# Patient Record
Sex: FEMALE | Race: WHITE | NOT HISPANIC OR LATINO | Employment: OTHER | URBAN - METROPOLITAN AREA
[De-identification: names, ages, dates, MRNs, and addresses within clinical notes are randomized per-mention and may not be internally consistent; named-entity substitution may affect disease eponyms.]

---

## 2018-08-01 DIAGNOSIS — M54.50 LUMBAGO: Primary | ICD-10-CM

## 2018-08-01 DIAGNOSIS — M54.2 CERVICALGIA: Primary | ICD-10-CM

## 2018-08-03 ENCOUNTER — HOSPITAL ENCOUNTER (OUTPATIENT)
Dept: RADIOLOGY | Facility: HOSPITAL | Age: 53
Discharge: HOME | End: 2018-08-03
Attending: NURSE PRACTITIONER

## 2018-08-03 DIAGNOSIS — M54.50 LUMBAGO: ICD-10-CM

## 2018-08-03 DIAGNOSIS — M54.2 CERVICALGIA: ICD-10-CM

## 2018-08-03 PROCEDURE — 72141 MRI NECK SPINE W/O DYE: CPT | Mod: 26,,, | Performed by: RADIOLOGY

## 2018-08-03 PROCEDURE — 72141 MRI NECK SPINE W/O DYE: CPT | Mod: TC

## 2018-08-03 PROCEDURE — 72148 MRI LUMBAR SPINE W/O DYE: CPT | Mod: TC

## 2018-08-03 PROCEDURE — 72148 MRI LUMBAR SPINE W/O DYE: CPT | Mod: 26,,, | Performed by: RADIOLOGY

## 2019-02-20 DIAGNOSIS — Z12.39 SCREENING BREAST EXAMINATION: Primary | ICD-10-CM

## 2019-02-22 ENCOUNTER — HOSPITAL ENCOUNTER (OUTPATIENT)
Dept: RADIOLOGY | Facility: HOSPITAL | Age: 54
Discharge: HOME | End: 2019-02-22
Attending: NURSE PRACTITIONER

## 2019-02-22 VITALS — WEIGHT: 130 LBS | HEIGHT: 65 IN | BODY MASS INDEX: 21.66 KG/M2

## 2019-02-22 DIAGNOSIS — Z12.39 SCREENING BREAST EXAMINATION: ICD-10-CM

## 2019-02-22 PROCEDURE — 77067 MAMMO DIGITAL SCREENING BILAT WITH CAD: ICD-10-PCS | Mod: 26,,, | Performed by: RADIOLOGY

## 2019-02-22 PROCEDURE — 77067 SCR MAMMO BI INCL CAD: CPT | Mod: 26,,, | Performed by: RADIOLOGY

## 2019-02-22 PROCEDURE — 77067 SCR MAMMO BI INCL CAD: CPT | Mod: TC

## 2019-05-16 ENCOUNTER — HOSPITAL ENCOUNTER (EMERGENCY)
Facility: HOSPITAL | Age: 54
Discharge: HOME | End: 2019-05-16
Attending: INTERNAL MEDICINE

## 2019-05-16 VITALS
TEMPERATURE: 98 F | HEIGHT: 65 IN | DIASTOLIC BLOOD PRESSURE: 82 MMHG | OXYGEN SATURATION: 98 % | RESPIRATION RATE: 18 BRPM | HEART RATE: 102 BPM | SYSTOLIC BLOOD PRESSURE: 122 MMHG | BODY MASS INDEX: 21.66 KG/M2 | WEIGHT: 130 LBS

## 2019-05-16 DIAGNOSIS — M54.2 NECK PAIN: ICD-10-CM

## 2019-05-16 DIAGNOSIS — M43.6 TORTICOLLIS, ACUTE: Primary | ICD-10-CM

## 2019-05-16 DIAGNOSIS — M47.892 OTHER OSTEOARTHRITIS OF SPINE, CERVICAL REGION: ICD-10-CM

## 2019-05-16 LAB
AMPHET+METHAMPHET UR QL: NORMAL
ANION GAP SERPL CALC-SCNC: 12 MMOL/L (ref 8–16)
BACTERIA #/AREA URNS HPF: ABNORMAL /HPF
BARBITURATES UR QL SCN>200 NG/ML: NEGATIVE
BASOPHILS # BLD AUTO: 0.07 K/UL (ref 0–0.2)
BASOPHILS NFR BLD: 0.8 % (ref 0–1.9)
BENZODIAZ UR QL SCN>200 NG/ML: NEGATIVE
BILIRUB UR QL STRIP: NEGATIVE
BUN SERPL-MCNC: 17 MG/DL (ref 6–20)
BZE UR QL SCN: NEGATIVE
CALCIUM SERPL-MCNC: 8.7 MG/DL (ref 8.7–10.5)
CANNABINOIDS UR QL SCN: NEGATIVE
CHLORIDE SERPL-SCNC: 104 MMOL/L (ref 95–110)
CLARITY UR: CLEAR
CO2 SERPL-SCNC: 25 MMOL/L (ref 23–29)
COLOR UR: YELLOW
CREAT SERPL-MCNC: 0.7 MG/DL (ref 0.5–1.4)
CREAT UR-MCNC: 49.8 MG/DL (ref 15–325)
CRP SERPL-MCNC: 0.87 MG/DL (ref 0–0.75)
DIFFERENTIAL METHOD: ABNORMAL
EOSINOPHIL # BLD AUTO: 0.1 K/UL (ref 0–0.5)
EOSINOPHIL NFR BLD: 1.1 % (ref 0–8)
ERYTHROCYTE [DISTWIDTH] IN BLOOD BY AUTOMATED COUNT: 11.9 % (ref 11.5–14.5)
EST. GFR  (AFRICAN AMERICAN): >60 ML/MIN/1.73 M^2
EST. GFR  (NON AFRICAN AMERICAN): >60 ML/MIN/1.73 M^2
GLUCOSE SERPL-MCNC: 91 MG/DL (ref 70–110)
GLUCOSE UR QL STRIP: NEGATIVE
HCT VFR BLD AUTO: 38.8 % (ref 37–48.5)
HGB BLD-MCNC: 13.1 G/DL (ref 12–16)
HGB UR QL STRIP: NEGATIVE
IMM GRANULOCYTES # BLD AUTO: 0.03 K/UL (ref 0–0.04)
IMM GRANULOCYTES NFR BLD AUTO: 0.3 % (ref 0–0.5)
KETONES UR QL STRIP: NEGATIVE
LEUKOCYTE ESTERASE UR QL STRIP: ABNORMAL
LYMPHOCYTES # BLD AUTO: 2.8 K/UL (ref 1–4.8)
LYMPHOCYTES NFR BLD: 30.3 % (ref 18–48)
MCH RBC QN AUTO: 32.3 PG (ref 27–31)
MCHC RBC AUTO-ENTMCNC: 33.8 G/DL (ref 32–36)
MCV RBC AUTO: 96 FL (ref 82–98)
MICROSCOPIC COMMENT: ABNORMAL
MONOCYTES # BLD AUTO: 0.6 K/UL (ref 0.3–1)
MONOCYTES NFR BLD: 6.2 % (ref 4–15)
NEUTROPHILS # BLD AUTO: 5.7 K/UL (ref 1.8–7.7)
NEUTROPHILS NFR BLD: 61.3 % (ref 38–73)
NITRITE UR QL STRIP: NEGATIVE
NRBC BLD-RTO: 0 /100 WBC
OPIATES UR QL SCN: NEGATIVE
PCP UR QL SCN>25 NG/ML: NEGATIVE
PH UR STRIP: 6 [PH] (ref 5–8)
PLATELET # BLD AUTO: 299 K/UL (ref 150–350)
PMV BLD AUTO: 9.9 FL (ref 9.2–12.9)
POTASSIUM SERPL-SCNC: 3 MMOL/L (ref 3.5–5.1)
PROT UR QL STRIP: NEGATIVE
RBC # BLD AUTO: 4.06 M/UL (ref 4–5.4)
RBC #/AREA URNS HPF: 1 /HPF (ref 0–4)
SODIUM SERPL-SCNC: 141 MMOL/L (ref 136–145)
SP GR UR STRIP: 1.01 (ref 1–1.03)
SQUAMOUS #/AREA URNS HPF: 5 /HPF
TOXICOLOGY INFORMATION: NORMAL
URN SPEC COLLECT METH UR: ABNORMAL
UROBILINOGEN UR STRIP-ACNC: NEGATIVE EU/DL
WBC # BLD AUTO: 9.21 K/UL (ref 3.9–12.7)
WBC #/AREA URNS HPF: 5 /HPF (ref 0–5)

## 2019-05-16 PROCEDURE — 72040 X-RAY EXAM NECK SPINE 2-3 VW: CPT | Mod: TC,FY

## 2019-05-16 PROCEDURE — 96375 TX/PRO/DX INJ NEW DRUG ADDON: CPT | Mod: 59

## 2019-05-16 PROCEDURE — 72040 X-RAY EXAM NECK SPINE 2-3 VW: CPT | Mod: 26,,, | Performed by: RADIOLOGY

## 2019-05-16 PROCEDURE — 72050 X-RAY EXAM NECK SPINE 4/5VWS: CPT | Mod: 26,,, | Performed by: RADIOLOGY

## 2019-05-16 PROCEDURE — 86140 C-REACTIVE PROTEIN: CPT

## 2019-05-16 PROCEDURE — 80307 DRUG TEST PRSMV CHEM ANLYZR: CPT

## 2019-05-16 PROCEDURE — 99284 EMERGENCY DEPT VISIT MOD MDM: CPT | Mod: 25

## 2019-05-16 PROCEDURE — 80048 BASIC METABOLIC PNL TOTAL CA: CPT

## 2019-05-16 PROCEDURE — 85025 COMPLETE CBC W/AUTO DIFF WBC: CPT

## 2019-05-16 PROCEDURE — 63600175 PHARM REV CODE 636 W HCPCS: Performed by: INTERNAL MEDICINE

## 2019-05-16 PROCEDURE — 96374 THER/PROPH/DIAG INJ IV PUSH: CPT

## 2019-05-16 PROCEDURE — 81000 URINALYSIS NONAUTO W/SCOPE: CPT | Mod: 59

## 2019-05-16 PROCEDURE — 72040 XR CERVICAL SPINE FLEXION  AND EXTENSION ONLY: ICD-10-PCS | Mod: 26,,, | Performed by: RADIOLOGY

## 2019-05-16 PROCEDURE — 72050 XR CERVICAL SPINE COMPLETE 5 VIEW: ICD-10-PCS | Mod: 26,,, | Performed by: RADIOLOGY

## 2019-05-16 PROCEDURE — 72050 X-RAY EXAM NECK SPINE 4/5VWS: CPT | Mod: TC,FY

## 2019-05-16 RX ORDER — AMLODIPINE BESYLATE 10 MG/1
10 TABLET ORAL DAILY
COMMUNITY
End: 2021-01-19

## 2019-05-16 RX ORDER — OMEPRAZOLE 10 MG/1
10 CAPSULE, DELAYED RELEASE ORAL DAILY
COMMUNITY
End: 2021-11-27

## 2019-05-16 RX ORDER — NAPROXEN 500 MG/1
500 TABLET ORAL 2 TIMES DAILY WITH MEALS
Qty: 10 TABLET | Refills: 0 | Status: SHIPPED | OUTPATIENT
Start: 2019-05-16 | End: 2020-03-15 | Stop reason: CLARIF

## 2019-05-16 RX ORDER — ATORVASTATIN CALCIUM 10 MG/1
10 TABLET, FILM COATED ORAL DAILY
COMMUNITY
End: 2021-01-06

## 2019-05-16 RX ORDER — HYDROXYZINE PAMOATE 25 MG/1
25 CAPSULE ORAL 4 TIMES DAILY
COMMUNITY
End: 2021-07-14 | Stop reason: SDUPTHER

## 2019-05-16 RX ORDER — KETOROLAC TROMETHAMINE 30 MG/ML
30 INJECTION, SOLUTION INTRAMUSCULAR; INTRAVENOUS
Status: COMPLETED | OUTPATIENT
Start: 2019-05-16 | End: 2019-05-16

## 2019-05-16 RX ORDER — DEXAMETHASONE SODIUM PHOSPHATE 100 MG/10ML
10 INJECTION INTRAMUSCULAR; INTRAVENOUS
Status: COMPLETED | OUTPATIENT
Start: 2019-05-16 | End: 2019-05-16

## 2019-05-16 RX ORDER — LORAZEPAM 2 MG/ML
2 INJECTION INTRAMUSCULAR
Status: COMPLETED | OUTPATIENT
Start: 2019-05-16 | End: 2019-05-16

## 2019-05-16 RX ORDER — ASPIRIN 325 MG
325 TABLET ORAL DAILY
Status: ON HOLD | COMMUNITY
End: 2021-12-07 | Stop reason: HOSPADM

## 2019-05-16 RX ORDER — LORAZEPAM 1 MG/1
1 TABLET ORAL EVERY 8 HOURS PRN
Qty: 10 TABLET | Refills: 0 | Status: SHIPPED | OUTPATIENT
Start: 2019-05-16 | End: 2020-04-20

## 2019-05-16 RX ORDER — VENLAFAXINE HYDROCHLORIDE 150 MG/1
75 CAPSULE, EXTENDED RELEASE ORAL DAILY
COMMUNITY
End: 2021-01-19

## 2019-05-16 RX ORDER — FLUTICASONE PROPIONATE AND SALMETEROL 100; 50 UG/1; UG/1
1 POWDER RESPIRATORY (INHALATION) 2 TIMES DAILY
COMMUNITY
End: 2021-02-23

## 2019-05-16 RX ADMIN — LORAZEPAM 2 MG: 2 INJECTION INTRAMUSCULAR; INTRAVENOUS at 06:05

## 2019-05-16 RX ADMIN — KETOROLAC TROMETHAMINE 30 MG: 30 INJECTION, SOLUTION INTRAMUSCULAR at 06:05

## 2019-05-16 RX ADMIN — DEXAMETHASONE SODIUM PHOSPHATE 10 MG: 10 INJECTION INTRAMUSCULAR; INTRAVENOUS at 06:05

## 2019-05-16 NOTE — DISCHARGE INSTRUCTIONS
Ativan 1 tablet every 8 hr as needed for spasm  Naprosyn 1 twice daily with meals  Discontinue any activity that would be worsening your neck issue including tobacco use    Follow-up with primary care provider

## 2019-05-16 NOTE — ED NOTES
Pt to ED with c/o pain & stiffness to neck X several days. Pt states she has had trouble with shoulders and back pain & for past couple days it has traveled to neck. Respirations noted, even & non-labored.

## 2019-05-16 NOTE — ED TRIAGE NOTES
"Pt to ED with c/o of "stiff neck". Pt states she has had issues with shoulder pain in the past & for the past 2 days it has moved towards her neck.  "

## 2020-03-15 ENCOUNTER — HOSPITAL ENCOUNTER (OUTPATIENT)
Facility: HOSPITAL | Age: 55
Discharge: HOME | End: 2020-03-18
Attending: EMERGENCY MEDICINE | Admitting: INTERNAL MEDICINE
Payer: MEDICAID

## 2020-03-15 DIAGNOSIS — Z01.818 PRE-OP EVALUATION: ICD-10-CM

## 2020-03-15 DIAGNOSIS — N76.4 LEFT GENITAL LABIAL ABSCESS: ICD-10-CM

## 2020-03-15 DIAGNOSIS — L02.214 ABSCESS OF GROIN, LEFT: Primary | ICD-10-CM

## 2020-03-15 DIAGNOSIS — Z48.89 ENCOUNTER FOR POSTOPERATIVE CARE: ICD-10-CM

## 2020-03-15 PROBLEM — J44.1 COPD EXACERBATION: Status: ACTIVE | Noted: 2020-03-15

## 2020-03-15 LAB
ALBUMIN SERPL BCP-MCNC: 3.7 G/DL (ref 3.5–5.2)
ALP SERPL-CCNC: 65 U/L (ref 55–135)
ALT SERPL W/O P-5'-P-CCNC: 13 U/L (ref 10–44)
ANION GAP SERPL CALC-SCNC: 10 MMOL/L (ref 8–16)
APTT BLDCRRT: 29 SEC (ref 21–32)
AST SERPL-CCNC: 16 U/L (ref 10–40)
BASOPHILS # BLD AUTO: 0.06 K/UL (ref 0–0.2)
BASOPHILS NFR BLD: 0.3 % (ref 0–1.9)
BILIRUB SERPL-MCNC: 0.8 MG/DL (ref 0.1–1)
BILIRUB UR QL STRIP: NEGATIVE
BUN SERPL-MCNC: 11 MG/DL (ref 6–20)
CALCIUM SERPL-MCNC: 8.6 MG/DL (ref 8.7–10.5)
CHLORIDE SERPL-SCNC: 104 MMOL/L (ref 95–110)
CLARITY UR: CLEAR
CO2 SERPL-SCNC: 25 MMOL/L (ref 23–29)
COLOR UR: YELLOW
CREAT SERPL-MCNC: 0.8 MG/DL (ref 0.5–1.4)
CRP SERPL-MCNC: 23.3 MG/DL (ref 0–0.75)
DIFFERENTIAL METHOD: ABNORMAL
EOSINOPHIL # BLD AUTO: 0.1 K/UL (ref 0–0.5)
EOSINOPHIL NFR BLD: 0.4 % (ref 0–8)
ERYTHROCYTE [DISTWIDTH] IN BLOOD BY AUTOMATED COUNT: 11.7 % (ref 11.5–14.5)
ERYTHROCYTE [SEDIMENTATION RATE] IN BLOOD BY WESTERGREN METHOD: 48 MM/HR (ref 0–20)
EST. GFR  (AFRICAN AMERICAN): >60 ML/MIN/1.73 M^2
EST. GFR  (NON AFRICAN AMERICAN): >60 ML/MIN/1.73 M^2
GLUCOSE SERPL-MCNC: 97 MG/DL (ref 70–110)
GLUCOSE UR QL STRIP: NEGATIVE
HCT VFR BLD AUTO: 41.1 % (ref 37–48.5)
HGB BLD-MCNC: 13.7 G/DL (ref 12–16)
HGB UR QL STRIP: NEGATIVE
IMM GRANULOCYTES # BLD AUTO: 0.1 K/UL (ref 0–0.04)
IMM GRANULOCYTES NFR BLD AUTO: 0.5 % (ref 0–0.5)
INR PPP: 1.2 (ref 0.8–1.2)
KETONES UR QL STRIP: NEGATIVE
LACTATE SERPL-SCNC: 1 MMOL/L (ref 0.5–2.2)
LEUKOCYTE ESTERASE UR QL STRIP: NEGATIVE
LYMPHOCYTES # BLD AUTO: 2.2 K/UL (ref 1–4.8)
LYMPHOCYTES NFR BLD: 12.2 % (ref 18–48)
MCH RBC QN AUTO: 31.6 PG (ref 27–31)
MCHC RBC AUTO-ENTMCNC: 33.3 G/DL (ref 32–36)
MCV RBC AUTO: 95 FL (ref 82–98)
MONOCYTES # BLD AUTO: 0.8 K/UL (ref 0.3–1)
MONOCYTES NFR BLD: 4.1 % (ref 4–15)
NEUTROPHILS # BLD AUTO: 15.2 K/UL (ref 1.8–7.7)
NEUTROPHILS NFR BLD: 82.5 % (ref 38–73)
NITRITE UR QL STRIP: NEGATIVE
NRBC BLD-RTO: 0 /100 WBC
PH UR STRIP: 6 [PH] (ref 5–8)
PLATELET # BLD AUTO: 320 K/UL (ref 150–350)
PMV BLD AUTO: 10.1 FL (ref 9.2–12.9)
POTASSIUM SERPL-SCNC: 3 MMOL/L (ref 3.5–5.1)
PROT SERPL-MCNC: 7.2 G/DL (ref 6–8.4)
PROT UR QL STRIP: ABNORMAL
PROTHROMBIN TIME: 13.2 SEC (ref 9–12.5)
RBC # BLD AUTO: 4.34 M/UL (ref 4–5.4)
SODIUM SERPL-SCNC: 139 MMOL/L (ref 136–145)
SP GR UR STRIP: 1.02 (ref 1–1.03)
TSH SERPL DL<=0.005 MIU/L-ACNC: 2.46 UIU/ML (ref 0.34–5.6)
URN SPEC COLLECT METH UR: ABNORMAL
UROBILINOGEN UR STRIP-ACNC: NEGATIVE EU/DL
WBC # BLD AUTO: 18.38 K/UL (ref 3.9–12.7)

## 2020-03-15 PROCEDURE — 36415 COLL VENOUS BLD VENIPUNCTURE: CPT

## 2020-03-15 PROCEDURE — 94761 N-INVAS EAR/PLS OXIMETRY MLT: CPT

## 2020-03-15 PROCEDURE — G0378 HOSPITAL OBSERVATION PER HR: HCPCS

## 2020-03-15 PROCEDURE — 85651 RBC SED RATE NONAUTOMATED: CPT

## 2020-03-15 PROCEDURE — 83605 ASSAY OF LACTIC ACID: CPT

## 2020-03-15 PROCEDURE — 63600175 PHARM REV CODE 636 W HCPCS: Performed by: EMERGENCY MEDICINE

## 2020-03-15 PROCEDURE — 80053 COMPREHEN METABOLIC PANEL: CPT

## 2020-03-15 PROCEDURE — 85025 COMPLETE CBC W/AUTO DIFF WBC: CPT

## 2020-03-15 PROCEDURE — 99285 EMERGENCY DEPT VISIT HI MDM: CPT | Mod: 25

## 2020-03-15 PROCEDURE — 94640 AIRWAY INHALATION TREATMENT: CPT

## 2020-03-15 PROCEDURE — 81003 URINALYSIS AUTO W/O SCOPE: CPT

## 2020-03-15 PROCEDURE — 71045 X-RAY EXAM CHEST 1 VIEW: CPT | Mod: TC,FY

## 2020-03-15 PROCEDURE — 93005 ELECTROCARDIOGRAM TRACING: CPT

## 2020-03-15 PROCEDURE — 71045 XR CHEST AP PORTABLE: ICD-10-PCS | Mod: 26,,, | Performed by: RADIOLOGY

## 2020-03-15 PROCEDURE — 63600175 PHARM REV CODE 636 W HCPCS: Performed by: INTERNAL MEDICINE

## 2020-03-15 PROCEDURE — 99220 PR INITIAL OBSERVATION CARE,LEVL III: ICD-10-PCS | Mod: ,,, | Performed by: INTERNAL MEDICINE

## 2020-03-15 PROCEDURE — 25000242 PHARM REV CODE 250 ALT 637 W/ HCPCS: Performed by: INTERNAL MEDICINE

## 2020-03-15 PROCEDURE — 96365 THER/PROPH/DIAG IV INF INIT: CPT

## 2020-03-15 PROCEDURE — 71045 X-RAY EXAM CHEST 1 VIEW: CPT | Mod: 26,,, | Performed by: RADIOLOGY

## 2020-03-15 PROCEDURE — 84443 ASSAY THYROID STIM HORMONE: CPT

## 2020-03-15 PROCEDURE — 21400001 HC TELEMETRY ROOM

## 2020-03-15 PROCEDURE — 87040 BLOOD CULTURE FOR BACTERIA: CPT

## 2020-03-15 PROCEDURE — 99220 PR INITIAL OBSERVATION CARE,LEVL III: CPT | Mod: ,,, | Performed by: INTERNAL MEDICINE

## 2020-03-15 PROCEDURE — 25000003 PHARM REV CODE 250: Performed by: INTERNAL MEDICINE

## 2020-03-15 PROCEDURE — 85610 PROTHROMBIN TIME: CPT

## 2020-03-15 PROCEDURE — 85730 THROMBOPLASTIN TIME PARTIAL: CPT

## 2020-03-15 PROCEDURE — 86140 C-REACTIVE PROTEIN: CPT

## 2020-03-15 RX ORDER — AMLODIPINE BESYLATE 2.5 MG/1
10 TABLET ORAL DAILY
Status: DISCONTINUED | OUTPATIENT
Start: 2020-03-16 | End: 2020-03-18 | Stop reason: HOSPADM

## 2020-03-15 RX ORDER — HYDROXYZINE PAMOATE 25 MG/1
25 CAPSULE ORAL 4 TIMES DAILY
Status: DISCONTINUED | OUTPATIENT
Start: 2020-03-15 | End: 2020-03-18 | Stop reason: HOSPADM

## 2020-03-15 RX ORDER — ZOLPIDEM TARTRATE 5 MG/1
5 TABLET ORAL NIGHTLY PRN
Status: DISCONTINUED | OUTPATIENT
Start: 2020-03-15 | End: 2020-03-18 | Stop reason: HOSPADM

## 2020-03-15 RX ORDER — METHOCARBAMOL 500 MG/1
500 TABLET, FILM COATED ORAL 2 TIMES DAILY
COMMUNITY
End: 2021-01-19 | Stop reason: SDUPTHER

## 2020-03-15 RX ORDER — SODIUM CHLORIDE 9 MG/ML
INJECTION, SOLUTION INTRAVENOUS CONTINUOUS
Status: DISCONTINUED | OUTPATIENT
Start: 2020-03-15 | End: 2020-03-15

## 2020-03-15 RX ORDER — IPRATROPIUM BROMIDE AND ALBUTEROL SULFATE 2.5; .5 MG/3ML; MG/3ML
3 SOLUTION RESPIRATORY (INHALATION) EVERY 4 HOURS
Status: DISCONTINUED | OUTPATIENT
Start: 2020-03-15 | End: 2020-03-18 | Stop reason: HOSPADM

## 2020-03-15 RX ORDER — SODIUM CHLORIDE AND POTASSIUM CHLORIDE 150; 900 MG/100ML; MG/100ML
INJECTION, SOLUTION INTRAVENOUS CONTINUOUS
Status: DISCONTINUED | OUTPATIENT
Start: 2020-03-15 | End: 2020-03-18 | Stop reason: HOSPADM

## 2020-03-15 RX ORDER — ACETAMINOPHEN 325 MG/1
650 TABLET ORAL EVERY 4 HOURS PRN
Status: DISCONTINUED | OUTPATIENT
Start: 2020-03-15 | End: 2020-03-18 | Stop reason: HOSPADM

## 2020-03-15 RX ORDER — ATENOLOL 25 MG/1
25 TABLET ORAL DAILY
Status: DISCONTINUED | OUTPATIENT
Start: 2020-03-16 | End: 2020-03-18 | Stop reason: HOSPADM

## 2020-03-15 RX ORDER — POTASSIUM CHLORIDE 7.45 MG/ML
10 INJECTION INTRAVENOUS ONCE
Status: COMPLETED | OUTPATIENT
Start: 2020-03-15 | End: 2020-03-15

## 2020-03-15 RX ORDER — SODIUM CHLORIDE 0.9 % (FLUSH) 0.9 %
10 SYRINGE (ML) INJECTION
Status: DISCONTINUED | OUTPATIENT
Start: 2020-03-15 | End: 2020-03-18 | Stop reason: HOSPADM

## 2020-03-15 RX ORDER — VANCOMYCIN HCL IN 5 % DEXTROSE 1G/250ML
1000 PLASTIC BAG, INJECTION (ML) INTRAVENOUS
Status: COMPLETED | OUTPATIENT
Start: 2020-03-15 | End: 2020-03-15

## 2020-03-15 RX ORDER — FLUTICASONE FUROATE AND VILANTEROL 100; 25 UG/1; UG/1
1 POWDER RESPIRATORY (INHALATION) DAILY
Status: DISCONTINUED | OUTPATIENT
Start: 2020-03-16 | End: 2020-03-18 | Stop reason: HOSPADM

## 2020-03-15 RX ORDER — VENLAFAXINE HYDROCHLORIDE 37.5 MG/1
75 CAPSULE, EXTENDED RELEASE ORAL DAILY
Status: DISCONTINUED | OUTPATIENT
Start: 2020-03-16 | End: 2020-03-18 | Stop reason: HOSPADM

## 2020-03-15 RX ORDER — ONDANSETRON 2 MG/ML
4 INJECTION INTRAMUSCULAR; INTRAVENOUS EVERY 4 HOURS PRN
Status: DISCONTINUED | OUTPATIENT
Start: 2020-03-15 | End: 2020-03-18 | Stop reason: HOSPADM

## 2020-03-15 RX ORDER — POTASSIUM CHLORIDE 20 MEQ/1
40 TABLET, EXTENDED RELEASE ORAL ONCE
Status: COMPLETED | OUTPATIENT
Start: 2020-03-15 | End: 2020-03-15

## 2020-03-15 RX ORDER — LORAZEPAM 1 MG/1
1 TABLET ORAL EVERY 8 HOURS PRN
Status: DISCONTINUED | OUTPATIENT
Start: 2020-03-15 | End: 2020-03-15

## 2020-03-15 RX ORDER — MORPHINE SULFATE 4 MG/ML
2 INJECTION, SOLUTION INTRAMUSCULAR; INTRAVENOUS EVERY 4 HOURS PRN
Status: DISCONTINUED | OUTPATIENT
Start: 2020-03-15 | End: 2020-03-16

## 2020-03-15 RX ADMIN — VANCOMYCIN HYDROCHLORIDE 1000 MG: 1 INJECTION, POWDER, LYOPHILIZED, FOR SOLUTION INTRAVENOUS at 04:03

## 2020-03-15 RX ADMIN — IPRATROPIUM BROMIDE AND ALBUTEROL SULFATE 3 ML: .5; 3 SOLUTION RESPIRATORY (INHALATION) at 07:03

## 2020-03-15 RX ADMIN — POTASSIUM CHLORIDE 40 MEQ: 1500 TABLET, EXTENDED RELEASE ORAL at 06:03

## 2020-03-15 RX ADMIN — SODIUM CHLORIDE AND POTASSIUM CHLORIDE: .9; .15 SOLUTION INTRAVENOUS at 10:03

## 2020-03-15 RX ADMIN — HYDROXYZINE PAMOATE 25 MG: 25 CAPSULE ORAL at 09:03

## 2020-03-15 RX ADMIN — PIPERACILLIN AND TAZOBACTAM 3.38 G: 3; .375 INJECTION, POWDER, LYOPHILIZED, FOR SOLUTION INTRAVENOUS; PARENTERAL at 10:03

## 2020-03-15 RX ADMIN — MORPHINE SULFATE 2 MG: 4 INJECTION, SOLUTION INTRAMUSCULAR; INTRAVENOUS at 10:03

## 2020-03-15 RX ADMIN — POTASSIUM CHLORIDE 10 MEQ: 10 INJECTION, SOLUTION INTRAVENOUS at 06:03

## 2020-03-15 RX ADMIN — SODIUM CHLORIDE 1497 ML: 9 INJECTION, SOLUTION INTRAVENOUS at 03:03

## 2020-03-15 RX ADMIN — MORPHINE SULFATE 2 MG: 4 INJECTION, SOLUTION INTRAMUSCULAR; INTRAVENOUS at 06:03

## 2020-03-15 RX ADMIN — PIPERACILLIN AND TAZOBACTAM 3.38 G: 3; .375 INJECTION, POWDER, LYOPHILIZED, FOR SOLUTION INTRAVENOUS; PARENTERAL at 03:03

## 2020-03-15 NOTE — ED TRIAGE NOTES
Patient has an abscess on her suprapubic area x4 days, went to urgent care and was told to come here.

## 2020-03-15 NOTE — ED PROVIDER NOTES
Encounter Date: 3/15/2020       History     Chief Complaint   Patient presents with    Abscess     Patient has an abscess on her suprapubic area x4 days, went to urgent care and was told to come here.     54-year-old female with past medical history significant for anxiety, osteoarthritis, asthma, back pain, COPD presents to the ED for evaluation left labial erythema, swelling, pain x4 days.  States the area began as a small pustule that quickly increased in size and became more tender and warm to touch.  Denies fever, chills.  Denies known injury or insect bite.        Review of patient's allergies indicates:  No Known Allergies  Past Medical History:   Diagnosis Date    Anxiety     Arthritis     Asthma     Back pain     COPD (chronic obstructive pulmonary disease)      Past Surgical History:   Procedure Laterality Date    BREAST CYST EXCISION       Family History   Problem Relation Age of Onset    Breast cancer Sister      Social History     Tobacco Use    Smoking status: Current Every Day Smoker    Smokeless tobacco: Never Used   Substance Use Topics    Alcohol use: Not Currently    Drug use: Never     Review of Systems   Constitutional: Negative for appetite change, chills, diaphoresis, fatigue and fever.   HENT: Negative for congestion, ear pain, rhinorrhea, sinus pressure, sinus pain, sore throat and tinnitus.    Eyes: Negative for photophobia and visual disturbance.   Respiratory: Negative for cough, chest tightness, shortness of breath and wheezing.    Cardiovascular: Negative for chest pain, palpitations and leg swelling.   Gastrointestinal: Negative for abdominal pain, constipation, diarrhea, nausea and vomiting.   Endocrine: Negative for cold intolerance, heat intolerance, polydipsia, polyphagia and polyuria.   Genitourinary: Negative for decreased urine volume, difficulty urinating, dysuria, flank pain, frequency, hematuria and urgency.   Musculoskeletal: Negative for arthralgias, back pain,  gait problem, joint swelling, myalgias, neck pain and neck stiffness.   Skin: Positive for color change. Negative for pallor, rash and wound.   Allergic/Immunologic: Negative for immunocompromised state.   Neurological: Negative for dizziness, syncope, weakness, light-headedness, numbness and headaches.   Hematological: Negative for adenopathy. Does not bruise/bleed easily.   Psychiatric/Behavioral: Negative for decreased concentration, dysphoric mood and sleep disturbance. The patient is not nervous/anxious.    All other systems reviewed and are negative.      Physical Exam     Initial Vitals [03/15/20 1401]   BP Pulse Resp Temp SpO2   127/73 92 20 98.3 °F (36.8 °C) 99 %      MAP       --         Physical Exam    Nursing note and vitals reviewed.  Constitutional: She appears well-developed and well-nourished. She is not diaphoretic. No distress.   HENT:   Head: Normocephalic and atraumatic.   Right Ear: External ear normal.   Left Ear: External ear normal.   Nose: Nose normal.   Mouth/Throat: Oropharynx is clear and moist.   Eyes: Conjunctivae are normal. Pupils are equal, round, and reactive to light. No scleral icterus.   Neck: Normal range of motion. Neck supple. No JVD present.   Cardiovascular: Normal rate, regular rhythm, normal heart sounds and intact distal pulses.   Pulmonary/Chest: Breath sounds normal. No respiratory distress. She has no wheezes. She has no rhonchi. She has no rales. She exhibits no tenderness.   Abdominal: Soft. Bowel sounds are normal. She exhibits no distension. There is no tenderness. There is no rebound and no guarding.   Musculoskeletal: Normal range of motion. She exhibits no edema or tenderness.   Lymphadenopathy:     She has no cervical adenopathy.   Neurological: She is alert and oriented to person, place, and time. GCS score is 15. GCS eye subscore is 4. GCS verbal subscore is 5. GCS motor subscore is 6.   Skin: Skin is warm and dry. Capillary refill takes less than 2 seconds.  Abscess noted. No rash noted. There is erythema. No pallor.   Extensive cellulitis involving the left labia majora   Psychiatric: She has a normal mood and affect. Her behavior is normal. Judgment and thought content normal.         ED Course   Procedures  Labs Reviewed   CBC W/ AUTO DIFFERENTIAL - Abnormal; Notable for the following components:       Result Value    WBC 18.38 (*)     Mean Corpuscular Hemoglobin 31.6 (*)     Gran # (ANC) 15.2 (*)     Immature Grans (Abs) 0.10 (*)     Gran% 82.5 (*)     Lymph% 12.2 (*)     All other components within normal limits   COMPREHENSIVE METABOLIC PANEL - Abnormal; Notable for the following components:    Potassium 3.0 (*)     Calcium 8.6 (*)     All other components within normal limits   PROTIME-INR - Abnormal; Notable for the following components:    Prothrombin Time 13.2 (*)     All other components within normal limits   URINALYSIS, REFLEX TO URINE CULTURE - Abnormal; Notable for the following components:    Protein, UA Trace (*)     All other components within normal limits    Narrative:     Preferred Collection Type->Urine, Clean Catch   SEDIMENTATION RATE - Abnormal; Notable for the following components:    Sed Rate 48 (*)     All other components within normal limits   C-REACTIVE PROTEIN - Abnormal; Notable for the following components:    CRP 23.30 (*)     All other components within normal limits   APTT   LACTIC ACID, PLASMA     EKG Readings: (Independently Interpreted)   Initial Reading: No STEMI. Rhythm: Normal Sinus Rhythm. Heart Rate: 83. Ectopy: No Ectopy. Conduction: Normal. ST Segments: Normal ST Segments. T Waves: Normal. Axis: Normal. Clinical Impression: Normal Sinus Rhythm       Imaging Results          X-Ray Chest AP Portable (Final result)  Result time 03/15/20 14:38:40    Final result by Ledy Doan MD (03/15/20 14:38:40)                 Impression:      No acute abnormality.  COPD.      Electronically signed by: Ledy Matias  Lobdaniel  Date:    03/15/2020  Time:    14:38             Narrative:    EXAMINATION:  XR CHEST AP PORTABLE    CLINICAL HISTORY:  abscess;.    Suprapubic abscess    TECHNIQUE:  Single frontal portable view of the chest was performed.    COMPARISON:  2/18/18    FINDINGS:  The lungs are hyperinflated and clear, with normal appearance of pulmonary vasculature and no pleural effusion or pneumothorax.    The cardiac silhouette is normal in size. The hilar and mediastinal contours are unremarkable.    Bones are intact.                                 Medical Decision Making:   Differential Diagnosis:   Cellulitis, abscess  ED Management:  Extensive cellulitis that will require inpatient treatment with IV antibiotics and possible surgical intervention given the location and extent.  Case discussed with Dr. Rice, who accepts admission.                    ED Course as of Mar 19 0003   Sun Mar 15, 2020   1602 Hemoglobin: 13.7 [MM]      ED Course User Index  [MM] Anna De Jesus MD                Clinical Impression:       ICD-10-CM ICD-9-CM   1. Abscess of groin, left L02.214 682.2   2. Left genital labial abscess N76.4 616.4   3. Pre-op evaluation Z01.818 V72.84   4. Encounter for postoperative care Z48.89 V58.49         Disposition:   Disposition: Discharged  Condition: Stable                        Anna De Jesus MD  03/19/20 0006

## 2020-03-15 NOTE — H&P
[facility] Medicine  History & Physical    Patient Name: Zayra White  MRN: [id number]  Admission Date: 3/15/2020  Attending Physician: Servando Rice MD  Primary Care Provider: Tiffanie Marc NP         Patient information was obtained from patient and ER records.     Subjective:     Principal Problem:Left genital labial abscess    Chief Complaint:   Chief Complaint   Patient presents with    Abscess     Patient has an abscess on her suprapubic area x4 days, went to urgent care and was told to come here.        HPI: Patient is a 54-year-old female that presented to the emergency department complaining of add area of swelling on her left suprapubic area that is erythematous, painful, and swelling.  Patient has surrounding erythema that extends to the left thigh and superior from the pubis.  Patient denies any fever but states that she felt hot and has had some chills.  She denies shortness of breath denies nausea vomiting and has a past medical history of COPD, asthma, anxiety, back pain, and history of pulmonary embolus.  Patient is not on any anticoagulants at this time    Past Medical History:   Diagnosis Date    Anxiety     Arthritis     Asthma     Back pain     COPD (chronic obstructive pulmonary disease)     Pulmonary embolism     10 years ago       Past Surgical History:   Procedure Laterality Date    BIOPSY      right breast    BREAST CYST EXCISION       SECTION      CHOLECYSTECTOMY         Review of patient's allergies indicates:  No Known Allergies    No current facility-administered medications on file prior to encounter.      Current Outpatient Medications on File Prior to Encounter   Medication Sig    aspirin 325 MG tablet Take 325 mg by mouth once daily.    atorvastatin (LIPITOR) 10 MG tablet Take 10 mg by mouth once daily.    fluticasone-salmeterol diskus inhaler 100-50 mcg Inhale 1 puff into the lungs 2 (two) times daily.  Controller    hydrOXYzine pamoate (VISTARIL) 25 MG Cap Take 25 mg by mouth 4 (four) times daily.    methocarbamoL (ROBAXIN) 500 MG Tab Take 500 mg by mouth 2 (two) times daily.    omeprazole (PRILOSEC) 10 MG capsule Take 10 mg by mouth once daily.    venlafaxine (EFFEXOR-XR) 150 MG Cp24 Take 75 mg by mouth once daily.    amLODIPine (NORVASC) 10 MG tablet Take 10 mg by mouth once daily.    LORazepam (ATIVAN) 1 MG tablet Take 1 tablet (1 mg total) by mouth every 8 (eight) hours as needed (spasm).    [DISCONTINUED] naproxen (EC NAPROSYN) 500 MG EC tablet Take 1 tablet (500 mg total) by mouth 2 (two) times daily with meals.     Family History     Problem Relation (Age of Onset)    Breast cancer Sister        Tobacco Use    Smoking status: Current Every Day Smoker    Smokeless tobacco: Never Used   Substance and Sexual Activity    Alcohol use: Yes     Comment: occ    Drug use: Never    Sexual activity: Not Currently     Birth control/protection: Post-menopausal     Review of Systems   Constitutional: Negative for activity change, appetite change, fatigue and fever.   HENT: Negative for congestion, ear discharge, mouth sores, nosebleeds, rhinorrhea, sinus pressure, sinus pain and tinnitus.    Eyes: Negative.  Negative for pain, redness and itching.   Respiratory: Negative for apnea, cough, choking, chest tightness, shortness of breath, wheezing and stridor.    Cardiovascular: Negative for chest pain, palpitations and leg swelling.   Gastrointestinal: Negative for abdominal distention, abdominal pain, anal bleeding, blood in stool, constipation and diarrhea.   Endocrine: Negative.    Genitourinary: Negative for difficulty urinating, flank pain, frequency and urgency.   Musculoskeletal: Negative for arthralgias, back pain, gait problem and myalgias.   Skin: Positive for wound. Negative for color change and pallor.        8 x 8 cm area of erythema induration and fluctuance.  There was expanding erythema to the  left thigh and to the left rib labia majora in superior from the lesion.   Allergic/Immunologic: Negative.    Neurological: Negative for dizziness, facial asymmetry, weakness, light-headedness and headaches.   Hematological: Negative for adenopathy. Does not bruise/bleed easily.   Psychiatric/Behavioral: The patient is nervous/anxious.      Objective:     Vital Signs (Most Recent):  Temp: 97.3 °F (36.3 °C) (03/15/20 1634)  Pulse: 86 (03/15/20 1634)  Resp: 18 (03/15/20 1634)  BP: 132/64 (03/15/20 1634)  SpO2: (!) 94 % (03/15/20 1634) Vital Signs (24h Range):  Temp:  [97.3 °F (36.3 °C)-98.3 °F (36.8 °C)] 97.3 °F (36.3 °C)  Pulse:  [77-92] 86  Resp:  [18-20] 18  SpO2:  [94 %-99 %] 94 %  BP: (104-132)/(47-73) 132/64     Weight: 49.9 kg (110 lb)  Body mass index is 18.3 kg/m².    Physical Exam   Constitutional: She is oriented to person, place, and time. She appears well-developed and well-nourished.   HENT:   Head: Normocephalic and atraumatic.   Eyes: Pupils are equal, round, and reactive to light. Conjunctivae are normal.   Neck: Normal range of motion.   Cardiovascular: Normal rate, regular rhythm, normal heart sounds and intact distal pulses.   Pulmonary/Chest: Effort normal.   Abdominal: Soft.   Neurological: She is alert and oriented to person, place, and time.   Skin: Skin is warm and dry. Capillary refill takes less than 2 seconds. Rash noted. There is erythema.        Psychiatric: She has a normal mood and affect. Her behavior is normal. Thought content normal.   Nursing note and vitals reviewed.        CRANIAL NERVES     CN III, IV, VI   Pupils are equal, round, and reactive to light.       Significant Labs:   Recent Lab Results       03/15/20  1523   03/15/20  1437   03/15/20  1421        Albumin   3.7       Alkaline Phosphatase   65       ALT   13       Anion Gap   10       Appearance, UA     Clear     aPTT   29.0       AST   16       Baso #   0.06       Basophil%   0.3       Bilirubin (UA)     Negative      BILIRUBIN TOTAL   0.8  Comment:  For infants and newborns, interpretation of results should be based  on gestational age, weight and in agreement with clinical  observations.  Premature Infant recommended reference ranges:  Up to 24 hours.............<8.0 mg/dL  Up to 48 hours............<12.0 mg/dL  3-5 days..................<15.0 mg/dL  6-29 days.................<15.0 mg/dL         BUN, Bld   11       Calcium   8.6       Chloride   104       CO2   25       Color, UA     Yellow     Creatinine   0.8       CRP   23.30       Differential Method   Automated       eGFR if    >60.0       eGFR if non    >60.0  Comment:  Calculation used to obtain the estimated glomerular filtration  rate (eGFR) is the CKD-EPI equation.          Eos #   0.1       Eosinophil%   0.4       Glucose   97       Glucose, UA     Negative     Gran # (ANC)   15.2       Gran%   82.5       Hematocrit   41.1       Hemoglobin   13.7       Immature Grans (Abs)   0.10  Comment:  Mild elevation in immature granulocytes is non specific and   can be seen in a variety of conditions including stress response,   acute inflammation, trauma and pregnancy. Correlation with other   laboratory and clinical findings is essential.         Immature Granulocytes   0.5       INR   1.2  Comment:  Coumadin Therapy:  2.0 - 3.0 for INR for all indicators except mechanical heart valves  and antiphospholipid syndromes which should use 2.5 - 3.5.         Ketones, UA     Negative     Lactate, Ayad 1.0  Comment:  Falsely low lactic acid results can be found in samples   containing >=13.0 mg/dL total bilirubin and/or >=3.5 mg/dL   direct bilirubin.           Leukocytes, UA     Negative     Lymph #   2.2       Lymph%   12.2       MCH   31.6       MCHC   33.3       MCV   95       Mono #   0.8       Mono%   4.1       MPV   10.1       NITRITE UA     Negative     nRBC   0       Occult Blood UA     Negative     pH, UA     6.0     Platelets   320        Potassium   3.0       PROTEIN TOTAL   7.2       Protein, UA     Trace  Comment:  Recommend a 24 hour urine protein or a urine   protein/creatinine ratio if globulin induced proteinuria is  clinically suspected.       Protime   13.2       RBC   4.34       RDW   11.7       Sed Rate   48       Sodium   139       Specific Hensley, UA     1.020     Specimen UA     Urine, Clean Catch     UROBILINOGEN UA     Negative     WBC   18.38           All pertinent labs within the past 24 hours have been reviewed.    Significant Imaging: I have reviewed and interpreted all pertinent imaging results/findings within the past 24 hours.    Assessment/Plan:     * Left genital labial abscess  03/15/2020:  Admit to medical-surgical unit.  Begin piperacillin and vancomycin IV for this abscess  Blood cultures done in the emergency department  Consult General surgery for incision and debridement.  Keep NPO after midnight due to possible surgery.      COPD exacerbation  03/15/2020:  Continue nebulization treatments DuoNeb every 6 hr as needed  Continue home medications.        VTE Risk Mitigation (From admission, onward)         Ordered     IP VTE LOW RISK PATIENT  Once      03/15/20 1642     Place sequential compression device  Until discontinued      03/15/20 1642                   Servando Rice MD  Department of Hospital Medicine   [facility]

## 2020-03-15 NOTE — SUBJECTIVE & OBJECTIVE
Past Medical History:   Diagnosis Date    Anxiety     Arthritis     Asthma     Back pain     COPD (chronic obstructive pulmonary disease)     Pulmonary embolism     10 years ago       Past Surgical History:   Procedure Laterality Date    BIOPSY      right breast    BREAST CYST EXCISION       SECTION      CHOLECYSTECTOMY         Review of patient's allergies indicates:  No Known Allergies    No current facility-administered medications on file prior to encounter.      Current Outpatient Medications on File Prior to Encounter   Medication Sig    aspirin 325 MG tablet Take 325 mg by mouth once daily.    atorvastatin (LIPITOR) 10 MG tablet Take 10 mg by mouth once daily.    fluticasone-salmeterol diskus inhaler 100-50 mcg Inhale 1 puff into the lungs 2 (two) times daily. Controller    hydrOXYzine pamoate (VISTARIL) 25 MG Cap Take 25 mg by mouth 4 (four) times daily.    methocarbamoL (ROBAXIN) 500 MG Tab Take 500 mg by mouth 2 (two) times daily.    omeprazole (PRILOSEC) 10 MG capsule Take 10 mg by mouth once daily.    venlafaxine (EFFEXOR-XR) 150 MG Cp24 Take 75 mg by mouth once daily.    amLODIPine (NORVASC) 10 MG tablet Take 10 mg by mouth once daily.    LORazepam (ATIVAN) 1 MG tablet Take 1 tablet (1 mg total) by mouth every 8 (eight) hours as needed (spasm).    [DISCONTINUED] naproxen (EC NAPROSYN) 500 MG EC tablet Take 1 tablet (500 mg total) by mouth 2 (two) times daily with meals.     Family History     Problem Relation (Age of Onset)    Breast cancer Sister        Tobacco Use    Smoking status: Current Every Day Smoker    Smokeless tobacco: Never Used   Substance and Sexual Activity    Alcohol use: Yes     Comment: occ    Drug use: Never    Sexual activity: Not Currently     Birth control/protection: Post-menopausal     Review of Systems   Constitutional: Negative for activity change, appetite change, fatigue and fever.   HENT: Negative for congestion, ear discharge, mouth sores,  nosebleeds, rhinorrhea, sinus pressure, sinus pain and tinnitus.    Eyes: Negative.  Negative for pain, redness and itching.   Respiratory: Negative for apnea, cough, choking, chest tightness, shortness of breath, wheezing and stridor.    Cardiovascular: Negative for chest pain, palpitations and leg swelling.   Gastrointestinal: Negative for abdominal distention, abdominal pain, anal bleeding, blood in stool, constipation and diarrhea.   Endocrine: Negative.    Genitourinary: Negative for difficulty urinating, flank pain, frequency and urgency.   Musculoskeletal: Negative for arthralgias, back pain, gait problem and myalgias.   Skin: Positive for wound. Negative for color change and pallor.        8 x 8 cm area of erythema induration and fluctuance.  There was expanding erythema to the left thigh and to the left rib labia majora in superior from the lesion.   Allergic/Immunologic: Negative.    Neurological: Negative for dizziness, facial asymmetry, weakness, light-headedness and headaches.   Hematological: Negative for adenopathy. Does not bruise/bleed easily.   Psychiatric/Behavioral: The patient is nervous/anxious.      Objective:     Vital Signs (Most Recent):  Temp: 97.3 °F (36.3 °C) (03/15/20 1634)  Pulse: 86 (03/15/20 1634)  Resp: 18 (03/15/20 1634)  BP: 132/64 (03/15/20 1634)  SpO2: (!) 94 % (03/15/20 1634) Vital Signs (24h Range):  Temp:  [97.3 °F (36.3 °C)-98.3 °F (36.8 °C)] 97.3 °F (36.3 °C)  Pulse:  [77-92] 86  Resp:  [18-20] 18  SpO2:  [94 %-99 %] 94 %  BP: (104-132)/(47-73) 132/64     Weight: 49.9 kg (110 lb)  Body mass index is 18.3 kg/m².    Physical Exam   Constitutional: She is oriented to person, place, and time. She appears well-developed and well-nourished.   HENT:   Head: Normocephalic and atraumatic.   Eyes: Pupils are equal, round, and reactive to light. Conjunctivae are normal.   Neck: Normal range of motion.   Cardiovascular: Normal rate, regular rhythm, normal heart sounds and intact distal  pulses.   Pulmonary/Chest: Effort normal.   Abdominal: Soft.   Neurological: She is alert and oriented to person, place, and time.   Skin: Skin is warm and dry. Capillary refill takes less than 2 seconds. Rash noted. There is erythema.        Psychiatric: She has a normal mood and affect. Her behavior is normal. Thought content normal.   Nursing note and vitals reviewed.        CRANIAL NERVES     CN III, IV, VI   Pupils are equal, round, and reactive to light.       Significant Labs:   Recent Lab Results       03/15/20  1523   03/15/20  1437   03/15/20  1421        Albumin   3.7       Alkaline Phosphatase   65       ALT   13       Anion Gap   10       Appearance, UA     Clear     aPTT   29.0       AST   16       Baso #   0.06       Basophil%   0.3       Bilirubin (UA)     Negative     BILIRUBIN TOTAL   0.8  Comment:  For infants and newborns, interpretation of results should be based  on gestational age, weight and in agreement with clinical  observations.  Premature Infant recommended reference ranges:  Up to 24 hours.............<8.0 mg/dL  Up to 48 hours............<12.0 mg/dL  3-5 days..................<15.0 mg/dL  6-29 days.................<15.0 mg/dL         BUN, Bld   11       Calcium   8.6       Chloride   104       CO2   25       Color, UA     Yellow     Creatinine   0.8       CRP   23.30       Differential Method   Automated       eGFR if    >60.0       eGFR if non    >60.0  Comment:  Calculation used to obtain the estimated glomerular filtration  rate (eGFR) is the CKD-EPI equation.          Eos #   0.1       Eosinophil%   0.4       Glucose   97       Glucose, UA     Negative     Gran # (ANC)   15.2       Gran%   82.5       Hematocrit   41.1       Hemoglobin   13.7       Immature Grans (Abs)   0.10  Comment:  Mild elevation in immature granulocytes is non specific and   can be seen in a variety of conditions including stress response,   acute inflammation, trauma and  pregnancy. Correlation with other   laboratory and clinical findings is essential.         Immature Granulocytes   0.5       INR   1.2  Comment:  Coumadin Therapy:  2.0 - 3.0 for INR for all indicators except mechanical heart valves  and antiphospholipid syndromes which should use 2.5 - 3.5.         Ketones, UA     Negative     Lactate, Ayad 1.0  Comment:  Falsely low lactic acid results can be found in samples   containing >=13.0 mg/dL total bilirubin and/or >=3.5 mg/dL   direct bilirubin.           Leukocytes, UA     Negative     Lymph #   2.2       Lymph%   12.2       MCH   31.6       MCHC   33.3       MCV   95       Mono #   0.8       Mono%   4.1       MPV   10.1       NITRITE UA     Negative     nRBC   0       Occult Blood UA     Negative     pH, UA     6.0     Platelets   320       Potassium   3.0       PROTEIN TOTAL   7.2       Protein, UA     Trace  Comment:  Recommend a 24 hour urine protein or a urine   protein/creatinine ratio if globulin induced proteinuria is  clinically suspected.       Protime   13.2       RBC   4.34       RDW   11.7       Sed Rate   48       Sodium   139       Specific [place], UA     1.020     Specimen UA     Urine, Clean Catch     UROBILINOGEN UA     Negative     WBC   18.38           All pertinent labs within the past 24 hours have been reviewed.    Significant Imaging: I have reviewed and interpreted all pertinent imaging results/findings within the past 24 hours.

## 2020-03-15 NOTE — HPI
Patient is a 54-year-old female that presented to the emergency department complaining of add area of swelling on her left suprapubic area that is erythematous, painful, and swelling.  Patient has surrounding erythema that extends to the left thigh and superior from the pubis.  Patient denies any fever but states that she felt hot and has had some chills.  She denies shortness of breath denies nausea vomiting and has a past medical history of COPD, asthma, anxiety, back pain, and history of pulmonary embolus.  Patient is not on any anticoagulants at this time

## 2020-03-15 NOTE — HOSPITAL COURSE
Evaluation in the emergency department revealed a sed rate of 48 lactate that was 1.0 sodium 139 potassium 3.0 chloride 104 bicarb 25 glucose 97 and all other chemistry was normal with GFR greater than 60.  C-reactive protein was 23.3  PT PTT was normal  White blood cell count 75899 with left-shifted 82 granulocytes and 12 lymphocytes.  Urinalysis is negative    03/16/2020:  Patient is stable this morning without new complaints.  No fever chills nausea vomiting overnight.  Pain in the abscess site is persistent but she states mildly improved with antibiotics.  Blood pressure 122/59 and temperature 98.4° O2 sat 96%.  Will await surgical consult on this patient for incision and drainage of abscess.  Labs are unremarkable.    03/17/2020:  Patient stable and stating much less pain.  Vital signs are stable with no fever chills nausea vomiting  Potassium is 3.4 today otherwise labs normal    03/18/2020:  Discharge to home.  Begin repacking at home once per day.  Clindamycin 300 mg q.6 hours for 10 days.  Family will repack her wound daily  Continue home medications that were taken prior to this admission  Will begin oxycodone [id number] p.o. Q 6 hr p.r.n. pain 20.  No refill

## 2020-03-15 NOTE — ASSESSMENT & PLAN NOTE
03/15/2020:  Admit to medical-surgical unit.  Begin piperacillin and vancomycin IV for this abscess  Blood cultures done in the emergency department  Consult General surgery for incision and debridement.  Keep NPO after midnight due to possible surgery.

## 2020-03-15 NOTE — ASSESSMENT & PLAN NOTE
03/15/2020:  Continue nebulization treatments DuoNeb every 6 hr as needed  Continue home medications.

## 2020-03-16 ENCOUNTER — ANESTHESIA EVENT (OUTPATIENT)
Dept: SURGERY | Facility: HOSPITAL | Age: 55
End: 2020-03-16
Payer: MEDICAID

## 2020-03-16 ENCOUNTER — ANESTHESIA (OUTPATIENT)
Dept: SURGERY | Facility: HOSPITAL | Age: 55
End: 2020-03-16
Payer: MEDICAID

## 2020-03-16 LAB
ALBUMIN SERPL BCP-MCNC: 2.9 G/DL (ref 3.5–5.2)
ALP SERPL-CCNC: 87 U/L (ref 55–135)
ALT SERPL W/O P-5'-P-CCNC: 37 U/L (ref 10–44)
ANION GAP SERPL CALC-SCNC: 9 MMOL/L (ref 8–16)
AST SERPL-CCNC: 69 U/L (ref 10–40)
BASOPHILS # BLD AUTO: 0.02 K/UL (ref 0–0.2)
BASOPHILS NFR BLD: 0.3 % (ref 0–1.9)
BILIRUB SERPL-MCNC: 0.4 MG/DL (ref 0.1–1)
BUN SERPL-MCNC: 5 MG/DL (ref 6–20)
CALCIUM SERPL-MCNC: 8.1 MG/DL (ref 8.7–10.5)
CHLORIDE SERPL-SCNC: 108 MMOL/L (ref 95–110)
CO2 SERPL-SCNC: 23 MMOL/L (ref 23–29)
CREAT SERPL-MCNC: 0.6 MG/DL (ref 0.5–1.4)
DIFFERENTIAL METHOD: ABNORMAL
EOSINOPHIL # BLD AUTO: 0.1 K/UL (ref 0–0.5)
EOSINOPHIL NFR BLD: 0.9 % (ref 0–8)
ERYTHROCYTE [DISTWIDTH] IN BLOOD BY AUTOMATED COUNT: 11.9 % (ref 11.5–14.5)
EST. GFR  (AFRICAN AMERICAN): >60 ML/MIN/1.73 M^2
EST. GFR  (NON AFRICAN AMERICAN): >60 ML/MIN/1.73 M^2
GLUCOSE SERPL-MCNC: 85 MG/DL (ref 70–110)
HCT VFR BLD AUTO: 32.7 % (ref 37–48.5)
HGB BLD-MCNC: 10.7 G/DL (ref 12–16)
IMM GRANULOCYTES # BLD AUTO: 0.02 K/UL (ref 0–0.04)
IMM GRANULOCYTES NFR BLD AUTO: 0.3 % (ref 0–0.5)
LACTATE SERPL-SCNC: 0.8 MMOL/L (ref 0.5–2.2)
LYMPHOCYTES # BLD AUTO: 1.3 K/UL (ref 1–4.8)
LYMPHOCYTES NFR BLD: 17.8 % (ref 18–48)
MAGNESIUM SERPL-MCNC: 1.6 MG/DL (ref 1.6–2.6)
MCH RBC QN AUTO: 31.3 PG (ref 27–31)
MCHC RBC AUTO-ENTMCNC: 32.7 G/DL (ref 32–36)
MCV RBC AUTO: 96 FL (ref 82–98)
MONOCYTES # BLD AUTO: 0.4 K/UL (ref 0.3–1)
MONOCYTES NFR BLD: 5.1 % (ref 4–15)
NEUTROPHILS # BLD AUTO: 5.3 K/UL (ref 1.8–7.7)
NEUTROPHILS NFR BLD: 75.6 % (ref 38–73)
NRBC BLD-RTO: 0 /100 WBC
PHOSPHATE SERPL-MCNC: 2.8 MG/DL (ref 2.7–4.5)
PLATELET # BLD AUTO: 183 K/UL (ref 150–350)
PMV BLD AUTO: 9.5 FL (ref 9.2–12.9)
POTASSIUM SERPL-SCNC: 3.8 MMOL/L (ref 3.5–5.1)
PROT SERPL-MCNC: 6 G/DL (ref 6–8.4)
RBC # BLD AUTO: 3.42 M/UL (ref 4–5.4)
SODIUM SERPL-SCNC: 140 MMOL/L (ref 136–145)
WBC # BLD AUTO: 7.01 K/UL (ref 3.9–12.7)

## 2020-03-16 PROCEDURE — 83605 ASSAY OF LACTIC ACID: CPT

## 2020-03-16 PROCEDURE — 96375 TX/PRO/DX INJ NEW DRUG ADDON: CPT | Performed by: INTERNAL MEDICINE

## 2020-03-16 PROCEDURE — 94761 N-INVAS EAR/PLS OXIMETRY MLT: CPT

## 2020-03-16 PROCEDURE — 84100 ASSAY OF PHOSPHORUS: CPT

## 2020-03-16 PROCEDURE — D9220A PRA ANESTHESIA: Mod: ANES,,, | Performed by: ANESTHESIOLOGY

## 2020-03-16 PROCEDURE — 87077 CULTURE AEROBIC IDENTIFY: CPT

## 2020-03-16 PROCEDURE — 25000003 PHARM REV CODE 250: Performed by: NURSE ANESTHETIST, CERTIFIED REGISTERED

## 2020-03-16 PROCEDURE — 99223 PR INITIAL HOSPITAL CARE,LEVL III: ICD-10-PCS | Mod: 25,,, | Performed by: SURGERY

## 2020-03-16 PROCEDURE — 63600175 PHARM REV CODE 636 W HCPCS: Performed by: SURGERY

## 2020-03-16 PROCEDURE — 99226 PR SUBSEQUENT OBSERVATION CARE,LEVEL III: CPT | Mod: ,,, | Performed by: INTERNAL MEDICINE

## 2020-03-16 PROCEDURE — 85025 COMPLETE CBC W/AUTO DIFF WBC: CPT

## 2020-03-16 PROCEDURE — 63600175 PHARM REV CODE 636 W HCPCS

## 2020-03-16 PROCEDURE — 83735 ASSAY OF MAGNESIUM: CPT

## 2020-03-16 PROCEDURE — 87186 SC STD MICRODIL/AGAR DIL: CPT

## 2020-03-16 PROCEDURE — 87075 CULTR BACTERIA EXCEPT BLOOD: CPT

## 2020-03-16 PROCEDURE — D9220A PRA ANESTHESIA: ICD-10-PCS | Mod: CRNA,,, | Performed by: NURSE ANESTHETIST, CERTIFIED REGISTERED

## 2020-03-16 PROCEDURE — 37000009 HC ANESTHESIA EA ADD 15 MINS: Performed by: SURGERY

## 2020-03-16 PROCEDURE — 00400 ANES INTEGUMENTARY SYS NOS: CPT | Performed by: SURGERY

## 2020-03-16 PROCEDURE — 63600175 PHARM REV CODE 636 W HCPCS: Performed by: INTERNAL MEDICINE

## 2020-03-16 PROCEDURE — 10061 PR DRAIN SKIN ABSCESS COMPLIC: ICD-10-PCS | Mod: ,,, | Performed by: SURGERY

## 2020-03-16 PROCEDURE — 36000704 HC OR TIME LEV I 1ST 15 MIN: Performed by: SURGERY

## 2020-03-16 PROCEDURE — D9220A PRA ANESTHESIA: ICD-10-PCS | Mod: ANES,,, | Performed by: ANESTHESIOLOGY

## 2020-03-16 PROCEDURE — 63600175 PHARM REV CODE 636 W HCPCS: Performed by: NURSE ANESTHETIST, CERTIFIED REGISTERED

## 2020-03-16 PROCEDURE — 36415 COLL VENOUS BLD VENIPUNCTURE: CPT

## 2020-03-16 PROCEDURE — D9220A PRA ANESTHESIA: Mod: CRNA,,, | Performed by: NURSE ANESTHETIST, CERTIFIED REGISTERED

## 2020-03-16 PROCEDURE — 71000033 HC RECOVERY, INTIAL HOUR: Performed by: SURGERY

## 2020-03-16 PROCEDURE — 25000003 PHARM REV CODE 250: Performed by: SURGERY

## 2020-03-16 PROCEDURE — 25000003 PHARM REV CODE 250: Performed by: INTERNAL MEDICINE

## 2020-03-16 PROCEDURE — 99226 PR SUBSEQUENT OBSERVATION CARE,LEVEL III: ICD-10-PCS | Mod: ,,, | Performed by: INTERNAL MEDICINE

## 2020-03-16 PROCEDURE — 96361 HYDRATE IV INFUSION ADD-ON: CPT | Mod: 59 | Performed by: INTERNAL MEDICINE

## 2020-03-16 PROCEDURE — 37000008 HC ANESTHESIA 1ST 15 MINUTES: Performed by: SURGERY

## 2020-03-16 PROCEDURE — 10061 I&D ABSCESS COMP/MULTIPLE: CPT | Mod: ,,, | Performed by: SURGERY

## 2020-03-16 PROCEDURE — 25000242 PHARM REV CODE 250 ALT 637 W/ HCPCS: Performed by: SURGERY

## 2020-03-16 PROCEDURE — 25000242 PHARM REV CODE 250 ALT 637 W/ HCPCS: Performed by: INTERNAL MEDICINE

## 2020-03-16 PROCEDURE — 25500020 PHARM REV CODE 255: Performed by: INTERNAL MEDICINE

## 2020-03-16 PROCEDURE — 94640 AIRWAY INHALATION TREATMENT: CPT

## 2020-03-16 PROCEDURE — 99223 1ST HOSP IP/OBS HIGH 75: CPT | Mod: 25,,, | Performed by: SURGERY

## 2020-03-16 PROCEDURE — 87070 CULTURE OTHR SPECIMN AEROBIC: CPT

## 2020-03-16 PROCEDURE — G0378 HOSPITAL OBSERVATION PER HR: HCPCS

## 2020-03-16 PROCEDURE — 63600175 PHARM REV CODE 636 W HCPCS: Performed by: HOSPITALIST

## 2020-03-16 PROCEDURE — 63600175 PHARM REV CODE 636 W HCPCS: Performed by: ANESTHESIOLOGY

## 2020-03-16 PROCEDURE — 80053 COMPREHEN METABOLIC PANEL: CPT

## 2020-03-16 PROCEDURE — 36000705 HC OR TIME LEV I EA ADD 15 MIN: Performed by: SURGERY

## 2020-03-16 RX ORDER — SUCCINYLCHOLINE CHLORIDE 20 MG/ML
INJECTION INTRAMUSCULAR; INTRAVENOUS
Status: DISCONTINUED | OUTPATIENT
Start: 2020-03-16 | End: 2020-03-16

## 2020-03-16 RX ORDER — MORPHINE SULFATE 4 MG/ML
2 INJECTION, SOLUTION INTRAMUSCULAR; INTRAVENOUS EVERY 4 HOURS PRN
Status: DISCONTINUED | OUTPATIENT
Start: 2020-03-16 | End: 2020-03-18 | Stop reason: HOSPADM

## 2020-03-16 RX ORDER — OXYCODONE AND ACETAMINOPHEN 10; 325 MG/1; MG/1
2 TABLET ORAL EVERY 4 HOURS PRN
Status: DISCONTINUED | OUTPATIENT
Start: 2020-03-16 | End: 2020-03-18 | Stop reason: HOSPADM

## 2020-03-16 RX ORDER — LIDOCAINE HYDROCHLORIDE 10 MG/ML
1 INJECTION, SOLUTION EPIDURAL; INFILTRATION; INTRACAUDAL; PERINEURAL ONCE
Status: DISCONTINUED | OUTPATIENT
Start: 2020-03-16 | End: 2020-03-16

## 2020-03-16 RX ORDER — MORPHINE SULFATE 4 MG/ML
2 INJECTION, SOLUTION INTRAMUSCULAR; INTRAVENOUS EVERY 5 MIN PRN
Status: DISCONTINUED | OUTPATIENT
Start: 2020-03-16 | End: 2020-03-18 | Stop reason: HOSPADM

## 2020-03-16 RX ORDER — SODIUM CHLORIDE, SODIUM LACTATE, POTASSIUM CHLORIDE, CALCIUM CHLORIDE 600; 310; 30; 20 MG/100ML; MG/100ML; MG/100ML; MG/100ML
125 INJECTION, SOLUTION INTRAVENOUS CONTINUOUS
Status: DISCONTINUED | OUTPATIENT
Start: 2020-03-16 | End: 2020-03-16

## 2020-03-16 RX ORDER — EPHEDRINE SULFATE 50 MG/ML
INJECTION, SOLUTION INTRAVENOUS
Status: DISCONTINUED | OUTPATIENT
Start: 2020-03-16 | End: 2020-03-16

## 2020-03-16 RX ORDER — DIPHENHYDRAMINE HYDROCHLORIDE 50 MG/ML
12.5 INJECTION INTRAMUSCULAR; INTRAVENOUS
Status: DISCONTINUED | OUTPATIENT
Start: 2020-03-16 | End: 2020-03-18 | Stop reason: HOSPADM

## 2020-03-16 RX ORDER — KETOROLAC TROMETHAMINE 30 MG/ML
INJECTION, SOLUTION INTRAMUSCULAR; INTRAVENOUS
Status: COMPLETED
Start: 2020-03-16 | End: 2020-03-16

## 2020-03-16 RX ORDER — MIDAZOLAM HYDROCHLORIDE 1 MG/ML
INJECTION, SOLUTION INTRAMUSCULAR; INTRAVENOUS
Status: DISCONTINUED | OUTPATIENT
Start: 2020-03-16 | End: 2020-03-16

## 2020-03-16 RX ORDER — KETOROLAC TROMETHAMINE 30 MG/ML
15 INJECTION, SOLUTION INTRAMUSCULAR; INTRAVENOUS EVERY 6 HOURS PRN
Status: DISCONTINUED | OUTPATIENT
Start: 2020-03-16 | End: 2020-03-18 | Stop reason: HOSPADM

## 2020-03-16 RX ORDER — BUPIVACAINE HYDROCHLORIDE AND EPINEPHRINE 5; 5 MG/ML; UG/ML
INJECTION, SOLUTION EPIDURAL; INTRACAUDAL; PERINEURAL
Status: DISCONTINUED | OUTPATIENT
Start: 2020-03-16 | End: 2020-03-16 | Stop reason: HOSPADM

## 2020-03-16 RX ORDER — SODIUM CHLORIDE, SODIUM LACTATE, POTASSIUM CHLORIDE, CALCIUM CHLORIDE 600; 310; 30; 20 MG/100ML; MG/100ML; MG/100ML; MG/100ML
INJECTION, SOLUTION INTRAVENOUS CONTINUOUS
Status: DISCONTINUED | OUTPATIENT
Start: 2020-03-16 | End: 2020-03-16

## 2020-03-16 RX ORDER — FAMOTIDINE 10 MG/ML
20 INJECTION INTRAVENOUS ONCE
Status: DISCONTINUED | OUTPATIENT
Start: 2020-03-16 | End: 2020-03-16

## 2020-03-16 RX ORDER — ROCURONIUM BROMIDE 10 MG/ML
INJECTION, SOLUTION INTRAVENOUS
Status: DISCONTINUED | OUTPATIENT
Start: 2020-03-16 | End: 2020-03-16

## 2020-03-16 RX ORDER — ONDANSETRON 2 MG/ML
4 INJECTION INTRAMUSCULAR; INTRAVENOUS DAILY PRN
Status: DISCONTINUED | OUTPATIENT
Start: 2020-03-16 | End: 2020-03-18 | Stop reason: HOSPADM

## 2020-03-16 RX ORDER — OXYCODONE AND ACETAMINOPHEN 10; 325 MG/1; MG/1
1 TABLET ORAL EVERY 4 HOURS PRN
Status: DISCONTINUED | OUTPATIENT
Start: 2020-03-16 | End: 2020-03-18 | Stop reason: HOSPADM

## 2020-03-16 RX ORDER — PROPOFOL 10 MG/ML
VIAL (ML) INTRAVENOUS
Status: DISCONTINUED | OUTPATIENT
Start: 2020-03-16 | End: 2020-03-16

## 2020-03-16 RX ORDER — MEPERIDINE HYDROCHLORIDE 50 MG/ML
INJECTION INTRAMUSCULAR; INTRAVENOUS; SUBCUTANEOUS
Status: DISCONTINUED | OUTPATIENT
Start: 2020-03-16 | End: 2020-03-16

## 2020-03-16 RX ADMIN — ROCURONIUM BROMIDE 20 MG: 10 INJECTION, SOLUTION INTRAVENOUS at 02:03

## 2020-03-16 RX ADMIN — AMLODIPINE BESYLATE 10 MG: 2.5 TABLET ORAL at 07:03

## 2020-03-16 RX ADMIN — PIPERACILLIN AND TAZOBACTAM 3.38 G: 3; .375 INJECTION, POWDER, LYOPHILIZED, FOR SOLUTION INTRAVENOUS; PARENTERAL at 04:03

## 2020-03-16 RX ADMIN — FLUTICASONE FUROATE AND VILANTEROL TRIFENATATE 1 PUFF: 100; 25 POWDER RESPIRATORY (INHALATION) at 01:03

## 2020-03-16 RX ADMIN — IPRATROPIUM BROMIDE AND ALBUTEROL SULFATE 3 ML: .5; 3 SOLUTION RESPIRATORY (INHALATION) at 08:03

## 2020-03-16 RX ADMIN — VENLAFAXINE HYDROCHLORIDE 75 MG: 37.5 CAPSULE, EXTENDED RELEASE ORAL at 07:03

## 2020-03-16 RX ADMIN — PROPOFOL 200 MG: 10 INJECTION, EMULSION INTRAVENOUS at 02:03

## 2020-03-16 RX ADMIN — KETOROLAC TROMETHAMINE 15 MG: 30 INJECTION, SOLUTION INTRAMUSCULAR at 12:03

## 2020-03-16 RX ADMIN — SUGAMMADEX 200 MG: 100 INJECTION, SOLUTION INTRAVENOUS at 02:03

## 2020-03-16 RX ADMIN — EPHEDRINE SULFATE 25 MG: 50 INJECTION INTRAMUSCULAR; INTRAVENOUS; SUBCUTANEOUS at 02:03

## 2020-03-16 RX ADMIN — HYDROXYZINE PAMOATE 25 MG: 25 CAPSULE ORAL at 07:03

## 2020-03-16 RX ADMIN — EPHEDRINE SULFATE 10 MG: 50 INJECTION INTRAMUSCULAR; INTRAVENOUS; SUBCUTANEOUS at 02:03

## 2020-03-16 RX ADMIN — IPRATROPIUM BROMIDE AND ALBUTEROL SULFATE 3 ML: .5; 3 SOLUTION RESPIRATORY (INHALATION) at 07:03

## 2020-03-16 RX ADMIN — PIPERACILLIN AND TAZOBACTAM 4.5 G: 4; .5 INJECTION, POWDER, FOR SOLUTION INTRAVENOUS at 08:03

## 2020-03-16 RX ADMIN — HYDROXYZINE PAMOATE 25 MG: 25 CAPSULE ORAL at 04:03

## 2020-03-16 RX ADMIN — IPRATROPIUM BROMIDE AND ALBUTEROL SULFATE 3 ML: .5; 3 SOLUTION RESPIRATORY (INHALATION) at 03:03

## 2020-03-16 RX ADMIN — SODIUM CHLORIDE, POTASSIUM CHLORIDE, SODIUM LACTATE AND CALCIUM CHLORIDE: 600; 310; 30; 20 INJECTION, SOLUTION INTRAVENOUS at 02:03

## 2020-03-16 RX ADMIN — IOHEXOL 75 ML: 350 INJECTION, SOLUTION INTRAVENOUS at 05:03

## 2020-03-16 RX ADMIN — VANCOMYCIN HYDROCHLORIDE 750 MG: 750 INJECTION, POWDER, LYOPHILIZED, FOR SOLUTION INTRAVENOUS at 02:03

## 2020-03-16 RX ADMIN — HYDROXYZINE PAMOATE 25 MG: 25 CAPSULE ORAL at 08:03

## 2020-03-16 RX ADMIN — IPRATROPIUM BROMIDE AND ALBUTEROL SULFATE 3 ML: .5; 3 SOLUTION RESPIRATORY (INHALATION) at 01:03

## 2020-03-16 RX ADMIN — EPHEDRINE SULFATE 15 MG: 50 INJECTION INTRAMUSCULAR; INTRAVENOUS; SUBCUTANEOUS at 02:03

## 2020-03-16 RX ADMIN — SUCCINYLCHOLINE CHLORIDE 150 MG: 20 INJECTION, SOLUTION INTRAMUSCULAR; INTRAVENOUS at 02:03

## 2020-03-16 RX ADMIN — OXYCODONE HYDROCHLORIDE AND ACETAMINOPHEN 2 TABLET: 10; 325 TABLET ORAL at 04:03

## 2020-03-16 RX ADMIN — KETOROLAC TROMETHAMINE 15 MG: 30 INJECTION, SOLUTION INTRAMUSCULAR at 08:03

## 2020-03-16 RX ADMIN — KETOROLAC TROMETHAMINE 15 MG: 30 INJECTION, SOLUTION INTRAMUSCULAR at 07:03

## 2020-03-16 RX ADMIN — PIPERACILLIN AND TAZOBACTAM 4.5 G: 4; .5 INJECTION, POWDER, FOR SOLUTION INTRAVENOUS at 10:03

## 2020-03-16 RX ADMIN — ATENOLOL 25 MG: 25 TABLET ORAL at 07:03

## 2020-03-16 RX ADMIN — MIDAZOLAM HYDROCHLORIDE 2 MG: 1 INJECTION, SOLUTION INTRAMUSCULAR; INTRAVENOUS at 02:03

## 2020-03-16 RX ADMIN — VANCOMYCIN HYDROCHLORIDE 750 MG: 750 INJECTION, POWDER, LYOPHILIZED, FOR SOLUTION INTRAVENOUS at 04:03

## 2020-03-16 RX ADMIN — MEPERIDINE HYDROCHLORIDE 25 MG: 50 INJECTION INTRAMUSCULAR; INTRAVENOUS; SUBCUTANEOUS at 02:03

## 2020-03-16 RX ADMIN — IPRATROPIUM BROMIDE AND ALBUTEROL SULFATE 3 ML: .5; 3 SOLUTION RESPIRATORY (INHALATION) at 11:03

## 2020-03-16 RX ADMIN — IPRATROPIUM BROMIDE AND ALBUTEROL SULFATE 3 ML: .5; 3 SOLUTION RESPIRATORY (INHALATION) at 04:03

## 2020-03-16 NOTE — ASSESSMENT & PLAN NOTE
03/15/2020:  Admit to medical-surgical unit.  Begin piperacillin and vancomycin IV for this abscess  Blood cultures done in the emergency department  Consult General surgery for incision and debridement.  Keep NPO after midnight due to possible surgery.    03/16/2020:  Continue current antibiotics  Await for surgery consult  Repeat labs in the a.m..

## 2020-03-16 NOTE — ANESTHESIA POSTPROCEDURE EVALUATION
Anesthesia Post Evaluation    Patient: Zayra White    Procedure(s) Performed: Procedure(s) (LRB):  INCISION AND DRAINAGE, ABSCESS (Left)    Final Anesthesia Type: general    Patient location during evaluation: PACU  Patient participation: Yes- Able to Participate  Level of consciousness: awake and alert  Post-procedure vital signs: reviewed and stable  Pain management: adequate  Airway patency: patent    PONV status at discharge: No PONV  Anesthetic complications: no      Cardiovascular status: blood pressure returned to baseline  Respiratory status: unassisted  Hydration status: euvolemic  Follow-up not needed.          Vitals Value Taken Time   /54 3/16/2020  1:57 PM   Temp 36.7 °C (98.1 °F) 3/16/2020  1:57 PM   Pulse 87 3/16/2020  1:57 PM   Resp 16 3/16/2020  1:57 PM   SpO2 96 % 3/16/2020  1:57 PM         No case tracking events are documented in the log.      Pain/Aureliano Score: Pain Rating Prior to Med Admin: 5 (3/16/2020  7:50 AM)  Pain Rating Post Med Admin: 0 (Pt sleeping comfortably and stated that the medicine worked.) (3/16/2020  1:21 AM)

## 2020-03-16 NOTE — NURSING
Notified Dr. Sandie Thomas that pt's pain level is 9.5.  Pt received morphine at 2211. Pt states that the morphine only lasts about an hour. 15 mg ketorolac prescribed Q6H PRN.

## 2020-03-16 NOTE — PROGRESS NOTES
[facility] Medicine  Progress Note    Patient Name: Zayra White  MRN: [id number]  Patient Class: OP- Observation   Admission Date: 3/15/2020  Length of Stay: 1 days  Attending Physician: Servando Rice MD  Primary Care Provider: Tiffanie Marc NP        Subjective:     Principal Problem:Left genital labial abscess        HPI:  Patient is a 54-year-old female that presented to the emergency department complaining of add area of swelling on her left suprapubic area that is erythematous, painful, and swelling.  Patient has surrounding erythema that extends to the left thigh and superior from the pubis.  Patient denies any fever but states that she felt hot and has had some chills.  She denies shortness of breath denies nausea vomiting and has a past medical history of COPD, asthma, anxiety, back pain, and history of pulmonary embolus.  Patient is not on any anticoagulants at this time    Overview/Hospital Course:  Evaluation in the emergency department revealed a sed rate of 48 lactate that was 1.0 sodium 139 potassium 3.0 chloride 104 bicarb 25 glucose 97 and all other chemistry was normal with GFR greater than 60.  C-reactive protein was 23.3  PT PTT was normal  White blood cell count 71751 with left-shifted 82 granulocytes and 12 lymphocytes.  Urinalysis is negative    03/16/2020:  Patient is stable this morning without new complaints.  No fever chills nausea vomiting overnight.  Pain in the abscess site is persistent but she states mildly improved with antibiotics.  Blood pressure 122/59 and temperature 98.4° O2 sat 96%.  Will await surgical consult on this patient for incision and drainage of abscess.  Labs are unremarkable.    Interval History:     Review of Systems   Constitutional: Negative for activity change, appetite change, fatigue and fever.   HENT: Negative for congestion, ear discharge, mouth sores, nosebleeds, rhinorrhea, sinus pressure, sinus pain and  tinnitus.    Eyes: Negative.  Negative for pain, redness and itching.   Respiratory: Negative for apnea, cough, choking, chest tightness, shortness of breath, wheezing and stridor.    Cardiovascular: Negative for chest pain, palpitations and leg swelling.   Gastrointestinal: Negative for abdominal distention, abdominal pain, anal bleeding, blood in stool, constipation and diarrhea.   Endocrine: Negative.    Genitourinary: Positive for genital sores and vaginal pain. Negative for difficulty urinating, flank pain, frequency and urgency.   Musculoskeletal: Negative for arthralgias, back pain, gait problem and myalgias.   Skin: Negative for color change and pallor.   Allergic/Immunologic: Negative.    Neurological: Negative for dizziness, facial asymmetry, weakness, light-headedness and headaches.   Hematological: Negative for adenopathy. Does not bruise/bleed easily.   Psychiatric/Behavioral: The patient is nervous/anxious.      Objective:     Vital Signs (Most Recent):  Temp: 98.1 °F (36.7 °C) (03/16/20 1357)  Pulse: 87 (03/16/20 1357)  Resp: 16 (03/16/20 1357)  BP: (!) 113/54 (03/16/20 1357)  SpO2: 96 % (03/16/20 1357) Vital Signs (24h Range):  Temp:  [96.8 °F (36 °C)-99.2 °F (37.3 °C)] 98.1 °F (36.7 °C)  Pulse:  [] 87  Resp:  [16-18] 16  SpO2:  [94 %-99 %] 96 %  BP: (104-143)/(47-64) 113/54     Weight: 49.9 kg (110 lb)  Body mass index is 18.3 kg/m².    Intake/Output Summary (Last 24 hours) at 3/16/2020 1419  Last data filed at 3/16/2020 0600  Gross per 24 hour   Intake 3297 ml   Output --   Net 3297 ml      Physical Exam   Constitutional: She is oriented to person, place, and time. She appears well-developed and well-nourished.   HENT:   Head: Normocephalic and atraumatic.   Eyes: Pupils are equal, round, and reactive to light. EOM are normal.   Neck: Normal range of motion. Neck supple. No tracheal deviation present. No thyromegaly present.   Cardiovascular: Normal rate, regular rhythm and normal heart  sounds.   Pulmonary/Chest: Effort normal and breath sounds normal.   Abdominal: Soft. Bowel sounds are normal. She exhibits no distension. There is no tenderness. There is no rebound and no guarding.   Genitourinary:   Genitourinary Comments: 8 x 8 cm area of induration with surrounding erythema.  Tenderness to palpation and fluctuance noted   Musculoskeletal: Normal range of motion.   Lymphadenopathy:     She has no cervical adenopathy.   Neurological: She is alert and oriented to person, place, and time.   Skin: Skin is warm and dry. Capillary refill takes less than 2 seconds.   Psychiatric: She has a normal mood and affect. Her behavior is normal. Judgment and thought content normal.   Nursing note and vitals reviewed.      Significant Labs:   Recent Lab Results       03/16/20  0641   03/15/20  1524   03/15/20  1523   03/15/20  1437   03/15/20  1430        Albumin 2.9     3.7       Alkaline Phosphatase 87     65       ALT 37     13       Anion Gap 9     10       Appearance, UA               aPTT       29.0       AST 69     16       Baso # 0.02     0.06       Basophil% 0.3     0.3       Bilirubin (UA)               BILIRUBIN TOTAL 0.4  Comment:  For infants and newborns, interpretation of results should be based  on gestational age, weight and in agreement with clinical  observations.  Premature Infant recommended reference ranges:  Up to 24 hours.............<8.0 mg/dL  Up to 48 hours............<12.0 mg/dL  3-5 days..................<15.0 mg/dL  6-29 days.................<15.0 mg/dL       0.8  Comment:  For infants and newborns, interpretation of results should be based  on gestational age, weight and in agreement with clinical  observations.  Premature Infant recommended reference ranges:  Up to 24 hours.............<8.0 mg/dL  Up to 48 hours............<12.0 mg/dL  3-5 days..................<15.0 mg/dL  6-29 days.................<15.0 mg/dL         Blood Culture, Routine   No Growth to date[P]   No Growth to  date[P]       BUN, Bld 5     11       Calcium 8.1     8.6       Chloride 108     104       CO2 23     25       Color, UA               Creatinine 0.6     0.8       CRP       23.30       Differential Method Automated     Automated       eGFR if  >60.0     >60.0       eGFR if non  >60.0  Comment:  Calculation used to obtain the estimated glomerular filtration  rate (eGFR) is the CKD-EPI equation.        >60.0  Comment:  Calculation used to obtain the estimated glomerular filtration  rate (eGFR) is the CKD-EPI equation.          Eos # 0.1     0.1       Eosinophil% 0.9     0.4       Glucose 85     97       Glucose, UA               Gran # (ANC) 5.3     15.2       Gran% 75.6     82.5       Hematocrit 32.7     41.1       Hemoglobin 10.7     13.7       Immature Grans (Abs) 0.02  Comment:  Mild elevation in immature granulocytes is non specific and   can be seen in a variety of conditions including stress response,   acute inflammation, trauma and pregnancy. Correlation with other   laboratory and clinical findings is essential.       0.10  Comment:  Mild elevation in immature granulocytes is non specific and   can be seen in a variety of conditions including stress response,   acute inflammation, trauma and pregnancy. Correlation with other   laboratory and clinical findings is essential.         Immature Granulocytes 0.3     0.5       INR       1.2  Comment:  Coumadin Therapy:  2.0 - 3.0 for INR for all indicators except mechanical heart valves  and antiphospholipid syndromes which should use 2.5 - 3.5.         Ketones, UA               Lactate, Ayad 0.8  Comment:  Falsely low lactic acid results can be found in samples   containing >=13.0 mg/dL total bilirubin and/or >=3.5 mg/dL   direct bilirubin.     1.0  Comment:  Falsely low lactic acid results can be found in samples   containing >=13.0 mg/dL total bilirubin and/or >=3.5 mg/dL   direct bilirubin.           Leukocytes, UA                Lymph # 1.3     2.2       Lymph% 17.8     12.2       Magnesium 1.6             MCH 31.3     31.6       MCHC 32.7     33.3       MCV 96     95       Mono # 0.4     0.8       Mono% 5.1     4.1       MPV 9.5     10.1       NITRITE UA               nRBC 0     0       Occult Blood UA               pH, UA               Phosphorus 2.8             Platelets 183     320       Potassium 3.8     3.0       PROTEIN TOTAL 6.0     7.2       Protein, UA               Protime       13.2       RBC 3.42     4.34       RDW 11.9     11.7       Sed Rate       48       Sodium 140     139       Specific [place], UA               Specimen UA               TSH         2.462     UROBILINOGEN UA               WBC 7.01     18.38                        03/15/20  1421        Albumin       Alkaline Phosphatase       ALT       Anion Gap       Appearance, UA Clear     aPTT       AST       Baso #       Basophil%       Bilirubin (UA) Negative     BILIRUBIN TOTAL       Blood Culture, Routine       BUN, Bld       Calcium       Chloride       CO2       Color, UA Yellow     Creatinine       CRP       Differential Method       eGFR if        eGFR if non        Eos #       Eosinophil%       Glucose       Glucose, UA Negative     Gran # (ANC)       Gran%       Hematocrit       Hemoglobin       Immature Grans (Abs)       Immature Granulocytes       INR       Ketones, UA Negative     Lactate, Ayad       Leukocytes, UA Negative     Lymph #       Lymph%       Magnesium       MCH       MCHC       MCV       Mono #       Mono%       MPV       NITRITE UA Negative     nRBC       Occult Blood UA Negative     pH, UA 6.0     Phosphorus       Platelets       Potassium       PROTEIN TOTAL       Protein, UA Trace  Comment:  Recommend a 24 hour urine protein or a urine   protein/creatinine ratio if globulin induced proteinuria is  clinically suspected.       Protime       RBC       RDW       Sed Rate       Sodium       Specific [place], UA  1.020     Specimen UA Urine, Clean Catch     TSH       UROBILINOGEN UA Negative     WBC           All pertinent labs within the past 24 hours have been reviewed.    Significant Imaging: I have reviewed and interpreted all pertinent imaging results/findings within the past 24 hours.      Assessment/Plan:      * Left genital labial abscess  03/15/2020:  Admit to medical-surgical unit.  Begin piperacillin and vancomycin IV for this abscess  Blood cultures done in the emergency department  Consult General surgery for incision and debridement.  Keep NPO after midnight due to possible surgery.    03/16/2020:  Continue current antibiotics  Await for surgery consult  Repeat labs in the a.m..    COPD exacerbation  03/15/2020:  Continue nebulization treatments DuoNeb every 6 hr as needed  Continue home medications.        VTE Risk Mitigation (From admission, onward)         Ordered     IP VTE LOW RISK PATIENT  Once      03/15/20 1642     Place sequential compression device  Until discontinued      03/15/20 1642                      Servando Rice MD  Department of Hospital Medicine   [facility]

## 2020-03-16 NOTE — TRANSFER OF CARE
"Anesthesia Transfer of Care Note    Patient: Zayra White    Procedure(s) Performed: Procedure(s) (LRB):  INCISION AND DRAINAGE, ABSCESS (Left)    Patient location: PACU    Anesthesia Type: general    Transport from OR: Transported from OR on room air with adequate spontaneous ventilation    Post pain: adequate analgesia    Post assessment: no apparent anesthetic complications    Post vital signs: stable    Level of consciousness: sedated and responds to stimulation    Nausea/Vomiting: no nausea/vomiting    Complications: none    Transfer of care protocol was followed      Last vitals:   Visit Vitals  BP (!) 113/54 (BP Location: Right arm, Patient Position: Lying)   Pulse 87   Temp 36.7 °C (98.1 °F) (Oral)   Resp 16   Ht 5' 5" (1.651 m)   Wt 49.9 kg (110 lb)   LMP 09/22/2017   SpO2 96%   Breastfeeding? No   BMI 18.30 kg/m²     "

## 2020-03-16 NOTE — NURSING
Rec'd back to room from surgery, ZA. Resp even and unlabored on room air. AAOx4. Oriented to room and call system. Dressing noted to left labia, CDI. Tolerating clear liquids will advance if ordered. Denies needs at this time.  Bed in lowest locked position, side rails elevated x2, call bell in reach. Will cont to monitor and continue purposeful rounding

## 2020-03-16 NOTE — PLAN OF CARE
03/16/20 0925   Discharge Assessment   Assessment Type Discharge Planning Assessment   Confirmed/corrected address and phone number on facesheet? Yes   Assessment information obtained from? Patient   Expected Length of Stay (days) 2   Communicated expected length of stay with patient/caregiver yes   Prior to hospitilization cognitive status: Alert/Oriented   Prior to hospitalization functional status: Independent   Current cognitive status: Alert/Oriented   Lives With sibling(s)   Able to Return to Prior Arrangements yes   Is patient able to care for self after discharge? Yes   Who are your caregiver(s) and their phone number(s)? Noreen Wong sister [phone]; Shell Wilkerson daughter [phone]   Patient's perception of discharge disposition home or selfcare   Readmission Within the Last 30 Days no previous admission in last 30 days   Patient currently being followed by outpatient case management? No   Patient currently receives any other outside agency services? No   Equipment Currently Used at Home nebulizer   Do you have any problems affording any of your prescribed medications? No   Is the patient taking medications as prescribed? yes   Does the patient have transportation home? Yes   Transportation Anticipated family or friend will provide   Does the patient receive services at the [facility]? No   Discharge Plan A Home with family   DME Needed Upon Discharge  none   Patient/Family in Agreement with Plan yes   Patient lives alone but has been staying with her sister. States she has her own house & may go either back to her sister's or to her house when discharged. Inquired if she would help with dressing changes when she goes home. States her daughter could change it but it will depend on what all they do. If unable to do at home patient can come to hospital to get it changed as Medicaid doesn't cover dressing changes at home. Denies any other needs at this time. Will continue to follow.

## 2020-03-16 NOTE — PROGRESS NOTES
Pharmacokinetic Initial Assessment: IV Vancomycin    Assessment/Plan:    Initiate intravenous vancomycin with loading dose of 1000 mg once given at the ED on 03/15/2020 @8260.  Regimen: Vancomycin 750 mg IV Q12H  Desired empiric serum trough concentration is 10-20 mcg/ml  Draw vancomycin 03/17/2020 @ 1330  Pharmacy will continue to follow and monitor vancomycin.      Please contact pharmacy at extension [phone] with any questions regarding this assessment.     Thank you for the consult,   Juanis Arreola       Patient brief summary:  Zayra White is a 54 y.o. female initiated on antimicrobial therapy with IV Vancomycin for treatment of suspected Skin & Soft Tissue Infx.    Drug Allergies:   Review of patient's allergies indicates:  No Known Allergies    Actual Body Weight:   49.9 Kg    Renal Function:   Estimated Creatinine Clearance: 82.5 mL/min (based on SCr of 0.6 mg/dL).,     Dialysis Method (if applicable):  N/a    CBC (last 72 hours):  Recent Labs   Lab Result Units 03/15/20  1437 03/16/20  0641   WBC K/uL 18.38* 7.01   Hemoglobin g/dL 13.7 10.7*   Hematocrit % 41.1 32.7*   Platelets K/uL 320 183   Gran% % 82.5* 75.6*   Lymph% % 12.2* 17.8*   Mono% % 4.1 5.1   Eosinophil% % 0.4 0.9   Basophil% % 0.3 0.3   Differential Method  Automated Automated       Metabolic Panel (last 72 hours):  Recent Labs   Lab Result Units 03/15/20  1421 03/15/20  1437 03/16/20  0641   Sodium mmol/L  --  139 140   Potassium mmol/L  --  3.0* 3.8   Chloride mmol/L  --  104 108   CO2 mmol/L  --  25 23   Glucose mg/dL  --  97 85   Glucose, UA  Negative  --   --    BUN, Bld mg/dL  --  11 5*   Creatinine mg/dL  --  0.8 0.6   Albumin g/dL  --  3.7 2.9*   Total Bilirubin mg/dL  --  0.8 0.4   Alkaline Phosphatase U/L  --  65 87   AST U/L  --  16 69*   ALT U/L  --  13 37   Magnesium mg/dL  --   --  1.6   Phosphorus mg/dL  --   --  2.8       Drug levels (last 3 results):  No results for input(s): VANCOMYCINRA, VANCOMYCINPE, VANCOMYCINTR in the  last 72 hours.    Microbiologic Results:  Microbiology Results (last 7 days)       Procedure Component Value Units Date/Time    Blood culture #2 **CANNOT BE ORDERED STAT** [[id number]] Collected:  03/15/20 1524    Order Status:  Completed Specimen:  Blood Updated:  03/16/20 0315     Blood Culture, Routine No Growth to date    Blood culture #1 **CANNOT BE ORDERED STAT** [[id number]] Collected:  03/15/20 1437    Order Status:  Completed Specimen:  Blood from Peripheral, Antecubital, Left Updated:  03/16/20 0315     Blood Culture, Routine No Growth to date

## 2020-03-16 NOTE — OP NOTE
[facility] - Med Surg  General Surgery  Op Note  03/16/2020      Patient Name: Zayra White  MRN: [id number]         SURGEON:  Irvin Villarreal M.D.     ASSISTANT: None     PREOPERATIVE DIAGNOSIS:  Left groin abscess     POSTOPERATIVE DIAGNOSIS:  Same     SURGICAL PROCEDURE PERFORMED:  Incision and drainage of left groin abscess    ANESTHESIA:  General/Local.     INDICATIONS FOR PROCEDURE:  Large left groin abscess    SURGICAL FINDINGS:  Above     PROCEDURE IN DETAIL:  In preop holding, Zayra White was identified by name, wrist band, and birth date.  She confirmed identity.  Informed consent process reviewed. She verbalized consent as well as understanding of all treatment options, risks, benefits, details of, and indications for each option.  Operative site was marked.  She confirmed the correct site was identified and marked.  Intravenous antibiotics administered.  Transported to operating room. Time-out completed. Transferred to OR bed. Anesthesia induced without difficulty or complications. Operative field prepped and draped in the usual sterile fashion with ChloraPrep, sterile towels, and sterile drapes.  ChloraPrep was permitted to dry for 3 min before placing towels and drapes.  0.5% Marcaine with epinephrine was infiltrated into the operative field for postoperative analgesia, total of 20 cc was used.  Skin overlying the abscess was incised with a number 15 blade.  Subcutaneous tissue was divided with electrocautery.  Purulent exudate was encountered in the subcutaneous space, cultured, evacuated with suction.  Incision was extended to reach the limits of the cavity in all directions.  Wound was irrigated.  Hemostasis ensured.  The entire abscess was contained within the subcutaneous space and did not extend beneath the external oblique aponeurosis.  Wound was explored.  No tracking, loculations, extensions, or necrotic tissue encounter.  Wound was irrigated again.  Hemostasis ensured.   Wound packed with saline soaked gauze.  Sterile dressing applied.    TOLERATED: Well    COUNTS:  Correct  on multiple occasions    DRAINS: None     ESTIMATED BLOOD LOSS:  Less than 5 cc     SPECIMEN:  Culture of purulent exudate     COMPLICATIONS: None     DISPOSITION:  To PACU, stable.      Documented with M*Modal voice recognition software.      Irvin Villarreal MD FACS

## 2020-03-16 NOTE — SUBJECTIVE & OBJECTIVE
Interval History:     Review of Systems   Constitutional: Negative for activity change, appetite change, fatigue and fever.   HENT: Negative for congestion, ear discharge, mouth sores, nosebleeds, rhinorrhea, sinus pressure, sinus pain and tinnitus.    Eyes: Negative.  Negative for pain, redness and itching.   Respiratory: Negative for apnea, cough, choking, chest tightness, shortness of breath, wheezing and stridor.    Cardiovascular: Negative for chest pain, palpitations and leg swelling.   Gastrointestinal: Negative for abdominal distention, abdominal pain, anal bleeding, blood in stool, constipation and diarrhea.   Endocrine: Negative.    Genitourinary: Positive for genital sores and vaginal pain. Negative for difficulty urinating, flank pain, frequency and urgency.   Musculoskeletal: Negative for arthralgias, back pain, gait problem and myalgias.   Skin: Negative for color change and pallor.   Allergic/Immunologic: Negative.    Neurological: Negative for dizziness, facial asymmetry, weakness, light-headedness and headaches.   Hematological: Negative for adenopathy. Does not bruise/bleed easily.   Psychiatric/Behavioral: The patient is nervous/anxious.      Objective:     Vital Signs (Most Recent):  Temp: 98.1 °F (36.7 °C) (03/16/20 1357)  Pulse: 87 (03/16/20 1357)  Resp: 16 (03/16/20 1357)  BP: (!) 113/54 (03/16/20 1357)  SpO2: 96 % (03/16/20 1357) Vital Signs (24h Range):  Temp:  [96.8 °F (36 °C)-99.2 °F (37.3 °C)] 98.1 °F (36.7 °C)  Pulse:  [] 87  Resp:  [16-18] 16  SpO2:  [94 %-99 %] 96 %  BP: (104-143)/(47-64) 113/54     Weight: 49.9 kg (110 lb)  Body mass index is 18.3 kg/m².    Intake/Output Summary (Last 24 hours) at 3/16/2020 1419  Last data filed at 3/16/2020 0600  Gross per 24 hour   Intake 3297 ml   Output --   Net 3297 ml      Physical Exam   Constitutional: She is oriented to person, place, and time. She appears well-developed and well-nourished.   HENT:   Head: Normocephalic and atraumatic.    Eyes: Pupils are equal, round, and reactive to light. EOM are normal.   Neck: Normal range of motion. Neck supple. No tracheal deviation present. No thyromegaly present.   Cardiovascular: Normal rate, regular rhythm and normal heart sounds.   Pulmonary/Chest: Effort normal and breath sounds normal.   Abdominal: Soft. Bowel sounds are normal. She exhibits no distension. There is no tenderness. There is no rebound and no guarding.   Genitourinary:   Genitourinary Comments: 8 x 8 cm area of induration with surrounding erythema.  Tenderness to palpation and fluctuance noted   Musculoskeletal: Normal range of motion.   Lymphadenopathy:     She has no cervical adenopathy.   Neurological: She is alert and oriented to person, place, and time.   Skin: Skin is warm and dry. Capillary refill takes less than 2 seconds.   Psychiatric: She has a normal mood and affect. Her behavior is normal. Judgment and thought content normal.   Nursing note and vitals reviewed.      Significant Labs:   Recent Lab Results       03/16/20  0641   03/15/20  1524   03/15/20  1523   03/15/20  1437   03/15/20  1430        Albumin 2.9     3.7       Alkaline Phosphatase 87     65       ALT 37     13       Anion Gap 9     10       Appearance, UA               aPTT       29.0       AST 69     16       Baso # 0.02     0.06       Basophil% 0.3     0.3       Bilirubin (UA)               BILIRUBIN TOTAL 0.4  Comment:  For infants and newborns, interpretation of results should be based  on gestational age, weight and in agreement with clinical  observations.  Premature Infant recommended reference ranges:  Up to 24 hours.............<8.0 mg/dL  Up to 48 hours............<12.0 mg/dL  3-5 days..................<15.0 mg/dL  6-29 days.................<15.0 mg/dL       0.8  Comment:  For infants and newborns, interpretation of results should be based  on gestational age, weight and in agreement with clinical  observations.  Premature Infant recommended reference  ranges:  Up to 24 hours.............<8.0 mg/dL  Up to 48 hours............<12.0 mg/dL  3-5 days..................<15.0 mg/dL  6-29 days.................<15.0 mg/dL         Blood Culture, Routine   No Growth to date[P]   No Growth to date[P]       BUN, Bld 5     11       Calcium 8.1     8.6       Chloride 108     104       CO2 23     25       Color, UA               Creatinine 0.6     0.8       CRP       23.30       Differential Method Automated     Automated       eGFR if  >60.0     >60.0       eGFR if non  >60.0  Comment:  Calculation used to obtain the estimated glomerular filtration  rate (eGFR) is the CKD-EPI equation.        >60.0  Comment:  Calculation used to obtain the estimated glomerular filtration  rate (eGFR) is the CKD-EPI equation.          Eos # 0.1     0.1       Eosinophil% 0.9     0.4       Glucose 85     97       Glucose, UA               Gran # (ANC) 5.3     15.2       Gran% 75.6     82.5       Hematocrit 32.7     41.1       Hemoglobin 10.7     13.7       Immature Grans (Abs) 0.02  Comment:  Mild elevation in immature granulocytes is non specific and   can be seen in a variety of conditions including stress response,   acute inflammation, trauma and pregnancy. Correlation with other   laboratory and clinical findings is essential.       0.10  Comment:  Mild elevation in immature granulocytes is non specific and   can be seen in a variety of conditions including stress response,   acute inflammation, trauma and pregnancy. Correlation with other   laboratory and clinical findings is essential.         Immature Granulocytes 0.3     0.5       INR       1.2  Comment:  Coumadin Therapy:  2.0 - 3.0 for INR for all indicators except mechanical heart valves  and antiphospholipid syndromes which should use 2.5 - 3.5.         Ketones, UA               Lactate, Ayad 0.8  Comment:  Falsely low lactic acid results can be found in samples   containing >=13.0 mg/dL total bilirubin  and/or >=3.5 mg/dL   direct bilirubin.     1.0  Comment:  Falsely low lactic acid results can be found in samples   containing >=13.0 mg/dL total bilirubin and/or >=3.5 mg/dL   direct bilirubin.           Leukocytes, UA               Lymph # 1.3     2.2       Lymph% 17.8     12.2       Magnesium 1.6             MCH 31.3     31.6       MCHC 32.7     33.3       MCV 96     95       Mono # 0.4     0.8       Mono% 5.1     4.1       MPV 9.5     10.1       NITRITE UA               nRBC 0     0       Occult Blood UA               pH, UA               Phosphorus 2.8             Platelets 183     320       Potassium 3.8     3.0       PROTEIN TOTAL 6.0     7.2       Protein, UA               Protime       13.2       RBC 3.42     4.34       RDW 11.9     11.7       Sed Rate       48       Sodium 140     139       Specific [place], UA               Specimen UA               TSH         2.462     UROBILINOGEN UA               WBC 7.01     18.38                        03/15/20  1421        Albumin       Alkaline Phosphatase       ALT       Anion Gap       Appearance, UA Clear     aPTT       AST       Baso #       Basophil%       Bilirubin (UA) Negative     BILIRUBIN TOTAL       Blood Culture, Routine       BUN, Bld       Calcium       Chloride       CO2       Color, UA Yellow     Creatinine       CRP       Differential Method       eGFR if        eGFR if non        Eos #       Eosinophil%       Glucose       Glucose, UA Negative     Gran # (ANC)       Gran%       Hematocrit       Hemoglobin       Immature Grans (Abs)       Immature Granulocytes       INR       Ketones, UA Negative     Lactate, Ayad       Leukocytes, UA Negative     Lymph #       Lymph%       Magnesium       MCH       MCHC       MCV       Mono #       Mono%       MPV       NITRITE UA Negative     nRBC       Occult Blood UA Negative     pH, UA 6.0     Phosphorus       Platelets       Potassium       PROTEIN TOTAL       Protein, UA  Trace  Comment:  Recommend a 24 hour urine protein or a urine   protein/creatinine ratio if globulin induced proteinuria is  clinically suspected.       Protime       RBC       RDW       Sed Rate       Sodium       Specific Gravity, UA 1.020     Specimen UA Urine, Clean Catch     TSH       UROBILINOGEN UA Negative     WBC           All pertinent labs within the past 24 hours have been reviewed.    Significant Imaging: I have reviewed and interpreted all pertinent imaging results/findings within the past 24 hours.

## 2020-03-16 NOTE — PROGRESS NOTES
Pharmacist Renal Dose Adjustment Note    Zayra White is a 54 y.o. female being treated with the medication zosyn    Patient Data:    Vital Signs (Most Recent):  Temp: 98.3 °F (36.8 °C) (03/16/20 0723)  Pulse: 100 (03/16/20 0731)  Resp: 16 (03/16/20 0731)  BP: 131/60 (03/16/20 0723)  SpO2: 99 % (03/16/20 0731) Vital Signs (72h Range):  Temp:  [96.8 °F (36 °C)-99.2 °F (37.3 °C)]   Pulse:  []   Resp:  [16-20]   BP: (104-143)/(47-73)   SpO2:  [94 %-99 %]      Recent Labs   Lab 03/15/20  1437 03/16/20  0641   CREATININE 0.8 0.6     Serum creatinine: 0.6 mg/dL 03/16/20 0641  Estimated creatinine clearance: 82.5 mL/min    Medication:Zosyn dose: 3.375 g frequency Q6H will be changed to medication:Zosyn dose:4.5 g frequency:Q8H (over 4 hours each dose)    Pharmacist's Name: Juanis Arreola  Pharmacist's Extension: [phone]

## 2020-03-16 NOTE — PLAN OF CARE
Problem: Adult Inpatient Plan of Care  Goal: Absence of Hospital-Acquired Illness or Injury  Outcome: Ongoing, Progressing  Goal: Optimal Comfort and Wellbeing  Outcome: Ongoing, Progressing  Goal: Readiness for Transition of Care  Outcome: Ongoing, Progressing

## 2020-03-16 NOTE — CONSULTS
[facility] - Med Surg  General Surgery  Consult Note  2020    Patient Name: Zayra White  MRN: [id number]  Admission Date: 3/15/2020        REASON FOR CONSULT:  Perineal abscess    HISTORY:  Ms. White is 54 years old.  Four days ago she developed a painful red mass in her left groin.  This expanded rapidly over the course of the past 4 days.  She has had a subjective fever but no chills.  No nausea or vomiting.  No vaginal discharge or bleeding. No abdominal pain. No diarrhea or constipation.  No perirectal pain. No triggering issues.  No aggravating factors other than palpation of the area.  No alleviating factors.  No other associated symptoms.  No prior episode of similar problems.  She has not had anything to eat or drink since well before midnight last night    PAST MEDICAL HISTORY:  Arthritis.  COPD.  Breast biopsy.   section.  Cholecystectomy.  Hypertension.  Hyperlipidemia.  Anxiety.  Depression.  Chronic pain. GERD.    ALLERGIES:  No known drug allergies    HOME MEDICATIONS:  Atrovent.  Albuterol.  Amlodipine.  Advair.  Hydroxyzine.  Effexor.  Lipitor.  Aspirin.  Ativan.  Robaxin.  Prilosec.    HOSPITAL MEDICATIONS:  Atrovent.  Albuterol.  Amlodipine.  Atenolol.  Advair.  Vistaril.  Zosyn.  Potassium.  Vancomycin.  Effexor.  Normal saline with potassium.    SOCIAL HISTORY:  Active smoker, 40+ pack-year history.  Social consumer of alcohol.  No history of alcohol dependence or withdrawal.  No illegal recreational drug use.  No alcohol or drug treatment.    FAMILY HISTORY:  Noncontributory.  Sister with breast cancer.  No family history of anesthetic complications, bleeding disorders, inflammatory bowel disease, gastrointestinal malignancies, gyn malignancies, immuno deficiencies.    ROS:  As above otherwise all 14 systems entirely negative    Physical Exam:   Gen.-normotensive, normocardic, comfortable, no acute distress, GCS 15.   HEENT-normocephalic, atraumatic, sclerae  are anicteric, pupils equal round reactive to light, conjunctiva clear, nares patent without discharge, dentition fair, no oral lesions, oropharynx clear without exudates, mucous membranes moist.   Neck-nontender, full range of motion, no JVD, no bruits, no masses, no thyromegaly.   Cardiovascular-regular rate and rhythm, PMI nondisplaced, tones normal, no gallops, no rubs, no murmurs.  Intact distal pulses throughout.  No peripheral edema.  No bruits or thrills.  Good perfusion.   Pulmonary-clear to auscultation, no respiratory distress, no wheezes, no rales, no rhonchi, good full symmetrical excursion, no accessory muscle use, no retractions.   Abdomen-soft, nontender, nondistended, normoactive normopitched bowel sounds, no masses, no ventral hernias, no inguinal hernias, no bruits, no hepatosplenomegaly, no guarding, no rebound.  Negative heel jolt, psoas, obturator, and Tapia signs.  No percussion, referred, or CVA tenderness.  -normal external genitalia, no lesions, no discharge, no masses.  Rectal-no masses, normal sphincter tone, nontender, Hemoccult negative.    Extremities/Musculoskeletal-no clubbing, no cyanosis, no edema, no gross deformities.  Integument-no rashes, no lesions, no jaundice, no decubiti; erythema and induration left groin with erythema extending to the left labia without induration    Lymphatic-no adenopathy - cervical, supraclavicular, axillary, or inguinal.    Psych-mental status intact, no hallucinations, no suicidal ideations, no homicidal ideations, alert, appropriate, oriented to person, time, and place.  Neuro-no gross cranial nerve deficits, no gross motor deficits, no evident sensory deficits, no incoordination, no tremors    LAB RESULTS:  Lab results reviewed from yesterday on admission.  CBC showed a leukocytosis with no evidence of anemia or thrombocytopenia.  White count was 18,400 with a left shift.  PT/PTT and INR showed no suggestion of factor deficit, coagulopathy, or  anticoagulation therapy.  ESR elevated at 48 mm/hr.  CRP elevated at 23.3 mg per dL.  Electrolytes revealed a hypokalemia and hypocalcemia, otherwise all electrolytes were in the range of normal.  BUN and creatinine showed no evidence of renal dysfunction.  Glucose was in the range of normal. Liver profile showed no evidence of hepatocellular disease or biliary obstruction. TSH indicated she is euthyroid.  Lactic acid level was in the range of normal.  Urinalysis was unremarkable.  Repeat laboratory studies were reviewed from this morning.  CBC showed resolution of the leukocytosis, development of a normochromic normocytic anemia with a hemoglobin of 10.7 grams/deciliter, no thrombocytopenia.  Electrolytes revealed a hypocalcemia, otherwise all electrolytes were in the range of normal.  BUN and creatinine showed no evidence of renal dysfunction.  Glucose showed no suspicion diabetes.  Liver profile showed a mild transaminitis but no evidence of biliary obstruction. Lactic acid level again was in the range of normal.    RADIOLOGY RESULTS:  Chest x-ray film and report reviewed from yesterday on admission; no acute cardiopulmonary pathology evident, changes consistent with COPD noted. CT scan of the pelvis with contrast films and report reviewed from this morning.  There is evidence of cellulitis involving the left groin and left labia along with reactive left iliac lymph node measuring 0.9 cm.  Upon review of films there is evidence of an abscess with a fluid collection underlying the cellulitis.    CARDIOVASCULAR STUDIES:  EKG reviewed from yesterday on admission.  Twelve lead tracing showed a normal sinus rhythm with a heart rate of 83 beats per minute and nonspecific ST changes.    ASSESSMENT:  Cellulitis and abscess of the left groin with cellulitis extending to the labia.  Probable left inguinal lymph node abscess.    MEDICAL DECISION MAKING:  Findings on exam, CT, and lab results indicate patient is best served  with incision and drainage of the abscess, excision of lymph node if encountered, continued antibiotic therapy, and wound care until wound heals by secondary intention.  No indications this involves gynecologic structures.    COUNSELING:  Ms. White was counseled regarding results of her evaluation thus far, differential diagnosis, and therapeutic options.  We discussed lymph node abscess, cellulitis, subcutaneous abscess, metastatic disease, etc.  Options discussed included were not limited to surgical intervention, second opinion, continued non operative/medical management, transfer to tertiary care center, etc.  Risks of surgery discussed included not limited to bleeding, infections, scars, chronic pain, nerve damage, need for additional operations or procedures, recurrent abscess, etc.  Details of the operation discussed included were not limited to general anesthesia, skin incision to reached the limits of the infection in all directions, debridement of necrotic tissue, drainage of purulent exudate with culture, packing wound open, daily packing changes until wound heals by secondary intention.  Entire conversation was held in layman's terms.  Questions were solicited and answered.  At the conclusion of the conversation she voiced complete understanding of all we discussed, satisfaction all questions were answered, and stated that she felt fully informed completely capable of making an informed decision.  She desires and requests to proceed with surgical intervention today.  Consent is on chart    PLAN:  I and D of abscess with excision of lymph node if encountered in the operating room today.  Further management will depend upon clinical course.    RECOMMENDATIONS:  Wound care following surgery.  Continue antibiotics.  Continue supportive and symptomatic care.  Continue analgesics and antiemetics as needed.  Repeat laboratory studies tomorrow.  Follow-up cultures when available.  Adjust antibiotics if needed.   Monitor closely.  Further recommendations will depend upon clinical course.    Total face-to-face encounter time was 60 minutes, 40 minutes spent counseling as outlined/summarized above.        Irvin Villarreal MD  3/16/2020

## 2020-03-16 NOTE — ANESTHESIA PREPROCEDURE EVALUATION
03/16/2020  Zayra White is a 54 y.o., female.    Anesthesia Evaluation    I have reviewed the Patient Summary Reports.    I have reviewed the Nursing Notes.   I have reviewed the Medications.     Review of Systems  Social:  Smoker    Hematology/Oncology:  Hematology Normal   Oncology Normal     EENT/Dental:EENT/Dental Normal   Cardiovascular:  Cardiovascular Normal     Pulmonary:   COPD    Hepatic/GI:   GERD    Musculoskeletal:  Spine Disorders: lumbar and thoracic    Neurological:   Chronic Pain Syndrome   Endocrine:  Endocrine Normal    Dermatological:  Skin Normal    Psych:  Psychiatric Normal           Physical Exam  General:  Well nourished    Airway/Jaw/Neck:  Airway Findings: Mouth Opening: Normal Tongue: Normal  General Airway Assessment: Adult  Mallampati: II  TM Distance: Normal, at least 6 cm      Dental:  Dental Findings: Edentulous   Chest/Lungs:  Chest/Lungs Findings: Clear to auscultation     Heart/Vascular:  Heart Findings: Rate: Normal  Rhythm: Regular Rhythm        Mental Status:  Mental Status Findings:  Cooperative, Alert and Oriented         Anesthesia Plan  Type of Anesthesia, risks & benefits discussed:  Anesthesia Type:  general  Patient's Preference:   Intra-op Monitoring Plan: standard ASA monitors  Intra-op Monitoring Plan Comments:   Post Op Pain Control Plan: IV/PO Opioids PRN  Post Op Pain Control Plan Comments:   Induction:    Beta Blocker:  Patient is not currently on a Beta-Blocker (No further documentation required).       Informed Consent: Patient understands risks and agrees with Anesthesia plan.  Questions answered. Anesthesia consent signed with patient.  ASA Score: 3     Day of Surgery Review of History & Physical: I have interviewed and examined the patient. I have reviewed the patient's H&P dated:            Ready For Surgery From Anesthesia Perspective.

## 2020-03-17 LAB
ALBUMIN SERPL BCP-MCNC: 2.8 G/DL (ref 3.5–5.2)
ALP SERPL-CCNC: 78 U/L (ref 55–135)
ALT SERPL W/O P-5'-P-CCNC: 28 U/L (ref 10–44)
ANION GAP SERPL CALC-SCNC: 8 MMOL/L (ref 8–16)
AST SERPL-CCNC: 31 U/L (ref 10–40)
BASOPHILS # BLD AUTO: 0.04 K/UL (ref 0–0.2)
BASOPHILS NFR BLD: 0.8 % (ref 0–1.9)
BILIRUB SERPL-MCNC: 0.4 MG/DL (ref 0.1–1)
BUN SERPL-MCNC: 5 MG/DL (ref 6–20)
CALCIUM SERPL-MCNC: 8.2 MG/DL (ref 8.7–10.5)
CHLORIDE SERPL-SCNC: 107 MMOL/L (ref 95–110)
CO2 SERPL-SCNC: 25 MMOL/L (ref 23–29)
CREAT SERPL-MCNC: 0.4 MG/DL (ref 0.5–1.4)
DIFFERENTIAL METHOD: ABNORMAL
EOSINOPHIL # BLD AUTO: 0.1 K/UL (ref 0–0.5)
EOSINOPHIL NFR BLD: 2 % (ref 0–8)
ERYTHROCYTE [DISTWIDTH] IN BLOOD BY AUTOMATED COUNT: 11.8 % (ref 11.5–14.5)
EST. GFR  (AFRICAN AMERICAN): >60 ML/MIN/1.73 M^2
EST. GFR  (NON AFRICAN AMERICAN): >60 ML/MIN/1.73 M^2
GLUCOSE SERPL-MCNC: 116 MG/DL (ref 70–110)
HCT VFR BLD AUTO: 32.5 % (ref 37–48.5)
HGB BLD-MCNC: 10.8 G/DL (ref 12–16)
IMM GRANULOCYTES # BLD AUTO: 0.02 K/UL (ref 0–0.04)
IMM GRANULOCYTES NFR BLD AUTO: 0.4 % (ref 0–0.5)
LYMPHOCYTES # BLD AUTO: 1.4 K/UL (ref 1–4.8)
LYMPHOCYTES NFR BLD: 27.5 % (ref 18–48)
MCH RBC QN AUTO: 31.5 PG (ref 27–31)
MCHC RBC AUTO-ENTMCNC: 33.2 G/DL (ref 32–36)
MCV RBC AUTO: 95 FL (ref 82–98)
MONOCYTES # BLD AUTO: 0.4 K/UL (ref 0.3–1)
MONOCYTES NFR BLD: 7.8 % (ref 4–15)
NEUTROPHILS # BLD AUTO: 3.1 K/UL (ref 1.8–7.7)
NEUTROPHILS NFR BLD: 61.5 % (ref 38–73)
NRBC BLD-RTO: 0 /100 WBC
PLATELET # BLD AUTO: 191 K/UL (ref 150–350)
PMV BLD AUTO: 9.6 FL (ref 9.2–12.9)
POTASSIUM SERPL-SCNC: 3.4 MMOL/L (ref 3.5–5.1)
PROT SERPL-MCNC: 6 G/DL (ref 6–8.4)
RBC # BLD AUTO: 3.43 M/UL (ref 4–5.4)
SODIUM SERPL-SCNC: 140 MMOL/L (ref 136–145)
VANCOMYCIN TROUGH SERPL-MCNC: 7.6 UG/ML (ref 10–22)
WBC # BLD AUTO: 5.01 K/UL (ref 3.9–12.7)

## 2020-03-17 PROCEDURE — 25000242 PHARM REV CODE 250 ALT 637 W/ HCPCS: Performed by: SURGERY

## 2020-03-17 PROCEDURE — 99225 PR SUBSEQUENT OBSERVATION CARE,LEVEL II: ICD-10-PCS | Mod: ,,, | Performed by: INTERNAL MEDICINE

## 2020-03-17 PROCEDURE — 80202 ASSAY OF VANCOMYCIN: CPT

## 2020-03-17 PROCEDURE — 99900035 HC TECH TIME PER 15 MIN (STAT)

## 2020-03-17 PROCEDURE — 25000003 PHARM REV CODE 250: Performed by: SURGERY

## 2020-03-17 PROCEDURE — 80053 COMPREHEN METABOLIC PANEL: CPT

## 2020-03-17 PROCEDURE — G0378 HOSPITAL OBSERVATION PER HR: HCPCS

## 2020-03-17 PROCEDURE — 94640 AIRWAY INHALATION TREATMENT: CPT

## 2020-03-17 PROCEDURE — 99225 PR SUBSEQUENT OBSERVATION CARE,LEVEL II: CPT | Mod: ,,, | Performed by: INTERNAL MEDICINE

## 2020-03-17 PROCEDURE — 63600175 PHARM REV CODE 636 W HCPCS: Performed by: SURGERY

## 2020-03-17 PROCEDURE — 85025 COMPLETE CBC W/AUTO DIFF WBC: CPT

## 2020-03-17 PROCEDURE — 94761 N-INVAS EAR/PLS OXIMETRY MLT: CPT

## 2020-03-17 PROCEDURE — 99024 PR POST-OP FOLLOW-UP VISIT: ICD-10-PCS | Mod: ,,, | Performed by: SURGERY

## 2020-03-17 PROCEDURE — 36415 COLL VENOUS BLD VENIPUNCTURE: CPT

## 2020-03-17 PROCEDURE — 99024 POSTOP FOLLOW-UP VISIT: CPT | Mod: ,,, | Performed by: SURGERY

## 2020-03-17 RX ORDER — VANCOMYCIN HCL IN 5 % DEXTROSE 1G/250ML
1000 PLASTIC BAG, INJECTION (ML) INTRAVENOUS
Status: DISCONTINUED | OUTPATIENT
Start: 2020-03-18 | End: 2020-03-18 | Stop reason: HOSPADM

## 2020-03-17 RX ADMIN — VENLAFAXINE HYDROCHLORIDE 75 MG: 37.5 CAPSULE, EXTENDED RELEASE ORAL at 09:03

## 2020-03-17 RX ADMIN — PIPERACILLIN AND TAZOBACTAM 4.5 G: 4; .5 INJECTION, POWDER, FOR SOLUTION INTRAVENOUS at 12:03

## 2020-03-17 RX ADMIN — PIPERACILLIN AND TAZOBACTAM 4.5 G: 4; .5 INJECTION, POWDER, FOR SOLUTION INTRAVENOUS at 08:03

## 2020-03-17 RX ADMIN — HYDROXYZINE PAMOATE 25 MG: 25 CAPSULE ORAL at 05:03

## 2020-03-17 RX ADMIN — IPRATROPIUM BROMIDE AND ALBUTEROL SULFATE 3 ML: .5; 3 SOLUTION RESPIRATORY (INHALATION) at 08:03

## 2020-03-17 RX ADMIN — OXYCODONE HYDROCHLORIDE AND ACETAMINOPHEN 2 TABLET: 10; 325 TABLET ORAL at 04:03

## 2020-03-17 RX ADMIN — SODIUM CHLORIDE AND POTASSIUM CHLORIDE: .9; .15 SOLUTION INTRAVENOUS at 01:03

## 2020-03-17 RX ADMIN — HYDROXYZINE PAMOATE 25 MG: 25 CAPSULE ORAL at 01:03

## 2020-03-17 RX ADMIN — IPRATROPIUM BROMIDE AND ALBUTEROL SULFATE 3 ML: .5; 3 SOLUTION RESPIRATORY (INHALATION) at 11:03

## 2020-03-17 RX ADMIN — VANCOMYCIN HYDROCHLORIDE 750 MG: 750 INJECTION, POWDER, LYOPHILIZED, FOR SOLUTION INTRAVENOUS at 01:03

## 2020-03-17 RX ADMIN — FLUTICASONE FUROATE AND VILANTEROL TRIFENATATE 1 PUFF: 100; 25 POWDER RESPIRATORY (INHALATION) at 07:03

## 2020-03-17 RX ADMIN — OXYCODONE HYDROCHLORIDE AND ACETAMINOPHEN 2 TABLET: 10; 325 TABLET ORAL at 01:03

## 2020-03-17 RX ADMIN — KETOROLAC TROMETHAMINE 15 MG: 30 INJECTION, SOLUTION INTRAMUSCULAR at 05:03

## 2020-03-17 RX ADMIN — KETOROLAC TROMETHAMINE 15 MG: 30 INJECTION, SOLUTION INTRAMUSCULAR at 02:03

## 2020-03-17 RX ADMIN — HYDROXYZINE PAMOATE 25 MG: 25 CAPSULE ORAL at 09:03

## 2020-03-17 RX ADMIN — IPRATROPIUM BROMIDE AND ALBUTEROL SULFATE 3 ML: .5; 3 SOLUTION RESPIRATORY (INHALATION) at 03:03

## 2020-03-17 RX ADMIN — IPRATROPIUM BROMIDE AND ALBUTEROL SULFATE 3 ML: .5; 3 SOLUTION RESPIRATORY (INHALATION) at 07:03

## 2020-03-17 RX ADMIN — OXYCODONE HYDROCHLORIDE AND ACETAMINOPHEN 2 TABLET: 10; 325 TABLET ORAL at 09:03

## 2020-03-17 RX ADMIN — HYDROXYZINE PAMOATE 25 MG: 25 CAPSULE ORAL at 08:03

## 2020-03-17 RX ADMIN — KETOROLAC TROMETHAMINE 15 MG: 30 INJECTION, SOLUTION INTRAMUSCULAR at 11:03

## 2020-03-17 RX ADMIN — PIPERACILLIN AND TAZOBACTAM 4.5 G: 4; .5 INJECTION, POWDER, FOR SOLUTION INTRAVENOUS at 04:03

## 2020-03-17 NOTE — PROGRESS NOTES
[facility] - Med Surg  General Surgery  Progress Note  03/17/2020    Patient Name: Zayra White  MRN: [id number]  Admission Date: 3/15/2020  Hospital Day: 3    POD #:  1  Antibiotics:  Zosyn/Vancomycin - Day 3      Interval History:  No complaints.  Pain controlled.  No nausea or vomiting.  Mobility good.  Tolerating diet.    Vitals:  Afebrile.  Good vital signs.  Fair room air oxygen saturations.      I/O:  Inaccurate  UOP:  Good        Exam:   Gen - Comfortable.  Nontoxic.  No acute distress.  CV - Regular rate and rhythm.  Good perfusion.  No edema.  Pulm - Clear to auscultation.  Nonlabored.   Abd - Soft.  Nondistended.  Expected tenderness only.  Normoactive normopitched bowel sounds.   Integument - No rashes.  No decubiti.  No jaundice.  Wound clean without erythema, edema, exudate, induration, fluctuance, crepitus, necrosis, discoloration, separation, etc.  Ext - No edema.  No cords.  No tenderness.      Lab Results:  No leukocytosis.  Stable anemia.  No thrombocytopenia.  Mild hypokalemia.  Mild hypocalcemia.  BUN and creatinine good.  Mild hyperglycemia.  No evidence of hepatocellular disease or biliary obstruction.  Vancomycin nontoxic.  Cultures pending.    Radiology Results:  No new radiology results.    Assessment:  Satisfactory postoperative recovery.  No evident complications.    Recommendations:  Follow-up cultures when available.  Adjust antibiotics if needed.  Transition to oral antibiotics when possible.  Continue wound care.  Continue supportive care.    Plan:  Will follow while hospitalized.  Follow-up surgery clinic 2 weeks after discharge.  Continue wound care with daily packing changes using saline soaked gauze.        Irvin Villarreal MD FACS

## 2020-03-17 NOTE — PLAN OF CARE
Problem: Adult Inpatient Plan of Care  Goal: Absence of Hospital-Acquired Illness or Injury  Outcome: Ongoing, Progressing     Problem: Adult Inpatient Plan of Care  Goal: Optimal Comfort and Wellbeing  Outcome: Ongoing, Progressing     Problem: Adult Inpatient Plan of Care  Goal: Readiness for Transition of Care  Outcome: Ongoing, Progressing     Pt states that she feels much better today than yesterday.  She is not as weak as the previous day and her gait is more stable.

## 2020-03-17 NOTE — PLAN OF CARE
PT ENEDELIA DRESSING CHANGE THIS DAY, PT EDUCATED ON REMOVING DRESSING IN SHOWER AND APPLYING NEW WET TO DRY. PT VERB UNDERSTOOD.

## 2020-03-18 VITALS
HEIGHT: 65 IN | TEMPERATURE: 98 F | OXYGEN SATURATION: 92 % | HEART RATE: 91 BPM | WEIGHT: 110 LBS | DIASTOLIC BLOOD PRESSURE: 67 MMHG | SYSTOLIC BLOOD PRESSURE: 140 MMHG | BODY MASS INDEX: 18.33 KG/M2 | RESPIRATION RATE: 18 BRPM

## 2020-03-18 LAB
ALBUMIN SERPL BCP-MCNC: 3 G/DL (ref 3.5–5.2)
ALP SERPL-CCNC: 131 U/L (ref 55–135)
ALT SERPL W/O P-5'-P-CCNC: 69 U/L (ref 10–44)
ANION GAP SERPL CALC-SCNC: 8 MMOL/L (ref 8–16)
AST SERPL-CCNC: 163 U/L (ref 10–40)
BILIRUB SERPL-MCNC: 0.6 MG/DL (ref 0.1–1)
BUN SERPL-MCNC: <5 MG/DL (ref 6–20)
CALCIUM SERPL-MCNC: 8.3 MG/DL (ref 8.7–10.5)
CHLORIDE SERPL-SCNC: 108 MMOL/L (ref 95–110)
CO2 SERPL-SCNC: 25 MMOL/L (ref 23–29)
CREAT SERPL-MCNC: 0.5 MG/DL (ref 0.5–1.4)
EST. GFR  (AFRICAN AMERICAN): >60 ML/MIN/1.73 M^2
EST. GFR  (NON AFRICAN AMERICAN): >60 ML/MIN/1.73 M^2
GLUCOSE SERPL-MCNC: 103 MG/DL (ref 70–110)
POTASSIUM SERPL-SCNC: 3.8 MMOL/L (ref 3.5–5.1)
PROT SERPL-MCNC: 6.1 G/DL (ref 6–8.4)
SODIUM SERPL-SCNC: 141 MMOL/L (ref 136–145)

## 2020-03-18 PROCEDURE — 25000242 PHARM REV CODE 250 ALT 637 W/ HCPCS: Performed by: SURGERY

## 2020-03-18 PROCEDURE — 36415 COLL VENOUS BLD VENIPUNCTURE: CPT

## 2020-03-18 PROCEDURE — 99225 PR SUBSEQUENT OBSERVATION CARE,LEVEL II: CPT | Mod: ,,, | Performed by: INTERNAL MEDICINE

## 2020-03-18 PROCEDURE — 25000003 PHARM REV CODE 250: Performed by: SURGERY

## 2020-03-18 PROCEDURE — 99024 POSTOP FOLLOW-UP VISIT: CPT | Mod: ,,, | Performed by: SURGERY

## 2020-03-18 PROCEDURE — 94640 AIRWAY INHALATION TREATMENT: CPT

## 2020-03-18 PROCEDURE — 63600175 PHARM REV CODE 636 W HCPCS: Performed by: INTERNAL MEDICINE

## 2020-03-18 PROCEDURE — 63600175 PHARM REV CODE 636 W HCPCS: Performed by: SURGERY

## 2020-03-18 PROCEDURE — 99024 PR POST-OP FOLLOW-UP VISIT: ICD-10-PCS | Mod: ,,, | Performed by: SURGERY

## 2020-03-18 PROCEDURE — 99225 PR SUBSEQUENT OBSERVATION CARE,LEVEL II: ICD-10-PCS | Mod: ,,, | Performed by: INTERNAL MEDICINE

## 2020-03-18 PROCEDURE — 94761 N-INVAS EAR/PLS OXIMETRY MLT: CPT

## 2020-03-18 PROCEDURE — 80053 COMPREHEN METABOLIC PANEL: CPT

## 2020-03-18 PROCEDURE — G0378 HOSPITAL OBSERVATION PER HR: HCPCS

## 2020-03-18 RX ORDER — CLINDAMYCIN HYDROCHLORIDE 150 MG/1
300 CAPSULE ORAL 4 TIMES DAILY
Qty: 80 CAPSULE | Refills: 0 | Status: SHIPPED | OUTPATIENT
Start: 2020-03-18 | End: 2020-03-25

## 2020-03-18 RX ORDER — IPRATROPIUM BROMIDE AND ALBUTEROL SULFATE 2.5; .5 MG/3ML; MG/3ML
SOLUTION RESPIRATORY (INHALATION)
Status: DISCONTINUED
Start: 2020-03-18 | End: 2020-03-18 | Stop reason: HOSPADM

## 2020-03-18 RX ORDER — OXYCODONE AND ACETAMINOPHEN 10; 325 MG/1; MG/1
1 TABLET ORAL EVERY 4 HOURS PRN
Qty: 20 TABLET | Refills: 0 | Status: SHIPPED | OUTPATIENT
Start: 2020-03-18 | End: 2020-04-20

## 2020-03-18 RX ADMIN — HYDROXYZINE PAMOATE 25 MG: 25 CAPSULE ORAL at 01:03

## 2020-03-18 RX ADMIN — KETOROLAC TROMETHAMINE 15 MG: 30 INJECTION, SOLUTION INTRAMUSCULAR at 06:03

## 2020-03-18 RX ADMIN — VANCOMYCIN HYDROCHLORIDE 1000 MG: 1 INJECTION, POWDER, LYOPHILIZED, FOR SOLUTION INTRAVENOUS at 01:03

## 2020-03-18 RX ADMIN — PIPERACILLIN AND TAZOBACTAM 4.5 G: 4; .5 INJECTION, POWDER, FOR SOLUTION INTRAVENOUS at 04:03

## 2020-03-18 RX ADMIN — OXYCODONE HYDROCHLORIDE AND ACETAMINOPHEN 1 TABLET: 10; 325 TABLET ORAL at 01:03

## 2020-03-18 RX ADMIN — IPRATROPIUM BROMIDE AND ALBUTEROL SULFATE 3 ML: .5; 3 SOLUTION RESPIRATORY (INHALATION) at 07:03

## 2020-03-18 RX ADMIN — AMLODIPINE BESYLATE 10 MG: 2.5 TABLET ORAL at 09:03

## 2020-03-18 RX ADMIN — OXYCODONE HYDROCHLORIDE AND ACETAMINOPHEN 2 TABLET: 10; 325 TABLET ORAL at 02:03

## 2020-03-18 RX ADMIN — FLUTICASONE FUROATE AND VILANTEROL TRIFENATATE 1 PUFF: 100; 25 POWDER RESPIRATORY (INHALATION) at 07:03

## 2020-03-18 RX ADMIN — IPRATROPIUM BROMIDE AND ALBUTEROL SULFATE 3 ML: .5; 3 SOLUTION RESPIRATORY (INHALATION) at 03:03

## 2020-03-18 RX ADMIN — ATENOLOL 25 MG: 25 TABLET ORAL at 09:03

## 2020-03-18 RX ADMIN — HYDROXYZINE PAMOATE 25 MG: 25 CAPSULE ORAL at 09:03

## 2020-03-18 RX ADMIN — VENLAFAXINE HYDROCHLORIDE 75 MG: 37.5 CAPSULE, EXTENDED RELEASE ORAL at 09:03

## 2020-03-18 NOTE — ASSESSMENT & PLAN NOTE
03/15/2020:  Admit to medical-surgical unit.  Begin piperacillin and vancomycin IV for this abscess  Blood cultures done in the emergency department  Consult General surgery for incision and debridement.  Keep NPO after midnight due to possible surgery.    03/16/2020:  Continue current antibiotics  Await for surgery consult  Repeat labs in the a.m..    03/18/2020:  1.  Discharge to home  2.  Follow up with Dr. Villarreal within 2 weeks post discharge  Continue repacking daily.  Family will repack wound.

## 2020-03-18 NOTE — PLAN OF CARE
03/18/20 1245   Final Note   Assessment Type Final Discharge Note   Anticipated Discharge Disposition Home   What phone number can be called within the next 1-3 days to see how you are doing after discharge? [phone]   Hospital Follow Up  Appt(s) scheduled? Yes   Discharge plans and expectations educations in teach back method with documentation complete? Yes   Verbal & written follow up appointments with Dr Villarreal & Tiffanie Marc NP provided to patient. Also instructed on handout with signs & symptoms to watch out for & when to call the doctor.  Demonstrated understanding by verbal feedback. States her sister will come pick her up & she will be staying with her daughter. Denies any other needs at this time.

## 2020-03-18 NOTE — PROGRESS NOTES
[facility] - Med Surg  General Surgery  Progress Note  03/18/2020    Patient Name: Zayra White  MRN: [id number]  Admission Date: 3/15/2020  Hospital Day: 4    POD #:  2  Antibiotics:  Zosyn/Vancomycin - Day 4      Interval History:  No complaints.  Pain controlled.  No nausea or vomiting.  Mobility good.  Tolerating diet.    Vitals:  Afebrile.  Good vital signs.  Fair room air oxygen saturations.      I/O:  Inaccurate  UOP:  Good        Exam:   Gen - Comfortable.  Nontoxic.  No acute distress.  CV - Regular rate and rhythm.  Good perfusion.  No edema.  Pulm - Clear to auscultation.  Nonlabored.   Abd - Soft.  Nondistended.  Expected tenderness only.  Normoactive normopitched bowel sounds.   Integument - No rashes.  No decubiti.  No jaundice.  Wound clean without erythema, edema, exudate, induration, fluctuance, crepitus, necrosis, discoloration, separation, etc.  Ext - No edema.  No cords.  No tenderness.      Lab Results:  No new results.  Cultures pending    Radiology Results:  No new radiology results.    Assessment:  Satisfactory postoperative recovery.  No evident complications.    Recommendations:  Follow-up cultures when available.  Adjust antibiotics if needed.  Transition to oral antibiotics, Bactrim or Cipro.  Continue wound care.  Continue supportive care.  Discharge planning    Plan:  Will follow while hospitalized.  Follow-up surgery clinic 2 weeks after discharge.  Continue wound care with daily packing changes using saline soaked gauze.        Irvin Villarreal MD FACS

## 2020-03-18 NOTE — PLAN OF CARE
Problem: Adult Inpatient Plan of Care  Goal: Absence of Hospital-Acquired Illness or Injury  Outcome: Ongoing, Progressing     Problem: Adult Inpatient Plan of Care  Goal: Optimal Comfort and Wellbeing  Outcome: Ongoing, Progressing     Problem: Fall Injury Risk  Goal: Absence of Fall and Fall-Related Injury  Outcome: Ongoing, Progressing     Problem: Surgical Site Infection  Goal: Improved Infection Symptoms  Outcome: Ongoing, Progressing

## 2020-03-18 NOTE — ASSESSMENT & PLAN NOTE
03/15/2020:  Admit to medical-surgical unit.  Begin piperacillin and vancomycin IV for this abscess  Blood cultures done in the emergency department  Consult General surgery for incision and debridement.  Keep NPO after midnight due to possible surgery.    03/16/2020:  Continue current antibiotics  Await for surgery consult  Repeat labs in the a.m..    03/17/2020:  Continue current treatment with piperacillin and vancomycin  Follow for any fever.  Repack wound daily      03/18/2020:  1.  Discharge to home  2.  Follow up with Dr. Villarreal within 2 weeks post discharge  Continue repacking daily.  Family will repack wound.

## 2020-03-18 NOTE — DISCHARGE SUMMARY
[facility] - Med Surg Hospital Medicine  Discharge Summary      Patient Name: Zayra White  MRN: [id number]  Admission Date: 3/15/2020  Hospital Length of Stay: 1 days  Discharge Date and Time:  03/18/2020 12:17 PM  Attending Physician: Servando Rice MD   Discharging Provider: Servando Rice MD  Primary Care Provider: Tiffanie Marc NP      HPI:   Patient is a 54-year-old female that presented to the emergency department complaining of add area of swelling on her left suprapubic area that is erythematous, painful, and swelling.  Patient has surrounding erythema that extends to the left thigh and superior from the pubis.  Patient denies any fever but states that she felt hot and has had some chills.  She denies shortness of breath denies nausea vomiting and has a past medical history of COPD, asthma, anxiety, back pain, and history of pulmonary embolus.  Patient is not on any anticoagulants at this time    Procedure(s) (LRB):  INCISION AND DRAINAGE, ABSCESS (Left)      Hospital Course:   Evaluation in the emergency department revealed a sed rate of 48 lactate that was 1.0 sodium 139 potassium 3.0 chloride 104 bicarb 25 glucose 97 and all other chemistry was normal with GFR greater than 60.  C-reactive protein was 23.3  PT PTT was normal  White blood cell count 14022 with left-shifted 82 granulocytes and 12 lymphocytes.  Urinalysis is negative    03/16/2020:  Patient is stable this morning without new complaints.  No fever chills nausea vomiting overnight.  Pain in the abscess site is persistent but she states mildly improved with antibiotics.  Blood pressure 122/59 and temperature 98.4° O2 sat 96%.  Will await surgical consult on this patient for incision and drainage of abscess.  Labs are unremarkable.      03/18/2020:  Discharge to home.  Begin repacking at home once per day.  Clindamycin 300 mg q.6 hours for 10 days.  Family will repack her wound daily  Continue home medications that  were taken prior to this admission  Will begin oxycodone 645710 p.o. Q 6 hr p.r.n. pain 20.  No refill     Consults:   Consults (From admission, onward)        Status Ordering Provider     General Surgery  Once     Provider:  Lilia Varela MD    Completed JOSE MEZA      consult to case management  Once     Provider:  (Not yet assigned)    Acknowledged LILIA VARELA     Pharmacy to dose Vancomycin consult  Once     Provider:  (Not yet assigned)    Acknowledged LILIA VARELA          Left genital labial abscess  03/15/2020:  Admit to medical-surgical unit.  Begin piperacillin and vancomycin IV for this abscess  Blood cultures done in the emergency department  Consult General surgery for incision and debridement.  Keep NPO after midnight due to possible surgery.    03/16/2020:  Continue current antibiotics  Await for surgery consult  Repeat labs in the a.m..    03/18/2020:  1.  Discharge to home  2.  Follow up with Dr. Varela within 2 weeks post discharge  Continue repacking daily.  Family will repack wound.      Final Active Diagnoses:    Diagnosis Date Noted POA    PRINCIPAL PROBLEM:  Abscess of groin, left [L02.214]  Yes    Encounter for postoperative care [Z48.89]  Not Applicable    Left genital labial abscess [N76.4] 03/15/2020 Yes    COPD exacerbation [J44.1] 03/15/2020 Yes      Problems Resolved During this Admission:       Discharged Condition: stable    Disposition:     Follow Up:  Follow-up Information     Tiffanie Marc NP. Go on 3/24/2020.    Specialty:  Family Medicine  Why:  appointment time: 10:40am Tueday March 24th for hospital follow up  Contact information:  [address] [place] [address]   [place] [address] [phone]             Lilia Varela MD. Go on 3/30/2020.    Specialty:  General Surgery  Why:  appointment time: 2:45pm Monday March 30th for surgery follow up  Contact information:  [address]  [facility] [place] [address] [phone]                  Patient Instructions:   No discharge procedures on file.    Significant Diagnostic Studies: Labs: All labs within the past 24 hours have been reviewed    Pending Diagnostic Studies:     None         Medications:  Reconciled Home Medications:      Medication List      START taking these medications    clindamycin 150 MG capsule  Commonly known as:  CLEOCIN  Take 2 capsules (300 mg total) by mouth 4 (four) times daily. for 7 days     oxyCODONE-acetaminophen  mg per tablet  Commonly known as:  PERCOCET  Take 1 tablet by mouth every 4 (four) hours as needed.        CONTINUE taking these medications    amLODIPine 10 MG tablet  Commonly known as:  NORVASC  Take 10 mg by mouth once daily.     aspirin 325 MG tablet  Take 325 mg by mouth once daily.     atorvastatin 10 MG tablet  Commonly known as:  LIPITOR  Take 10 mg by mouth once daily.     fluticasone-salmeterol 100-50 mcg/dose 100-50 mcg/dose diskus inhaler  Commonly known as:  ADVAIR  Inhale 1 puff into the lungs 2 (two) times daily. Controller     hydrOXYzine pamoate 25 MG Cap  Commonly known as:  VISTARIL  Take 25 mg by mouth 4 (four) times daily.     LORazepam 1 MG tablet  Commonly known as:  ATIVAN  Take 1 tablet (1 mg total) by mouth every 8 (eight) hours as needed (spasm).     methocarbamoL 500 MG Tab  Commonly known as:  ROBAXIN  Take 500 mg by mouth 2 (two) times daily.     omeprazole 10 MG capsule  Commonly known as:  PRILOSEC  Take 10 mg by mouth once daily.     venlafaxine 150 MG Cp24  Commonly known as:  EFFEXOR-XR  Take 75 mg by mouth once daily.            Indwelling Lines/Drains at time of discharge:   Lines/Drains/Airways     None                 Time spent on the discharge of patient: 25 minutes  Patient was seen and examined on the date of discharge and determined to be suitable for discharge.         Servando Rice MD  Department of Hospital Medicine  [facility] - Med [facility]urg

## 2020-03-18 NOTE — SUBJECTIVE & OBJECTIVE
Interval History:     Review of Systems   Constitutional: Negative for activity change, appetite change, fatigue and fever.   HENT: Negative for congestion, ear discharge, mouth sores, nosebleeds, rhinorrhea, sinus pressure, sinus pain and tinnitus.    Eyes: Negative.  Negative for pain, redness and itching.   Respiratory: Negative for apnea, cough, choking, chest tightness, shortness of breath, wheezing and stridor.    Cardiovascular: Negative for chest pain, palpitations and leg swelling.   Gastrointestinal: Negative for abdominal distention, abdominal pain, anal bleeding, blood in stool, constipation and diarrhea.   Endocrine: Negative.    Genitourinary: Negative for difficulty urinating, flank pain, frequency and urgency.   Musculoskeletal: Negative for arthralgias, back pain, gait problem and myalgias.   Skin: Negative for color change and pallor.   Allergic/Immunologic: Negative.    Neurological: Negative for dizziness, facial asymmetry, weakness, light-headedness and headaches.   Hematological: Negative for adenopathy. Does not bruise/bleed easily.   Psychiatric/Behavioral: The patient is hyperactive.      Objective:     Vital Signs (Most Recent):  Temp: 98.1 °F (36.7 °C) (03/18/20 0900)  Pulse: 91 (03/18/20 0900)  Resp: 18 (03/18/20 0900)  BP: (!) 140/67 (03/18/20 0900)  SpO2: (!) 92 % (03/18/20 0900) Vital Signs (24h Range):  Temp:  [96.9 °F (36.1 °C)-98.1 °F (36.7 °C)] 98.1 °F (36.7 °C)  Pulse:  [] 91  Resp:  [16-20] 18  SpO2:  [90 %-95 %] 92 %  BP: (131-188)/(60-79) 140/67     Weight: 49.9 kg (110 lb)  Body mass index is 18.3 kg/m².    Intake/Output Summary (Last 24 hours) at 3/18/2020 1218  Last data filed at 3/18/2020 0419  Gross per 24 hour   Intake 750 ml   Output 700 ml   Net 50 ml      Physical Exam   Constitutional: She is oriented to person, place, and time. She appears well-developed and well-nourished.   HENT:   Head: Normocephalic and atraumatic.   Eyes: Pupils are equal, round, and  reactive to light. EOM are normal.   Neck: Normal range of motion. Neck supple. No tracheal deviation present. No thyromegaly present.   Cardiovascular: Normal rate, regular rhythm and normal heart sounds.   Pulmonary/Chest: Effort normal and breath sounds normal.   Abdominal: Soft. Bowel sounds are normal. She exhibits no distension. There is no tenderness. There is no rebound and no guarding.   Musculoskeletal: Normal range of motion.   Lymphadenopathy:     She has no cervical adenopathy.   Neurological: She is alert and oriented to person, place, and time.   Skin: Skin is warm and dry. Capillary refill takes less than 2 seconds. There is erythema.   Erythema of the ears much improved induration remains but will decrease on its own will over the next several days.   Psychiatric: She has a normal mood and affect. Her behavior is normal. Judgment and thought content normal.   Nursing note and vitals reviewed.      Significant Labs:   Recent Lab Results       03/18/20  0930   03/17/20  1312        Albumin 3.0       Alkaline Phosphatase 131       ALT 69       Anion Gap 8              BILIRUBIN TOTAL 0.6  Comment:  For infants and newborns, interpretation of results should be based  on gestational age, weight and in agreement with clinical  observations.  Premature Infant recommended reference ranges:  Up to 24 hours.............<8.0 mg/dL  Up to 48 hours............<12.0 mg/dL  3-5 days..................<15.0 mg/dL  6-29 days.................<15.0 mg/dL         BUN, Bld <5       Calcium 8.3       Chloride 108       CO2 25       Creatinine 0.5       eGFR if  >60.0       eGFR if non  >60.0  Comment:  Calculation used to obtain the estimated glomerular filtration  rate (eGFR) is the CKD-EPI equation.          Glucose 103       Potassium 3.8       PROTEIN TOTAL 6.1       Sodium 141       Vancomycin-Trough   7.6         All pertinent labs within the past 24 hours have been  reviewed.    Significant Imaging: I have reviewed and interpreted all pertinent imaging results/findings within the past 24 hours.

## 2020-03-18 NOTE — PROGRESS NOTES
[facility] Hospital Medicine  Progress Note    Patient Name: Zayra White  MRN: [id number]  Patient Class: OP- Observation   Admission Date: 3/15/2020  Length of Stay: 1 days  Attending Physician: Servando Rice MD  Primary Care Provider: Tiffanie Marc NP        Subjective:     Principal Problem:Abscess of groin, left        HPI:  Patient is a 54-year-old female that presented to the emergency department complaining of add area of swelling on her left suprapubic area that is erythematous, painful, and swelling.  Patient has surrounding erythema that extends to the left thigh and superior from the pubis.  Patient denies any fever but states that she felt hot and has had some chills.  She denies shortness of breath denies nausea vomiting and has a past medical history of COPD, asthma, anxiety, back pain, and history of pulmonary embolus.  Patient is not on any anticoagulants at this time    Overview/Hospital Course:  Evaluation in the emergency department revealed a sed rate of 48 lactate that was 1.0 sodium 139 potassium 3.0 chloride 104 bicarb 25 glucose 97 and all other chemistry was normal with GFR greater than 60.  C-reactive protein was 23.3  PT PTT was normal  White blood cell count 43649 with left-shifted 82 granulocytes and 12 lymphocytes.  Urinalysis is negative    03/16/2020:  Patient is stable this morning without new complaints.  No fever chills nausea vomiting overnight.  Pain in the abscess site is persistent but she states mildly improved with antibiotics.  Blood pressure 122/59 and temperature 98.4° O2 sat 96%.  Will await surgical consult on this patient for incision and drainage of abscess.  Labs are unremarkable.    03/17/2020:  Patient stable and stating much less pain.  Vital signs are stable with no fever chills nausea vomiting  Potassium is 3.4 today otherwise labs normal    03/18/2020:  Discharge to home.  Begin repacking at home once per day.   Clindamycin 300 mg q.6 hours for 10 days.  Family will repack her wound daily  Continue home medications that were taken prior to this admission  Will begin oxycodone 634005 p.o. Q 6 hr p.r.n. pain 20.  No refill    Interval History:     Review of Systems   Constitutional: Negative for activity change, appetite change, fatigue and fever.   HENT: Negative for congestion, ear discharge, mouth sores, nosebleeds, rhinorrhea, sinus pressure, sinus pain and tinnitus.    Eyes: Negative.  Negative for pain, redness and itching.   Respiratory: Negative for apnea, cough, choking, chest tightness, shortness of breath, wheezing and stridor.    Cardiovascular: Negative for chest pain, palpitations and leg swelling.   Gastrointestinal: Negative for abdominal distention, abdominal pain, anal bleeding, blood in stool, constipation and diarrhea.   Endocrine: Negative.    Genitourinary: Negative for difficulty urinating, flank pain, frequency and urgency.   Musculoskeletal: Negative for arthralgias, back pain, gait problem and myalgias.   Skin: Negative for color change and pallor.   Allergic/Immunologic: Negative.    Neurological: Negative for dizziness, facial asymmetry, weakness, light-headedness and headaches.   Hematological: Negative for adenopathy. Does not bruise/bleed easily.   Psychiatric/Behavioral: The patient is hyperactive.      Objective:     Vital Signs (Most Recent):  Temp: 98.1 °F (36.7 °C) (03/18/20 0900)  Pulse: 91 (03/18/20 0900)  Resp: 18 (03/18/20 0900)  BP: (!) 140/67 (03/18/20 0900)  SpO2: (!) 92 % (03/18/20 0900) Vital Signs (24h Range):  Temp:  [96.9 °F (36.1 °C)-98.1 °F (36.7 °C)] 98.1 °F (36.7 °C)  Pulse:  [] 91  Resp:  [16-20] 18  SpO2:  [90 %-95 %] 92 %  BP: (131-188)/(60-79) 140/67     Weight: 49.9 kg (110 lb)  Body mass index is 18.3 kg/m².    Intake/Output Summary (Last 24 hours) at 3/18/2020 1218  Last data filed at 3/18/2020 0419  Gross per 24 hour   Intake 750 ml   Output 700 ml   Net 50 ml       Physical Exam   Constitutional: She is oriented to person, place, and time. She appears well-developed and well-nourished.   HENT:   Head: Normocephalic and atraumatic.   Eyes: Pupils are equal, round, and reactive to light. EOM are normal.   Neck: Normal range of motion. Neck supple. No tracheal deviation present. No thyromegaly present.   Cardiovascular: Normal rate, regular rhythm and normal heart sounds.   Pulmonary/Chest: Effort normal and breath sounds normal.   Abdominal: Soft. Bowel sounds are normal. She exhibits no distension. There is no tenderness. There is no rebound and no guarding.   Musculoskeletal: Normal range of motion.   Lymphadenopathy:     She has no cervical adenopathy.   Neurological: She is alert and oriented to person, place, and time.   Skin: Skin is warm and dry. Capillary refill takes less than 2 seconds. There is erythema.   Erythema of the ears much improved induration remains but will decrease on its own will over the next several days.   Psychiatric: She has a normal mood and affect. Her behavior is normal. Judgment and thought content normal.   Nursing note and vitals reviewed.      Significant Labs:   Recent Lab Results       03/18/20  0930   03/17/20  1312        Albumin 3.0       Alkaline Phosphatase 131       ALT 69       Anion Gap 8              BILIRUBIN TOTAL 0.6  Comment:  For infants and newborns, interpretation of results should be based  on gestational age, weight and in agreement with clinical  observations.  Premature Infant recommended reference ranges:  Up to 24 hours.............<8.0 mg/dL  Up to 48 hours............<12.0 mg/dL  3-5 days..................<15.0 mg/dL  6-29 days.................<15.0 mg/dL         BUN, Bld <5       Calcium 8.3       Chloride 108       CO2 25       Creatinine 0.5       eGFR if  >60.0       eGFR if non  >60.0  Comment:  Calculation used to obtain the estimated glomerular filtration  rate (eGFR)  is the CKD-EPI equation.          Glucose 103       Potassium 3.8       PROTEIN TOTAL 6.1       Sodium 141       Vancomycin-Trough   7.6         All pertinent labs within the past 24 hours have been reviewed.    Significant Imaging: I have reviewed and interpreted all pertinent imaging results/findings within the past 24 hours.      Assessment/Plan:      * Abscess of groin, left        COPD exacerbation  03/15/2020:  Continue nebulization treatments DuoNeb every 6 hr as needed  Continue home medications.      Left genital labial abscess  03/15/2020:  Admit to medical-surgical unit.  Begin piperacillin and vancomycin IV for this abscess  Blood cultures done in the emergency department  Consult General surgery for incision and debridement.  Keep NPO after midnight due to possible surgery.    03/16/2020:  Continue current antibiotics  Await for surgery consult  Repeat labs in the a.m..    03/17/2020:  Continue current treatment with piperacillin and vancomycin  Follow for any fever.  Repack wound daily      03/18/2020:  1.  Discharge to home  2.  Follow up with Dr. Villarreal within 2 weeks post discharge  Continue repacking daily.  Family will repack wound.      VTE Risk Mitigation (From admission, onward)         Ordered     IP VTE LOW RISK PATIENT  Once      03/15/20 1642     Place sequential compression device  Until discontinued      03/15/20 1642                      Servando Rice MD  Department of Hospital Medicine   [facility]

## 2020-03-18 NOTE — NURSING
Pt would like to reschedule dressing change at a later time during the day in place of the ordered dressing change at 6:00 am since the dressing change requires her to take a shower.

## 2020-03-18 NOTE — NURSING
Patient was bathed and wound was repacked. IV and tele removed. Patient daughter understands how to care for the wound and demonstrated proper wound care. Follow up appointment and discharge instructions given to patient along with prescriptions to be filled. Patient and family verbalized understanding

## 2020-03-19 LAB — BACTERIA SPEC AEROBE CULT: ABNORMAL

## 2020-03-20 LAB
BACTERIA BLD CULT: NORMAL
BACTERIA BLD CULT: NORMAL

## 2020-03-23 LAB — BACTERIA SPEC ANAEROBE CULT: NORMAL

## 2020-03-30 ENCOUNTER — OFFICE VISIT (OUTPATIENT)
Dept: SURGERY | Facility: CLINIC | Age: 55
End: 2020-03-30
Payer: MEDICAID

## 2020-03-30 VITALS
BODY MASS INDEX: 17.99 KG/M2 | DIASTOLIC BLOOD PRESSURE: 79 MMHG | RESPIRATION RATE: 13 BRPM | HEART RATE: 83 BPM | OXYGEN SATURATION: 98 % | WEIGHT: 108 LBS | HEIGHT: 65 IN | TEMPERATURE: 99 F | SYSTOLIC BLOOD PRESSURE: 132 MMHG

## 2020-03-30 DIAGNOSIS — Z48.89 ENCOUNTER FOR POSTOPERATIVE CARE: Primary | ICD-10-CM

## 2020-03-30 PROCEDURE — 99024 PR POST-OP FOLLOW-UP VISIT: ICD-10-PCS | Mod: S$GLB,,, | Performed by: SURGERY

## 2020-03-30 PROCEDURE — 99024 POSTOP FOLLOW-UP VISIT: CPT | Mod: S$GLB,,, | Performed by: SURGERY

## 2020-03-30 RX ORDER — IPRATROPIUM BROMIDE 0.5 MG/2.5ML
SOLUTION RESPIRATORY (INHALATION)
COMMUNITY
Start: 2020-03-17 | End: 2021-03-11 | Stop reason: SDUPTHER

## 2020-03-30 RX ORDER — ATENOLOL 25 MG/1
12.5 TABLET ORAL DAILY
COMMUNITY
Start: 2020-03-24 | End: 2021-07-14 | Stop reason: SDUPTHER

## 2020-03-30 RX ORDER — ALBUTEROL SULFATE 90 UG/1
AEROSOL, METERED RESPIRATORY (INHALATION)
COMMUNITY
Start: 2020-03-17 | End: 2021-06-10 | Stop reason: SDUPTHER

## 2020-03-30 RX ORDER — TIOTROPIUM BROMIDE 18 UG/1
1 CAPSULE ORAL; RESPIRATORY (INHALATION)
COMMUNITY
Start: 2020-03-17 | End: 2021-01-19 | Stop reason: SDUPTHER

## 2020-03-30 RX ORDER — FLUCONAZOLE 100 MG/1
100 TABLET ORAL DAILY
Qty: 10 TABLET | Refills: 0 | Status: SHIPPED | OUTPATIENT
Start: 2020-03-30 | End: 2020-04-09

## 2020-03-30 NOTE — PROGRESS NOTES
"Subjective:       Patient ID: Zayra White is a 54 y.o. female.    Chief Complaint: Post-op Evaluation (I&D Abscess 3-16-20)      HPI:  Ms. White presents today with no complaints.  She recently had an uncomplicated incision and drainage of a left groin abscess.  She is not experiencing any pain, nausea, vomiting, diarrhea, constipation, fevers, chills, wound concerns, etc.  No wound redness, swelling, drainage, odor, etc.  Activity tolerance has returned to normal.  Appetite is excellent.  Overall she feels "great".  She has completed oral antibiotic therapy as prescribed on discharge.  She believes she is developing a yeast infection.  She has had yeast infections frequently throughout her life associated with antibiotic therapy.      Allergies & Meds:  Review of patient's allergies indicates:  No Known Allergies    Current Outpatient Medications   Medication Sig Dispense Refill    albuterol (VENTOLIN HFA) 90 mcg/actuation inhaler inhale 2 puff by inhalation route  every 4 - 6 hours as needed      amLODIPine (NORVASC) 10 MG tablet Take 10 mg by mouth once daily.      aspirin 325 MG tablet Take 325 mg by mouth once daily.      atenoloL (TENORMIN) 25 MG tablet take 1 tablet by oral route every day      atorvastatin (LIPITOR) 10 MG tablet Take 10 mg by mouth once daily.      fluticasone-salmeterol diskus inhaler 100-50 mcg Inhale 1 puff into the lungs 2 (two) times daily. Controller      hydrOXYzine pamoate (VISTARIL) 25 MG Cap Take 25 mg by mouth 4 (four) times daily.      ipratropium (ATROVENT) 0.02 % nebulizer solution inhale 2.5 milliliter by inhalation route  every 8 hours via nebulizer mixed with albuterol as needed      methocarbamoL (ROBAXIN) 500 MG Tab Take 500 mg by mouth 2 (two) times daily.      omeprazole (PRILOSEC) 10 MG capsule Take 10 mg by mouth once daily.      oxyCODONE-acetaminophen (PERCOCET)  mg per tablet Take 1 tablet by mouth every 4 (four) hours as needed. 20 tablet 0    " tiotropium (SPIRIVA WITH HANDIHALER) 18 mcg inhalation capsule Inhale 1 capsule into the lungs.      venlafaxine (EFFEXOR-XR) 150 MG Cp24 Take 75 mg by mouth once daily.      fluconazole (DIFLUCAN) 100 MG tablet Take 1 tablet (100 mg total) by mouth once daily. for 10 days 10 tablet 0    LORazepam (ATIVAN) 1 MG tablet Take 1 tablet (1 mg total) by mouth every 8 (eight) hours as needed (spasm). 10 tablet 0     No current facility-administered medications for this visit.        PMFSHx:    Past Medical History:   Diagnosis Date    Anxiety     Arthritis     Asthma     Back pain     COPD (chronic obstructive pulmonary disease)     Pulmonary embolism     10 years ago       Past Surgical History:   Procedure Laterality Date    BIOPSY      right breast    BREAST CYST EXCISION       SECTION      CHOLECYSTECTOMY      INCISION AND DRAINAGE OF ABSCESS Left 3/16/2020    Procedure: INCISION AND DRAINAGE, ABSCESS;  Surgeon: Irvin Villarreal MD;  Location: [facility] OR;  Service: General;  Laterality: Left;       Family History   Problem Relation Age of Onset    Breast cancer Sister        Social History     Tobacco Use    Smoking status: Current Every Day Smoker    Smokeless tobacco: Never Used   Substance Use Topics    Alcohol use: Yes     Comment: occ    Drug use: Never         Review of Systems   Constitutional: Negative for appetite change, chills, fatigue, fever and unexpected weight change.   HENT: Negative for congestion, dental problem, ear pain, mouth sores, postnasal drip, rhinorrhea, sore throat, tinnitus, trouble swallowing and voice change.    Eyes: Negative for photophobia, pain, discharge and visual disturbance.   Respiratory: Negative for cough, chest tightness, shortness of breath and wheezing.    Cardiovascular: Negative for chest pain, palpitations and leg swelling.   Gastrointestinal: Negative for abdominal pain, blood in stool, constipation, diarrhea, nausea and vomiting.   Endocrine:  Negative for cold intolerance, heat intolerance, polydipsia, polyphagia and polyuria.   Genitourinary: Negative for difficulty urinating, dysuria, flank pain, frequency, hematuria and urgency.   Musculoskeletal: Negative for arthralgias, joint swelling and myalgias.   Skin: Negative for color change and rash.   Allergic/Immunologic: Negative for immunocompromised state.   Neurological: Negative for dizziness, tremors, seizures, syncope, speech difficulty, weakness, numbness and headaches.   Hematological: Negative for adenopathy. Does not bruise/bleed easily.   Psychiatric/Behavioral: Negative for agitation, confusion, hallucinations, self-injury and suicidal ideas. The patient is not nervous/anxious.        Objective:      Physical Exam   Constitutional: Vital signs are normal. She is active. No distress (no acute distress).   Cardiovascular: Normal rate, regular rhythm and normal heart sounds.   Pulmonary/Chest: Effort normal and breath sounds normal. Respiratory distress: no respiratory distress.   Abdominal: Soft. Normal appearance and bowel sounds are normal. Distention: nondistended. Tenderness: nontender.   Neurological: She is alert.   Skin: Skin is warm, dry and intact.   Wound granulating and tee well; no erythema, edema, exudate, induration, fluctuance, crepitus, necrosis, discoloration, separation, etc.         Medical Records Review:  Wound cultures obtained intraoperatively have confirmed community-acquired MRSA sensitive to prescribed antibiotics.  Anaerobic culture showed no growth.      Assessment:       Satisfactory postoperative recovery.  No evident complications.  Wound healing well.  Probable candidal vulvovaginitis    1. Encounter for postoperative care          Plan:     Encounter for postoperative care    Other orders  -     fluconazole (DIFLUCAN) 100 MG tablet; Take 1 tablet (100 mg total) by mouth once daily. for 10 days  Dispense: 10 tablet; Refill: 0          Follow up in about 2  weeks (around 4/13/2020).  RTC sooner if needed.

## 2020-04-20 ENCOUNTER — OFFICE VISIT (OUTPATIENT)
Dept: SURGERY | Facility: CLINIC | Age: 55
End: 2020-04-20
Payer: MEDICAID

## 2020-04-20 VITALS
TEMPERATURE: 97 F | OXYGEN SATURATION: 96 % | DIASTOLIC BLOOD PRESSURE: 85 MMHG | SYSTOLIC BLOOD PRESSURE: 151 MMHG | HEART RATE: 96 BPM | HEIGHT: 65 IN | RESPIRATION RATE: 16 BRPM | WEIGHT: 108 LBS | BODY MASS INDEX: 17.99 KG/M2

## 2020-04-20 DIAGNOSIS — Z48.89 ENCOUNTER FOR POSTOPERATIVE CARE: Primary | ICD-10-CM

## 2020-04-20 PROBLEM — N76.4 LEFT GENITAL LABIAL ABSCESS: Status: RESOLVED | Noted: 2020-03-15 | Resolved: 2020-04-20

## 2020-04-20 PROBLEM — J44.9 COPD (CHRONIC OBSTRUCTIVE PULMONARY DISEASE): Status: ACTIVE | Noted: 2020-03-15

## 2020-04-20 PROCEDURE — 99024 PR POST-OP FOLLOW-UP VISIT: ICD-10-PCS | Mod: S$GLB,,, | Performed by: SURGERY

## 2020-04-20 PROCEDURE — 99024 POSTOP FOLLOW-UP VISIT: CPT | Mod: S$GLB,,, | Performed by: SURGERY

## 2020-04-20 NOTE — PROGRESS NOTES
"Subjective:       Patient ID: Zayra White is a 54 y.o. female.    Chief Complaint: Follow-up      HPI:  Ms. White presents today with no complaints.  She recently had an uncomplicated incision and drainage of left groin abscess.  She is not experiencing any pain, nausea, vomiting, diarrhea, constipation, fevers, chills, wound concerns, etc.  No wound redness, swelling, drainage, odor, etc.  Activity tolerance has returned to normal.  Appetite is excellent.  Overall she feels "great".      Allergies & Meds:  Review of patient's allergies indicates:  No Known Allergies    Current Outpatient Medications   Medication Sig Dispense Refill    albuterol (VENTOLIN HFA) 90 mcg/actuation inhaler inhale 2 puff by inhalation route  every 4 - 6 hours as needed      amLODIPine (NORVASC) 10 MG tablet Take 10 mg by mouth once daily.      aspirin 325 MG tablet Take 325 mg by mouth once daily.      atenoloL (TENORMIN) 25 MG tablet take 1 tablet by oral route every day      atorvastatin (LIPITOR) 10 MG tablet Take 10 mg by mouth once daily.      fluticasone-salmeterol diskus inhaler 100-50 mcg Inhale 1 puff into the lungs 2 (two) times daily. Controller      hydrOXYzine pamoate (VISTARIL) 25 MG Cap Take 25 mg by mouth 4 (four) times daily.      ipratropium (ATROVENT) 0.02 % nebulizer solution inhale 2.5 milliliter by inhalation route  every 8 hours via nebulizer mixed with albuterol as needed      methocarbamoL (ROBAXIN) 500 MG Tab Take 500 mg by mouth 2 (two) times daily.      omeprazole (PRILOSEC) 10 MG capsule Take 10 mg by mouth once daily.      tiotropium (SPIRIVA WITH HANDIHALER) 18 mcg inhalation capsule Inhale 1 capsule into the lungs.      venlafaxine (EFFEXOR-XR) 150 MG Cp24 Take 75 mg by mouth once daily.       No current facility-administered medications for this visit.        PMFSHx:    Past Medical History:   Diagnosis Date    Anxiety     Arthritis     Asthma     Back pain     COPD (chronic obstructive " pulmonary disease)     Pulmonary embolism     10 years ago       Past Surgical History:   Procedure Laterality Date    BIOPSY      right breast    BREAST CYST EXCISION       SECTION      CHOLECYSTECTOMY      INCISION AND DRAINAGE OF ABSCESS Left 3/16/2020    Procedure: INCISION AND DRAINAGE, ABSCESS;  Surgeon: Irvin Villarreal MD;  Location: [facility];  Service: General;  Laterality: Left;       Family History   Problem Relation Age of Onset    Breast cancer Sister        Social History     Tobacco Use    Smoking status: Current Every Day Smoker    Smokeless tobacco: Never Used   Substance Use Topics    Alcohol use: Yes     Comment: occ    Drug use: Never         Review of Systems   Constitutional: Negative for appetite change, chills, fatigue, fever and unexpected weight change.   HENT: Negative for congestion, dental problem, ear pain, mouth sores, postnasal drip, rhinorrhea, sore throat, tinnitus, trouble swallowing and voice change.    Eyes: Negative for photophobia, pain, discharge and visual disturbance.   Respiratory: Negative for cough, chest tightness, shortness of breath and wheezing.    Cardiovascular: Negative for chest pain, palpitations and leg swelling.   Gastrointestinal: Negative for abdominal pain, blood in stool, constipation, diarrhea, nausea and vomiting.   Endocrine: Negative for cold intolerance, heat intolerance, polydipsia, polyphagia and polyuria.   Genitourinary: Negative for difficulty urinating, dysuria, flank pain, frequency, hematuria and urgency.   Musculoskeletal: Negative for arthralgias, joint swelling and myalgias.   Skin: Negative for color change and rash.   Allergic/Immunologic: Negative for immunocompromised state.   Neurological: Negative for dizziness, tremors, seizures, syncope, speech difficulty, weakness, numbness and headaches.   Hematological: Negative for adenopathy. Does not bruise/bleed easily.   Psychiatric/Behavioral: Negative for agitation,  confusion, hallucinations, self-injury and suicidal ideas. The patient is not nervous/anxious.        Objective:      Physical Exam   Constitutional: Vital signs are normal. She is active. No distress (no acute distress).   Cardiovascular: Normal rate, regular rhythm and normal heart sounds.   Pulmonary/Chest: Effort normal and breath sounds normal. Respiratory distress: no respiratory distress.   Abdominal: Soft. Normal appearance and bowel sounds are normal. Distention: nondistended. Tenderness: nontender.   Neurological: She is alert.   Skin: Skin is warm, dry and intact.   Incision healed well without erythema, edema, exudate, induration, fluctuance, crepitus, necrosis, discoloration, separation           Assessment:       Satisfactory postoperative recovery.  No evident complications.  Wound healed.    1. Encounter for postoperative care          Plan:     Encounter for postoperative care          Follow up for any concerns or questions as needed, resume care with PCP.

## 2020-09-28 DIAGNOSIS — Z12.31 ENCOUNTER FOR SCREENING MAMMOGRAM FOR BREAST CANCER: ICD-10-CM

## 2020-09-28 DIAGNOSIS — M19.049 PRIMARY OSTEOARTHRITIS OF HAND: Primary | ICD-10-CM

## 2020-10-05 ENCOUNTER — TELEPHONE (OUTPATIENT)
Dept: ORTHOPEDICS | Facility: CLINIC | Age: 55
End: 2020-10-05

## 2020-10-05 NOTE — TELEPHONE ENCOUNTER
Received referral from [facility] from [facility] for unknown issue. Called patient to schedule appointment. Straight to voicemail. LVM

## 2020-10-05 NOTE — TELEPHONE ENCOUNTER
Returned call to patient. Appointment made for trigger finger on right hand. Patient verbalized understanding of appointment day, time and location.

## 2020-10-05 NOTE — TELEPHONE ENCOUNTER
----- Message from Devika Reilly MA sent at 10/5/2020 10:17 AM CDT -----  Contact: pt  Missed call back   [phone]

## 2020-10-19 ENCOUNTER — HOSPITAL ENCOUNTER (OUTPATIENT)
Dept: RADIOLOGY | Facility: HOSPITAL | Age: 55
Discharge: HOME | End: 2020-10-19
Attending: NURSE PRACTITIONER
Payer: MEDICAID

## 2020-10-19 VITALS — WEIGHT: 105 LBS | HEIGHT: 65 IN | BODY MASS INDEX: 17.49 KG/M2

## 2020-10-19 DIAGNOSIS — M19.049 PRIMARY OSTEOARTHRITIS OF HAND: ICD-10-CM

## 2020-10-19 DIAGNOSIS — Z12.31 ENCOUNTER FOR SCREENING MAMMOGRAM FOR BREAST CANCER: ICD-10-CM

## 2020-10-19 PROCEDURE — 77067 MAMMO DIGITAL SCREENING BILAT WITH TOMO: ICD-10-PCS | Mod: 26,,, | Performed by: RADIOLOGY

## 2020-10-19 PROCEDURE — 77067 SCR MAMMO BI INCL CAD: CPT | Mod: TC

## 2020-10-19 PROCEDURE — 77063 MAMMO DIGITAL SCREENING BILAT WITH TOMO: ICD-10-PCS | Mod: 26,,, | Performed by: RADIOLOGY

## 2020-10-19 PROCEDURE — 73130 XR HAND COMPLETE 3 VIEWS BILATERAL: ICD-10-PCS | Mod: 26,50,, | Performed by: RADIOLOGY

## 2020-10-19 PROCEDURE — 73130 X-RAY EXAM OF HAND: CPT | Mod: TC,50,FY

## 2020-10-19 PROCEDURE — 77063 BREAST TOMOSYNTHESIS BI: CPT | Mod: 26,,, | Performed by: RADIOLOGY

## 2020-10-19 PROCEDURE — 73130 X-RAY EXAM OF HAND: CPT | Mod: 26,50,, | Performed by: RADIOLOGY

## 2020-10-19 PROCEDURE — 77067 SCR MAMMO BI INCL CAD: CPT | Mod: 26,,, | Performed by: RADIOLOGY

## 2020-10-20 ENCOUNTER — OFFICE VISIT (OUTPATIENT)
Dept: ORTHOPEDICS | Facility: CLINIC | Age: 55
End: 2020-10-20
Payer: MEDICAID

## 2020-10-20 VITALS
RESPIRATION RATE: 18 BRPM | TEMPERATURE: 99 F | HEART RATE: 104 BPM | OXYGEN SATURATION: 97 % | HEIGHT: 65 IN | WEIGHT: 105 LBS | BODY MASS INDEX: 17.49 KG/M2

## 2020-10-20 DIAGNOSIS — M18.0 ARTHRITIS OF CARPOMETACARPAL (CMC) JOINT OF BOTH THUMBS: ICD-10-CM

## 2020-10-20 DIAGNOSIS — M65.331 TRIGGER MIDDLE FINGER OF RIGHT HAND: Primary | ICD-10-CM

## 2020-10-20 DIAGNOSIS — M65.312 TRIGGER THUMB OF LEFT HAND: ICD-10-CM

## 2020-10-20 PROCEDURE — 99204 PR OFFICE/OUTPT VISIT, NEW, LEVL IV, 45-59 MIN: ICD-10-PCS | Mod: 25,S$PBB,, | Performed by: ORTHOPAEDIC SURGERY

## 2020-10-20 PROCEDURE — 99213 OFFICE O/P EST LOW 20 MIN: CPT | Mod: PBBFAC,PN,25 | Performed by: ORTHOPAEDIC SURGERY

## 2020-10-20 PROCEDURE — 20550 TENDON SHEATH: R LONG MCP: ICD-10-PCS | Mod: 51,FA,S$PBB, | Performed by: ORTHOPAEDIC SURGERY

## 2020-10-20 PROCEDURE — 99999 PR PBB SHADOW E&M-EST. PATIENT-LVL III: ICD-10-PCS | Mod: PBBFAC,,, | Performed by: ORTHOPAEDIC SURGERY

## 2020-10-20 PROCEDURE — 99999 PR PBB SHADOW E&M-EST. PATIENT-LVL III: CPT | Mod: PBBFAC,,, | Performed by: ORTHOPAEDIC SURGERY

## 2020-10-20 PROCEDURE — 99204 OFFICE O/P NEW MOD 45 MIN: CPT | Mod: 25,S$PBB,, | Performed by: ORTHOPAEDIC SURGERY

## 2020-10-20 PROCEDURE — 20550 NJX 1 TENDON SHEATH/LIGAMENT: CPT | Mod: PBBFAC,PN | Performed by: ORTHOPAEDIC SURGERY

## 2020-10-20 RX ADMIN — TRIAMCINOLONE ACETONIDE 10 MG: 10 INJECTION, SUSPENSION INTRA-ARTICULAR; INTRALESIONAL at 01:10

## 2020-10-20 NOTE — PROCEDURES
Tendon Sheath: L thumb MCP    Date/Time: 10/20/2020 1:00 PM  Performed by: Edwin Garza DO  Authorized by: Edwin Garza, DO     Consent Done?:  Yes (Verbal)  Indications:  Pain and diagnostic evaluation  Site marked: the procedure site was marked    Timeout: prior to procedure the correct patient, procedure, and site was verified    Prep: patient was prepped and draped in usual sterile fashion      Location:  Thumb  Site:  L thumb MCP  Ultrasonic guidance for needle placement?: No    Needle size:  25 G  Approach:  Volar  Medications:  10 mg triamcinolone acetonide 10 mg/mL  Patient tolerance:  Patient tolerated the procedure well with no immediate complications

## 2020-10-20 NOTE — PROGRESS NOTES
Subjective:      Patient ID: Zayra White is a 55 y.o. female.    Chief Complaint: Pain of the Right Hand    Referring Provider: Tiffanie Marc Np  [address] [place] [address] [place] [address]    HPI:  Ms. White this is a 55-year-old left hand dominant female who presented today for evaluation of 3-4 months of right middle finger and 1 day of left thumb triggering.  She stated flexing her finger increases her symptoms and is worse in the morning.  She has been wearing compression gloves which do help.  She has taken NSAIDs with questionable help.  She has not had injections nor done physical/occupational therapy.    Past Medical History:   Diagnosis Date    Anxiety     Arthritis     Asthma     Back pain     COPD (chronic obstructive pulmonary disease)     Pulmonary embolism    *  *  *  *  *  *  *  * Hypertension  Hyperlipidemia  Seasonal allergies  GERD  Headaches  Stomach ulcers  Rheumatoid arthritis  Fibromyalgia     Past Surgical History:   Procedure Laterality Date    BIOPSY right breast      TUBAL LIGATION    BREAST CYST EXCISION right       SECTION      CHOLECYSTECTOMY      INCISION AND DRAINAGE OF ABSCESS left groin Left 3/16/2020   *  * BRONCHOSCOPY  LASIK BILATERAL EYES TWICE       Review of patient's allergies indicates:  No Known Allergies    Social History     Occupational History    Disabled labor   Tobacco Use    Smoking status: Current Every Day Smoker    Smokeless tobacco: Never Used   Substance and Sexual Activity    Alcohol use: Yes     Comment: occ    Drug use: Never    Sexual activity: Not Currently     Birth control/protection: Post-menopausal      Family History   Problem Relation Age of Onset    Breast cancer Sister     Ovarian cancer Neg Hx        Previous Hospitalizations:  Childbirth, left inguinal abscess, pulmonary embolus.    ROS:   Review of Systems   Constitution: Negative for chills and fever.   HENT: Negative for congestion.    Eyes: Negative for  blurred vision.   Cardiovascular: Negative for chest pain.   Respiratory: Negative for cough.    Endocrine: Negative for polydipsia.   Hematologic/Lymphatic: Negative for adenopathy.   Skin: Negative for flushing and itching.   Musculoskeletal: Negative for gout, joint pain and joint swelling.   Gastrointestinal: Positive for constipation and heartburn. Negative for diarrhea.   Genitourinary: Negative for nocturia.   Neurological: Positive for headaches. Negative for seizures.   Psychiatric/Behavioral: Negative for depression and substance abuse. The patient is nervous/anxious.    Allergic/Immunologic: Positive for environmental allergies.           Objective:      Physical Exam:   General: AAOx3.  No acute distress  HEENT: Normocephalic, PEARLA EOMI Edentulous  Neck: Supple, No JVD  Chest: Symetric, equal excursion on inspiration  Abdomen: Soft NTND  Vascular:  Pulses intact and equal bilaterally.  Capillary refill less than 3 seconds and equal bilaterally  Neurologic:  Pinprick and soft touch intact and equal bilaterally  Integment:  No ecchymosis, no errythema  Extremity:  Hand:  Pronation/supination equal bilaterally 90/90 degrees.  Dorsiflexion/volar flexion equal bilaterally 90/85 degrees.  Nontender in the anatomic snuffbox bilaterally.  Nontender at the 1st dorsal compartment bilaterally.  No swelling at the 1st dorsal compartment bilaterally.  Finkelstein's negative bilaterally.  Grind test mildly positive bilaterally.  Nontender at the scapholunate interval both hands.  Rodriguez's test negative bilaterally.  Nontender at the DRUJ/TFCC bilaterally.  Ulnar impaction test negative bilaterally.  Tender with palpation volar MCP right middle finger.  Tender with palpation volar MCP left thumb.  Nodules palpable volar MCP right middle finger and left thumb.  Positive triggering right middle finger and left thumb.  Wartenberg sign negative bilaterally  Radiography:  Personally reviewed x-rays of both hands completed  on 10/19/2020 showed 1st CMC arthritis no fracture dislocation.      Assessment:       Impression:      1. Trigger middle finger of right hand    2.  3. Trigger thumb of left hand   First CMC arthritis, both thumbs.         Plan:       1.  Discussed physical examination and radiographic findings with the patient. Zayra understands that she has triggering of the right middle finger and left thumb of her hands.  Treatment alternatives and outcomes were discussed with the patient, she understands she could be treated conservatively with observation, activity modification, IP taping, occupational therapy, injections, or she could consider surgical intervention such as A1 pulley release.  Since she has not completed conservative management recommend she trial conservative care.  2.  Offered a steroid injection to the A1 pulley of the right middle finger and left thumb.  3.  Continue with compression gloves.  4.  Take NSAIDs as tolerated allowed by PCM.  5.  Home exercises to include vitamin-E massage to the A1 pulleys of the patient's hands were shown discussed.  6.  Offered referred to Occupational therapy declined for now.  7.  Follow up p.r.n..

## 2020-10-20 NOTE — LETTER
October 20, 2020      Tiffanie Marc, [address]  [place] [address]           [facility]  [address] [place] [address] A  [place] [address]  Phone: [phone]  Fax: [phone]          Patient: Zayra White   MR Number: [id number]   YOB: 1965   Date of Visit: 10/20/2020       Dear Dr. Tiffanie Marc:    Thank you for referring Zayra White to me for evaluation. Attached you will find relevant portions of my assessment and plan of care.    If you have questions, please do not hesitate to call me. I look forward to following Zayra White along with you.    Sincerely,    Edwin Garza, DO    Enclosure  CC:  No Recipients    If you would like to receive this communication electronically, please contact [email or web address] or [phone] to request more information on [employer] access.    For providers and/or their staff who would like to refer a patient to [facility], please contact us through our one-stop-shop provider referral line, [facility], at [phone].    If you feel you have received this communication in error or would no longer like to receive these types of communications, please e-mail [email or web address]

## 2020-10-20 NOTE — PROCEDURES
Tendon Sheath: R long MCP    Date/Time: 10/20/2020 1:00 PM  Performed by: Edwin Garza DO  Authorized by: Edwin Garza, DO     Consent Done?:  Yes (Verbal)  Indications:  Pain and diagnostic evaluation  Site marked: the procedure site was marked    Timeout: prior to procedure the correct patient, procedure, and site was verified    Prep: patient was prepped and draped in usual sterile fashion      Location:  Long finger  Site:  R long MCP  Ultrasonic guidance for needle placement?: No    Needle size:  25 G  Approach:  Volar  Medications:  10 mg triamcinolone acetonide 10 mg/mL  Patient tolerance:  Patient tolerated the procedure well with no immediate complications

## 2020-11-08 ENCOUNTER — HOSPITAL ENCOUNTER (EMERGENCY)
Facility: HOSPITAL | Age: 55
Discharge: HOME | End: 2020-11-08
Attending: FAMILY MEDICINE
Payer: MEDICAID

## 2020-11-08 VITALS
HEART RATE: 86 BPM | OXYGEN SATURATION: 93 % | TEMPERATURE: 98 F | SYSTOLIC BLOOD PRESSURE: 127 MMHG | BODY MASS INDEX: 17.49 KG/M2 | RESPIRATION RATE: 19 BRPM | WEIGHT: 105 LBS | HEIGHT: 65 IN | DIASTOLIC BLOOD PRESSURE: 87 MMHG

## 2020-11-08 DIAGNOSIS — R06.02 SOB (SHORTNESS OF BREATH): ICD-10-CM

## 2020-11-08 DIAGNOSIS — U07.1 COVID-19 VIRUS DETECTED: ICD-10-CM

## 2020-11-08 DIAGNOSIS — U07.1 COVID-19 VIRUS INFECTION: Primary | ICD-10-CM

## 2020-11-08 LAB
ALBUMIN SERPL BCP-MCNC: 3.5 G/DL (ref 3.5–5.2)
ALP SERPL-CCNC: 70 U/L (ref 55–135)
ALT SERPL W/O P-5'-P-CCNC: 29 U/L (ref 10–44)
ANION GAP SERPL CALC-SCNC: 9 MMOL/L (ref 8–16)
APTT BLDCRRT: 25.6 SEC (ref 21–32)
AST SERPL-CCNC: 32 U/L (ref 10–40)
BASOPHILS # BLD AUTO: 0.01 K/UL (ref 0–0.2)
BASOPHILS NFR BLD: 0.2 % (ref 0–1.9)
BILIRUB SERPL-MCNC: 0.4 MG/DL (ref 0.1–1)
BNP SERPL-MCNC: 13 PG/ML (ref 0–99)
BUN SERPL-MCNC: 9 MG/DL (ref 6–20)
CALCIUM SERPL-MCNC: 8.4 MG/DL (ref 8.7–10.5)
CHLORIDE SERPL-SCNC: 102 MMOL/L (ref 95–110)
CK MB SERPL-MCNC: 1.4 NG/ML (ref 0.1–6.5)
CK MB SERPL-MCNC: 1.5 NG/ML (ref 0.1–6.5)
CK MB SERPL-RTO: 2 % (ref 0–5)
CK MB SERPL-RTO: 2.3 % (ref 0–5)
CK SERPL-CCNC: 65 U/L (ref 20–180)
CK SERPL-CCNC: 65 U/L (ref 20–180)
CK SERPL-CCNC: 71 U/L (ref 20–180)
CK SERPL-CCNC: 71 U/L (ref 20–180)
CO2 SERPL-SCNC: 28 MMOL/L (ref 23–29)
CREAT SERPL-MCNC: 0.6 MG/DL (ref 0.5–1.4)
DIFFERENTIAL METHOD: ABNORMAL
EOSINOPHIL # BLD AUTO: 0.1 K/UL (ref 0–0.5)
EOSINOPHIL NFR BLD: 2.5 % (ref 0–8)
ERYTHROCYTE [DISTWIDTH] IN BLOOD BY AUTOMATED COUNT: 11.9 % (ref 11.5–14.5)
EST. GFR  (AFRICAN AMERICAN): >60 ML/MIN/1.73 M^2
EST. GFR  (NON AFRICAN AMERICAN): >60 ML/MIN/1.73 M^2
GLUCOSE SERPL-MCNC: 108 MG/DL (ref 70–110)
HCT VFR BLD AUTO: 42 % (ref 37–48.5)
HGB BLD-MCNC: 14.2 G/DL (ref 12–16)
IMM GRANULOCYTES # BLD AUTO: 0.01 K/UL (ref 0–0.04)
IMM GRANULOCYTES NFR BLD AUTO: 0.2 % (ref 0–0.5)
INR PPP: 1 (ref 0.8–1.2)
LYMPHOCYTES # BLD AUTO: 1.8 K/UL (ref 1–4.8)
LYMPHOCYTES NFR BLD: 32.3 % (ref 18–48)
MAGNESIUM SERPL-MCNC: 1.8 MG/DL (ref 1.6–2.6)
MCH RBC QN AUTO: 32.3 PG (ref 27–31)
MCHC RBC AUTO-ENTMCNC: 33.8 G/DL (ref 32–36)
MCV RBC AUTO: 96 FL (ref 82–98)
MONOCYTES # BLD AUTO: 0.3 K/UL (ref 0.3–1)
MONOCYTES NFR BLD: 5.5 % (ref 4–15)
NEUTROPHILS # BLD AUTO: 3.3 K/UL (ref 1.8–7.7)
NEUTROPHILS NFR BLD: 59.3 % (ref 38–73)
NRBC BLD-RTO: 0 /100 WBC
PLATELET # BLD AUTO: 210 K/UL (ref 150–350)
PMV BLD AUTO: 9.5 FL (ref 9.2–12.9)
POTASSIUM SERPL-SCNC: 3.2 MMOL/L (ref 3.5–5.1)
PROT SERPL-MCNC: 6.5 G/DL (ref 6–8.4)
PROTHROMBIN TIME: 9.8 SEC (ref 9–12.5)
RBC # BLD AUTO: 4.4 M/UL (ref 4–5.4)
SARS-COV-2 RDRP RESP QL NAA+PROBE: POSITIVE
SODIUM SERPL-SCNC: 139 MMOL/L (ref 136–145)
TROPONIN I SERPL DL<=0.01 NG/ML-MCNC: 0.01 NG/ML (ref 0.02–0.5)
TROPONIN I SERPL DL<=0.01 NG/ML-MCNC: 0.01 NG/ML (ref 0.02–0.5)
TSH SERPL DL<=0.005 MIU/L-ACNC: 3.17 UIU/ML (ref 0.34–5.6)
WBC # BLD AUTO: 5.63 K/UL (ref 3.9–12.7)

## 2020-11-08 PROCEDURE — 71045 XR CHEST AP PORTABLE: ICD-10-PCS | Mod: 26,,, | Performed by: RADIOLOGY

## 2020-11-08 PROCEDURE — 85730 THROMBOPLASTIN TIME PARTIAL: CPT

## 2020-11-08 PROCEDURE — 71045 X-RAY EXAM CHEST 1 VIEW: CPT | Mod: TC,FY

## 2020-11-08 PROCEDURE — 83880 ASSAY OF NATRIURETIC PEPTIDE: CPT

## 2020-11-08 PROCEDURE — 25000003 PHARM REV CODE 250: Performed by: FAMILY MEDICINE

## 2020-11-08 PROCEDURE — U0002 COVID-19 LAB TEST NON-CDC: HCPCS

## 2020-11-08 PROCEDURE — 84443 ASSAY THYROID STIM HORMONE: CPT

## 2020-11-08 PROCEDURE — 82553 CREATINE MB FRACTION: CPT | Mod: 91

## 2020-11-08 PROCEDURE — 85610 PROTHROMBIN TIME: CPT

## 2020-11-08 PROCEDURE — 85025 COMPLETE CBC W/AUTO DIFF WBC: CPT

## 2020-11-08 PROCEDURE — 84484 ASSAY OF TROPONIN QUANT: CPT

## 2020-11-08 PROCEDURE — 83735 ASSAY OF MAGNESIUM: CPT

## 2020-11-08 PROCEDURE — 82550 ASSAY OF CK (CPK): CPT | Mod: 91

## 2020-11-08 PROCEDURE — 99284 EMERGENCY DEPT VISIT MOD MDM: CPT | Mod: 25

## 2020-11-08 PROCEDURE — 71045 X-RAY EXAM CHEST 1 VIEW: CPT | Mod: 26,,, | Performed by: RADIOLOGY

## 2020-11-08 PROCEDURE — 80053 COMPREHEN METABOLIC PANEL: CPT

## 2020-11-08 RX ORDER — DOXYCYCLINE 100 MG/1
100 CAPSULE ORAL 2 TIMES DAILY
Qty: 20 CAPSULE | Refills: 0 | Status: SHIPPED | OUTPATIENT
Start: 2020-11-08 | End: 2020-11-18

## 2020-11-08 RX ORDER — POTASSIUM CHLORIDE 20 MEQ/1
40 TABLET, EXTENDED RELEASE ORAL
Status: COMPLETED | OUTPATIENT
Start: 2020-11-08 | End: 2020-11-08

## 2020-11-08 RX ORDER — IVERMECTIN 3 MG/1
9 TABLET ORAL ONCE
Qty: 3 TABLET | Refills: 0 | Status: SHIPPED | OUTPATIENT
Start: 2020-11-08 | End: 2020-11-08

## 2020-11-08 RX ORDER — VIT C/E/ZN/COPPR/LUTEIN/ZEAXAN 250MG-90MG
2000 CAPSULE ORAL DAILY
Qty: 60 CAPSULE | Refills: 0 | Status: ON HOLD | OUTPATIENT
Start: 2020-11-08

## 2020-11-08 RX ORDER — IVERMECTIN 3 MG/1
9 TABLET ORAL DAILY
Qty: 30 TABLET | Refills: 0 | Status: SHIPPED | OUTPATIENT
Start: 2020-11-08 | End: 2020-11-08 | Stop reason: SDUPTHER

## 2020-11-08 RX ORDER — ASCORBIC ACID 500 MG
1000 TABLET ORAL 2 TIMES DAILY
Qty: 120 TABLET | Refills: 0 | Status: ON HOLD | OUTPATIENT
Start: 2020-11-08 | End: 2021-12-07 | Stop reason: HOSPADM

## 2020-11-08 RX ADMIN — POTASSIUM CHLORIDE 40 MEQ: 1500 TABLET, EXTENDED RELEASE ORAL at 11:11

## 2020-11-09 NOTE — ED PROVIDER NOTES
Encounter Date: 2020       History     Chief Complaint   Patient presents with    Shortness of Breath    Dizziness     Patient comes our facility status post COVID exposure.  She had a family member that was diagnosed COVID.  Patient does have a significant smoking issue with his rheumatoid arthritis as well as COPD.  Patient does have a history of pulmonary emboli in the past as well.  Today patient felt a near syncopal episode.  She describes chest heaviness for about 30 min which has resolved.  She had some mild nausea, shortness of breath, palpitations at of all resolved.  She comes to the ER satting 97% on room air.  She denies any current chest pain.  She denies any coronary history.  She does have family history of coronary artery disease in addition to her history of smoking, hypertension and hyperlipidemia.        Review of patient's allergies indicates:  No Known Allergies  Past Medical History:   Diagnosis Date    Anxiety     Arthritis     Asthma     Back pain     COPD (chronic obstructive pulmonary disease)     Pulmonary embolism     10 years ago     Past Surgical History:   Procedure Laterality Date    BIOPSY      right breast    BREAST CYST EXCISION       SECTION      CHOLECYSTECTOMY      INCISION AND DRAINAGE OF ABSCESS Left 3/16/2020    Procedure: INCISION AND DRAINAGE, ABSCESS;  Surgeon: Irvin Villarreal MD;  [facility];  Service: General;  Laterality: Left;     Family History   Problem Relation Age of Onset    Breast cancer Sister     Ovarian cancer Neg Hx      Social History     Tobacco Use    Smoking status: Current Every Day Smoker    Smokeless tobacco: Never Used   Substance Use Topics    Alcohol use: Yes     Comment: occ    Drug use: Never     Review of Systems   Constitutional: Negative for chills, fatigue and fever.   HENT: Negative for sore throat.    Respiratory: Positive for shortness of breath and wheezing. Negative for cough and stridor.     Cardiovascular: Negative for chest pain, palpitations and leg swelling.   Gastrointestinal: Negative for abdominal pain, nausea and vomiting.   Genitourinary: Negative for dysuria.   Musculoskeletal: Negative for arthralgias and myalgias.   Skin: Negative for color change, pallor, rash and wound.   Neurological: Negative for dizziness, seizures, weakness, light-headedness and headaches.   Hematological: Negative for adenopathy. Does not bruise/bleed easily.   Psychiatric/Behavioral: Negative for agitation, behavioral problems and confusion.       Physical Exam     Initial Vitals [11/08/20 1827]   BP Pulse Resp Temp SpO2   (!) 145/81 91 18 97.5 °F (36.4 °C) 97 %      MAP       --         Physical Exam    Nursing note and vitals reviewed.  Constitutional: She appears well-developed and well-nourished. She is not diaphoretic. No distress.   HENT:   Head: Normocephalic and atraumatic.   Nose: Nose normal.   Mouth/Throat: No oropharyngeal exudate.   Eyes: Conjunctivae and EOM are normal. Right eye exhibits no discharge. Left eye exhibits no discharge. No scleral icterus.   Neck: Normal range of motion. Neck supple.   Cardiovascular: Normal rate, regular rhythm, normal heart sounds and intact distal pulses.   No murmur heard.  Pulmonary/Chest: Breath sounds normal. No respiratory distress. She has no wheezes. She has no rhonchi. She has no rales. She exhibits no tenderness.   Abdominal: Soft. Bowel sounds are normal. She exhibits no distension. There is no abdominal tenderness. There is no rebound and no guarding.   Musculoskeletal: No tenderness or edema.   Lymphadenopathy:     She has no cervical adenopathy.   Neurological: She is alert. She has normal strength. GCS score is 15. GCS eye subscore is 4. GCS verbal subscore is 5. GCS motor subscore is 6.   Skin: Skin is warm and dry. Capillary refill takes less than 2 seconds. No rash and no abscess noted. No erythema. No pallor.   Psychiatric: She has a normal mood and  affect. Her behavior is normal. Judgment and thought content normal.         ED Course   Procedures  Labs Reviewed   CBC W/ AUTO DIFFERENTIAL - Abnormal; Notable for the following components:       Result Value    MCH 32.3 (*)     All other components within normal limits   COMPREHENSIVE METABOLIC PANEL - Abnormal; Notable for the following components:    Potassium 3.2 (*)     Calcium 8.4 (*)     All other components within normal limits   TROPONIN I - Abnormal; Notable for the following components:    Troponin I 0.01 (*)     All other components within normal limits   SARS-COV-2 RNA AMPLIFICATION, QUAL - Abnormal; Notable for the following components:    SARS-CoV-2 RNA, Amplification, Qual Positive (*)     All other components within normal limits   TROPONIN I - Abnormal; Notable for the following components:    Troponin I 0.01 (*)     All other components within normal limits   PROTIME-INR   APTT   CK-MB   CK   B-TYPE NATRIURETIC PEPTIDE   MAGNESIUM   TSH   CK   CK-MB     EKG Readings: (Independently Interpreted)   Initial Reading: No STEMI.   Normal sinus rhythm pulse 85.  No ST elevation or depressions.       Imaging Results          X-Ray Chest AP Portable (In process)               X-Rays:   Independently Interpreted Readings:   Other Readings:  Changes consistent with emphysema or COPD that are chronic.  No acute changes.  No infiltrates no ground-glass appearance appreciated.  Cardiac silhouette within normal limits.                         Repeat cardiac markers were negative.       Clinical Impression:       ICD-10-CM ICD-9-CM   1. COVID-19 virus infection  U07.1 079.89   2. SOB (shortness of breath)  R06.02 786.05                      Disposition:   Disposition: Discharged  Condition: Stable     ED Disposition Condition    Discharge Stable        ED Prescriptions     Medication Sig Dispense Start Date End Date Auth. Provider    doxycycline (VIBRAMYCIN) 100 MG Cap Take 1 capsule (100 mg total) by mouth 2  (two) times daily. for 10 days 20 capsule 11/8/2020 11/18/2020 Daniel Aguilar MD    ascorbic acid, vitamin C, (VITAMIN C) 500 MG tablet Take 2 tablets (1,000 mg total) by mouth 2 (two) times daily. 120 tablet 11/8/2020  Daniel Aguilar MD    zinc 50 mg Tab Take 50 mg by mouth once daily. 30 each 11/8/2020  Daniel Aguilar MD    cholecalciferol, vitamin D3, (VITAMIN D3) 25 mcg (1,000 unit) capsule Take 2 capsules (2,000 Units total) by mouth once daily. 60 capsule 11/8/2020  Daniel Aguilar MD    ivermectin (STROMECTOL) 3 mg Tab Take 3 tablets (9 mg total) by mouth once daily. for 10 doses 30 tablet 11/8/2020 11/18/2020 Daniel Aguilar MD        Follow-up Information    None                                      Daniel Aguilar MD  11/08/20 3047

## 2020-12-18 ENCOUNTER — OFFICE VISIT (OUTPATIENT)
Dept: FAMILY MEDICINE | Facility: CLINIC | Age: 55
End: 2020-12-18
Payer: MEDICAID

## 2020-12-18 ENCOUNTER — LAB VISIT (OUTPATIENT)
Dept: LAB | Facility: HOSPITAL | Age: 55
End: 2020-12-18
Attending: FAMILY MEDICINE
Payer: MEDICAID

## 2020-12-18 VITALS
WEIGHT: 104.63 LBS | RESPIRATION RATE: 14 BRPM | HEIGHT: 65 IN | BODY MASS INDEX: 17.43 KG/M2 | HEART RATE: 94 BPM | OXYGEN SATURATION: 97 % | SYSTOLIC BLOOD PRESSURE: 118 MMHG | TEMPERATURE: 98 F | DIASTOLIC BLOOD PRESSURE: 73 MMHG

## 2020-12-18 DIAGNOSIS — Z11.4 SCREENING FOR HIV (HUMAN IMMUNODEFICIENCY VIRUS): ICD-10-CM

## 2020-12-18 DIAGNOSIS — Z12.11 SCREENING FOR COLON CANCER: ICD-10-CM

## 2020-12-18 DIAGNOSIS — Z11.59 ENCOUNTER FOR HEPATITIS C SCREENING TEST FOR LOW RISK PATIENT: ICD-10-CM

## 2020-12-18 DIAGNOSIS — Z23 NEED FOR PNEUMOCOCCAL VACCINATION: ICD-10-CM

## 2020-12-18 DIAGNOSIS — Z76.89 ENCOUNTER TO ESTABLISH CARE: Primary | ICD-10-CM

## 2020-12-18 DIAGNOSIS — R63.4 UNINTENTIONAL WEIGHT LOSS: ICD-10-CM

## 2020-12-18 DIAGNOSIS — Z12.2 ENCOUNTER FOR SCREENING FOR MALIGNANT NEOPLASM OF RESPIRATORY ORGANS: ICD-10-CM

## 2020-12-18 LAB
ALBUMIN SERPL BCP-MCNC: 4.7 G/DL (ref 3.5–5.2)
ALP SERPL-CCNC: 78 U/L (ref 55–135)
ALT SERPL W/O P-5'-P-CCNC: 24 U/L (ref 10–44)
ANION GAP SERPL CALC-SCNC: 10 MMOL/L (ref 8–16)
AST SERPL-CCNC: 30 U/L (ref 10–40)
BASOPHILS # BLD AUTO: 0.12 K/UL (ref 0–0.2)
BASOPHILS NFR BLD: 1.2 % (ref 0–1.9)
BILIRUB SERPL-MCNC: 0.8 MG/DL (ref 0.1–1)
BUN SERPL-MCNC: 18 MG/DL (ref 6–20)
CALCIUM SERPL-MCNC: 9.7 MG/DL (ref 8.7–10.5)
CHLORIDE SERPL-SCNC: 102 MMOL/L (ref 95–110)
CO2 SERPL-SCNC: 28 MMOL/L (ref 23–29)
CREAT SERPL-MCNC: 0.9 MG/DL (ref 0.5–1.4)
DIFFERENTIAL METHOD: ABNORMAL
EOSINOPHIL # BLD AUTO: 0.5 K/UL (ref 0–0.5)
EOSINOPHIL NFR BLD: 4.7 % (ref 0–8)
ERYTHROCYTE [DISTWIDTH] IN BLOOD BY AUTOMATED COUNT: 12.1 % (ref 11.5–14.5)
EST. GFR  (AFRICAN AMERICAN): >60 ML/MIN/1.73 M^2
EST. GFR  (NON AFRICAN AMERICAN): >60 ML/MIN/1.73 M^2
GLUCOSE SERPL-MCNC: 119 MG/DL (ref 70–110)
HCT VFR BLD AUTO: 46.9 % (ref 37–48.5)
HGB BLD-MCNC: 15.8 G/DL (ref 12–16)
IMM GRANULOCYTES # BLD AUTO: 0.02 K/UL (ref 0–0.04)
IMM GRANULOCYTES NFR BLD AUTO: 0.2 % (ref 0–0.5)
LYMPHOCYTES # BLD AUTO: 3 K/UL (ref 1–4.8)
LYMPHOCYTES NFR BLD: 30.4 % (ref 18–48)
MCH RBC QN AUTO: 32.1 PG (ref 27–31)
MCHC RBC AUTO-ENTMCNC: 33.7 G/DL (ref 32–36)
MCV RBC AUTO: 95 FL (ref 82–98)
MONOCYTES # BLD AUTO: 0.5 K/UL (ref 0.3–1)
MONOCYTES NFR BLD: 5.5 % (ref 4–15)
NEUTROPHILS # BLD AUTO: 5.7 K/UL (ref 1.8–7.7)
NEUTROPHILS NFR BLD: 58 % (ref 38–73)
NRBC BLD-RTO: 0 /100 WBC
PLATELET # BLD AUTO: 384 K/UL (ref 150–350)
PMV BLD AUTO: 9.7 FL (ref 9.2–12.9)
POTASSIUM SERPL-SCNC: 4.1 MMOL/L (ref 3.5–5.1)
PROT SERPL-MCNC: 7.9 G/DL (ref 6–8.4)
RBC # BLD AUTO: 4.92 M/UL (ref 4–5.4)
SODIUM SERPL-SCNC: 140 MMOL/L (ref 136–145)
WBC # BLD AUTO: 9.9 K/UL (ref 3.9–12.7)

## 2020-12-18 PROCEDURE — 36415 COLL VENOUS BLD VENIPUNCTURE: CPT

## 2020-12-18 PROCEDURE — 90471 PNEUMOCOCCAL POLYSACCHARIDE VACCINE 23-VALENT =>2YO SQ IM: ICD-10-PCS | Mod: S$GLB,,, | Performed by: FAMILY MEDICINE

## 2020-12-18 PROCEDURE — 99203 OFFICE O/P NEW LOW 30 MIN: CPT | Mod: 25,S$GLB,, | Performed by: FAMILY MEDICINE

## 2020-12-18 PROCEDURE — 90471 IMMUNIZATION ADMIN: CPT | Mod: S$GLB,,, | Performed by: FAMILY MEDICINE

## 2020-12-18 PROCEDURE — 90732 PPSV23 VACC 2 YRS+ SUBQ/IM: CPT | Mod: S$GLB,,, | Performed by: FAMILY MEDICINE

## 2020-12-18 PROCEDURE — 90732 PNEUMOCOCCAL POLYSACCHARIDE VACCINE 23-VALENT =>2YO SQ IM: ICD-10-PCS | Mod: S$GLB,,, | Performed by: FAMILY MEDICINE

## 2020-12-18 PROCEDURE — 80053 COMPREHEN METABOLIC PANEL: CPT

## 2020-12-18 PROCEDURE — 86703 HIV-1/HIV-2 1 RESULT ANTBDY: CPT

## 2020-12-18 PROCEDURE — 99203 PR OFFICE/OUTPT VISIT, NEW, LEVL III, 30-44 MIN: ICD-10-PCS | Mod: 25,S$GLB,, | Performed by: FAMILY MEDICINE

## 2020-12-18 PROCEDURE — 86803 HEPATITIS C AB TEST: CPT

## 2020-12-18 PROCEDURE — 85025 COMPLETE CBC W/AUTO DIFF WBC: CPT

## 2020-12-18 NOTE — PROGRESS NOTES
[facility] - Clinic Note    Subjective      Ms. White is a 55 y.o. female who presents to clinic for [facility]    Patient with a history of COPD and anxiety presents to [facility].  She reports that over the last year she has had unintentional weight loss.  In May of 2019 she weighed 130 lb and now she weighs 104 lb.  She has been eating the same as she always has.  She denies any decrease in appetite.  She did have COVID in November but recovered from this.  She has not had a colonoscopy.  There is family history of lung cancer in her mother.  There is also family history of breast cancer in her sister but she had a mammogram done this year which was normal.  Bowel habits have not changed.  She does continue to smoke but she has cut back on the amount that she smoked.  She has a 40 pack year history.    [facility] Zayra has a past medical history of Anxiety, Arthritis, Asthma, Back pain, COPD (chronic obstructive pulmonary disease), and Pulmonary embolism.   PSX Zayra has a past surgical history that includes Breast cyst excision;  section; Cholecystectomy; Biopsy; and Incision and drainage of abscess (Left, 3/16/2020).    Zayra's family history includes Breast cancer in her sister.    Zayra reports that she has been smoking. She has never used smokeless tobacco. She reports current alcohol use. She reports that she does not use drugs.   XI Iverson has No Known Allergies.   DYLAN Iverson has a current medication list which includes the following prescription(s): albuterol, amlodipine, ascorbic acid (vitamin c), aspirin, atenolol, atorvastatin, cholecalciferol (vitamin d3), fluticasone-salmeterol 100-50 mcg/dose, hydroxyzine pamoate, ipratropium, methocarbamol, omeprazole, tiotropium, venlafaxine, and zinc.     Review of Systems   Constitutional: Positive for unexpected weight change. Negative for chills and fever.   HENT: Negative for congestion and rhinorrhea.    Eyes: Negative  "for visual disturbance.   Respiratory: Negative for cough and shortness of breath.    Cardiovascular: Negative for chest pain.   Gastrointestinal: Negative for abdominal pain, constipation, diarrhea, nausea and vomiting.   Genitourinary: Negative for dysuria.   Musculoskeletal: Negative for myalgias.   Skin: Negative for rash.   Neurological: Negative for weakness and headaches.     Objective     /73 (BP Location: Right arm, Patient Position: Sitting, BP Method: Small (Automatic))   Pulse 94   Temp 97.8 °F (36.6 °C) (Temporal)   Resp 14   Ht 5' 5" (1.651 m)   Wt 47.4 kg (104 lb 9.6 oz)   LMP 09/22/2017   SpO2 97%   BMI 17.41 kg/m²     Physical Exam  Vitals signs and nursing note reviewed.   Constitutional:       General: She is not in acute distress.     Appearance: Normal appearance. She is well-developed. She is not diaphoretic.   HENT:      Head: Normocephalic and atraumatic.      Right Ear: External ear normal.      Left Ear: External ear normal.   Eyes:      General:         Right eye: No discharge.         Left eye: No discharge.   Cardiovascular:      Rate and Rhythm: Normal rate and regular rhythm.      Heart sounds: Normal heart sounds.   Pulmonary:      Effort: Pulmonary effort is normal.      Breath sounds: Normal breath sounds. No wheezing or rales.   Skin:     General: Skin is warm and dry.   Neurological:      Mental Status: She is alert and oriented to person, place, and time. Mental status is at baseline.   Psychiatric:         Mood and Affect: Mood normal.         Behavior: Behavior normal.         Thought Content: Thought content normal.         Judgment: Judgment normal.        Assessment/Plan     1. Encounter to establish care     2. Unintentional weight loss  Ambulatory referral/consult to General Surgery    Comprehensive Metabolic Panel    CBC Auto Differential   3. Screening for colon cancer  Ambulatory referral/consult to General Surgery   4. Encounter for screening for " malignant neoplasm of respiratory organs  CT Chest Lung Screening Low Dose   5. Encounter for hepatitis C screening test for low risk patient  Hepatitis C Antibody   6. Screening for HIV (human immunodeficiency virus)  HIV 1 / 2 ANTIBODY   7. Need for pneumococcal vaccination  (In Office Administered) Pneumococcal Polysaccharide Vaccine (23 Valent) (SQ/IM)     Need to rule out cancer as a cause of unintentional weight loss.  Will obtain CT chest to rule out lung cancer.  Will refer to general surgery for colon cancer screening.  Check lab work as above.  Due for Pneumovax today.  Could consider changing Effexor to mirtazapine.  Return to clinic in 1 month.  Pap smear at that time.    Future Appointments   Date Time Provider Department Center   12/18/2020 10:30 AM [facility], LABORATORY [facility]   1/6/2021  9:00 AM Irvin Villarreal MD [facility]         Eric Singh MD  Family Medicine  [facility]

## 2020-12-21 LAB
HCV AB SERPL QL IA: NEGATIVE
HIV 1+2 AB+HIV1 P24 AG SERPL QL IA: NEGATIVE

## 2020-12-23 ENCOUNTER — HOSPITAL ENCOUNTER (OUTPATIENT)
Dept: RADIOLOGY | Facility: HOSPITAL | Age: 55
Discharge: HOME | End: 2020-12-23
Attending: FAMILY MEDICINE
Payer: MEDICAID

## 2020-12-23 DIAGNOSIS — Z12.11 COLON CANCER SCREENING: ICD-10-CM

## 2020-12-23 DIAGNOSIS — Z12.2 ENCOUNTER FOR SCREENING FOR MALIGNANT NEOPLASM OF RESPIRATORY ORGANS: ICD-10-CM

## 2020-12-23 PROCEDURE — G0297 LDCT FOR LUNG CA SCREEN: HCPCS | Mod: TC

## 2020-12-23 PROCEDURE — G0297 LDCT FOR LUNG CA SCREEN: HCPCS | Mod: 26,,, | Performed by: RADIOLOGY

## 2020-12-23 PROCEDURE — G0297 CT CHEST LUNG SCREENING LOW DOSE: ICD-10-PCS | Mod: 26,,, | Performed by: RADIOLOGY

## 2021-01-04 ENCOUNTER — PATIENT MESSAGE (OUTPATIENT)
Dept: ADMINISTRATIVE | Facility: HOSPITAL | Age: 56
End: 2021-01-04

## 2021-01-06 ENCOUNTER — OFFICE VISIT (OUTPATIENT)
Dept: SURGERY | Facility: CLINIC | Age: 56
End: 2021-01-06
Payer: MEDICAID

## 2021-01-06 VITALS
TEMPERATURE: 98 F | WEIGHT: 103.81 LBS | RESPIRATION RATE: 12 BRPM | DIASTOLIC BLOOD PRESSURE: 87 MMHG | HEART RATE: 102 BPM | SYSTOLIC BLOOD PRESSURE: 133 MMHG | OXYGEN SATURATION: 95 % | BODY MASS INDEX: 17.3 KG/M2 | HEIGHT: 65 IN

## 2021-01-06 DIAGNOSIS — R63.4 UNINTENTIONAL WEIGHT LOSS: ICD-10-CM

## 2021-01-06 DIAGNOSIS — Z12.11 SCREENING FOR COLON CANCER: ICD-10-CM

## 2021-01-06 PROCEDURE — 99215 PR OFFICE/OUTPT VISIT, EST, LEVL V, 40-54 MIN: ICD-10-PCS | Mod: S$GLB,,, | Performed by: SURGERY

## 2021-01-06 PROCEDURE — 99215 OFFICE O/P EST HI 40 MIN: CPT | Mod: S$GLB,,, | Performed by: SURGERY

## 2021-01-06 RX ORDER — ATORVASTATIN CALCIUM 20 MG/1
20 TABLET, FILM COATED ORAL DAILY
COMMUNITY
Start: 2020-12-09 | End: 2021-01-19 | Stop reason: SDUPTHER

## 2021-01-06 RX ORDER — SODIUM, POTASSIUM,MAG SULFATES 17.5-3.13G
SOLUTION, RECONSTITUTED, ORAL ORAL
Qty: 2 BOTTLE | Refills: 0 | Status: SHIPPED | OUTPATIENT
Start: 2021-01-06 | End: 2021-02-23

## 2021-01-06 RX ORDER — SODIUM CHLORIDE, SODIUM LACTATE, POTASSIUM CHLORIDE, CALCIUM CHLORIDE 600; 310; 30; 20 MG/100ML; MG/100ML; MG/100ML; MG/100ML
INJECTION, SOLUTION INTRAVENOUS CONTINUOUS
Status: CANCELLED | OUTPATIENT
Start: 2021-01-06

## 2021-01-06 RX ORDER — LIDOCAINE HYDROCHLORIDE 10 MG/ML
1 INJECTION, SOLUTION EPIDURAL; INFILTRATION; INTRACAUDAL; PERINEURAL ONCE
Status: CANCELLED | OUTPATIENT
Start: 2021-01-06 | End: 2021-01-06

## 2021-01-13 ENCOUNTER — PATIENT MESSAGE (OUTPATIENT)
Dept: FAMILY MEDICINE | Facility: CLINIC | Age: 56
End: 2021-01-13

## 2021-01-19 ENCOUNTER — OFFICE VISIT (OUTPATIENT)
Dept: FAMILY MEDICINE | Facility: CLINIC | Age: 56
End: 2021-01-19
Payer: MEDICAID

## 2021-01-19 VITALS
RESPIRATION RATE: 14 BRPM | BODY MASS INDEX: 16.72 KG/M2 | DIASTOLIC BLOOD PRESSURE: 60 MMHG | HEART RATE: 95 BPM | SYSTOLIC BLOOD PRESSURE: 136 MMHG | OXYGEN SATURATION: 96 % | WEIGHT: 100.38 LBS | TEMPERATURE: 98 F | HEIGHT: 65 IN

## 2021-01-19 DIAGNOSIS — J44.9 CHRONIC OBSTRUCTIVE PULMONARY DISEASE, UNSPECIFIED COPD TYPE: ICD-10-CM

## 2021-01-19 DIAGNOSIS — E78.5 HYPERLIPIDEMIA, UNSPECIFIED HYPERLIPIDEMIA TYPE: ICD-10-CM

## 2021-01-19 DIAGNOSIS — R63.4 UNINTENTIONAL WEIGHT LOSS: Primary | ICD-10-CM

## 2021-01-19 DIAGNOSIS — F17.210 CIGARETTE NICOTINE DEPENDENCE WITHOUT COMPLICATION: ICD-10-CM

## 2021-01-19 PROCEDURE — 99214 PR OFFICE/OUTPT VISIT, EST, LEVL IV, 30-39 MIN: ICD-10-PCS | Mod: S$GLB,,, | Performed by: FAMILY MEDICINE

## 2021-01-19 PROCEDURE — 99214 OFFICE O/P EST MOD 30 MIN: CPT | Mod: S$GLB,,, | Performed by: FAMILY MEDICINE

## 2021-01-19 RX ORDER — ATORVASTATIN CALCIUM 20 MG/1
20 TABLET, FILM COATED ORAL DAILY
Qty: 90 TABLET | Refills: 3 | Status: SHIPPED | OUTPATIENT
Start: 2021-01-19 | End: 2021-07-14 | Stop reason: SDUPTHER

## 2021-01-19 RX ORDER — TIOTROPIUM BROMIDE 18 UG/1
1 CAPSULE ORAL; RESPIRATORY (INHALATION) DAILY
Qty: 90 CAPSULE | Refills: 3 | Status: SHIPPED | OUTPATIENT
Start: 2021-01-19 | End: 2021-07-14 | Stop reason: SDUPTHER

## 2021-01-19 RX ORDER — MIRTAZAPINE 15 MG/1
15 TABLET, FILM COATED ORAL NIGHTLY
Qty: 30 TABLET | Refills: 11 | Status: SHIPPED | OUTPATIENT
Start: 2021-01-19 | End: 2021-03-11 | Stop reason: SDUPTHER

## 2021-01-19 RX ORDER — METHOCARBAMOL 500 MG/1
500 TABLET, FILM COATED ORAL 2 TIMES DAILY
Qty: 180 TABLET | Refills: 3 | Status: SHIPPED | OUTPATIENT
Start: 2021-01-19 | End: 2021-03-11 | Stop reason: SDUPTHER

## 2021-01-26 DIAGNOSIS — R63.4 UNINTENTIONAL WEIGHT LOSS: Primary | ICD-10-CM

## 2021-01-28 ENCOUNTER — PATIENT OUTREACH (OUTPATIENT)
Dept: ADMINISTRATIVE | Facility: OTHER | Age: 56
End: 2021-01-28

## 2021-01-29 ENCOUNTER — PATIENT MESSAGE (OUTPATIENT)
Dept: ADMINISTRATIVE | Facility: HOSPITAL | Age: 56
End: 2021-01-29

## 2021-02-03 ENCOUNTER — PATIENT MESSAGE (OUTPATIENT)
Dept: ADMINISTRATIVE | Facility: HOSPITAL | Age: 56
End: 2021-02-03

## 2021-02-04 ENCOUNTER — TELEPHONE (OUTPATIENT)
Dept: SMOKING CESSATION | Facility: CLINIC | Age: 56
End: 2021-02-04

## 2021-02-23 ENCOUNTER — OFFICE VISIT (OUTPATIENT)
Dept: FAMILY MEDICINE | Facility: CLINIC | Age: 56
End: 2021-02-23
Payer: MEDICAID

## 2021-02-23 VITALS
HEIGHT: 65 IN | RESPIRATION RATE: 13 BRPM | BODY MASS INDEX: 17.72 KG/M2 | SYSTOLIC BLOOD PRESSURE: 136 MMHG | OXYGEN SATURATION: 97 % | DIASTOLIC BLOOD PRESSURE: 80 MMHG | HEART RATE: 75 BPM | WEIGHT: 106.38 LBS | TEMPERATURE: 98 F

## 2021-02-23 DIAGNOSIS — Z12.4 SCREENING FOR CERVICAL CANCER: ICD-10-CM

## 2021-02-23 DIAGNOSIS — R63.4 UNINTENTIONAL WEIGHT LOSS: Primary | ICD-10-CM

## 2021-02-23 DIAGNOSIS — R35.0 FREQUENT URINATION: ICD-10-CM

## 2021-02-23 LAB
BILIRUB UR QL STRIP: NEGATIVE
CLARITY UR: CLEAR
COLOR UR: YELLOW
GLUCOSE UR QL STRIP: NEGATIVE
HGB UR QL STRIP: NEGATIVE
KETONES UR QL STRIP: NEGATIVE
LEUKOCYTE ESTERASE UR QL STRIP: NEGATIVE
NITRITE UR QL STRIP: NEGATIVE
PH UR STRIP: 6 [PH] (ref 5–8)
PROT UR QL STRIP: NEGATIVE
SP GR UR STRIP: 1.01 (ref 1–1.03)
URN SPEC COLLECT METH UR: NORMAL
UROBILINOGEN UR STRIP-ACNC: NEGATIVE EU/DL

## 2021-02-23 PROCEDURE — 99214 OFFICE O/P EST MOD 30 MIN: CPT | Mod: S$GLB,,, | Performed by: FAMILY MEDICINE

## 2021-02-23 PROCEDURE — 88175 CYTOPATH C/V AUTO FLUID REDO: CPT | Performed by: PATHOLOGY

## 2021-02-23 PROCEDURE — 87624 HPV HI-RISK TYP POOLED RSLT: CPT

## 2021-02-23 PROCEDURE — 88141 PR  CYTOPATH CERV/VAG INTERPRET: ICD-10-PCS | Mod: ,,, | Performed by: PATHOLOGY

## 2021-02-23 PROCEDURE — 81003 URINALYSIS AUTO W/O SCOPE: CPT

## 2021-02-23 PROCEDURE — 99214 PR OFFICE/OUTPT VISIT, EST, LEVL IV, 30-39 MIN: ICD-10-PCS | Mod: S$GLB,,, | Performed by: FAMILY MEDICINE

## 2021-02-23 PROCEDURE — 88141 CYTOPATH C/V INTERPRET: CPT | Mod: ,,, | Performed by: PATHOLOGY

## 2021-02-28 ENCOUNTER — HOSPITAL ENCOUNTER (EMERGENCY)
Facility: HOSPITAL | Age: 56
Discharge: HOME | End: 2021-02-28
Attending: EMERGENCY MEDICINE
Payer: MEDICAID

## 2021-02-28 VITALS
SYSTOLIC BLOOD PRESSURE: 125 MMHG | HEART RATE: 72 BPM | OXYGEN SATURATION: 95 % | RESPIRATION RATE: 19 BRPM | TEMPERATURE: 99 F | WEIGHT: 106 LBS | HEIGHT: 65 IN | DIASTOLIC BLOOD PRESSURE: 70 MMHG | BODY MASS INDEX: 17.66 KG/M2

## 2021-02-28 DIAGNOSIS — R06.00 DYSPNEA: ICD-10-CM

## 2021-02-28 DIAGNOSIS — J44.1 CHRONIC OBSTRUCTIVE PULMONARY DISEASE WITH ACUTE EXACERBATION: ICD-10-CM

## 2021-02-28 DIAGNOSIS — J44.1 COPD WITH ACUTE EXACERBATION: Primary | ICD-10-CM

## 2021-02-28 DIAGNOSIS — R06.03 ACUTE RESPIRATORY DISTRESS: ICD-10-CM

## 2021-02-28 LAB
ALBUMIN SERPL BCP-MCNC: 4 G/DL (ref 3.5–5.2)
ALLENS TEST: ABNORMAL
ALP SERPL-CCNC: 71 U/L (ref 55–135)
ALT SERPL W/O P-5'-P-CCNC: 17 U/L (ref 10–44)
ANION GAP SERPL CALC-SCNC: 9 MMOL/L (ref 8–16)
APTT BLDCRRT: 24.6 SEC (ref 21–32)
AST SERPL-CCNC: 26 U/L (ref 10–40)
BASOPHILS # BLD AUTO: 0.07 K/UL (ref 0–0.2)
BASOPHILS NFR BLD: 0.8 % (ref 0–1.9)
BILIRUB SERPL-MCNC: 0.6 MG/DL (ref 0.1–1)
BNP SERPL-MCNC: 32 PG/ML (ref 0–99)
BUN SERPL-MCNC: 7 MG/DL (ref 6–20)
CALCIUM SERPL-MCNC: 9.3 MG/DL (ref 8.7–10.5)
CHLORIDE SERPL-SCNC: 109 MMOL/L (ref 95–110)
CO2 SERPL-SCNC: 24 MMOL/L (ref 23–29)
CREAT SERPL-MCNC: 0.7 MG/DL (ref 0.5–1.4)
DIFFERENTIAL METHOD: ABNORMAL
EOSINOPHIL # BLD AUTO: 0.3 K/UL (ref 0–0.5)
EOSINOPHIL NFR BLD: 3.5 % (ref 0–8)
ERYTHROCYTE [DISTWIDTH] IN BLOOD BY AUTOMATED COUNT: 11.7 % (ref 11.5–14.5)
EST. GFR  (AFRICAN AMERICAN): >60 ML/MIN/1.73 M^2
EST. GFR  (NON AFRICAN AMERICAN): >60 ML/MIN/1.73 M^2
GLUCOSE SERPL-MCNC: 152 MG/DL (ref 70–110)
HCO3 UR-SCNC: 22.4 MMOL/L (ref 24–28)
HCT VFR BLD AUTO: 42.6 % (ref 37–48.5)
HGB BLD-MCNC: 14.5 G/DL (ref 12–16)
IMM GRANULOCYTES # BLD AUTO: 0.03 K/UL (ref 0–0.04)
IMM GRANULOCYTES NFR BLD AUTO: 0.3 % (ref 0–0.5)
INR PPP: 1 (ref 0.8–1.2)
LYMPHOCYTES # BLD AUTO: 2 K/UL (ref 1–4.8)
LYMPHOCYTES NFR BLD: 23.2 % (ref 18–48)
MCH RBC QN AUTO: 31.7 PG (ref 27–31)
MCHC RBC AUTO-ENTMCNC: 34 G/DL (ref 32–36)
MCV RBC AUTO: 93 FL (ref 82–98)
MONOCYTES # BLD AUTO: 0.5 K/UL (ref 0.3–1)
MONOCYTES NFR BLD: 5.4 % (ref 4–15)
NEUTROPHILS # BLD AUTO: 5.9 K/UL (ref 1.8–7.7)
NEUTROPHILS NFR BLD: 66.8 % (ref 38–73)
NRBC BLD-RTO: 0 /100 WBC
PCO2 BLDA: 37.1 MMHG (ref 35–45)
PH SMN: 7.39 [PH] (ref 7.35–7.45)
PLATELET # BLD AUTO: 337 K/UL (ref 150–350)
PMV BLD AUTO: 9.4 FL (ref 9.2–12.9)
PO2 BLDA: 77 MMHG (ref 80–100)
POC BE: -3 MMOL/L
POC SATURATED O2: 95 % (ref 95–100)
POC TCO2: 24 MMOL/L (ref 23–27)
POTASSIUM SERPL-SCNC: 3.6 MMOL/L (ref 3.5–5.1)
PROT SERPL-MCNC: 7.2 G/DL (ref 6–8.4)
PROTHROMBIN TIME: 10.6 SEC (ref 9–12.5)
RBC # BLD AUTO: 4.57 M/UL (ref 4–5.4)
SAMPLE: ABNORMAL
SITE: ABNORMAL
SODIUM SERPL-SCNC: 142 MMOL/L (ref 136–145)
TROPONIN I SERPL DL<=0.01 NG/ML-MCNC: <0.01 NG/ML (ref 0.02–0.5)
WBC # BLD AUTO: 8.78 K/UL (ref 3.9–12.7)

## 2021-02-28 PROCEDURE — 71045 XR CHEST AP PORTABLE: ICD-10-PCS | Mod: 26,,, | Performed by: RADIOLOGY

## 2021-02-28 PROCEDURE — 83880 ASSAY OF NATRIURETIC PEPTIDE: CPT

## 2021-02-28 PROCEDURE — 82803 BLOOD GASES ANY COMBINATION: CPT

## 2021-02-28 PROCEDURE — 36600 WITHDRAWAL OF ARTERIAL BLOOD: CPT

## 2021-02-28 PROCEDURE — 85610 PROTHROMBIN TIME: CPT

## 2021-02-28 PROCEDURE — 71045 X-RAY EXAM CHEST 1 VIEW: CPT | Mod: TC,FY

## 2021-02-28 PROCEDURE — 84484 ASSAY OF TROPONIN QUANT: CPT

## 2021-02-28 PROCEDURE — 63600175 PHARM REV CODE 636 W HCPCS: Performed by: EMERGENCY MEDICINE

## 2021-02-28 PROCEDURE — 71045 X-RAY EXAM CHEST 1 VIEW: CPT | Mod: 26,,, | Performed by: RADIOLOGY

## 2021-02-28 PROCEDURE — 99285 EMERGENCY DEPT VISIT HI MDM: CPT | Mod: 25

## 2021-02-28 PROCEDURE — 99900035 HC TECH TIME PER 15 MIN (STAT)

## 2021-02-28 PROCEDURE — 96374 THER/PROPH/DIAG INJ IV PUSH: CPT

## 2021-02-28 PROCEDURE — 85730 THROMBOPLASTIN TIME PARTIAL: CPT

## 2021-02-28 PROCEDURE — 94640 AIRWAY INHALATION TREATMENT: CPT

## 2021-02-28 PROCEDURE — 85025 COMPLETE CBC W/AUTO DIFF WBC: CPT

## 2021-02-28 PROCEDURE — 80053 COMPREHEN METABOLIC PANEL: CPT

## 2021-02-28 PROCEDURE — 94760 N-INVAS EAR/PLS OXIMETRY 1: CPT

## 2021-02-28 PROCEDURE — 93005 ELECTROCARDIOGRAM TRACING: CPT

## 2021-02-28 PROCEDURE — 25000242 PHARM REV CODE 250 ALT 637 W/ HCPCS: Performed by: EMERGENCY MEDICINE

## 2021-02-28 RX ORDER — LEVOFLOXACIN 500 MG/1
500 TABLET, FILM COATED ORAL DAILY
Qty: 5 TABLET | Refills: 0 | Status: SHIPPED | OUTPATIENT
Start: 2021-02-28 | End: 2021-03-05

## 2021-02-28 RX ORDER — METHYLPREDNISOLONE SOD SUCC 125 MG
125 VIAL (EA) INJECTION
Status: COMPLETED | OUTPATIENT
Start: 2021-02-28 | End: 2021-02-28

## 2021-02-28 RX ORDER — IPRATROPIUM BROMIDE AND ALBUTEROL SULFATE 2.5; .5 MG/3ML; MG/3ML
3 SOLUTION RESPIRATORY (INHALATION)
Status: COMPLETED | OUTPATIENT
Start: 2021-02-28 | End: 2021-02-28

## 2021-02-28 RX ORDER — PREDNISONE 20 MG/1
20 TABLET ORAL DAILY
Qty: 5 TABLET | Refills: 0 | Status: SHIPPED | OUTPATIENT
Start: 2021-02-28 | End: 2021-03-05

## 2021-02-28 RX ADMIN — IPRATROPIUM BROMIDE AND ALBUTEROL SULFATE 3 ML: 2.5; .5 SOLUTION RESPIRATORY (INHALATION) at 11:02

## 2021-02-28 RX ADMIN — METHYLPREDNISOLONE SODIUM SUCCINATE 125 MG: 125 INJECTION, POWDER, FOR SOLUTION INTRAMUSCULAR; INTRAVENOUS at 11:02

## 2021-03-01 ENCOUNTER — HOSPITAL ENCOUNTER (OUTPATIENT)
Dept: RADIOLOGY | Facility: HOSPITAL | Age: 56
Discharge: HOME | End: 2021-03-01
Attending: SURGERY
Payer: MEDICAID

## 2021-03-01 DIAGNOSIS — R63.4 UNINTENTIONAL WEIGHT LOSS: ICD-10-CM

## 2021-03-01 LAB
FINAL PATHOLOGIC DIAGNOSIS: ABNORMAL
HPV HR 12 DNA SPEC QL NAA+PROBE: NEGATIVE
HPV16 AG SPEC QL: NEGATIVE
HPV18 DNA SPEC QL NAA+PROBE: NEGATIVE
Lab: ABNORMAL

## 2021-03-01 PROCEDURE — 74177 CT ABDOMEN PELVIS WITH CONTRAST: ICD-10-PCS | Mod: 26,,, | Performed by: RADIOLOGY

## 2021-03-01 PROCEDURE — 25500020 PHARM REV CODE 255: Performed by: SURGERY

## 2021-03-01 PROCEDURE — 74177 CT ABD & PELVIS W/CONTRAST: CPT | Mod: 26,,, | Performed by: RADIOLOGY

## 2021-03-01 PROCEDURE — A9698 NON-RAD CONTRAST MATERIALNOC: HCPCS | Performed by: SURGERY

## 2021-03-01 PROCEDURE — 74177 CT ABD & PELVIS W/CONTRAST: CPT | Mod: TC

## 2021-03-01 RX ADMIN — IOHEXOL 1000 ML: 9 SOLUTION ORAL at 01:03

## 2021-03-01 RX ADMIN — IOHEXOL 75 ML: 350 INJECTION, SOLUTION INTRAVENOUS at 03:03

## 2021-03-04 ENCOUNTER — TELEPHONE (OUTPATIENT)
Dept: SMOKING CESSATION | Facility: CLINIC | Age: 56
End: 2021-03-04

## 2021-03-07 ENCOUNTER — HOSPITAL ENCOUNTER (EMERGENCY)
Facility: HOSPITAL | Age: 56
Discharge: HOME | End: 2021-03-07
Attending: FAMILY MEDICINE
Payer: MEDICAID

## 2021-03-07 VITALS
RESPIRATION RATE: 18 BRPM | TEMPERATURE: 98 F | SYSTOLIC BLOOD PRESSURE: 147 MMHG | DIASTOLIC BLOOD PRESSURE: 71 MMHG | WEIGHT: 106 LBS | OXYGEN SATURATION: 99 % | HEART RATE: 67 BPM | HEIGHT: 65 IN | BODY MASS INDEX: 17.66 KG/M2

## 2021-03-07 DIAGNOSIS — F41.9 ANXIETY: Primary | ICD-10-CM

## 2021-03-07 DIAGNOSIS — R55 SYNCOPE: ICD-10-CM

## 2021-03-07 LAB
ALBUMIN SERPL BCP-MCNC: 3.5 G/DL (ref 3.5–5.2)
ALP SERPL-CCNC: 63 U/L (ref 55–135)
ALT SERPL W/O P-5'-P-CCNC: 26 U/L (ref 10–44)
ANION GAP SERPL CALC-SCNC: 9 MMOL/L (ref 8–16)
AST SERPL-CCNC: 26 U/L (ref 10–40)
BASOPHILS # BLD AUTO: 0.07 K/UL (ref 0–0.2)
BASOPHILS NFR BLD: 0.6 % (ref 0–1.9)
BILIRUB SERPL-MCNC: 0.6 MG/DL (ref 0.1–1)
BUN SERPL-MCNC: 8 MG/DL (ref 6–20)
CALCIUM SERPL-MCNC: 8.7 MG/DL (ref 8.7–10.5)
CHLORIDE SERPL-SCNC: 103 MMOL/L (ref 95–110)
CO2 SERPL-SCNC: 31 MMOL/L (ref 23–29)
CREAT SERPL-MCNC: 0.8 MG/DL (ref 0.5–1.4)
DIFFERENTIAL METHOD: ABNORMAL
EOSINOPHIL # BLD AUTO: 0.5 K/UL (ref 0–0.5)
EOSINOPHIL NFR BLD: 4.6 % (ref 0–8)
ERYTHROCYTE [DISTWIDTH] IN BLOOD BY AUTOMATED COUNT: 12.1 % (ref 11.5–14.5)
EST. GFR  (AFRICAN AMERICAN): >60 ML/MIN/1.73 M^2
EST. GFR  (NON AFRICAN AMERICAN): >60 ML/MIN/1.73 M^2
GLUCOSE SERPL-MCNC: 122 MG/DL (ref 70–110)
HCT VFR BLD AUTO: 41.1 % (ref 37–48.5)
HGB BLD-MCNC: 13.7 G/DL (ref 12–16)
IMM GRANULOCYTES # BLD AUTO: 0.05 K/UL (ref 0–0.04)
IMM GRANULOCYTES NFR BLD AUTO: 0.4 % (ref 0–0.5)
LYMPHOCYTES # BLD AUTO: 2 K/UL (ref 1–4.8)
LYMPHOCYTES NFR BLD: 17.7 % (ref 18–48)
MCH RBC QN AUTO: 32.1 PG (ref 27–31)
MCHC RBC AUTO-ENTMCNC: 33.3 G/DL (ref 32–36)
MCV RBC AUTO: 96 FL (ref 82–98)
MONOCYTES # BLD AUTO: 0.5 K/UL (ref 0.3–1)
MONOCYTES NFR BLD: 4.7 % (ref 4–15)
NEUTROPHILS # BLD AUTO: 8.2 K/UL (ref 1.8–7.7)
NEUTROPHILS NFR BLD: 72 % (ref 38–73)
NRBC BLD-RTO: 0 /100 WBC
PLATELET # BLD AUTO: 271 K/UL (ref 150–350)
PMV BLD AUTO: 9.3 FL (ref 9.2–12.9)
POTASSIUM SERPL-SCNC: 3.7 MMOL/L (ref 3.5–5.1)
PROT SERPL-MCNC: 6 G/DL (ref 6–8.4)
RBC # BLD AUTO: 4.27 M/UL (ref 4–5.4)
SODIUM SERPL-SCNC: 143 MMOL/L (ref 136–145)
WBC # BLD AUTO: 11.32 K/UL (ref 3.9–12.7)

## 2021-03-07 PROCEDURE — 93005 ELECTROCARDIOGRAM TRACING: CPT

## 2021-03-07 PROCEDURE — 36415 COLL VENOUS BLD VENIPUNCTURE: CPT | Performed by: FAMILY MEDICINE

## 2021-03-07 PROCEDURE — 99284 EMERGENCY DEPT VISIT MOD MDM: CPT | Mod: 25

## 2021-03-07 PROCEDURE — 80053 COMPREHEN METABOLIC PANEL: CPT | Performed by: FAMILY MEDICINE

## 2021-03-07 PROCEDURE — 85025 COMPLETE CBC W/AUTO DIFF WBC: CPT | Performed by: FAMILY MEDICINE

## 2021-03-09 ENCOUNTER — PATIENT MESSAGE (OUTPATIENT)
Dept: FAMILY MEDICINE | Facility: CLINIC | Age: 56
End: 2021-03-09

## 2021-03-11 ENCOUNTER — CLINICAL SUPPORT (OUTPATIENT)
Dept: SMOKING CESSATION | Facility: CLINIC | Age: 56
End: 2021-03-11
Payer: MEDICAID

## 2021-03-11 ENCOUNTER — HOSPITAL ENCOUNTER (OUTPATIENT)
Dept: RADIOLOGY | Facility: HOSPITAL | Age: 56
Discharge: HOME | End: 2021-03-11
Attending: FAMILY MEDICINE
Payer: MEDICAID

## 2021-03-11 ENCOUNTER — OFFICE VISIT (OUTPATIENT)
Dept: FAMILY MEDICINE | Facility: CLINIC | Age: 56
End: 2021-03-11
Payer: MEDICAID

## 2021-03-11 VITALS
HEIGHT: 65 IN | RESPIRATION RATE: 14 BRPM | DIASTOLIC BLOOD PRESSURE: 63 MMHG | WEIGHT: 105.38 LBS | HEART RATE: 87 BPM | TEMPERATURE: 98 F | SYSTOLIC BLOOD PRESSURE: 107 MMHG | OXYGEN SATURATION: 97 % | BODY MASS INDEX: 17.56 KG/M2

## 2021-03-11 DIAGNOSIS — Z13.220 SCREENING FOR LIPID DISORDERS: ICD-10-CM

## 2021-03-11 DIAGNOSIS — R06.09 DYSPNEA ON EXERTION: Primary | ICD-10-CM

## 2021-03-11 DIAGNOSIS — R63.4 UNINTENTIONAL WEIGHT LOSS: ICD-10-CM

## 2021-03-11 DIAGNOSIS — M54.2 CERVICALGIA: ICD-10-CM

## 2021-03-11 DIAGNOSIS — Z13.1 SCREENING FOR DIABETES MELLITUS: ICD-10-CM

## 2021-03-11 DIAGNOSIS — F17.210 MODERATE CIGARETTE SMOKER (10-19 PER DAY): Primary | ICD-10-CM

## 2021-03-11 PROCEDURE — 72040 XR CERVICAL SPINE AP LATERAL: ICD-10-PCS | Mod: 26,,, | Performed by: RADIOLOGY

## 2021-03-11 PROCEDURE — 72040 X-RAY EXAM NECK SPINE 2-3 VW: CPT | Mod: TC,FY

## 2021-03-11 PROCEDURE — 99214 OFFICE O/P EST MOD 30 MIN: CPT | Mod: S$GLB,,, | Performed by: FAMILY MEDICINE

## 2021-03-11 PROCEDURE — 99404 PR PREVENT COUNSEL,INDIV,60 MIN: ICD-10-PCS | Mod: S$GLB,,, | Performed by: NURSE PRACTITIONER

## 2021-03-11 PROCEDURE — 99404 PREV MED CNSL INDIV APPRX 60: CPT | Mod: S$GLB,,, | Performed by: NURSE PRACTITIONER

## 2021-03-11 PROCEDURE — 99214 PR OFFICE/OUTPT VISIT, EST, LEVL IV, 30-39 MIN: ICD-10-PCS | Mod: S$GLB,,, | Performed by: FAMILY MEDICINE

## 2021-03-11 PROCEDURE — 72040 X-RAY EXAM NECK SPINE 2-3 VW: CPT | Mod: 26,,, | Performed by: RADIOLOGY

## 2021-03-11 RX ORDER — MIRTAZAPINE 30 MG/1
30 TABLET, FILM COATED ORAL NIGHTLY
Qty: 90 TABLET | Refills: 3 | Status: SHIPPED | OUTPATIENT
Start: 2021-03-11 | End: 2021-07-13

## 2021-03-11 RX ORDER — IBUPROFEN 200 MG
1 TABLET ORAL DAILY
Qty: 28 PATCH | Refills: 0 | Status: SHIPPED | OUTPATIENT
Start: 2021-03-11 | End: 2021-07-13

## 2021-03-11 RX ORDER — VARENICLINE TARTRATE 0.5 (11)-1
KIT ORAL
Qty: 53 TABLET | Refills: 0 | Status: SHIPPED | OUTPATIENT
Start: 2021-03-11 | End: 2021-04-06 | Stop reason: DRUGHIGH

## 2021-03-11 RX ORDER — IPRATROPIUM BROMIDE 0.5 MG/2.5ML
500 SOLUTION RESPIRATORY (INHALATION) EVERY 8 HOURS PRN
Qty: 1 BOX | Refills: 5 | Status: SHIPPED | OUTPATIENT
Start: 2021-03-11 | End: 2021-08-09 | Stop reason: SDUPTHER

## 2021-03-11 RX ORDER — METHOCARBAMOL 500 MG/1
500 TABLET, FILM COATED ORAL 2 TIMES DAILY
Qty: 180 TABLET | Refills: 3 | Status: SHIPPED | OUTPATIENT
Start: 2021-03-11 | End: 2021-07-13

## 2021-03-15 ENCOUNTER — CLINICAL SUPPORT (OUTPATIENT)
Dept: REHABILITATION | Facility: HOSPITAL | Age: 56
End: 2021-03-15
Attending: FAMILY MEDICINE
Payer: MEDICAID

## 2021-03-15 DIAGNOSIS — M54.2 CERVICALGIA: ICD-10-CM

## 2021-03-15 DIAGNOSIS — M53.82 DECREASED ROM OF INTERVERTEBRAL DISCS OF CERVICAL SPINE: ICD-10-CM

## 2021-03-15 PROCEDURE — 97140 MANUAL THERAPY 1/> REGIONS: CPT | Mod: PN

## 2021-03-15 PROCEDURE — 97162 PT EVAL MOD COMPLEX 30 MIN: CPT | Mod: PN

## 2021-03-18 ENCOUNTER — HOSPITAL ENCOUNTER (OUTPATIENT)
Dept: CARDIOLOGY | Facility: HOSPITAL | Age: 56
Discharge: HOME | End: 2021-03-18
Attending: FAMILY MEDICINE
Payer: MEDICAID

## 2021-03-18 VITALS — HEIGHT: 65 IN | WEIGHT: 105 LBS | BODY MASS INDEX: 17.49 KG/M2

## 2021-03-18 DIAGNOSIS — R06.09 DYSPNEA ON EXERTION: ICD-10-CM

## 2021-03-18 LAB
AORTIC ROOT ANNULUS: 3.15 CM
AORTIC VALVE CUSP SEPERATION: 1.66 CM
AV INDEX (PROSTH): 0.63
AV MEAN GRADIENT: 2 MMHG
AV PEAK GRADIENT: 4 MMHG
AV VALVE AREA: 1.81 CM2
AV VELOCITY RATIO: 0.7
BSA FOR ECHO PROCEDURE: 1.48 M2
CV ECHO LV RWT: 0.59 CM
DOP CALC AO PEAK VEL: 0.94 M/S
DOP CALC AO VTI: 20 CM
DOP CALC LVOT AREA: 2.9 CM2
DOP CALC LVOT DIAMETER: 1.91 CM
DOP CALC LVOT PEAK VEL: 0.66 M/S
DOP CALC LVOT STROKE VOLUME: 36.17 CM3
DOP CALCLVOT PEAK VEL VTI: 12.63 CM
E WAVE DECELERATION TIME: 212.28 MSEC
E/A RATIO: 0.92
E/E' RATIO: 8.14 M/S
ECHO LV POSTERIOR WALL: 0.95 CM (ref 0.6–1.1)
FRACTIONAL SHORTENING: 34 % (ref 28–44)
INTERVENTRICULAR SEPTUM: 0.96 CM (ref 0.6–1.1)
IVRT: 64.7 MSEC
LA MAJOR: 2.52 CM
LA MINOR: 2.58 CM
LEFT ATRIUM SIZE: 3.03 CM
LEFT ATRIUM VOLUME INDEX MOD: 3.7 ML/M2
LEFT ATRIUM VOLUME MOD: 5.5 CM3
LEFT INTERNAL DIMENSION IN SYSTOLE: 2.13 CM (ref 2.1–4)
LEFT VENTRICLE DIASTOLIC VOLUME INDEX: 28.11 ML/M2
LEFT VENTRICLE DIASTOLIC VOLUME: 42.16 ML
LEFT VENTRICLE MASS INDEX: 57 G/M2
LEFT VENTRICLE SYSTOLIC VOLUME INDEX: 10 ML/M2
LEFT VENTRICLE SYSTOLIC VOLUME: 14.99 ML
LEFT VENTRICULAR INTERNAL DIMENSION IN DIASTOLE: 3.24 CM (ref 3.5–6)
LEFT VENTRICULAR MASS: 85.95 G
LV LATERAL E/E' RATIO: 7.13 M/S
LV SEPTAL E/E' RATIO: 9.5 M/S
MV A" WAVE DURATION": 9.9 MSEC
MV MEAN GRADIENT: 1 MMHG
MV PEAK A VEL: 0.62 M/S
MV PEAK E VEL: 0.57 M/S
MV PEAK GRADIENT: 3 MMHG
MV STENOSIS PRESSURE HALF TIME: 61.56 MS
MV VALVE AREA P 1/2 METHOD: 3.57 CM2
PISA MRMAX VEL: 0.02 M/S
PISA TR MAX VEL: 1.85 M/S
PV MEAN GRADIENT: 1 MMHG
PV PEAK VELOCITY: 0.68 CM/S
RA MAJOR: 2.73 CM
RA PRESSURE: 3 MMHG
RA WIDTH: 2.3 CM
RIGHT VENTRICULAR END-DIASTOLIC DIMENSION: 2.39 CM
TDI LATERAL: 0.08 M/S
TDI SEPTAL: 0.06 M/S
TDI: 0.07 M/S
TR MAX PG: 14 MMHG
TRICUSPID ANNULAR PLANE SYSTOLIC EXCURSION: 1.41 CM
TV REST PULMONARY ARTERY PRESSURE: 17 MMHG

## 2021-03-18 PROCEDURE — 93306 ECHO (CUPID ONLY): ICD-10-PCS | Mod: 26,,, | Performed by: INTERNAL MEDICINE

## 2021-03-18 PROCEDURE — 93356 MYOCRD STRAIN IMG SPCKL TRCK: CPT | Mod: ,,, | Performed by: INTERNAL MEDICINE

## 2021-03-18 PROCEDURE — 93306 TTE W/DOPPLER COMPLETE: CPT

## 2021-03-18 PROCEDURE — 93306 TTE W/DOPPLER COMPLETE: CPT | Mod: 26,,, | Performed by: INTERNAL MEDICINE

## 2021-03-18 PROCEDURE — 93356 ECHO (CUPID ONLY): ICD-10-PCS | Mod: ,,, | Performed by: INTERNAL MEDICINE

## 2021-03-19 ENCOUNTER — CLINICAL SUPPORT (OUTPATIENT)
Dept: REHABILITATION | Facility: HOSPITAL | Age: 56
End: 2021-03-19
Attending: FAMILY MEDICINE
Payer: MEDICAID

## 2021-03-19 DIAGNOSIS — M54.2 CERVICALGIA: Primary | ICD-10-CM

## 2021-03-19 DIAGNOSIS — M53.82 DECREASED ROM OF INTERVERTEBRAL DISCS OF CERVICAL SPINE: ICD-10-CM

## 2021-03-19 PROCEDURE — 97110 THERAPEUTIC EXERCISES: CPT | Mod: PN

## 2021-03-19 PROCEDURE — 97140 MANUAL THERAPY 1/> REGIONS: CPT | Mod: PN

## 2021-03-22 ENCOUNTER — TELEPHONE (OUTPATIENT)
Dept: SMOKING CESSATION | Facility: CLINIC | Age: 56
End: 2021-03-22

## 2021-03-29 ENCOUNTER — PATIENT OUTREACH (OUTPATIENT)
Dept: ADMINISTRATIVE | Facility: HOSPITAL | Age: 56
End: 2021-03-29

## 2021-03-31 ENCOUNTER — TELEPHONE (OUTPATIENT)
Dept: SMOKING CESSATION | Facility: CLINIC | Age: 56
End: 2021-03-31

## 2021-04-05 ENCOUNTER — CLINICAL SUPPORT (OUTPATIENT)
Dept: SMOKING CESSATION | Facility: CLINIC | Age: 56
End: 2021-04-05

## 2021-04-05 DIAGNOSIS — F17.210 LIGHT CIGARETTE SMOKER (1-9 CIGS/DAY): Primary | ICD-10-CM

## 2021-04-05 PROCEDURE — 99407 BEHAV CHNG SMOKING > 10 MIN: CPT | Mod: S$GLB,,, | Performed by: NURSE PRACTITIONER

## 2021-04-05 PROCEDURE — 99407 PR TOBACCO USE CESSATION INTENSIVE >10 MINUTES: ICD-10-PCS | Mod: S$GLB,,, | Performed by: NURSE PRACTITIONER

## 2021-04-06 RX ORDER — VARENICLINE TARTRATE 1 MG/1
1 TABLET, FILM COATED ORAL 2 TIMES DAILY
Qty: 58 TABLET | Refills: 0 | Status: SHIPPED | OUTPATIENT
Start: 2021-04-06 | End: 2021-04-29 | Stop reason: SDUPTHER

## 2021-04-09 ENCOUNTER — DOCUMENTATION ONLY (OUTPATIENT)
Dept: REHABILITATION | Facility: HOSPITAL | Age: 56
End: 2021-04-09

## 2021-04-09 DIAGNOSIS — M54.2 CERVICALGIA: Primary | ICD-10-CM

## 2021-04-09 DIAGNOSIS — M53.82 DECREASED ROM OF INTERVERTEBRAL DISCS OF CERVICAL SPINE: ICD-10-CM

## 2021-04-12 ENCOUNTER — OFFICE VISIT (OUTPATIENT)
Dept: FAMILY MEDICINE | Facility: CLINIC | Age: 56
End: 2021-04-12
Payer: MEDICAID

## 2021-04-12 ENCOUNTER — CLINICAL SUPPORT (OUTPATIENT)
Dept: SMOKING CESSATION | Facility: CLINIC | Age: 56
End: 2021-04-12

## 2021-04-12 VITALS
OXYGEN SATURATION: 96 % | BODY MASS INDEX: 18.43 KG/M2 | HEIGHT: 65 IN | DIASTOLIC BLOOD PRESSURE: 84 MMHG | HEART RATE: 86 BPM | WEIGHT: 110.63 LBS | SYSTOLIC BLOOD PRESSURE: 129 MMHG | TEMPERATURE: 98 F | RESPIRATION RATE: 14 BRPM

## 2021-04-12 DIAGNOSIS — F17.210 LIGHT CIGARETTE SMOKER (1-9 CIGS/DAY): Primary | ICD-10-CM

## 2021-04-12 DIAGNOSIS — R06.09 DYSPNEA ON EXERTION: Primary | ICD-10-CM

## 2021-04-12 DIAGNOSIS — M54.2 CERVICALGIA: ICD-10-CM

## 2021-04-12 PROCEDURE — 99214 OFFICE O/P EST MOD 30 MIN: CPT | Mod: S$GLB,,, | Performed by: FAMILY MEDICINE

## 2021-04-12 PROCEDURE — 99214 PR OFFICE/OUTPT VISIT, EST, LEVL IV, 30-39 MIN: ICD-10-PCS | Mod: S$GLB,,, | Performed by: FAMILY MEDICINE

## 2021-04-12 PROCEDURE — 99404 PR PREVENT COUNSEL,INDIV,60 MIN: ICD-10-PCS | Mod: 25,S$GLB,, | Performed by: NURSE PRACTITIONER

## 2021-04-12 PROCEDURE — 99404 PREV MED CNSL INDIV APPRX 60: CPT | Mod: 25,S$GLB,, | Performed by: NURSE PRACTITIONER

## 2021-04-22 ENCOUNTER — TELEPHONE (OUTPATIENT)
Dept: SMOKING CESSATION | Facility: CLINIC | Age: 56
End: 2021-04-22

## 2021-04-26 ENCOUNTER — CLINICAL SUPPORT (OUTPATIENT)
Dept: SMOKING CESSATION | Facility: CLINIC | Age: 56
End: 2021-04-26

## 2021-04-26 ENCOUNTER — TELEPHONE (OUTPATIENT)
Dept: PAIN MEDICINE | Facility: CLINIC | Age: 56
End: 2021-04-26

## 2021-04-26 DIAGNOSIS — F17.210 LIGHT CIGARETTE SMOKER (1-9 CIGS/DAY): Primary | ICD-10-CM

## 2021-04-26 PROCEDURE — 99404 PR PREVENT COUNSEL,INDIV,60 MIN: ICD-10-PCS | Mod: S$GLB,,, | Performed by: NURSE PRACTITIONER

## 2021-04-26 PROCEDURE — 99404 PREV MED CNSL INDIV APPRX 60: CPT | Mod: S$GLB,,, | Performed by: NURSE PRACTITIONER

## 2021-04-28 ENCOUNTER — HOSPITAL ENCOUNTER (OUTPATIENT)
Dept: RADIOLOGY | Facility: HOSPITAL | Age: 56
Discharge: HOME | End: 2021-04-28
Attending: FAMILY MEDICINE
Payer: MEDICAID

## 2021-04-28 ENCOUNTER — HOSPITAL ENCOUNTER (OUTPATIENT)
Dept: CARDIOLOGY | Facility: HOSPITAL | Age: 56
Discharge: HOME | End: 2021-04-28
Attending: FAMILY MEDICINE
Payer: MEDICAID

## 2021-04-28 VITALS
WEIGHT: 110 LBS | DIASTOLIC BLOOD PRESSURE: 63 MMHG | HEIGHT: 65 IN | SYSTOLIC BLOOD PRESSURE: 187 MMHG | HEART RATE: 101 BPM | RESPIRATION RATE: 20 BRPM | OXYGEN SATURATION: 98 % | BODY MASS INDEX: 18.33 KG/M2

## 2021-04-28 DIAGNOSIS — R06.09 DYSPNEA ON EXERTION: ICD-10-CM

## 2021-04-28 LAB
CV STRESS BASE HR: 96 BPM
DIASTOLIC BLOOD PRESSURE: 81 MMHG
EJECTION FRACTION- HIGH: 65 %
END DIASTOLIC INDEX-HIGH: 153 ML/M2
END DIASTOLIC INDEX-LOW: 93 ML/M2
END SYSTOLIC INDEX-HIGH: 71 ML/M2
END SYSTOLIC INDEX-LOW: 31 ML/M2
NUC REST DIASTOLIC VOLUME INDEX: 45
NUC REST EJECTION FRACTION: 64
NUC REST SYSTOLIC VOLUME INDEX: 16
NUC STRESS DIASTOLIC VOLUME INDEX: 40
NUC STRESS EJECTION FRACTION: 66 %
NUC STRESS SYSTOLIC VOLUME INDEX: 14
OHS CV CPX 85 PERCENT MAX PREDICTED HEART RATE MALE: 132
OHS CV CPX MAX PREDICTED HEART RATE: 156
OHS CV CPX PATIENT IS FEMALE: 1
OHS CV CPX PATIENT IS MALE: 0
OHS CV CPX PEAK DIASTOLIC BLOOD PRESSURE: 84 MMHG
OHS CV CPX PEAK HEAR RATE: 117 BPM
OHS CV CPX PEAK RATE PRESSURE PRODUCT: NORMAL
OHS CV CPX PEAK SYSTOLIC BLOOD PRESSURE: 208 MMHG
OHS CV CPX PERCENT MAX PREDICTED HEART RATE ACHIEVED: 75
OHS CV CPX RATE PRESSURE PRODUCT PRESENTING: NORMAL
RETIRED EF AND QEF - SEE NOTES: 53 %
SYSTOLIC BLOOD PRESSURE: 185 MMHG

## 2021-04-28 PROCEDURE — 93016 CV STRESS TEST SUPVJ ONLY: CPT | Mod: ,,, | Performed by: INTERNAL MEDICINE

## 2021-04-28 PROCEDURE — 63600175 PHARM REV CODE 636 W HCPCS: Performed by: INTERNAL MEDICINE

## 2021-04-28 PROCEDURE — 78452 HT MUSCLE IMAGE SPECT MULT: CPT | Mod: 26,,, | Performed by: INTERNAL MEDICINE

## 2021-04-28 PROCEDURE — 93018 STRESS TEST WITH MYOCARDIAL PERFUSION (CUPID ONLY): ICD-10-PCS | Mod: ,,, | Performed by: INTERNAL MEDICINE

## 2021-04-28 PROCEDURE — 93016 STRESS TEST WITH MYOCARDIAL PERFUSION (CUPID ONLY): ICD-10-PCS | Mod: ,,, | Performed by: INTERNAL MEDICINE

## 2021-04-28 PROCEDURE — 78452 STRESS TEST WITH MYOCARDIAL PERFUSION (CUPID ONLY): ICD-10-PCS | Mod: 26,,, | Performed by: INTERNAL MEDICINE

## 2021-04-28 PROCEDURE — 93018 CV STRESS TEST I&R ONLY: CPT | Mod: ,,, | Performed by: INTERNAL MEDICINE

## 2021-04-28 PROCEDURE — A9500 TC99M SESTAMIBI: HCPCS

## 2021-04-28 PROCEDURE — 93017 CV STRESS TEST TRACING ONLY: CPT

## 2021-04-28 PROCEDURE — 78452 HT MUSCLE IMAGE SPECT MULT: CPT

## 2021-04-28 RX ORDER — REGADENOSON 0.08 MG/ML
0.4 INJECTION, SOLUTION INTRAVENOUS ONCE
Status: COMPLETED | OUTPATIENT
Start: 2021-04-28 | End: 2021-04-28

## 2021-04-28 RX ADMIN — REGADENOSON 0.4 MG: 0.08 INJECTION, SOLUTION INTRAVENOUS at 09:04

## 2021-04-29 RX ORDER — VARENICLINE TARTRATE 1 MG/1
1 TABLET, FILM COATED ORAL 2 TIMES DAILY
Qty: 58 TABLET | Refills: 0 | Status: SHIPPED | OUTPATIENT
Start: 2021-04-29 | End: 2021-08-05

## 2021-06-08 ENCOUNTER — PATIENT OUTREACH (OUTPATIENT)
Dept: ADMINISTRATIVE | Facility: OTHER | Age: 56
End: 2021-06-08

## 2021-06-09 ENCOUNTER — PATIENT MESSAGE (OUTPATIENT)
Dept: FAMILY MEDICINE | Facility: CLINIC | Age: 56
End: 2021-06-09

## 2021-06-09 ENCOUNTER — OFFICE VISIT (OUTPATIENT)
Dept: PAIN MEDICINE | Facility: CLINIC | Age: 56
End: 2021-06-09
Payer: MEDICAID

## 2021-06-09 VITALS
DIASTOLIC BLOOD PRESSURE: 95 MMHG | OXYGEN SATURATION: 96 % | SYSTOLIC BLOOD PRESSURE: 169 MMHG | HEIGHT: 65 IN | HEART RATE: 108 BPM | TEMPERATURE: 97 F | WEIGHT: 111.31 LBS | BODY MASS INDEX: 18.55 KG/M2

## 2021-06-09 DIAGNOSIS — M50.30 DDD (DEGENERATIVE DISC DISEASE), CERVICAL: Primary | ICD-10-CM

## 2021-06-09 DIAGNOSIS — M47.892 OTHER SPONDYLOSIS, CERVICAL REGION: ICD-10-CM

## 2021-06-09 DIAGNOSIS — M79.18 MYOFASCIAL PAIN SYNDROME, CERVICAL: ICD-10-CM

## 2021-06-09 PROCEDURE — 99204 OFFICE O/P NEW MOD 45 MIN: CPT | Mod: S$PBB,,, | Performed by: ANESTHESIOLOGY

## 2021-06-09 PROCEDURE — 99999 PR PBB SHADOW E&M-EST. PATIENT-LVL III: CPT | Mod: PBBFAC,,, | Performed by: ANESTHESIOLOGY

## 2021-06-09 PROCEDURE — 99213 OFFICE O/P EST LOW 20 MIN: CPT | Mod: PBBFAC,PO | Performed by: ANESTHESIOLOGY

## 2021-06-09 PROCEDURE — 99204 PR OFFICE/OUTPT VISIT, NEW, LEVL IV, 45-59 MIN: ICD-10-PCS | Mod: S$PBB,,, | Performed by: ANESTHESIOLOGY

## 2021-06-09 PROCEDURE — 99999 PR PBB SHADOW E&M-EST. PATIENT-LVL III: ICD-10-PCS | Mod: PBBFAC,,, | Performed by: ANESTHESIOLOGY

## 2021-06-10 RX ORDER — ALBUTEROL SULFATE 90 UG/1
AEROSOL, METERED RESPIRATORY (INHALATION)
Qty: 18 G | Refills: 6 | Status: SHIPPED | OUTPATIENT
Start: 2021-06-10 | End: 2021-07-13 | Stop reason: SDUPTHER

## 2021-06-14 ENCOUNTER — DOCUMENTATION ONLY (OUTPATIENT)
Dept: REHABILITATION | Facility: HOSPITAL | Age: 56
End: 2021-06-14

## 2021-06-14 DIAGNOSIS — M54.2 CERVICALGIA: Primary | ICD-10-CM

## 2021-06-14 DIAGNOSIS — M53.82 DECREASED ROM OF INTERVERTEBRAL DISCS OF CERVICAL SPINE: ICD-10-CM

## 2021-06-20 ENCOUNTER — PATIENT MESSAGE (OUTPATIENT)
Dept: SURGERY | Facility: HOSPITAL | Age: 56
End: 2021-06-20

## 2021-06-29 ENCOUNTER — PATIENT OUTREACH (OUTPATIENT)
Dept: ADMINISTRATIVE | Facility: HOSPITAL | Age: 56
End: 2021-06-29

## 2021-06-29 ENCOUNTER — PATIENT MESSAGE (OUTPATIENT)
Dept: ADMINISTRATIVE | Facility: HOSPITAL | Age: 56
End: 2021-06-29

## 2021-07-13 ENCOUNTER — OFFICE VISIT (OUTPATIENT)
Dept: FAMILY MEDICINE | Facility: CLINIC | Age: 56
End: 2021-07-13
Payer: MEDICAID

## 2021-07-13 VITALS
DIASTOLIC BLOOD PRESSURE: 70 MMHG | TEMPERATURE: 98 F | WEIGHT: 116 LBS | OXYGEN SATURATION: 95 % | RESPIRATION RATE: 14 BRPM | HEIGHT: 65 IN | HEART RATE: 85 BPM | SYSTOLIC BLOOD PRESSURE: 120 MMHG | BODY MASS INDEX: 19.33 KG/M2

## 2021-07-13 DIAGNOSIS — Z12.11 COLON CANCER SCREENING: ICD-10-CM

## 2021-07-13 DIAGNOSIS — M25.50 ARTHRALGIA, UNSPECIFIED JOINT: Primary | ICD-10-CM

## 2021-07-13 PROCEDURE — 99214 OFFICE O/P EST MOD 30 MIN: CPT | Mod: S$GLB,,, | Performed by: FAMILY MEDICINE

## 2021-07-13 PROCEDURE — 99214 PR OFFICE/OUTPT VISIT, EST, LEVL IV, 30-39 MIN: ICD-10-PCS | Mod: S$GLB,,, | Performed by: FAMILY MEDICINE

## 2021-07-13 RX ORDER — DULOXETIN HYDROCHLORIDE 30 MG/1
30 CAPSULE, DELAYED RELEASE ORAL DAILY
Qty: 30 CAPSULE | Refills: 11 | Status: SHIPPED | OUTPATIENT
Start: 2021-07-13 | End: 2021-08-05 | Stop reason: SDUPTHER

## 2021-07-13 RX ORDER — CYCLOBENZAPRINE HCL 10 MG
5-10 TABLET ORAL 3 TIMES DAILY PRN
Qty: 90 TABLET | Refills: 3 | Status: SHIPPED | OUTPATIENT
Start: 2021-07-13 | End: 2021-07-23

## 2021-07-13 RX ORDER — ALBUTEROL SULFATE 90 UG/1
AEROSOL, METERED RESPIRATORY (INHALATION)
Qty: 18 G | Refills: 6 | Status: SHIPPED | OUTPATIENT
Start: 2021-07-13 | End: 2021-08-09 | Stop reason: SDUPTHER

## 2021-07-14 ENCOUNTER — PATIENT MESSAGE (OUTPATIENT)
Dept: FAMILY MEDICINE | Facility: CLINIC | Age: 56
End: 2021-07-14

## 2021-07-14 DIAGNOSIS — J44.9 CHRONIC OBSTRUCTIVE PULMONARY DISEASE, UNSPECIFIED COPD TYPE: ICD-10-CM

## 2021-07-14 DIAGNOSIS — E78.5 HYPERLIPIDEMIA, UNSPECIFIED HYPERLIPIDEMIA TYPE: ICD-10-CM

## 2021-07-19 RX ORDER — TIOTROPIUM BROMIDE 18 UG/1
1 CAPSULE ORAL; RESPIRATORY (INHALATION) DAILY
Qty: 90 CAPSULE | Refills: 3 | Status: ON HOLD | OUTPATIENT
Start: 2021-07-19 | End: 2021-12-07 | Stop reason: HOSPADM

## 2021-07-19 RX ORDER — ATORVASTATIN CALCIUM 20 MG/1
20 TABLET, FILM COATED ORAL DAILY
Qty: 90 TABLET | Refills: 3 | Status: SHIPPED | OUTPATIENT
Start: 2021-07-19 | End: 2022-07-26

## 2021-07-19 RX ORDER — HYDROXYZINE PAMOATE 25 MG/1
25 CAPSULE ORAL EVERY 6 HOURS PRN
Qty: 120 CAPSULE | Refills: 11 | Status: SHIPPED | OUTPATIENT
Start: 2021-07-19 | End: 2021-12-15

## 2021-07-19 RX ORDER — ATENOLOL 25 MG/1
12.5 TABLET ORAL DAILY
Qty: 45 TABLET | Refills: 3 | Status: SHIPPED | OUTPATIENT
Start: 2021-07-19 | End: 2022-09-01 | Stop reason: SDUPTHER

## 2021-07-28 ENCOUNTER — IMMUNIZATION (OUTPATIENT)
Dept: FAMILY MEDICINE | Facility: CLINIC | Age: 56
End: 2021-07-28
Payer: MEDICAID

## 2021-07-28 DIAGNOSIS — Z23 NEED FOR VACCINATION: Primary | ICD-10-CM

## 2021-07-28 PROCEDURE — 0001A COVID-19, MRNA, LNP-S, PF, 30 MCG/0.3 ML DOSE VACCINE: CPT | Mod: CV19,S$GLB,, | Performed by: FAMILY MEDICINE

## 2021-07-28 PROCEDURE — 91300 COVID-19, MRNA, LNP-S, PF, 30 MCG/0.3 ML DOSE VACCINE: ICD-10-PCS | Mod: S$GLB,,, | Performed by: FAMILY MEDICINE

## 2021-07-28 PROCEDURE — 91300 COVID-19, MRNA, LNP-S, PF, 30 MCG/0.3 ML DOSE VACCINE: CPT | Mod: S$GLB,,, | Performed by: FAMILY MEDICINE

## 2021-07-28 PROCEDURE — 0001A COVID-19, MRNA, LNP-S, PF, 30 MCG/0.3 ML DOSE VACCINE: ICD-10-PCS | Mod: CV19,S$GLB,, | Performed by: FAMILY MEDICINE

## 2021-08-04 ENCOUNTER — PATIENT MESSAGE (OUTPATIENT)
Dept: FAMILY MEDICINE | Facility: CLINIC | Age: 56
End: 2021-08-04

## 2021-08-05 ENCOUNTER — OFFICE VISIT (OUTPATIENT)
Dept: FAMILY MEDICINE | Facility: CLINIC | Age: 56
End: 2021-08-05
Payer: MEDICAID

## 2021-08-05 VITALS
BODY MASS INDEX: 19.63 KG/M2 | HEIGHT: 65 IN | TEMPERATURE: 98 F | HEART RATE: 76 BPM | RESPIRATION RATE: 14 BRPM | WEIGHT: 117.81 LBS | DIASTOLIC BLOOD PRESSURE: 82 MMHG | OXYGEN SATURATION: 96 % | SYSTOLIC BLOOD PRESSURE: 136 MMHG

## 2021-08-05 DIAGNOSIS — M25.50 ARTHRALGIA, UNSPECIFIED JOINT: ICD-10-CM

## 2021-08-05 DIAGNOSIS — R42 DIZZINESS: ICD-10-CM

## 2021-08-05 DIAGNOSIS — R06.02 SHORTNESS OF BREATH: Primary | ICD-10-CM

## 2021-08-05 PROCEDURE — 99214 PR OFFICE/OUTPT VISIT, EST, LEVL IV, 30-39 MIN: ICD-10-PCS | Mod: S$GLB,,, | Performed by: FAMILY MEDICINE

## 2021-08-05 PROCEDURE — U0005 INFEC AGEN DETEC AMPLI PROBE: HCPCS | Performed by: FAMILY MEDICINE

## 2021-08-05 PROCEDURE — U0003 INFECTIOUS AGENT DETECTION BY NUCLEIC ACID (DNA OR RNA); SEVERE ACUTE RESPIRATORY SYNDROME CORONAVIRUS 2 (SARS-COV-2) (CORONAVIRUS DISEASE [COVID-19]), AMPLIFIED PROBE TECHNIQUE, MAKING USE OF HIGH THROUGHPUT TECHNOLOGIES AS DESCRIBED BY CMS-2020-01-R: HCPCS | Performed by: FAMILY MEDICINE

## 2021-08-05 PROCEDURE — 99214 OFFICE O/P EST MOD 30 MIN: CPT | Mod: S$GLB,,, | Performed by: FAMILY MEDICINE

## 2021-08-05 RX ORDER — DULOXETINE 40 MG/1
40 CAPSULE, DELAYED RELEASE ORAL DAILY
Qty: 30 CAPSULE | Refills: 11 | Status: SHIPPED | OUTPATIENT
Start: 2021-08-05 | End: 2022-09-20 | Stop reason: SDUPTHER

## 2021-08-05 RX ORDER — METHYLPREDNISOLONE 4 MG/1
TABLET ORAL
Qty: 1 PACKAGE | Refills: 0 | Status: SHIPPED | OUTPATIENT
Start: 2021-08-05 | End: 2021-08-26

## 2021-08-06 LAB
SARS-COV-2 RNA RESP QL NAA+PROBE: NOT DETECTED
SARS-COV-2- CYCLE NUMBER: -1

## 2021-08-18 ENCOUNTER — IMMUNIZATION (OUTPATIENT)
Dept: FAMILY MEDICINE | Facility: CLINIC | Age: 56
End: 2021-08-18
Payer: MEDICAID

## 2021-08-18 DIAGNOSIS — Z23 NEED FOR VACCINATION: Primary | ICD-10-CM

## 2021-08-18 PROCEDURE — 0002A COVID-19, MRNA, LNP-S, PF, 30 MCG/0.3 ML DOSE VACCINE: ICD-10-PCS | Mod: CV19,S$GLB,, | Performed by: FAMILY MEDICINE

## 2021-08-18 PROCEDURE — 91300 COVID-19, MRNA, LNP-S, PF, 30 MCG/0.3 ML DOSE VACCINE: ICD-10-PCS | Mod: S$GLB,,, | Performed by: FAMILY MEDICINE

## 2021-08-18 PROCEDURE — 91300 COVID-19, MRNA, LNP-S, PF, 30 MCG/0.3 ML DOSE VACCINE: CPT | Mod: S$GLB,,, | Performed by: FAMILY MEDICINE

## 2021-08-18 PROCEDURE — 0002A COVID-19, MRNA, LNP-S, PF, 30 MCG/0.3 ML DOSE VACCINE: CPT | Mod: CV19,S$GLB,, | Performed by: FAMILY MEDICINE

## 2021-09-03 ENCOUNTER — TELEPHONE (OUTPATIENT)
Dept: SURGERY | Facility: CLINIC | Age: 56
End: 2021-09-03

## 2021-09-20 ENCOUNTER — TELEPHONE (OUTPATIENT)
Dept: SURGERY | Facility: CLINIC | Age: 56
End: 2021-09-20

## 2021-10-15 ENCOUNTER — TELEPHONE (OUTPATIENT)
Dept: SURGERY | Facility: CLINIC | Age: 56
End: 2021-10-15

## 2021-10-19 ENCOUNTER — TELEPHONE (OUTPATIENT)
Dept: SMOKING CESSATION | Facility: CLINIC | Age: 56
End: 2021-10-19

## 2021-10-20 ENCOUNTER — OFFICE VISIT (OUTPATIENT)
Dept: FAMILY MEDICINE | Facility: CLINIC | Age: 56
End: 2021-10-20
Payer: MEDICAID

## 2021-10-20 VITALS
RESPIRATION RATE: 12 BRPM | DIASTOLIC BLOOD PRESSURE: 76 MMHG | BODY MASS INDEX: 19.13 KG/M2 | HEART RATE: 70 BPM | HEIGHT: 65 IN | TEMPERATURE: 98 F | OXYGEN SATURATION: 99 % | WEIGHT: 114.81 LBS | SYSTOLIC BLOOD PRESSURE: 126 MMHG

## 2021-10-20 DIAGNOSIS — J44.9 CHRONIC OBSTRUCTIVE PULMONARY DISEASE, UNSPECIFIED COPD TYPE: ICD-10-CM

## 2021-10-20 DIAGNOSIS — M25.551 RIGHT HIP PAIN: ICD-10-CM

## 2021-10-20 DIAGNOSIS — F17.210 CIGARETTE NICOTINE DEPENDENCE WITHOUT COMPLICATION: ICD-10-CM

## 2021-10-20 DIAGNOSIS — Z12.31 ENCOUNTER FOR SCREENING MAMMOGRAM FOR MALIGNANT NEOPLASM OF BREAST: Primary | ICD-10-CM

## 2021-10-20 PROCEDURE — 99214 PR OFFICE/OUTPT VISIT, EST, LEVL IV, 30-39 MIN: ICD-10-PCS | Mod: S$PBB,,, | Performed by: FAMILY MEDICINE

## 2021-10-20 PROCEDURE — 99214 OFFICE O/P EST MOD 30 MIN: CPT | Mod: S$PBB,,, | Performed by: FAMILY MEDICINE

## 2021-10-20 PROCEDURE — 99999 PR PBB SHADOW E&M-EST. PATIENT-LVL IV: ICD-10-PCS | Mod: PBBFAC,,, | Performed by: FAMILY MEDICINE

## 2021-10-20 PROCEDURE — 99999 PR PBB SHADOW E&M-EST. PATIENT-LVL IV: CPT | Mod: PBBFAC,,, | Performed by: FAMILY MEDICINE

## 2021-10-20 PROCEDURE — 90686 IIV4 VACC NO PRSV 0.5 ML IM: CPT | Mod: PBBFAC

## 2021-10-20 PROCEDURE — 99214 OFFICE O/P EST MOD 30 MIN: CPT | Mod: PBBFAC,25 | Performed by: FAMILY MEDICINE

## 2021-10-20 RX ORDER — ALBUTEROL SULFATE 90 UG/1
AEROSOL, METERED RESPIRATORY (INHALATION)
Qty: 36 G | Refills: 6 | Status: SHIPPED | OUTPATIENT
Start: 2021-10-20 | End: 2022-02-18 | Stop reason: SDUPTHER

## 2021-10-20 RX ORDER — VARENICLINE TARTRATE 0.5 MG/1
TABLET, FILM COATED ORAL
Qty: 90 TABLET | Refills: 1 | Status: SHIPPED | OUTPATIENT
Start: 2021-10-20 | End: 2021-11-27

## 2021-10-26 ENCOUNTER — HOSPITAL ENCOUNTER (OUTPATIENT)
Dept: RADIOLOGY | Facility: HOSPITAL | Age: 56
Discharge: HOME | End: 2021-10-26
Attending: FAMILY MEDICINE
Payer: MEDICAID

## 2021-10-26 VITALS — HEIGHT: 65 IN | WEIGHT: 114.63 LBS | BODY MASS INDEX: 19.1 KG/M2

## 2021-10-26 DIAGNOSIS — M25.551 RIGHT HIP PAIN: ICD-10-CM

## 2021-10-26 DIAGNOSIS — Z12.31 ENCOUNTER FOR SCREENING MAMMOGRAM FOR MALIGNANT NEOPLASM OF BREAST: ICD-10-CM

## 2021-10-26 PROCEDURE — 73521 X-RAY EXAM HIPS BI 2 VIEWS: CPT | Mod: 26,,, | Performed by: RADIOLOGY

## 2021-10-26 PROCEDURE — 77067 MAMMO DIGITAL SCREENING BILAT WITH TOMO: ICD-10-PCS | Mod: 26,,, | Performed by: RADIOLOGY

## 2021-10-26 PROCEDURE — 73521 XR HIPS BILATERAL 2 VIEW INCL AP PELVIS: ICD-10-PCS | Mod: 26,,, | Performed by: RADIOLOGY

## 2021-10-26 PROCEDURE — 77063 MAMMO DIGITAL SCREENING BILAT WITH TOMO: ICD-10-PCS | Mod: 26,,, | Performed by: RADIOLOGY

## 2021-10-26 PROCEDURE — 77067 SCR MAMMO BI INCL CAD: CPT | Mod: TC

## 2021-10-26 PROCEDURE — 73521 X-RAY EXAM HIPS BI 2 VIEWS: CPT | Mod: TC,FY

## 2021-10-26 PROCEDURE — 77063 BREAST TOMOSYNTHESIS BI: CPT | Mod: 26,,, | Performed by: RADIOLOGY

## 2021-10-26 PROCEDURE — 77067 SCR MAMMO BI INCL CAD: CPT | Mod: 26,,, | Performed by: RADIOLOGY

## 2021-11-03 ENCOUNTER — TELEPHONE (OUTPATIENT)
Dept: SMOKING CESSATION | Facility: CLINIC | Age: 56
End: 2021-11-03
Payer: MEDICAID

## 2021-11-16 ENCOUNTER — TELEPHONE (OUTPATIENT)
Dept: SMOKING CESSATION | Facility: CLINIC | Age: 56
End: 2021-11-16
Payer: MEDICAID

## 2021-11-27 ENCOUNTER — HOSPITAL ENCOUNTER (INPATIENT)
Facility: HOSPITAL | Age: 56
LOS: 7 days | Discharge: HOME | End: 2021-12-07
Attending: INTERNAL MEDICINE | Admitting: HOSPITALIST
Payer: MEDICAID

## 2021-11-27 DIAGNOSIS — J44.1 COPD WITH ACUTE EXACERBATION: Primary | ICD-10-CM

## 2021-11-27 DIAGNOSIS — R06.02 SHORTNESS OF BREATH: ICD-10-CM

## 2021-11-27 DIAGNOSIS — R06.02 SOB (SHORTNESS OF BREATH): ICD-10-CM

## 2021-11-27 DIAGNOSIS — J96.01 ACUTE RESPIRATORY FAILURE WITH HYPOXIA: ICD-10-CM

## 2021-11-27 DIAGNOSIS — R00.0 TACHYCARDIA: ICD-10-CM

## 2021-11-27 DIAGNOSIS — J44.1 COPD EXACERBATION: ICD-10-CM

## 2021-11-27 DIAGNOSIS — S30.1XXA HEMATOMA OF RECTUS SHEATH, INITIAL ENCOUNTER: ICD-10-CM

## 2021-11-27 DIAGNOSIS — R06.00 DYSPNEA, UNSPECIFIED TYPE: ICD-10-CM

## 2021-11-27 PROBLEM — R10.31 RIGHT INGUINAL PAIN: Status: ACTIVE | Noted: 2021-11-27

## 2021-11-27 PROBLEM — F41.9 ANXIETY: Status: ACTIVE | Noted: 2021-11-27

## 2021-11-27 LAB
ALBUMIN SERPL BCP-MCNC: 4.7 G/DL (ref 3.5–5.2)
ALLENS TEST: ABNORMAL
ALP SERPL-CCNC: 95 U/L (ref 55–135)
ALT SERPL W/O P-5'-P-CCNC: 19 U/L (ref 10–44)
ANION GAP SERPL CALC-SCNC: 14 MMOL/L (ref 8–16)
AST SERPL-CCNC: 28 U/L (ref 10–40)
BASOPHILS # BLD AUTO: 0.06 K/UL (ref 0–0.2)
BASOPHILS NFR BLD: 0.7 % (ref 0–1.9)
BILIRUB SERPL-MCNC: 0.5 MG/DL (ref 0.1–1)
BNP SERPL-MCNC: 14 PG/ML (ref 0–99)
BUN SERPL-MCNC: 7 MG/DL (ref 6–20)
CALCIUM SERPL-MCNC: 9.5 MG/DL (ref 8.7–10.5)
CHLORIDE SERPL-SCNC: 102 MMOL/L (ref 95–110)
CO2 SERPL-SCNC: 26 MMOL/L (ref 23–29)
CREAT SERPL-MCNC: 0.8 MG/DL (ref 0.5–1.4)
DELSYS: ABNORMAL
DIFFERENTIAL METHOD: ABNORMAL
EOSINOPHIL # BLD AUTO: 0.1 K/UL (ref 0–0.5)
EOSINOPHIL NFR BLD: 1.2 % (ref 0–8)
ERYTHROCYTE [DISTWIDTH] IN BLOOD BY AUTOMATED COUNT: 11.8 % (ref 11.5–14.5)
EST. GFR  (AFRICAN AMERICAN): >60 ML/MIN/1.73 M^2
EST. GFR  (NON AFRICAN AMERICAN): >60 ML/MIN/1.73 M^2
GLUCOSE SERPL-MCNC: 116 MG/DL (ref 70–110)
HCO3 UR-SCNC: 26.8 MMOL/L (ref 24–28)
HCT VFR BLD AUTO: 44.2 % (ref 37–48.5)
HGB BLD-MCNC: 15.6 G/DL (ref 12–16)
IMM GRANULOCYTES # BLD AUTO: 0.01 K/UL (ref 0–0.04)
IMM GRANULOCYTES NFR BLD AUTO: 0.1 % (ref 0–0.5)
LYMPHOCYTES # BLD AUTO: 1.2 K/UL (ref 1–4.8)
LYMPHOCYTES NFR BLD: 15.3 % (ref 18–48)
MCH RBC QN AUTO: 32 PG (ref 27–31)
MCHC RBC AUTO-ENTMCNC: 35.3 G/DL (ref 32–36)
MCV RBC AUTO: 91 FL (ref 82–98)
MONOCYTES # BLD AUTO: 0.5 K/UL (ref 0.3–1)
MONOCYTES NFR BLD: 6.5 % (ref 4–15)
NEUTROPHILS # BLD AUTO: 6.2 K/UL (ref 1.8–7.7)
NEUTROPHILS NFR BLD: 76.2 % (ref 38–73)
NRBC BLD-RTO: 0 /100 WBC
PCO2 BLDA: 47.1 MMHG (ref 35–45)
PH SMN: 7.36 [PH] (ref 7.35–7.45)
PLATELET # BLD AUTO: 284 K/UL (ref 150–450)
PMV BLD AUTO: 9.4 FL (ref 9.2–12.9)
PO2 BLDA: 114 MMHG (ref 80–100)
POC BE: 1 MMOL/L
POC SATURATED O2: 98 % (ref 95–100)
POC TCO2: 28 MMOL/L (ref 23–27)
POTASSIUM SERPL-SCNC: 3.6 MMOL/L (ref 3.5–5.1)
PROT SERPL-MCNC: 8.2 G/DL (ref 6–8.4)
RBC # BLD AUTO: 4.88 M/UL (ref 4–5.4)
SAMPLE: ABNORMAL
SARS-COV-2 RDRP RESP QL NAA+PROBE: NEGATIVE
SITE: ABNORMAL
SODIUM SERPL-SCNC: 142 MMOL/L (ref 136–145)
TROPONIN I SERPL DL<=0.01 NG/ML-MCNC: <0.006 NG/ML (ref 0–0.03)
WBC # BLD AUTO: 8.11 K/UL (ref 3.9–12.7)

## 2021-11-27 PROCEDURE — 84484 ASSAY OF TROPONIN QUANT: CPT | Performed by: INTERNAL MEDICINE

## 2021-11-27 PROCEDURE — 71045 XR CHEST AP PORTABLE: ICD-10-PCS | Mod: 26,,, | Performed by: RADIOLOGY

## 2021-11-27 PROCEDURE — 99220 PR INITIAL OBSERVATION CARE,LEVL III: CPT | Mod: GT,,, | Performed by: HOSPITALIST

## 2021-11-27 PROCEDURE — 71045 X-RAY EXAM CHEST 1 VIEW: CPT | Mod: 26,,, | Performed by: RADIOLOGY

## 2021-11-27 PROCEDURE — 99285 EMERGENCY DEPT VISIT HI MDM: CPT | Mod: 25

## 2021-11-27 PROCEDURE — 71275 CT ANGIOGRAPHY CHEST: CPT | Mod: 26,,, | Performed by: RADIOLOGY

## 2021-11-27 PROCEDURE — 99220 PR INITIAL OBSERVATION CARE,LEVL III: ICD-10-PCS | Mod: GT,,, | Performed by: HOSPITALIST

## 2021-11-27 PROCEDURE — 93010 EKG 12-LEAD: ICD-10-PCS | Mod: ,,, | Performed by: INTERNAL MEDICINE

## 2021-11-27 PROCEDURE — 99900035 HC TECH TIME PER 15 MIN (STAT)

## 2021-11-27 PROCEDURE — 93010 ELECTROCARDIOGRAM REPORT: CPT | Mod: ,,, | Performed by: INTERNAL MEDICINE

## 2021-11-27 PROCEDURE — 71045 X-RAY EXAM CHEST 1 VIEW: CPT | Mod: TC,FY

## 2021-11-27 PROCEDURE — 96374 THER/PROPH/DIAG INJ IV PUSH: CPT

## 2021-11-27 PROCEDURE — U0002 COVID-19 LAB TEST NON-CDC: HCPCS | Performed by: INTERNAL MEDICINE

## 2021-11-27 PROCEDURE — 82803 BLOOD GASES ANY COMBINATION: CPT

## 2021-11-27 PROCEDURE — 80053 COMPREHEN METABOLIC PANEL: CPT | Performed by: INTERNAL MEDICINE

## 2021-11-27 PROCEDURE — 36600 WITHDRAWAL OF ARTERIAL BLOOD: CPT

## 2021-11-27 PROCEDURE — 25500020 PHARM REV CODE 255: Performed by: INTERNAL MEDICINE

## 2021-11-27 PROCEDURE — G0378 HOSPITAL OBSERVATION PER HR: HCPCS

## 2021-11-27 PROCEDURE — 85025 COMPLETE CBC W/AUTO DIFF WBC: CPT | Performed by: INTERNAL MEDICINE

## 2021-11-27 PROCEDURE — 25000242 PHARM REV CODE 250 ALT 637 W/ HCPCS: Performed by: FAMILY MEDICINE

## 2021-11-27 PROCEDURE — 93005 ELECTROCARDIOGRAM TRACING: CPT

## 2021-11-27 PROCEDURE — 94640 AIRWAY INHALATION TREATMENT: CPT

## 2021-11-27 PROCEDURE — 63600175 PHARM REV CODE 636 W HCPCS: Performed by: INTERNAL MEDICINE

## 2021-11-27 PROCEDURE — 83880 ASSAY OF NATRIURETIC PEPTIDE: CPT | Performed by: INTERNAL MEDICINE

## 2021-11-27 PROCEDURE — 71275 CT ANGIOGRAPHY CHEST: CPT | Mod: TC

## 2021-11-27 PROCEDURE — 25000242 PHARM REV CODE 250 ALT 637 W/ HCPCS

## 2021-11-27 PROCEDURE — 71275 CTA CHEST NON CORONARY: ICD-10-PCS | Mod: 26,,, | Performed by: RADIOLOGY

## 2021-11-27 RX ORDER — METHYLPREDNISOLONE SOD SUCC 125 MG
125 VIAL (EA) INJECTION EVERY 6 HOURS
Status: DISCONTINUED | OUTPATIENT
Start: 2021-11-28 | End: 2021-11-27

## 2021-11-27 RX ORDER — SODIUM CHLORIDE 0.9 % (FLUSH) 0.9 %
10 SYRINGE (ML) INJECTION
Status: DISCONTINUED | OUTPATIENT
Start: 2021-11-27 | End: 2021-12-07 | Stop reason: HOSPADM

## 2021-11-27 RX ORDER — TIOTROPIUM BROMIDE 18 UG/1
1 CAPSULE ORAL; RESPIRATORY (INHALATION) DAILY
Status: DISCONTINUED | OUTPATIENT
Start: 2021-11-28 | End: 2021-11-30

## 2021-11-27 RX ORDER — AZITHROMYCIN 250 MG/1
250 TABLET, FILM COATED ORAL NIGHTLY
Status: DISCONTINUED | OUTPATIENT
Start: 2021-11-28 | End: 2021-12-03

## 2021-11-27 RX ORDER — ENOXAPARIN SODIUM 100 MG/ML
40 INJECTION SUBCUTANEOUS EVERY 24 HOURS
Status: DISCONTINUED | OUTPATIENT
Start: 2021-11-27 | End: 2021-11-30

## 2021-11-27 RX ORDER — ASPIRIN 325 MG
325 TABLET ORAL DAILY
Status: DISCONTINUED | OUTPATIENT
Start: 2021-11-28 | End: 2021-11-30

## 2021-11-27 RX ORDER — IBUPROFEN 200 MG
1 TABLET ORAL DAILY
Status: DISCONTINUED | OUTPATIENT
Start: 2021-11-27 | End: 2021-12-07 | Stop reason: HOSPADM

## 2021-11-27 RX ORDER — IPRATROPIUM BROMIDE 0.5 MG/2.5ML
SOLUTION RESPIRATORY (INHALATION)
Status: COMPLETED
Start: 2021-11-27 | End: 2021-11-28

## 2021-11-27 RX ORDER — DULOXETIN HYDROCHLORIDE 20 MG/1
40 CAPSULE, DELAYED RELEASE ORAL DAILY
Status: DISCONTINUED | OUTPATIENT
Start: 2021-11-28 | End: 2021-12-07 | Stop reason: HOSPADM

## 2021-11-27 RX ORDER — METHYLPREDNISOLONE SOD SUCC 125 MG
125 VIAL (EA) INJECTION
Status: COMPLETED | OUTPATIENT
Start: 2021-11-27 | End: 2021-11-27

## 2021-11-27 RX ORDER — BUTALBITAL, ACETAMINOPHEN AND CAFFEINE 50; 325; 40 MG/1; MG/1; MG/1
1 TABLET ORAL EVERY 6 HOURS PRN
Status: DISCONTINUED | OUTPATIENT
Start: 2021-11-27 | End: 2021-12-07 | Stop reason: HOSPADM

## 2021-11-27 RX ORDER — IPRATROPIUM BROMIDE AND ALBUTEROL SULFATE 2.5; .5 MG/3ML; MG/3ML
SOLUTION RESPIRATORY (INHALATION)
Status: COMPLETED
Start: 2021-11-27 | End: 2021-11-27

## 2021-11-27 RX ORDER — HYDROXYZINE PAMOATE 25 MG/1
25 CAPSULE ORAL EVERY 6 HOURS PRN
Status: DISCONTINUED | OUTPATIENT
Start: 2021-11-27 | End: 2021-12-07 | Stop reason: HOSPADM

## 2021-11-27 RX ORDER — ACETAMINOPHEN 325 MG/1
650 TABLET ORAL EVERY 4 HOURS PRN
Status: DISCONTINUED | OUTPATIENT
Start: 2021-11-27 | End: 2021-12-07 | Stop reason: HOSPADM

## 2021-11-27 RX ORDER — CHOLECALCIFEROL (VITAMIN D3) 25 MCG
2000 TABLET ORAL DAILY
Status: DISCONTINUED | OUTPATIENT
Start: 2021-11-28 | End: 2021-12-07 | Stop reason: HOSPADM

## 2021-11-27 RX ORDER — IPRATROPIUM BROMIDE 0.5 MG/2.5ML
0.5 SOLUTION RESPIRATORY (INHALATION) EVERY 4 HOURS PRN
Status: DISCONTINUED | OUTPATIENT
Start: 2021-11-27 | End: 2021-12-07 | Stop reason: HOSPADM

## 2021-11-27 RX ORDER — ASCORBIC ACID 500 MG
1000 TABLET ORAL 2 TIMES DAILY
Status: DISCONTINUED | OUTPATIENT
Start: 2021-11-27 | End: 2021-12-07 | Stop reason: HOSPADM

## 2021-11-27 RX ORDER — ATORVASTATIN CALCIUM 10 MG/1
20 TABLET, FILM COATED ORAL DAILY
Status: DISCONTINUED | OUTPATIENT
Start: 2021-11-28 | End: 2021-12-07 | Stop reason: HOSPADM

## 2021-11-27 RX ORDER — IPRATROPIUM BROMIDE AND ALBUTEROL SULFATE 2.5; .5 MG/3ML; MG/3ML
3 SOLUTION RESPIRATORY (INHALATION)
Status: COMPLETED | OUTPATIENT
Start: 2021-11-27 | End: 2021-11-27

## 2021-11-27 RX ADMIN — IOHEXOL 75 ML: 350 INJECTION, SOLUTION INTRAVENOUS at 08:11

## 2021-11-27 RX ADMIN — IPRATROPIUM BROMIDE AND ALBUTEROL SULFATE 3 ML: .5; 3 SOLUTION RESPIRATORY (INHALATION) at 07:11

## 2021-11-27 RX ADMIN — IPRATROPIUM BROMIDE AND ALBUTEROL SULFATE 3 ML: .5; 3 SOLUTION RESPIRATORY (INHALATION) at 06:11

## 2021-11-27 RX ADMIN — METHYLPREDNISOLONE SODIUM SUCCINATE 125 MG: 125 INJECTION, POWDER, FOR SOLUTION INTRAMUSCULAR; INTRAVENOUS at 07:11

## 2021-11-28 LAB
BASOPHILS # BLD AUTO: 0.01 K/UL (ref 0–0.2)
BASOPHILS NFR BLD: 0.2 % (ref 0–1.9)
DIFFERENTIAL METHOD: ABNORMAL
EOSINOPHIL # BLD AUTO: 0 K/UL (ref 0–0.5)
EOSINOPHIL NFR BLD: 0 % (ref 0–8)
ERYTHROCYTE [DISTWIDTH] IN BLOOD BY AUTOMATED COUNT: 11.7 % (ref 11.5–14.5)
HCT VFR BLD AUTO: 39.7 % (ref 37–48.5)
HGB BLD-MCNC: 14 G/DL (ref 12–16)
IMM GRANULOCYTES # BLD AUTO: 0.01 K/UL (ref 0–0.04)
IMM GRANULOCYTES NFR BLD AUTO: 0.2 % (ref 0–0.5)
LYMPHOCYTES # BLD AUTO: 0.4 K/UL (ref 1–4.8)
LYMPHOCYTES NFR BLD: 8.6 % (ref 18–48)
MAGNESIUM SERPL-MCNC: 1.7 MG/DL (ref 1.6–2.6)
MCH RBC QN AUTO: 31.7 PG (ref 27–31)
MCHC RBC AUTO-ENTMCNC: 35.3 G/DL (ref 32–36)
MCV RBC AUTO: 90 FL (ref 82–98)
MONOCYTES # BLD AUTO: 0 K/UL (ref 0.3–1)
MONOCYTES NFR BLD: 0.9 % (ref 4–15)
NEUTROPHILS # BLD AUTO: 4.1 K/UL (ref 1.8–7.7)
NEUTROPHILS NFR BLD: 90.1 % (ref 38–73)
NRBC BLD-RTO: 0 /100 WBC
PHOSPHATE SERPL-MCNC: 3.1 MG/DL (ref 2.7–4.5)
PLATELET # BLD AUTO: 231 K/UL (ref 150–450)
PMV BLD AUTO: 9.5 FL (ref 9.2–12.9)
RBC # BLD AUTO: 4.41 M/UL (ref 4–5.4)
WBC # BLD AUTO: 4.54 K/UL (ref 3.9–12.7)

## 2021-11-28 PROCEDURE — 85025 COMPLETE CBC W/AUTO DIFF WBC: CPT | Performed by: HOSPITALIST

## 2021-11-28 PROCEDURE — 25000003 PHARM REV CODE 250: Performed by: HOSPITALIST

## 2021-11-28 PROCEDURE — 63600175 PHARM REV CODE 636 W HCPCS: Performed by: HOSPITALIST

## 2021-11-28 PROCEDURE — 83735 ASSAY OF MAGNESIUM: CPT | Performed by: HOSPITALIST

## 2021-11-28 PROCEDURE — 25000242 PHARM REV CODE 250 ALT 637 W/ HCPCS: Performed by: HOSPITALIST

## 2021-11-28 PROCEDURE — 94640 AIRWAY INHALATION TREATMENT: CPT

## 2021-11-28 PROCEDURE — 84100 ASSAY OF PHOSPHORUS: CPT | Performed by: HOSPITALIST

## 2021-11-28 PROCEDURE — S4991 NICOTINE PATCH NONLEGEND: HCPCS | Performed by: HOSPITALIST

## 2021-11-28 PROCEDURE — G0378 HOSPITAL OBSERVATION PER HR: HCPCS

## 2021-11-28 PROCEDURE — 36415 COLL VENOUS BLD VENIPUNCTURE: CPT | Performed by: HOSPITALIST

## 2021-11-28 PROCEDURE — 94761 N-INVAS EAR/PLS OXIMETRY MLT: CPT

## 2021-11-28 PROCEDURE — 25000242 PHARM REV CODE 250 ALT 637 W/ HCPCS

## 2021-11-28 PROCEDURE — 27000221 HC OXYGEN, UP TO 24 HOURS

## 2021-11-28 PROCEDURE — 63700000 PHARM REV CODE 250 ALT 637 W/O HCPCS: Performed by: HOSPITALIST

## 2021-11-28 RX ORDER — BENZONATATE 100 MG/1
100 CAPSULE ORAL EVERY 4 HOURS PRN
Status: DISCONTINUED | OUTPATIENT
Start: 2021-11-28 | End: 2021-12-07 | Stop reason: HOSPADM

## 2021-11-28 RX ORDER — GUAIFENESIN 100 MG/5ML
200 SOLUTION ORAL EVERY 4 HOURS PRN
Status: DISCONTINUED | OUTPATIENT
Start: 2021-11-28 | End: 2021-12-07 | Stop reason: HOSPADM

## 2021-11-28 RX ADMIN — CEFTRIAXONE SODIUM 1 G: 1 INJECTION, POWDER, FOR SOLUTION INTRAMUSCULAR; INTRAVENOUS at 12:11

## 2021-11-28 RX ADMIN — TIOTROPIUM BROMIDE 18 MCG: 18 CAPSULE ORAL; RESPIRATORY (INHALATION) at 11:11

## 2021-11-28 RX ADMIN — BENZONATATE 100 MG: 100 CAPSULE ORAL at 11:11

## 2021-11-28 RX ADMIN — ACETAMINOPHEN 650 MG: 325 TABLET ORAL at 11:11

## 2021-11-28 RX ADMIN — HYDROXYZINE PAMOATE 25 MG: 25 CAPSULE ORAL at 12:11

## 2021-11-28 RX ADMIN — Medication 2000 UNITS: at 09:11

## 2021-11-28 RX ADMIN — GUAIFENESIN 200 MG: 300 SOLUTION ORAL at 08:11

## 2021-11-28 RX ADMIN — ASPIRIN 325 MG ORAL TABLET 325 MG: 325 PILL ORAL at 09:11

## 2021-11-28 RX ADMIN — BENZONATATE 100 MG: 100 CAPSULE ORAL at 10:11

## 2021-11-28 RX ADMIN — IPRATROPIUM BROMIDE 0.5 MG: 0.5 SOLUTION RESPIRATORY (INHALATION) at 06:11

## 2021-11-28 RX ADMIN — OXYCODONE HYDROCHLORIDE AND ACETAMINOPHEN 1000 MG: 500 TABLET ORAL at 08:11

## 2021-11-28 RX ADMIN — CEFTRIAXONE SODIUM 1 G: 1 INJECTION, POWDER, FOR SOLUTION INTRAMUSCULAR; INTRAVENOUS at 11:11

## 2021-11-28 RX ADMIN — IPRATROPIUM BROMIDE 0.5 MG: 0.5 SOLUTION RESPIRATORY (INHALATION) at 03:11

## 2021-11-28 RX ADMIN — METHYLPREDNISOLONE SODIUM SUCCINATE 80 MG: 40 INJECTION, POWDER, FOR SOLUTION INTRAMUSCULAR; INTRAVENOUS at 03:11

## 2021-11-28 RX ADMIN — OXYCODONE HYDROCHLORIDE AND ACETAMINOPHEN 1000 MG: 500 TABLET ORAL at 09:11

## 2021-11-28 RX ADMIN — ENOXAPARIN SODIUM 40 MG: 40 INJECTION SUBCUTANEOUS at 04:11

## 2021-11-28 RX ADMIN — OXYCODONE HYDROCHLORIDE AND ACETAMINOPHEN 1000 MG: 500 TABLET ORAL at 12:11

## 2021-11-28 RX ADMIN — ENOXAPARIN SODIUM 40 MG: 40 INJECTION SUBCUTANEOUS at 12:11

## 2021-11-28 RX ADMIN — METHYLPREDNISOLONE SODIUM SUCCINATE 80 MG: 40 INJECTION, POWDER, FOR SOLUTION INTRAMUSCULAR; INTRAVENOUS at 10:11

## 2021-11-28 RX ADMIN — IPRATROPIUM BROMIDE 0.5 MG: 0.5 SOLUTION RESPIRATORY (INHALATION) at 12:11

## 2021-11-28 RX ADMIN — IPRATROPIUM BROMIDE 0.5 MG: 0.5 SOLUTION RESPIRATORY (INHALATION) at 07:11

## 2021-11-28 RX ADMIN — AZITHROMYCIN 250 MG: 250 TABLET, FILM COATED ORAL at 08:11

## 2021-11-28 RX ADMIN — METHYLPREDNISOLONE SODIUM SUCCINATE 80 MG: 40 INJECTION, POWDER, FOR SOLUTION INTRAMUSCULAR; INTRAVENOUS at 01:11

## 2021-11-28 RX ADMIN — Medication 1 PATCH: at 12:11

## 2021-11-28 RX ADMIN — ACETAMINOPHEN 650 MG: 325 TABLET ORAL at 12:11

## 2021-11-28 RX ADMIN — HYDROXYZINE PAMOATE 25 MG: 25 CAPSULE ORAL at 10:11

## 2021-11-28 RX ADMIN — ATORVASTATIN CALCIUM 20 MG: 10 TABLET, FILM COATED ORAL at 09:11

## 2021-11-28 RX ADMIN — IPRATROPIUM BROMIDE 0.5 MG: 0.5 SOLUTION RESPIRATORY (INHALATION) at 11:11

## 2021-11-28 RX ADMIN — AZITHROMYCIN 250 MG: 250 TABLET, FILM COATED ORAL at 12:11

## 2021-11-28 RX ADMIN — Medication 1 PATCH: at 09:11

## 2021-11-29 PROCEDURE — G0378 HOSPITAL OBSERVATION PER HR: HCPCS

## 2021-11-29 PROCEDURE — 25000003 PHARM REV CODE 250: Performed by: HOSPITALIST

## 2021-11-29 PROCEDURE — 25500020 PHARM REV CODE 255: Performed by: HOSPITALIST

## 2021-11-29 PROCEDURE — S4991 NICOTINE PATCH NONLEGEND: HCPCS | Performed by: HOSPITALIST

## 2021-11-29 PROCEDURE — 63700000 PHARM REV CODE 250 ALT 637 W/O HCPCS: Performed by: HOSPITALIST

## 2021-11-29 PROCEDURE — 63600175 PHARM REV CODE 636 W HCPCS: Performed by: HOSPITALIST

## 2021-11-29 PROCEDURE — 94640 AIRWAY INHALATION TREATMENT: CPT

## 2021-11-29 RX ORDER — HYDROCODONE BITARTRATE AND ACETAMINOPHEN 5; 325 MG/1; MG/1
1 TABLET ORAL EVERY 6 HOURS PRN
Status: DISCONTINUED | OUTPATIENT
Start: 2021-11-29 | End: 2021-12-02

## 2021-11-29 RX ADMIN — METHYLPREDNISOLONE SODIUM SUCCINATE 80 MG: 40 INJECTION, POWDER, FOR SOLUTION INTRAMUSCULAR; INTRAVENOUS at 09:11

## 2021-11-29 RX ADMIN — Medication 1 PATCH: at 09:11

## 2021-11-29 RX ADMIN — GUAIFENESIN 200 MG: 300 SOLUTION ORAL at 11:11

## 2021-11-29 RX ADMIN — TIOTROPIUM BROMIDE 18 MCG: 18 CAPSULE ORAL; RESPIRATORY (INHALATION) at 09:11

## 2021-11-29 RX ADMIN — AZITHROMYCIN 250 MG: 250 TABLET, FILM COATED ORAL at 09:11

## 2021-11-29 RX ADMIN — HYDROCODONE BITARTRATE AND ACETAMINOPHEN 1 TABLET: 5; 325 TABLET ORAL at 08:11

## 2021-11-29 RX ADMIN — HYDROXYZINE PAMOATE 25 MG: 25 CAPSULE ORAL at 09:11

## 2021-11-29 RX ADMIN — METHYLPREDNISOLONE SODIUM SUCCINATE 80 MG: 40 INJECTION, POWDER, FOR SOLUTION INTRAMUSCULAR; INTRAVENOUS at 06:11

## 2021-11-29 RX ADMIN — BUTALBITAL, ACETAMINOPHEN, AND CAFFEINE 1 TABLET: 50; 325; 40 TABLET, COATED ORAL at 11:11

## 2021-11-29 RX ADMIN — ATENOLOL 12.5 MG: 25 TABLET ORAL at 11:11

## 2021-11-29 RX ADMIN — CEFTRIAXONE SODIUM 1 G: 1 INJECTION, POWDER, FOR SOLUTION INTRAMUSCULAR; INTRAVENOUS at 11:11

## 2021-11-29 RX ADMIN — IOHEXOL 75 ML: 350 INJECTION, SOLUTION INTRAVENOUS at 10:11

## 2021-11-29 RX ADMIN — ASPIRIN 325 MG ORAL TABLET 325 MG: 325 PILL ORAL at 09:11

## 2021-11-29 RX ADMIN — ATORVASTATIN CALCIUM 20 MG: 10 TABLET, FILM COATED ORAL at 09:11

## 2021-11-29 RX ADMIN — METHYLPREDNISOLONE SODIUM SUCCINATE 80 MG: 40 INJECTION, POWDER, FOR SOLUTION INTRAMUSCULAR; INTRAVENOUS at 03:11

## 2021-11-29 RX ADMIN — ENOXAPARIN SODIUM 40 MG: 40 INJECTION SUBCUTANEOUS at 03:11

## 2021-11-29 RX ADMIN — OXYCODONE HYDROCHLORIDE AND ACETAMINOPHEN 1000 MG: 500 TABLET ORAL at 09:11

## 2021-11-29 RX ADMIN — DULOXETINE HYDROCHLORIDE 40 MG: 20 CAPSULE, DELAYED RELEASE ORAL at 11:11

## 2021-11-29 RX ADMIN — Medication 2000 UNITS: at 09:11

## 2021-11-29 RX ADMIN — BENZONATATE 100 MG: 100 CAPSULE ORAL at 11:11

## 2021-11-30 PROBLEM — R10.32 LEFT INGUINAL PAIN: Status: ACTIVE | Noted: 2021-11-27

## 2021-11-30 PROBLEM — S30.1XXA RECTUS SHEATH HEMATOMA: Status: ACTIVE | Noted: 2021-11-30

## 2021-11-30 PROBLEM — J96.01 ACUTE RESPIRATORY FAILURE WITH HYPOXIA: Status: ACTIVE | Noted: 2021-11-30

## 2021-11-30 LAB
ALBUMIN SERPL BCP-MCNC: 4.2 G/DL (ref 3.5–5.2)
ALP SERPL-CCNC: 77 U/L (ref 55–135)
ALT SERPL W/O P-5'-P-CCNC: 24 U/L (ref 10–44)
ANION GAP SERPL CALC-SCNC: 10 MMOL/L (ref 8–16)
APTT BLDCRRT: 21.7 SEC (ref 21–32)
AST SERPL-CCNC: 38 U/L (ref 10–40)
BASOPHILS # BLD AUTO: 0.02 K/UL (ref 0–0.2)
BASOPHILS NFR BLD: 0.1 % (ref 0–1.9)
BILIRUB SERPL-MCNC: 0.3 MG/DL (ref 0.1–1)
BUN SERPL-MCNC: 16 MG/DL (ref 6–20)
CALCIUM SERPL-MCNC: 9 MG/DL (ref 8.7–10.5)
CHLORIDE SERPL-SCNC: 95 MMOL/L (ref 95–110)
CO2 SERPL-SCNC: 30 MMOL/L (ref 23–29)
CREAT SERPL-MCNC: 0.7 MG/DL (ref 0.5–1.4)
DIFFERENTIAL METHOD: ABNORMAL
EOSINOPHIL # BLD AUTO: 0 K/UL (ref 0–0.5)
EOSINOPHIL NFR BLD: 0 % (ref 0–8)
ERYTHROCYTE [DISTWIDTH] IN BLOOD BY AUTOMATED COUNT: 11.9 % (ref 11.5–14.5)
EST. GFR  (AFRICAN AMERICAN): >60 ML/MIN/1.73 M^2
EST. GFR  (NON AFRICAN AMERICAN): >60 ML/MIN/1.73 M^2
GLUCOSE SERPL-MCNC: 135 MG/DL (ref 70–110)
HCT VFR BLD AUTO: 37 % (ref 37–48.5)
HGB BLD-MCNC: 13.2 G/DL (ref 12–16)
IMM GRANULOCYTES # BLD AUTO: 0.1 K/UL (ref 0–0.04)
IMM GRANULOCYTES NFR BLD AUTO: 0.5 % (ref 0–0.5)
INR PPP: 0.9 (ref 0.8–1.2)
LYMPHOCYTES # BLD AUTO: 1 K/UL (ref 1–4.8)
LYMPHOCYTES NFR BLD: 4.6 % (ref 18–48)
MCH RBC QN AUTO: 32.4 PG (ref 27–31)
MCHC RBC AUTO-ENTMCNC: 35.7 G/DL (ref 32–36)
MCV RBC AUTO: 91 FL (ref 82–98)
MONOCYTES # BLD AUTO: 0.5 K/UL (ref 0.3–1)
MONOCYTES NFR BLD: 2.5 % (ref 4–15)
NEUTROPHILS # BLD AUTO: 19.2 K/UL (ref 1.8–7.7)
NEUTROPHILS NFR BLD: 92.3 % (ref 38–73)
NRBC BLD-RTO: 0 /100 WBC
PLATELET # BLD AUTO: 318 K/UL (ref 150–450)
PMV BLD AUTO: 9.5 FL (ref 9.2–12.9)
POTASSIUM SERPL-SCNC: 4.6 MMOL/L (ref 3.5–5.1)
PROT SERPL-MCNC: 7.1 G/DL (ref 6–8.4)
PROTHROMBIN TIME: 10 SEC (ref 9–12.5)
RBC # BLD AUTO: 4.08 M/UL (ref 4–5.4)
SODIUM SERPL-SCNC: 135 MMOL/L (ref 136–145)
WBC # BLD AUTO: 20.79 K/UL (ref 3.9–12.7)

## 2021-11-30 PROCEDURE — 85730 THROMBOPLASTIN TIME PARTIAL: CPT | Performed by: FAMILY MEDICINE

## 2021-11-30 PROCEDURE — 80053 COMPREHEN METABOLIC PANEL: CPT | Performed by: FAMILY MEDICINE

## 2021-11-30 PROCEDURE — 21400001 HC TELEMETRY ROOM

## 2021-11-30 PROCEDURE — 25000242 PHARM REV CODE 250 ALT 637 W/ HCPCS: Performed by: FAMILY MEDICINE

## 2021-11-30 PROCEDURE — 99233 SBSQ HOSP IP/OBS HIGH 50: CPT | Mod: ,,, | Performed by: FAMILY MEDICINE

## 2021-11-30 PROCEDURE — 99214 PR OFFICE/OUTPT VISIT, EST, LEVL IV, 30-39 MIN: ICD-10-PCS | Mod: ,,, | Performed by: SURGERY

## 2021-11-30 PROCEDURE — 25000003 PHARM REV CODE 250: Performed by: FAMILY MEDICINE

## 2021-11-30 PROCEDURE — 25000003 PHARM REV CODE 250: Performed by: HOSPITALIST

## 2021-11-30 PROCEDURE — 63600175 PHARM REV CODE 636 W HCPCS: Performed by: HOSPITALIST

## 2021-11-30 PROCEDURE — 99214 OFFICE O/P EST MOD 30 MIN: CPT | Mod: ,,, | Performed by: SURGERY

## 2021-11-30 PROCEDURE — 94640 AIRWAY INHALATION TREATMENT: CPT

## 2021-11-30 PROCEDURE — 36415 COLL VENOUS BLD VENIPUNCTURE: CPT | Performed by: FAMILY MEDICINE

## 2021-11-30 PROCEDURE — S4991 NICOTINE PATCH NONLEGEND: HCPCS | Performed by: HOSPITALIST

## 2021-11-30 PROCEDURE — 99233 PR SUBSEQUENT HOSPITAL CARE,LEVL III: ICD-10-PCS | Mod: ,,, | Performed by: FAMILY MEDICINE

## 2021-11-30 PROCEDURE — 85610 PROTHROMBIN TIME: CPT | Performed by: FAMILY MEDICINE

## 2021-11-30 PROCEDURE — 63700000 PHARM REV CODE 250 ALT 637 W/O HCPCS: Performed by: HOSPITALIST

## 2021-11-30 PROCEDURE — 85025 COMPLETE CBC W/AUTO DIFF WBC: CPT | Performed by: FAMILY MEDICINE

## 2021-11-30 PROCEDURE — 25000242 PHARM REV CODE 250 ALT 637 W/ HCPCS: Performed by: HOSPITALIST

## 2021-11-30 PROCEDURE — 27000221 HC OXYGEN, UP TO 24 HOURS

## 2021-11-30 PROCEDURE — 94761 N-INVAS EAR/PLS OXIMETRY MLT: CPT

## 2021-11-30 RX ORDER — IPRATROPIUM BROMIDE AND ALBUTEROL SULFATE 2.5; .5 MG/3ML; MG/3ML
3 SOLUTION RESPIRATORY (INHALATION) EVERY 4 HOURS
Status: DISCONTINUED | OUTPATIENT
Start: 2021-11-30 | End: 2021-12-07 | Stop reason: HOSPADM

## 2021-11-30 RX ORDER — GUAIFENESIN 600 MG/1
600 TABLET, EXTENDED RELEASE ORAL 2 TIMES DAILY
Status: DISCONTINUED | OUTPATIENT
Start: 2021-11-30 | End: 2021-12-07 | Stop reason: HOSPADM

## 2021-11-30 RX ADMIN — ATENOLOL 12.5 MG: 25 TABLET ORAL at 09:11

## 2021-11-30 RX ADMIN — AZITHROMYCIN 250 MG: 250 TABLET, FILM COATED ORAL at 09:11

## 2021-11-30 RX ADMIN — IPRATROPIUM BROMIDE 0.5 MG: 0.5 SOLUTION RESPIRATORY (INHALATION) at 05:11

## 2021-11-30 RX ADMIN — GUAIFENESIN 600 MG: 600 TABLET, EXTENDED RELEASE ORAL at 09:11

## 2021-11-30 RX ADMIN — GUAIFENESIN 200 MG: 300 SOLUTION ORAL at 09:11

## 2021-11-30 RX ADMIN — BENZONATATE 100 MG: 100 CAPSULE ORAL at 09:11

## 2021-11-30 RX ADMIN — METHYLPREDNISOLONE SODIUM SUCCINATE 80 MG: 40 INJECTION, POWDER, FOR SOLUTION INTRAMUSCULAR; INTRAVENOUS at 02:11

## 2021-11-30 RX ADMIN — OXYCODONE HYDROCHLORIDE AND ACETAMINOPHEN 1000 MG: 500 TABLET ORAL at 09:11

## 2021-11-30 RX ADMIN — ATORVASTATIN CALCIUM 20 MG: 10 TABLET, FILM COATED ORAL at 09:11

## 2021-11-30 RX ADMIN — HYDROCODONE BITARTRATE AND ACETAMINOPHEN 1 TABLET: 5; 325 TABLET ORAL at 03:11

## 2021-11-30 RX ADMIN — METHYLPREDNISOLONE SODIUM SUCCINATE 80 MG: 40 INJECTION, POWDER, FOR SOLUTION INTRAMUSCULAR; INTRAVENOUS at 09:11

## 2021-11-30 RX ADMIN — GUAIFENESIN 600 MG: 600 TABLET, EXTENDED RELEASE ORAL at 12:11

## 2021-11-30 RX ADMIN — CEFTRIAXONE SODIUM 1 G: 1 INJECTION, POWDER, FOR SOLUTION INTRAMUSCULAR; INTRAVENOUS at 11:11

## 2021-11-30 RX ADMIN — Medication 2000 UNITS: at 09:11

## 2021-11-30 RX ADMIN — TIOTROPIUM BROMIDE 18 MCG: 18 CAPSULE ORAL; RESPIRATORY (INHALATION) at 07:11

## 2021-11-30 RX ADMIN — HYDROCODONE BITARTRATE AND ACETAMINOPHEN 1 TABLET: 5; 325 TABLET ORAL at 02:11

## 2021-11-30 RX ADMIN — METHYLPREDNISOLONE SODIUM SUCCINATE 80 MG: 40 INJECTION, POWDER, FOR SOLUTION INTRAMUSCULAR; INTRAVENOUS at 06:11

## 2021-11-30 RX ADMIN — HYDROCODONE BITARTRATE AND ACETAMINOPHEN 1 TABLET: 5; 325 TABLET ORAL at 09:11

## 2021-11-30 RX ADMIN — DULOXETINE HYDROCHLORIDE 40 MG: 20 CAPSULE, DELAYED RELEASE ORAL at 09:11

## 2021-11-30 RX ADMIN — Medication 1 PATCH: at 09:11

## 2021-11-30 RX ADMIN — IPRATROPIUM BROMIDE AND ALBUTEROL SULFATE 3 ML: .5; 3 SOLUTION RESPIRATORY (INHALATION) at 08:11

## 2021-12-01 PROBLEM — K65.2 SBP (SPONTANEOUS BACTERIAL PERITONITIS): Status: RESOLVED | Noted: 2021-12-01 | Resolved: 2021-12-01

## 2021-12-01 PROBLEM — K65.2 SBP (SPONTANEOUS BACTERIAL PERITONITIS): Status: ACTIVE | Noted: 2021-12-01

## 2021-12-01 LAB
BASOPHILS # BLD AUTO: 0 K/UL (ref 0–0.2)
BASOPHILS # BLD AUTO: 0.01 K/UL (ref 0–0.2)
BASOPHILS NFR BLD: 0 % (ref 0–1.9)
BASOPHILS NFR BLD: 0.1 % (ref 0–1.9)
DIFFERENTIAL METHOD: ABNORMAL
DIFFERENTIAL METHOD: ABNORMAL
EOSINOPHIL # BLD AUTO: 0 K/UL (ref 0–0.5)
EOSINOPHIL # BLD AUTO: 0 K/UL (ref 0–0.5)
EOSINOPHIL NFR BLD: 0 % (ref 0–8)
EOSINOPHIL NFR BLD: 0 % (ref 0–8)
ERYTHROCYTE [DISTWIDTH] IN BLOOD BY AUTOMATED COUNT: 11.7 % (ref 11.5–14.5)
ERYTHROCYTE [DISTWIDTH] IN BLOOD BY AUTOMATED COUNT: 11.8 % (ref 11.5–14.5)
HCT VFR BLD AUTO: 33.3 % (ref 37–48.5)
HCT VFR BLD AUTO: 34.8 % (ref 37–48.5)
HGB BLD-MCNC: 11.8 G/DL (ref 12–16)
HGB BLD-MCNC: 12.3 G/DL (ref 12–16)
IMM GRANULOCYTES # BLD AUTO: 0.06 K/UL (ref 0–0.04)
IMM GRANULOCYTES # BLD AUTO: 0.09 K/UL (ref 0–0.04)
IMM GRANULOCYTES NFR BLD AUTO: 0.7 % (ref 0–0.5)
IMM GRANULOCYTES NFR BLD AUTO: 0.9 % (ref 0–0.5)
LYMPHOCYTES # BLD AUTO: 0.5 K/UL (ref 1–4.8)
LYMPHOCYTES # BLD AUTO: 0.6 K/UL (ref 1–4.8)
LYMPHOCYTES NFR BLD: 5.7 % (ref 18–48)
LYMPHOCYTES NFR BLD: 6.2 % (ref 18–48)
MCH RBC QN AUTO: 32.1 PG (ref 27–31)
MCH RBC QN AUTO: 32.1 PG (ref 27–31)
MCHC RBC AUTO-ENTMCNC: 35.3 G/DL (ref 32–36)
MCHC RBC AUTO-ENTMCNC: 35.4 G/DL (ref 32–36)
MCV RBC AUTO: 91 FL (ref 82–98)
MCV RBC AUTO: 91 FL (ref 82–98)
MONOCYTES # BLD AUTO: 0.2 K/UL (ref 0.3–1)
MONOCYTES # BLD AUTO: 0.3 K/UL (ref 0.3–1)
MONOCYTES NFR BLD: 2.6 % (ref 4–15)
MONOCYTES NFR BLD: 3.2 % (ref 4–15)
NEUTROPHILS # BLD AUTO: 8.2 K/UL (ref 1.8–7.7)
NEUTROPHILS # BLD AUTO: 8.8 K/UL (ref 1.8–7.7)
NEUTROPHILS NFR BLD: 89.6 % (ref 38–73)
NEUTROPHILS NFR BLD: 91 % (ref 38–73)
NRBC BLD-RTO: 0 /100 WBC
NRBC BLD-RTO: 0 /100 WBC
PLATELET # BLD AUTO: 219 K/UL (ref 150–450)
PLATELET # BLD AUTO: 236 K/UL (ref 150–450)
PMV BLD AUTO: 9.4 FL (ref 9.2–12.9)
PMV BLD AUTO: 9.5 FL (ref 9.2–12.9)
RBC # BLD AUTO: 3.68 M/UL (ref 4–5.4)
RBC # BLD AUTO: 3.83 M/UL (ref 4–5.4)
WBC # BLD AUTO: 8.98 K/UL (ref 3.9–12.7)
WBC # BLD AUTO: 9.83 K/UL (ref 3.9–12.7)

## 2021-12-01 PROCEDURE — 99233 PR SUBSEQUENT HOSPITAL CARE,LEVL III: ICD-10-PCS | Mod: ,,, | Performed by: FAMILY MEDICINE

## 2021-12-01 PROCEDURE — 25000003 PHARM REV CODE 250: Performed by: HOSPITALIST

## 2021-12-01 PROCEDURE — 99232 SBSQ HOSP IP/OBS MODERATE 35: CPT | Mod: ,,, | Performed by: SURGERY

## 2021-12-01 PROCEDURE — 25000003 PHARM REV CODE 250: Performed by: FAMILY MEDICINE

## 2021-12-01 PROCEDURE — 25000242 PHARM REV CODE 250 ALT 637 W/ HCPCS: Performed by: FAMILY MEDICINE

## 2021-12-01 PROCEDURE — 21400001 HC TELEMETRY ROOM

## 2021-12-01 PROCEDURE — 63600175 PHARM REV CODE 636 W HCPCS: Performed by: HOSPITALIST

## 2021-12-01 PROCEDURE — S4991 NICOTINE PATCH NONLEGEND: HCPCS | Performed by: HOSPITALIST

## 2021-12-01 PROCEDURE — 36415 COLL VENOUS BLD VENIPUNCTURE: CPT | Performed by: FAMILY MEDICINE

## 2021-12-01 PROCEDURE — 94640 AIRWAY INHALATION TREATMENT: CPT

## 2021-12-01 PROCEDURE — 99233 SBSQ HOSP IP/OBS HIGH 50: CPT | Mod: ,,, | Performed by: FAMILY MEDICINE

## 2021-12-01 PROCEDURE — 25000242 PHARM REV CODE 250 ALT 637 W/ HCPCS: Performed by: HOSPITALIST

## 2021-12-01 PROCEDURE — 27000221 HC OXYGEN, UP TO 24 HOURS

## 2021-12-01 PROCEDURE — 99232 PR SUBSEQUENT HOSPITAL CARE,LEVL II: ICD-10-PCS | Mod: ,,, | Performed by: SURGERY

## 2021-12-01 PROCEDURE — 85025 COMPLETE CBC W/AUTO DIFF WBC: CPT | Performed by: FAMILY MEDICINE

## 2021-12-01 PROCEDURE — 94761 N-INVAS EAR/PLS OXIMETRY MLT: CPT

## 2021-12-01 PROCEDURE — 63700000 PHARM REV CODE 250 ALT 637 W/O HCPCS: Performed by: HOSPITALIST

## 2021-12-01 RX ADMIN — ATENOLOL 12.5 MG: 25 TABLET ORAL at 09:12

## 2021-12-01 RX ADMIN — CEFTRIAXONE SODIUM 1 G: 1 INJECTION, POWDER, FOR SOLUTION INTRAMUSCULAR; INTRAVENOUS at 10:12

## 2021-12-01 RX ADMIN — OXYCODONE HYDROCHLORIDE AND ACETAMINOPHEN 1000 MG: 500 TABLET ORAL at 09:12

## 2021-12-01 RX ADMIN — IPRATROPIUM BROMIDE 0.5 MG: 0.5 SOLUTION RESPIRATORY (INHALATION) at 04:12

## 2021-12-01 RX ADMIN — METHYLPREDNISOLONE SODIUM SUCCINATE 80 MG: 40 INJECTION, POWDER, FOR SOLUTION INTRAMUSCULAR; INTRAVENOUS at 09:12

## 2021-12-01 RX ADMIN — Medication 2000 UNITS: at 09:12

## 2021-12-01 RX ADMIN — METHYLPREDNISOLONE SODIUM SUCCINATE 80 MG: 40 INJECTION, POWDER, FOR SOLUTION INTRAMUSCULAR; INTRAVENOUS at 06:12

## 2021-12-01 RX ADMIN — GUAIFENESIN 600 MG: 600 TABLET, EXTENDED RELEASE ORAL at 09:12

## 2021-12-01 RX ADMIN — METHYLPREDNISOLONE SODIUM SUCCINATE 80 MG: 40 INJECTION, POWDER, FOR SOLUTION INTRAMUSCULAR; INTRAVENOUS at 01:12

## 2021-12-01 RX ADMIN — AZITHROMYCIN 250 MG: 250 TABLET, FILM COATED ORAL at 09:12

## 2021-12-01 RX ADMIN — GUAIFENESIN 200 MG: 300 SOLUTION ORAL at 06:12

## 2021-12-01 RX ADMIN — Medication 1 PATCH: at 09:12

## 2021-12-01 RX ADMIN — HYDROCODONE BITARTRATE AND ACETAMINOPHEN 1 TABLET: 5; 325 TABLET ORAL at 01:12

## 2021-12-01 RX ADMIN — IPRATROPIUM BROMIDE AND ALBUTEROL SULFATE 3 ML: .5; 3 SOLUTION RESPIRATORY (INHALATION) at 08:12

## 2021-12-01 RX ADMIN — IPRATROPIUM BROMIDE AND ALBUTEROL SULFATE 3 ML: .5; 3 SOLUTION RESPIRATORY (INHALATION) at 07:12

## 2021-12-01 RX ADMIN — HYDROCODONE BITARTRATE AND ACETAMINOPHEN 1 TABLET: 5; 325 TABLET ORAL at 07:12

## 2021-12-01 RX ADMIN — IPRATROPIUM BROMIDE AND ALBUTEROL SULFATE 3 ML: .5; 3 SOLUTION RESPIRATORY (INHALATION) at 01:12

## 2021-12-01 RX ADMIN — ATORVASTATIN CALCIUM 20 MG: 10 TABLET, FILM COATED ORAL at 09:12

## 2021-12-01 RX ADMIN — HYDROCODONE BITARTRATE AND ACETAMINOPHEN 1 TABLET: 5; 325 TABLET ORAL at 06:12

## 2021-12-02 LAB
ANION GAP SERPL CALC-SCNC: 12 MMOL/L (ref 8–16)
BASOPHILS # BLD AUTO: 0.01 K/UL (ref 0–0.2)
BASOPHILS NFR BLD: 0.1 % (ref 0–1.9)
BUN SERPL-MCNC: 13 MG/DL (ref 6–20)
CALCIUM SERPL-MCNC: 8.6 MG/DL (ref 8.7–10.5)
CHLORIDE SERPL-SCNC: 95 MMOL/L (ref 95–110)
CO2 SERPL-SCNC: 31 MMOL/L (ref 23–29)
CREAT SERPL-MCNC: 0.8 MG/DL (ref 0.5–1.4)
DIFFERENTIAL METHOD: ABNORMAL
EOSINOPHIL # BLD AUTO: 0 K/UL (ref 0–0.5)
EOSINOPHIL NFR BLD: 0 % (ref 0–8)
ERYTHROCYTE [DISTWIDTH] IN BLOOD BY AUTOMATED COUNT: 11.6 % (ref 11.5–14.5)
EST. GFR  (AFRICAN AMERICAN): >60 ML/MIN/1.73 M^2
EST. GFR  (NON AFRICAN AMERICAN): >60 ML/MIN/1.73 M^2
GLUCOSE SERPL-MCNC: 180 MG/DL (ref 70–110)
HCT VFR BLD AUTO: 34.7 % (ref 37–48.5)
HGB BLD-MCNC: 12.2 G/DL (ref 12–16)
IMM GRANULOCYTES # BLD AUTO: 0.14 K/UL (ref 0–0.04)
IMM GRANULOCYTES NFR BLD AUTO: 1.3 % (ref 0–0.5)
LYMPHOCYTES # BLD AUTO: 0.8 K/UL (ref 1–4.8)
LYMPHOCYTES NFR BLD: 6.7 % (ref 18–48)
MCH RBC QN AUTO: 32.2 PG (ref 27–31)
MCHC RBC AUTO-ENTMCNC: 35.2 G/DL (ref 32–36)
MCV RBC AUTO: 92 FL (ref 82–98)
MONOCYTES # BLD AUTO: 0.2 K/UL (ref 0.3–1)
MONOCYTES NFR BLD: 1.7 % (ref 4–15)
NEUTROPHILS # BLD AUTO: 10.1 K/UL (ref 1.8–7.7)
NEUTROPHILS NFR BLD: 90.2 % (ref 38–73)
NRBC BLD-RTO: 0 /100 WBC
PLATELET # BLD AUTO: 266 K/UL (ref 150–450)
PMV BLD AUTO: 9.6 FL (ref 9.2–12.9)
POTASSIUM SERPL-SCNC: 4.3 MMOL/L (ref 3.5–5.1)
RBC # BLD AUTO: 3.79 M/UL (ref 4–5.4)
SODIUM SERPL-SCNC: 138 MMOL/L (ref 136–145)
WBC # BLD AUTO: 11.16 K/UL (ref 3.9–12.7)

## 2021-12-02 PROCEDURE — 25000242 PHARM REV CODE 250 ALT 637 W/ HCPCS: Performed by: HOSPITALIST

## 2021-12-02 PROCEDURE — 99233 PR SUBSEQUENT HOSPITAL CARE,LEVL III: ICD-10-PCS | Mod: ,,, | Performed by: FAMILY MEDICINE

## 2021-12-02 PROCEDURE — S4991 NICOTINE PATCH NONLEGEND: HCPCS | Performed by: HOSPITALIST

## 2021-12-02 PROCEDURE — 25000003 PHARM REV CODE 250: Performed by: FAMILY MEDICINE

## 2021-12-02 PROCEDURE — 99233 SBSQ HOSP IP/OBS HIGH 50: CPT | Mod: ,,, | Performed by: FAMILY MEDICINE

## 2021-12-02 PROCEDURE — 27000221 HC OXYGEN, UP TO 24 HOURS

## 2021-12-02 PROCEDURE — 80048 BASIC METABOLIC PNL TOTAL CA: CPT | Performed by: FAMILY MEDICINE

## 2021-12-02 PROCEDURE — 94640 AIRWAY INHALATION TREATMENT: CPT

## 2021-12-02 PROCEDURE — 25000003 PHARM REV CODE 250: Performed by: HOSPITALIST

## 2021-12-02 PROCEDURE — 21400001 HC TELEMETRY ROOM

## 2021-12-02 PROCEDURE — 63600175 PHARM REV CODE 636 W HCPCS: Performed by: HOSPITALIST

## 2021-12-02 PROCEDURE — 85025 COMPLETE CBC W/AUTO DIFF WBC: CPT | Performed by: FAMILY MEDICINE

## 2021-12-02 PROCEDURE — 94761 N-INVAS EAR/PLS OXIMETRY MLT: CPT

## 2021-12-02 PROCEDURE — 63600175 PHARM REV CODE 636 W HCPCS: Performed by: FAMILY MEDICINE

## 2021-12-02 PROCEDURE — 63700000 PHARM REV CODE 250 ALT 637 W/O HCPCS: Performed by: HOSPITALIST

## 2021-12-02 PROCEDURE — 36415 COLL VENOUS BLD VENIPUNCTURE: CPT | Performed by: FAMILY MEDICINE

## 2021-12-02 PROCEDURE — 25000242 PHARM REV CODE 250 ALT 637 W/ HCPCS: Performed by: FAMILY MEDICINE

## 2021-12-02 RX ORDER — HYDROCODONE BITARTRATE AND ACETAMINOPHEN 5; 325 MG/1; MG/1
1 TABLET ORAL EVERY 4 HOURS PRN
Status: DISCONTINUED | OUTPATIENT
Start: 2021-12-02 | End: 2021-12-07 | Stop reason: HOSPADM

## 2021-12-02 RX ORDER — IPRATROPIUM BROMIDE 0.5 MG/2.5ML
SOLUTION RESPIRATORY (INHALATION)
Status: DISPENSED
Start: 2021-12-02 | End: 2021-12-02

## 2021-12-02 RX ADMIN — AZITHROMYCIN 250 MG: 250 TABLET, FILM COATED ORAL at 09:12

## 2021-12-02 RX ADMIN — HYDROCODONE BITARTRATE AND ACETAMINOPHEN 1 TABLET: 5; 325 TABLET ORAL at 01:12

## 2021-12-02 RX ADMIN — ATENOLOL 12.5 MG: 25 TABLET ORAL at 08:12

## 2021-12-02 RX ADMIN — IPRATROPIUM BROMIDE AND ALBUTEROL SULFATE 3 ML: .5; 3 SOLUTION RESPIRATORY (INHALATION) at 03:12

## 2021-12-02 RX ADMIN — METHYLPREDNISOLONE SODIUM SUCCINATE 80 MG: 40 INJECTION, POWDER, FOR SOLUTION INTRAMUSCULAR; INTRAVENOUS at 09:12

## 2021-12-02 RX ADMIN — ATORVASTATIN CALCIUM 20 MG: 10 TABLET, FILM COATED ORAL at 08:12

## 2021-12-02 RX ADMIN — DULOXETINE HYDROCHLORIDE 40 MG: 20 CAPSULE, DELAYED RELEASE ORAL at 08:12

## 2021-12-02 RX ADMIN — HYDROCODONE BITARTRATE AND ACETAMINOPHEN 1 TABLET: 5; 325 TABLET ORAL at 02:12

## 2021-12-02 RX ADMIN — HYDROCODONE BITARTRATE AND ACETAMINOPHEN 1 TABLET: 5; 325 TABLET ORAL at 09:12

## 2021-12-02 RX ADMIN — GUAIFENESIN 600 MG: 600 TABLET, EXTENDED RELEASE ORAL at 09:12

## 2021-12-02 RX ADMIN — HYDROCODONE BITARTRATE AND ACETAMINOPHEN 1 TABLET: 5; 325 TABLET ORAL at 05:12

## 2021-12-02 RX ADMIN — Medication 2000 UNITS: at 08:12

## 2021-12-02 RX ADMIN — IPRATROPIUM BROMIDE AND ALBUTEROL SULFATE 3 ML: .5; 3 SOLUTION RESPIRATORY (INHALATION) at 11:12

## 2021-12-02 RX ADMIN — GUAIFENESIN 600 MG: 600 TABLET, EXTENDED RELEASE ORAL at 08:12

## 2021-12-02 RX ADMIN — METHYLPREDNISOLONE SODIUM SUCCINATE 80 MG: 40 INJECTION, POWDER, FOR SOLUTION INTRAMUSCULAR; INTRAVENOUS at 06:12

## 2021-12-02 RX ADMIN — IPRATROPIUM BROMIDE AND ALBUTEROL SULFATE 3 ML: .5; 3 SOLUTION RESPIRATORY (INHALATION) at 06:12

## 2021-12-02 RX ADMIN — OXYCODONE HYDROCHLORIDE AND ACETAMINOPHEN 1000 MG: 500 TABLET ORAL at 08:12

## 2021-12-02 RX ADMIN — HYDROCODONE BITARTRATE AND ACETAMINOPHEN 1 TABLET: 5; 325 TABLET ORAL at 08:12

## 2021-12-02 RX ADMIN — OXYCODONE HYDROCHLORIDE AND ACETAMINOPHEN 1000 MG: 500 TABLET ORAL at 09:12

## 2021-12-02 RX ADMIN — IPRATROPIUM BROMIDE 0.5 MG: 0.5 SOLUTION RESPIRATORY (INHALATION) at 09:12

## 2021-12-02 RX ADMIN — IPRATROPIUM BROMIDE AND ALBUTEROL SULFATE 3 ML: .5; 3 SOLUTION RESPIRATORY (INHALATION) at 07:12

## 2021-12-02 RX ADMIN — Medication 1 PATCH: at 08:12

## 2021-12-02 RX ADMIN — CEFTRIAXONE SODIUM 1 G: 1 INJECTION, POWDER, FOR SOLUTION INTRAMUSCULAR; INTRAVENOUS at 11:12

## 2021-12-02 RX ADMIN — BENZONATATE 100 MG: 100 CAPSULE ORAL at 05:12

## 2021-12-03 LAB
ANION GAP SERPL CALC-SCNC: 11 MMOL/L (ref 8–16)
BASOPHILS # BLD AUTO: 0.01 K/UL (ref 0–0.2)
BASOPHILS NFR BLD: 0.1 % (ref 0–1.9)
BUN SERPL-MCNC: 12 MG/DL (ref 6–20)
CALCIUM SERPL-MCNC: 8.6 MG/DL (ref 8.7–10.5)
CHLORIDE SERPL-SCNC: 96 MMOL/L (ref 95–110)
CO2 SERPL-SCNC: 31 MMOL/L (ref 23–29)
CREAT SERPL-MCNC: 0.7 MG/DL (ref 0.5–1.4)
DIFFERENTIAL METHOD: ABNORMAL
EOSINOPHIL # BLD AUTO: 0 K/UL (ref 0–0.5)
EOSINOPHIL NFR BLD: 0 % (ref 0–8)
ERYTHROCYTE [DISTWIDTH] IN BLOOD BY AUTOMATED COUNT: 11.6 % (ref 11.5–14.5)
EST. GFR  (AFRICAN AMERICAN): >60 ML/MIN/1.73 M^2
EST. GFR  (NON AFRICAN AMERICAN): >60 ML/MIN/1.73 M^2
GLUCOSE SERPL-MCNC: 124 MG/DL (ref 70–110)
HCT VFR BLD AUTO: 34.7 % (ref 37–48.5)
HGB BLD-MCNC: 12.1 G/DL (ref 12–16)
IMM GRANULOCYTES # BLD AUTO: 0.27 K/UL (ref 0–0.04)
IMM GRANULOCYTES NFR BLD AUTO: 3.4 % (ref 0–0.5)
LYMPHOCYTES # BLD AUTO: 1 K/UL (ref 1–4.8)
LYMPHOCYTES NFR BLD: 12.7 % (ref 18–48)
MCH RBC QN AUTO: 32.4 PG (ref 27–31)
MCHC RBC AUTO-ENTMCNC: 34.9 G/DL (ref 32–36)
MCV RBC AUTO: 93 FL (ref 82–98)
MONOCYTES # BLD AUTO: 0.5 K/UL (ref 0.3–1)
MONOCYTES NFR BLD: 5.7 % (ref 4–15)
NEUTROPHILS # BLD AUTO: 6.2 K/UL (ref 1.8–7.7)
NEUTROPHILS NFR BLD: 78.1 % (ref 38–73)
NRBC BLD-RTO: 0 /100 WBC
PLATELET # BLD AUTO: 249 K/UL (ref 150–450)
PMV BLD AUTO: 9.8 FL (ref 9.2–12.9)
POTASSIUM SERPL-SCNC: 3.7 MMOL/L (ref 3.5–5.1)
RBC # BLD AUTO: 3.74 M/UL (ref 4–5.4)
SODIUM SERPL-SCNC: 138 MMOL/L (ref 136–145)
WBC # BLD AUTO: 7.89 K/UL (ref 3.9–12.7)

## 2021-12-03 PROCEDURE — 85025 COMPLETE CBC W/AUTO DIFF WBC: CPT | Performed by: FAMILY MEDICINE

## 2021-12-03 PROCEDURE — 99233 SBSQ HOSP IP/OBS HIGH 50: CPT | Mod: ,,, | Performed by: FAMILY MEDICINE

## 2021-12-03 PROCEDURE — 36415 COLL VENOUS BLD VENIPUNCTURE: CPT | Performed by: FAMILY MEDICINE

## 2021-12-03 PROCEDURE — 25000003 PHARM REV CODE 250: Performed by: HOSPITALIST

## 2021-12-03 PROCEDURE — 25000003 PHARM REV CODE 250: Performed by: FAMILY MEDICINE

## 2021-12-03 PROCEDURE — 80048 BASIC METABOLIC PNL TOTAL CA: CPT | Performed by: FAMILY MEDICINE

## 2021-12-03 PROCEDURE — 87070 CULTURE OTHR SPECIMN AEROBIC: CPT | Performed by: FAMILY MEDICINE

## 2021-12-03 PROCEDURE — 94640 AIRWAY INHALATION TREATMENT: CPT

## 2021-12-03 PROCEDURE — 87205 SMEAR GRAM STAIN: CPT | Performed by: FAMILY MEDICINE

## 2021-12-03 PROCEDURE — 21400001 HC TELEMETRY ROOM

## 2021-12-03 PROCEDURE — 63600175 PHARM REV CODE 636 W HCPCS: Performed by: FAMILY MEDICINE

## 2021-12-03 PROCEDURE — 87077 CULTURE AEROBIC IDENTIFY: CPT | Performed by: FAMILY MEDICINE

## 2021-12-03 PROCEDURE — 87186 SC STD MICRODIL/AGAR DIL: CPT | Performed by: FAMILY MEDICINE

## 2021-12-03 PROCEDURE — 94761 N-INVAS EAR/PLS OXIMETRY MLT: CPT

## 2021-12-03 PROCEDURE — S4991 NICOTINE PATCH NONLEGEND: HCPCS | Performed by: HOSPITALIST

## 2021-12-03 PROCEDURE — 99233 PR SUBSEQUENT HOSPITAL CARE,LEVL III: ICD-10-PCS | Mod: ,,, | Performed by: FAMILY MEDICINE

## 2021-12-03 PROCEDURE — 25000242 PHARM REV CODE 250 ALT 637 W/ HCPCS: Performed by: FAMILY MEDICINE

## 2021-12-03 PROCEDURE — 27000221 HC OXYGEN, UP TO 24 HOURS

## 2021-12-03 RX ORDER — PREDNISONE 10 MG/1
40 TABLET ORAL DAILY
Status: DISCONTINUED | OUTPATIENT
Start: 2021-12-03 | End: 2021-12-07 | Stop reason: HOSPADM

## 2021-12-03 RX ORDER — FLUTICASONE FUROATE AND VILANTEROL 100; 25 UG/1; UG/1
1 POWDER RESPIRATORY (INHALATION) DAILY
Status: DISCONTINUED | OUTPATIENT
Start: 2021-12-03 | End: 2021-12-07 | Stop reason: HOSPADM

## 2021-12-03 RX ORDER — POLYETHYLENE GLYCOL 3350 17 G/17G
17 POWDER, FOR SOLUTION ORAL DAILY PRN
Status: DISCONTINUED | OUTPATIENT
Start: 2021-12-03 | End: 2021-12-07 | Stop reason: HOSPADM

## 2021-12-03 RX ORDER — AMOXICILLIN 250 MG
1 CAPSULE ORAL 2 TIMES DAILY PRN
Status: DISCONTINUED | OUTPATIENT
Start: 2021-12-03 | End: 2021-12-07 | Stop reason: HOSPADM

## 2021-12-03 RX ADMIN — OXYCODONE HYDROCHLORIDE AND ACETAMINOPHEN 1000 MG: 500 TABLET ORAL at 08:12

## 2021-12-03 RX ADMIN — IPRATROPIUM BROMIDE AND ALBUTEROL SULFATE 3 ML: .5; 3 SOLUTION RESPIRATORY (INHALATION) at 04:12

## 2021-12-03 RX ADMIN — PIPERACILLIN AND TAZOBACTAM 3.38 G: 3; .375 INJECTION, POWDER, LYOPHILIZED, FOR SOLUTION INTRAVENOUS; PARENTERAL at 11:12

## 2021-12-03 RX ADMIN — METHYLPREDNISOLONE SODIUM SUCCINATE 80 MG: 40 INJECTION, POWDER, FOR SOLUTION INTRAMUSCULAR; INTRAVENOUS at 08:12

## 2021-12-03 RX ADMIN — HYDROCODONE BITARTRATE AND ACETAMINOPHEN 1 TABLET: 5; 325 TABLET ORAL at 02:12

## 2021-12-03 RX ADMIN — IPRATROPIUM BROMIDE AND ALBUTEROL SULFATE 3 ML: .5; 3 SOLUTION RESPIRATORY (INHALATION) at 12:12

## 2021-12-03 RX ADMIN — DULOXETINE HYDROCHLORIDE 40 MG: 20 CAPSULE, DELAYED RELEASE ORAL at 08:12

## 2021-12-03 RX ADMIN — IPRATROPIUM BROMIDE AND ALBUTEROL SULFATE 3 ML: .5; 3 SOLUTION RESPIRATORY (INHALATION) at 11:12

## 2021-12-03 RX ADMIN — ATENOLOL 12.5 MG: 25 TABLET ORAL at 08:12

## 2021-12-03 RX ADMIN — GUAIFENESIN 600 MG: 600 TABLET, EXTENDED RELEASE ORAL at 08:12

## 2021-12-03 RX ADMIN — PIPERACILLIN AND TAZOBACTAM 3.38 G: 3; .375 INJECTION, POWDER, LYOPHILIZED, FOR SOLUTION INTRAVENOUS; PARENTERAL at 12:12

## 2021-12-03 RX ADMIN — PREDNISONE 40 MG: 10 TABLET ORAL at 12:12

## 2021-12-03 RX ADMIN — IPRATROPIUM BROMIDE AND ALBUTEROL SULFATE 3 ML: .5; 3 SOLUTION RESPIRATORY (INHALATION) at 01:12

## 2021-12-03 RX ADMIN — DOCUSATE SODIUM 50MG AND SENNOSIDES 8.6MG 1 TABLET: 8.6; 5 TABLET, FILM COATED ORAL at 04:12

## 2021-12-03 RX ADMIN — BENZONATATE 100 MG: 100 CAPSULE ORAL at 01:12

## 2021-12-03 RX ADMIN — POLYETHYLENE GLYCOL (3350) 17 G: 17 POWDER, FOR SOLUTION ORAL at 04:12

## 2021-12-03 RX ADMIN — HYDROCODONE BITARTRATE AND ACETAMINOPHEN 1 TABLET: 5; 325 TABLET ORAL at 10:12

## 2021-12-03 RX ADMIN — PIPERACILLIN AND TAZOBACTAM 3.38 G: 3; .375 INJECTION, POWDER, LYOPHILIZED, FOR SOLUTION INTRAVENOUS; PARENTERAL at 05:12

## 2021-12-03 RX ADMIN — BENZONATATE 100 MG: 100 CAPSULE ORAL at 08:12

## 2021-12-03 RX ADMIN — HYDROCODONE BITARTRATE AND ACETAMINOPHEN 1 TABLET: 5; 325 TABLET ORAL at 08:12

## 2021-12-03 RX ADMIN — ATORVASTATIN CALCIUM 20 MG: 10 TABLET, FILM COATED ORAL at 08:12

## 2021-12-03 RX ADMIN — HYDROCODONE BITARTRATE AND ACETAMINOPHEN 1 TABLET: 5; 325 TABLET ORAL at 06:12

## 2021-12-03 RX ADMIN — IPRATROPIUM BROMIDE AND ALBUTEROL SULFATE 3 ML: .5; 3 SOLUTION RESPIRATORY (INHALATION) at 07:12

## 2021-12-03 RX ADMIN — Medication 2000 UNITS: at 08:12

## 2021-12-03 RX ADMIN — HYDROCODONE BITARTRATE AND ACETAMINOPHEN 1 TABLET: 5; 325 TABLET ORAL at 01:12

## 2021-12-03 RX ADMIN — Medication 1 PATCH: at 08:12

## 2021-12-03 RX ADMIN — HYDROXYZINE PAMOATE 25 MG: 25 CAPSULE ORAL at 08:12

## 2021-12-04 PROCEDURE — 25000242 PHARM REV CODE 250 ALT 637 W/ HCPCS: Performed by: FAMILY MEDICINE

## 2021-12-04 PROCEDURE — 25000003 PHARM REV CODE 250: Performed by: HOSPITALIST

## 2021-12-04 PROCEDURE — 21400001 HC TELEMETRY ROOM

## 2021-12-04 PROCEDURE — 27000221 HC OXYGEN, UP TO 24 HOURS

## 2021-12-04 PROCEDURE — 25000003 PHARM REV CODE 250: Performed by: FAMILY MEDICINE

## 2021-12-04 PROCEDURE — 63600175 PHARM REV CODE 636 W HCPCS: Performed by: FAMILY MEDICINE

## 2021-12-04 PROCEDURE — 94761 N-INVAS EAR/PLS OXIMETRY MLT: CPT

## 2021-12-04 PROCEDURE — S4991 NICOTINE PATCH NONLEGEND: HCPCS | Performed by: HOSPITALIST

## 2021-12-04 PROCEDURE — 94640 AIRWAY INHALATION TREATMENT: CPT

## 2021-12-04 RX ADMIN — PIPERACILLIN AND TAZOBACTAM 3.38 G: 3; .375 INJECTION, POWDER, LYOPHILIZED, FOR SOLUTION INTRAVENOUS; PARENTERAL at 06:12

## 2021-12-04 RX ADMIN — PIPERACILLIN AND TAZOBACTAM 3.38 G: 3; .375 INJECTION, POWDER, LYOPHILIZED, FOR SOLUTION INTRAVENOUS; PARENTERAL at 05:12

## 2021-12-04 RX ADMIN — POLYETHYLENE GLYCOL (3350) 17 G: 17 POWDER, FOR SOLUTION ORAL at 02:12

## 2021-12-04 RX ADMIN — HYDROCODONE BITARTRATE AND ACETAMINOPHEN 1 TABLET: 5; 325 TABLET ORAL at 08:12

## 2021-12-04 RX ADMIN — HYDROCODONE BITARTRATE AND ACETAMINOPHEN 1 TABLET: 5; 325 TABLET ORAL at 09:12

## 2021-12-04 RX ADMIN — IPRATROPIUM BROMIDE AND ALBUTEROL SULFATE 3 ML: .5; 3 SOLUTION RESPIRATORY (INHALATION) at 04:12

## 2021-12-04 RX ADMIN — DULOXETINE HYDROCHLORIDE 40 MG: 20 CAPSULE, DELAYED RELEASE ORAL at 08:12

## 2021-12-04 RX ADMIN — BENZONATATE 100 MG: 100 CAPSULE ORAL at 09:12

## 2021-12-04 RX ADMIN — IPRATROPIUM BROMIDE AND ALBUTEROL SULFATE 3 ML: .5; 3 SOLUTION RESPIRATORY (INHALATION) at 09:12

## 2021-12-04 RX ADMIN — IPRATROPIUM BROMIDE AND ALBUTEROL SULFATE 3 ML: .5; 3 SOLUTION RESPIRATORY (INHALATION) at 11:12

## 2021-12-04 RX ADMIN — IPRATROPIUM BROMIDE AND ALBUTEROL SULFATE 3 ML: .5; 3 SOLUTION RESPIRATORY (INHALATION) at 07:12

## 2021-12-04 RX ADMIN — OXYCODONE HYDROCHLORIDE AND ACETAMINOPHEN 1000 MG: 500 TABLET ORAL at 08:12

## 2021-12-04 RX ADMIN — FLUTICASONE FUROATE AND VILANTEROL TRIFENATATE 1 PUFF: 100; 25 POWDER RESPIRATORY (INHALATION) at 08:12

## 2021-12-04 RX ADMIN — HYDROXYZINE PAMOATE 25 MG: 25 CAPSULE ORAL at 09:12

## 2021-12-04 RX ADMIN — GUAIFENESIN 600 MG: 600 TABLET, EXTENDED RELEASE ORAL at 08:12

## 2021-12-04 RX ADMIN — HYDROCODONE BITARTRATE AND ACETAMINOPHEN 1 TABLET: 5; 325 TABLET ORAL at 01:12

## 2021-12-04 RX ADMIN — Medication 2000 UNITS: at 08:12

## 2021-12-04 RX ADMIN — Medication 1 PATCH: at 08:12

## 2021-12-04 RX ADMIN — ATORVASTATIN CALCIUM 20 MG: 10 TABLET, FILM COATED ORAL at 08:12

## 2021-12-04 RX ADMIN — HYDROCODONE BITARTRATE AND ACETAMINOPHEN 1 TABLET: 5; 325 TABLET ORAL at 04:12

## 2021-12-04 RX ADMIN — DOCUSATE SODIUM 50MG AND SENNOSIDES 8.6MG 1 TABLET: 8.6; 5 TABLET, FILM COATED ORAL at 02:12

## 2021-12-04 RX ADMIN — BENZONATATE 100 MG: 100 CAPSULE ORAL at 08:12

## 2021-12-04 RX ADMIN — PREDNISONE 40 MG: 10 TABLET ORAL at 08:12

## 2021-12-04 RX ADMIN — OXYCODONE HYDROCHLORIDE AND ACETAMINOPHEN 1000 MG: 500 TABLET ORAL at 09:12

## 2021-12-04 RX ADMIN — PIPERACILLIN AND TAZOBACTAM 3.38 G: 3; .375 INJECTION, POWDER, LYOPHILIZED, FOR SOLUTION INTRAVENOUS; PARENTERAL at 01:12

## 2021-12-04 RX ADMIN — ATENOLOL 12.5 MG: 25 TABLET ORAL at 08:12

## 2021-12-04 RX ADMIN — GUAIFENESIN 600 MG: 600 TABLET, EXTENDED RELEASE ORAL at 09:12

## 2021-12-05 PROCEDURE — 94640 AIRWAY INHALATION TREATMENT: CPT

## 2021-12-05 PROCEDURE — S4991 NICOTINE PATCH NONLEGEND: HCPCS | Performed by: HOSPITALIST

## 2021-12-05 PROCEDURE — 99900035 HC TECH TIME PER 15 MIN (STAT)

## 2021-12-05 PROCEDURE — 25000003 PHARM REV CODE 250: Performed by: HOSPITALIST

## 2021-12-05 PROCEDURE — 21400001 HC TELEMETRY ROOM

## 2021-12-05 PROCEDURE — 27000221 HC OXYGEN, UP TO 24 HOURS

## 2021-12-05 PROCEDURE — 94761 N-INVAS EAR/PLS OXIMETRY MLT: CPT

## 2021-12-05 PROCEDURE — 25000242 PHARM REV CODE 250 ALT 637 W/ HCPCS: Performed by: FAMILY MEDICINE

## 2021-12-05 PROCEDURE — 63600175 PHARM REV CODE 636 W HCPCS: Performed by: FAMILY MEDICINE

## 2021-12-05 PROCEDURE — 25000003 PHARM REV CODE 250: Performed by: FAMILY MEDICINE

## 2021-12-05 RX ADMIN — IPRATROPIUM BROMIDE AND ALBUTEROL SULFATE 3 ML: .5; 3 SOLUTION RESPIRATORY (INHALATION) at 09:12

## 2021-12-05 RX ADMIN — HYDROCODONE BITARTRATE AND ACETAMINOPHEN 1 TABLET: 5; 325 TABLET ORAL at 11:12

## 2021-12-05 RX ADMIN — HYDROCODONE BITARTRATE AND ACETAMINOPHEN 1 TABLET: 5; 325 TABLET ORAL at 06:12

## 2021-12-05 RX ADMIN — PIPERACILLIN AND TAZOBACTAM 3.38 G: 3; .375 INJECTION, POWDER, LYOPHILIZED, FOR SOLUTION INTRAVENOUS; PARENTERAL at 05:12

## 2021-12-05 RX ADMIN — DOCUSATE SODIUM 50MG AND SENNOSIDES 8.6MG 1 TABLET: 8.6; 5 TABLET, FILM COATED ORAL at 01:12

## 2021-12-05 RX ADMIN — HYDROCODONE BITARTRATE AND ACETAMINOPHEN 1 TABLET: 5; 325 TABLET ORAL at 05:12

## 2021-12-05 RX ADMIN — PREDNISONE 40 MG: 10 TABLET ORAL at 09:12

## 2021-12-05 RX ADMIN — FLUTICASONE FUROATE AND VILANTEROL TRIFENATATE 1 PUFF: 100; 25 POWDER RESPIRATORY (INHALATION) at 07:12

## 2021-12-05 RX ADMIN — IPRATROPIUM BROMIDE AND ALBUTEROL SULFATE 3 ML: .5; 3 SOLUTION RESPIRATORY (INHALATION) at 12:12

## 2021-12-05 RX ADMIN — GUAIFENESIN 600 MG: 600 TABLET, EXTENDED RELEASE ORAL at 08:12

## 2021-12-05 RX ADMIN — DULOXETINE HYDROCHLORIDE 40 MG: 20 CAPSULE, DELAYED RELEASE ORAL at 09:12

## 2021-12-05 RX ADMIN — GUAIFENESIN 600 MG: 600 TABLET, EXTENDED RELEASE ORAL at 09:12

## 2021-12-05 RX ADMIN — OXYCODONE HYDROCHLORIDE AND ACETAMINOPHEN 1000 MG: 500 TABLET ORAL at 08:12

## 2021-12-05 RX ADMIN — PIPERACILLIN AND TAZOBACTAM 3.38 G: 3; .375 INJECTION, POWDER, LYOPHILIZED, FOR SOLUTION INTRAVENOUS; PARENTERAL at 06:12

## 2021-12-05 RX ADMIN — POLYETHYLENE GLYCOL (3350) 17 G: 17 POWDER, FOR SOLUTION ORAL at 01:12

## 2021-12-05 RX ADMIN — Medication 2000 UNITS: at 09:12

## 2021-12-05 RX ADMIN — HYDROXYZINE PAMOATE 25 MG: 25 CAPSULE ORAL at 08:12

## 2021-12-05 RX ADMIN — PIPERACILLIN AND TAZOBACTAM 3.38 G: 3; .375 INJECTION, POWDER, LYOPHILIZED, FOR SOLUTION INTRAVENOUS; PARENTERAL at 12:12

## 2021-12-05 RX ADMIN — Medication 1 PATCH: at 09:12

## 2021-12-05 RX ADMIN — PIPERACILLIN AND TAZOBACTAM 3.38 G: 3; .375 INJECTION, POWDER, LYOPHILIZED, FOR SOLUTION INTRAVENOUS; PARENTERAL at 11:12

## 2021-12-05 RX ADMIN — IPRATROPIUM BROMIDE AND ALBUTEROL SULFATE 3 ML: .5; 3 SOLUTION RESPIRATORY (INHALATION) at 04:12

## 2021-12-05 RX ADMIN — ATORVASTATIN CALCIUM 20 MG: 10 TABLET, FILM COATED ORAL at 09:12

## 2021-12-05 RX ADMIN — IPRATROPIUM BROMIDE AND ALBUTEROL SULFATE 3 ML: .5; 3 SOLUTION RESPIRATORY (INHALATION) at 07:12

## 2021-12-05 RX ADMIN — OXYCODONE HYDROCHLORIDE AND ACETAMINOPHEN 1000 MG: 500 TABLET ORAL at 09:12

## 2021-12-05 RX ADMIN — BENZONATATE 100 MG: 100 CAPSULE ORAL at 08:12

## 2021-12-05 RX ADMIN — ATENOLOL 12.5 MG: 25 TABLET ORAL at 09:12

## 2021-12-06 LAB
ANION GAP SERPL CALC-SCNC: 13 MMOL/L (ref 8–16)
BASOPHILS # BLD AUTO: 0.02 K/UL (ref 0–0.2)
BASOPHILS NFR BLD: 0.2 % (ref 0–1.9)
BUN SERPL-MCNC: 10 MG/DL (ref 6–20)
CALCIUM SERPL-MCNC: 8.4 MG/DL (ref 8.7–10.5)
CHLORIDE SERPL-SCNC: 99 MMOL/L (ref 95–110)
CO2 SERPL-SCNC: 30 MMOL/L (ref 23–29)
CREAT SERPL-MCNC: 0.7 MG/DL (ref 0.5–1.4)
DIFFERENTIAL METHOD: ABNORMAL
EOSINOPHIL # BLD AUTO: 0 K/UL (ref 0–0.5)
EOSINOPHIL NFR BLD: 0.2 % (ref 0–8)
ERYTHROCYTE [DISTWIDTH] IN BLOOD BY AUTOMATED COUNT: 12.4 % (ref 11.5–14.5)
EST. GFR  (AFRICAN AMERICAN): >60 ML/MIN/1.73 M^2
EST. GFR  (NON AFRICAN AMERICAN): >60 ML/MIN/1.73 M^2
GLUCOSE SERPL-MCNC: 161 MG/DL (ref 70–110)
HCT VFR BLD AUTO: 35.4 % (ref 37–48.5)
HGB BLD-MCNC: 12.1 G/DL (ref 12–16)
IMM GRANULOCYTES # BLD AUTO: 0.29 K/UL (ref 0–0.04)
IMM GRANULOCYTES NFR BLD AUTO: 2.6 % (ref 0–0.5)
LYMPHOCYTES # BLD AUTO: 0.9 K/UL (ref 1–4.8)
LYMPHOCYTES NFR BLD: 7.9 % (ref 18–48)
MCH RBC QN AUTO: 31.8 PG (ref 27–31)
MCHC RBC AUTO-ENTMCNC: 34.2 G/DL (ref 32–36)
MCV RBC AUTO: 93 FL (ref 82–98)
MONOCYTES # BLD AUTO: 0.2 K/UL (ref 0.3–1)
MONOCYTES NFR BLD: 1.9 % (ref 4–15)
NEUTROPHILS # BLD AUTO: 9.7 K/UL (ref 1.8–7.7)
NEUTROPHILS NFR BLD: 87.2 % (ref 38–73)
NRBC BLD-RTO: 0 /100 WBC
PLATELET # BLD AUTO: 292 K/UL (ref 150–450)
PMV BLD AUTO: 9.1 FL (ref 9.2–12.9)
POTASSIUM SERPL-SCNC: 3.4 MMOL/L (ref 3.5–5.1)
RBC # BLD AUTO: 3.81 M/UL (ref 4–5.4)
SODIUM SERPL-SCNC: 142 MMOL/L (ref 136–145)
WBC # BLD AUTO: 11.15 K/UL (ref 3.9–12.7)

## 2021-12-06 PROCEDURE — 94640 AIRWAY INHALATION TREATMENT: CPT

## 2021-12-06 PROCEDURE — 80048 BASIC METABOLIC PNL TOTAL CA: CPT | Performed by: FAMILY MEDICINE

## 2021-12-06 PROCEDURE — 21400001 HC TELEMETRY ROOM

## 2021-12-06 PROCEDURE — 99232 SBSQ HOSP IP/OBS MODERATE 35: CPT | Mod: ,,, | Performed by: FAMILY MEDICINE

## 2021-12-06 PROCEDURE — 36415 COLL VENOUS BLD VENIPUNCTURE: CPT | Performed by: FAMILY MEDICINE

## 2021-12-06 PROCEDURE — 94761 N-INVAS EAR/PLS OXIMETRY MLT: CPT

## 2021-12-06 PROCEDURE — 27000221 HC OXYGEN, UP TO 24 HOURS

## 2021-12-06 PROCEDURE — 25000003 PHARM REV CODE 250: Performed by: HOSPITALIST

## 2021-12-06 PROCEDURE — 25000242 PHARM REV CODE 250 ALT 637 W/ HCPCS: Performed by: FAMILY MEDICINE

## 2021-12-06 PROCEDURE — 25000003 PHARM REV CODE 250: Performed by: FAMILY MEDICINE

## 2021-12-06 PROCEDURE — S4991 NICOTINE PATCH NONLEGEND: HCPCS | Performed by: HOSPITALIST

## 2021-12-06 PROCEDURE — 85025 COMPLETE CBC W/AUTO DIFF WBC: CPT | Performed by: FAMILY MEDICINE

## 2021-12-06 PROCEDURE — 99232 PR SUBSEQUENT HOSPITAL CARE,LEVL II: ICD-10-PCS | Mod: ,,, | Performed by: FAMILY MEDICINE

## 2021-12-06 PROCEDURE — 63600175 PHARM REV CODE 636 W HCPCS: Performed by: FAMILY MEDICINE

## 2021-12-06 RX ADMIN — Medication 1 PATCH: at 08:12

## 2021-12-06 RX ADMIN — IPRATROPIUM BROMIDE AND ALBUTEROL SULFATE 3 ML: .5; 3 SOLUTION RESPIRATORY (INHALATION) at 04:12

## 2021-12-06 RX ADMIN — IPRATROPIUM BROMIDE AND ALBUTEROL SULFATE 3 ML: .5; 3 SOLUTION RESPIRATORY (INHALATION) at 11:12

## 2021-12-06 RX ADMIN — OXYCODONE HYDROCHLORIDE AND ACETAMINOPHEN 1000 MG: 500 TABLET ORAL at 08:12

## 2021-12-06 RX ADMIN — DULOXETINE HYDROCHLORIDE 40 MG: 20 CAPSULE, DELAYED RELEASE ORAL at 08:12

## 2021-12-06 RX ADMIN — HYDROCODONE BITARTRATE AND ACETAMINOPHEN 1 TABLET: 5; 325 TABLET ORAL at 10:12

## 2021-12-06 RX ADMIN — PREDNISONE 40 MG: 10 TABLET ORAL at 08:12

## 2021-12-06 RX ADMIN — PIPERACILLIN AND TAZOBACTAM 3.38 G: 3; .375 INJECTION, POWDER, LYOPHILIZED, FOR SOLUTION INTRAVENOUS; PARENTERAL at 01:12

## 2021-12-06 RX ADMIN — GUAIFENESIN 600 MG: 600 TABLET, EXTENDED RELEASE ORAL at 08:12

## 2021-12-06 RX ADMIN — ATORVASTATIN CALCIUM 20 MG: 10 TABLET, FILM COATED ORAL at 08:12

## 2021-12-06 RX ADMIN — HYDROCODONE BITARTRATE AND ACETAMINOPHEN 1 TABLET: 5; 325 TABLET ORAL at 08:12

## 2021-12-06 RX ADMIN — FLUTICASONE FUROATE AND VILANTEROL TRIFENATATE 1 PUFF: 100; 25 POWDER RESPIRATORY (INHALATION) at 06:12

## 2021-12-06 RX ADMIN — IPRATROPIUM BROMIDE AND ALBUTEROL SULFATE 3 ML: .5; 3 SOLUTION RESPIRATORY (INHALATION) at 06:12

## 2021-12-06 RX ADMIN — Medication 2000 UNITS: at 08:12

## 2021-12-06 RX ADMIN — PIPERACILLIN AND TAZOBACTAM 3.38 G: 3; .375 INJECTION, POWDER, LYOPHILIZED, FOR SOLUTION INTRAVENOUS; PARENTERAL at 06:12

## 2021-12-06 RX ADMIN — HYDROCODONE BITARTRATE AND ACETAMINOPHEN 1 TABLET: 5; 325 TABLET ORAL at 05:12

## 2021-12-06 RX ADMIN — PIPERACILLIN AND TAZOBACTAM 3.38 G: 3; .375 INJECTION, POWDER, LYOPHILIZED, FOR SOLUTION INTRAVENOUS; PARENTERAL at 12:12

## 2021-12-06 RX ADMIN — PIPERACILLIN AND TAZOBACTAM 3.38 G: 3; .375 INJECTION, POWDER, LYOPHILIZED, FOR SOLUTION INTRAVENOUS; PARENTERAL at 05:12

## 2021-12-07 VITALS
OXYGEN SATURATION: 98 % | WEIGHT: 110 LBS | BODY MASS INDEX: 18.33 KG/M2 | SYSTOLIC BLOOD PRESSURE: 130 MMHG | RESPIRATION RATE: 19 BRPM | HEART RATE: 81 BPM | DIASTOLIC BLOOD PRESSURE: 71 MMHG | HEIGHT: 65 IN | TEMPERATURE: 98 F

## 2021-12-07 PROCEDURE — 94640 AIRWAY INHALATION TREATMENT: CPT

## 2021-12-07 PROCEDURE — 25000242 PHARM REV CODE 250 ALT 637 W/ HCPCS: Performed by: FAMILY MEDICINE

## 2021-12-07 PROCEDURE — S4991 NICOTINE PATCH NONLEGEND: HCPCS | Performed by: HOSPITALIST

## 2021-12-07 PROCEDURE — 25000003 PHARM REV CODE 250: Performed by: HOSPITALIST

## 2021-12-07 PROCEDURE — 94761 N-INVAS EAR/PLS OXIMETRY MLT: CPT

## 2021-12-07 PROCEDURE — 99239 PR HOSPITAL DISCHARGE DAY,>30 MIN: ICD-10-PCS | Mod: ,,, | Performed by: FAMILY MEDICINE

## 2021-12-07 PROCEDURE — 99239 HOSP IP/OBS DSCHRG MGMT >30: CPT | Mod: ,,, | Performed by: FAMILY MEDICINE

## 2021-12-07 PROCEDURE — 25000003 PHARM REV CODE 250: Performed by: FAMILY MEDICINE

## 2021-12-07 PROCEDURE — 27000221 HC OXYGEN, UP TO 24 HOURS

## 2021-12-07 PROCEDURE — 63600175 PHARM REV CODE 636 W HCPCS: Performed by: FAMILY MEDICINE

## 2021-12-07 RX ORDER — PREDNISONE 20 MG/1
TABLET ORAL
Qty: 14 TABLET | Refills: 0 | Status: SHIPPED | OUTPATIENT
Start: 2021-12-07 | End: 2021-12-19

## 2021-12-07 RX ORDER — BENZONATATE 100 MG/1
100 CAPSULE ORAL EVERY 4 HOURS PRN
Qty: 30 CAPSULE | Refills: 1 | Status: SHIPPED | OUTPATIENT
Start: 2021-12-07 | End: 2021-12-17

## 2021-12-07 RX ORDER — IBUPROFEN 200 MG
1 TABLET ORAL DAILY PRN
Qty: 30 PATCH | Refills: 1 | Status: SHIPPED | OUTPATIENT
Start: 2021-12-07 | End: 2022-01-11

## 2021-12-07 RX ADMIN — ATORVASTATIN CALCIUM 20 MG: 10 TABLET, FILM COATED ORAL at 09:12

## 2021-12-07 RX ADMIN — HYDROCODONE BITARTRATE AND ACETAMINOPHEN 1 TABLET: 5; 325 TABLET ORAL at 04:12

## 2021-12-07 RX ADMIN — OXYCODONE HYDROCHLORIDE AND ACETAMINOPHEN 1000 MG: 500 TABLET ORAL at 09:12

## 2021-12-07 RX ADMIN — BENZONATATE 100 MG: 100 CAPSULE ORAL at 09:12

## 2021-12-07 RX ADMIN — IPRATROPIUM BROMIDE AND ALBUTEROL SULFATE 3 ML: .5; 3 SOLUTION RESPIRATORY (INHALATION) at 12:12

## 2021-12-07 RX ADMIN — PREDNISONE 40 MG: 10 TABLET ORAL at 09:12

## 2021-12-07 RX ADMIN — GUAIFENESIN 600 MG: 600 TABLET, EXTENDED RELEASE ORAL at 09:12

## 2021-12-07 RX ADMIN — ATENOLOL 12.5 MG: 25 TABLET ORAL at 09:12

## 2021-12-07 RX ADMIN — DULOXETINE HYDROCHLORIDE 40 MG: 20 CAPSULE, DELAYED RELEASE ORAL at 09:12

## 2021-12-07 RX ADMIN — HYDROCODONE BITARTRATE AND ACETAMINOPHEN 1 TABLET: 5; 325 TABLET ORAL at 09:12

## 2021-12-07 RX ADMIN — FLUTICASONE FUROATE AND VILANTEROL TRIFENATATE 1 PUFF: 100; 25 POWDER RESPIRATORY (INHALATION) at 08:12

## 2021-12-07 RX ADMIN — Medication 2000 UNITS: at 09:12

## 2021-12-07 RX ADMIN — IPRATROPIUM BROMIDE AND ALBUTEROL SULFATE 3 ML: .5; 3 SOLUTION RESPIRATORY (INHALATION) at 04:12

## 2021-12-07 RX ADMIN — Medication 1 PATCH: at 09:12

## 2021-12-07 RX ADMIN — IPRATROPIUM BROMIDE AND ALBUTEROL SULFATE 3 ML: .5; 3 SOLUTION RESPIRATORY (INHALATION) at 08:12

## 2021-12-07 RX ADMIN — PIPERACILLIN AND TAZOBACTAM 3.38 G: 3; .375 INJECTION, POWDER, LYOPHILIZED, FOR SOLUTION INTRAVENOUS; PARENTERAL at 01:12

## 2021-12-11 LAB
BACTERIA SPEC AEROBE CULT: ABNORMAL
BACTERIA SPEC AEROBE CULT: ABNORMAL
GRAM STN SPEC: ABNORMAL

## 2021-12-13 ENCOUNTER — PATIENT OUTREACH (OUTPATIENT)
Dept: ADMINISTRATIVE | Facility: OTHER | Age: 56
End: 2021-12-13
Payer: MEDICAID

## 2021-12-14 ENCOUNTER — OFFICE VISIT (OUTPATIENT)
Dept: SURGERY | Facility: CLINIC | Age: 56
End: 2021-12-14
Payer: MEDICAID

## 2021-12-14 VITALS
DIASTOLIC BLOOD PRESSURE: 85 MMHG | HEIGHT: 65 IN | WEIGHT: 117 LBS | SYSTOLIC BLOOD PRESSURE: 189 MMHG | HEART RATE: 120 BPM | BODY MASS INDEX: 19.49 KG/M2 | RESPIRATION RATE: 20 BRPM | OXYGEN SATURATION: 97 %

## 2021-12-14 DIAGNOSIS — M79.81 NONTRAUMATIC RECTUS HEMATOMA: Primary | ICD-10-CM

## 2021-12-14 PROCEDURE — 99999 PR PBB SHADOW E&M-EST. PATIENT-LVL IV: CPT | Mod: PBBFAC,,, | Performed by: SURGERY

## 2021-12-14 PROCEDURE — 99213 OFFICE O/P EST LOW 20 MIN: CPT | Mod: S$PBB,,, | Performed by: SURGERY

## 2021-12-14 PROCEDURE — 99213 PR OFFICE/OUTPT VISIT, EST, LEVL III, 20-29 MIN: ICD-10-PCS | Mod: S$PBB,,, | Performed by: SURGERY

## 2021-12-14 PROCEDURE — 99999 PR PBB SHADOW E&M-EST. PATIENT-LVL IV: ICD-10-PCS | Mod: PBBFAC,,, | Performed by: SURGERY

## 2021-12-14 PROCEDURE — 99214 OFFICE O/P EST MOD 30 MIN: CPT | Mod: PBBFAC | Performed by: SURGERY

## 2021-12-15 ENCOUNTER — OFFICE VISIT (OUTPATIENT)
Dept: FAMILY MEDICINE | Facility: CLINIC | Age: 56
End: 2021-12-15
Payer: MEDICAID

## 2021-12-15 VITALS
OXYGEN SATURATION: 97 % | TEMPERATURE: 99 F | WEIGHT: 115.63 LBS | BODY MASS INDEX: 19.27 KG/M2 | HEART RATE: 97 BPM | DIASTOLIC BLOOD PRESSURE: 76 MMHG | HEIGHT: 65 IN | SYSTOLIC BLOOD PRESSURE: 132 MMHG | RESPIRATION RATE: 14 BRPM

## 2021-12-15 DIAGNOSIS — Z09 HOSPITAL DISCHARGE FOLLOW-UP: Primary | ICD-10-CM

## 2021-12-15 DIAGNOSIS — F41.9 ANXIETY: ICD-10-CM

## 2021-12-15 PROCEDURE — 99214 OFFICE O/P EST MOD 30 MIN: CPT | Mod: PBBFAC | Performed by: FAMILY MEDICINE

## 2021-12-15 PROCEDURE — 99999 PR PBB SHADOW E&M-EST. PATIENT-LVL IV: CPT | Mod: PBBFAC,,, | Performed by: FAMILY MEDICINE

## 2021-12-15 PROCEDURE — 99999 PR PBB SHADOW E&M-EST. PATIENT-LVL IV: ICD-10-PCS | Mod: PBBFAC,,, | Performed by: FAMILY MEDICINE

## 2021-12-15 PROCEDURE — 99214 OFFICE O/P EST MOD 30 MIN: CPT | Mod: S$PBB,,, | Performed by: FAMILY MEDICINE

## 2021-12-15 PROCEDURE — 99214 PR OFFICE/OUTPT VISIT, EST, LEVL IV, 30-39 MIN: ICD-10-PCS | Mod: S$PBB,,, | Performed by: FAMILY MEDICINE

## 2021-12-15 RX ORDER — CLONAZEPAM 0.5 MG/1
0.5 TABLET ORAL DAILY PRN
Qty: 30 TABLET | Refills: 0 | Status: SHIPPED | OUTPATIENT
Start: 2021-12-15 | End: 2022-01-11 | Stop reason: SDUPTHER

## 2021-12-15 RX ORDER — SULFAMETHOXAZOLE AND TRIMETHOPRIM 800; 160 MG/1; MG/1
1 TABLET ORAL 2 TIMES DAILY
Qty: 14 TABLET | Refills: 0 | Status: SHIPPED | OUTPATIENT
Start: 2021-12-15 | End: 2021-12-22

## 2021-12-21 ENCOUNTER — PATIENT MESSAGE (OUTPATIENT)
Dept: FAMILY MEDICINE | Facility: CLINIC | Age: 56
End: 2021-12-21
Payer: MEDICAID

## 2022-01-05 DIAGNOSIS — Z12.11 COLON CANCER SCREENING: ICD-10-CM

## 2022-01-06 ENCOUNTER — PATIENT MESSAGE (OUTPATIENT)
Dept: ADMINISTRATIVE | Facility: HOSPITAL | Age: 57
End: 2022-01-06
Payer: MEDICAID

## 2022-01-06 ENCOUNTER — PATIENT OUTREACH (OUTPATIENT)
Dept: ADMINISTRATIVE | Facility: HOSPITAL | Age: 57
End: 2022-01-06
Payer: MEDICAID

## 2022-01-11 ENCOUNTER — OFFICE VISIT (OUTPATIENT)
Dept: FAMILY MEDICINE | Facility: CLINIC | Age: 57
End: 2022-01-11
Payer: MEDICAID

## 2022-01-11 VITALS
RESPIRATION RATE: 14 BRPM | HEIGHT: 65 IN | WEIGHT: 111.38 LBS | BODY MASS INDEX: 18.56 KG/M2 | OXYGEN SATURATION: 96 % | DIASTOLIC BLOOD PRESSURE: 66 MMHG | SYSTOLIC BLOOD PRESSURE: 120 MMHG | HEART RATE: 79 BPM | TEMPERATURE: 98 F

## 2022-01-11 DIAGNOSIS — F41.9 ANXIETY: ICD-10-CM

## 2022-01-11 DIAGNOSIS — Z09 HOSPITAL DISCHARGE FOLLOW-UP: Primary | ICD-10-CM

## 2022-01-11 PROCEDURE — 1160F RVW MEDS BY RX/DR IN RCRD: CPT | Mod: CPTII,,, | Performed by: FAMILY MEDICINE

## 2022-01-11 PROCEDURE — 3078F DIAST BP <80 MM HG: CPT | Mod: CPTII,,, | Performed by: FAMILY MEDICINE

## 2022-01-11 PROCEDURE — 99214 PR OFFICE/OUTPT VISIT, EST, LEVL IV, 30-39 MIN: ICD-10-PCS | Mod: S$PBB,,, | Performed by: FAMILY MEDICINE

## 2022-01-11 PROCEDURE — 3074F SYST BP LT 130 MM HG: CPT | Mod: CPTII,,, | Performed by: FAMILY MEDICINE

## 2022-01-11 PROCEDURE — 1159F MED LIST DOCD IN RCRD: CPT | Mod: CPTII,,, | Performed by: FAMILY MEDICINE

## 2022-01-11 PROCEDURE — 1159F PR MEDICATION LIST DOCUMENTED IN MEDICAL RECORD: ICD-10-PCS | Mod: CPTII,,, | Performed by: FAMILY MEDICINE

## 2022-01-11 PROCEDURE — 99999 PR PBB SHADOW E&M-EST. PATIENT-LVL III: ICD-10-PCS | Mod: PBBFAC,,, | Performed by: FAMILY MEDICINE

## 2022-01-11 PROCEDURE — 3008F BODY MASS INDEX DOCD: CPT | Mod: CPTII,,, | Performed by: FAMILY MEDICINE

## 2022-01-11 PROCEDURE — 99999 PR PBB SHADOW E&M-EST. PATIENT-LVL III: CPT | Mod: PBBFAC,,, | Performed by: FAMILY MEDICINE

## 2022-01-11 PROCEDURE — 3008F PR BODY MASS INDEX (BMI) DOCUMENTED: ICD-10-PCS | Mod: CPTII,,, | Performed by: FAMILY MEDICINE

## 2022-01-11 PROCEDURE — 3078F PR MOST RECENT DIASTOLIC BLOOD PRESSURE < 80 MM HG: ICD-10-PCS | Mod: CPTII,,, | Performed by: FAMILY MEDICINE

## 2022-01-11 PROCEDURE — 3074F PR MOST RECENT SYSTOLIC BLOOD PRESSURE < 130 MM HG: ICD-10-PCS | Mod: CPTII,,, | Performed by: FAMILY MEDICINE

## 2022-01-11 PROCEDURE — 1160F PR REVIEW ALL MEDS BY PRESCRIBER/CLIN PHARMACIST DOCUMENTED: ICD-10-PCS | Mod: CPTII,,, | Performed by: FAMILY MEDICINE

## 2022-01-11 PROCEDURE — 99214 OFFICE O/P EST MOD 30 MIN: CPT | Mod: S$PBB,,, | Performed by: FAMILY MEDICINE

## 2022-01-11 PROCEDURE — 99213 OFFICE O/P EST LOW 20 MIN: CPT | Mod: PBBFAC | Performed by: FAMILY MEDICINE

## 2022-01-11 RX ORDER — NAPROXEN SODIUM 220 MG/1
81 TABLET, FILM COATED ORAL
Status: ON HOLD | COMMUNITY
Start: 2022-01-04

## 2022-01-11 RX ORDER — CLONAZEPAM 0.5 MG/1
0.5 TABLET ORAL DAILY PRN
Qty: 30 TABLET | Refills: 0 | Status: SHIPPED | OUTPATIENT
Start: 2022-01-11 | End: 2022-02-07

## 2022-01-11 NOTE — PROGRESS NOTES
"[facility] - Clinic Note    Subjective      Ms. White is a 56 y.o. female who presents to clinic for Hospital Follow Up (D/c'd last week from [facility])    Patient was admitted from to [facility] for chest pain. Cardiac workup negative. Symptoms seem to be improving.  Cough improved.  Anxiety has improved with Klonopin.    [facility] Zayra has a past medical history of Anxiety, Arthritis, Asthma, Back pain, COPD (chronic obstructive pulmonary disease), and Pulmonary embolism.   PSX Zayra has a past surgical history that includes Breast cyst excision;  section; Cholecystectomy; Biopsy; and Incision and drainage of abscess (Left, 3/16/2020).    Zayra's family history includes Breast cancer in her sister.    Zayra reports that she quit smoking about 6 weeks ago. She has a 41.00 pack-year smoking history. She has never used smokeless tobacco. She reports current alcohol use. She reports that she does not use drugs.   [facility] Zayra is allergic to ativan [lorazepam].   [facility] Zayra has a current medication list which includes the following prescription(s): albuterol, aspirin, atenolol, atorvastatin, cholecalciferol (vitamin d3), duloxetine, ipratropium, umeclidinium-vilanterol, and clonazepam.     Review of Systems   Constitutional: Negative for chills and fever.   Respiratory: Positive for cough.    Cardiovascular: Negative for chest pain.   Musculoskeletal: Negative for back pain.   Psychiatric/Behavioral: The patient is nervous/anxious.      Objective     /66 (BP Location: Left arm, Patient Position: Sitting, BP Method: Medium (Manual))   Pulse 79   Temp 97.9 °F (36.6 °C) (Oral)   Resp 14   Ht 5' 5" (1.651 m)   Wt 50.5 kg (111 lb 6.4 oz)   LMP 2017   SpO2 96%   BMI 18.54 kg/m²     Physical Exam  Vitals and nursing note reviewed.   Constitutional:       General: She is not in acute distress.     Appearance: Normal appearance. She is well-developed. She is not " diaphoretic.   HENT:      Head: Normocephalic and atraumatic.      Right Ear: External ear normal.      Left Ear: External ear normal.   Eyes:      General:         Right eye: No discharge.         Left eye: No discharge.   Cardiovascular:      Rate and Rhythm: Normal rate and regular rhythm.      Heart sounds: Normal heart sounds.   Pulmonary:      Effort: Pulmonary effort is normal.      Breath sounds: Normal breath sounds. No wheezing or rales.   Skin:     General: Skin is warm and dry.   Neurological:      Mental Status: She is alert and oriented to person, place, and time. Mental status is at baseline.   Psychiatric:         Mood and Affect: Mood normal.         Behavior: Behavior normal.         Thought Content: Thought content normal.         Judgment: Judgment normal.        Assessment/Plan     1. Hospital discharge follow-up     2. Anxiety  clonazePAM (KLONOPIN) 0.5 MG tablet     Respiratory status has improved.  Will review hospital records.  Reviewed medications.  Continue clonazepam to help with anxiety.  Saw hydroxyzine.   reviewed.  No red flags.  Follow-up in 1 month for booster vaccination and Pap smear.    Future Appointments   Date Time Provider Department Center   1/18/2022  1:00 PM Chavez Gonzales MD [facility]         Eric Singh MD  Family Medicine  [facility]

## 2022-01-14 ENCOUNTER — PATIENT OUTREACH (OUTPATIENT)
Dept: ADMINISTRATIVE | Facility: OTHER | Age: 57
End: 2022-01-14
Payer: MEDICAID

## 2022-01-14 NOTE — PROGRESS NOTES
Patient's chart was reviewed for overdue DAISY ( Proactive [facility] Encounters) Topics  Requested updates within Care Everywhere.  [facility] immunization registry triggered  Patient was not found in [facility].   Health Maintenance was updated.

## 2022-01-17 ENCOUNTER — PATIENT MESSAGE (OUTPATIENT)
Dept: SURGERY | Facility: CLINIC | Age: 57
End: 2022-01-17
Payer: MEDICAID

## 2022-02-07 ENCOUNTER — HOSPITAL ENCOUNTER (OUTPATIENT)
Facility: HOSPITAL | Age: 57
Discharge: HOME | End: 2022-02-10
Attending: FAMILY MEDICINE | Admitting: HOSPITALIST
Payer: MEDICAID

## 2022-02-07 DIAGNOSIS — J44.1 COPD EXACERBATION: Primary | ICD-10-CM

## 2022-02-07 DIAGNOSIS — J96.01 ACUTE RESPIRATORY FAILURE WITH HYPOXIA: ICD-10-CM

## 2022-02-07 DIAGNOSIS — E78.5 HYPERLIPIDEMIA, UNSPECIFIED HYPERLIPIDEMIA TYPE: ICD-10-CM

## 2022-02-07 DIAGNOSIS — R06.03 ACUTE RESPIRATORY DISTRESS: ICD-10-CM

## 2022-02-07 DIAGNOSIS — R50.9 FEVER, UNSPECIFIED FEVER CAUSE: ICD-10-CM

## 2022-02-07 DIAGNOSIS — R00.0 SINUS TACHYCARDIA SEEN ON CARDIAC MONITOR: ICD-10-CM

## 2022-02-07 DIAGNOSIS — R06.02 SOB (SHORTNESS OF BREATH): ICD-10-CM

## 2022-02-07 DIAGNOSIS — F41.9 ANXIETY: ICD-10-CM

## 2022-02-07 LAB
ALBUMIN SERPL BCP-MCNC: 4.1 G/DL (ref 3.5–5.2)
ALLENS TEST: NORMAL
ALP SERPL-CCNC: 91 U/L (ref 55–135)
ALT SERPL W/O P-5'-P-CCNC: 10 U/L (ref 10–44)
AMPHET+METHAMPHET UR QL: NEGATIVE
ANION GAP SERPL CALC-SCNC: 18 MMOL/L (ref 8–16)
AST SERPL-CCNC: 21 U/L (ref 10–40)
BACTERIA #/AREA URNS HPF: ABNORMAL /HPF
BARBITURATES UR QL SCN>200 NG/ML: NEGATIVE
BASOPHILS # BLD AUTO: 0.1 K/UL (ref 0–0.2)
BASOPHILS NFR BLD: 0.5 % (ref 0–1.9)
BENZODIAZ UR QL SCN>200 NG/ML: NEGATIVE
BILIRUB SERPL-MCNC: 0.6 MG/DL (ref 0.1–1)
BILIRUB UR QL STRIP: NEGATIVE
BUN SERPL-MCNC: 6 MG/DL (ref 6–20)
BZE UR QL SCN: NEGATIVE
CALCIUM SERPL-MCNC: 9.5 MG/DL (ref 8.7–10.5)
CANNABINOIDS UR QL SCN: NEGATIVE
CHLORIDE SERPL-SCNC: 101 MMOL/L (ref 95–110)
CLARITY UR: CLEAR
CO2 SERPL-SCNC: 21 MMOL/L (ref 23–29)
COLOR UR: YELLOW
CREAT SERPL-MCNC: 0.7 MG/DL (ref 0.5–1.4)
CREAT UR-MCNC: 165.2 MG/DL (ref 15–325)
DELSYS: NORMAL
DIFFERENTIAL METHOD: ABNORMAL
EOSINOPHIL # BLD AUTO: 0 K/UL (ref 0–0.5)
EOSINOPHIL NFR BLD: 0.2 % (ref 0–8)
ERYTHROCYTE [DISTWIDTH] IN BLOOD BY AUTOMATED COUNT: 11.9 % (ref 11.5–14.5)
EST. GFR  (AFRICAN AMERICAN): >60 ML/MIN/1.73 M^2
EST. GFR  (NON AFRICAN AMERICAN): >60 ML/MIN/1.73 M^2
GLUCOSE SERPL-MCNC: 145 MG/DL (ref 70–110)
GLUCOSE UR QL STRIP: NEGATIVE
HCO3 UR-SCNC: 25.8 MMOL/L (ref 24–28)
HCT VFR BLD AUTO: 41.6 % (ref 37–48.5)
HGB BLD-MCNC: 14.7 G/DL (ref 12–16)
HGB UR QL STRIP: NEGATIVE
HYALINE CASTS #/AREA URNS LPF: 3 /LPF
IMM GRANULOCYTES # BLD AUTO: 0.1 K/UL (ref 0–0.04)
IMM GRANULOCYTES NFR BLD AUTO: 0.5 % (ref 0–0.5)
KETONES UR QL STRIP: ABNORMAL
LACTATE SERPL-SCNC: 1.7 MMOL/L (ref 0.5–2.2)
LEUKOCYTE ESTERASE UR QL STRIP: NEGATIVE
LYMPHOCYTES # BLD AUTO: 1.8 K/UL (ref 1–4.8)
LYMPHOCYTES NFR BLD: 8.5 % (ref 18–48)
MCH RBC QN AUTO: 32.7 PG (ref 27–31)
MCHC RBC AUTO-ENTMCNC: 35.3 G/DL (ref 32–36)
MCV RBC AUTO: 92 FL (ref 82–98)
METHADONE UR QL SCN>300 NG/ML: NEGATIVE
MICROSCOPIC COMMENT: ABNORMAL
MONOCYTES # BLD AUTO: 1 K/UL (ref 0.3–1)
MONOCYTES NFR BLD: 4.6 % (ref 4–15)
NEUTROPHILS # BLD AUTO: 18.1 K/UL (ref 1.8–7.7)
NEUTROPHILS NFR BLD: 85.7 % (ref 38–73)
NITRITE UR QL STRIP: NEGATIVE
NRBC BLD-RTO: 0 /100 WBC
OPIATES UR QL SCN: NEGATIVE
PCO2 BLDA: 41.6 MMHG (ref 35–45)
PCP UR QL SCN>25 NG/ML: NEGATIVE
PH SMN: 7.4 [PH] (ref 7.35–7.45)
PH UR STRIP: 6 [PH] (ref 5–8)
PLATELET # BLD AUTO: 427 K/UL (ref 150–450)
PMV BLD AUTO: 9.3 FL (ref 9.2–12.9)
PO2 BLDA: 99 MMHG (ref 80–100)
POC BE: 1 MMOL/L
POC SATURATED O2: 98 % (ref 95–100)
POC TCO2: 27 MMOL/L (ref 23–27)
POTASSIUM SERPL-SCNC: 3.6 MMOL/L (ref 3.5–5.1)
PROT SERPL-MCNC: 7.5 G/DL (ref 6–8.4)
PROT UR QL STRIP: ABNORMAL
RBC # BLD AUTO: 4.5 M/UL (ref 4–5.4)
RBC #/AREA URNS HPF: 2 /HPF (ref 0–4)
SAMPLE: NORMAL
SARS-COV-2 RDRP RESP QL NAA+PROBE: NEGATIVE
SITE: NORMAL
SODIUM SERPL-SCNC: 140 MMOL/L (ref 136–145)
SP GR UR STRIP: 1.02 (ref 1–1.03)
SQUAMOUS #/AREA URNS HPF: 1 /HPF
TOXICOLOGY INFORMATION: NORMAL
URN SPEC COLLECT METH UR: ABNORMAL
UROBILINOGEN UR STRIP-ACNC: NEGATIVE EU/DL
WBC # BLD AUTO: 21.08 K/UL (ref 3.9–12.7)
WBC #/AREA URNS HPF: 1 /HPF (ref 0–5)

## 2022-02-07 PROCEDURE — 81000 URINALYSIS NONAUTO W/SCOPE: CPT | Mod: 59 | Performed by: FAMILY MEDICINE

## 2022-02-07 PROCEDURE — 63600175 PHARM REV CODE 636 W HCPCS: Performed by: FAMILY MEDICINE

## 2022-02-07 PROCEDURE — 94640 AIRWAY INHALATION TREATMENT: CPT

## 2022-02-07 PROCEDURE — 27000221 HC OXYGEN, UP TO 24 HOURS

## 2022-02-07 PROCEDURE — U0002 COVID-19 LAB TEST NON-CDC: HCPCS | Performed by: FAMILY MEDICINE

## 2022-02-07 PROCEDURE — G0378 HOSPITAL OBSERVATION PER HR: HCPCS

## 2022-02-07 PROCEDURE — 71045 XR CHEST AP PORTABLE: ICD-10-PCS | Mod: 26,,, | Performed by: RADIOLOGY

## 2022-02-07 PROCEDURE — 85025 COMPLETE CBC W/AUTO DIFF WBC: CPT | Performed by: FAMILY MEDICINE

## 2022-02-07 PROCEDURE — 71045 X-RAY EXAM CHEST 1 VIEW: CPT | Mod: TC,FY

## 2022-02-07 PROCEDURE — 96375 TX/PRO/DX INJ NEW DRUG ADDON: CPT

## 2022-02-07 PROCEDURE — 25000242 PHARM REV CODE 250 ALT 637 W/ HCPCS: Performed by: FAMILY MEDICINE

## 2022-02-07 PROCEDURE — 93005 ELECTROCARDIOGRAM TRACING: CPT

## 2022-02-07 PROCEDURE — 36415 COLL VENOUS BLD VENIPUNCTURE: CPT | Performed by: FAMILY MEDICINE

## 2022-02-07 PROCEDURE — 96360 HYDRATION IV INFUSION INIT: CPT | Mod: 59

## 2022-02-07 PROCEDURE — 93010 EKG 12-LEAD: ICD-10-PCS | Mod: ,,, | Performed by: INTERNAL MEDICINE

## 2022-02-07 PROCEDURE — 96365 THER/PROPH/DIAG IV INF INIT: CPT

## 2022-02-07 PROCEDURE — 82803 BLOOD GASES ANY COMBINATION: CPT

## 2022-02-07 PROCEDURE — 93010 ELECTROCARDIOGRAM REPORT: CPT | Mod: ,,, | Performed by: INTERNAL MEDICINE

## 2022-02-07 PROCEDURE — 99900035 HC TECH TIME PER 15 MIN (STAT)

## 2022-02-07 PROCEDURE — 71045 X-RAY EXAM CHEST 1 VIEW: CPT | Mod: 26,,, | Performed by: RADIOLOGY

## 2022-02-07 PROCEDURE — 80307 DRUG TEST PRSMV CHEM ANLYZR: CPT | Performed by: FAMILY MEDICINE

## 2022-02-07 PROCEDURE — 25000003 PHARM REV CODE 250: Performed by: FAMILY MEDICINE

## 2022-02-07 PROCEDURE — 80053 COMPREHEN METABOLIC PANEL: CPT | Performed by: FAMILY MEDICINE

## 2022-02-07 PROCEDURE — 36600 WITHDRAWAL OF ARTERIAL BLOOD: CPT

## 2022-02-07 PROCEDURE — 87040 BLOOD CULTURE FOR BACTERIA: CPT | Performed by: FAMILY MEDICINE

## 2022-02-07 PROCEDURE — 83605 ASSAY OF LACTIC ACID: CPT | Performed by: FAMILY MEDICINE

## 2022-02-07 PROCEDURE — 99285 EMERGENCY DEPT VISIT HI MDM: CPT | Mod: 25

## 2022-02-07 RX ORDER — IPRATROPIUM BROMIDE AND ALBUTEROL SULFATE 2.5; .5 MG/3ML; MG/3ML
3 SOLUTION RESPIRATORY (INHALATION)
Status: COMPLETED | OUTPATIENT
Start: 2022-02-07 | End: 2022-02-07

## 2022-02-07 RX ORDER — LEVOFLOXACIN 5 MG/ML
750 INJECTION, SOLUTION INTRAVENOUS
Status: COMPLETED | OUTPATIENT
Start: 2022-02-07 | End: 2022-02-08

## 2022-02-07 RX ORDER — METHYLPREDNISOLONE SOD SUCC 125 MG
125 VIAL (EA) INJECTION
Status: COMPLETED | OUTPATIENT
Start: 2022-02-07 | End: 2022-02-07

## 2022-02-07 RX ORDER — ACETAMINOPHEN 500 MG
1000 TABLET ORAL
Status: COMPLETED | OUTPATIENT
Start: 2022-02-07 | End: 2022-02-07

## 2022-02-07 RX ORDER — SODIUM CHLORIDE 9 MG/ML
INJECTION, SOLUTION INTRAVENOUS
Status: COMPLETED | OUTPATIENT
Start: 2022-02-07 | End: 2022-02-07

## 2022-02-07 RX ADMIN — LEVOFLOXACIN 750 MG: 750 INJECTION, SOLUTION INTRAVENOUS at 10:02

## 2022-02-07 RX ADMIN — IPRATROPIUM BROMIDE AND ALBUTEROL SULFATE 3 ML: .5; 3 SOLUTION RESPIRATORY (INHALATION) at 11:02

## 2022-02-07 RX ADMIN — IPRATROPIUM BROMIDE AND ALBUTEROL SULFATE 3 ML: .5; 3 SOLUTION RESPIRATORY (INHALATION) at 08:02

## 2022-02-07 RX ADMIN — SODIUM CHLORIDE: 0.9 INJECTION, SOLUTION INTRAVENOUS at 10:02

## 2022-02-07 RX ADMIN — METHYLPREDNISOLONE SODIUM SUCCINATE 125 MG: 125 INJECTION, POWDER, FOR SOLUTION INTRAMUSCULAR; INTRAVENOUS at 08:02

## 2022-02-07 RX ADMIN — ACETAMINOPHEN 1000 MG: 500 TABLET ORAL at 10:02

## 2022-02-07 NOTE — LETTER
Patient: Zayra White  YOB: 1965  Date: 2/7/2022 Time: 9:56 PM  Location: [facility]    Leaving the [facility] Against Medical Advice    Chart #:[id number]    This will certify that I, the undersigned,    ______________________________________________________________________    A patient in the above named medical center, having requested discharge and removal from the medical center against the advice of my attending physician(s), hereby release [facility], its physicians, officers and employees, severally and individually, from any and all liability of any nature whatsoever for any injury or harm or complication of any kind that may result directly or indirectly, by reason of my terminating my stay as a patient at [facility] and my departure from [facility], and hereby waive any and all rights of action I may now have or later acquire as a result of my voluntary departure from [facility] and the termination of my stay as a patient therein.    This release is made with the full knowledge of the danger that may result from the action which I am taking.      Date:_______________________                         ___________________________                                                                                    Patient/Legal Representative    Witness:        ____________________________                          ___________________________  Nurse                                                                        Physician

## 2022-02-08 PROBLEM — E78.5 HYPERLIPIDEMIA: Status: ACTIVE | Noted: 2022-02-08

## 2022-02-08 LAB
ANION GAP SERPL CALC-SCNC: 14 MMOL/L (ref 8–16)
BASOPHILS # BLD AUTO: 0.02 K/UL (ref 0–0.2)
BASOPHILS NFR BLD: 0.1 % (ref 0–1.9)
BUN SERPL-MCNC: 6 MG/DL (ref 6–20)
CALCIUM SERPL-MCNC: 9 MG/DL (ref 8.7–10.5)
CHLORIDE SERPL-SCNC: 105 MMOL/L (ref 95–110)
CO2 SERPL-SCNC: 20 MMOL/L (ref 23–29)
CREAT SERPL-MCNC: 0.6 MG/DL (ref 0.5–1.4)
DIFFERENTIAL METHOD: ABNORMAL
EOSINOPHIL # BLD AUTO: 0 K/UL (ref 0–0.5)
EOSINOPHIL NFR BLD: 0 % (ref 0–8)
ERYTHROCYTE [DISTWIDTH] IN BLOOD BY AUTOMATED COUNT: 11.6 % (ref 11.5–14.5)
EST. GFR  (AFRICAN AMERICAN): >60 ML/MIN/1.73 M^2
EST. GFR  (NON AFRICAN AMERICAN): >60 ML/MIN/1.73 M^2
GLUCOSE SERPL-MCNC: 162 MG/DL (ref 70–110)
HCT VFR BLD AUTO: 36.4 % (ref 37–48.5)
HGB BLD-MCNC: 12.9 G/DL (ref 12–16)
IMM GRANULOCYTES # BLD AUTO: 0.08 K/UL (ref 0–0.04)
IMM GRANULOCYTES NFR BLD AUTO: 0.5 % (ref 0–0.5)
LYMPHOCYTES # BLD AUTO: 0.7 K/UL (ref 1–4.8)
LYMPHOCYTES NFR BLD: 4.8 % (ref 18–48)
MAGNESIUM SERPL-MCNC: 1.5 MG/DL (ref 1.6–2.6)
MCH RBC QN AUTO: 32.8 PG (ref 27–31)
MCHC RBC AUTO-ENTMCNC: 35.4 G/DL (ref 32–36)
MCV RBC AUTO: 93 FL (ref 82–98)
MONOCYTES # BLD AUTO: 0.1 K/UL (ref 0.3–1)
MONOCYTES NFR BLD: 0.4 % (ref 4–15)
NEUTROPHILS # BLD AUTO: 14.2 K/UL (ref 1.8–7.7)
NEUTROPHILS NFR BLD: 94.2 % (ref 38–73)
NRBC BLD-RTO: 0 /100 WBC
PHOSPHATE SERPL-MCNC: 3.2 MG/DL (ref 2.7–4.5)
PLATELET # BLD AUTO: 265 K/UL (ref 150–450)
PMV BLD AUTO: 9.5 FL (ref 9.2–12.9)
POTASSIUM SERPL-SCNC: 3.8 MMOL/L (ref 3.5–5.1)
RBC # BLD AUTO: 3.93 M/UL (ref 4–5.4)
SODIUM SERPL-SCNC: 139 MMOL/L (ref 136–145)
T4 FREE SERPL-MCNC: 0.85 NG/DL (ref 0.71–1.51)
TSH SERPL DL<=0.005 MIU/L-ACNC: 0.29 UIU/ML (ref 0.4–4)
WBC # BLD AUTO: 15.04 K/UL (ref 3.9–12.7)

## 2022-02-08 PROCEDURE — G0378 HOSPITAL OBSERVATION PER HR: HCPCS

## 2022-02-08 PROCEDURE — 96376 TX/PRO/DX INJ SAME DRUG ADON: CPT | Mod: 59 | Performed by: FAMILY MEDICINE

## 2022-02-08 PROCEDURE — 96375 TX/PRO/DX INJ NEW DRUG ADDON: CPT | Performed by: FAMILY MEDICINE

## 2022-02-08 PROCEDURE — 84443 ASSAY THYROID STIM HORMONE: CPT | Performed by: HOSPITALIST

## 2022-02-08 PROCEDURE — 99225 PR SUBSEQUENT OBSERVATION CARE,LEVEL II: CPT | Mod: ,,, | Performed by: HOSPITALIST

## 2022-02-08 PROCEDURE — 96372 THER/PROPH/DIAG INJ SC/IM: CPT | Performed by: HOSPITALIST

## 2022-02-08 PROCEDURE — 99900031 HC PATIENT EDUCATION (STAT)

## 2022-02-08 PROCEDURE — 84100 ASSAY OF PHOSPHORUS: CPT | Performed by: HOSPITALIST

## 2022-02-08 PROCEDURE — 80048 BASIC METABOLIC PNL TOTAL CA: CPT | Performed by: HOSPITALIST

## 2022-02-08 PROCEDURE — 94761 N-INVAS EAR/PLS OXIMETRY MLT: CPT

## 2022-02-08 PROCEDURE — 25000003 PHARM REV CODE 250

## 2022-02-08 PROCEDURE — 83735 ASSAY OF MAGNESIUM: CPT | Performed by: HOSPITALIST

## 2022-02-08 PROCEDURE — 36415 COLL VENOUS BLD VENIPUNCTURE: CPT | Performed by: HOSPITALIST

## 2022-02-08 PROCEDURE — 96361 HYDRATE IV INFUSION ADD-ON: CPT

## 2022-02-08 PROCEDURE — 99220 PR INITIAL OBSERVATION CARE,LEVL III: ICD-10-PCS | Mod: GT,,, | Performed by: HOSPITALIST

## 2022-02-08 PROCEDURE — 25000003 PHARM REV CODE 250: Performed by: HOSPITALIST

## 2022-02-08 PROCEDURE — 85025 COMPLETE CBC W/AUTO DIFF WBC: CPT | Performed by: HOSPITALIST

## 2022-02-08 PROCEDURE — 63600175 PHARM REV CODE 636 W HCPCS: Performed by: HOSPITALIST

## 2022-02-08 PROCEDURE — 99220 PR INITIAL OBSERVATION CARE,LEVL III: CPT | Mod: GT,,, | Performed by: HOSPITALIST

## 2022-02-08 PROCEDURE — 99900035 HC TECH TIME PER 15 MIN (STAT)

## 2022-02-08 PROCEDURE — 99225 PR SUBSEQUENT OBSERVATION CARE,LEVEL II: ICD-10-PCS | Mod: ,,, | Performed by: HOSPITALIST

## 2022-02-08 PROCEDURE — 27000221 HC OXYGEN, UP TO 24 HOURS

## 2022-02-08 PROCEDURE — 94640 AIRWAY INHALATION TREATMENT: CPT

## 2022-02-08 PROCEDURE — 84439 ASSAY OF FREE THYROXINE: CPT | Performed by: HOSPITALIST

## 2022-02-08 PROCEDURE — 25500020 PHARM REV CODE 255: Performed by: HOSPITALIST

## 2022-02-08 RX ORDER — ONDANSETRON 2 MG/ML
4 INJECTION INTRAMUSCULAR; INTRAVENOUS EVERY 6 HOURS PRN
Status: DISCONTINUED | OUTPATIENT
Start: 2022-02-08 | End: 2022-02-10 | Stop reason: HOSPADM

## 2022-02-08 RX ORDER — ATORVASTATIN CALCIUM 10 MG/1
20 TABLET, FILM COATED ORAL DAILY
Status: DISCONTINUED | OUTPATIENT
Start: 2022-02-08 | End: 2022-02-10 | Stop reason: HOSPADM

## 2022-02-08 RX ORDER — SODIUM CHLORIDE 0.9 % (FLUSH) 0.9 %
10 SYRINGE (ML) INJECTION
Status: DISCONTINUED | OUTPATIENT
Start: 2022-02-08 | End: 2022-02-10 | Stop reason: HOSPADM

## 2022-02-08 RX ORDER — BENZONATATE 100 MG/1
100 CAPSULE ORAL 3 TIMES DAILY PRN
Status: DISCONTINUED | OUTPATIENT
Start: 2022-02-09 | End: 2022-02-10 | Stop reason: HOSPADM

## 2022-02-08 RX ORDER — GUAIFENESIN 100 MG/5ML
200 SOLUTION ORAL EVERY 4 HOURS PRN
Status: DISCONTINUED | OUTPATIENT
Start: 2022-02-09 | End: 2022-02-10 | Stop reason: HOSPADM

## 2022-02-08 RX ORDER — BENZONATATE 100 MG/1
CAPSULE ORAL
Status: COMPLETED
Start: 2022-02-08 | End: 2022-02-08

## 2022-02-08 RX ORDER — LEVOFLOXACIN 5 MG/ML
750 INJECTION, SOLUTION INTRAVENOUS
Status: DISCONTINUED | OUTPATIENT
Start: 2022-02-08 | End: 2022-02-10 | Stop reason: HOSPADM

## 2022-02-08 RX ORDER — ACETAMINOPHEN 325 MG/1
650 TABLET ORAL EVERY 4 HOURS PRN
Status: DISCONTINUED | OUTPATIENT
Start: 2022-02-08 | End: 2022-02-10 | Stop reason: HOSPADM

## 2022-02-08 RX ORDER — ENOXAPARIN SODIUM 100 MG/ML
40 INJECTION SUBCUTANEOUS EVERY 24 HOURS
Status: DISCONTINUED | OUTPATIENT
Start: 2022-02-08 | End: 2022-02-10 | Stop reason: HOSPADM

## 2022-02-08 RX ORDER — METHYLPREDNISOLONE SOD SUCC 125 MG
80 VIAL (EA) INJECTION EVERY 8 HOURS
Status: DISCONTINUED | OUTPATIENT
Start: 2022-02-08 | End: 2022-02-09

## 2022-02-08 RX ORDER — LANOLIN ALCOHOL/MO/W.PET/CERES
400 CREAM (GRAM) TOPICAL ONCE
Status: COMPLETED | OUTPATIENT
Start: 2022-02-08 | End: 2022-02-08

## 2022-02-08 RX ORDER — CHOLECALCIFEROL (VITAMIN D3) 50 MCG
2000 TABLET ORAL DAILY
Status: DISCONTINUED | OUTPATIENT
Start: 2022-02-08 | End: 2022-02-10 | Stop reason: HOSPADM

## 2022-02-08 RX ORDER — DULOXETIN HYDROCHLORIDE 20 MG/1
40 CAPSULE, DELAYED RELEASE ORAL DAILY
Status: DISCONTINUED | OUTPATIENT
Start: 2022-02-08 | End: 2022-02-10 | Stop reason: HOSPADM

## 2022-02-08 RX ORDER — NAPROXEN SODIUM 220 MG/1
81 TABLET, FILM COATED ORAL DAILY
Status: DISCONTINUED | OUTPATIENT
Start: 2022-02-08 | End: 2022-02-10 | Stop reason: HOSPADM

## 2022-02-08 RX ORDER — CLONAZEPAM 0.5 MG/1
0.5 TABLET ORAL DAILY PRN
Status: DISCONTINUED | OUTPATIENT
Start: 2022-02-08 | End: 2022-02-10 | Stop reason: HOSPADM

## 2022-02-08 RX ADMIN — ASPIRIN 81 MG: 81 TABLET, CHEWABLE ORAL at 09:02

## 2022-02-08 RX ADMIN — BENZONATATE 100 MG: 100 CAPSULE ORAL at 11:02

## 2022-02-08 RX ADMIN — IOHEXOL 75 ML: 350 INJECTION, SOLUTION INTRAVENOUS at 06:02

## 2022-02-08 RX ADMIN — METHYLPREDNISOLONE SODIUM SUCCINATE 80 MG: 125 INJECTION, POWDER, FOR SOLUTION INTRAMUSCULAR; INTRAVENOUS at 04:02

## 2022-02-08 RX ADMIN — ONDANSETRON 4 MG: 2 INJECTION INTRAMUSCULAR; INTRAVENOUS at 07:02

## 2022-02-08 RX ADMIN — ENOXAPARIN SODIUM 40 MG: 40 INJECTION SUBCUTANEOUS at 05:02

## 2022-02-08 RX ADMIN — ACETAMINOPHEN 650 MG: 325 TABLET ORAL at 11:02

## 2022-02-08 RX ADMIN — METHYLPREDNISOLONE SODIUM SUCCINATE 80 MG: 125 INJECTION, POWDER, FOR SOLUTION INTRAMUSCULAR; INTRAVENOUS at 01:02

## 2022-02-08 RX ADMIN — Medication 2000 UNITS: at 09:02

## 2022-02-08 RX ADMIN — METHYLPREDNISOLONE SODIUM SUCCINATE 80 MG: 125 INJECTION, POWDER, FOR SOLUTION INTRAMUSCULAR; INTRAVENOUS at 10:02

## 2022-02-08 RX ADMIN — ATORVASTATIN CALCIUM 20 MG: 10 TABLET, FILM COATED ORAL at 09:02

## 2022-02-08 RX ADMIN — ATENOLOL 12.5 MG: 25 TABLET ORAL at 09:02

## 2022-02-08 RX ADMIN — LEVOFLOXACIN 750 MG: 750 INJECTION, SOLUTION INTRAVENOUS at 10:02

## 2022-02-08 RX ADMIN — Medication 400 MG: at 05:02

## 2022-02-08 NOTE — HPI
The patient is a 55 y/o female with PMH of COPD, anxiety, and asthma. She presents with SOB. On presentation she was in significant respiratory distress and she was very tachycardic. She improved with nebs and solumedrol. Work up suggestive of COPD exacerbation. She was also noted to have a low grade temp of 100.2 and a leukocytosis. She does report some cough productive of clear sputum. She denies any fevers, chills, chest pain, nausea, vomiting, dysuria, or other symptoms.

## 2022-02-08 NOTE — ED PROVIDER NOTES
Encounter Date: 2022       History     Chief Complaint   Patient presents with    Shortness of Breath     56-year-old female presents to the ED with acute shortness of breath developing over the prior 12-24 hours she has a past history of COPD anxiety arthritis and asthma with a pulmonary embolism over 10 years ago, social history is significant for smoking cigarettes every day for over 40 years she denies any known exposure to COVID-19 and has had no fever        Review of patient's allergies indicates:   Allergen Reactions    Ativan [lorazepam] Itching     Past Medical History:   Diagnosis Date    Anxiety     Arthritis     Asthma     Back pain     COPD (chronic obstructive pulmonary disease)     Pulmonary embolism     10 years ago     Past Surgical History:   Procedure Laterality Date    BIOPSY      right breast    BREAST CYST EXCISION       SECTION      CHOLECYSTECTOMY      INCISION AND DRAINAGE OF ABSCESS Left 3/16/2020    Procedure: INCISION AND DRAINAGE, ABSCESS;  Surgeon: Irvin Villarreal MD;  Location: [facility];  Service: General;  Laterality: Left;     Family History   Problem Relation Age of Onset    Breast cancer Sister     Ovarian cancer Neg Hx      Social History     Tobacco Use    Smoking status: Current Every Day Smoker     Packs/day: 1.00     Years: 41.00     Pack years: 41.00     Types: Cigarettes     Last attempt to quit: 2021     Years since quittin.2    Smokeless tobacco: Never Used   Substance Use Topics    Alcohol use: Yes     Comment: occ    Drug use: Never     Review of Systems   Constitutional: Negative for fever.   HENT: Positive for congestion. Negative for sore throat.    Respiratory: Positive for cough, shortness of breath and wheezing.    Cardiovascular: Negative for chest pain.   Gastrointestinal: Negative for nausea.   Genitourinary: Negative for dysuria.   Musculoskeletal: Negative for back pain.   Skin: Negative for rash.   Neurological:  Negative for weakness.   Hematological: Does not bruise/bleed easily.       Physical Exam     Initial Vitals   BP Pulse Resp Temp SpO2   02/07/22 1954 02/07/22 1954 02/07/22 1954 02/07/22 1959 02/07/22 1954   (!) 136/99 (!) 165 (!) 32 100.2 °F (37.9 °C) (!) 89 %      MAP       --                Physical Exam    Nursing note and vitals reviewed.  Constitutional: She appears well-developed and well-nourished. She is not diaphoretic. No distress.   HENT:   Head: Normocephalic and atraumatic.   Eyes: Pupils are equal, round, and reactive to light. Right eye exhibits no discharge. Left eye exhibits no discharge.   Neck: No tracheal deviation present. No JVD present.   Cardiovascular: Exam reveals no friction rub.    No murmur heard.  Pulmonary/Chest: No stridor. She is in respiratory distress. She has wheezes. She has no rales.   Abdominal: Bowel sounds are normal. She exhibits no distension.   Musculoskeletal:         General: Normal range of motion.     Neurological: She is alert.   Skin: Skin is warm.   Psychiatric: She has a normal mood and affect.         ED Course   Procedures  Labs Reviewed   CBC W/ AUTO DIFFERENTIAL - Abnormal; Notable for the following components:       Result Value    WBC 21.08 (*)     MCH 32.7 (*)     Gran # (ANC) 18.1 (*)     Immature Grans (Abs) 0.10 (*)     Gran % 85.7 (*)     Lymph % 8.5 (*)     All other components within normal limits   COMPREHENSIVE METABOLIC PANEL - Abnormal; Notable for the following components:    CO2 21 (*)     Glucose 145 (*)     Anion Gap 18 (*)     All other components within normal limits   URINALYSIS, REFLEX TO URINE CULTURE - Abnormal; Notable for the following components:    Protein, UA 1+ (*)     Ketones, UA 1+ (*)     All other components within normal limits    Narrative:     Preferred Collection Type->Urine, Clean Catch  Specimen Source->Urine   URINALYSIS MICROSCOPIC - Abnormal; Notable for the following components:    Hyaline Casts, UA 3 (*)     All  other components within normal limits    Narrative:     Preferred Collection Type->Urine, Clean Catch  Specimen Source->Urine   SARS-COV-2 RNA AMPLIFICATION, QUAL    Narrative:     Is the patient symptomatic?->Yes   DRUG SCREEN PANEL, URINE EMERGENCY    Narrative:     Preferred Collection Type->Urine, Clean Catch  Specimen Source->Urine   LACTIC ACID, PLASMA   ISTAT PROCEDURE        ECG Results          EKG 12-lead (Final result)  Result time 02/08/22 11:25:01    Final result by Interface, Lab In [facility] (02/08/22 11:25:01)                 Narrative:    Test Reason : R06.02,    Vent. Rate : 159 BPM     Atrial Rate : 163 BPM     P-R Int : 124 ms          QRS Dur : 070 ms      QT Int : 308 ms       P-R-T Axes : 088 265 085 degrees     QTc Int : 501 ms    Poor data quality  Sinus tachycardia  Right superior axis deviation  Pulmonary disease pattern  Right ventricular hypertrophy  Nonspecific ST abnormality  Abnormal ECG  When compared with ECG of 27-NOV-2021 18:44,  RVH now suggested.  Confirmed by Bryant Morrell MD ([id number]) on 2/8/2022 11:24:54 AM    Referred By: [email or web address]   SELF           Confirmed By:Bryant Morrell MD                            Imaging Results          X-Ray Chest AP Portable (Final result)  Result time 02/08/22 05:44:09    Final result by Mathieu Thakur MD (02/08/22 05:44:09)                 Impression:      COPD.      Electronically signed by: Mathieu Thakur  Date:    02/08/2022  Time:    05:44             Narrative:    EXAMINATION:  XR CHEST AP PORTABLE    CLINICAL HISTORY:  sob;    TECHNIQUE:  Single frontal view of the chest was performed.    COMPARISON:  12/03/2021.    FINDINGS:  Pulmonary hyperinflation with flattening diaphragms consistent with COPD.  Mild chronic interstitial change.  No focal consolidation    Heart size normal.  Mediastinal contours unremarkable.  Trachea midline.    Bony thorax intact.                                 Medications   albuterol-ipratropium 2.5 mg-0.5 mg/3 mL nebulizer  solution 3 mL (3 mLs Nebulization Given 2/7/22 2014)   methylPREDNISolone sodium succinate injection 125 mg (125 mg Intravenous Given 2/7/22 2009)   0.9%  NaCl infusion ( Intravenous Rate/Dose Change 2/7/22 2316)   acetaminophen tablet 1,000 mg (1,000 mg Oral Given 2/7/22 2246)   albuterol-ipratropium 2.5 mg-0.5 mg/3 mL nebulizer solution 3 mL (3 mLs Nebulization Given 2/7/22 2301)   levoFLOXacin 750 mg/150 mL IVPB 750 mg (0 mg Intravenous Stopped 2/8/22 0023)   sodium chloride 0.9% bolus 500 mL ( Intravenous Stopped 2/8/22 0119)   magnesium oxide tablet 400 mg (400 mg Oral Given 2/8/22 1742)   iohexoL (OMNIPAQUE 350) injection 75 mL (75 mLs Intravenous Given 2/8/22 1805)     Medical Decision Making:   ED Management:  Patient improved steadily in the ED,  the patient presented in considerable acute respiratory distress and the fever is yet unexplained case was discussed with hospitalist Dr. TIRSO doty patient will be admitted  Patient was not given the full 30 cc per kg fluid boluses due to her small size normal lactic acid and the clinical hemodynamic full hydration status                      Clinical Impression:   Final diagnoses:  [R06.02] SOB (shortness of breath)  [J44.1] COPD exacerbation (Primary)  [R50.9] Fever, unspecified fever cause  [R06.03] Acute respiratory distress          ED Disposition Condition    Observation               Alan Nguyễn MD  02/07/22 2241       Alan Nguyễn MD  02/23/22 8937

## 2022-02-08 NOTE — PLAN OF CARE
"   02/08/22 0957   Discharge Assessment   Assessment Type Discharge Planning Assessment   Confirmed/corrected address, phone number and insurance Yes   Confirmed Demographics Correct on Facesheet   Source of Information patient   When was your last doctors appointment? 01/11/22  (With PCP.)   Does patient/caregiver understand observation status Yes   Communicated KIM with patient/caregiver Date not available/Unable to determine   Reason For Admission "Shortness of breath.  Cough."   Lives With alone   Facility Arrived From: Home via [facility]   Do you expect to return to your current living situation? Yes   Do you have help at home or someone to help you manage your care at home? Yes   Who are your caregiver(s) and their phone number(s)? Shell Zhang (Daughter) [phone]; Noreen Wong (Sister) [phone]   Prior to hospitilization cognitive status: Alert/Oriented   Current cognitive status: Alert/Oriented   Walking or Climbing Stairs Difficulty none   Dressing/Bathing Difficulty none   Home Accessibility stairs to enter home   Number of Stairs, Main Entrance four   Home Layout Able to live on 1st floor  (Patient lives in a trailer.)   Equipment Currently Used at Home nebulizer;oxygen   Readmission within 30 days? No   Patient currently being followed by outpatient case management? No   Do you currently have service(s) that help you manage your care at home? No   Do you take prescription medications? Yes   Do you have prescription coverage? Yes   Coverage Medicaid   Do you have any problems affording any of your prescribed medications? No   Is the patient taking medications as prescribed? yes   Who is going to help you get home at discharge? Family   How do you get to doctors appointments? car, drives self;family or friend will provide   Are you on dialysis? No   Discharge Plan A Home   DME Needed Upon Discharge  none   Discharge Plan discussed with: Patient   Discharge Barriers Identified None   SW spoke with " "patient to complete discharge planning assessment.  Patient is familiar to SW from hospitalization in November 2021.  She is alert and oriented; very pleasant patient.  She lives alone next door to two sisters.  Her daughter, who is a nurse, lives down the street from patient.  After previous discharge in November, patient stayed with her daughter for a brief period of time as she continued to recuperate after hospital stay.  Patient tells SW this morning that she feels like she has been doing well.  She remains independent of ADL's and even "had a really good day yesterday" coming into town to run some errands.  Per patient, she generally stays home much of the time.  Patient has home oxygen and a nebulizer which was obtained through [facility] at discharge in November.  Patient tells SW that she continues to use her oxygen at time and PRN during the day.  She denies using any other equipment or needing any other equipment at discharge.  At the present time, patient denies any anticipated discharge needs.    SW will continue to follow.  "

## 2022-02-08 NOTE — H&P
[facility] Medicine  History & Physical    Patient Name: Zayra White  MRN: [id number]  Admission Date: 2022  Attending Physician: Alan Nguyễn MD   Primary Care Provider: Eric Singh MD         Patient information was obtained from patient and ER records.       Subjective:     Principal Problem:Acute respiratory failure with hypoxia    Chief Complaint:   Chief Complaint   Patient presents with    Shortness of Breath        HPI: The patient is a 55 y/o female with PMH of COPD, anxiety, and asthma. She presents with SOB. On presentation she was in significant respiratory distress and she was very tachycardic. She improved with nebs and solumedrol. Work up suggestive of COPD exacerbation. She was also noted to have a low grade temp of 100.2 and a leukocytosis. She does report some cough productive of clear sputum. She denies any fevers, chills, chest pain, nausea, vomiting, dysuria, or other symptoms.       Past Medical History:   Diagnosis Date    Anxiety     Arthritis     Asthma     Back pain     COPD (chronic obstructive pulmonary disease)     Pulmonary embolism     10 years ago       Past Surgical History:   Procedure Laterality Date    BIOPSY      right breast    BREAST CYST EXCISION       SECTION      CHOLECYSTECTOMY      INCISION AND DRAINAGE OF ABSCESS Left 3/16/2020    Procedure: INCISION AND DRAINAGE, ABSCESS;  Surgeon: Irvin Villarreal MD;  Location: [facility] OR;  Service: General;  Laterality: Left;       Review of patient's allergies indicates:   Allergen Reactions    Ativan [lorazepam] Itching       No current facility-administered medications on file prior to encounter.     Current Outpatient Medications on File Prior to Encounter   Medication Sig    albuterol (VENTOLIN HFA) 90 mcg/actuation inhaler inhale 2 puff by inhalation route  every 4 - 6 hours as needed    aspirin 81 MG Chew 81 mg.    atenoloL (TENORMIN) 25 MG tablet Take 0.5  tablets (12.5 mg total) by mouth once daily.    atorvastatin (LIPITOR) 20 MG tablet Take 1 tablet (20 mg total) by mouth once daily.    cholecalciferol, vitamin D3, (VITAMIN D3) 25 mcg (1,000 unit) capsule Take 2 capsules (2,000 Units total) by mouth once daily.    clonazePAM (KLONOPIN) 0.5 MG tablet Take ONE tablet (0.5 mg total) by mouth daily as needed for Anxiety **THANK YOU**    DULoxetine 40 mg CpDR Take 40 mg by mouth once daily.    ipratropium (ATROVENT) 0.02 % nebulizer solution Take 2.5 mLs (500 mcg total) by nebulization every 8 (eight) hours as needed for Wheezing. Rescue    umeclidinium-vilanteroL (ANORO ELLIPTA) 62.5-25 mcg/actuation DsDv Inhale 1 puff into the lungs once daily. Controller     Family History     Problem Relation (Age of Onset)    Breast cancer Sister        Tobacco Use    Smoking status: Current Every Day Smoker     Packs/day: 1.00     Years: 41.00     Pack years: 41.00     Types: Cigarettes     Last attempt to quit: 2021     Years since quittin.2    Smokeless tobacco: Never Used   Substance and Sexual Activity    Alcohol use: Yes     Comment: occ    Drug use: Never    Sexual activity: Not Currently     Birth control/protection: Post-menopausal     Review of Systems   Constitutional: Positive for activity change. Negative for chills and fever.   HENT: Negative for congestion, rhinorrhea and sore throat.    Eyes: Negative for photophobia and visual disturbance.   Respiratory:        Per HPI   Cardiovascular: Negative for chest pain, palpitations and leg swelling.   Gastrointestinal: Negative for abdominal pain, nausea and vomiting.   Endocrine: Negative for polyphagia and polyuria.   Genitourinary: Negative for dysuria and hematuria.   Musculoskeletal: Negative for arthralgias and myalgias.   Skin: Negative for rash and wound.   Neurological: Negative for syncope and headaches.   Hematological: Does not bruise/bleed easily.   Psychiatric/Behavioral: Negative for  confusion and hallucinations.     Objective:     Vital Signs (Most Recent):  Temp: 100.2 °F (37.9 °C) (02/07/22 2246)  Pulse: (!) 124 (02/08/22 0014)  Resp: (!) 29 (02/08/22 0014)  BP: (!) 102/55 (02/08/22 0014)  SpO2: 96 % (02/08/22 0014) Vital Signs (24h Range):  Temp:  [100.2 °F (37.9 °C)] 100.2 °F (37.9 °C)  Pulse:  [124-165] 124  Resp:  [14-34] 29  SpO2:  [89 %-99 %] 96 %  BP: (102-148)/(54-99) 102/55     Weight: 50.8 kg (112 lb)  Body mass index is 18.64 kg/m².    Physical Exam  Vitals reviewed.   Constitutional:       General: She is not in acute distress.  HENT:      Head: Normocephalic and atraumatic.   Eyes:      General: No scleral icterus.  Cardiovascular:      Rate and Rhythm: Tachycardia present.   Pulmonary:      Comments: tachypenic but able to speak in full sentences  Receiving nebulizer treatment   Musculoskeletal:      Right lower leg: No edema.      Left lower leg: No edema.   Skin:     Coloration: Skin is not jaundiced.   Neurological:      General: No focal deficit present.      Mental Status: She is alert and oriented to person, place, and time.   Psychiatric:         Mood and Affect: Mood normal.             Significant Labs: All pertinent labs within the past 24 hours have been reviewed.    Significant Imaging: I have reviewed all pertinent imaging results/findings within the past 24 hours.    Assessment/Plan:     * Acute respiratory failure with hypoxia  Patient with Hypoxic Respiratory failure which is Acute on chronic.  she is not on home oxygen. Supplemental oxygen was provided and noted-  .   Signs/symptoms of respiratory failure include- tachypnea and increased work of breathing. Contributing diagnoses includes - COPD Labs and images were reviewed. Patient Has recent ABG, which has been reviewed. Will treat underlying causes and adjust management of respiratory failure as follows-      Continue solumedrol and levaquin   O2 and nebs PRN        Hyperlipidemia  Continue atorvastatin        Anxiety  Continue klonopin        VTE Risk Mitigation (From admission, onward)         Ordered     IP VTE HIGH RISK PATIENT  Once         02/08/22 0017     Place sequential compression device  Until discontinued         02/08/22 0017                The attending portion of this evaluation, treatment, and documentation was performed per Maurizio Thomas MD via Telemedicine AudioVisual using the secure [employer] software platform with 2 way audio/video. The provider was located off-site and the patient is located in the hospital. The aforementioned video software was utilized to document the relevant history and physical exam      Maurizio Thomas MD  Department of Hospital Medicine   [facility] - Emergency Dept

## 2022-02-08 NOTE — SUBJECTIVE & OBJECTIVE
Past Medical History:   Diagnosis Date    Anxiety     Arthritis     Asthma     Back pain     COPD (chronic obstructive pulmonary disease)     Pulmonary embolism     10 years ago       Past Surgical History:   Procedure Laterality Date    BIOPSY      right breast    BREAST CYST EXCISION       SECTION      CHOLECYSTECTOMY      INCISION AND DRAINAGE OF ABSCESS Left 3/16/2020    Procedure: INCISION AND DRAINAGE, ABSCESS;  Surgeon: Irvin Villarreal MD;  Location: [facility] OR;  Service: General;  Laterality: Left;       Review of patient's allergies indicates:   Allergen Reactions    Ativan [lorazepam] Itching       No current facility-administered medications on file prior to encounter.     Current Outpatient Medications on File Prior to Encounter   Medication Sig    albuterol (VENTOLIN HFA) 90 mcg/actuation inhaler inhale 2 puff by inhalation route  every 4 - 6 hours as needed    aspirin 81 MG Chew 81 mg.    atenoloL (TENORMIN) 25 MG tablet Take 0.5 tablets (12.5 mg total) by mouth once daily.    atorvastatin (LIPITOR) 20 MG tablet Take 1 tablet (20 mg total) by mouth once daily.    cholecalciferol, vitamin D3, (VITAMIN D3) 25 mcg (1,000 unit) capsule Take 2 capsules (2,000 Units total) by mouth once daily.    clonazePAM (KLONOPIN) 0.5 MG tablet Take ONE tablet (0.5 mg total) by mouth daily as needed for Anxiety **THANK YOU**    DULoxetine 40 mg CpDR Take 40 mg by mouth once daily.    ipratropium (ATROVENT) 0.02 % nebulizer solution Take 2.5 mLs (500 mcg total) by nebulization every 8 (eight) hours as needed for Wheezing. Rescue    umeclidinium-vilanteroL (ANORO ELLIPTA) 62.5-25 mcg/actuation DsDv Inhale 1 puff into the lungs once daily. Controller     Family History     Problem Relation (Age of Onset)    Breast cancer Sister        Tobacco Use    Smoking status: Current Every Day Smoker     Packs/day: 1.00     Years: 41.00     Pack years: 41.00     Types: Cigarettes     Last attempt to quit:  2021     Years since quittin.2    Smokeless tobacco: Never Used   Substance and Sexual Activity    Alcohol use: Yes     Comment: occ    Drug use: Never    Sexual activity: Not Currently     Birth control/protection: Post-menopausal     Review of Systems   Constitutional: Positive for activity change. Negative for chills and fever.   HENT: Negative for congestion, rhinorrhea and sore throat.    Eyes: Negative for photophobia and visual disturbance.   Respiratory:        Per HPI   Cardiovascular: Negative for chest pain, palpitations and leg swelling.   Gastrointestinal: Negative for abdominal pain, nausea and vomiting.   Endocrine: Negative for polyphagia and polyuria.   Genitourinary: Negative for dysuria and hematuria.   Musculoskeletal: Negative for arthralgias and myalgias.   Skin: Negative for rash and wound.   Neurological: Negative for syncope and headaches.   Hematological: Does not bruise/bleed easily.   Psychiatric/Behavioral: Negative for confusion and hallucinations.     Objective:     Vital Signs (Most Recent):  Temp: 100.2 °F (37.9 °C) (22 2246)  Pulse: (!) 124 (22 0014)  Resp: (!) 29 (22 0014)  BP: (!) 102/55 (22 0014)  SpO2: 96 % (22 0014) Vital Signs (24h Range):  Temp:  [100.2 °F (37.9 °C)] 100.2 °F (37.9 °C)  Pulse:  [124-165] 124  Resp:  [14-34] 29  SpO2:  [89 %-99 %] 96 %  BP: (102-148)/(54-99) 102/55     Weight: 50.8 kg (112 lb)  Body mass index is 18.64 kg/m².    Physical Exam  Vitals reviewed.   Constitutional:       General: She is not in acute distress.  HENT:      Head: Normocephalic and atraumatic.   Eyes:      General: No scleral icterus.  Cardiovascular:      Rate and Rhythm: Tachycardia present.   Pulmonary:      Comments: tachypenic but able to speak in full sentences  Receiving nebulizer treatment   Musculoskeletal:      Right lower leg: No edema.      Left lower leg: No edema.   Skin:     Coloration: Skin is not jaundiced.   Neurological:       General: No focal deficit present.      Mental Status: She is alert and oriented to person, place, and time.   Psychiatric:         Mood and Affect: Mood normal.             Significant Labs: All pertinent labs within the past 24 hours have been reviewed.    Significant Imaging: I have reviewed all pertinent imaging results/findings within the past 24 hours.

## 2022-02-09 LAB
ANION GAP SERPL CALC-SCNC: 13 MMOL/L (ref 8–16)
BASOPHILS # BLD AUTO: 0.03 K/UL (ref 0–0.2)
BASOPHILS NFR BLD: 0.1 % (ref 0–1.9)
BNP SERPL-MCNC: 61 PG/ML (ref 0–99)
BUN SERPL-MCNC: 9 MG/DL (ref 6–20)
CALCIUM SERPL-MCNC: 8.8 MG/DL (ref 8.7–10.5)
CHLORIDE SERPL-SCNC: 103 MMOL/L (ref 95–110)
CO2 SERPL-SCNC: 23 MMOL/L (ref 23–29)
CREAT SERPL-MCNC: 0.7 MG/DL (ref 0.5–1.4)
DIFFERENTIAL METHOD: ABNORMAL
EOSINOPHIL # BLD AUTO: 0 K/UL (ref 0–0.5)
EOSINOPHIL NFR BLD: 0 % (ref 0–8)
ERYTHROCYTE [DISTWIDTH] IN BLOOD BY AUTOMATED COUNT: 11.9 % (ref 11.5–14.5)
EST. GFR  (AFRICAN AMERICAN): >60 ML/MIN/1.73 M^2
EST. GFR  (NON AFRICAN AMERICAN): >60 ML/MIN/1.73 M^2
GLUCOSE SERPL-MCNC: 179 MG/DL (ref 70–110)
HCT VFR BLD AUTO: 34.2 % (ref 37–48.5)
HGB BLD-MCNC: 12.1 G/DL (ref 12–16)
IMM GRANULOCYTES # BLD AUTO: 0.18 K/UL (ref 0–0.04)
IMM GRANULOCYTES NFR BLD AUTO: 0.8 % (ref 0–0.5)
LYMPHOCYTES # BLD AUTO: 0.8 K/UL (ref 1–4.8)
LYMPHOCYTES NFR BLD: 3.4 % (ref 18–48)
MAGNESIUM SERPL-MCNC: 1.7 MG/DL (ref 1.6–2.6)
MCH RBC QN AUTO: 32.8 PG (ref 27–31)
MCHC RBC AUTO-ENTMCNC: 35.4 G/DL (ref 32–36)
MCV RBC AUTO: 93 FL (ref 82–98)
MONOCYTES # BLD AUTO: 0.3 K/UL (ref 0.3–1)
MONOCYTES NFR BLD: 1.2 % (ref 4–15)
NEUTROPHILS # BLD AUTO: 21.1 K/UL (ref 1.8–7.7)
NEUTROPHILS NFR BLD: 94.5 % (ref 38–73)
NRBC BLD-RTO: 0 /100 WBC
PLATELET # BLD AUTO: 235 K/UL (ref 150–450)
PLATELET BLD QL SMEAR: ABNORMAL
PMV BLD AUTO: 9.7 FL (ref 9.2–12.9)
POTASSIUM SERPL-SCNC: 3.7 MMOL/L (ref 3.5–5.1)
RBC # BLD AUTO: 3.69 M/UL (ref 4–5.4)
SODIUM SERPL-SCNC: 139 MMOL/L (ref 136–145)
WBC # BLD AUTO: 22.28 K/UL (ref 3.9–12.7)

## 2022-02-09 PROCEDURE — 96376 TX/PRO/DX INJ SAME DRUG ADON: CPT | Performed by: FAMILY MEDICINE

## 2022-02-09 PROCEDURE — 63600175 PHARM REV CODE 636 W HCPCS: Performed by: HOSPITALIST

## 2022-02-09 PROCEDURE — 25000003 PHARM REV CODE 250: Performed by: HOSPITALIST

## 2022-02-09 PROCEDURE — 94640 AIRWAY INHALATION TREATMENT: CPT

## 2022-02-09 PROCEDURE — 99220 PR INITIAL OBSERVATION CARE,LEVL III: CPT | Mod: ,,, | Performed by: FAMILY MEDICINE

## 2022-02-09 PROCEDURE — 27000221 HC OXYGEN, UP TO 24 HOURS

## 2022-02-09 PROCEDURE — 96366 THER/PROPH/DIAG IV INF ADDON: CPT | Performed by: FAMILY MEDICINE

## 2022-02-09 PROCEDURE — 94799 UNLISTED PULMONARY SVC/PX: CPT

## 2022-02-09 PROCEDURE — 87205 SMEAR GRAM STAIN: CPT | Performed by: FAMILY MEDICINE

## 2022-02-09 PROCEDURE — 36415 COLL VENOUS BLD VENIPUNCTURE: CPT | Performed by: FAMILY MEDICINE

## 2022-02-09 PROCEDURE — 96372 THER/PROPH/DIAG INJ SC/IM: CPT | Performed by: HOSPITALIST

## 2022-02-09 PROCEDURE — G0378 HOSPITAL OBSERVATION PER HR: HCPCS

## 2022-02-09 PROCEDURE — 85025 COMPLETE CBC W/AUTO DIFF WBC: CPT | Performed by: FAMILY MEDICINE

## 2022-02-09 PROCEDURE — 83880 ASSAY OF NATRIURETIC PEPTIDE: CPT | Performed by: FAMILY MEDICINE

## 2022-02-09 PROCEDURE — 87070 CULTURE OTHR SPECIMN AEROBIC: CPT | Performed by: FAMILY MEDICINE

## 2022-02-09 PROCEDURE — 94761 N-INVAS EAR/PLS OXIMETRY MLT: CPT

## 2022-02-09 PROCEDURE — 63600175 PHARM REV CODE 636 W HCPCS: Performed by: FAMILY MEDICINE

## 2022-02-09 PROCEDURE — 83735 ASSAY OF MAGNESIUM: CPT | Performed by: FAMILY MEDICINE

## 2022-02-09 PROCEDURE — 80048 BASIC METABOLIC PNL TOTAL CA: CPT | Performed by: FAMILY MEDICINE

## 2022-02-09 PROCEDURE — 99220 PR INITIAL OBSERVATION CARE,LEVL III: ICD-10-PCS | Mod: ,,, | Performed by: FAMILY MEDICINE

## 2022-02-09 RX ORDER — METHYLPREDNISOLONE SOD SUCC 125 MG
80 VIAL (EA) INJECTION EVERY 12 HOURS
Status: DISCONTINUED | OUTPATIENT
Start: 2022-02-09 | End: 2022-02-10 | Stop reason: HOSPADM

## 2022-02-09 RX ADMIN — METHYLPREDNISOLONE SODIUM SUCCINATE 80 MG: 125 INJECTION, POWDER, FOR SOLUTION INTRAMUSCULAR; INTRAVENOUS at 09:02

## 2022-02-09 RX ADMIN — ATENOLOL 12.5 MG: 25 TABLET ORAL at 08:02

## 2022-02-09 RX ADMIN — DULOXETINE HYDROCHLORIDE 40 MG: 20 CAPSULE, DELAYED RELEASE ORAL at 08:02

## 2022-02-09 RX ADMIN — ASPIRIN 81 MG: 81 TABLET, CHEWABLE ORAL at 08:02

## 2022-02-09 RX ADMIN — METHYLPREDNISOLONE SODIUM SUCCINATE 80 MG: 125 INJECTION, POWDER, FOR SOLUTION INTRAMUSCULAR; INTRAVENOUS at 05:02

## 2022-02-09 RX ADMIN — ONDANSETRON 4 MG: 2 INJECTION INTRAMUSCULAR; INTRAVENOUS at 08:02

## 2022-02-09 RX ADMIN — ATORVASTATIN CALCIUM 20 MG: 10 TABLET, FILM COATED ORAL at 08:02

## 2022-02-09 RX ADMIN — Medication 2000 UNITS: at 08:02

## 2022-02-09 RX ADMIN — ENOXAPARIN SODIUM 40 MG: 40 INJECTION SUBCUTANEOUS at 04:02

## 2022-02-09 RX ADMIN — BENZONATATE 100 MG: 100 CAPSULE ORAL at 10:02

## 2022-02-09 RX ADMIN — LEVOFLOXACIN 750 MG: 750 INJECTION, SOLUTION INTRAVENOUS at 11:02

## 2022-02-09 NOTE — ASSESSMENT & PLAN NOTE
Patient with Hypoxic Respiratory failure which is Acute on chronic.  she is not on home oxygen. Supplemental oxygen was provided and noted-  .   Signs/symptoms of respiratory failure include- tachypnea and increased work of breathing. Contributing diagnoses includes - COPD Labs and images were reviewed. Patient Has recent ABG, which has been reviewed. Will treat underlying causes and adjust management of respiratory failure as follows-      Continue levaquin   O2 and nebs PRN  With worsening leukocytosis today, possibly due to steroids  Wean steroids today as tolerated  Sputum cultures ordered  Check BNP

## 2022-02-09 NOTE — PLAN OF CARE
Problem: Adult Inpatient Plan of Care  Goal: Plan of Care Review  Outcome: Met     Problem: Adult Inpatient Plan of Care  Goal: Patient-Specific Goal (Individualized)  Outcome: Met     Problem: Adult Inpatient Plan of Care  Goal: Absence of Hospital-Acquired Illness or Injury  Outcome: Met     Problem: Adult Inpatient Plan of Care  Goal: Optimal Comfort and Wellbeing  Outcome: Met     Problem: Adult Inpatient Plan of Care  Goal: Readiness for Transition of Care  Outcome: Met

## 2022-02-09 NOTE — PROGRESS NOTES
[facility] Medicine  Progress Note    Patient Name: Zayra Whiet  MRN: [id number]  Patient Class: OP- Observation   Admission Date: 2/7/2022  Length of Stay: 0 days  Attending Physician: Maurizio Thomas MD  Primary Care Provider: Eric Singh MD        Subjective:     Principal Problem:Acute respiratory failure with hypoxia        HPI:  The patient is a 57 y/o female with PMH of COPD, anxiety, and asthma. She presents with SOB. On presentation she was in significant respiratory distress and she was very tachycardic. She improved with nebs and solumedrol. Work up suggestive of COPD exacerbation. She was also noted to have a low grade temp of 100.2 and a leukocytosis. She does report some cough productive of clear sputum. She denies any fevers, chills, chest pain, nausea, vomiting, dysuria, or other symptoms.       Overview/Hospital Course:  No notes on file    Interval History: no acute events, still with cough/shortness of breath    Review of Systems   Constitutional: Negative for fatigue and fever.   HENT: Negative.    Eyes: Negative for visual disturbance.   Respiratory: Positive for shortness of breath.    Cardiovascular: Negative for chest pain.   Gastrointestinal: Negative for abdominal pain.   Endocrine: Negative.    Genitourinary: Negative.    Musculoskeletal: Negative.    Skin: Negative.    Allergic/Immunologic: Negative.    Neurological: Negative.    Hematological: Negative.      Objective:     Vital Signs (Most Recent):  Temp: 99.6 °F (37.6 °C) (02/09/22 1054)  Pulse: 62 (02/09/22 1054)  Resp: 15 (02/09/22 1054)  BP: (!) 138/59 (02/09/22 1054)  SpO2: (!) 93 % (02/09/22 1054) Vital Signs (24h Range):  Temp:  [96.1 °F (35.6 °C)-99.6 °F (37.6 °C)] 99.6 °F (37.6 °C)  Pulse:  [] 62  Resp:  [15-19] 15  SpO2:  [93 %-99 %] 93 %  BP: (120-138)/(57-69) 138/59     Weight: 50.8 kg (112 lb)  Body mass index is 18.64 kg/m².    Intake/Output Summary (Last 24 hours) at 2/9/2022 1437  Last  data filed at 2/9/2022 0038  Gross per 24 hour   Intake 350.32 ml   Output --   Net 350.32 ml      Physical Exam  Vitals reviewed.   Constitutional:       General: She is not in acute distress.     Appearance: She is not toxic-appearing or diaphoretic.   HENT:      Head: Normocephalic and atraumatic.      Right Ear: External ear normal.      Left Ear: External ear normal.      Nose: Nose normal.      Mouth/Throat:      Mouth: Mucous membranes are moist.   Eyes:      Conjunctiva/sclera: Conjunctivae normal.   Cardiovascular:      Rate and Rhythm: Normal rate and regular rhythm.      Pulses: Normal pulses.      Heart sounds: No murmur heard.  No gallop.    Pulmonary:      Comments: No increased work of breathing, prolonged expiratory phase, diffuse wheezes, no rales  Abdominal:      General: Bowel sounds are normal. There is no distension.      Tenderness: There is no abdominal tenderness.   Musculoskeletal:      Right lower leg: No edema.      Left lower leg: No edema.   Skin:     General: Skin is warm and dry.   Neurological:      Mental Status: She is alert and oriented to person, place, and time. Mental status is at baseline.   Psychiatric:         Mood and Affect: Mood normal.         Thought Content: Thought content normal.         Significant Labs:   All pertinent labs within the past 24 hours have been reviewed.  CBC:   Recent Labs   Lab 02/07/22 2008 02/08/22  0654 02/09/22  1018   WBC 21.08* 15.04* 22.28*   HGB 14.7 12.9 12.1   HCT 41.6 36.4* 34.2*    265 235     CMP:   Recent Labs   Lab 02/07/22 2008 02/08/22  0654 02/09/22  1018    139 139   K 3.6 3.8 3.7    105 103   CO2 21* 20* 23   * 162* 179*   BUN 6 6 9   CREATININE 0.7 0.6 0.7   CALCIUM 9.5 9.0 8.8   PROT 7.5  --   --    ALBUMIN 4.1  --   --    BILITOT 0.6  --   --    ALKPHOS 91  --   --    AST 21  --   --    ALT 10  --   --    ANIONGAP 18* 14 13   EGFRNONAA >60.0 >60.0 >60.0       Significant Imaging: I have reviewed all  pertinent imaging results/findings within the past 24 hours.   CTA Chest Non-Coronary   Final Result      No CTA evidence of pulmonary embolus.  Significant centrilobular emphysema.  Stable 1.4 cm spiculated mass of the left lingula.  New 1.9 cm band of spiculated density in the posterior right lung gutter likely represents atelectasis.  Follow-up CT in 3 months is recommended however.         Electronically signed by: Gee Penn MD   Date:    02/08/2022   Time:    18:30      X-Ray Chest AP Portable   Final Result      COPD.         Electronically signed by: Mathieu Thakur   Date:    02/08/2022   Time:    05:44              Assessment/Plan:      * Acute respiratory failure with hypoxia  Patient with Hypoxic Respiratory failure which is Acute on chronic.  she is not on home oxygen. Supplemental oxygen was provided and noted-  .   Signs/symptoms of respiratory failure include- tachypnea and increased work of breathing. Contributing diagnoses includes - COPD Labs and images were reviewed. Patient Has recent ABG, which has been reviewed. Will treat underlying causes and adjust management of respiratory failure as follows-      Continue levaquin   O2 and nebs PRN  With worsening leukocytosis today, possibly due to steroids  Wean steroids today as tolerated  Sputum cultures ordered  Check BNP          Hyperlipidemia  Continue atorvastatin       Anxiety  Continue klonopin        VTE Risk Mitigation (From admission, onward)         Ordered     enoxaparin injection 40 mg  Daily         02/08/22 0128     IP VTE HIGH RISK PATIENT  Once         02/08/22 0128     Place sequential compression device  Until discontinued         02/08/22 0128                Discharge Planning   KIM: 2/9/2022     Code Status: Full Code   Is the patient medically ready for discharge?:     Reason for patient still in hospital (select all that apply): Treatment  Discharge Plan A: Home                  Lyn Hassan MD  Department of Hospital  [facility]

## 2022-02-09 NOTE — SUBJECTIVE & OBJECTIVE
Interval History: no acute events, still with cough/shortness of breath    Review of Systems   Constitutional: Negative for fatigue and fever.   HENT: Negative.    Eyes: Negative for visual disturbance.   Respiratory: Positive for shortness of breath.    Cardiovascular: Negative for chest pain.   Gastrointestinal: Negative for abdominal pain.   Endocrine: Negative.    Genitourinary: Negative.    Musculoskeletal: Negative.    Skin: Negative.    Allergic/Immunologic: Negative.    Neurological: Negative.    Hematological: Negative.      Objective:     Vital Signs (Most Recent):  Temp: 99.6 °F (37.6 °C) (02/09/22 1054)  Pulse: 62 (02/09/22 1054)  Resp: 15 (02/09/22 1054)  BP: (!) 138/59 (02/09/22 1054)  SpO2: (!) 93 % (02/09/22 1054) Vital Signs (24h Range):  Temp:  [96.1 °F (35.6 °C)-99.6 °F (37.6 °C)] 99.6 °F (37.6 °C)  Pulse:  [] 62  Resp:  [15-19] 15  SpO2:  [93 %-99 %] 93 %  BP: (120-138)/(57-69) 138/59     Weight: 50.8 kg (112 lb)  Body mass index is 18.64 kg/m².    Intake/Output Summary (Last 24 hours) at 2/9/2022 1437  Last data filed at 2/9/2022 0038  Gross per 24 hour   Intake 350.32 ml   Output --   Net 350.32 ml      Physical Exam  Vitals reviewed.   Constitutional:       General: She is not in acute distress.     Appearance: She is not toxic-appearing or diaphoretic.   HENT:      Head: Normocephalic and atraumatic.      Right Ear: External ear normal.      Left Ear: External ear normal.      Nose: Nose normal.      Mouth/Throat:      Mouth: Mucous membranes are moist.   Eyes:      Conjunctiva/sclera: Conjunctivae normal.   Cardiovascular:      Rate and Rhythm: Normal rate and regular rhythm.      Pulses: Normal pulses.      Heart sounds: No murmur heard.  No gallop.    Pulmonary:      Comments: No increased work of breathing, prolonged expiratory phase, diffuse wheezes, no rales  Abdominal:      General: Bowel sounds are normal. There is no distension.      Tenderness: There is no abdominal  tenderness.   Musculoskeletal:      Right lower leg: No edema.      Left lower leg: No edema.   Skin:     General: Skin is warm and dry.   Neurological:      Mental Status: She is alert and oriented to person, place, and time. Mental status is at baseline.   Psychiatric:         Mood and Affect: Mood normal.         Thought Content: Thought content normal.         Significant Labs:   All pertinent labs within the past 24 hours have been reviewed.  CBC:   Recent Labs   Lab 02/07/22 2008 02/08/22 0654 02/09/22  1018   WBC 21.08* 15.04* 22.28*   HGB 14.7 12.9 12.1   HCT 41.6 36.4* 34.2*    265 235     CMP:   Recent Labs   Lab 02/07/22 2008 02/08/22 0654 02/09/22  1018    139 139   K 3.6 3.8 3.7    105 103   CO2 21* 20* 23   * 162* 179*   BUN 6 6 9   CREATININE 0.7 0.6 0.7   CALCIUM 9.5 9.0 8.8   PROT 7.5  --   --    ALBUMIN 4.1  --   --    BILITOT 0.6  --   --    ALKPHOS 91  --   --    AST 21  --   --    ALT 10  --   --    ANIONGAP 18* 14 13   EGFRNONAA >60.0 >60.0 >60.0       Significant Imaging: I have reviewed all pertinent imaging results/findings within the past 24 hours.   CTA Chest Non-Coronary   Final Result      No CTA evidence of pulmonary embolus.  Significant centrilobular emphysema.  Stable 1.4 cm spiculated mass of the left lingula.  New 1.9 cm band of spiculated density in the posterior right lung gutter likely represents atelectasis.  Follow-up CT in 3 months is recommended however.         Electronically signed by: Gee Penn MD   Date:    02/08/2022   Time:    18:30      X-Ray Chest AP Portable   Final Result      COPD.         Electronically signed by: Mathieu Thakur   Date:    02/08/2022   Time:    05:44

## 2022-02-09 NOTE — NURSING
Patient AAOX4, respirations even and unlabored. No distress noted. Patient refused bed alarm, educated patient on falls and safety, verbalized understanding. Charge nurse and MD notified. Call light within reach, bed in lowest position, wheels locked. Patient denies any needs at this time.

## 2022-02-09 NOTE — PLAN OF CARE
"   02/09/22 1518   Discharge Reassessment   Assessment Type Discharge Planning Reassessment   Did the patient's condition or plan change since previous assessment? No   Discharge Plan discussed with: Patient   Communicated KIM with patient/caregiver Date not available/Unable to determine   Discharge Plan A Home   DME Needed Upon Discharge  none   Discharge Barriers Identified None   Why the patient remains in the hospital Requires continued medical care   Post-Acute Status   Hospital Resources/Appts/Education Provided Appointments scheduled and added to AVS   Discharge Delays None known at this time   MAYELA visited with patient to complete reassessment.  She told MAYELA that she was feeling a little better but still experiencing some shortness of breath.  Patient plans to return home at discharge.  She has two sisters who live next door to her and a daughter who lives "down the street".  She has home oxygen and nebulizer in place.  She does not desire home health at discharge.    MAYELA will continue to follow.  "

## 2022-02-10 VITALS
SYSTOLIC BLOOD PRESSURE: 129 MMHG | HEART RATE: 85 BPM | WEIGHT: 112 LBS | RESPIRATION RATE: 16 BRPM | TEMPERATURE: 97 F | HEIGHT: 65 IN | OXYGEN SATURATION: 96 % | DIASTOLIC BLOOD PRESSURE: 62 MMHG | BODY MASS INDEX: 18.66 KG/M2

## 2022-02-10 LAB
ANION GAP SERPL CALC-SCNC: 11 MMOL/L (ref 8–16)
BASOPHILS # BLD AUTO: 0.01 K/UL (ref 0–0.2)
BASOPHILS NFR BLD: 0.1 % (ref 0–1.9)
BUN SERPL-MCNC: 7 MG/DL (ref 6–20)
CALCIUM SERPL-MCNC: 9 MG/DL (ref 8.7–10.5)
CHLORIDE SERPL-SCNC: 101 MMOL/L (ref 95–110)
CO2 SERPL-SCNC: 28 MMOL/L (ref 23–29)
CREAT SERPL-MCNC: 0.7 MG/DL (ref 0.5–1.4)
DIFFERENTIAL METHOD: ABNORMAL
EOSINOPHIL # BLD AUTO: 0 K/UL (ref 0–0.5)
EOSINOPHIL NFR BLD: 0 % (ref 0–8)
ERYTHROCYTE [DISTWIDTH] IN BLOOD BY AUTOMATED COUNT: 11.8 % (ref 11.5–14.5)
EST. GFR  (AFRICAN AMERICAN): >60 ML/MIN/1.73 M^2
EST. GFR  (NON AFRICAN AMERICAN): >60 ML/MIN/1.73 M^2
GLUCOSE SERPL-MCNC: 117 MG/DL (ref 70–110)
HCT VFR BLD AUTO: 37.4 % (ref 37–48.5)
HGB BLD-MCNC: 12.5 G/DL (ref 12–16)
IMM GRANULOCYTES # BLD AUTO: 0.06 K/UL (ref 0–0.04)
IMM GRANULOCYTES NFR BLD AUTO: 0.5 % (ref 0–0.5)
LYMPHOCYTES # BLD AUTO: 0.7 K/UL (ref 1–4.8)
LYMPHOCYTES NFR BLD: 5.5 % (ref 18–48)
MCH RBC QN AUTO: 32.5 PG (ref 27–31)
MCHC RBC AUTO-ENTMCNC: 33.4 G/DL (ref 32–36)
MCV RBC AUTO: 97 FL (ref 82–98)
MONOCYTES # BLD AUTO: 0.2 K/UL (ref 0.3–1)
MONOCYTES NFR BLD: 1.7 % (ref 4–15)
NEUTROPHILS # BLD AUTO: 10.9 K/UL (ref 1.8–7.7)
NEUTROPHILS NFR BLD: 92.2 % (ref 38–73)
NRBC BLD-RTO: 0 /100 WBC
PLATELET # BLD AUTO: 253 K/UL (ref 150–450)
PMV BLD AUTO: 9.8 FL (ref 9.2–12.9)
POTASSIUM SERPL-SCNC: 4.3 MMOL/L (ref 3.5–5.1)
RBC # BLD AUTO: 3.85 M/UL (ref 4–5.4)
SODIUM SERPL-SCNC: 140 MMOL/L (ref 136–145)
WBC # BLD AUTO: 11.83 K/UL (ref 3.9–12.7)

## 2022-02-10 PROCEDURE — 25000003 PHARM REV CODE 250: Performed by: HOSPITALIST

## 2022-02-10 PROCEDURE — 80048 BASIC METABOLIC PNL TOTAL CA: CPT | Performed by: FAMILY MEDICINE

## 2022-02-10 PROCEDURE — 99224 PR SUBSEQUENT OBSERVATION CARE,LEVEL I: CPT | Mod: ,,, | Performed by: FAMILY MEDICINE

## 2022-02-10 PROCEDURE — 85025 COMPLETE CBC W/AUTO DIFF WBC: CPT | Performed by: FAMILY MEDICINE

## 2022-02-10 PROCEDURE — 96376 TX/PRO/DX INJ SAME DRUG ADON: CPT | Performed by: FAMILY MEDICINE

## 2022-02-10 PROCEDURE — 99224 PR SUBSEQUENT OBSERVATION CARE,LEVEL I: ICD-10-PCS | Mod: ,,, | Performed by: FAMILY MEDICINE

## 2022-02-10 PROCEDURE — 36415 COLL VENOUS BLD VENIPUNCTURE: CPT | Performed by: FAMILY MEDICINE

## 2022-02-10 PROCEDURE — G0378 HOSPITAL OBSERVATION PER HR: HCPCS

## 2022-02-10 PROCEDURE — 63600175 PHARM REV CODE 636 W HCPCS: Performed by: FAMILY MEDICINE

## 2022-02-10 RX ORDER — LEVOFLOXACIN 750 MG/1
750 TABLET ORAL DAILY
Qty: 5 TABLET | Refills: 0 | Status: SHIPPED | OUTPATIENT
Start: 2022-02-10 | End: 2022-03-11

## 2022-02-10 RX ORDER — PREDNISONE 20 MG/1
40 TABLET ORAL DAILY
Qty: 10 TABLET | Refills: 0 | Status: SHIPPED | OUTPATIENT
Start: 2022-02-10 | End: 2022-02-15

## 2022-02-10 RX ORDER — PROMETHAZINE HYDROCHLORIDE 25 MG/1
25 TABLET ORAL EVERY 6 HOURS PRN
Qty: 30 TABLET | Refills: 1 | Status: SHIPPED | OUTPATIENT
Start: 2022-02-10 | End: 2022-02-11 | Stop reason: SDUPTHER

## 2022-02-10 RX ADMIN — ATORVASTATIN CALCIUM 20 MG: 10 TABLET, FILM COATED ORAL at 08:02

## 2022-02-10 RX ADMIN — ATENOLOL 12.5 MG: 25 TABLET ORAL at 08:02

## 2022-02-10 RX ADMIN — ASPIRIN 81 MG: 81 TABLET, CHEWABLE ORAL at 08:02

## 2022-02-10 RX ADMIN — DULOXETINE HYDROCHLORIDE 40 MG: 20 CAPSULE, DELAYED RELEASE ORAL at 08:02

## 2022-02-10 RX ADMIN — METHYLPREDNISOLONE SODIUM SUCCINATE 80 MG: 125 INJECTION, POWDER, FOR SOLUTION INTRAMUSCULAR; INTRAVENOUS at 08:02

## 2022-02-10 RX ADMIN — Medication 2000 UNITS: at 08:02

## 2022-02-10 NOTE — PLAN OF CARE
02/10/22 0834   Final Note   Assessment Type Final Discharge Note   Anticipated Discharge Disposition Home   What phone number can be called within the next 1-3 days to see how you are doing after discharge? [phone]   Hospital Resources/Appts/Education Provided Appointments scheduled and added to AVS   Post-Acute Status   Discharge Delays None known at this time   Patient aware of follow up appointments with Dr Singh & Dr Aalmo. Denies any other needs at this time. States she will call her family once she knows what time she will be ready to leave. Patient's nurse notified & will let her know.

## 2022-02-10 NOTE — NURSING
Patient AAOX4, respirations even and unlabored. No distress noted. Discharge instructions reviewed with the patient and verbalizes understanding. New prescriptions reviewed with the patient and verbalizes understanding. Patient discharged on her home oxygen. Patient transported to personal vehicle via wheelchair. Patient denies any shortness of breath or chest pains at this time. All personal belongings sent with the patient.

## 2022-02-11 ENCOUNTER — OFFICE VISIT (OUTPATIENT)
Dept: FAMILY MEDICINE | Facility: CLINIC | Age: 57
End: 2022-02-11
Payer: MEDICAID

## 2022-02-11 VITALS
HEIGHT: 65 IN | SYSTOLIC BLOOD PRESSURE: 136 MMHG | HEART RATE: 88 BPM | TEMPERATURE: 98 F | RESPIRATION RATE: 17 BRPM | BODY MASS INDEX: 19.16 KG/M2 | DIASTOLIC BLOOD PRESSURE: 80 MMHG | OXYGEN SATURATION: 98 % | WEIGHT: 115 LBS

## 2022-02-11 DIAGNOSIS — R06.02 SHORTNESS OF BREATH: Primary | ICD-10-CM

## 2022-02-11 DIAGNOSIS — Z09 HOSPITAL DISCHARGE FOLLOW-UP: ICD-10-CM

## 2022-02-11 PROCEDURE — 99215 OFFICE O/P EST HI 40 MIN: CPT | Mod: PBBFAC | Performed by: FAMILY MEDICINE

## 2022-02-11 PROCEDURE — 99999 PR PBB SHADOW E&M-EST. PATIENT-LVL V: ICD-10-PCS | Mod: PBBFAC,,, | Performed by: FAMILY MEDICINE

## 2022-02-11 PROCEDURE — 3008F BODY MASS INDEX DOCD: CPT | Mod: CPTII,,, | Performed by: FAMILY MEDICINE

## 2022-02-11 PROCEDURE — 3079F PR MOST RECENT DIASTOLIC BLOOD PRESSURE 80-89 MM HG: ICD-10-PCS | Mod: CPTII,,, | Performed by: FAMILY MEDICINE

## 2022-02-11 PROCEDURE — 99999 PR PBB SHADOW E&M-EST. PATIENT-LVL V: CPT | Mod: PBBFAC,,, | Performed by: FAMILY MEDICINE

## 2022-02-11 PROCEDURE — 3077F SYST BP >= 140 MM HG: CPT | Mod: CPTII,,, | Performed by: FAMILY MEDICINE

## 2022-02-11 PROCEDURE — 1159F MED LIST DOCD IN RCRD: CPT | Mod: CPTII,,, | Performed by: FAMILY MEDICINE

## 2022-02-11 PROCEDURE — 3008F PR BODY MASS INDEX (BMI) DOCUMENTED: ICD-10-PCS | Mod: CPTII,,, | Performed by: FAMILY MEDICINE

## 2022-02-11 PROCEDURE — 3079F DIAST BP 80-89 MM HG: CPT | Mod: CPTII,,, | Performed by: FAMILY MEDICINE

## 2022-02-11 PROCEDURE — 1159F PR MEDICATION LIST DOCUMENTED IN MEDICAL RECORD: ICD-10-PCS | Mod: CPTII,,, | Performed by: FAMILY MEDICINE

## 2022-02-11 PROCEDURE — 99214 PR OFFICE/OUTPT VISIT, EST, LEVL IV, 30-39 MIN: ICD-10-PCS | Mod: S$PBB,,, | Performed by: FAMILY MEDICINE

## 2022-02-11 PROCEDURE — 3077F PR MOST RECENT SYSTOLIC BLOOD PRESSURE >= 140 MM HG: ICD-10-PCS | Mod: CPTII,,, | Performed by: FAMILY MEDICINE

## 2022-02-11 PROCEDURE — 1160F PR REVIEW ALL MEDS BY PRESCRIBER/CLIN PHARMACIST DOCUMENTED: ICD-10-PCS | Mod: CPTII,,, | Performed by: FAMILY MEDICINE

## 2022-02-11 PROCEDURE — 99214 OFFICE O/P EST MOD 30 MIN: CPT | Mod: S$PBB,,, | Performed by: FAMILY MEDICINE

## 2022-02-11 PROCEDURE — 1160F RVW MEDS BY RX/DR IN RCRD: CPT | Mod: CPTII,,, | Performed by: FAMILY MEDICINE

## 2022-02-11 RX ORDER — PROMETHAZINE HYDROCHLORIDE 25 MG/1
25 TABLET ORAL EVERY 6 HOURS PRN
Qty: 30 TABLET | Refills: 1 | Status: SHIPPED | OUTPATIENT
Start: 2022-02-11 | End: 2022-02-18 | Stop reason: ALTCHOICE

## 2022-02-11 RX ORDER — DULOXETIN HYDROCHLORIDE 20 MG/1
40 CAPSULE, DELAYED RELEASE ORAL DAILY
COMMUNITY
Start: 2022-02-07 | End: 2022-06-15

## 2022-02-11 RX ORDER — BENZONATATE 100 MG/1
100 CAPSULE ORAL EVERY 4 HOURS PRN
COMMUNITY
Start: 2022-01-24 | End: 2022-02-18

## 2022-02-11 NOTE — PROGRESS NOTES
"[facility] - Clinic Note    Subjective      Ms. White is a 56 y.o. female who presents to clinic for Follow-up    Patient was admitted to the hospital from 2022 to 02/10/2022 for a COPD exacerbation.  She has been breathing better.  She is going to complete a course of steroids and antibiotics.  She has follow-up with pulmonology next week.  She would like referral to cardiology.    [facility] Zayra has a past medical history of Anxiety, Arthritis, Asthma, Back pain, COPD (chronic obstructive pulmonary disease), and Pulmonary embolism.   PSXH Zayra has a past surgical history that includes Breast cyst excision;  section; Cholecystectomy; Biopsy; and Incision and drainage of abscess (Left, 3/16/2020).    Zayra's family history includes Breast cancer in her sister.    Zayra reports that she has been smoking cigarettes. She has a 41.00 pack-year smoking history. She has never used smokeless tobacco. She reports current alcohol use. She reports that she does not use drugs.   ALG Zayra is allergic to ativan [lorazepam].   MED Zayra has a current medication list which includes the following prescription(s): albuterol, aspirin, atenolol, atorvastatin, benzonatate, cholecalciferol (vitamin d3), clonazepam, duloxetine, duloxetine, ipratropium, levofloxacin, prednisone, umeclidinium-vilanterol, and promethazine.     Review of Systems   Constitutional: Negative for chills and fever.   Respiratory: Positive for cough.    Cardiovascular: Negative for chest pain.   Musculoskeletal: Negative for back pain.   Psychiatric/Behavioral: The patient is nervous/anxious.      Objective     /80 Comment: home  Pulse 88   Temp 97.7 °F (36.5 °C)   Resp 17   Ht 5' 5" (1.651 m)   Wt 52.2 kg (115 lb)   LMP 2017   SpO2 98%   BMI 19.14 kg/m²     Physical Exam  Vitals and nursing note reviewed.   Constitutional:       General: She is not in acute distress.     Appearance: Normal appearance. " She is well-developed. She is not diaphoretic.   HENT:      Head: Normocephalic and atraumatic.      Right Ear: External ear normal.      Left Ear: External ear normal.   Eyes:      General:         Right eye: No discharge.         Left eye: No discharge.   Cardiovascular:      Rate and Rhythm: Normal rate and regular rhythm.      Heart sounds: Normal heart sounds.   Pulmonary:      Effort: Pulmonary effort is normal.      Breath sounds: Normal breath sounds. No wheezing or rales.   Skin:     General: Skin is warm and dry.   Neurological:      Mental Status: She is alert and oriented to person, place, and time. Mental status is at baseline.   Psychiatric:         Mood and Affect: Mood normal.         Behavior: Behavior normal.         Thought Content: Thought content normal.         Judgment: Judgment normal.        Assessment/Plan     1. Shortness of breath  Ambulatory referral/consult to Cardiology   2. Hospital discharge follow-up       Respiratory status has improved.  Reviewed medications.  Continue clonazepam to help with anxiety.  Referral to cardiology has been placed.  Keep follow-up with pulmonology.  Follow-up in 1 month for Pap smear.    Future Appointments   Date Time Provider Department Center   2/14/2022 11:30 AM [facility] F[facility]   2/18/2022 10:30 AM Chloe Alamo NP [facility] PULM [place] [facility]   3/11/2022  1:20 PM [facility]         Eric Singh MD  Family Medicine  [facility]

## 2022-02-12 LAB
BACTERIA SPEC AEROBE CULT: NORMAL
BACTERIA SPEC AEROBE CULT: NORMAL
GRAM STN SPEC: NORMAL

## 2022-02-13 LAB
BACTERIA BLD CULT: NORMAL
BACTERIA BLD CULT: NORMAL

## 2022-02-14 ENCOUNTER — PATIENT MESSAGE (OUTPATIENT)
Dept: FAMILY MEDICINE | Facility: CLINIC | Age: 57
End: 2022-02-14
Payer: MEDICAID

## 2022-02-15 ENCOUNTER — PATIENT OUTREACH (OUTPATIENT)
Dept: ADMINISTRATIVE | Facility: OTHER | Age: 57
End: 2022-02-15
Payer: MEDICAID

## 2022-02-15 NOTE — PROGRESS NOTES
Chart was reviewed for overdue [facility]active [facility] Encounters (DAISY)  topics  Updates were requested from care everywhere  Health Maintenance has been updated  LINKS immunization registry triggered

## 2022-02-18 ENCOUNTER — OFFICE VISIT (OUTPATIENT)
Dept: PULMONOLOGY | Facility: CLINIC | Age: 57
End: 2022-02-18
Payer: MEDICAID

## 2022-02-18 VITALS
OXYGEN SATURATION: 94 % | DIASTOLIC BLOOD PRESSURE: 82 MMHG | SYSTOLIC BLOOD PRESSURE: 140 MMHG | WEIGHT: 110.56 LBS | BODY MASS INDEX: 18.42 KG/M2 | HEART RATE: 93 BPM | HEIGHT: 65 IN

## 2022-02-18 DIAGNOSIS — R05.9 COUGH: ICD-10-CM

## 2022-02-18 DIAGNOSIS — J44.1 COPD EXACERBATION: Primary | ICD-10-CM

## 2022-02-18 DIAGNOSIS — R91.8 MULTIPLE LUNG NODULES ON CT: ICD-10-CM

## 2022-02-18 DIAGNOSIS — R09.89 CHRONIC SINUS COMPLAINTS: ICD-10-CM

## 2022-02-18 DIAGNOSIS — J44.9 CHRONIC OBSTRUCTIVE PULMONARY DISEASE, UNSPECIFIED COPD TYPE: ICD-10-CM

## 2022-02-18 DIAGNOSIS — J96.01 ACUTE RESPIRATORY FAILURE WITH HYPOXIA: ICD-10-CM

## 2022-02-18 PROCEDURE — 3079F DIAST BP 80-89 MM HG: CPT | Mod: CPTII,,, | Performed by: NURSE PRACTITIONER

## 2022-02-18 PROCEDURE — 99213 OFFICE O/P EST LOW 20 MIN: CPT | Mod: PBBFAC,PO | Performed by: NURSE PRACTITIONER

## 2022-02-18 PROCEDURE — 99204 OFFICE O/P NEW MOD 45 MIN: CPT | Mod: S$PBB,,, | Performed by: NURSE PRACTITIONER

## 2022-02-18 PROCEDURE — 99999 PR PBB SHADOW E&M-EST. PATIENT-LVL III: ICD-10-PCS | Mod: PBBFAC,,, | Performed by: NURSE PRACTITIONER

## 2022-02-18 PROCEDURE — 99204 PR OFFICE/OUTPT VISIT, NEW, LEVL IV, 45-59 MIN: ICD-10-PCS | Mod: S$PBB,,, | Performed by: NURSE PRACTITIONER

## 2022-02-18 PROCEDURE — 1159F MED LIST DOCD IN RCRD: CPT | Mod: CPTII,,, | Performed by: NURSE PRACTITIONER

## 2022-02-18 PROCEDURE — 3077F SYST BP >= 140 MM HG: CPT | Mod: CPTII,,, | Performed by: NURSE PRACTITIONER

## 2022-02-18 PROCEDURE — 3079F PR MOST RECENT DIASTOLIC BLOOD PRESSURE 80-89 MM HG: ICD-10-PCS | Mod: CPTII,,, | Performed by: NURSE PRACTITIONER

## 2022-02-18 PROCEDURE — 3008F PR BODY MASS INDEX (BMI) DOCUMENTED: ICD-10-PCS | Mod: CPTII,,, | Performed by: NURSE PRACTITIONER

## 2022-02-18 PROCEDURE — 1159F PR MEDICATION LIST DOCUMENTED IN MEDICAL RECORD: ICD-10-PCS | Mod: CPTII,,, | Performed by: NURSE PRACTITIONER

## 2022-02-18 PROCEDURE — 3077F PR MOST RECENT SYSTOLIC BLOOD PRESSURE >= 140 MM HG: ICD-10-PCS | Mod: CPTII,,, | Performed by: NURSE PRACTITIONER

## 2022-02-18 PROCEDURE — 99999 PR PBB SHADOW E&M-EST. PATIENT-LVL III: CPT | Mod: PBBFAC,,, | Performed by: NURSE PRACTITIONER

## 2022-02-18 PROCEDURE — 3008F BODY MASS INDEX DOCD: CPT | Mod: CPTII,,, | Performed by: NURSE PRACTITIONER

## 2022-02-18 RX ORDER — CETIRIZINE HYDROCHLORIDE 10 MG/1
10 TABLET ORAL DAILY
Qty: 30 TABLET | Refills: 3 | Status: SHIPPED | OUTPATIENT
Start: 2022-02-18 | End: 2022-05-05

## 2022-02-18 RX ORDER — ALBUTEROL SULFATE 90 UG/1
AEROSOL, METERED RESPIRATORY (INHALATION)
Qty: 36 G | Refills: 6 | Status: SHIPPED | OUTPATIENT
Start: 2022-02-18 | End: 2022-05-23 | Stop reason: SDUPTHER

## 2022-02-18 RX ORDER — FLUTICASONE FUROATE, UMECLIDINIUM BROMIDE AND VILANTEROL TRIFENATATE 200; 62.5; 25 UG/1; UG/1; UG/1
1 POWDER RESPIRATORY (INHALATION) DAILY
Qty: 60 EACH | Refills: 11 | Status: SHIPPED | OUTPATIENT
Start: 2022-02-18 | End: 2022-12-13 | Stop reason: SDUPTHER

## 2022-02-18 RX ORDER — PREDNISONE 20 MG/1
TABLET ORAL
Qty: 18 TABLET | Refills: 0 | Status: SHIPPED | OUTPATIENT
Start: 2022-02-18 | End: 2022-03-11

## 2022-02-18 RX ORDER — BENZONATATE 200 MG/1
200 CAPSULE ORAL 3 TIMES DAILY PRN
Qty: 60 CAPSULE | Refills: 0 | Status: SHIPPED | OUTPATIENT
Start: 2022-02-18 | End: 2022-02-28

## 2022-02-18 RX ORDER — PROMETHAZINE HYDROCHLORIDE AND CODEINE PHOSPHATE 6.25; 1 MG/5ML; MG/5ML
2.5 SOLUTION ORAL EVERY 6 HOURS PRN
Qty: 240 ML | Refills: 0 | Status: SHIPPED | OUTPATIENT
Start: 2022-02-18 | End: 2022-02-28

## 2022-02-18 NOTE — PATIENT INSTRUCTIONS
You have two areas of suspicion noted on CT Chest.   One area in the R lower lung - initially noted on CT from 2/8/2022.  Area in the L lung has been present since at least 12/2020 - appears to have grown in size.  Will repeat CT in 3 months.    Would benefit from smoking cessation. Declines referral to smoking cessation program at this time. Has tried Chantix in past.    This inhaler Trelegy is your new daily inhaler. It contains an inhaled steroid component. Rinse mouth after each use due to risk for thrush development. If mouth or tongue develops white sores please contact the clinic and I will order a prescription mouth wash.     Continue to use albuterol rescue inhaler as needed.    Zyrtec - take one pill nightly for 30 days - see if it helps with sinus complaints.    Tessalon pearls as needed for cough.     I ordered a Lung Function Test to evaluate lung strength. Please complete prior to next appointment.

## 2022-02-18 NOTE — PROGRESS NOTES
2/18/2022    Zayra White  New Patient Consult    Chief Complaint   Patient presents with    COPD     Hospital f/u     Shortness of Breath       HPI:  2/18/2022: In office today with sister. Hx: COPD, Chronic Resp failure.  Recently Hospitalized at [facility] on 2/7 for COPD exacerbation, was treated with IV steroids and neb treatments, subsequently discharged home on 2/10 with Rx for Prednisone and Levaquin, which she reports completion and compliance.  Had Prior hospitalization in November 2021 at [facility] for COPD exacerbation - complicated hospitalization with intramuscular hematoma of the left rectus muscle of the anterior abdominal wall - general surgery consulted, resolved spontaneously. Discharged home at that time with supplemental oxygen. Patient also endorses prior ER visit in Feb 2021 for COPD exacerbation and ER visit at [facility] for same.  Currently taking Anora once per day - Albuterol rescue inhaler approx 2-5 times per day. Using Nebulizer (Albuterol and Ipatropium) scheduled per day.  Using oxygen at night time, and as needed throughout the day - 1L via [place].  Shortness of breath: Varies with time - states worse over the last week due to sinus congestion, weather change, pollen. Exertional, improves with rest and tripod position. Affecting ADLS.   Cough: Worsening, productive with clear mucous production. Worse in the morning, Denies wheezing, chest tightness. Taking Mucinex without benefit. Ran out of [place]i - they seemed to help.       Social Hx: Lives alone - no animals in the home. Previously worked as labored. No Asbestosis exposure, Smoking Hx: Current smoker, started age 14 - 0.5 ppd use.   Family Hx: Mother Lung Cancer, No COPD, Son, Brother, Sister Asthma  Medical Hx: Previous pneumonia (did not require intubation) ; No previous shoulder/chest surgery      The chief compliant  problem is new to me  PFSH:  Past Medical History:   Diagnosis Date    Anxiety      Arthritis     Asthma     Back pain     COPD (chronic obstructive pulmonary disease)     Pulmonary embolism     10 years ago         Past Surgical History:   Procedure Laterality Date    BIOPSY      right breast    BREAST CYST EXCISION       SECTION      CHOLECYSTECTOMY      INCISION AND DRAINAGE OF ABSCESS Left 3/16/2020    Procedure: INCISION AND DRAINAGE, ABSCESS;  Surgeon: Irvin Villarreal MD;  Location: [facility];  Service: General;  Laterality: Left;     Social History     Tobacco Use    Smoking status: Current Every Day Smoker     Packs/day: 1.00     Years: 41.00     Pack years: 41.00     Types: Cigarettes     Last attempt to quit: 2021     Years since quittin.2    Smokeless tobacco: Never Used   Substance Use Topics    Alcohol use: Yes     Comment: occ    Drug use: Never     Family History   Problem Relation Age of Onset    Breast cancer Sister     Ovarian cancer Neg Hx      Review of patient's allergies indicates:   Allergen Reactions    Ativan [lorazepam] Itching     I have reviewed past medical, family, and social history. I have reviewed previous nurse notes.    Performance Status:The patient's activity level is functions out of house.        Review of Systems   Constitutional: Positive for activity change. Negative for appetite change, chills, diaphoresis, fatigue, fever and unexpected weight change.   HENT: Positive for congestion, postnasal drip, rhinorrhea, sinus pressure, sinus pain and sneezing. Negative for sore throat and trouble swallowing.    Eyes: Negative for photophobia and visual disturbance.   Respiratory: Positive for cough and shortness of breath. Negative for choking, chest tightness and wheezing.    Cardiovascular: Negative for chest pain, palpitations and leg swelling.   Gastrointestinal: Positive for nausea. Negative for abdominal distention, abdominal pain, blood in stool, constipation, diarrhea and vomiting.   Genitourinary: Negative for  "difficulty urinating, dysuria, flank pain and hematuria.   Musculoskeletal: Positive for back pain and neck pain. Negative for gait problem and joint swelling.   Skin: Negative for rash and wound.   Allergic/Immunologic: Positive for environmental allergies. Negative for food allergies.   Neurological: Positive for dizziness and headaches. Negative for seizures, syncope, weakness, light-headedness and numbness.   Psychiatric/Behavioral: Negative for confusion. The patient is nervous/anxious.          Exam:Comprehensive exam done. BP (!) 140/82 (BP Location: Left arm, Patient Position: Sitting, BP Method: Small (Automatic))   Pulse 93   Ht 5' 5" (1.651 m)   Wt 50.1 kg (110 lb 9 oz)   LMP 09/22/2017   SpO2 (!) 94% Comment: on room air at rest  BMI 18.40 kg/m²   Exam included Vitals as listed  Constitutional: She is oriented to person, place, and time. She appears well-developed. No distress.   Nose: Nose normal.   Mouth/Throat: Uvula is midline, oropharynx is clear and moist and mucous membranes are normal. No dental caries. No oropharyngeal exudate, posterior oropharyngeal edema, posterior oropharyngeal erythema or tonsillar abscesses.  Mallapatti (M) score 1  Eyes: Pupils are equal, round, and reactive to light.   Neck: No JVD present. No thyromegaly present.   Cardiovascular: Normal rate, regular rhythm and normal heart sounds. Exam reveals no gallop and no friction rub.   No murmur heard.  Pulmonary/Chest: Effort normal and breath sounds normal. No accessory muscle usage or stridor. No apnea and no tachypnea. No respiratory distress, on room air. Diminished breath sounds to bilateral lower lung fields, faint expiratory wheezing noted to lower lung fields.   Abdominal: Soft. She exhibits no mass. There is no tenderness. No hepatosplenomegaly, hernias and normoactive bowel sounds  Musculoskeletal: Normal range of motion. exhibits no edema.   Neurological:  alert and oriented to person, place, and time. not " disoriented.   Skin: Skin is warm and dry. Capillary refill takes less 2 sec. No cyanosis or erythema. No pallor. Nails show no clubbing.   Psychiatric: normal mood and affect. behavior is normal. Judgment and thought content normal.       Radiographs (ct chest and cxr) reviewed: view by direct vision     CTA Chest Non-Coronary  2/8/2022   Impression:   No CTA evidence of pulmonary embolus.  Significant centrilobular emphysema.  Stable 1.4 cm spiculated mass of the left lingula.  New 1.9 cm band of spiculated density in the posterior right lung gutter likely represents atelectasis.  Follow-up CT in 3 months is recommended however.      X-Ray Chest AP Portable  2/8/2022   Impression:   COPD.         Labs reviewed     Lab Results   Component Value Date    WBC 11.83 02/10/2022    RBC 3.85 (L) 02/10/2022    HGB 12.5 02/10/2022    HCT 37.4 02/10/2022    MCV 97 02/10/2022    MCH 32.5 (H) 02/10/2022    MCHC 33.4 02/10/2022    RDW 11.8 02/10/2022     02/10/2022    MPV 9.8 02/10/2022    GRAN 10.9 (H) 02/10/2022    GRAN 92.2 (H) 02/10/2022    LYMPH 0.7 (L) 02/10/2022    LYMPH 5.5 (L) 02/10/2022    MONO 0.2 (L) 02/10/2022    MONO 1.7 (L) 02/10/2022    EOS 0.0 02/10/2022    BASO 0.01 02/10/2022    EOSINOPHIL 0.0 02/10/2022    BASOPHIL 0.1 02/10/2022       PFT will be done and results to be reviewed  Pulmonary Functions Testing Results:        Plan:  Clinical impression is ambiguous and will need repeated evaluation wrt.    Zayra was seen today for copd and shortness of breath.    Diagnoses and all orders for this visit:    COPD exacerbation  -     fluticasone-umeclidin-vilanter (TRELEGY ELLIPTA) 200-62.5-25 mcg inhaler; Inhale 1 puff into the lungs once daily.  -     Complete PFT with bronchodilator; Future  -     predniSONE (DELTASONE) 20 MG tablet; Take three tablets for three days. Two tablets for three days. One tablet for three days.    Acute respiratory failure with hypoxia    Multiple lung nodules on CT  -      CT Chest Without Contrast; Future    Cough  -     benzonatate (TESSALON) 200 MG capsule; Take 1 capsule (200 mg total) by mouth 3 (three) times daily as needed for Cough.  -     promethazine-codeine 6.25-10 mg/5 ml (PHENERGAN WITH CODEINE) 6.25-10 mg/5 mL syrup; Take 2.5 mLs by mouth every 6 (six) hours as needed for Cough.    Chronic sinus complaints  -     cetirizine (ZYRTEC) 10 MG tablet; Take 1 tablet (10 mg total) by mouth once daily.    Chronic obstructive pulmonary disease, unspecified COPD type  -     albuterol (VENTOLIN HFA) 90 mcg/actuation inhaler; inhale 2 puff by inhalation route  every 4 - 6 hours as needed        Follow up in about 3 months (around 5/18/2022), or if symptoms worsen or fail to improve.    Discussed with patient above for education the following:      Patient Instructions   You have two areas of suspicion noted on CT Chest.   One area in the R lower lung - initially noted on CT from 2/8/2022.  Area in the L lung has been present since at least 12/2020 - appears to have grown in size.  Will repeat CT in 3 months.    Would benefit from smoking cessation. Declines referral to smoking cessation program at this time. Has tried Chantix in past.    This inhaler Trelegy is your new daily inhaler. It contains an inhaled steroid component. Rinse mouth after each use due to risk for thrush development. If mouth or tongue develops white sores please contact the clinic and I will order a prescription mouth wash.     Continue to use albuterol rescue inhaler as needed.    Zyrtec - take one pill nightly for 30 days - see if it helps with sinus complaints.    Tessalon pearls as needed for cough.     I ordered a Lung Function Test to evaluate lung strength. Please complete prior to next appointment.

## 2022-03-04 ENCOUNTER — PATIENT MESSAGE (OUTPATIENT)
Dept: PULMONOLOGY | Facility: CLINIC | Age: 57
End: 2022-03-04
Payer: MEDICAID

## 2022-03-07 NOTE — DISCHARGE SUMMARY
[facility] Hospital Medicine  Discharge Summary      Patient Name: Zayra White  MRN: [id number]  Patient Class: OP- Observation  Admission Date: 2/7/2022  Hospital Length of Stay: 0 days  Discharge Date and Time: 2/10/2022 10:43 AM  Attending Physician: Lyn Hassan MD  Discharging Provider: Lyn Hassan MD  Primary Care Provider: Eric Singh MD      HPI:   The patient is a 55 y/o female with PMH of COPD, anxiety, and asthma. She presents with SOB. On presentation she was in significant respiratory distress and she was very tachycardic. She improved with nebs and solumedrol. Work up suggestive of COPD exacerbation. She was also noted to have a low grade temp of 100.2 and a leukocytosis. She does report some cough productive of clear sputum. She denies any fevers, chills, chest pain, nausea, vomiting, dysuria, or other symptoms.         * No surgery found *        Goals of Care Treatment Preferences:  Code Status: Full Code      Consults:     Hospital Course:   Recovered with treatments as below and returned to baseline. Discharged home in good condition. Close follow up scheduled.   * Acute respiratory failure with hypoxia  Patient with Hypoxic Respiratory failure which is Acute on chronic.  she is not on home oxygen. Supplemental oxygen was provided and noted-  .   Signs/symptoms of respiratory failure include- tachypnea and increased work of breathing. Contributing diagnoses includes - COPD Labs and images were reviewed. Patient Has recent ABG, which has been reviewed. Will treat underlying causes and adjust management of respiratory failure as follows-      Continue levaquin   O2 and nebs PRN  With worsening leukocytosis today, possibly due to steroids  Wean steroids today as tolerated  Sputum cultures ordered  Check BNP          Hyperlipidemia  Continue atorvastatin       Anxiety  Continue klonopin            Final Active Diagnoses:    Diagnosis Date  Noted POA    PRINCIPAL PROBLEM:  Acute respiratory failure with hypoxia [J96.01] 11/30/2021 Yes    Hyperlipidemia [E78.5] 02/08/2022 Yes    Anxiety [F41.9] 11/27/2021 Yes      Problems Resolved During this Admission:       Discharged Condition: good    Disposition: Home or Self Care    Follow Up:   Follow-up Information     Eric Singh MD On 2/11/2022.    Specialty: Family Medicine  Why: Appointment with PCP scheduled for Friday, 2/11/2022, at 9:20.  Contact information:  [address] [facility] [address]  [phone]             Chloe Alamo NP On 2/18/2022.    Specialty: Pulmonary Disease  Why: Appointment scheduled for Friday, 2/18/2022, at 10:30.  Contact information:  [address] [place] [address]  [address]  [place] [address]  [phone]                       Patient Instructions:      Diet Adult Regular     Notify your health care provider if you experience any of the following:  temperature >100.4     Notify your health care provider if you experience any of the following:  difficulty breathing or increased cough     Activity as tolerated       Significant Diagnostic Studies:   Results for orders placed or performed during the hospital encounter of 02/07/22   Blood culture #1 **CANNOT BE ORDERED STAT**    Specimen: Blood   Result Value Ref Range    Blood Culture, Routine No growth after 5 days.    Blood culture #2 **CANNOT BE ORDERED STAT**    Specimen: Blood   Result Value Ref Range    Blood Culture, Routine No growth after 5 days.    Culture, Respiratory with Gram Stain    Specimen: Sputum; Respiratory   Result Value Ref Range    Respiratory Culture Normal respiratory keegan     Respiratory Culture No S aureus or Pseudomonas isolated.     Gram Stain (Respiratory) <10 epithelial cells per low power field.     Gram Stain (Respiratory) Rare WBC's     Gram Stain (Respiratory) Rare Gram positive cocci    Complete Blood Count (CBC)   Result Value Ref Range    WBC 21.08 (H) 3.90 - 12.70 K/uL    RBC  4.50 4.00 - 5.40 M/uL    Hemoglobin 14.7 12.0 - 16.0 g/dL    Hematocrit 41.6 37.0 - 48.5 %    MCV 92 82 - 98 fL    MCH 32.7 (H) 27.0 - 31.0 pg    MCHC 35.3 32.0 - 36.0 g/dL    RDW 11.9 11.5 - 14.5 %    Platelets 427 150 - 450 K/uL    MPV 9.3 9.2 - 12.9 fL    Immature Granulocytes 0.5 0.0 - 0.5 %    Gran # (ANC) 18.1 (H) 1.8 - 7.7 K/uL    Immature Grans (Abs) 0.10 (H) 0.00 - 0.04 K/uL    Lymph # 1.8 1.0 - 4.8 K/uL    Mono # 1.0 0.3 - 1.0 K/uL    Eos # 0.0 0.0 - 0.5 K/uL    Baso # 0.10 0.00 - 0.20 K/uL    nRBC 0 0 /100 WBC    Gran % 85.7 (H) 38.0 - 73.0 %    Lymph % 8.5 (L) 18.0 - 48.0 %    Mono % 4.6 4.0 - 15.0 %    Eosinophil % 0.2 0.0 - 8.0 %    Basophil % 0.5 0.0 - 1.9 %    Differential Method Automated    Comprehensive Metabolic Panel (CMP)   Result Value Ref Range    Sodium 140 136 - 145 mmol/L    Potassium 3.6 3.5 - 5.1 mmol/L    Chloride 101 95 - 110 mmol/L    CO2 21 (L) 23 - 29 mmol/L    Glucose 145 (H) 70 - 110 mg/dL    BUN 6 6 - 20 mg/dL    Creatinine 0.7 0.5 - 1.4 mg/dL    Calcium 9.5 8.7 - 10.5 mg/dL    Total Protein 7.5 6.0 - 8.4 g/dL    Albumin 4.1 3.5 - 5.2 g/dL    Total Bilirubin 0.6 0.1 - 1.0 mg/dL    Alkaline Phosphatase 91 55 - 135 U/L    AST 21 10 - 40 U/L    ALT 10 10 - 44 U/L    Anion Gap 18 (H) 8 - 16 mmol/L    eGFR if African American >60.0 >60 mL/min/1.73 m^2    eGFR if non African American >60.0 >60 mL/min/1.73 m^2   COVID-19 Rapid Screening   Result Value Ref Range    SARS-CoV-2 RNA, Amplification, Qual Negative Negative   Drug screen panel, in-house   Result Value Ref Range    Benzodiazepines Negative Negative    Methadone metabolites Negative Negative    Cocaine (Metab.) Negative Negative    Opiate Scrn, Ur Negative Negative    Barbiturate Screen, Ur Negative Negative    Amphetamine Screen, Ur Negative Negative    THC Negative Negative    Phencyclidine Negative Negative    Creatinine, Urine 165.2 15.0 - 325.0 mg/dL    Toxicology Information SEE COMMENT    Urinalysis, Reflex to Urine  Culture Urine, Clean Catch    Specimen: Urine   Result Value Ref Range    Specimen UA Urine, Unspecified     Color, UA Yellow Yellow, Straw, [place]    Appearance, UA Clear Clear    pH, UA 6.0 5.0 - 8.0    Specific Gravity, UA 1.025 1.005 - 1.030    Protein, UA 1+ (A) Negative    Glucose, UA Negative Negative    Ketones, UA 1+ (A) Negative    Bilirubin (UA) Negative Negative    Occult Blood UA Negative Negative    Nitrite, UA Negative Negative    Urobilinogen, UA Negative Negative EU/dL    Leukocytes, UA Negative Negative   Lactic acid, plasma   Result Value Ref Range    Lactate (Lactic Acid) 1.7 0.5 - 2.2 mmol/L   Urinalysis Microscopic   Result Value Ref Range    RBC, UA 2 0 - 4 /hpf    WBC, UA 1 0 - 5 /hpf    Bacteria Occasional None-Occ /hpf    Squam Epithel, UA 1 /hpf    Hyaline Casts, UA 3 (A) 0-1/lpf /lpf    Microscopic Comment SEE COMMENT    Basic metabolic panel   Result Value Ref Range    Sodium 139 136 - 145 mmol/L    Potassium 3.8 3.5 - 5.1 mmol/L    Chloride 105 95 - 110 mmol/L    CO2 20 (L) 23 - 29 mmol/L    Glucose 162 (H) 70 - 110 mg/dL    BUN 6 6 - 20 mg/dL    Creatinine 0.6 0.5 - 1.4 mg/dL    Calcium 9.0 8.7 - 10.5 mg/dL    Anion Gap 14 8 - 16 mmol/L    eGFR if African American >60.0 >60 mL/min/1.73 m^2    eGFR if non African American >60.0 >60 mL/min/1.73 m^2   CBC auto differential   Result Value Ref Range    WBC 15.04 (H) 3.90 - 12.70 K/uL    RBC 3.93 (L) 4.00 - 5.40 M/uL    Hemoglobin 12.9 12.0 - 16.0 g/dL    Hematocrit 36.4 (L) 37.0 - 48.5 %    MCV 93 82 - 98 fL    MCH 32.8 (H) 27.0 - 31.0 pg    MCHC 35.4 32.0 - 36.0 g/dL    RDW 11.6 11.5 - 14.5 %    Platelets 265 150 - 450 K/uL    MPV 9.5 9.2 - 12.9 fL    Immature Granulocytes 0.5 0.0 - 0.5 %    Gran # (ANC) 14.2 (H) 1.8 - 7.7 K/uL    Immature Grans (Abs) 0.08 (H) 0.00 - 0.04 K/uL    Lymph # 0.7 (L) 1.0 - 4.8 K/uL    Mono # 0.1 (L) 0.3 - 1.0 K/uL    Eos # 0.0 0.0 - 0.5 K/uL    Baso # 0.02 0.00 - 0.20 K/uL    nRBC 0 0 /100 WBC    Gran % 94.2 (H)  38.0 - 73.0 %    Lymph % 4.8 (L) 18.0 - 48.0 %    Mono % 0.4 (L) 4.0 - 15.0 %    Eosinophil % 0.0 0.0 - 8.0 %    Basophil % 0.1 0.0 - 1.9 %    Differential Method Automated    Magnesium   Result Value Ref Range    Magnesium 1.5 (L) 1.6 - 2.6 mg/dL   Phosphorus   Result Value Ref Range    Phosphorus 3.2 2.7 - 4.5 mg/dL   TSH   Result Value Ref Range    TSH 0.292 (L) 0.400 - 4.000 uIU/mL   T4, Free   Result Value Ref Range    Free T4 0.85 0.71 - 1.51 ng/dL   CBC auto differential   Result Value Ref Range    WBC 22.28 (H) 3.90 - 12.70 K/uL    RBC 3.69 (L) 4.00 - 5.40 M/uL    Hemoglobin 12.1 12.0 - 16.0 g/dL    Hematocrit 34.2 (L) 37.0 - 48.5 %    MCV 93 82 - 98 fL    MCH 32.8 (H) 27.0 - 31.0 pg    MCHC 35.4 32.0 - 36.0 g/dL    RDW 11.9 11.5 - 14.5 %    Platelets 235 150 - 450 K/uL    MPV 9.7 9.2 - 12.9 fL    Immature Granulocytes 0.8 (H) 0.0 - 0.5 %    Gran # (ANC) 21.1 (H) 1.8 - 7.7 K/uL    Immature Grans (Abs) 0.18 (H) 0.00 - 0.04 K/uL    Lymph # 0.8 (L) 1.0 - 4.8 K/uL    Mono # 0.3 0.3 - 1.0 K/uL    Eos # 0.0 0.0 - 0.5 K/uL    Baso # 0.03 0.00 - 0.20 K/uL    nRBC 0 0 /100 WBC    Gran % 94.5 (H) 38.0 - 73.0 %    Lymph % 3.4 (L) 18.0 - 48.0 %    Mono % 1.2 (L) 4.0 - 15.0 %    Eosinophil % 0.0 0.0 - 8.0 %    Basophil % 0.1 0.0 - 1.9 %    Platelet Estimate Appears normal     Differential Method Automated    Magnesium   Result Value Ref Range    Magnesium 1.7 1.6 - 2.6 mg/dL   Basic metabolic panel   Result Value Ref Range    Sodium 139 136 - 145 mmol/L    Potassium 3.7 3.5 - 5.1 mmol/L    Chloride 103 95 - 110 mmol/L    CO2 23 23 - 29 mmol/L    Glucose 179 (H) 70 - 110 mg/dL    BUN 9 6 - 20 mg/dL    Creatinine 0.7 0.5 - 1.4 mg/dL    Calcium 8.8 8.7 - 10.5 mg/dL    Anion Gap 13 8 - 16 mmol/L    eGFR if African American >60.0 >60 mL/min/1.73 m^2    eGFR if non African American >60.0 >60 mL/min/1.73 m^2   BNP   Result Value Ref Range    BNP 61 0 - 99 pg/mL   CBC auto differential   Result Value Ref Range    WBC 11.83 3.90  - 12.70 K/uL    RBC 3.85 (L) 4.00 - 5.40 M/uL    Hemoglobin 12.5 12.0 - 16.0 g/dL    Hematocrit 37.4 37.0 - 48.5 %    MCV 97 82 - 98 fL    MCH 32.5 (H) 27.0 - 31.0 pg    MCHC 33.4 32.0 - 36.0 g/dL    RDW 11.8 11.5 - 14.5 %    Platelets 253 150 - 450 K/uL    MPV 9.8 9.2 - 12.9 fL    Immature Granulocytes 0.5 0.0 - 0.5 %    Gran # (ANC) 10.9 (H) 1.8 - 7.7 K/uL    Immature Grans (Abs) 0.06 (H) 0.00 - 0.04 K/uL    Lymph # 0.7 (L) 1.0 - 4.8 K/uL    Mono # 0.2 (L) 0.3 - 1.0 K/uL    Eos # 0.0 0.0 - 0.5 K/uL    Baso # 0.01 0.00 - 0.20 K/uL    nRBC 0 0 /100 WBC    Gran % 92.2 (H) 38.0 - 73.0 %    Lymph % 5.5 (L) 18.0 - 48.0 %    Mono % 1.7 (L) 4.0 - 15.0 %    Eosinophil % 0.0 0.0 - 8.0 %    Basophil % 0.1 0.0 - 1.9 %    Differential Method Automated    Basic metabolic panel   Result Value Ref Range    Sodium 140 136 - 145 mmol/L    Potassium 4.3 3.5 - 5.1 mmol/L    Chloride 101 95 - 110 mmol/L    CO2 28 23 - 29 mmol/L    Glucose 117 (H) 70 - 110 mg/dL    BUN 7 6 - 20 mg/dL    Creatinine 0.7 0.5 - 1.4 mg/dL    Calcium 9.0 8.7 - 10.5 mg/dL    Anion Gap 11 8 - 16 mmol/L    eGFR if African American >60.0 >60 mL/min/1.73 m^2    eGFR if non African American >60.0 >60 mL/min/1.73 m^2   ISTAT PROCEDURE   Result Value Ref Range    POC PH 7.401 7.35 - 7.45    POC PCO2 41.6 35 - 45 mmHg    POC PO2 99 80 - 100 mmHg    POC HCO3 25.8 24 - 28 mmol/L    POC BE 1 -2 to 2 mmol/L    POC SATURATED O2 98 95 - 100 %    POC TCO2 27 23 - 27 mmol/L    Sample ARTERIAL     Site LR     Allens Test Pass     DelSys Nasal Can      CTA Chest Non-Coronary   Final Result      No CTA evidence of pulmonary embolus.  Significant centrilobular emphysema.  Stable 1.4 cm spiculated mass of the left lingula.  New 1.9 cm band of spiculated density in the posterior right lung gutter likely represents atelectasis.  Follow-up CT in 3 months is recommended however.         Electronically signed by: Gee Penn MD   Date:    02/08/2022   Time:    18:30      X-Ray  Chest AP Portable   Final Result      COPD.         Electronically signed by: Mathieu Thakur   Date:    02/08/2022   Time:    05:44            Pending Diagnostic Studies:     Procedure Component Value Units Date/Time    EKG 12-lead [[id number]]     Order Status: Sent Lab Status: No result          Medications:  Reconciled Home Medications:      Medication List      START taking these medications    levoFLOXacin 750 MG tablet  Commonly known as: LEVAQUIN  Take 1 tablet (750 mg total) by mouth once daily.        CONTINUE taking these medications    aspirin 81 MG Chew  81 mg.     atenoloL 25 MG tablet  Commonly known as: TENORMIN  Take 0.5 tablets (12.5 mg total) by mouth once daily.     atorvastatin 20 MG tablet  Commonly known as: LIPITOR  Take 1 tablet (20 mg total) by mouth once daily.     cholecalciferol (vitamin D3) 25 mcg (1,000 unit) capsule  Commonly known as: VITAMIN D3  Take 2 capsules (2,000 Units total) by mouth once daily.     clonazePAM 0.5 MG tablet  Commonly known as: KlonoPIN  Take ONE tablet (0.5 mg total) by mouth daily as needed for Anxiety **THANK YOU**     DULoxetine 40 mg Cpdr  Take 40 mg by mouth once daily.     ipratropium 0.02 % nebulizer solution  Commonly known as: ATROVENT  Take 2.5 mLs (500 mcg total) by nebulization every 8 (eight) hours as needed for Wheezing. Rescue        ASK your doctor about these medications    predniSONE 20 MG tablet  Commonly known as: DELTASONE  Take 2 tablets (40 mg total) by mouth once daily. for 5 days  Ask about: Should I take this medication?            Indwelling Lines/Drains at time of discharge:   Lines/Drains/Airways       None                   Time spent on the discharge of patient: 35 minutes         Lyn Hassan MD  Department of Hospital Medicine  [facility]

## 2022-03-09 ENCOUNTER — PATIENT MESSAGE (OUTPATIENT)
Dept: PULMONOLOGY | Facility: CLINIC | Age: 57
End: 2022-03-09
Payer: MEDICAID

## 2022-03-09 DIAGNOSIS — R91.8 MULTIPLE LUNG NODULES ON CT: Primary | ICD-10-CM

## 2022-03-11 ENCOUNTER — HOSPITAL ENCOUNTER (OUTPATIENT)
Dept: RADIOLOGY | Facility: HOSPITAL | Age: 57
Discharge: HOME | End: 2022-03-11
Attending: NURSE PRACTITIONER
Payer: MEDICAID

## 2022-03-11 ENCOUNTER — OFFICE VISIT (OUTPATIENT)
Dept: FAMILY MEDICINE | Facility: CLINIC | Age: 57
End: 2022-03-11
Payer: MEDICAID

## 2022-03-11 ENCOUNTER — TELEPHONE (OUTPATIENT)
Dept: FAMILY MEDICINE | Facility: CLINIC | Age: 57
End: 2022-03-11
Payer: MEDICAID

## 2022-03-11 VITALS
BODY MASS INDEX: 18.19 KG/M2 | RESPIRATION RATE: 14 BRPM | SYSTOLIC BLOOD PRESSURE: 110 MMHG | DIASTOLIC BLOOD PRESSURE: 66 MMHG | HEIGHT: 65 IN | OXYGEN SATURATION: 96 % | WEIGHT: 109.19 LBS | HEART RATE: 71 BPM | TEMPERATURE: 98 F

## 2022-03-11 DIAGNOSIS — Z12.11 SCREENING FOR COLON CANCER: ICD-10-CM

## 2022-03-11 DIAGNOSIS — R91.8 MULTIPLE LUNG NODULES ON CT: ICD-10-CM

## 2022-03-11 DIAGNOSIS — J30.2 SEASONAL ALLERGIES: Primary | ICD-10-CM

## 2022-03-11 DIAGNOSIS — Z23 NEED FOR VACCINATION: ICD-10-CM

## 2022-03-11 PROCEDURE — 1160F RVW MEDS BY RX/DR IN RCRD: CPT | Mod: CPTII,,, | Performed by: FAMILY MEDICINE

## 2022-03-11 PROCEDURE — 71046 X-RAY EXAM CHEST 2 VIEWS: CPT | Mod: 26,,, | Performed by: RADIOLOGY

## 2022-03-11 PROCEDURE — 3074F PR MOST RECENT SYSTOLIC BLOOD PRESSURE < 130 MM HG: ICD-10-PCS | Mod: CPTII,,, | Performed by: FAMILY MEDICINE

## 2022-03-11 PROCEDURE — 71046 X-RAY EXAM CHEST 2 VIEWS: CPT | Mod: TC,FY

## 2022-03-11 PROCEDURE — 99214 OFFICE O/P EST MOD 30 MIN: CPT | Mod: PBBFAC,25 | Performed by: FAMILY MEDICINE

## 2022-03-11 PROCEDURE — 1159F MED LIST DOCD IN RCRD: CPT | Mod: CPTII,,, | Performed by: FAMILY MEDICINE

## 2022-03-11 PROCEDURE — 91305 COVID-19, MRNA, LNP-S, PF, 30 MCG/0.3 ML DOSE VACCINE (PFIZER): CPT | Mod: PBBFAC

## 2022-03-11 PROCEDURE — 1159F PR MEDICATION LIST DOCUMENTED IN MEDICAL RECORD: ICD-10-PCS | Mod: CPTII,,, | Performed by: FAMILY MEDICINE

## 2022-03-11 PROCEDURE — 3008F BODY MASS INDEX DOCD: CPT | Mod: CPTII,,, | Performed by: FAMILY MEDICINE

## 2022-03-11 PROCEDURE — 71046 XR CHEST PA AND LATERAL: ICD-10-PCS | Mod: 26,,, | Performed by: RADIOLOGY

## 2022-03-11 PROCEDURE — 1160F PR REVIEW ALL MEDS BY PRESCRIBER/CLIN PHARMACIST DOCUMENTED: ICD-10-PCS | Mod: CPTII,,, | Performed by: FAMILY MEDICINE

## 2022-03-11 PROCEDURE — 3078F PR MOST RECENT DIASTOLIC BLOOD PRESSURE < 80 MM HG: ICD-10-PCS | Mod: CPTII,,, | Performed by: FAMILY MEDICINE

## 2022-03-11 PROCEDURE — 3008F PR BODY MASS INDEX (BMI) DOCUMENTED: ICD-10-PCS | Mod: CPTII,,, | Performed by: FAMILY MEDICINE

## 2022-03-11 PROCEDURE — 3074F SYST BP LT 130 MM HG: CPT | Mod: CPTII,,, | Performed by: FAMILY MEDICINE

## 2022-03-11 PROCEDURE — 3078F DIAST BP <80 MM HG: CPT | Mod: CPTII,,, | Performed by: FAMILY MEDICINE

## 2022-03-11 PROCEDURE — 99999 PR PBB SHADOW E&M-EST. PATIENT-LVL IV: ICD-10-PCS | Mod: PBBFAC,,, | Performed by: FAMILY MEDICINE

## 2022-03-11 PROCEDURE — 99214 PR OFFICE/OUTPT VISIT, EST, LEVL IV, 30-39 MIN: ICD-10-PCS | Mod: S$PBB,,, | Performed by: FAMILY MEDICINE

## 2022-03-11 PROCEDURE — 99214 OFFICE O/P EST MOD 30 MIN: CPT | Mod: S$PBB,,, | Performed by: FAMILY MEDICINE

## 2022-03-11 PROCEDURE — 99999 PR PBB SHADOW E&M-EST. PATIENT-LVL IV: CPT | Mod: PBBFAC,,, | Performed by: FAMILY MEDICINE

## 2022-03-11 RX ORDER — MONTELUKAST SODIUM 10 MG/1
10 TABLET ORAL NIGHTLY
Qty: 30 TABLET | Refills: 0 | Status: SHIPPED | OUTPATIENT
Start: 2022-03-11 | End: 2022-04-10

## 2022-03-11 NOTE — PROGRESS NOTES
"[facility] - Clinic Note    Subjective      Ms. White is a 56 y.o. female who presents to clinic for No chief complaint on file.    Patient has been tolerating the Trelegy.  She has an x-ray later today to evaluate lungs.  Having postnasal drip.    [facility] Zayra has a past medical history of Anxiety, Arthritis, Asthma, Back pain, COPD (chronic obstructive pulmonary disease), and Pulmonary embolism.   PSXH Zayra has a past surgical history that includes Breast cyst excision;  section; Cholecystectomy; Biopsy; and Incision and drainage of abscess (Left, 3/16/2020).    Zayra's family history includes Breast cancer in her sister.    Zayra reports that she has been smoking cigarettes. She has a 41.00 pack-year smoking history. She has never used smokeless tobacco. She reports current alcohol use. She reports that she does not use drugs.   ALG Zayra is allergic to ativan [lorazepam].   MED Zayra has a current medication list which includes the following prescription(s): albuterol, aspirin, atenolol, atorvastatin, cetirizine, cholecalciferol (vitamin d3), clonazepam, duloxetine, duloxetine, trelegy ellipta, ipratropium, and montelukast.     Review of Systems   Constitutional: Negative for chills and fever.   HENT: Positive for postnasal drip.    Respiratory: Positive for cough.    Cardiovascular: Negative for chest pain.   Musculoskeletal: Negative for back pain.   Psychiatric/Behavioral: The patient is not nervous/anxious.      Objective     /66 (BP Location: Left arm, Patient Position: Sitting, BP Method: Medium (Manual))   Pulse 71   Temp 97.9 °F (36.6 °C) (Temporal)   Resp 14   Ht 5' 5" (1.651 m)   Wt 49.5 kg (109 lb 3.2 oz)   LMP 2017   SpO2 96%   BMI 18.17 kg/m²     Physical Exam  Vitals and nursing note reviewed.   Constitutional:       General: She is not in acute distress.     Appearance: Normal appearance. She is well-developed. She is not diaphoretic. "   HENT:      Head: Normocephalic and atraumatic.      Right Ear: External ear normal.      Left Ear: External ear normal.   Eyes:      General:         Right eye: No discharge.         Left eye: No discharge.   Cardiovascular:      Rate and Rhythm: Normal rate and regular rhythm.      Heart sounds: Normal heart sounds.   Pulmonary:      Effort: Pulmonary effort is normal.      Breath sounds: Normal breath sounds. No wheezing or rales.   Skin:     General: Skin is warm and dry.   Neurological:      Mental Status: She is alert and oriented to person, place, and time. Mental status is at baseline.   Psychiatric:         Mood and Affect: Mood normal.         Behavior: Behavior normal.         Thought Content: Thought content normal.         Judgment: Judgment normal.        Assessment/Plan     1. Seasonal allergies  montelukast (SINGULAIR) 10 mg tablet   2. Screening for colon cancer  Ambulatory referral/consult to General Surgery   3. Need for vaccination  COVID-19-MRNA-(PF)(Pfizer)Vaccine     COVID vaccination booster today.  Referral to general surgery for colon cancer screening.  Keep follow-up with pulmonology and Cardiology.  Will trial Singulair for allergies.  Follow-up in 3 months.  Pap smear at that time.    Future Appointments   Date Time Provider Department Center   5/10/2022 10:00 AM Chavez Gonzales MD [facility]   5/20/2022 10:00 AM [facility] PULMONARY [facility]   5/20/2022 11:00 AM [facility] CT2 LIMIT 500 LBS [facility] CT SCAN [facility]   5/20/2022  1:00 PM Chloe Alamo NP [facility] PULM [facility]   6/15/2022  3:40 PM Eric Singh MD [facility] FAMMED [facility]         Eric Singh MD  Family Medicine  [facility]

## 2022-03-11 NOTE — TELEPHONE ENCOUNTER
Zayra White arrive to clinic awake and alert.  Pt verified name and .   Allergies reviewed with patient.  Pt given Pfizer vaccine per provider orders.    Pt tolerated well.  Pt denies any further needs.    Patient instructed to wait 15 mins after injection.   Patient states no vaccines in the last 14 days.  Vaccine information sheet was given to patient. Patient voiced understanding.    Jorje Mustafa LPN

## 2022-03-14 ENCOUNTER — PATIENT MESSAGE (OUTPATIENT)
Dept: FAMILY MEDICINE | Facility: CLINIC | Age: 57
End: 2022-03-14
Payer: MEDICAID

## 2022-03-14 ENCOUNTER — PATIENT MESSAGE (OUTPATIENT)
Dept: PULMONOLOGY | Facility: CLINIC | Age: 57
End: 2022-03-14
Payer: MEDICAID

## 2022-03-14 DIAGNOSIS — F41.9 ANXIETY: ICD-10-CM

## 2022-03-14 RX ORDER — CLONAZEPAM 0.5 MG/1
TABLET ORAL
Qty: 30 TABLET | Refills: 0 | Status: SHIPPED | OUTPATIENT
Start: 2022-03-14 | End: 2022-04-12 | Stop reason: SDUPTHER

## 2022-03-18 ENCOUNTER — TELEPHONE (OUTPATIENT)
Dept: PULMONOLOGY | Facility: CLINIC | Age: 57
End: 2022-03-18
Payer: MEDICAID

## 2022-03-18 DIAGNOSIS — J44.1 COPD EXACERBATION: Primary | ICD-10-CM

## 2022-03-18 RX ORDER — TIOTROPIUM BROMIDE 18 UG/1
18 CAPSULE ORAL; RESPIRATORY (INHALATION) DAILY
Qty: 90 CAPSULE | Refills: 3 | Status: SHIPPED | OUTPATIENT
Start: 2022-03-18 | End: 2022-05-05

## 2022-03-18 RX ORDER — BUDESONIDE AND FORMOTEROL FUMARATE DIHYDRATE 160; 4.5 UG/1; UG/1
2 AEROSOL RESPIRATORY (INHALATION) EVERY 12 HOURS
Qty: 10.2 G | Refills: 11 | Status: SHIPPED | OUTPATIENT
Start: 2022-03-18 | End: 2022-05-05

## 2022-03-18 NOTE — TELEPHONE ENCOUNTER
This is another patient that will need a change from trelegy as it is [place] medicaid.

## 2022-03-21 ENCOUNTER — PATIENT MESSAGE (OUTPATIENT)
Dept: PULMONOLOGY | Facility: CLINIC | Age: 57
End: 2022-03-21
Payer: MEDICAID

## 2022-03-21 ENCOUNTER — PATIENT MESSAGE (OUTPATIENT)
Dept: ADMINISTRATIVE | Facility: HOSPITAL | Age: 57
End: 2022-03-21
Payer: MEDICAID

## 2022-03-23 ENCOUNTER — CLINICAL SUPPORT (OUTPATIENT)
Dept: SMOKING CESSATION | Facility: CLINIC | Age: 57
End: 2022-03-23

## 2022-03-23 DIAGNOSIS — F17.200 NICOTINE DEPENDENCE: Primary | ICD-10-CM

## 2022-03-23 PROCEDURE — 99407 PR TOBACCO USE CESSATION INTENSIVE >10 MINUTES: ICD-10-PCS | Mod: S$GLB,,,

## 2022-03-23 PROCEDURE — 99407 BEHAV CHNG SMOKING > 10 MIN: CPT | Mod: S$GLB,,,

## 2022-03-23 NOTE — PROGRESS NOTES
Spoke with patient today in regard to smoking cessation progress for 12 month telephone follow up, she states not tobacco free. Patient states not interested in returning to the program at this time.  Informed patient of benefit period, future follow up, and contact information if any further help or support is needed. Will complete smart form for 12 month follow up and resolve Quit attempt #1.

## 2022-04-12 ENCOUNTER — PATIENT MESSAGE (OUTPATIENT)
Dept: FAMILY MEDICINE | Facility: CLINIC | Age: 57
End: 2022-04-12
Payer: MEDICAID

## 2022-04-12 DIAGNOSIS — F41.9 ANXIETY: ICD-10-CM

## 2022-04-12 RX ORDER — CLONAZEPAM 0.5 MG/1
TABLET ORAL
Qty: 30 TABLET | Refills: 0 | Status: SHIPPED | OUTPATIENT
Start: 2022-04-12 | End: 2022-05-17 | Stop reason: SDUPTHER

## 2022-05-05 ENCOUNTER — OFFICE VISIT (OUTPATIENT)
Dept: FAMILY MEDICINE | Facility: CLINIC | Age: 57
End: 2022-05-05
Payer: MEDICAID

## 2022-05-05 ENCOUNTER — PATIENT OUTREACH (OUTPATIENT)
Dept: ADMINISTRATIVE | Facility: OTHER | Age: 57
End: 2022-05-05
Payer: MEDICAID

## 2022-05-05 ENCOUNTER — OFFICE VISIT (OUTPATIENT)
Dept: SURGERY | Facility: CLINIC | Age: 57
End: 2022-05-05
Payer: MEDICAID

## 2022-05-05 VITALS
DIASTOLIC BLOOD PRESSURE: 79 MMHG | WEIGHT: 107 LBS | OXYGEN SATURATION: 94 % | HEIGHT: 65 IN | SYSTOLIC BLOOD PRESSURE: 144 MMHG | BODY MASS INDEX: 17.83 KG/M2 | HEART RATE: 83 BPM | RESPIRATION RATE: 16 BRPM

## 2022-05-05 VITALS
HEART RATE: 94 BPM | SYSTOLIC BLOOD PRESSURE: 144 MMHG | WEIGHT: 107 LBS | BODY MASS INDEX: 17.83 KG/M2 | HEIGHT: 65 IN | OXYGEN SATURATION: 91 % | DIASTOLIC BLOOD PRESSURE: 79 MMHG

## 2022-05-05 DIAGNOSIS — Z12.11 SCREENING FOR COLON CANCER: ICD-10-CM

## 2022-05-05 DIAGNOSIS — K21.9 GASTROESOPHAGEAL REFLUX DISEASE, UNSPECIFIED WHETHER ESOPHAGITIS PRESENT: Primary | ICD-10-CM

## 2022-05-05 DIAGNOSIS — J44.1 COPD WITH ACUTE EXACERBATION: Primary | ICD-10-CM

## 2022-05-05 DIAGNOSIS — Z01.818 PRE-OP TESTING: ICD-10-CM

## 2022-05-05 PROCEDURE — 1159F PR MEDICATION LIST DOCUMENTED IN MEDICAL RECORD: ICD-10-PCS | Mod: CPTII,,, | Performed by: SURGERY

## 2022-05-05 PROCEDURE — 99213 OFFICE O/P EST LOW 20 MIN: CPT | Mod: PBBFAC,27 | Performed by: FAMILY MEDICINE

## 2022-05-05 PROCEDURE — 3077F PR MOST RECENT SYSTOLIC BLOOD PRESSURE >= 140 MM HG: ICD-10-PCS | Mod: CPTII,,, | Performed by: SURGERY

## 2022-05-05 PROCEDURE — 3078F DIAST BP <80 MM HG: CPT | Mod: CPTII,,, | Performed by: FAMILY MEDICINE

## 2022-05-05 PROCEDURE — 1159F MED LIST DOCD IN RCRD: CPT | Mod: CPTII,,, | Performed by: FAMILY MEDICINE

## 2022-05-05 PROCEDURE — 99215 OFFICE O/P EST HI 40 MIN: CPT | Mod: S$PBB,,, | Performed by: SURGERY

## 2022-05-05 PROCEDURE — 3078F PR MOST RECENT DIASTOLIC BLOOD PRESSURE < 80 MM HG: ICD-10-PCS | Mod: CPTII,,, | Performed by: FAMILY MEDICINE

## 2022-05-05 PROCEDURE — 99214 PR OFFICE/OUTPT VISIT, EST, LEVL IV, 30-39 MIN: ICD-10-PCS | Mod: S$PBB,,, | Performed by: FAMILY MEDICINE

## 2022-05-05 PROCEDURE — 3008F BODY MASS INDEX DOCD: CPT | Mod: CPTII,,, | Performed by: FAMILY MEDICINE

## 2022-05-05 PROCEDURE — 3078F DIAST BP <80 MM HG: CPT | Mod: CPTII,,, | Performed by: SURGERY

## 2022-05-05 PROCEDURE — 1159F MED LIST DOCD IN RCRD: CPT | Mod: CPTII,,, | Performed by: SURGERY

## 2022-05-05 PROCEDURE — 3008F BODY MASS INDEX DOCD: CPT | Mod: CPTII,,, | Performed by: SURGERY

## 2022-05-05 PROCEDURE — 99999 PR PBB SHADOW E&M-EST. PATIENT-LVL III: CPT | Mod: PBBFAC,,, | Performed by: FAMILY MEDICINE

## 2022-05-05 PROCEDURE — 99215 OFFICE O/P EST HI 40 MIN: CPT | Mod: PBBFAC | Performed by: SURGERY

## 2022-05-05 PROCEDURE — 99999 PR PBB SHADOW E&M-EST. PATIENT-LVL V: CPT | Mod: PBBFAC,,, | Performed by: SURGERY

## 2022-05-05 PROCEDURE — 3008F PR BODY MASS INDEX (BMI) DOCUMENTED: ICD-10-PCS | Mod: CPTII,,, | Performed by: FAMILY MEDICINE

## 2022-05-05 PROCEDURE — 99999 PR PBB SHADOW E&M-EST. PATIENT-LVL V: ICD-10-PCS | Mod: PBBFAC,,, | Performed by: SURGERY

## 2022-05-05 PROCEDURE — 3078F PR MOST RECENT DIASTOLIC BLOOD PRESSURE < 80 MM HG: ICD-10-PCS | Mod: CPTII,,, | Performed by: SURGERY

## 2022-05-05 PROCEDURE — 99215 PR OFFICE/OUTPT VISIT, EST, LEVL V, 40-54 MIN: ICD-10-PCS | Mod: S$PBB,,, | Performed by: SURGERY

## 2022-05-05 PROCEDURE — 3077F SYST BP >= 140 MM HG: CPT | Mod: CPTII,,, | Performed by: SURGERY

## 2022-05-05 PROCEDURE — 3008F PR BODY MASS INDEX (BMI) DOCUMENTED: ICD-10-PCS | Mod: CPTII,,, | Performed by: SURGERY

## 2022-05-05 PROCEDURE — 3077F PR MOST RECENT SYSTOLIC BLOOD PRESSURE >= 140 MM HG: ICD-10-PCS | Mod: CPTII,,, | Performed by: FAMILY MEDICINE

## 2022-05-05 PROCEDURE — 1159F PR MEDICATION LIST DOCUMENTED IN MEDICAL RECORD: ICD-10-PCS | Mod: CPTII,,, | Performed by: FAMILY MEDICINE

## 2022-05-05 PROCEDURE — 99214 OFFICE O/P EST MOD 30 MIN: CPT | Mod: S$PBB,,, | Performed by: FAMILY MEDICINE

## 2022-05-05 PROCEDURE — 99999 PR PBB SHADOW E&M-EST. PATIENT-LVL III: ICD-10-PCS | Mod: PBBFAC,,, | Performed by: FAMILY MEDICINE

## 2022-05-05 PROCEDURE — 3077F SYST BP >= 140 MM HG: CPT | Mod: CPTII,,, | Performed by: FAMILY MEDICINE

## 2022-05-05 RX ORDER — PROMETHAZINE HYDROCHLORIDE AND DEXTROMETHORPHAN HYDROBROMIDE 6.25; 15 MG/5ML; MG/5ML
5 SYRUP ORAL EVERY 4 HOURS PRN
Qty: 118 ML | Refills: 0 | Status: SHIPPED | OUTPATIENT
Start: 2022-05-05 | End: 2022-05-15

## 2022-05-05 RX ORDER — AZITHROMYCIN 250 MG/1
TABLET, FILM COATED ORAL
Qty: 6 TABLET | Refills: 0 | Status: SHIPPED | OUTPATIENT
Start: 2022-05-05 | End: 2022-05-10

## 2022-05-05 RX ORDER — PROMETHAZINE HYDROCHLORIDE 25 MG/1
25 TABLET ORAL EVERY 6 HOURS PRN
COMMUNITY
Start: 2022-04-05 | End: 2022-11-21 | Stop reason: SDUPTHER

## 2022-05-05 RX ORDER — SODIUM CHLORIDE 9 MG/ML
INJECTION, SOLUTION INTRAVENOUS CONTINUOUS
Status: CANCELLED | OUTPATIENT
Start: 2022-05-05

## 2022-05-05 RX ORDER — PREDNISONE 20 MG/1
40 TABLET ORAL DAILY
Qty: 10 TABLET | Refills: 0 | Status: SHIPPED | OUTPATIENT
Start: 2022-05-05 | End: 2022-05-10

## 2022-05-05 NOTE — PROGRESS NOTES
Patient, Zayra White, was instructed on Magnesium Citrate bowel prep. Patient was given instructions on pre-op, tammy-op, and post-op scheduled appointments.  Patient received blue folder containing all written instructions.      Brayden Fitch LPN

## 2022-05-05 NOTE — PROGRESS NOTES
[facility] - Clinic Note    Subjective      Ms. White is a 56 y.o. female who presents to clinic with complaints of congestion.     Reports congestion for the past 2 weeks.   Complains of a cough with green sputum productive and increase use of nebs.   She has been using symbicort daily as well.   Denies fevers.   She has been taking OTC mucinex and zrytec with no improvement.    PM Zayra has a past medical history of Anxiety, Arthritis, Asthma, Back pain, COPD (chronic obstructive pulmonary disease), and Pulmonary embolism.   PSXH Zayra has a past surgical history that includes Breast cyst excision;  section; Cholecystectomy; Biopsy; and Incision and drainage of abscess (Left, 3/16/2020).    Zayra's family history includes Breast cancer in her sister.    Zayra reports that she has been smoking cigarettes. She has a 41.00 pack-year smoking history. She has never used smokeless tobacco. She reports previous alcohol use. She reports that she does not use drugs.   ALG Zayra is allergic to ativan [lorazepam].   MED Zayra has a current medication list which includes the following prescription(s): albuterol, aspirin, atenolol, atorvastatin, cholecalciferol (vitamin d3), clonazepam, duloxetine, duloxetine, trelegy ellipta, ipratropium, promethazine, azithromycin, prednisone, and promethazine-dextromethorphan.     Review of Systems   Constitutional: Negative for activity change, appetite change, chills, fatigue and fever.   HENT: Positive for congestion and sinus pressure. Negative for ear discharge, ear pain, postnasal drip, rhinorrhea, sinus pain and sore throat.    Eyes: Negative for visual disturbance.   Respiratory: Positive for cough, shortness of breath and wheezing.    Cardiovascular: Negative for chest pain, palpitations and leg swelling.   Gastrointestinal: Negative for abdominal pain, nausea and vomiting.   Skin: Negative for wound.   Neurological: Negative for dizziness  "and headaches.   Psychiatric/Behavioral: Negative for confusion.     Objective     BP (!) 144/79 (BP Location: Left arm, Patient Position: Sitting, BP Method: Medium (Manual))   Pulse 83   Resp 16   Ht 5' 5" (1.651 m)   Wt 48.5 kg (107 lb)   LMP 09/22/2017   SpO2 (!) 94%   BMI 17.81 kg/m²     Physical Exam   Constitutional: She appears well-developed and well-nourished.  Non-toxic appearance. She does not appear ill. No distress.   HENT:   Head: Normocephalic and atraumatic.   Right Ear: Tympanic membrane, external ear and ear canal normal.   Left Ear: External ear and ear canal normal.   Nose: Nasal congestion present.   Mouth/Throat: Mucous membranes are moist. No oropharyngeal exudate or posterior oropharyngeal erythema. Oropharynx is clear.   Left middle ear effusion   Eyes: Conjunctivae are normal. Right eye exhibits no discharge. Left eye exhibits no discharge. No scleral icterus.   Cardiovascular: Normal rate, regular rhythm, normal heart sounds and normal pulses. Exam reveals no gallop and no friction rub.   No murmur heard.  Pulmonary/Chest: Effort normal. No respiratory distress. She has wheezes. She has no rhonchi. She has no rales.   Poor air movement in all lung fields   Abdominal: Normal appearance.   Musculoskeletal:      Cervical back: Neck supple.   Lymphadenopathy:     She has no cervical adenopathy.   Neurological: She is alert.   Skin: Skin is warm and dry. Capillary refill takes less than 2 seconds. She is not diaphoretic.   Psychiatric: Her behavior is normal. Mood, judgment and thought content normal.   Vitals reviewed.     Assessment/Plan     Zayra was seen today for nasal congestion.    Diagnoses and all orders for this visit:  -New patient and new problem to me    COPD with acute exacerbation  -     predniSONE (DELTASONE) 20 MG tablet; Take 2 tablets (40 mg total) by mouth once daily. for 5 days  -     promethazine-dextromethorphan (PROMETHAZINE-DM) 6.25-15 mg/5 mL Syrp; Take 5 " mLs by mouth every 4 (four) hours as needed (cough).  -     azithromycin (Z-MIANI) 250 MG tablet; Take 2 tablets by mouth on day 1; Take 1 tablet by mouth on days 2-5    -schedule nebs TID for the next 5 days.     Follow up if symptoms worsen or fail to improve.    Future Appointments   Date Time Provider Department Center   5/5/2022 11:00 AM Shay Dodge MD [facility]   5/20/2022 10:00 AM [facility] PULMONARY [facility]   5/20/2022 11:00 AM [facility] CT2 LIMIT 500 LBS [facility] CT SCAN [facility]   5/20/2022  1:00 PM Chloe Alamo NP [facility] PULM [facility]   6/3/2022 12:30 PM [facility] LABORATORY [facility] LAB [facility]   6/15/2022  3:40 PM Eric Singh MD [facility]   6/21/2022 10:15 AM Chavez Gonzales MD [facility]       Shay Dodge MD  Family Medicine  [facility]

## 2022-05-05 NOTE — H&P
[facility] Surgery H&P Note    Subjective:       Patient ID: Zayra White is a 56 y.o. female.    Chief Complaint:  Doc I need scopes.  HPI:  Zayra White is a 56 y.o. female history of anxiety asthma arthritis COPD presents today as a established patient surgery Clinic with new issues.  Patient is established patient of Dr. Singh.  Patient has never had a colonoscopy.  No blood in the stool.  No family history of colon or rectal cancers.  No unexplained weight loss significant or bowel habit changes.  Patient also describes symptoms of gastroesophageal reflux disease.  Spicy foods make it worse.  Reflux disease not anywhere severe as what it was when she had a gallbladder problem.  She however does have heartburn at times.  Given the above, patient was referred today for evaluation for possible endoscopy.    Past Medical History:   Diagnosis Date    Anxiety     Arthritis     Asthma     Back pain     COPD (chronic obstructive pulmonary disease)     Pulmonary embolism     10 years ago     Past Surgical History:   Procedure Laterality Date    BIOPSY      right breast    BREAST CYST EXCISION       SECTION      CHOLECYSTECTOMY      INCISION AND DRAINAGE OF ABSCESS Left 3/16/2020    Procedure: INCISION AND DRAINAGE, ABSCESS;  Surgeon: Irvin Villarreal MD;  Location: [facility];  Service: General;  Laterality: Left;     Family History   Problem Relation Age of Onset    Breast cancer Sister     Ovarian cancer Neg Hx      Social History     Socioeconomic History    Marital status: Legally    Tobacco Use    Smoking status: Current Every Day Smoker     Packs/day: 1.00     Years: 41.00     Pack years: 41.00     Types: Cigarettes     Last attempt to quit: 2021     Years since quittin.4    Smokeless tobacco: Never Used   Substance and Sexual Activity    Alcohol use: Not Currently     Comment: occ    Drug use: Never    Sexual activity: Yes     Birth  control/protection: Post-menopausal   Social History Narrative    ** Merged History Encounter **            Current Outpatient Medications   Medication Sig Dispense Refill    albuterol (VENTOLIN HFA) 90 mcg/actuation inhaler inhale 2 puff by inhalation route  every 4 - 6 hours as needed 36 g 6    aspirin 81 MG Chew 81 mg.      atenoloL (TENORMIN) 25 MG tablet Take 0.5 tablets (12.5 mg total) by mouth once daily. 45 tablet 3    atorvastatin (LIPITOR) 20 MG tablet Take 1 tablet (20 mg total) by mouth once daily. 90 tablet 3    cholecalciferol, vitamin D3, (VITAMIN D3) 25 mcg (1,000 unit) capsule Take 2 capsules (2,000 Units total) by mouth once daily. 60 capsule 0    clonazePAM (KLONOPIN) 0.5 MG tablet Take ONE tablet (0.5 mg total) by mouth daily as needed for Anxiety **THANK YOU** 30 tablet 0    DULoxetine (CYMBALTA) 20 MG capsule Take 40 mg by mouth once daily.      DULoxetine 40 mg CpDR Take 40 mg by mouth once daily. 30 capsule 11    fluticasone-umeclidin-vilanter (TRELEGY ELLIPTA) 200-62.5-25 mcg inhaler Inhale 1 puff into the lungs once daily. 60 each 11    ipratropium (ATROVENT) 0.02 % nebulizer solution Take 2.5 mLs (500 mcg total) by nebulization every 8 (eight) hours as needed for Wheezing. Rescue 1 Box 5    promethazine (PHENERGAN) 25 MG tablet Take 25 mg by mouth every 6 (six) hours as needed.      budesonide-formoterol 160-4.5 mcg (SYMBICORT) 160-4.5 mcg/actuation HFAA Inhale 2 puffs into the lungs every 12 (twelve) hours. Controller 10.2 g 11    cetirizine (ZYRTEC) 10 MG tablet Take 1 tablet (10 mg total) by mouth once daily. 30 tablet 3    tiotropium (SPIRIVA) 18 mcg inhalation capsule Inhale 1 capsule (18 mcg total) into the lungs once daily. Controller 90 capsule 3     No current facility-administered medications for this visit.     Review of patient's allergies indicates:   Allergen Reactions    Ativan [lorazepam] Itching       Review of Systems   Constitutional: Negative for activity  "change, appetite change, chills and fever.   HENT: Negative for congestion, dental problem and ear discharge.    Eyes: Negative for discharge and itching.   Respiratory: Negative for apnea, choking and chest tightness.    Cardiovascular: Negative for chest pain and leg swelling.   Gastrointestinal: Negative for abdominal distention, abdominal pain, anal bleeding, constipation, diarrhea and nausea.   Endocrine: Negative for cold intolerance and heat intolerance.   Genitourinary: Negative for difficulty urinating and dyspareunia.   Musculoskeletal: Negative for arthralgias and back pain.   Skin: Negative for color change and pallor.   Neurological: Negative for dizziness and facial asymmetry.   Hematological: Negative for adenopathy. Does not bruise/bleed easily.   Psychiatric/Behavioral: Negative for agitation and behavioral problems.       Objective:      Vitals:    05/05/22 0916   BP: (!) 144/79   Pulse: 94   SpO2: (!) 91%   Weight: 48.5 kg (107 lb)   Height: 5' 5" (1.651 m)     Physical Exam  Constitutional:       General: She is not in acute distress.     Appearance: She is well-developed.   HENT:      Head: Normocephalic and atraumatic.   Eyes:      Pupils: Pupils are equal, round, and reactive to light.   Neck:      Thyroid: No thyromegaly.   Cardiovascular:      Rate and Rhythm: Normal rate and regular rhythm.   Pulmonary:      Effort: Pulmonary effort is normal.      Breath sounds: Normal breath sounds.   Abdominal:      General: Bowel sounds are normal. There is no distension.      Palpations: Abdomen is soft.      Tenderness: There is no abdominal tenderness.   Musculoskeletal:         General: No deformity. Normal range of motion.      Cervical back: Normal range of motion and neck supple.   Skin:     General: Skin is warm.      Capillary Refill: Capillary refill takes less than 2 seconds.      Findings: No erythema.   Neurological:      Mental Status: She is alert and oriented to person, place, and time. "      Cranial Nerves: No cranial nerve deficit.   Psychiatric:         Behavior: Behavior normal.            Assessment:       1. Gastroesophageal reflux disease, unspecified whether esophagitis present    2. Pre-op testing    3. Screening for colon cancer        Plan:   Gastroesophageal reflux disease, unspecified whether esophagitis present  -     Case Request Operating Room: COLONOSCOPY, ESOPHAGOGASTRODUODENOSCOPY (EGD)  -     Full code; Standing  -     Place in Outpatient; Standing  -     Vital signs; Standing  -     Insert peripheral IV; Standing  -     Diet NPO; Standing  -     Place FAUSTINO hose; Standing  -     Place sequential compression device; Standing    Pre-op testing  -     CBC Auto Differential; Future; Expected date: 08/04/2022  -     Comprehensive Metabolic Panel; Future; Expected date: 08/04/2022    Screening for colon cancer  -     Ambulatory referral/consult to General Surgery  -     Case Request Operating Room: COLONOSCOPY, ESOPHAGOGASTRODUODENOSCOPY (EGD)  -     Full code; Standing  -     Place in Outpatient; Standing  -     Vital signs; Standing  -     Insert peripheral IV; Standing  -     Diet NPO; Standing  -     Place FAUSTINO hose; Standing  -     Place sequential compression device; Standing    Other orders  -     0.9%  NaCl infusion  -     IP VTE LOW RISK PATIENT; Standing        Medical Decision Making/Counseling:  Gastroesophageal reflux disease in need of EGD further delineation to ensure no other issues.  Risk benefits of EGD were discussed in detail with patient clinic today.  After risk benefits discussion, patient voiced understanding risk benefits wished to proceed near future.    Colon screening.  Due for age based risk, normal risk screening colonoscopy.  Risk benefits colonoscopy discussed in detail patient clinic today.  After risk benefits discussion, patient voiced understanding risk benefits wished to proceed near future.    Will obtain preoperative lab test to include CBC, CMP and  EKG for review by the Anesthesiologist the day of the procedure prior to induction of the anesthetic agent of choice.    Risk and benefits of EGD were discussed in clinic in depth.  Risk of EGD were discussed to include bleeding as well as perforation.  Patient understands that if the above procedural risks were to occur, he could need further intervention to include but not exclude a blood transfusion, repeat procedure, admission to the hospital, or even surgery which would likely require transfer to a higher level of care.     Risks and benefits of Colonoscopy were discussed in depth in clinic as well.  From a procedural standpoint, we discuss the benefits of colonoscopy to be finding colon cancers at early stages, including polyps which can be endoscopically resectable, to finding early stage colon cancers which can be better treated with current medical and surgical therapies in order to give patients a longer survival, if found in these early stages.  From a standpoint of risks, the risk of bleeding and perforation of the colon were discussed.  I personally discussed that if complications of bleeding or perforation were to occur, the patient could need as little as a blood transfusion and as much as possible hospital admission, repeat procedure, or even surgery.  During today's discussion of the procedure of colonoscopy with the patient, I personally alex the patient a picture to assist with counseling.  Total clinic time spent today 45 minutes face-to-face, with greater than half of the time spent in face to face counseling.    Patient instructed that best way to communicate with my office staff is for patient to get on the [id number] 3LM patient portal to expedite communication and communication issues that may occur.  Patient was given instructions on how to get on the portal.  I encouraged patient to obtain portal access as well.  Ultimately it is up to the patient to obtain access.  Patient voiced  understanding.

## 2022-05-11 ENCOUNTER — PATIENT MESSAGE (OUTPATIENT)
Dept: FAMILY MEDICINE | Facility: CLINIC | Age: 57
End: 2022-05-11
Payer: MEDICAID

## 2022-05-11 DIAGNOSIS — J44.1 COPD WITH ACUTE EXACERBATION: Primary | ICD-10-CM

## 2022-05-12 ENCOUNTER — PATIENT MESSAGE (OUTPATIENT)
Dept: FAMILY MEDICINE | Facility: CLINIC | Age: 57
End: 2022-05-12
Payer: MEDICAID

## 2022-05-12 RX ORDER — DOXYCYCLINE HYCLATE 100 MG
100 TABLET ORAL 2 TIMES DAILY
Qty: 14 TABLET | Refills: 0 | Status: SHIPPED | OUTPATIENT
Start: 2022-05-12 | End: 2022-05-19

## 2022-05-17 ENCOUNTER — PATIENT MESSAGE (OUTPATIENT)
Dept: FAMILY MEDICINE | Facility: CLINIC | Age: 57
End: 2022-05-17
Payer: MEDICAID

## 2022-05-17 DIAGNOSIS — F41.9 ANXIETY: ICD-10-CM

## 2022-05-18 RX ORDER — CLONAZEPAM 0.5 MG/1
TABLET ORAL
Qty: 30 TABLET | Refills: 0 | Status: SHIPPED | OUTPATIENT
Start: 2022-05-18 | End: 2022-06-15 | Stop reason: SDUPTHER

## 2022-05-23 ENCOUNTER — PATIENT MESSAGE (OUTPATIENT)
Dept: FAMILY MEDICINE | Facility: CLINIC | Age: 57
End: 2022-05-23
Payer: MEDICAID

## 2022-05-23 DIAGNOSIS — J44.9 CHRONIC OBSTRUCTIVE PULMONARY DISEASE, UNSPECIFIED COPD TYPE: ICD-10-CM

## 2022-05-23 RX ORDER — ALBUTEROL SULFATE 90 UG/1
AEROSOL, METERED RESPIRATORY (INHALATION)
Qty: 36 G | Refills: 6 | Status: SHIPPED | OUTPATIENT
Start: 2022-05-23 | End: 2022-06-15

## 2022-06-03 ENCOUNTER — LAB VISIT (OUTPATIENT)
Dept: LAB | Facility: HOSPITAL | Age: 57
End: 2022-06-03
Attending: SURGERY
Payer: MEDICAID

## 2022-06-03 DIAGNOSIS — Z01.818 PRE-OP TESTING: ICD-10-CM

## 2022-06-03 LAB
ALBUMIN SERPL BCP-MCNC: 3.7 G/DL (ref 3.5–5.2)
ALP SERPL-CCNC: 88 U/L (ref 55–135)
ALT SERPL W/O P-5'-P-CCNC: 12 U/L (ref 10–44)
ANION GAP SERPL CALC-SCNC: 12 MMOL/L (ref 8–16)
AST SERPL-CCNC: 19 U/L (ref 10–40)
BASOPHILS # BLD AUTO: 0.03 K/UL (ref 0–0.2)
BASOPHILS NFR BLD: 0.5 % (ref 0–1.9)
BILIRUB SERPL-MCNC: 0.3 MG/DL (ref 0.1–1)
BUN SERPL-MCNC: 4 MG/DL (ref 6–20)
CALCIUM SERPL-MCNC: 9.5 MG/DL (ref 8.7–10.5)
CHLORIDE SERPL-SCNC: 103 MMOL/L (ref 95–110)
CO2 SERPL-SCNC: 30 MMOL/L (ref 23–29)
CREAT SERPL-MCNC: 0.7 MG/DL (ref 0.5–1.4)
DIFFERENTIAL METHOD: ABNORMAL
EOSINOPHIL # BLD AUTO: 0 K/UL (ref 0–0.5)
EOSINOPHIL NFR BLD: 0.5 % (ref 0–8)
ERYTHROCYTE [DISTWIDTH] IN BLOOD BY AUTOMATED COUNT: 11.9 % (ref 11.5–14.5)
EST. GFR  (AFRICAN AMERICAN): >60 ML/MIN/1.73 M^2
EST. GFR  (NON AFRICAN AMERICAN): >60 ML/MIN/1.73 M^2
GLUCOSE SERPL-MCNC: 103 MG/DL (ref 70–110)
HCT VFR BLD AUTO: 40.5 % (ref 37–48.5)
HGB BLD-MCNC: 14.1 G/DL (ref 12–16)
IMM GRANULOCYTES # BLD AUTO: 0.02 K/UL (ref 0–0.04)
IMM GRANULOCYTES NFR BLD AUTO: 0.3 % (ref 0–0.5)
LYMPHOCYTES # BLD AUTO: 1.5 K/UL (ref 1–4.8)
LYMPHOCYTES NFR BLD: 23.9 % (ref 18–48)
MCH RBC QN AUTO: 32.6 PG (ref 27–31)
MCHC RBC AUTO-ENTMCNC: 34.8 G/DL (ref 32–36)
MCV RBC AUTO: 94 FL (ref 82–98)
MONOCYTES # BLD AUTO: 0.4 K/UL (ref 0.3–1)
MONOCYTES NFR BLD: 6.8 % (ref 4–15)
NEUTROPHILS # BLD AUTO: 4.2 K/UL (ref 1.8–7.7)
NEUTROPHILS NFR BLD: 68 % (ref 38–73)
NRBC BLD-RTO: 0 /100 WBC
PLATELET # BLD AUTO: 336 K/UL (ref 150–450)
PMV BLD AUTO: 8.7 FL (ref 9.2–12.9)
POTASSIUM SERPL-SCNC: 3 MMOL/L (ref 3.5–5.1)
PROT SERPL-MCNC: 7 G/DL (ref 6–8.4)
RBC # BLD AUTO: 4.32 M/UL (ref 4–5.4)
SODIUM SERPL-SCNC: 145 MMOL/L (ref 136–145)
WBC # BLD AUTO: 6.14 K/UL (ref 3.9–12.7)

## 2022-06-03 PROCEDURE — 80053 COMPREHEN METABOLIC PANEL: CPT | Performed by: SURGERY

## 2022-06-03 PROCEDURE — 85025 COMPLETE CBC W/AUTO DIFF WBC: CPT | Performed by: SURGERY

## 2022-06-03 PROCEDURE — 36415 COLL VENOUS BLD VENIPUNCTURE: CPT | Performed by: SURGERY

## 2022-06-06 ENCOUNTER — ANESTHESIA EVENT (OUTPATIENT)
Dept: SURGERY | Facility: HOSPITAL | Age: 57
End: 2022-06-06
Payer: MEDICAID

## 2022-06-06 ENCOUNTER — HOSPITAL ENCOUNTER (OUTPATIENT)
Facility: HOSPITAL | Age: 57
Discharge: HOME | End: 2022-06-06
Attending: SURGERY | Admitting: SURGERY
Payer: MEDICAID

## 2022-06-06 ENCOUNTER — ANESTHESIA (OUTPATIENT)
Dept: SURGERY | Facility: HOSPITAL | Age: 57
End: 2022-06-06
Payer: MEDICAID

## 2022-06-06 VITALS
TEMPERATURE: 97 F | HEIGHT: 65 IN | DIASTOLIC BLOOD PRESSURE: 74 MMHG | BODY MASS INDEX: 17.83 KG/M2 | RESPIRATION RATE: 19 BRPM | HEART RATE: 81 BPM | SYSTOLIC BLOOD PRESSURE: 136 MMHG | OXYGEN SATURATION: 95 % | WEIGHT: 107 LBS

## 2022-06-06 DIAGNOSIS — K21.9 GASTROESOPHAGEAL REFLUX DISEASE, UNSPECIFIED WHETHER ESOPHAGITIS PRESENT: ICD-10-CM

## 2022-06-06 DIAGNOSIS — Z12.11 SCREENING FOR COLON CANCER: ICD-10-CM

## 2022-06-06 PROCEDURE — 45384 COLONOSCOPY W/LESION REMOVAL: CPT | Mod: PT | Performed by: SURGERY

## 2022-06-06 PROCEDURE — 45385 COLONOSCOPY W/LESION REMOVAL: CPT | Mod: PT,,, | Performed by: SURGERY

## 2022-06-06 PROCEDURE — D9220A PRA ANESTHESIA: Mod: ANES,,, | Performed by: ANESTHESIOLOGY

## 2022-06-06 PROCEDURE — 43239 EGD BIOPSY SINGLE/MULTIPLE: CPT | Mod: ,,, | Performed by: SURGERY

## 2022-06-06 PROCEDURE — 25000003 PHARM REV CODE 250: Performed by: NURSE ANESTHETIST, CERTIFIED REGISTERED

## 2022-06-06 PROCEDURE — D9220A PRA ANESTHESIA: Mod: CRNA,,, | Performed by: NURSE ANESTHETIST, CERTIFIED REGISTERED

## 2022-06-06 PROCEDURE — 45385 PR COLONOSCOPY,REMV LESN,SNARE: ICD-10-PCS | Mod: PT,,, | Performed by: SURGERY

## 2022-06-06 PROCEDURE — 88305 TISSUE EXAM BY PATHOLOGIST: ICD-10-PCS | Mod: 26,,, | Performed by: PATHOLOGY

## 2022-06-06 PROCEDURE — 27201423 OPTIME MED/SURG SUP & DEVICES STERILE SUPPLY: Performed by: SURGERY

## 2022-06-06 PROCEDURE — 88305 TISSUE EXAM BY PATHOLOGIST: CPT | Mod: 26,,, | Performed by: PATHOLOGY

## 2022-06-06 PROCEDURE — D9220A PRA ANESTHESIA: ICD-10-PCS | Mod: CRNA,,, | Performed by: NURSE ANESTHETIST, CERTIFIED REGISTERED

## 2022-06-06 PROCEDURE — D9220A PRA ANESTHESIA: ICD-10-PCS | Mod: ANES,,, | Performed by: ANESTHESIOLOGY

## 2022-06-06 PROCEDURE — 43239 PR EGD, FLEX, W/BIOPSY, SGL/MULTI: ICD-10-PCS | Mod: ,,, | Performed by: SURGERY

## 2022-06-06 PROCEDURE — 37000008 HC ANESTHESIA 1ST 15 MINUTES: Performed by: SURGERY

## 2022-06-06 PROCEDURE — 45384 PR COLONOSCOPY,REMV LESN,FORCEP/CAUTERY: ICD-10-PCS | Mod: 59,PT,, | Performed by: SURGERY

## 2022-06-06 PROCEDURE — 00813 ANES UPR LWR GI NDSC PX: CPT | Performed by: SURGERY

## 2022-06-06 PROCEDURE — 37000009 HC ANESTHESIA EA ADD 15 MINS: Performed by: SURGERY

## 2022-06-06 PROCEDURE — 88305 TISSUE EXAM BY PATHOLOGIST: CPT | Performed by: PATHOLOGY

## 2022-06-06 PROCEDURE — 45384 COLONOSCOPY W/LESION REMOVAL: CPT | Mod: 59,PT,, | Performed by: SURGERY

## 2022-06-06 PROCEDURE — 45385 COLONOSCOPY W/LESION REMOVAL: CPT | Mod: PT | Performed by: SURGERY

## 2022-06-06 PROCEDURE — 63600175 PHARM REV CODE 636 W HCPCS: Performed by: NURSE ANESTHETIST, CERTIFIED REGISTERED

## 2022-06-06 PROCEDURE — 43239 EGD BIOPSY SINGLE/MULTIPLE: CPT | Performed by: SURGERY

## 2022-06-06 PROCEDURE — 63600175 PHARM REV CODE 636 W HCPCS: Performed by: ANESTHESIOLOGY

## 2022-06-06 RX ORDER — SODIUM CHLORIDE 9 MG/ML
INJECTION, SOLUTION INTRAVENOUS CONTINUOUS
Status: DISCONTINUED | OUTPATIENT
Start: 2022-06-06 | End: 2022-06-06 | Stop reason: HOSPADM

## 2022-06-06 RX ORDER — SODIUM CHLORIDE, SODIUM LACTATE, POTASSIUM CHLORIDE, CALCIUM CHLORIDE 600; 310; 30; 20 MG/100ML; MG/100ML; MG/100ML; MG/100ML
125 INJECTION, SOLUTION INTRAVENOUS CONTINUOUS
Status: DISCONTINUED | OUTPATIENT
Start: 2022-06-06 | End: 2022-06-06 | Stop reason: HOSPADM

## 2022-06-06 RX ORDER — ONDANSETRON 2 MG/ML
4 INJECTION INTRAMUSCULAR; INTRAVENOUS DAILY PRN
Status: DISCONTINUED | OUTPATIENT
Start: 2022-06-06 | End: 2022-06-06 | Stop reason: HOSPADM

## 2022-06-06 RX ORDER — LIDOCAINE HYDROCHLORIDE 20 MG/ML
INJECTION, SOLUTION EPIDURAL; INFILTRATION; INTRACAUDAL; PERINEURAL
Status: DISCONTINUED | OUTPATIENT
Start: 2022-06-06 | End: 2022-06-06

## 2022-06-06 RX ORDER — PANTOPRAZOLE SODIUM 40 MG/1
40 TABLET, DELAYED RELEASE ORAL DAILY
Qty: 30 TABLET | Refills: 11 | Status: SHIPPED | OUTPATIENT
Start: 2022-06-06 | End: 2023-05-22 | Stop reason: SDUPTHER

## 2022-06-06 RX ORDER — LIDOCAINE HYDROCHLORIDE 10 MG/ML
1 INJECTION, SOLUTION EPIDURAL; INFILTRATION; INTRACAUDAL; PERINEURAL ONCE
Status: DISCONTINUED | OUTPATIENT
Start: 2022-06-06 | End: 2022-06-06 | Stop reason: HOSPADM

## 2022-06-06 RX ORDER — PROPOFOL 10 MG/ML
VIAL (ML) INTRAVENOUS
Status: DISCONTINUED | OUTPATIENT
Start: 2022-06-06 | End: 2022-06-06

## 2022-06-06 RX ORDER — SODIUM CHLORIDE, SODIUM LACTATE, POTASSIUM CHLORIDE, CALCIUM CHLORIDE 600; 310; 30; 20 MG/100ML; MG/100ML; MG/100ML; MG/100ML
INJECTION, SOLUTION INTRAVENOUS CONTINUOUS
Status: DISCONTINUED | OUTPATIENT
Start: 2022-06-06 | End: 2022-06-06 | Stop reason: HOSPADM

## 2022-06-06 RX ADMIN — PROPOFOL 50 MG: 10 INJECTION, EMULSION INTRAVENOUS at 11:06

## 2022-06-06 RX ADMIN — GLYCOPYRROLATE 0.2 MG: 0.2 INJECTION INTRAMUSCULAR; INTRAVENOUS at 11:06

## 2022-06-06 RX ADMIN — SODIUM CHLORIDE, POTASSIUM CHLORIDE, SODIUM LACTATE AND CALCIUM CHLORIDE: 600; 310; 30; 20 INJECTION, SOLUTION INTRAVENOUS at 11:06

## 2022-06-06 RX ADMIN — LIDOCAINE HYDROCHLORIDE 40 MG: 20 INJECTION, SOLUTION EPIDURAL; INFILTRATION; INTRACAUDAL; PERINEURAL at 11:06

## 2022-06-06 NOTE — ANESTHESIA POSTPROCEDURE EVALUATION
Anesthesia Post Evaluation    Patient: Zayra White    Procedure(s) Performed: Procedure(s) (LRB):  COLONOSCOPY (N/A)  ESOPHAGOGASTRODUODENOSCOPY (EGD) (N/A)    Final Anesthesia Type: general      Patient location during evaluation: PACU  Patient participation: Yes- Able to Participate  Level of consciousness: awake and awake and alert  Post-procedure vital signs: reviewed and stable  Pain management: adequate  Airway patency: patent    PONV status at discharge: No PONV  Anesthetic complications: no      Cardiovascular status: blood pressure returned to baseline  Respiratory status: unassisted and spontaneous ventilation  Hydration status: euvolemic  Follow-up not needed.          Vitals Value Taken Time   /74 06/06/22 1208   Temp 36.2 °C (97.2 °F) 06/06/22 1138   Pulse 81 06/06/22 1223   Resp 19 06/06/22 1223   SpO2 95 % 06/06/22 1223         Event Time   Out of Recovery 12:08:00         Pain/Aureliano Score: Aureliano Score: 9 (6/6/2022 11:53 AM)  Modified Aureliano Score: 20 (6/6/2022 12:23 PM)

## 2022-06-06 NOTE — PROVATION PATIENT INSTRUCTIONS
Discharge Summary/Instructions after an Endoscopic Procedure  Patient Name: Zayra White  Patient MRN: [id number]  Patient YOB: 1965 Monday, June 6, 2022  Chavez Gonzales MD  RESTRICTIONS:  During your procedure today, you received medications for sedation.  These   medications may affect your judgment, balance and coordination.  Therefore,   for 24 hours, you have the following restrictions:   - DO NOT drive a car, operate machinery, make legal/financial decisions,   sign important papers or drink alcohol.    ACTIVITY:  Today: no heavy lifting, straining or running due to procedural   sedation/anesthesia.  The following day: return to full activity including work.  DIET:  Eat and drink normally unless instructed otherwise.     TREATMENT FOR COMMON SIDE EFFECTS:  - Mild abdominal pain, nausea, belching, bloating or excessive gas:  rest,   eat lightly and use a heating pad.  - Sore Throat: treat with throat lozenges and/or gargle with warm salt   water.  - Because air was used during the procedure, expelling large amounts of air   from your rectum or belching is normal.  - If a bowel prep was taken, you may not have a bowel movement for 1-3 days.    This is normal.  SYMPTOMS TO WATCH FOR AND REPORT TO YOUR PHYSICIAN:  1. Abdominal pain or bloating, other than gas cramps.  2. Chest pain.  3. Back pain.  4. Signs of infection such as: chills or fever occurring within 24 hours   after the procedure.  5. Rectal bleeding, which would show as bright red, maroon, or black stools.   (A tablespoon of blood from the rectum is not serious, especially if   hemorrhoids are present.)  6. Vomiting.  7. Weakness or dizziness.  GO DIRECTLY TO THE NEAREST EMERGENCY ROOM IF YOU HAVE ANY OF THE FOLLOWING:      Difficulty breathing              Chills and/or fever over 101 F   Persistent vomiting and/or vomiting blood   Severe abdominal pain   Severe chest pain   Black, tarry stools   Bleeding- more than one  tablespoon   Any other symptom or condition that you feel may need urgent attention  Your doctor recommends these additional instructions:  If any biopsies were taken, your doctors clinic will contact you in 1 to 2   weeks with any results.  - Discharge patient to home (ambulatory).   - Resume previous diet.   - Continue present medications.   - Use Protonix (pantoprazole) 40 mg PO daily.   - Await pathology results.   - Return to my office in 2 weeks.  For questions, problems or results please call your physician - Chavez Gonzales MD at Work:  [phone].  [facility] EMERGENCY ROOM PHONE NUMBER: [phone]  IF A COMPLICATION OR EMERGENCY SITUATION ARISES AND YOU ARE UNABLE TO REACH   YOUR PHYSICIAN - GO DIRECTLY TO THE EMERGENCY ROOM.  MD Chavez Duque MD  6/6/2022 11:36:32 AM  This report has been verified and signed electronically.  Dear patient,  As a result of recent federal legislation (The Federal Cures Act), you may   receive lab or pathology results from your procedure in your [facility]   account before your physician is able to contact you. Your physician or   their representative will relay the results to you with their   recommendations at their soonest availability.  Thank you,  PROVATION

## 2022-06-06 NOTE — PROVATION PATIENT INSTRUCTIONS
Discharge Summary/Instructions after an Endoscopic Procedure  Patient Name: Zayra White  Patient MRN: [id number]  Patient YOB: 1965 Monday, June 6, 2022  Chavez Gonzales MD  RESTRICTIONS:  During your procedure today, you received medications for sedation.  These   medications may affect your judgment, balance and coordination.  Therefore,   for 24 hours, you have the following restrictions:   - DO NOT drive a car, operate machinery, make legal/financial decisions,   sign important papers or drink alcohol.    ACTIVITY:  Today: no heavy lifting, straining or running due to procedural   sedation/anesthesia.  The following day: return to full activity including work.  DIET:  Eat and drink normally unless instructed otherwise.     TREATMENT FOR COMMON SIDE EFFECTS:  - Mild abdominal pain, nausea, belching, bloating or excessive gas:  rest,   eat lightly and use a heating pad.  - Sore Throat: treat with throat lozenges and/or gargle with warm salt   water.  - Because air was used during the procedure, expelling large amounts of air   from your rectum or belching is normal.  - If a bowel prep was taken, you may not have a bowel movement for 1-3 days.    This is normal.  SYMPTOMS TO WATCH FOR AND REPORT TO YOUR PHYSICIAN:  1. Abdominal pain or bloating, other than gas cramps.  2. Chest pain.  3. Back pain.  4. Signs of infection such as: chills or fever occurring within 24 hours   after the procedure.  5. Rectal bleeding, which would show as bright red, maroon, or black stools.   (A tablespoon of blood from the rectum is not serious, especially if   hemorrhoids are present.)  6. Vomiting.  7. Weakness or dizziness.  GO DIRECTLY TO THE NEAREST EMERGENCY ROOM IF YOU HAVE ANY OF THE FOLLOWING:      Difficulty breathing              Chills and/or fever over 101 F   Persistent vomiting and/or vomiting blood   Severe abdominal pain   Severe chest pain   Black, tarry stools   Bleeding- more than one  tablespoon   Any other symptom or condition that you feel may need urgent attention  Your doctor recommends these additional instructions:  If any biopsies were taken, your doctors clinic will contact you in 1 to 2   weeks with any results.  - Discharge patient to home (ambulatory).   - Resume previous diet.   - Continue present medications.   - Await pathology results.   - Repeat colonoscopy in 3 years for surveillance.   - Return to my office in 2 weeks.  For questions, problems or results please call your physician - Chavez Gonzales MD at Work:  [phone].  [facility] EMERGENCY ROOM PHONE NUMBER: [phone]  IF A COMPLICATION OR EMERGENCY SITUATION ARISES AND YOU ARE UNABLE TO REACH   YOUR PHYSICIAN - GO DIRECTLY TO THE EMERGENCY ROOM.  MD Chavez Duque MD  6/6/2022 11:32:47 AM  This report has been verified and signed electronically.  Dear patient,  As a result of recent federal legislation (The Federal Cures Act), you may   receive lab or pathology results from your procedure in your [facility]   account before your physician is able to contact you. Your physician or   their representative will relay the results to you with their   recommendations at their soonest availability.  Thank you,  PROVATION

## 2022-06-06 NOTE — ANESTHESIA PREPROCEDURE EVALUATION
06/06/2022  Zayra White is a 56 y.o., female.      Pre-op Assessment    I have reviewed the Patient Summary Reports.     I have reviewed the Nursing Notes.    I have reviewed the Medications.     Review of Systems  Anesthesia Hx:  No problems with previous Anesthesia  Neg history of prior surgery. Denies Family Hx of Anesthesia complications.   Denies Personal Hx of Anesthesia complications.   Social:  Smoker    Hematology/Oncology:  Hematology Normal   Oncology Normal     EENT/Dental:EENT/Dental Normal   Cardiovascular:  Cardiovascular Normal     Pulmonary:   COPD, mild Asthma asymptomatic    Renal/:  Renal/ Normal     Hepatic/GI:  Hepatic/GI Normal    Musculoskeletal:  Musculoskeletal Normal    Neurological:  Neurology Normal    Endocrine:  Endocrine Normal    Dermatological:  Skin Normal    Psych:  Psychiatric Normal           Physical Exam  General: Alert    Airway:  Mallampati: II   Mouth Opening: Normal  TM Distance: Normal  Neck ROM: Normal ROM    Dental:  Intact    Chest/Lungs:  Clear to auscultation, Normal Respiratory Rate    Heart:  Rate: Normal    Abdomen:  Normal        Anesthesia Plan  Type of Anesthesia, risks & benefits discussed:    Anesthesia Type: Gen ETT  Post Op Pain Control Plan: multimodal analgesia  Airway Plan: Direct, Post-Induction  Informed Consent: Informed consent signed with the Patient and all parties understand the risks and agree with anesthesia plan.  All questions answered. Patient consented to blood products? No  ASA Score: 3  Day of Surgery Review of History & Physical: H&P Update referred to the surgeon/provider.    Ready For Surgery From Anesthesia Perspective.     .

## 2022-06-06 NOTE — H&P
[facility] Surgery H&P Note    Subjective:       Patient ID: Zayra White is a 56 y.o. female.    Chief Complaint:  Doc I need scopes.  HPI:  Zayra White is a 56 y.o. female history of anxiety asthma arthritis COPD presents today as a established patient surgery Clinic with new issues.  Patient is established patient of Dr. Singh.  Patient has never had a colonoscopy.  No blood in the stool.  No family history of colon or rectal cancers.  No unexplained weight loss significant or bowel habit changes.  Patient also describes symptoms of gastroesophageal reflux disease.  Spicy foods make it worse.  Reflux disease not anywhere severe as what it was when she had a gallbladder problem.  She however does have heartburn at times.  Given the above, patient was referred today for evaluation for possible endoscopy.    Past Medical History:   Diagnosis Date    Anxiety     Arthritis     Asthma     Back pain     COPD (chronic obstructive pulmonary disease)     Pulmonary embolism     10 years ago     Past Surgical History:   Procedure Laterality Date    BIOPSY      right breast    BREAST CYST EXCISION       SECTION      CHOLECYSTECTOMY      INCISION AND DRAINAGE OF ABSCESS Left 3/16/2020    Procedure: INCISION AND DRAINAGE, ABSCESS;  Surgeon: Irvin Villarreal MD;  Location: [facility];  Service: General;  Laterality: Left;     Family History   Problem Relation Age of Onset    Breast cancer Sister     Ovarian cancer Neg Hx      Social History     Socioeconomic History    Marital status: Legally    Tobacco Use    Smoking status: Current Every Day Smoker     Packs/day: 1.00     Years: 41.00     Pack years: 41.00     Types: Cigarettes     Last attempt to quit: 2021     Years since quittin.5    Smokeless tobacco: Never Used   Substance and Sexual Activity    Alcohol use: Not Currently     Comment: occ    Drug use: Never    Sexual activity: Yes     Birth  control/protection: Post-menopausal   Social History Narrative    ** Merged History Encounter **            No current facility-administered medications for this encounter.     Review of patient's allergies indicates:   Allergen Reactions    Ativan [lorazepam] Itching       Review of Systems   Constitutional: Negative for activity change, appetite change, chills and fever.   HENT: Negative for congestion, dental problem and ear discharge.    Eyes: Negative for discharge and itching.   Respiratory: Negative for apnea, choking and chest tightness.    Cardiovascular: Negative for chest pain and leg swelling.   Gastrointestinal: Negative for abdominal distention, abdominal pain, anal bleeding, constipation, diarrhea and nausea.   Endocrine: Negative for cold intolerance and heat intolerance.   Genitourinary: Negative for difficulty urinating and dyspareunia.   Musculoskeletal: Negative for arthralgias and back pain.   Skin: Negative for color change and pallor.   Neurological: Negative for dizziness and facial asymmetry.   Hematological: Negative for adenopathy. Does not bruise/bleed easily.   Psychiatric/Behavioral: Negative for agitation and behavioral problems.       Objective:      There were no vitals filed for this visit.  Physical Exam  Constitutional:       General: She is not in acute distress.     Appearance: She is well-developed.   HENT:      Head: Normocephalic and atraumatic.   Eyes:      Pupils: Pupils are equal, round, and reactive to light.   Neck:      Thyroid: No thyromegaly.   Cardiovascular:      Rate and Rhythm: Normal rate and regular rhythm.   Pulmonary:      Effort: Pulmonary effort is normal.      Breath sounds: Normal breath sounds.   Abdominal:      General: Bowel sounds are normal. There is no distension.      Palpations: Abdomen is soft.      Tenderness: There is no abdominal tenderness.   Musculoskeletal:         General: No deformity. Normal range of motion.      Cervical back: Normal  range of motion and neck supple.   Skin:     General: Skin is warm.      Capillary Refill: Capillary refill takes less than 2 seconds.      Findings: No erythema.   Neurological:      Mental Status: She is alert and oriented to person, place, and time.      Cranial Nerves: No cranial nerve deficit.   Psychiatric:         Behavior: Behavior normal.              Medical Decision Making/Counseling:  Gastroesophageal reflux disease in need of EGD further delineation to ensure no other issues.  Risk benefits of EGD were discussed in detail with patient clinic today.  After risk benefits discussion, patient voiced understanding risk benefits wished to proceed near future.    Colon screening.  Due for age based risk, normal risk screening colonoscopy.  Risk benefits colonoscopy discussed in detail patient clinic today.  After risk benefits discussion, patient voiced understanding risk benefits wished to proceed near future.    Will obtain preoperative lab test to include CBC, CMP and EKG for review by the Anesthesiologist the day of the procedure prior to induction of the anesthetic agent of choice.    Risk and benefits of EGD were discussed in clinic in depth.  Risk of EGD were discussed to include bleeding as well as perforation.  Patient understands that if the above procedural risks were to occur, he could need further intervention to include but not exclude a blood transfusion, repeat procedure, admission to the hospital, or even surgery which would likely require transfer to a higher level of care.     Risks and benefits of Colonoscopy were discussed in depth in clinic as well.  From a procedural standpoint, we discuss the benefits of colonoscopy to be finding colon cancers at early stages, including polyps which can be endoscopically resectable, to finding early stage colon cancers which can be better treated with current medical and surgical therapies in order to give patients a longer survival, if found in these  early stages.  From a standpoint of risks, the risk of bleeding and perforation of the colon were discussed.  I personally discussed that if complications of bleeding or perforation were to occur, the patient could need as little as a blood transfusion and as much as possible hospital admission, repeat procedure, or even surgery.  During today's discussion of the procedure of colonoscopy with the patient, I personally alex the patient a picture to assist with counseling.  Total clinic time spent today 45 minutes face-to-face, with greater than half of the time spent in face to face counseling.    Patient instructed that best way to communicate with my office staff is for patient to get on the [facility] patient portal to expedite communication and communication issues that may occur.  Patient was given instructions on how to get on the portal.  I encouraged patient to obtain portal access as well.  Ultimately it is up to the patient to obtain access.  Patient voiced understanding.

## 2022-06-06 NOTE — DISCHARGE SUMMARY
Discharge Note        SUMMARY     Admit Date: 6/6/2022    Attending Physician: Chavez Gonzales MD     Discharge Physician: Chavez Gonzales MD    Discharge Date: 6/6/2022 11:33 AM      Hospital Course: Patient tolerated procedure well.     Disposition: Home or Self Care    Patient Instructions:   Current Discharge Medication List      START taking these medications    Details   pantoprazole (PROTONIX) 40 MG tablet Take 1 tablet (40 mg total) by mouth once daily. Take in the morning before breakfast.  Wait 30 minutes before eating or drinking anything  Qty: 30 tablet, Refills: 11         CONTINUE these medications which have NOT CHANGED    Details   albuterol (VENTOLIN HFA) 90 mcg/actuation inhaler inhale 2 puff by inhalation route  every 4 - 6 hours as needed  Qty: 36 g, Refills: 6    Associated Diagnoses: Chronic obstructive pulmonary disease, unspecified COPD type      aspirin 81 MG Chew 81 mg.      atenoloL (TENORMIN) 25 MG tablet Take 0.5 tablets (12.5 mg total) by mouth once daily.  Qty: 45 tablet, Refills: 3    Comments: .      atorvastatin (LIPITOR) 20 MG tablet Take 1 tablet (20 mg total) by mouth once daily.  Qty: 90 tablet, Refills: 3    Associated Diagnoses: Hyperlipidemia, unspecified hyperlipidemia type      cholecalciferol, vitamin D3, (VITAMIN D3) 25 mcg (1,000 unit) capsule Take 2 capsules (2,000 Units total) by mouth once daily.  Qty: 60 capsule, Refills: 0    Associated Diagnoses: COVID-19 virus infection      clonazePAM (KLONOPIN) 0.5 MG tablet Take ONE tablet (0.5 mg total) by mouth daily as needed for Anxiety **THANK YOU**  Qty: 30 tablet, Refills: 0    Associated Diagnoses: Anxiety      !! DULoxetine (CYMBALTA) 20 MG capsule Take 40 mg by mouth once daily.      !! DULoxetine 40 mg CpDR Take 40 mg by mouth once daily.  Qty: 30 capsule, Refills: 11    Associated Diagnoses: Arthralgia, unspecified joint      fluticasone-umeclidin-vilanter (TRELEGY ELLIPTA) 200-62.5-25 mcg inhaler  Inhale 1 puff into the lungs once daily.  Qty: 60 each, Refills: 11    Associated Diagnoses: COPD exacerbation      ipratropium (ATROVENT) 0.02 % nebulizer solution Take 2.5 mLs (500 mcg total) by nebulization every 8 (eight) hours as needed for Wheezing. Rescue  Qty: 1 Box, Refills: 5      promethazine (PHENERGAN) 25 MG tablet Take 25 mg by mouth every 6 (six) hours as needed.       !! - Potential duplicate medications found. Please discuss with provider.          Discharge Procedure Orders (must include Diet, Follow-up, Activity):   Discharge Procedure Orders (must include Diet, Follow-up, Activity)   Diet general     Call MD for:  temperature >100.4     Call MD for:  persistent nausea and vomiting     Call MD for:  severe uncontrolled pain     Call MD for:  difficulty breathing, headache or visual disturbances     Call MD for:  redness, tenderness, or signs of infection (pain, swelling, redness, odor or green/yellow discharge around incision site)     Call MD for:  persistent dizziness or light-headedness        Follow Up:  Follow up as scheduled.  Resume routine diet.  Activity as tolerated.    No driving day of procedure.

## 2022-06-06 NOTE — TRANSFER OF CARE
"Anesthesia Transfer of Care Note    Patient: Zayra White    Procedure(s) Performed: Procedure(s) (LRB):  COLONOSCOPY (N/A)  ESOPHAGOGASTRODUODENOSCOPY (EGD) (N/A)    Patient location: PACU    Anesthesia Type: general    Transport from OR: Transported from OR on room air with adequate spontaneous ventilation    Post pain: adequate analgesia    Post assessment: no apparent anesthetic complications and tolerated procedure well    Post vital signs: stable    Level of consciousness: awake, alert and oriented    Nausea/Vomiting: no nausea/vomiting    Complications: none    Transfer of care protocol was followed      Last vitals:   Visit Vitals  /73 (BP Location: Right arm, Patient Position: Lying)   Pulse 83   Temp 36.8 °C (98.2 °F) (Oral)   Resp 17   Ht 5' 5" (1.651 m)   Wt 48.5 kg (107 lb)   LMP 09/22/2017   SpO2 95%   Breastfeeding No   BMI 17.81 kg/m²     "

## 2022-06-06 NOTE — DISCHARGE INSTRUCTIONS
[facility]   [phone]  [facility]      [phone]    Managing nausea    Some people have an upset stomach after surgery. This is often because of anesthesia, pain, or pain medicine, or the stress of surgery. These tips will help you handle nausea and eat healthy foods as you get better. If you were on a special food plan before surgery, ask your healthcare provider if you should follow it while you get better. These tips may help:  Do not push yourself to eat. Your body will tell you when to eat and how much.  Start off with clear liquids and soup. They are easier to digest.  Next try semi-solid foods, such as mashed potatoes, applesauce, and gelatin, as you feel ready.  Slowly move to solid foods. Dont eat fatty, rich, or spicy foods at first.  Do not force yourself to have 3 large meals a day. Instead eat smaller amounts more often.  Take pain medicines with a small amount of solid food, such as crackers or toast, to avoid nausea.

## 2022-06-06 NOTE — PLAN OF CARE
Has met unit/department guidelines for discharge from each phase of the post procedure continuum. Leaving floor per w/c with RN. AURELIO x3. Resp even and unlabored room air. No distress noted. Tolerating Po fluids without c/o nausea/vomiting. Voiding without difficulty. All personal belongings returned to pt.

## 2022-06-08 LAB
FINAL PATHOLOGIC DIAGNOSIS: NORMAL
GROSS: NORMAL
Lab: NORMAL

## 2022-06-15 ENCOUNTER — OFFICE VISIT (OUTPATIENT)
Dept: FAMILY MEDICINE | Facility: CLINIC | Age: 57
End: 2022-06-15
Payer: MEDICAID

## 2022-06-15 VITALS
TEMPERATURE: 98 F | SYSTOLIC BLOOD PRESSURE: 136 MMHG | HEIGHT: 65 IN | OXYGEN SATURATION: 98 % | HEART RATE: 99 BPM | BODY MASS INDEX: 17.83 KG/M2 | RESPIRATION RATE: 12 BRPM | WEIGHT: 107 LBS | DIASTOLIC BLOOD PRESSURE: 82 MMHG

## 2022-06-15 DIAGNOSIS — Z23 NEED FOR SHINGLES VACCINE: ICD-10-CM

## 2022-06-15 DIAGNOSIS — J44.9 CHRONIC OBSTRUCTIVE PULMONARY DISEASE, UNSPECIFIED COPD TYPE: Primary | ICD-10-CM

## 2022-06-15 DIAGNOSIS — F41.9 ANXIETY: ICD-10-CM

## 2022-06-15 PROCEDURE — 3075F PR MOST RECENT SYSTOLIC BLOOD PRESS GE 130-139MM HG: ICD-10-PCS | Mod: CPTII,,, | Performed by: FAMILY MEDICINE

## 2022-06-15 PROCEDURE — 99999 PR PBB SHADOW E&M-EST. PATIENT-LVL IV: ICD-10-PCS | Mod: PBBFAC,,, | Performed by: FAMILY MEDICINE

## 2022-06-15 PROCEDURE — 1160F PR REVIEW ALL MEDS BY PRESCRIBER/CLIN PHARMACIST DOCUMENTED: ICD-10-PCS | Mod: CPTII,,, | Performed by: FAMILY MEDICINE

## 2022-06-15 PROCEDURE — 3008F PR BODY MASS INDEX (BMI) DOCUMENTED: ICD-10-PCS | Mod: CPTII,,, | Performed by: FAMILY MEDICINE

## 2022-06-15 PROCEDURE — 3079F PR MOST RECENT DIASTOLIC BLOOD PRESSURE 80-89 MM HG: ICD-10-PCS | Mod: CPTII,,, | Performed by: FAMILY MEDICINE

## 2022-06-15 PROCEDURE — 1159F PR MEDICATION LIST DOCUMENTED IN MEDICAL RECORD: ICD-10-PCS | Mod: CPTII,,, | Performed by: FAMILY MEDICINE

## 2022-06-15 PROCEDURE — 99999 PR PBB SHADOW E&M-EST. PATIENT-LVL IV: CPT | Mod: PBBFAC,,, | Performed by: FAMILY MEDICINE

## 2022-06-15 PROCEDURE — 3075F SYST BP GE 130 - 139MM HG: CPT | Mod: CPTII,,, | Performed by: FAMILY MEDICINE

## 2022-06-15 PROCEDURE — 1160F RVW MEDS BY RX/DR IN RCRD: CPT | Mod: CPTII,,, | Performed by: FAMILY MEDICINE

## 2022-06-15 PROCEDURE — 99213 OFFICE O/P EST LOW 20 MIN: CPT | Mod: S$PBB,,, | Performed by: FAMILY MEDICINE

## 2022-06-15 PROCEDURE — 3079F DIAST BP 80-89 MM HG: CPT | Mod: CPTII,,, | Performed by: FAMILY MEDICINE

## 2022-06-15 PROCEDURE — 3008F BODY MASS INDEX DOCD: CPT | Mod: CPTII,,, | Performed by: FAMILY MEDICINE

## 2022-06-15 PROCEDURE — 99214 OFFICE O/P EST MOD 30 MIN: CPT | Mod: PBBFAC,25 | Performed by: FAMILY MEDICINE

## 2022-06-15 PROCEDURE — 1159F MED LIST DOCD IN RCRD: CPT | Mod: CPTII,,, | Performed by: FAMILY MEDICINE

## 2022-06-15 PROCEDURE — 90750 HZV VACC RECOMBINANT IM: CPT | Mod: PBBFAC

## 2022-06-15 PROCEDURE — 99213 PR OFFICE/OUTPT VISIT, EST, LEVL III, 20-29 MIN: ICD-10-PCS | Mod: S$PBB,,, | Performed by: FAMILY MEDICINE

## 2022-06-15 RX ORDER — CLONAZEPAM 0.5 MG/1
TABLET ORAL
Qty: 30 TABLET | Refills: 0 | Status: SHIPPED | OUTPATIENT
Start: 2022-06-15 | End: 2022-08-03 | Stop reason: SDUPTHER

## 2022-06-15 RX ORDER — IPRATROPIUM BROMIDE 0.5 MG/2.5ML
500 SOLUTION RESPIRATORY (INHALATION) EVERY 8 HOURS PRN
Qty: 1 EACH | Refills: 5 | Status: SHIPPED | OUTPATIENT
Start: 2022-06-15 | End: 2022-08-16

## 2022-06-15 RX ORDER — ALBUTEROL SULFATE 90 UG/1
AEROSOL, METERED RESPIRATORY (INHALATION)
Qty: 36 G | Refills: 6 | Status: SHIPPED | OUTPATIENT
Start: 2022-06-15 | End: 2023-10-02 | Stop reason: SDUPTHER

## 2022-06-15 NOTE — PROGRESS NOTES
Zayra White arrive to clinic awake and alert.  Pt verified name and .   Allergies reviewed with patient.  Pt given Shingrix via Intramuscular Left deltoid per provider orders.    Well tolerated by patient.  denies further needs.    Patient instructed to wait 15 mins after injection.   Patient states no vaccines in the last 14 days.  Vaccine information sheet was given to patient. Patient voiced understanding.    Jorje Mustafa LPN

## 2022-06-15 NOTE — PROGRESS NOTES
"[facility] - Clinic Note    Subjective      Ms. White is a 56 y.o. female who presents to clinic for Follow-up (3mth follow up  requesting refills) and Shortness of Breath    Patient has been tolerating the Trelegy.  Needs refills of medications.  Not ready to do the Pap smear today.    PMH Zayra has a past medical history of Anxiety, Arthritis, Asthma, Back pain, COPD (chronic obstructive pulmonary disease), and Pulmonary embolism.   PSXH Zayra has a past surgical history that includes Breast cyst excision;  section; Cholecystectomy; Biopsy; Incision and drainage of abscess (Left, 3/16/2020); Colonoscopy (N/A, 2022); and Esophagogastroduodenoscopy (N/A, 2022).    Zayra's family history includes Breast cancer in her sister.    aZyra reports that she has been smoking cigarettes. She has a 41.00 pack-year smoking history. She has never used smokeless tobacco. She reports previous alcohol use. She reports that she does not use drugs.   ALG Zayra is allergic to ativan [lorazepam].   DYLAN Iverson has a current medication list which includes the following prescription(s): aspirin, atenolol, atorvastatin, cholecalciferol (vitamin d3), duloxetine, trelegy ellipta, pantoprazole, promethazine, albuterol, clonazepam, and ipratropium.     Review of Systems   Constitutional: Negative for chills and fever.   HENT: Negative for postnasal drip.    Respiratory: Negative for cough.    Cardiovascular: Negative for chest pain.   Musculoskeletal: Negative for back pain.   Psychiatric/Behavioral: The patient is not nervous/anxious.      Objective     /82 (BP Location: Left arm, Patient Position: Sitting, BP Method: Small (Manual))   Pulse 99   Temp 97.9 °F (36.6 °C) (Temporal)   Resp 12   Ht 5' 5" (1.651 m)   Wt 48.5 kg (107 lb)   LMP 2017   SpO2 98% Comment: 02 given to the patient by MONCHO Amrendariz  BMI 17.81 kg/m²     Physical Exam  Vitals and nursing note reviewed. "   Constitutional:       General: She is not in acute distress.     Appearance: Normal appearance. She is well-developed. She is not diaphoretic.   HENT:      Head: Normocephalic and atraumatic.      Right Ear: External ear normal.      Left Ear: External ear normal.   Eyes:      General:         Right eye: No discharge.         Left eye: No discharge.   Cardiovascular:      Rate and Rhythm: Normal rate and regular rhythm.      Heart sounds: Normal heart sounds.   Pulmonary:      Effort: Pulmonary effort is normal.      Breath sounds: Normal breath sounds. No wheezing or rales.   Skin:     General: Skin is warm and dry.   Neurological:      Mental Status: She is alert and oriented to person, place, and time. Mental status is at baseline.   Psychiatric:         Mood and Affect: Mood normal.         Behavior: Behavior normal.         Thought Content: Thought content normal.         Judgment: Judgment normal.        Assessment/Plan     1. Chronic obstructive pulmonary disease, unspecified COPD type  albuterol (VENTOLIN HFA) 90 mcg/actuation inhaler    ipratropium (ATROVENT) 0.02 % nebulizer solution   2. Need for shingles vaccine  (In Office Administered) Zoster Recombinant Vaccine   3. Anxiety  clonazePAM (KLONOPIN) 0.5 MG tablet     Refilled medications as above.   reviewed.  No red flags.  Zoster vaccination today.  Follow-up in 3 months.  Pap smear at that time.    Future Appointments   Date Time Provider Department Center   6/15/2022  3:40 PM Eric Singh MD [facility]   6/21/2022 10:15 AM Chavez Gonzales MD [facility]   9/15/2022  2:00 PM Eric Singh MD [facility] Singh MD  Family Medicine  [facility]

## 2022-06-16 ENCOUNTER — HOSPITAL ENCOUNTER (EMERGENCY)
Facility: HOSPITAL | Age: 57
Discharge: HOME | End: 2022-06-16
Attending: EMERGENCY MEDICINE
Payer: MEDICAID

## 2022-06-16 VITALS
HEIGHT: 65 IN | OXYGEN SATURATION: 96 % | HEART RATE: 77 BPM | RESPIRATION RATE: 21 BRPM | TEMPERATURE: 99 F | WEIGHT: 110 LBS | BODY MASS INDEX: 18.33 KG/M2 | DIASTOLIC BLOOD PRESSURE: 60 MMHG | SYSTOLIC BLOOD PRESSURE: 120 MMHG

## 2022-06-16 DIAGNOSIS — J44.1 COPD EXACERBATION: Primary | ICD-10-CM

## 2022-06-16 DIAGNOSIS — R06.02 SHORTNESS OF BREATH: ICD-10-CM

## 2022-06-16 LAB
ALBUMIN SERPL BCP-MCNC: 4 G/DL (ref 3.5–5.2)
ALP SERPL-CCNC: 105 U/L (ref 55–135)
ALT SERPL W/O P-5'-P-CCNC: 12 U/L (ref 10–44)
ANION GAP SERPL CALC-SCNC: 13 MMOL/L (ref 8–16)
AST SERPL-CCNC: 19 U/L (ref 10–40)
BASOPHILS # BLD AUTO: 0.06 K/UL (ref 0–0.2)
BASOPHILS NFR BLD: 0.4 % (ref 0–1.9)
BILIRUB SERPL-MCNC: 0.6 MG/DL (ref 0.1–1)
BNP SERPL-MCNC: 40 PG/ML (ref 0–99)
BUN SERPL-MCNC: 7 MG/DL (ref 6–20)
CALCIUM SERPL-MCNC: 10 MG/DL (ref 8.7–10.5)
CHLORIDE SERPL-SCNC: 103 MMOL/L (ref 95–110)
CO2 SERPL-SCNC: 25 MMOL/L (ref 23–29)
CREAT SERPL-MCNC: 0.7 MG/DL (ref 0.5–1.4)
DIFFERENTIAL METHOD: ABNORMAL
EOSINOPHIL # BLD AUTO: 0 K/UL (ref 0–0.5)
EOSINOPHIL NFR BLD: 0.3 % (ref 0–8)
ERYTHROCYTE [DISTWIDTH] IN BLOOD BY AUTOMATED COUNT: 11.9 % (ref 11.5–14.5)
EST. GFR  (AFRICAN AMERICAN): >60 ML/MIN/1.73 M^2
EST. GFR  (NON AFRICAN AMERICAN): >60 ML/MIN/1.73 M^2
GLUCOSE SERPL-MCNC: 100 MG/DL (ref 70–110)
HCT VFR BLD AUTO: 43.5 % (ref 37–48.5)
HGB BLD-MCNC: 14.7 G/DL (ref 12–16)
IMM GRANULOCYTES # BLD AUTO: 0.05 K/UL (ref 0–0.04)
IMM GRANULOCYTES NFR BLD AUTO: 0.4 % (ref 0–0.5)
LYMPHOCYTES # BLD AUTO: 1.6 K/UL (ref 1–4.8)
LYMPHOCYTES NFR BLD: 11.4 % (ref 18–48)
MCH RBC QN AUTO: 32.3 PG (ref 27–31)
MCHC RBC AUTO-ENTMCNC: 33.8 G/DL (ref 32–36)
MCV RBC AUTO: 96 FL (ref 82–98)
MONOCYTES # BLD AUTO: 0.7 K/UL (ref 0.3–1)
MONOCYTES NFR BLD: 4.7 % (ref 4–15)
NEUTROPHILS # BLD AUTO: 11.5 K/UL (ref 1.8–7.7)
NEUTROPHILS NFR BLD: 82.8 % (ref 38–73)
NRBC BLD-RTO: 0 /100 WBC
PLATELET # BLD AUTO: 458 K/UL (ref 150–450)
PMV BLD AUTO: 9.5 FL (ref 9.2–12.9)
POTASSIUM SERPL-SCNC: 4.3 MMOL/L (ref 3.5–5.1)
PROT SERPL-MCNC: 7.7 G/DL (ref 6–8.4)
RBC # BLD AUTO: 4.55 M/UL (ref 4–5.4)
SODIUM SERPL-SCNC: 141 MMOL/L (ref 136–145)
TROPONIN I SERPL DL<=0.01 NG/ML-MCNC: <0.006 NG/ML (ref 0–0.03)
WBC # BLD AUTO: 13.88 K/UL (ref 3.9–12.7)

## 2022-06-16 PROCEDURE — 93010 ELECTROCARDIOGRAM REPORT: CPT | Mod: ,,, | Performed by: INTERNAL MEDICINE

## 2022-06-16 PROCEDURE — 94640 AIRWAY INHALATION TREATMENT: CPT

## 2022-06-16 PROCEDURE — 85025 COMPLETE CBC W/AUTO DIFF WBC: CPT | Performed by: EMERGENCY MEDICINE

## 2022-06-16 PROCEDURE — 93010 EKG 12-LEAD: ICD-10-PCS | Mod: ,,, | Performed by: INTERNAL MEDICINE

## 2022-06-16 PROCEDURE — 71045 X-RAY EXAM CHEST 1 VIEW: CPT | Mod: 26,,, | Performed by: RADIOLOGY

## 2022-06-16 PROCEDURE — 99285 EMERGENCY DEPT VISIT HI MDM: CPT | Mod: 25

## 2022-06-16 PROCEDURE — 27000221 HC OXYGEN, UP TO 24 HOURS

## 2022-06-16 PROCEDURE — 25000242 PHARM REV CODE 250 ALT 637 W/ HCPCS: Performed by: EMERGENCY MEDICINE

## 2022-06-16 PROCEDURE — 71045 X-RAY EXAM CHEST 1 VIEW: CPT | Mod: TC,FY

## 2022-06-16 PROCEDURE — 93005 ELECTROCARDIOGRAM TRACING: CPT

## 2022-06-16 PROCEDURE — 84484 ASSAY OF TROPONIN QUANT: CPT | Performed by: EMERGENCY MEDICINE

## 2022-06-16 PROCEDURE — 36415 COLL VENOUS BLD VENIPUNCTURE: CPT | Performed by: EMERGENCY MEDICINE

## 2022-06-16 PROCEDURE — 63600175 PHARM REV CODE 636 W HCPCS: Performed by: EMERGENCY MEDICINE

## 2022-06-16 PROCEDURE — 99900035 HC TECH TIME PER 15 MIN (STAT)

## 2022-06-16 PROCEDURE — 71045 XR CHEST AP PORTABLE: ICD-10-PCS | Mod: 26,,, | Performed by: RADIOLOGY

## 2022-06-16 PROCEDURE — 83880 ASSAY OF NATRIURETIC PEPTIDE: CPT | Performed by: EMERGENCY MEDICINE

## 2022-06-16 PROCEDURE — 96374 THER/PROPH/DIAG INJ IV PUSH: CPT

## 2022-06-16 PROCEDURE — 80053 COMPREHEN METABOLIC PANEL: CPT | Performed by: EMERGENCY MEDICINE

## 2022-06-16 RX ORDER — IPRATROPIUM BROMIDE AND ALBUTEROL SULFATE 2.5; .5 MG/3ML; MG/3ML
3 SOLUTION RESPIRATORY (INHALATION) ONCE
Status: COMPLETED | OUTPATIENT
Start: 2022-06-16 | End: 2022-06-16

## 2022-06-16 RX ORDER — METHYLPREDNISOLONE SOD SUCC 125 MG
125 VIAL (EA) INJECTION
Status: COMPLETED | OUTPATIENT
Start: 2022-06-16 | End: 2022-06-16

## 2022-06-16 RX ORDER — PREDNISONE 20 MG/1
20 TABLET ORAL DAILY
Qty: 15 TABLET | Refills: 1 | Status: ON HOLD | OUTPATIENT
Start: 2022-06-16 | End: 2022-08-08 | Stop reason: HOSPADM

## 2022-06-16 RX ORDER — ALBUTEROL SULFATE 90 UG/1
1-2 AEROSOL, METERED RESPIRATORY (INHALATION) EVERY 6 HOURS PRN
Qty: 1 G | Refills: 1 | Status: SHIPPED | OUTPATIENT
Start: 2022-06-16 | End: 2022-08-16

## 2022-06-16 RX ADMIN — IPRATROPIUM BROMIDE AND ALBUTEROL SULFATE 3 ML: 2.5; .5 SOLUTION RESPIRATORY (INHALATION) at 11:06

## 2022-06-16 RX ADMIN — METHYLPREDNISOLONE SODIUM SUCCINATE 125 MG: 125 INJECTION, POWDER, FOR SOLUTION INTRAMUSCULAR; INTRAVENOUS at 11:06

## 2022-06-16 NOTE — ED PROVIDER NOTES
Encounter Date: 2022       History     Chief Complaint   Patient presents with    Shortness of Breath     SOB staring last night at approx. 2200     49-year-old female with known history of COPD emphysema comes emergency complaints of worsening COPD emphysema.  States that she also has a mildly productive cough.  Has history of pneumonia.  Was concerned came to the ER for further evaluation treatment.  No fevers or chills.  No chest pain.  No other symptomatology.  States that the weather usually changes and gets hot or and that is when she become sicker.  Was seen by her PCP this week and had a healthy checkup.        Review of patient's allergies indicates:   Allergen Reactions    Ativan [lorazepam] Itching     Past Medical History:   Diagnosis Date    Anxiety     Arthritis     Asthma     Back pain     COPD (chronic obstructive pulmonary disease)     Pulmonary embolism     10 years ago     Past Surgical History:   Procedure Laterality Date    BIOPSY      right breast    BREAST CYST EXCISION       SECTION      CHOLECYSTECTOMY      COLONOSCOPY N/A 2022    Procedure: COLONOSCOPY;  Surgeon: Chavez Gonzales MD;  Location: [facility] ENDO;  Service: General;  Laterality: N/A;    ESOPHAGOGASTRODUODENOSCOPY N/A 2022    Procedure: ESOPHAGOGASTRODUODENOSCOPY (EGD);  Surgeon: Chavez Gonzales MD;  Location: [facility] ENDO;  Service: General;  Laterality: N/A;    INCISION AND DRAINAGE OF ABSCESS Left 3/16/2020    Procedure: INCISION AND DRAINAGE, ABSCESS;  Surgeon: Irvin Villarreal MD;  Location: [facility] OR;  Service: General;  Laterality: Left;     Family History   Problem Relation Age of Onset    Breast cancer Sister     Ovarian cancer Neg Hx      Social History     Tobacco Use    Smoking status: Current Every Day Smoker     Packs/day: 1.00     Years: 41.00     Pack years: 41.00     Types: Cigarettes     Last attempt to quit: 2021     Years since quittin.5    Smokeless  tobacco: Never Used   Substance Use Topics    Alcohol use: Not Currently     Comment: occ    Drug use: Never     Review of Systems   Constitutional: Negative.    HENT: Negative.    Respiratory: Negative.    Cardiovascular: Negative.    Gastrointestinal: Negative.    Genitourinary: Negative.    Musculoskeletal: Negative.    All other systems reviewed and are negative.      Physical Exam     Initial Vitals [06/16/22 0952]   BP Pulse Resp Temp SpO2   137/80 91 18 98.7 °F (37.1 °C) 96 %      MAP       --         Physical Exam    Nursing note and vitals reviewed.  Constitutional: She appears well-developed and well-nourished.   HENT:   Head: Normocephalic and atraumatic.   Eyes: Pupils are equal, round, and reactive to light.   Cardiovascular: Normal rate, regular rhythm and normal heart sounds.   Pulmonary/Chest: No respiratory distress. She has wheezes. She has no rhonchi. She has no rales. She exhibits no tenderness.   Very poor expiratory phase.  No crackles.  phase.  Poor air movement overall.  Mild bibasilar wheezes   Abdominal: Abdomen is soft. Bowel sounds are normal.   Musculoskeletal:         General: Normal range of motion.     Neurological: She is alert and oriented to person, place, and time. She has normal strength.   Skin: Skin is warm.   Psychiatric: She has a normal mood and affect. Thought content normal.         ED Course   Procedures  Labs Reviewed   CBC W/ AUTO DIFFERENTIAL - Abnormal; Notable for the following components:       Result Value    WBC 13.88 (*)     MCH 32.3 (*)     Platelets 458 (*)     Gran # (ANC) 11.5 (*)     Immature Grans (Abs) 0.05 (*)     Gran % 82.8 (*)     Lymph % 11.4 (*)     All other components within normal limits   COMPREHENSIVE METABOLIC PANEL   TROPONIN I   B-TYPE NATRIURETIC PEPTIDE     EKG Readings: (Independently Interpreted)   Rate 93, normal sinus rhythm, right atrial enlargement, indeterminate axis, pulmonary disease pattern.  Normal QRS, abnormal EKG.        Imaging Results          X-Ray Chest AP Portable (Final result)  Result time 06/16/22 10:51:31    Final result by Mathieu Thakur MD (06/16/22 10:51:31)                 Impression:      COPD.      Electronically signed by: Mathieu Thakur  Date:    06/16/2022  Time:    10:51             Narrative:    EXAMINATION:  XR CHEST AP PORTABLE    CLINICAL HISTORY:  CHF;    TECHNIQUE:  Single frontal view of the chest was performed.    COMPARISON:  03/11/2022.    FINDINGS:  Pulmonary hyperinflation with flattening diaphragms consistent with COPD.  Mild chronic interstitial change.  No focal consolidation.  Heart size normal.  Calcified aortic plaque.  Trachea midline.    Bony thorax intact.                              X-Rays:   Independently Interpreted Readings:   Other Readings:  Advanced COPD.    Medications   methylPREDNISolone sodium succinate injection 125 mg (125 mg Intravenous Given 6/16/22 1113)   albuterol-ipratropium 2.5 mg-0.5 mg/3 mL nebulizer solution 3 mL (3 mLs Nebulization Given 6/16/22 1100)     Medical Decision Making:   Differential Diagnosis:   Pneumonia, pulmonary bruising, asthma exacerbation, bronchitis, MI, pneumothorax, foreign body aspiration, CHF, pulmonary edema, COVID    ED Management:  Patient is stable this time.  I suspect a COPD exacerbation.  I will check a chest x-ray.  Evaluate laboratory values.  Treat with duo nebulization, Solu-Medrol.  Probable discharge home with taper dose of steroids and H have a rescue inhalers.    Chest x-ray demonstrates COPD.  Mildly elevated white blood cell count 1300. Possible bronchitis.  I will treat the patient with steroids, HFA inhaler.  Have the patient follow-up with her primary care physician as necessary for further treatment.  No obvious source needing antibiotics at this time.                      Clinical Impression:   Final diagnoses:  [R06.02] Shortness of breath  [J44.1] COPD exacerbation (Primary)          ED Disposition Condition     Discharge Stable        ED Prescriptions     Medication Sig Dispense Start Date End Date Auth. Provider    predniSONE (DELTASONE) 20 MG tablet Take 1 tablet (20 mg total) by mouth once daily. On days number 1 and 2 take 3 at 1 time in the morning.  On days 3., 4, 5 take 2 at 1 time in the morning, on days 6., 7, 8 take 1 in the morning. 15 tablet 6/16/2022  Clifford Maguire MD    albuterol (PROVENTIL/VENTOLIN HFA) 90 mcg/actuation inhaler Inhale 1-2 puffs into the lungs every 6 (six) hours as needed for Wheezing. Rescue 1 g 6/16/2022  Clifford Maguire MD        Follow-up Information     Follow up With Specialties Details Why Contact Info    Eric Singh MD Family Medicine In 1 week As needed, If symptoms worsen [address] [place] [address] [phone]             Clifford Maguire MD  06/16/22 8185

## 2022-06-16 NOTE — ED TRIAGE NOTES
Pt here for increased SOB since last night starting around 2200.  Pt reports cough.  Uses home O2 @ 2L. Took a breathing treatment and a rescue inhaler this morning pta.  Wheeled to , ambulatory to bed. Resting comfortably at this time.j Will continue to monitor.

## 2022-06-16 NOTE — PLAN OF CARE
Patient in no apparent distress. Shortness of breath noted. Sat's 96  % on 1.5 lpm. 3 ml Duoneb given. Patient wears 1-2 lpm Oxygen Q HS and PRN day  . Will continue to monitor.

## 2022-06-16 NOTE — DISCHARGE INSTRUCTIONS
Return emergency with any worsening shortness of breath, fever, chills or other concerning symptoms.  Take medications as prescribed.  Always have a rescue inhaler with you.

## 2022-07-13 ENCOUNTER — PATIENT MESSAGE (OUTPATIENT)
Dept: FAMILY MEDICINE | Facility: CLINIC | Age: 57
End: 2022-07-13
Payer: MEDICAID

## 2022-08-07 ENCOUNTER — HOSPITAL ENCOUNTER (INPATIENT)
Facility: HOSPITAL | Age: 57
LOS: 3 days | Discharge: HOME | End: 2022-08-10
Attending: EMERGENCY MEDICINE | Admitting: HOSPITALIST
Payer: MEDICAID

## 2022-08-07 DIAGNOSIS — J96.02 ACUTE RESPIRATORY FAILURE WITH HYPOXIA AND HYPERCARBIA: ICD-10-CM

## 2022-08-07 DIAGNOSIS — J96.01 ACUTE RESPIRATORY FAILURE WITH HYPOXIA AND HYPERCARBIA: ICD-10-CM

## 2022-08-07 DIAGNOSIS — R06.02 SOB (SHORTNESS OF BREATH): ICD-10-CM

## 2022-08-07 DIAGNOSIS — J44.1 COPD EXACERBATION: Primary | ICD-10-CM

## 2022-08-07 DIAGNOSIS — J96.02 ACUTE RESPIRATORY FAILURE WITH HYPERCAPNIA: ICD-10-CM

## 2022-08-07 DIAGNOSIS — A41.9 SEPSIS WITHOUT ACUTE ORGAN DYSFUNCTION, DUE TO UNSPECIFIED ORGANISM: ICD-10-CM

## 2022-08-07 LAB
ALBUMIN SERPL BCP-MCNC: 3.8 G/DL (ref 3.5–5.2)
ALLENS TEST: ABNORMAL
ALP SERPL-CCNC: 85 U/L (ref 55–135)
ALT SERPL W/O P-5'-P-CCNC: 12 U/L (ref 10–44)
ANION GAP SERPL CALC-SCNC: 17 MMOL/L (ref 8–16)
AST SERPL-CCNC: 19 U/L (ref 10–40)
BASOPHILS # BLD AUTO: 0.03 K/UL (ref 0–0.2)
BASOPHILS NFR BLD: 0.2 % (ref 0–1.9)
BILIRUB SERPL-MCNC: 0.9 MG/DL (ref 0.1–1)
BUN SERPL-MCNC: 5 MG/DL (ref 6–20)
CALCIUM SERPL-MCNC: 9.2 MG/DL (ref 8.7–10.5)
CHLORIDE SERPL-SCNC: 102 MMOL/L (ref 95–110)
CO2 SERPL-SCNC: 24 MMOL/L (ref 23–29)
CREAT SERPL-MCNC: 0.7 MG/DL (ref 0.5–1.4)
D DIMER PPP IA.FEU-MCNC: 0.33 MG/L FEU
DELSYS: ABNORMAL
DIFFERENTIAL METHOD: ABNORMAL
EOSINOPHIL # BLD AUTO: 0 K/UL (ref 0–0.5)
EOSINOPHIL NFR BLD: 0.2 % (ref 0–8)
ERYTHROCYTE [DISTWIDTH] IN BLOOD BY AUTOMATED COUNT: 11.8 % (ref 11.5–14.5)
EST. GFR  (NO RACE VARIABLE): >60 ML/MIN/1.73 M^2
GLUCOSE SERPL-MCNC: 144 MG/DL (ref 70–110)
HCO3 UR-SCNC: 32 MMOL/L (ref 24–28)
HCT VFR BLD AUTO: 39.4 % (ref 37–48.5)
HGB BLD-MCNC: 13.7 G/DL (ref 12–16)
IMM GRANULOCYTES # BLD AUTO: 0.16 K/UL (ref 0–0.04)
IMM GRANULOCYTES NFR BLD AUTO: 1.1 % (ref 0–0.5)
LACTATE SERPL-SCNC: 0.9 MMOL/L (ref 0.5–2.2)
LYMPHOCYTES # BLD AUTO: 1.8 K/UL (ref 1–4.8)
LYMPHOCYTES NFR BLD: 12.9 % (ref 18–48)
MAGNESIUM SERPL-MCNC: 1.7 MG/DL (ref 1.6–2.6)
MCH RBC QN AUTO: 32.9 PG (ref 27–31)
MCHC RBC AUTO-ENTMCNC: 34.8 G/DL (ref 32–36)
MCV RBC AUTO: 95 FL (ref 82–98)
MONOCYTES # BLD AUTO: 1.1 K/UL (ref 0.3–1)
MONOCYTES NFR BLD: 7.6 % (ref 4–15)
NEUTROPHILS # BLD AUTO: 10.8 K/UL (ref 1.8–7.7)
NEUTROPHILS NFR BLD: 78 % (ref 38–73)
NRBC BLD-RTO: 0 /100 WBC
PCO2 BLDA: 63.6 MMHG (ref 35–45)
PH SMN: 7.31 [PH] (ref 7.35–7.45)
PLATELET # BLD AUTO: 246 K/UL (ref 150–450)
PMV BLD AUTO: 9.7 FL (ref 9.2–12.9)
PO2 BLDA: 122 MMHG (ref 80–100)
POC BE: 6 MMOL/L
POC SATURATED O2: 98 % (ref 95–100)
POC TCO2: 34 MMOL/L (ref 23–27)
POTASSIUM SERPL-SCNC: 3.5 MMOL/L (ref 3.5–5.1)
PROCALCITONIN SERPL IA-MCNC: 0.37 NG/ML
PROT SERPL-MCNC: 7.4 G/DL (ref 6–8.4)
RBC # BLD AUTO: 4.16 M/UL (ref 4–5.4)
SAMPLE: ABNORMAL
SARS-COV-2 RDRP RESP QL NAA+PROBE: NEGATIVE
SITE: ABNORMAL
SODIUM SERPL-SCNC: 143 MMOL/L (ref 136–145)
TROPONIN I SERPL DL<=0.01 NG/ML-MCNC: <0.006 NG/ML (ref 0–0.03)
WBC # BLD AUTO: 13.92 K/UL (ref 3.9–12.7)

## 2022-08-07 PROCEDURE — 20000000 HC ICU ROOM

## 2022-08-07 PROCEDURE — 25000003 PHARM REV CODE 250: Performed by: EMERGENCY MEDICINE

## 2022-08-07 PROCEDURE — 63600175 PHARM REV CODE 636 W HCPCS: Performed by: STUDENT IN AN ORGANIZED HEALTH CARE EDUCATION/TRAINING PROGRAM

## 2022-08-07 PROCEDURE — 27000221 HC OXYGEN, UP TO 24 HOURS

## 2022-08-07 PROCEDURE — 99291 CRITICAL CARE FIRST HOUR: CPT | Mod: 25

## 2022-08-07 PROCEDURE — 99223 PR INITIAL HOSPITAL CARE,LEVL III: ICD-10-PCS | Mod: ,,, | Performed by: STUDENT IN AN ORGANIZED HEALTH CARE EDUCATION/TRAINING PROGRAM

## 2022-08-07 PROCEDURE — 99900035 HC TECH TIME PER 15 MIN (STAT)

## 2022-08-07 PROCEDURE — 63600175 PHARM REV CODE 636 W HCPCS: Performed by: EMERGENCY MEDICINE

## 2022-08-07 PROCEDURE — 96375 TX/PRO/DX INJ NEW DRUG ADDON: CPT

## 2022-08-07 PROCEDURE — 87040 BLOOD CULTURE FOR BACTERIA: CPT | Performed by: EMERGENCY MEDICINE

## 2022-08-07 PROCEDURE — 87070 CULTURE OTHR SPECIMN AEROBIC: CPT | Performed by: STUDENT IN AN ORGANIZED HEALTH CARE EDUCATION/TRAINING PROGRAM

## 2022-08-07 PROCEDURE — 25000242 PHARM REV CODE 250 ALT 637 W/ HCPCS: Performed by: EMERGENCY MEDICINE

## 2022-08-07 PROCEDURE — 85379 FIBRIN DEGRADATION QUANT: CPT | Performed by: INTERNAL MEDICINE

## 2022-08-07 PROCEDURE — 63600175 PHARM REV CODE 636 W HCPCS: Performed by: INTERNAL MEDICINE

## 2022-08-07 PROCEDURE — 94640 AIRWAY INHALATION TREATMENT: CPT

## 2022-08-07 PROCEDURE — 71045 XR CHEST AP PORTABLE: ICD-10-PCS | Mod: 26,,, | Performed by: RADIOLOGY

## 2022-08-07 PROCEDURE — 85025 COMPLETE CBC W/AUTO DIFF WBC: CPT | Performed by: EMERGENCY MEDICINE

## 2022-08-07 PROCEDURE — 27000190 HC CPAP FULL FACE MASK W/VALVE

## 2022-08-07 PROCEDURE — 84484 ASSAY OF TROPONIN QUANT: CPT | Performed by: INTERNAL MEDICINE

## 2022-08-07 PROCEDURE — 83605 ASSAY OF LACTIC ACID: CPT | Performed by: EMERGENCY MEDICINE

## 2022-08-07 PROCEDURE — 71045 X-RAY EXAM CHEST 1 VIEW: CPT | Mod: 26,,, | Performed by: RADIOLOGY

## 2022-08-07 PROCEDURE — 36600 WITHDRAWAL OF ARTERIAL BLOOD: CPT

## 2022-08-07 PROCEDURE — 80053 COMPREHEN METABOLIC PANEL: CPT | Performed by: EMERGENCY MEDICINE

## 2022-08-07 PROCEDURE — 83735 ASSAY OF MAGNESIUM: CPT | Performed by: EMERGENCY MEDICINE

## 2022-08-07 PROCEDURE — 94660 CPAP INITIATION&MGMT: CPT

## 2022-08-07 PROCEDURE — 25000003 PHARM REV CODE 250: Performed by: STUDENT IN AN ORGANIZED HEALTH CARE EDUCATION/TRAINING PROGRAM

## 2022-08-07 PROCEDURE — 36415 COLL VENOUS BLD VENIPUNCTURE: CPT | Performed by: INTERNAL MEDICINE

## 2022-08-07 PROCEDURE — 93005 ELECTROCARDIOGRAM TRACING: CPT

## 2022-08-07 PROCEDURE — 82803 BLOOD GASES ANY COMBINATION: CPT

## 2022-08-07 PROCEDURE — 96365 THER/PROPH/DIAG IV INF INIT: CPT

## 2022-08-07 PROCEDURE — 87205 SMEAR GRAM STAIN: CPT | Performed by: STUDENT IN AN ORGANIZED HEALTH CARE EDUCATION/TRAINING PROGRAM

## 2022-08-07 PROCEDURE — 99223 1ST HOSP IP/OBS HIGH 75: CPT | Mod: ,,, | Performed by: STUDENT IN AN ORGANIZED HEALTH CARE EDUCATION/TRAINING PROGRAM

## 2022-08-07 PROCEDURE — 25000242 PHARM REV CODE 250 ALT 637 W/ HCPCS: Performed by: STUDENT IN AN ORGANIZED HEALTH CARE EDUCATION/TRAINING PROGRAM

## 2022-08-07 PROCEDURE — 84145 PROCALCITONIN (PCT): CPT | Performed by: INTERNAL MEDICINE

## 2022-08-07 PROCEDURE — 93010 ELECTROCARDIOGRAM REPORT: CPT | Mod: ,,, | Performed by: INTERNAL MEDICINE

## 2022-08-07 PROCEDURE — 71045 X-RAY EXAM CHEST 1 VIEW: CPT | Mod: TC

## 2022-08-07 PROCEDURE — U0002 COVID-19 LAB TEST NON-CDC: HCPCS | Performed by: EMERGENCY MEDICINE

## 2022-08-07 PROCEDURE — 94644 CONT INHLJ TX 1ST HOUR: CPT

## 2022-08-07 PROCEDURE — 93010 EKG 12-LEAD: ICD-10-PCS | Mod: ,,, | Performed by: INTERNAL MEDICINE

## 2022-08-07 RX ORDER — AZITHROMYCIN 250 MG/1
500 TABLET, FILM COATED ORAL DAILY
Status: DISCONTINUED | OUTPATIENT
Start: 2022-08-08 | End: 2022-08-10 | Stop reason: HOSPADM

## 2022-08-07 RX ORDER — POTASSIUM CHLORIDE 20 MEQ/1
40 TABLET, EXTENDED RELEASE ORAL ONCE
Status: DISCONTINUED | OUTPATIENT
Start: 2022-08-07 | End: 2022-08-07

## 2022-08-07 RX ORDER — MAGNESIUM SULFATE 1 G/100ML
1 INJECTION INTRAVENOUS ONCE
Status: COMPLETED | OUTPATIENT
Start: 2022-08-07 | End: 2022-08-07

## 2022-08-07 RX ORDER — LEVOFLOXACIN 5 MG/ML
750 INJECTION, SOLUTION INTRAVENOUS
Status: COMPLETED | OUTPATIENT
Start: 2022-08-07 | End: 2022-08-07

## 2022-08-07 RX ORDER — IPRATROPIUM BROMIDE AND ALBUTEROL SULFATE 2.5; .5 MG/3ML; MG/3ML
3 SOLUTION RESPIRATORY (INHALATION) EVERY 4 HOURS
Status: DISCONTINUED | OUTPATIENT
Start: 2022-08-07 | End: 2022-08-07

## 2022-08-07 RX ORDER — ATORVASTATIN CALCIUM 10 MG/1
20 TABLET, FILM COATED ORAL NIGHTLY
Status: DISCONTINUED | OUTPATIENT
Start: 2022-08-07 | End: 2022-08-10 | Stop reason: HOSPADM

## 2022-08-07 RX ORDER — ALBUTEROL SULFATE 0.83 MG/ML
10 SOLUTION RESPIRATORY (INHALATION) CONTINUOUS
Status: DISCONTINUED | OUTPATIENT
Start: 2022-08-07 | End: 2022-08-07

## 2022-08-07 RX ORDER — METHYLPREDNISOLONE SOD SUCC 125 MG
125 VIAL (EA) INJECTION EVERY 6 HOURS
Status: DISCONTINUED | OUTPATIENT
Start: 2022-08-07 | End: 2022-08-07

## 2022-08-07 RX ORDER — PREDNISONE 10 MG/1
40 TABLET ORAL DAILY
Status: DISCONTINUED | OUTPATIENT
Start: 2022-08-07 | End: 2022-08-10 | Stop reason: HOSPADM

## 2022-08-07 RX ORDER — SODIUM CHLORIDE 0.9 % (FLUSH) 0.9 %
10 SYRINGE (ML) INJECTION
Status: DISCONTINUED | OUTPATIENT
Start: 2022-08-07 | End: 2022-08-10 | Stop reason: HOSPADM

## 2022-08-07 RX ORDER — HYDROCODONE BITARTRATE AND ACETAMINOPHEN 5; 325 MG/1; MG/1
1 TABLET ORAL EVERY 4 HOURS PRN
Status: DISCONTINUED | OUTPATIENT
Start: 2022-08-07 | End: 2022-08-07

## 2022-08-07 RX ORDER — LEVOFLOXACIN 5 MG/ML
750 INJECTION, SOLUTION INTRAVENOUS
Status: DISCONTINUED | OUTPATIENT
Start: 2022-08-07 | End: 2022-08-07

## 2022-08-07 RX ORDER — IPRATROPIUM BROMIDE AND ALBUTEROL SULFATE 2.5; .5 MG/3ML; MG/3ML
3 SOLUTION RESPIRATORY (INHALATION)
Status: DISCONTINUED | OUTPATIENT
Start: 2022-08-07 | End: 2022-08-07

## 2022-08-07 RX ORDER — FAMOTIDINE 20 MG/1
20 TABLET, FILM COATED ORAL 2 TIMES DAILY
Status: DISCONTINUED | OUTPATIENT
Start: 2022-08-07 | End: 2022-08-10 | Stop reason: HOSPADM

## 2022-08-07 RX ORDER — METHYLPREDNISOLONE SOD SUCC 125 MG
125 VIAL (EA) INJECTION
Status: COMPLETED | OUTPATIENT
Start: 2022-08-07 | End: 2022-08-07

## 2022-08-07 RX ORDER — ENOXAPARIN SODIUM 100 MG/ML
40 INJECTION SUBCUTANEOUS EVERY 24 HOURS
Status: DISCONTINUED | OUTPATIENT
Start: 2022-08-07 | End: 2022-08-10 | Stop reason: HOSPADM

## 2022-08-07 RX ORDER — LEVALBUTEROL 1.25 MG/.5ML
1.25 SOLUTION, CONCENTRATE RESPIRATORY (INHALATION) EVERY 8 HOURS
Status: DISCONTINUED | OUTPATIENT
Start: 2022-08-07 | End: 2022-08-07

## 2022-08-07 RX ORDER — ENOXAPARIN SODIUM 100 MG/ML
40 INJECTION SUBCUTANEOUS EVERY 24 HOURS
Status: DISCONTINUED | OUTPATIENT
Start: 2022-08-07 | End: 2022-08-07

## 2022-08-07 RX ORDER — DULOXETINE 40 MG/1
40 CAPSULE, DELAYED RELEASE ORAL DAILY
Status: DISCONTINUED | OUTPATIENT
Start: 2022-08-08 | End: 2022-08-10 | Stop reason: HOSPADM

## 2022-08-07 RX ORDER — NAPROXEN SODIUM 220 MG/1
81 TABLET, FILM COATED ORAL DAILY
Status: DISCONTINUED | OUTPATIENT
Start: 2022-08-08 | End: 2022-08-10 | Stop reason: HOSPADM

## 2022-08-07 RX ORDER — IPRATROPIUM BROMIDE AND ALBUTEROL SULFATE 2.5; .5 MG/3ML; MG/3ML
3 SOLUTION RESPIRATORY (INHALATION) EVERY 4 HOURS
Status: DISCONTINUED | OUTPATIENT
Start: 2022-08-07 | End: 2022-08-08

## 2022-08-07 RX ADMIN — PREDNISONE 40 MG: 10 TABLET ORAL at 09:08

## 2022-08-07 RX ADMIN — ATENOLOL 12.5 MG: 25 TABLET ORAL at 02:08

## 2022-08-07 RX ADMIN — IPRATROPIUM BROMIDE AND ALBUTEROL SULFATE 3 ML: 2.5; .5 SOLUTION RESPIRATORY (INHALATION) at 11:08

## 2022-08-07 RX ADMIN — IPRATROPIUM BROMIDE AND ALBUTEROL SULFATE 3 ML: 2.5; .5 SOLUTION RESPIRATORY (INHALATION) at 04:08

## 2022-08-07 RX ADMIN — LEVOFLOXACIN 750 MG: 750 INJECTION, SOLUTION INTRAVENOUS at 04:08

## 2022-08-07 RX ADMIN — FAMOTIDINE 20 MG: 20 TABLET ORAL at 08:08

## 2022-08-07 RX ADMIN — ATORVASTATIN CALCIUM 20 MG: 10 TABLET, FILM COATED ORAL at 08:08

## 2022-08-07 RX ADMIN — METHYLPREDNISOLONE SODIUM SUCCINATE 125 MG: 125 INJECTION, POWDER, FOR SOLUTION INTRAMUSCULAR; INTRAVENOUS at 03:08

## 2022-08-07 RX ADMIN — ALBUTEROL SULFATE 10 MG: 2.5 SOLUTION RESPIRATORY (INHALATION) at 03:08

## 2022-08-07 RX ADMIN — ENOXAPARIN SODIUM 40 MG: 100 INJECTION SUBCUTANEOUS at 07:08

## 2022-08-07 RX ADMIN — IPRATROPIUM BROMIDE AND ALBUTEROL SULFATE 3 ML: 2.5; .5 SOLUTION RESPIRATORY (INHALATION) at 07:08

## 2022-08-07 RX ADMIN — IPRATROPIUM BROMIDE AND ALBUTEROL SULFATE 3 ML: 2.5; .5 SOLUTION RESPIRATORY (INHALATION) at 10:08

## 2022-08-07 RX ADMIN — MAGNESIUM SULFATE 1 G: 1 INJECTION INTRAVENOUS at 07:08

## 2022-08-07 RX ADMIN — FAMOTIDINE 20 MG: 20 TABLET ORAL at 09:08

## 2022-08-07 NOTE — ED NOTES
BiPap setting 12/6 at 30%. Patient states she feels much better. Color improved. Explained to patient and sister of admit to ICU due to BiPap. Voices understanding.

## 2022-08-07 NOTE — NURSING
"Report received.  Assessment done. Awake and oriented.  Bipap removed and placed on O2 per NC.  RT at bedside.  Will closely monitor.  Reports "I'm feeling better".  Sitting up in bed and eating breakfast.  No complaints.  See flowsheet for further assessment.   "

## 2022-08-07 NOTE — SUBJECTIVE & OBJECTIVE
Past Medical History:   Diagnosis Date    Anxiety     Arthritis     Asthma     Back pain     COPD (chronic obstructive pulmonary disease)     Pulmonary embolism     10 years ago       Past Surgical History:   Procedure Laterality Date    BIOPSY      right breast    BREAST CYST EXCISION       SECTION      CHOLECYSTECTOMY      COLONOSCOPY N/A 2022    Procedure: COLONOSCOPY;  Surgeon: Chavez Gonzales MD;  Location: [facility] ENDO;  Service: General;  Laterality: N/A;    ESOPHAGOGASTRODUODENOSCOPY N/A 2022    Procedure: ESOPHAGOGASTRODUODENOSCOPY (EGD);  Surgeon: Chavez Gonzales MD;  Location: [facility] ENDO;  Service: General;  Laterality: N/A;    INCISION AND DRAINAGE OF ABSCESS Left 3/16/2020    Procedure: INCISION AND DRAINAGE, ABSCESS;  Surgeon: Irvin Villarreal MD;  Location: [facility] OR;  Service: General;  Laterality: Left;       Review of patient's allergies indicates:   Allergen Reactions    Ativan [lorazepam] Itching       No current facility-administered medications on file prior to encounter.     Current Outpatient Medications on File Prior to Encounter   Medication Sig    albuterol (PROVENTIL/VENTOLIN HFA) 90 mcg/actuation inhaler Inhale 1-2 puffs into the lungs every 6 (six) hours as needed for Wheezing. Rescue    albuterol (VENTOLIN HFA) 90 mcg/actuation inhaler inhale 2 puff by inhalation route  every 4 - 6 hours as needed    aspirin 81 MG Chew 81 mg.    atenoloL (TENORMIN) 25 MG tablet Take 0.5 tablets (12.5 mg total) by mouth once daily.    atorvastatin (LIPITOR) 20 MG tablet TAKE ONE TABLET BY MOUTH EVERY DAY **THANK YOU**    cholecalciferol, vitamin D3, (VITAMIN D3) 25 mcg (1,000 unit) capsule Take 2 capsules (2,000 Units total) by mouth once daily.    clonazePAM (KLONOPIN) 0.5 MG tablet Take ONE tablet (0.5 mg total) by mouth daily as needed for Anxiety **THANK YOU**    DULoxetine 40 mg CpDR Take 40 mg by mouth once daily.    fluticasone-umeclidin-vilanter (TRELEGY ELLIPTA)  200-62.5-25 mcg inhaler Inhale 1 puff into the lungs once daily.    ipratropium (ATROVENT) 0.02 % nebulizer solution Take 2.5 mLs (500 mcg total) by nebulization every 8 (eight) hours as needed for Wheezing. Rescue    pantoprazole (PROTONIX) 40 MG tablet Take 1 tablet (40 mg total) by mouth once daily. Take in the morning before breakfast.  Wait 30 minutes before eating or drinking anything    predniSONE (DELTASONE) 20 MG tablet Take 1 tablet (20 mg total) by mouth once daily. On days number 1 and 2 take 3 at 1 time in the morning.  On days 3., 4, 5 take 2 at 1 time in the morning, on days 6., 7, 8 take 1 in the morning.    promethazine (PHENERGAN) 25 MG tablet Take 25 mg by mouth every 6 (six) hours as needed.     Family History       Problem Relation (Age of Onset)    Breast cancer Sister          Tobacco Use    Smoking status: Current Every Day Smoker     Packs/day: 1.00     Years: 41.00     Pack years: 41.00     Types: Cigarettes     Last attempt to quit: 2021     Years since quittin.6    Smokeless tobacco: Never Used   Substance and Sexual Activity    Alcohol use: Not Currently     Comment: occ    Drug use: Never    Sexual activity: Yes     Birth control/protection: Post-menopausal     Review of Systems   All other systems reviewed and are negative.  Objective:     Vital Signs (Most Recent):  Temp: 98.1 °F (36.7 °C) (22)  Pulse: (!) 126 (22)  Resp: (!) 26 (22)  BP: 109/77 (22)  SpO2: (!) 94 % (22)   Vital Signs (24h Range):  Temp:  [98 °F (36.7 °C)-98.9 °F (37.2 °C)] 98.1 °F (36.7 °C)  Pulse:  [126-140] 126  Resp:  [21-33] 26  SpO2:  [93 %-99 %] 94 %  BP: ()/(72-85) 109/77     Weight: 45.9 kg (101 lb 3.1 oz)  Body mass index is 16.84 kg/m².    Physical Exam  Vitals reviewed.   Constitutional:       Appearance: Normal appearance.   HENT:      Head: Normocephalic and atraumatic.      Nose: No congestion.      Mouth/Throat:      Mouth: Mucous  membranes are moist.   Eyes:      Extraocular Movements: Extraocular movements intact.      Pupils: Pupils are equal, round, and reactive to light.   Cardiovascular:      Rate and Rhythm: Regular rhythm. Tachycardia present.   Pulmonary:      Effort: No respiratory distress.      Breath sounds: No wheezing.      Comments: Mild tachypnea  Abdominal:      Palpations: Abdomen is soft.      Tenderness: There is no abdominal tenderness.   Musculoskeletal:      Right lower leg: No edema.      Left lower leg: No edema.   Skin:     General: Skin is warm and dry.   Neurological:      General: No focal deficit present.      Mental Status: She is alert and oriented to person, place, and time.   Psychiatric:         Mood and Affect: Mood normal.         Behavior: Behavior normal.         CRANIAL NERVES     CN III, IV, VI   Pupils are equal, round, and reactive to light.     Significant Labs: All pertinent labs within the past 24 hours have been reviewed.    Significant Imaging: I have reviewed all pertinent imaging results/findings within the past 24 hours.

## 2022-08-07 NOTE — ED NOTES
Patient arrives per [facility] with c/o SOB with COPD exacerbation. Had 2 breathing treatments prior to arrival with [facility]. Continues diminished breath sounds to lower lobes and nonproductive cough. Sats on Room air 93%. Cardiac monitor applied, o2 at 3liters applied and IV started to right forearm.

## 2022-08-07 NOTE — ED NOTES
Much improved. On continuous BiPap.  V/S stable. Sister at bedside. Remains tachycardic without ectopy. Breathing less labored.

## 2022-08-07 NOTE — EICU
Brief HPI  56 yr old female with CC: increased so, wheezing  and greenish cough w/o viral prodrome admitted to ICU on BiPAP for COPD exacerbation. Sinus tachy post albuterol continuous nebs.    Hx of COPD, PE-10 yrs ago.current daily smoker.    Data  Reviewed.  Wbc 13 K.  N 78%.  7.03/63/122  EKG: sinus tachy, non specific changes.  LA normal.   CxR film seen:  Emphysematous. No obvious air space densities or pneumothorax. Tubular heart.   Covid neg  Nuclear stress testing normal perfusion, EF > 50% 4/2021    Camera:  Discussed with bed side RN. Sinus tachy.  Asking to have xoponex scheduled not albuterol.  She is awake, no AMS. MAP > 95%. RR/wob  improving.     A/P  1. SIRS-Sepsis. COPD exacerbation, no obvious Pneumonia. Type 2 resp failure. Sinus tachy.  - continue BiPAP.  - prn ABG.  -wean fio2 as tolerated to keep sats 88 to 95% only  - xoponex nebs ordered q8 hr. Start her ellipta once off of BiPAP today as at home.   Resume home tiotropium once stable.  - s/p 125 mg solumedrol from ED. Solumedrol 40 q12 hrly. Keep CBG goals , 180.  - got levofloxacin IV already.  - get pro calcitonin, d dimer level. If elevated d dimer get CTA angiogram. No leg edema.hx or remote PE 10 yrs ago.  - IV mag 1 gm over 1 hour as a bronchodilator. Earlier admit had low mag levels. Get Mag-add on to earlier labs.    2. Low K.  - replace     3. VTE:  Lovenox ordered    4. HTN.  On atenolol/asa at home.  - resume from AM, once stable.

## 2022-08-07 NOTE — ED NOTES
Continues with continuous nebs per respiratory. Cough becoming more productive. Slight air movement increase across all fields but with expiratory wheezing. Sats 97% with treatment. Continues with sinus tach due to steroid use.

## 2022-08-07 NOTE — ED PROVIDER NOTES
Encounter Date: 2022       History     Chief Complaint   Patient presents with    Shortness of Breath     COPD exacerbation with SOB and cough. Worsening over last 2 days.      Pt with COPD here with SOB the past 2-3days. No CP. No fever. Worse tonight so she called EMS. RA sats in 80s. Given neb and put on O2.     The history is provided by the patient.   Shortness of Breath  This is a recurrent problem. The problem occurs frequently.The current episode started more than 2 days ago. The problem has not changed since onset.Associated symptoms include cough, wheezing and orthopnea. Pertinent negatives include no fever, no headaches, no coryza, no rhinorrhea, no sore throat, no swollen glands, no ear pain, no neck pain, no sputum production, no hemoptysis, no PND, no chest pain, no syncope, no vomiting, no abdominal pain, no rash, no leg pain, no leg swelling and no claudication. Risk factors include smoking. She has tried beta-agonist inhalers for the symptoms. The treatment provided no relief. She has had prior hospitalizations. She has had prior ED visits. She has had prior ICU admissions. Associated medical issues include COPD, chronic lung disease and PE. Associated medical issues do not include asthma.     Review of patient's allergies indicates:   Allergen Reactions    Ativan [lorazepam] Itching     Past Medical History:   Diagnosis Date    Anxiety     Arthritis     Asthma     Back pain     COPD (chronic obstructive pulmonary disease)     Pulmonary embolism     10 years ago     Past Surgical History:   Procedure Laterality Date    BIOPSY      right breast    BREAST CYST EXCISION       SECTION      CHOLECYSTECTOMY      COLONOSCOPY N/A 2022    Procedure: COLONOSCOPY;  Surgeon: Chavez Gonzales MD;  Location: [facility];  Service: General;  Laterality: N/A;    ESOPHAGOGASTRODUODENOSCOPY N/A 2022    Procedure: ESOPHAGOGASTRODUODENOSCOPY (EGD);  Surgeon: Chavez Gonzales  MD;  Location: [facility] ENDO;  Service: General;  Laterality: N/A;    INCISION AND DRAINAGE OF ABSCESS Left 3/16/2020    Procedure: INCISION AND DRAINAGE, ABSCESS;  Surgeon: Irvin Villarreal MD;  Location: [facility] OR;  Service: General;  Laterality: Left;     Family History   Problem Relation Age of Onset    Breast cancer Sister     Ovarian cancer Neg Hx      Social History     Tobacco Use    Smoking status: Current Every Day Smoker     Packs/day: 1.00     Years: 41.00     Pack years: 41.00     Types: Cigarettes     Last attempt to quit: 2021     Years since quittin.6    Smokeless tobacco: Never Used   Substance Use Topics    Alcohol use: Not Currently     Comment: occ    Drug use: Never     Review of Systems   Constitutional: Negative for fever.   HENT: Negative for ear pain, rhinorrhea and sore throat.    Respiratory: Positive for cough, shortness of breath and wheezing. Negative for hemoptysis and sputum production.    Cardiovascular: Positive for orthopnea. Negative for chest pain, claudication, leg swelling, syncope and PND.   Gastrointestinal: Negative for abdominal pain and vomiting.   Musculoskeletal: Negative for neck pain.   Skin: Negative for rash.   Neurological: Negative for headaches.   All other systems reviewed and are negative.      Physical Exam     Initial Vitals [22 0308]   BP Pulse Resp Temp SpO2   (!) 186/78 (!) 129 (!) 24 98.9 °F (37.2 °C) (!) 93 %      MAP       --         Physical Exam    Nursing note and vitals reviewed.  Constitutional:   Thing, mod distress from sob, speaks in 2-3 word sentences   HENT:   Mouth/Throat: Oropharynx is clear and moist.   Neck: Neck supple. No JVD present.   Normal range of motion.  Cardiovascular: Regular rhythm, normal heart sounds and intact distal pulses.   tachy   Pulmonary/Chest: No stridor. She exhibits no tenderness.   Poor air movement. Scant exp wheezing. Increased WoB.   Abdominal: Abdomen is soft. There is no abdominal tenderness.    Musculoskeletal:         General: No tenderness or edema. Normal range of motion.      Cervical back: Normal range of motion and neck supple.     Neurological: She is alert and oriented to person, place, and time.   Skin: Skin is warm and dry. Capillary refill takes less than 2 seconds. No rash noted. No erythema. No pallor.         ED Course   Critical Care    Date/Time: 8/7/2022 4:43 AM  Performed by: Gee Gordon Jr., MD  Authorized by: Gee Gordon Jr., MD   Direct patient critical care time: 30 minutes  Additional history critical care time: 5 minutes  Ordering / reviewing critical care time: 5 minutes  Documentation critical care time: 15 minutes  Consulting other physicians critical care time: 2 minutes  Total critical care time (exclusive of procedural time) : 57 minutes  Critical care time was exclusive of separately billable procedures and treating other patients.  Critical care was necessary to treat or prevent imminent or life-threatening deterioration of the following conditions: respiratory failure.  Critical care was time spent personally by me on the following activities: examination of patient, evaluation of patient's response to treatment, obtaining history from patient or surrogate, ordering and performing treatments and interventions, ordering and review of radiographic studies, ordering and review of laboratory studies, pulse oximetry and re-evaluation of patient's condition.        Labs Reviewed   CBC W/ AUTO DIFFERENTIAL - Abnormal; Notable for the following components:       Result Value    WBC 13.92 (*)     MCH 32.9 (*)     Immature Granulocytes 1.1 (*)     Gran # (ANC) 10.8 (*)     Immature Grans (Abs) 0.16 (*)     Mono # 1.1 (*)     Gran % 78.0 (*)     Lymph % 12.9 (*)     All other components within normal limits   COMPREHENSIVE METABOLIC PANEL - Abnormal; Notable for the following components:    Glucose 144 (*)     BUN 5 (*)     Anion Gap 17 (*)     All other components within  normal limits   ISTAT PROCEDURE - Abnormal; Notable for the following components:    POC PH 7.309 (*)     POC PCO2 63.6 (*)     POC PO2 122 (*)     POC HCO3 32.0 (*)     POC TCO2 34 (*)     All other components within normal limits   CULTURE, BLOOD   CULTURE, BLOOD   LACTIC ACID, PLASMA   SARS-COV-2 RNA AMPLIFICATION, QUAL    Narrative:     Is the patient symptomatic?->No     EKG Readings: (Independently Interpreted)   Rhythm: Sinus Tachycardia. Heart Rate: 133. Ectopy: No Ectopy. Conduction: Normal. ST Segments: Non-Specific ST Segment Depression. T Waves: Normal. Axis: Normal. Clinical Impression: Sinus Tachycardia Other Impression: pulmonary dz pattern       Imaging Results          X-Ray Chest AP Portable (In process)                  Medications   albuterol nebulizer solution 10 mg (10 mg Nebulization New Bag 22 0328)   levoFLOXacin 750 mg/150 mL IVPB 750 mg (750 mg Intravenous New Bag 22 1375)   methylPREDNISolone sodium succinate injection 125 mg (125 mg Intravenous Given 22 032)     Medical Decision Making:   Differential Diagnosis:   COPD exacerbation, CHF, ACS, pneumonia, covid, resp failure  Clinical Tests:   Lab Tests: Ordered and Reviewed  Radiological Study: Ordered and Reviewed  Medical Tests: Ordered and Reviewed  ED Management:  Pt with copd exacerbation with hypercapneic respiratory failure. Pt given albuterol neb, solumedrol and started on bipap. She was SIRS positive so blood cultures and lactate sent. Empiric levaquin given. She had neg covid. CBC showed leukocytosis of 14k. CMP unremarkable. Lactate normal. CXR c/w COPD.              ED Course as of 22 0451   Sun Aug 07, 2022   0350 SIRS positive. Cxs and lactate sent.  [DC]   0423 AB.30/64/122/32 on neb. Will give trial of bipap. [DC]      ED Course User Index  [DC] Gee Gordon Jr., MD             Clinical Impression:   Final diagnoses:  [R06.02] SOB (shortness of breath)  [J44.1] COPD exacerbation (Primary)  [J96.02]  Acute respiratory failure with hypercapnia  [A41.9] Sepsis without acute organ dysfunction, due to unspecified organism          ED Disposition Condition    Admit               Gee Gordon Jr., MD  08/07/22 045

## 2022-08-07 NOTE — PLAN OF CARE
Problem: Adult Inpatient Plan of Care  Goal: Plan of Care Review  Outcome: Ongoing, Progressing  Goal: Patient-Specific Goal (Individualized)  Outcome: Ongoing, Progressing  Goal: Absence of Hospital-Acquired Illness or Injury  Outcome: Ongoing, Progressing  Goal: Optimal Comfort and Wellbeing  Outcome: Ongoing, Progressing  Goal: Readiness for Transition of Care  Outcome: Ongoing, Progressing     Problem: Adjustment to Illness COPD (Chronic Obstructive Pulmonary Disease)  Goal: Optimal Chronic Illness Coping  Outcome: Ongoing, Progressing     Problem: Functional Ability Impaired COPD (Chronic Obstructive Pulmonary Disease)  Goal: Optimal Level of Functional [place]  Outcome: Ongoing, Progressing     Problem: Infection COPD (Chronic Obstructive Pulmonary Disease)  Goal: Absence of Infection Signs and Symptoms  Outcome: Ongoing, Progressing     Problem: Oral Intake Inadequate COPD (Chronic Obstructive Pulmonary Disease)  Goal: Improved Nutrition Intake  Outcome: Ongoing, Progressing     Problem: Respiratory Compromise COPD (Chronic Obstructive Pulmonary Disease)  Goal: Effective Oxygenation and Ventilation  Outcome: Ongoing, Progressing

## 2022-08-07 NOTE — ASSESSMENT & PLAN NOTE
Patient with Hypercapnic and Hypoxic Respiratory failure which is Acute on chronic.  she is on home oxygen at 1 LPM. Supplemental oxygen was provided and noted- Oxygen Concentration (%):  [28-30] 28.   Signs/symptoms of respiratory failure include- tachypnea and respiratory distress. Contributing diagnoses includes - COPD Labs and images were reviewed. Patient Has recent ABG, which has been reviewed. Will treat underlying causes and adjust management of respiratory failure as follows-     Acute on chronic hypoxic/hypercapnic respiratory failure 2/2 Acute on chronic COPD exacerbation caused by likely viral infection  - Duoneb q4h  - pred 40  - Azithro/Rocephin daily starting tomorrow  - Wean O2 as tolerated  - Continuous pulse ox and tele  - Continue home inhaler when able  - D-dimer wnl  - F/U respiratory culture  - Procalcitonin pending  - Appreciate E-icu recommendations

## 2022-08-07 NOTE — NURSING
Received pt to ICU 10. Pt AAO. Tachypneic and tachycardic. ST with  on telemetry monitoring. PIV with Levaquin infusing.  EICU notified of pt's arrival.

## 2022-08-07 NOTE — H&P
[facility] Medicine  History & Physical    Patient Name: Zayra White  MRN: [id number]  Patient Class: IP- Inpatient  Admission Date: 8/7/2022  Attending Physician: Jeff Boateng MD   Primary Care Provider: Eric Singh MD         Patient information was obtained from patient and ER records.     Subjective:     Principal Problem:Acute respiratory failure with hypoxia and hypercarbia    Chief Complaint:   Chief Complaint   Patient presents with    Shortness of Breath     COPD exacerbation with SOB and cough. Worsening over last 2 days.         HPI: 55 yo w/ hx of HTN, Anxiety, PE, Chronic Hypoxic respiratory Failure 2/2 Chronic COPD presenting w/ SOB. Symptoms started 3 days ago and have worsened. She has noticed a change in sputum. Positive sick contact w/ one of her grandchildren. She had some elevated temperatures at home. Denies any CP. No NVD. Patient has had multiple admissions in the past for COPD exacerbations. She has never required intubation. Patient states that her baseline functional status has been impaired for the past 1-2 months 2/2 her COPD.    On day of admission patient used her rescue inhaler multiple times and used 3-4 duonebs w/ no improvement. She then called EMS. In EMS patient received multiple duonebs. In the ED patient placed on continuous nebulizer treatment. ABG w/ respiratory acidosis. Given IV steroids. Placed on BiPAP 12/6 30% FiO2 w/ rapid improvement in symptoms. Patient able to be removed from BiPAP at around 3754-7294 and moved to [facility]. Has remained stable on this regimen. Patient states she now feels near the baseline she has been at for the past 1-2 months.      Past Medical History:   Diagnosis Date    Anxiety     Arthritis     Asthma     Back pain     COPD (chronic obstructive pulmonary disease)     Pulmonary embolism     10 years ago       Past Surgical History:   Procedure Laterality Date    BIOPSY      right breast     BREAST CYST EXCISION       SECTION      CHOLECYSTECTOMY      COLONOSCOPY N/A 2022    Procedure: COLONOSCOPY;  Surgeon: Chavez Gonzales MD;  Location: [facility] ENDO;  Service: General;  Laterality: N/A;    ESOPHAGOGASTRODUODENOSCOPY N/A 2022    Procedure: ESOPHAGOGASTRODUODENOSCOPY (EGD);  Surgeon: Chavez Gonzales MD;  Location: [facility] ENDO;  Service: General;  Laterality: N/A;    INCISION AND DRAINAGE OF ABSCESS Left 3/16/2020    Procedure: INCISION AND DRAINAGE, ABSCESS;  Surgeon: Irvin Villarreal MD;  Location: [facility] OR;  Service: General;  Laterality: Left;       Review of patient's allergies indicates:   Allergen Reactions    Ativan [lorazepam] Itching       No current facility-administered medications on file prior to encounter.     Current Outpatient Medications on File Prior to Encounter   Medication Sig    albuterol (PROVENTIL/VENTOLIN HFA) 90 mcg/actuation inhaler Inhale 1-2 puffs into the lungs every 6 (six) hours as needed for Wheezing. Rescue    albuterol (VENTOLIN HFA) 90 mcg/actuation inhaler inhale 2 puff by inhalation route  every 4 - 6 hours as needed    aspirin 81 MG Chew 81 mg.    atenoloL (TENORMIN) 25 MG tablet Take 0.5 tablets (12.5 mg total) by mouth once daily.    atorvastatin (LIPITOR) 20 MG tablet TAKE ONE TABLET BY MOUTH EVERY DAY **THANK YOU**    cholecalciferol, vitamin D3, (VITAMIN D3) 25 mcg (1,000 unit) capsule Take 2 capsules (2,000 Units total) by mouth once daily.    clonazePAM (KLONOPIN) 0.5 MG tablet Take ONE tablet (0.5 mg total) by mouth daily as needed for Anxiety **THANK YOU**    DULoxetine 40 mg CpDR Take 40 mg by mouth once daily.    fluticasone-umeclidin-vilanter (TRELEGY ELLIPTA) 200-62.5-25 mcg inhaler Inhale 1 puff into the lungs once daily.    ipratropium (ATROVENT) 0.02 % nebulizer solution Take 2.5 mLs (500 mcg total) by nebulization every 8 (eight) hours as needed for Wheezing. Rescue    pantoprazole (PROTONIX) 40 MG tablet  Take 1 tablet (40 mg total) by mouth once daily. Take in the morning before breakfast.  Wait 30 minutes before eating or drinking anything    predniSONE (DELTASONE) 20 MG tablet Take 1 tablet (20 mg total) by mouth once daily. On days number 1 and 2 take 3 at 1 time in the morning.  On days 3., 4, 5 take 2 at 1 time in the morning, on days 6., 7, 8 take 1 in the morning.    promethazine (PHENERGAN) 25 MG tablet Take 25 mg by mouth every 6 (six) hours as needed.     Family History       Problem Relation (Age of Onset)    Breast cancer Sister          Tobacco Use    Smoking status: Current Every Day Smoker     Packs/day: 1.00     Years: 41.00     Pack years: 41.00     Types: Cigarettes     Last attempt to quit: 2021     Years since quittin.6    Smokeless tobacco: Never Used   Substance and Sexual Activity    Alcohol use: Not Currently     Comment: occ    Drug use: Never    Sexual activity: Yes     Birth control/protection: Post-menopausal     Review of Systems   All other systems reviewed and are negative.  Objective:     Vital Signs (Most Recent):  Temp: 98.1 °F (36.7 °C) (22)  Pulse: (!) 126 (22)  Resp: (!) 26 (22)  BP: 109/77 (22)  SpO2: (!) 94 % (22)   Vital Signs (24h Range):  Temp:  [98 °F (36.7 °C)-98.9 °F (37.2 °C)] 98.1 °F (36.7 °C)  Pulse:  [126-140] 126  Resp:  [21-33] 26  SpO2:  [93 %-99 %] 94 %  BP: ()/(72-85) 109/77     Weight: 45.9 kg (101 lb 3.1 oz)  Body mass index is 16.84 kg/m².    Physical Exam  Vitals reviewed.   Constitutional:       Appearance: Normal appearance.   HENT:      Head: Normocephalic and atraumatic.      Nose: No congestion.      Mouth/Throat:      Mouth: Mucous membranes are moist.   Eyes:      Extraocular Movements: Extraocular movements intact.      Pupils: Pupils are equal, round, and reactive to light.   Cardiovascular:      Rate and Rhythm: Regular rhythm. Tachycardia present.   Pulmonary:       Effort: No respiratory distress.      Breath sounds: No wheezing.      Comments: Mild tachypnea  Abdominal:      Palpations: Abdomen is soft.      Tenderness: There is no abdominal tenderness.   Musculoskeletal:      Right lower leg: No edema.      Left lower leg: No edema.   Skin:     General: Skin is warm and dry.   Neurological:      General: No focal deficit present.      Mental Status: She is alert and oriented to person, place, and time.   Psychiatric:         Mood and Affect: Mood normal.         Behavior: Behavior normal.         CRANIAL NERVES     CN III, IV, VI   Pupils are equal, round, and reactive to light.     Significant Labs: All pertinent labs within the past 24 hours have been reviewed.    Significant Imaging: I have reviewed all pertinent imaging results/findings within the past 24 hours.    Assessment/Plan:     * Acute respiratory failure with hypoxia and hypercarbia  Patient with Hypercapnic and Hypoxic Respiratory failure which is Acute on chronic.  she is on home oxygen at 1 LPM. Supplemental oxygen was provided and noted- Oxygen Concentration (%):  [28-30] 28.   Signs/symptoms of respiratory failure include- tachypnea and respiratory distress. Contributing diagnoses includes - COPD Labs and images were reviewed. Patient Has recent ABG, which has been reviewed. Will treat underlying causes and adjust management of respiratory failure as follows-     Acute on chronic hypoxic/hypercapnic respiratory failure 2/2 Acute on chronic COPD exacerbation caused by likely viral infection  - Duoneb q4h  - pred 40  - Azithro/Rocephin daily starting tomorrow  - Wean O2 as tolerated  - Continuous pulse ox and tele  - Continue home inhaler when able  - D-dimer wnl  - F/U respiratory culture  - Procalcitonin pending  - Appreciate E-icu recommendations        Hyperlipidemia  Continue home ASA/statin      Anxiety  Hold xanax for now given respiratory status  Continue home duloxetine        VTE Risk Mitigation  (From admission, onward)         Ordered     enoxaparin injection 40 mg  Daily         08/07/22 0601     IP VTE HIGH RISK PATIENT  Once         08/07/22 0606     Place sequential compression device  Until discontinued         08/07/22 0606                   Jeff Boateng MD  Department of [facility] Medicine   [place] - Intensive Care

## 2022-08-07 NOTE — HPI
55 yo w/ hx of HTN, Anxiety, PE, Chronic Hypoxic respiratory Failure 2/2 Chronic COPD presenting w/ SOB. Symptoms started 3 days ago and have worsened. She has noticed a change in sputum. Positive sick contact w/ one of her grandchildren. She had some elevated temperatures at home. Denies any CP. No NVD. Patient has had multiple admissions in the past for COPD exacerbations. She has never required intubation. Patient states that her baseline functional status has been impaired for the past 1-2 months 2/2 her COPD.    On day of admission patient used her rescue inhaler multiple times and used 3-4 duonebs w/ no improvement. She then called EMS. In EMS patient received multiple duonebs. In the ED patient placed on continuous nebulizer treatment. ABG w/ respiratory acidosis. Given IV steroids. Placed on BiPAP 12/6 30% FiO2 w/ rapid improvement in symptoms. Patient able to be removed from BiPAP at around [id number] and moved to [facility]. Has remained stable on this regimen. Patient states she now feels near the baseline she has been at for the past 1-2 months.

## 2022-08-07 NOTE — ED NOTES
Much improved, lungs less diminished to bases. Continues with fine crackles to bases. Now with productive cough and thick green sputum. V/S stable. Sister at bedside. Xray complete. No acute distress at this time.

## 2022-08-07 NOTE — PLAN OF CARE
Problem: Adjustment to Illness COPD (Chronic Obstructive Pulmonary Disease)  Goal: Optimal Chronic Illness Coping  Outcome: Ongoing, Progressing  Intervention: Support and Optimize Psychosocial Response  Flowsheets (Taken 8/7/2022 0614)  Family/Support System Care: self-care encouraged     Problem: Infection COPD (Chronic Obstructive Pulmonary Disease)  Goal: Absence of Infection Signs and Symptoms  Outcome: Ongoing, Progressing  Intervention: Prevent or Manage Infection  Flowsheets (Taken 8/7/2022 0614)  Fever Reduction/Comfort Measures: lightweight clothing     Problem: Respiratory Compromise COPD (Chronic Obstructive Pulmonary Disease)  Goal: Effective Oxygenation and Ventilation  Outcome: Ongoing, Progressing  Intervention: Promote Airway Secretion Clearance  Flowsheets (Taken 8/7/2022 0614)  Activity Management: Rolling - L1

## 2022-08-08 ENCOUNTER — TELEPHONE (OUTPATIENT)
Dept: FAMILY MEDICINE | Facility: CLINIC | Age: 57
End: 2022-08-08
Payer: MEDICAID

## 2022-08-08 LAB
ANION GAP SERPL CALC-SCNC: 14 MMOL/L (ref 8–16)
BASOPHILS # BLD AUTO: 0.02 K/UL (ref 0–0.2)
BASOPHILS NFR BLD: 0.2 % (ref 0–1.9)
BUN SERPL-MCNC: 6 MG/DL (ref 6–20)
CALCIUM SERPL-MCNC: 9.2 MG/DL (ref 8.7–10.5)
CHLORIDE SERPL-SCNC: 102 MMOL/L (ref 95–110)
CO2 SERPL-SCNC: 27 MMOL/L (ref 23–29)
CREAT SERPL-MCNC: 0.6 MG/DL (ref 0.5–1.4)
DIFFERENTIAL METHOD: ABNORMAL
EOSINOPHIL # BLD AUTO: 0 K/UL (ref 0–0.5)
EOSINOPHIL NFR BLD: 0 % (ref 0–8)
ERYTHROCYTE [DISTWIDTH] IN BLOOD BY AUTOMATED COUNT: 11.6 % (ref 11.5–14.5)
EST. GFR  (NO RACE VARIABLE): >60 ML/MIN/1.73 M^2
GLUCOSE SERPL-MCNC: 149 MG/DL (ref 70–110)
HCT VFR BLD AUTO: 35.6 % (ref 37–48.5)
HGB BLD-MCNC: 12.2 G/DL (ref 12–16)
IMM GRANULOCYTES # BLD AUTO: 0.03 K/UL (ref 0–0.04)
IMM GRANULOCYTES NFR BLD AUTO: 0.3 % (ref 0–0.5)
LYMPHOCYTES # BLD AUTO: 0.8 K/UL (ref 1–4.8)
LYMPHOCYTES NFR BLD: 7 % (ref 18–48)
MAGNESIUM SERPL-MCNC: 2 MG/DL (ref 1.6–2.6)
MCH RBC QN AUTO: 32.8 PG (ref 27–31)
MCHC RBC AUTO-ENTMCNC: 34.3 G/DL (ref 32–36)
MCV RBC AUTO: 96 FL (ref 82–98)
MONOCYTES # BLD AUTO: 0.7 K/UL (ref 0.3–1)
MONOCYTES NFR BLD: 5.9 % (ref 4–15)
NEUTROPHILS # BLD AUTO: 9.6 K/UL (ref 1.8–7.7)
NEUTROPHILS NFR BLD: 86.6 % (ref 38–73)
NRBC BLD-RTO: 0 /100 WBC
PHOSPHATE SERPL-MCNC: 2.3 MG/DL (ref 2.7–4.5)
PLATELET # BLD AUTO: 219 K/UL (ref 150–450)
PMV BLD AUTO: 9.4 FL (ref 9.2–12.9)
POTASSIUM SERPL-SCNC: 3.4 MMOL/L (ref 3.5–5.1)
RBC # BLD AUTO: 3.72 M/UL (ref 4–5.4)
SODIUM SERPL-SCNC: 143 MMOL/L (ref 136–145)
WBC # BLD AUTO: 11.11 K/UL (ref 3.9–12.7)

## 2022-08-08 PROCEDURE — 21400001 HC TELEMETRY ROOM

## 2022-08-08 PROCEDURE — 25000003 PHARM REV CODE 250: Performed by: STUDENT IN AN ORGANIZED HEALTH CARE EDUCATION/TRAINING PROGRAM

## 2022-08-08 PROCEDURE — 25000242 PHARM REV CODE 250 ALT 637 W/ HCPCS

## 2022-08-08 PROCEDURE — 63600175 PHARM REV CODE 636 W HCPCS: Performed by: STUDENT IN AN ORGANIZED HEALTH CARE EDUCATION/TRAINING PROGRAM

## 2022-08-08 PROCEDURE — 27000221 HC OXYGEN, UP TO 24 HOURS

## 2022-08-08 PROCEDURE — 85025 COMPLETE CBC W/AUTO DIFF WBC: CPT | Performed by: STUDENT IN AN ORGANIZED HEALTH CARE EDUCATION/TRAINING PROGRAM

## 2022-08-08 PROCEDURE — 83735 ASSAY OF MAGNESIUM: CPT | Performed by: STUDENT IN AN ORGANIZED HEALTH CARE EDUCATION/TRAINING PROGRAM

## 2022-08-08 PROCEDURE — 25000242 PHARM REV CODE 250 ALT 637 W/ HCPCS: Performed by: STUDENT IN AN ORGANIZED HEALTH CARE EDUCATION/TRAINING PROGRAM

## 2022-08-08 PROCEDURE — 80048 BASIC METABOLIC PNL TOTAL CA: CPT | Performed by: STUDENT IN AN ORGANIZED HEALTH CARE EDUCATION/TRAINING PROGRAM

## 2022-08-08 PROCEDURE — 84100 ASSAY OF PHOSPHORUS: CPT | Performed by: STUDENT IN AN ORGANIZED HEALTH CARE EDUCATION/TRAINING PROGRAM

## 2022-08-08 PROCEDURE — 63700000 PHARM REV CODE 250 ALT 637 W/O HCPCS: Performed by: STUDENT IN AN ORGANIZED HEALTH CARE EDUCATION/TRAINING PROGRAM

## 2022-08-08 PROCEDURE — 99233 SBSQ HOSP IP/OBS HIGH 50: CPT | Mod: ,,, | Performed by: STUDENT IN AN ORGANIZED HEALTH CARE EDUCATION/TRAINING PROGRAM

## 2022-08-08 PROCEDURE — 94640 AIRWAY INHALATION TREATMENT: CPT

## 2022-08-08 PROCEDURE — 99233 PR SUBSEQUENT HOSPITAL CARE,LEVL III: ICD-10-PCS | Mod: ,,, | Performed by: STUDENT IN AN ORGANIZED HEALTH CARE EDUCATION/TRAINING PROGRAM

## 2022-08-08 PROCEDURE — 94761 N-INVAS EAR/PLS OXIMETRY MLT: CPT

## 2022-08-08 RX ORDER — PREDNISONE 20 MG/1
40 TABLET ORAL DAILY
Qty: 6 TABLET | Refills: 0 | Status: SHIPPED | OUTPATIENT
Start: 2022-08-08 | End: 2022-08-11

## 2022-08-08 RX ORDER — IPRATROPIUM BROMIDE AND ALBUTEROL SULFATE 2.5; .5 MG/3ML; MG/3ML
SOLUTION RESPIRATORY (INHALATION)
Status: COMPLETED
Start: 2022-08-08 | End: 2022-08-08

## 2022-08-08 RX ORDER — IPRATROPIUM BROMIDE AND ALBUTEROL SULFATE 2.5; .5 MG/3ML; MG/3ML
3 SOLUTION RESPIRATORY (INHALATION) EVERY 6 HOURS PRN
Status: DISCONTINUED | OUTPATIENT
Start: 2022-08-08 | End: 2022-08-08

## 2022-08-08 RX ORDER — AZITHROMYCIN 500 MG/1
500 TABLET, FILM COATED ORAL DAILY
Qty: 2 TABLET | Refills: 0 | Status: SHIPPED | OUTPATIENT
Start: 2022-08-08 | End: 2022-08-10

## 2022-08-08 RX ORDER — MUPIROCIN 20 MG/G
OINTMENT TOPICAL 2 TIMES DAILY
Status: DISCONTINUED | OUTPATIENT
Start: 2022-08-08 | End: 2022-08-10 | Stop reason: HOSPADM

## 2022-08-08 RX ORDER — CLONAZEPAM 0.5 MG/1
0.5 TABLET ORAL
Status: DISCONTINUED | OUTPATIENT
Start: 2022-08-08 | End: 2022-08-10 | Stop reason: HOSPADM

## 2022-08-08 RX ORDER — SODIUM,POTASSIUM PHOSPHATES 280-250MG
1 POWDER IN PACKET (EA) ORAL ONCE
Status: COMPLETED | OUTPATIENT
Start: 2022-08-08 | End: 2022-08-08

## 2022-08-08 RX ORDER — IPRATROPIUM BROMIDE AND ALBUTEROL SULFATE 2.5; .5 MG/3ML; MG/3ML
3 SOLUTION RESPIRATORY (INHALATION) EVERY 6 HOURS
Status: DISCONTINUED | OUTPATIENT
Start: 2022-08-08 | End: 2022-08-10 | Stop reason: HOSPADM

## 2022-08-08 RX ORDER — AMOXICILLIN AND CLAVULANATE POTASSIUM 875; 125 MG/1; MG/1
1 TABLET, FILM COATED ORAL EVERY 12 HOURS
Qty: 6 TABLET | Refills: 0 | Status: SHIPPED | OUTPATIENT
Start: 2022-08-08 | End: 2022-08-11

## 2022-08-08 RX ADMIN — MUPIROCIN: 20 OINTMENT TOPICAL at 09:08

## 2022-08-08 RX ADMIN — POTASSIUM & SODIUM PHOSPHATES POWDER PACK 280-160-250 MG 1 PACKET: 280-160-250 PACK at 08:08

## 2022-08-08 RX ADMIN — PREDNISONE 40 MG: 10 TABLET ORAL at 08:08

## 2022-08-08 RX ADMIN — ATENOLOL 12.5 MG: 25 TABLET ORAL at 08:08

## 2022-08-08 RX ADMIN — ATORVASTATIN CALCIUM 20 MG: 10 TABLET, FILM COATED ORAL at 09:08

## 2022-08-08 RX ADMIN — IPRATROPIUM BROMIDE AND ALBUTEROL SULFATE 3 ML: 2.5; .5 SOLUTION RESPIRATORY (INHALATION) at 03:08

## 2022-08-08 RX ADMIN — IPRATROPIUM BROMIDE AND ALBUTEROL SULFATE 3 ML: 2.5; .5 SOLUTION RESPIRATORY (INHALATION) at 06:08

## 2022-08-08 RX ADMIN — AZITHROMYCIN MONOHYDRATE 500 MG: 250 TABLET ORAL at 08:08

## 2022-08-08 RX ADMIN — FAMOTIDINE 20 MG: 20 TABLET ORAL at 09:08

## 2022-08-08 RX ADMIN — DULOXETINE 40 MG: 40 CAPSULE, DELAYED RELEASE ORAL at 09:08

## 2022-08-08 RX ADMIN — ASPIRIN 81 MG: 81 TABLET, CHEWABLE ORAL at 08:08

## 2022-08-08 RX ADMIN — ENOXAPARIN SODIUM 40 MG: 100 INJECTION SUBCUTANEOUS at 05:08

## 2022-08-08 RX ADMIN — CEFTRIAXONE 1 G: 1 INJECTION, SOLUTION INTRAVENOUS at 08:08

## 2022-08-08 RX ADMIN — FAMOTIDINE 20 MG: 20 TABLET ORAL at 08:08

## 2022-08-08 RX ADMIN — IPRATROPIUM BROMIDE AND ALBUTEROL SULFATE 3 ML: 2.5; .5 SOLUTION RESPIRATORY (INHALATION) at 07:08

## 2022-08-08 RX ADMIN — IPRATROPIUM BROMIDE AND ALBUTEROL SULFATE 3 ML: .5; 3 SOLUTION RESPIRATORY (INHALATION) at 02:08

## 2022-08-08 NOTE — NURSING
Report given to JUAN MIGUEL Manzo. Pt being transferred on TELE -28 to [facility].

## 2022-08-08 NOTE — PLAN OF CARE
08/08/22 1130   Final Note   Assessment Type Final Discharge Note   Anticipated Discharge Disposition Home   What phone number can be called within the next 1-3 days to see how you are doing after discharge? [phone]   Post-Acute Status   Discharge Delays (!) Personal Transportation   Patient notified that someone from Dr Singh's office will call with follow up appointment. Demonstrated understanding by verbal feedback. States her daughter will bring her home when she gets off. Denies any other needs at this time.

## 2022-08-08 NOTE — NURSING
"Received report from JUAN MIGUEL Ha. Pt AAO. States SOB is "much better." PIV x 2 to R arm SL. 2L NC with SpO2 95%. Orders received to transfer pt to telemetry room. Denies needs or complaints at this time.   "

## 2022-08-08 NOTE — PROGRESS NOTES
[facility] Medicine  Progress Note    Patient Name: Zayra White  MRN: [id number]  Patient Class: IP- Inpatient   Admission Date: 8/7/2022  Length of Stay: 1 days  Attending Physician: Jeff Boateng MD  Primary Care Provider: Eric Singh MD        Subjective:     Principal Problem:Acute respiratory failure with hypoxia and hypercarbia        HPI:  57 yo w/ hx of HTN, Anxiety, PE, Chronic Hypoxic respiratory Failure 2/2 Chronic COPD presenting w/ SOB. Symptoms started 3 days ago and have worsened. She has noticed a change in sputum. Positive sick contact w/ one of her grandchildren. She had some elevated temperatures at home. Denies any CP. No NVD. Patient has had multiple admissions in the past for COPD exacerbations. She has never required intubation. Patient states that her baseline functional status has been impaired for the past 1-2 months 2/2 her COPD.    On day of admission patient used her rescue inhaler multiple times and used 3-4 duonebs w/ no improvement. She then called EMS. In EMS patient received multiple duonebs. In the ED patient placed on continuous nebulizer treatment. ABG w/ respiratory acidosis. Given IV steroids. Placed on BiPAP 12/6 30% FiO2 w/ rapid improvement in symptoms. Patient able to be removed from BiPAP at around 1230-7264 and moved to [facility]. Has remained stable on this regimen. Patient states she now feels near the baseline she has been at for the past 1-2 months.      Overview/Hospital Course:  No notes on file    Interval History: This am patient stated she was at baseline breathing and functional status. Patient set for discharge today but developed worsening SOB w/ wheezing while ambulating near time of discharge from unit w/ an increase from her baseline O2 requirement. Because of this she will require monitoring overnight w/ continued scheduled duonebs.    Review of Systems   All other systems reviewed and are negative.  Objective:      Vital Signs (Most Recent):  Temp: 98.2 °F (36.8 °C) (08/08/22 1515)  Pulse: (!) 115 (08/08/22 1451)  Resp: 20 (08/08/22 1451)  BP: 110/78 (08/08/22 0755)  SpO2: 98 % (08/08/22 1451)   Vital Signs (24h Range):  Temp:  [97.7 °F (36.5 °C)-98.3 °F (36.8 °C)] 98.2 °F (36.8 °C)  Pulse:  [] 115  Resp:  [16-28] 20  SpO2:  [94 %-100 %] 98 %  BP: (105-136)/(77-86) 110/78     Weight: 45.9 kg (101 lb 3.1 oz)  Body mass index is 16.84 kg/m².    Intake/Output Summary (Last 24 hours) at 8/8/2022 1537  Last data filed at 8/8/2022 0300  Gross per 24 hour   Intake 250 ml   Output --   Net 250 ml      Physical Exam  Vitals reviewed.   Constitutional:       General: She is not in acute distress.  HENT:      Head: Normocephalic and atraumatic.   Eyes:      Extraocular Movements: Extraocular movements intact.      Pupils: Pupils are equal, round, and reactive to light.   Cardiovascular:      Rate and Rhythm: Regular rhythm. Tachycardia present.   Pulmonary:      Breath sounds: Wheezing present.      Comments: Increased RR w/ activity  Abdominal:      Palpations: Abdomen is soft.      Tenderness: There is no abdominal tenderness.   Musculoskeletal:      Right lower leg: No edema.      Left lower leg: No edema.   Skin:     General: Skin is warm and dry.   Neurological:      General: No focal deficit present.      Mental Status: She is alert and oriented to person, place, and time.   Psychiatric:         Mood and Affect: Mood normal.         Behavior: Behavior normal.       Significant Labs: All pertinent labs within the past 24 hours have been reviewed.    Significant Imaging: I have reviewed all pertinent imaging results/findings within the past 24 hours.      Assessment/Plan:      * Acute respiratory failure with hypoxia and hypercarbia  Patient with Hypercapnic and Hypoxic Respiratory failure which is Acute on chronic.  she is on home oxygen at 1 LPM. Supplemental oxygen was provided and noted- Oxygen Concentration (%):   [28-30] 28.   Signs/symptoms of respiratory failure include- tachypnea and respiratory distress. Contributing diagnoses includes - COPD Labs and images were reviewed. Patient Has recent ABG, which has been reviewed. Will treat underlying causes and adjust management of respiratory failure as follows-     Acute on chronic hypoxic/hypercapnic respiratory failure 2/2 Acute on chronic COPD exacerbation caused by likely viral infection  - Duoneb q6h  - pred 40  - Azithro/Rocephin daily   - Wean O2 as tolerated  - Continuous pulse ox and tele  - Continue home inhaler when able  - D-dimer wnl  - F/U respiratory culture  - Procalcitonin mildly elevated- Will continue abx  - Appreciate E-icu recommendations        Hyperlipidemia  Continue home ASA/statin      Anxiety  Resume home xanax  Continue home duloxetine        VTE Risk Mitigation (From admission, onward)         Ordered     enoxaparin injection 40 mg  Daily         08/07/22 0601     IP VTE HIGH RISK PATIENT  Once         08/07/22 0606     Place sequential compression device  Until discontinued         08/07/22 0606                Discharge Planning   KIM: 8/8/2022     Code Status: Full Code   Is the patient medically ready for discharge?:     Reason for patient still in hospital (select all that apply): Treatment                     Jeff Boateng MD  Department of Hospital Medicine   [place] - Intensive Care

## 2022-08-08 NOTE — PLAN OF CARE
08/08/22 1110   Discharge Assessment   Assessment Type Discharge Planning Assessment   Confirmed/corrected address, phone number and insurance Yes   Confirmed Demographics Correct on Facesheet   Source of Information patient   When was your last doctors appointment?   (couple of months ago)   Communicated KIM with patient/caregiver Yes   Reason For Admission couldn't breathe   Lives With alone   Do you expect to return to your current living situation? Yes   Do you have help at home or someone to help you manage your care at home? Yes   Who are your caregiver(s) and their phone number(s)? Noreen Wong sister [phone] lives next door   Prior to hospitilization cognitive status: Alert/Oriented   Current cognitive status: Alert/Oriented   Walking or Climbing Stairs Difficulty none   Dressing/Bathing Difficulty none   Do you have any problems with: Errands/Grocery   Home Accessibility stairs to enter home   Number of Stairs, Main Entrance four   Home Layout Able to live on 1st floor   Equipment Currently Used at Home nebulizer;oxygen;shower chair   Readmission within 30 days? No   Patient currently being followed by outpatient case management? No   Do you currently have service(s) that help you manage your care at home? No   Do you take prescription medications? Yes   Do you have prescription coverage? Yes   Coverage Medicaid   Do you have any problems affording any of your prescribed medications? No   Is the patient taking medications as prescribed? yes   Who is going to help you get home at discharge? Shell or her sister Noreen   How do you get to doctors appointments? family or friend will provide   Are you on dialysis? No   Do you take coumadin? No   Discharge Plan A Home   Discharge Plan B Home with family   DME Needed Upon Discharge  none   Discharge Plan discussed with: Patient   Discharge Barriers Identified None   Patient alert & oriented lives at home alone. States her sister lives next door to her &  her daughter lives a mile or so away. She uses oxygen at home. She states she is independent at home & doesn't use any equipment equipment to assist with ambulation. She does have a shower chair she uses. Denies any needs at this time. Will continue to follow.

## 2022-08-08 NOTE — SUBJECTIVE & OBJECTIVE
Interval History: This am patient stated she was at baseline breathing and functional status. Patient set for discharge today but developed worsening SOB w/ wheezing while ambulating near time of discharge from unit w/ an increase from her baseline O2 requirement. Because of this she will require monitoring overnight w/ continued scheduled duonebs.    Review of Systems   All other systems reviewed and are negative.  Objective:     Vital Signs (Most Recent):  Temp: 98.2 °F (36.8 °C) (08/08/22 1515)  Pulse: (!) 115 (08/08/22 1451)  Resp: 20 (08/08/22 1451)  BP: 110/78 (08/08/22 0755)  SpO2: 98 % (08/08/22 1451)   Vital Signs (24h Range):  Temp:  [97.7 °F (36.5 °C)-98.3 °F (36.8 °C)] 98.2 °F (36.8 °C)  Pulse:  [] 115  Resp:  [16-28] 20  SpO2:  [94 %-100 %] 98 %  BP: (105-136)/(77-86) 110/78     Weight: 45.9 kg (101 lb 3.1 oz)  Body mass index is 16.84 kg/m².    Intake/Output Summary (Last 24 hours) at 8/8/2022 1537  Last data filed at 8/8/2022 0300  Gross per 24 hour   Intake 250 ml   Output --   Net 250 ml      Physical Exam  Vitals reviewed.   Constitutional:       General: She is not in acute distress.  HENT:      Head: Normocephalic and atraumatic.   Eyes:      Extraocular Movements: Extraocular movements intact.      Pupils: Pupils are equal, round, and reactive to light.   Cardiovascular:      Rate and Rhythm: Regular rhythm. Tachycardia present.   Pulmonary:      Breath sounds: Wheezing present.      Comments: Increased RR w/ activity  Abdominal:      Palpations: Abdomen is soft.      Tenderness: There is no abdominal tenderness.   Musculoskeletal:      Right lower leg: No edema.      Left lower leg: No edema.   Skin:     General: Skin is warm and dry.   Neurological:      General: No focal deficit present.      Mental Status: She is alert and oriented to person, place, and time.   Psychiatric:         Mood and Affect: Mood normal.         Behavior: Behavior normal.       Significant Labs: All pertinent  labs within the past 24 hours have been reviewed.    Significant Imaging: I have reviewed all pertinent imaging results/findings within the past 24 hours.

## 2022-08-08 NOTE — NURSING
Pt resting comfortably on 1.5L 02. No resp distress seen. Pt requesting some cough drops to suck on.

## 2022-08-08 NOTE — PLAN OF CARE
Problem: Adjustment to Illness COPD (Chronic Obstructive Pulmonary Disease)  Goal: Optimal Chronic Illness Coping  Outcome: Ongoing, Progressing  Intervention: Support and Optimize Psychosocial Response  Flowsheets (Taken 8/8/2022 0158)  Family/Support System Care: self-care encouraged     Problem: Functional Ability Impaired COPD (Chronic Obstructive Pulmonary Disease)  Goal: Optimal Level of Functional Levy  Outcome: Ongoing, Progressing  Intervention: Optimize Functional Ability  Flowsheets (Taken 8/8/2022 0158)  Activity Management: Ambulated to bathroom - L4     Problem: Infection COPD (Chronic Obstructive Pulmonary Disease)  Goal: Absence of Infection Signs and Symptoms  Outcome: Ongoing, Progressing  Intervention: Prevent or Manage Infection  Flowsheets (Taken 8/8/2022 0158)  Fever Reduction/Comfort Measures: lightweight clothing     Problem: Respiratory Compromise COPD (Chronic Obstructive Pulmonary Disease)  Goal: Effective Oxygenation and Ventilation  Outcome: Ongoing, Progressing  Intervention: Promote Airway Secretion Clearance  Flowsheets (Taken 8/8/2022 0158)  Activity Management: Ambulated to bathroom - L4  Intervention: Optimize Oxygenation and Ventilation  Flowsheets (Taken 8/8/2022 0158)  Head of Bed (HOB) Positioning: HOB at 30-45 degrees

## 2022-08-08 NOTE — TELEPHONE ENCOUNTER
----- Message from Cherie Mei sent at 8/8/2022 10:14 AM CDT -----  Contact: Hospital  Type:  Sooner Apoointment Request    Caller is requesting a sooner appointment.  Caller declined first available appointment listed below.  Caller will not accept being placed on the waitlist and is requesting a message be sent to doctor.    Name of Caller:  [facility]     When is the first available appointment? 10/15      Would the patient rather a call back or a response via [facility]?  Call    Best Call Back Number: [phone] (home)     Additional Information:  Needing a 1  week hospital follow up

## 2022-08-09 LAB
BACTERIA SPEC AEROBE CULT: NORMAL
BACTERIA SPEC AEROBE CULT: NORMAL
GRAM STN SPEC: NORMAL

## 2022-08-09 PROCEDURE — 25000003 PHARM REV CODE 250: Performed by: STUDENT IN AN ORGANIZED HEALTH CARE EDUCATION/TRAINING PROGRAM

## 2022-08-09 PROCEDURE — 99900035 HC TECH TIME PER 15 MIN (STAT)

## 2022-08-09 PROCEDURE — 25000242 PHARM REV CODE 250 ALT 637 W/ HCPCS: Performed by: STUDENT IN AN ORGANIZED HEALTH CARE EDUCATION/TRAINING PROGRAM

## 2022-08-09 PROCEDURE — 27000221 HC OXYGEN, UP TO 24 HOURS

## 2022-08-09 PROCEDURE — 99233 SBSQ HOSP IP/OBS HIGH 50: CPT | Mod: ,,, | Performed by: STUDENT IN AN ORGANIZED HEALTH CARE EDUCATION/TRAINING PROGRAM

## 2022-08-09 PROCEDURE — 63600175 PHARM REV CODE 636 W HCPCS: Performed by: STUDENT IN AN ORGANIZED HEALTH CARE EDUCATION/TRAINING PROGRAM

## 2022-08-09 PROCEDURE — 97530 THERAPEUTIC ACTIVITIES: CPT

## 2022-08-09 PROCEDURE — 94664 DEMO&/EVAL PT USE INHALER: CPT

## 2022-08-09 PROCEDURE — 97165 OT EVAL LOW COMPLEX 30 MIN: CPT

## 2022-08-09 PROCEDURE — 94640 AIRWAY INHALATION TREATMENT: CPT

## 2022-08-09 PROCEDURE — 63700000 PHARM REV CODE 250 ALT 637 W/O HCPCS: Performed by: STUDENT IN AN ORGANIZED HEALTH CARE EDUCATION/TRAINING PROGRAM

## 2022-08-09 PROCEDURE — 21400001 HC TELEMETRY ROOM

## 2022-08-09 PROCEDURE — 97162 PT EVAL MOD COMPLEX 30 MIN: CPT

## 2022-08-09 PROCEDURE — 27000646 HC AEROBIKA DEVICE

## 2022-08-09 PROCEDURE — 99233 PR SUBSEQUENT HOSPITAL CARE,LEVL III: ICD-10-PCS | Mod: ,,, | Performed by: STUDENT IN AN ORGANIZED HEALTH CARE EDUCATION/TRAINING PROGRAM

## 2022-08-09 RX ORDER — GUAIFENESIN 600 MG/1
1200 TABLET, EXTENDED RELEASE ORAL 2 TIMES DAILY
Status: DISCONTINUED | OUTPATIENT
Start: 2022-08-09 | End: 2022-08-10 | Stop reason: HOSPADM

## 2022-08-09 RX ADMIN — IPRATROPIUM BROMIDE AND ALBUTEROL SULFATE 3 ML: 2.5; .5 SOLUTION RESPIRATORY (INHALATION) at 07:08

## 2022-08-09 RX ADMIN — IPRATROPIUM BROMIDE AND ALBUTEROL SULFATE 3 ML: 2.5; .5 SOLUTION RESPIRATORY (INHALATION) at 12:08

## 2022-08-09 RX ADMIN — PREDNISONE 40 MG: 10 TABLET ORAL at 09:08

## 2022-08-09 RX ADMIN — AZITHROMYCIN MONOHYDRATE 500 MG: 250 TABLET ORAL at 09:08

## 2022-08-09 RX ADMIN — ATENOLOL 12.5 MG: 25 TABLET ORAL at 09:08

## 2022-08-09 RX ADMIN — FAMOTIDINE 20 MG: 20 TABLET ORAL at 09:08

## 2022-08-09 RX ADMIN — GUAIFENESIN 1200 MG: 600 TABLET, EXTENDED RELEASE ORAL at 11:08

## 2022-08-09 RX ADMIN — MUPIROCIN: 20 OINTMENT TOPICAL at 09:08

## 2022-08-09 RX ADMIN — IPRATROPIUM BROMIDE AND ALBUTEROL SULFATE 3 ML: 2.5; .5 SOLUTION RESPIRATORY (INHALATION) at 06:08

## 2022-08-09 RX ADMIN — GUAIFENESIN 1200 MG: 600 TABLET, EXTENDED RELEASE ORAL at 09:08

## 2022-08-09 RX ADMIN — DULOXETINE 40 MG: 40 CAPSULE, DELAYED RELEASE ORAL at 09:08

## 2022-08-09 RX ADMIN — ENOXAPARIN SODIUM 40 MG: 100 INJECTION SUBCUTANEOUS at 05:08

## 2022-08-09 RX ADMIN — CEFTRIAXONE 1 G: 1 INJECTION, SOLUTION INTRAVENOUS at 09:08

## 2022-08-09 RX ADMIN — ASPIRIN 81 MG: 81 TABLET, CHEWABLE ORAL at 09:08

## 2022-08-09 RX ADMIN — ATORVASTATIN CALCIUM 20 MG: 10 TABLET, FILM COATED ORAL at 09:08

## 2022-08-09 NOTE — PLAN OF CARE
08/09/22 0825   Discharge Reassessment   Assessment Type Discharge Planning Reassessment   Did the patient's condition or plan change since previous assessment? No   Discharge Plan discussed with: Patient;Spouse/sig other   Name(s) and Number(s) Adrianna Gupta spouse [phone]   Communicated KIM with patient/caregiver Yes   Discharge Plan A Home with family   Discharge Plan B Home   DME Needed Upon Discharge  none   Discharge Barriers Identified None   Why the patient remains in the hospital Requires continued medical care   Post-Acute Status   Discharge Delays None known at this time   Patient alert & oriented with spouse at bedside. States he is doing much better than yesterday. Plan is to possibly go home tomorrow if he continues to improve. They recently moved here from [place] & don't have a doctor. He has [place] medicaid but is supposed to get insurance through his work but hasn't heard anything about it. Will provide them with Franklyn's number in case they need financial assistance for hospital stay. Will get him established with [facility] & provide them with pediatrician information for their child. Denies any other needs at this time. Will continue to follow.

## 2022-08-09 NOTE — PROGRESS NOTES
[facility] Medicine  Progress Note    Patient Name: Zayra White  MRN: [id number]  Patient Class: IP- Inpatient   Admission Date: 8/7/2022  Length of Stay: 2 days  Attending Physician: Jeff Boateng MD  Primary Care Provider: Eric Singh MD        Subjective:     Principal Problem:Acute respiratory failure with hypoxia and hypercarbia        HPI:  55 yo w/ hx of HTN, Anxiety, PE, Chronic Hypoxic respiratory Failure 2/2 Chronic COPD presenting w/ SOB. Symptoms started 3 days ago and have worsened. She has noticed a change in sputum. Positive sick contact w/ one of her grandchildren. She had some elevated temperatures at home. Denies any CP. No NVD. Patient has had multiple admissions in the past for COPD exacerbations. She has never required intubation. Patient states that her baseline functional status has been impaired for the past 1-2 months 2/2 her COPD.    On day of admission patient used her rescue inhaler multiple times and used 3-4 duonebs w/ no improvement. She then called EMS. In EMS patient received multiple duonebs. In the ED patient placed on continuous nebulizer treatment. ABG w/ respiratory acidosis. Given IV steroids. Placed on BiPAP 12/6 30% FiO2 w/ rapid improvement in symptoms. Patient able to be removed from BiPAP at around 0570-0446 and moved to [facility]. Has remained stable on this regimen. Patient states she now feels near the baseline she has been at for the past 1-2 months.      Overview/Hospital Course:  55 yo w/ hx of HTN, Anxiety, PE, Chronic Hypoxic respiratory Failure 2/2 Chronic COPD admitted for acute hypoxic/hypercapnic respiratory failure initially requiring BiPAP. Weaned to LFNC. On scheduled duonebs, abx, steroids. Doing well but still not back at baseline prior to admission.      Interval History: NAEON. Not at baseline. Still very SOB with activity    Review of Systems   All other systems reviewed and are negative.  Objective:     Vital Signs  (Most Recent):  Temp: 97 °F (36.1 °C) (08/09/22 1059)  Pulse: 82 (08/09/22 1059)  Resp: 17 (08/09/22 1059)  BP: 124/67 (08/09/22 1059)  SpO2: 96 % (08/09/22 1059) Vital Signs (24h Range):  Temp:  [96.4 °F (35.8 °C)-98.2 °F (36.8 °C)] 97 °F (36.1 °C)  Pulse:  [] 82  Resp:  [16-20] 17  SpO2:  [92 %-98 %] 96 %  BP: (113-139)/(56-71) 124/67     Weight: 45.9 kg (101 lb 3.1 oz)  Body mass index is 16.84 kg/m².    Intake/Output Summary (Last 24 hours) at 8/9/2022 1247  Last data filed at 8/9/2022 0800  Gross per 24 hour   Intake 360 ml   Output --   Net 360 ml      Physical Exam  Vitals reviewed.   Constitutional:       Appearance: Normal appearance.   Eyes:      Extraocular Movements: Extraocular movements intact.      Pupils: Pupils are equal, round, and reactive to light.   Cardiovascular:      Rate and Rhythm: Regular rhythm. Tachycardia present.   Pulmonary:      Effort: Pulmonary effort is normal.      Breath sounds: Wheezing present.   Abdominal:      Palpations: Abdomen is soft.      Tenderness: There is no abdominal tenderness.   Musculoskeletal:      Right lower leg: No edema.      Left lower leg: No edema.   Skin:     General: Skin is warm and dry.   Neurological:      General: No focal deficit present.      Mental Status: She is alert and oriented to person, place, and time.   Psychiatric:         Mood and Affect: Mood normal.         Behavior: Behavior normal.       Significant Labs: All pertinent labs within the past 24 hours have been reviewed.    Significant Imaging: I have reviewed all pertinent imaging results/findings within the past 24 hours.      Assessment/Plan:      * Acute respiratory failure with hypoxia and hypercarbia  Patient with Hypercapnic and Hypoxic Respiratory failure which is Acute on chronic.  she is on home oxygen at 1 LPM. Supplemental oxygen was provided and noted-  .   Signs/symptoms of respiratory failure include- tachypnea and respiratory distress. Contributing diagnoses  includes - COPD Labs and images were reviewed. Patient Has recent ABG, which has been reviewed. Will treat underlying causes and adjust management of respiratory failure as follows-     Acute on chronic hypoxic/hypercapnic respiratory failure 2/2 Acute on chronic COPD exacerbation caused by likely viral infection  - Duoneb q6h  - pred 40  - Azithro/Rocephin daily   - Wean O2 as tolerated  - Continuous pulse ox and tele  - Continue home inhaler when able  - D-dimer wnl  - F/U respiratory culture  - Procalcitonin elevated  - Start pulmicort  - Acapella Valve ordered      Hyperlipidemia  Continue home ASA/statin      Anxiety  Continue home xanax  Continue home duloxetine        VTE Risk Mitigation (From admission, onward)         Ordered     enoxaparin injection 40 mg  Daily         08/07/22 0601     IP VTE HIGH RISK PATIENT  Once         08/07/22 0606     Place sequential compression device  Until discontinued         08/07/22 0606                Discharge Planning   KIM: 8/8/2022     Code Status: Full Code   Is the patient medically ready for discharge?:     Reason for patient still in hospital (select all that apply): Treatment  Discharge Plan A: Home   Discharge Delays: (!) Personal Transportation              Jeff Boateng MD  Department of Hospital Medicine   [facility]

## 2022-08-09 NOTE — PLAN OF CARE
Problem: Adult Inpatient Plan of Care  Goal: Plan of Care Review  Outcome: Ongoing, Progressing  Goal: Patient-Specific Goal (Individualized)  Outcome: Ongoing, Progressing  Goal: Absence of Hospital-Acquired Illness or Injury  Outcome: Ongoing, Progressing  Goal: Optimal Comfort and Wellbeing  Outcome: Ongoing, Progressing  Goal: Readiness for Transition of Care  Outcome: Ongoing, Progressing     Problem: Adjustment to Illness COPD (Chronic Obstructive Pulmonary Disease)  Goal: Optimal Chronic Illness Coping  Outcome: Ongoing, Progressing     Problem: Functional Ability Impaired COPD (Chronic Obstructive Pulmonary Disease)  Goal: Optimal Level of Functional Reagan  Outcome: Ongoing, Progressing     Problem: Respiratory Compromise COPD (Chronic Obstructive Pulmonary Disease)  Goal: Effective Oxygenation and Ventilation  Outcome: Ongoing, Progressing

## 2022-08-09 NOTE — PLAN OF CARE
Problem: Adult Inpatient Plan of Care  Goal: Plan of Care Review  Outcome: Ongoing, Progressing  Goal: Patient-Specific Goal (Individualized)  Outcome: Ongoing, Progressing  Goal: Absence of Hospital-Acquired Illness or Injury  Outcome: Ongoing, Progressing  Goal: Optimal Comfort and Wellbeing  Outcome: Ongoing, Progressing  Goal: Readiness for Transition of Care  Outcome: Ongoing, Progressing     Problem: Adjustment to Illness COPD (Chronic Obstructive Pulmonary Disease)  Goal: Optimal Chronic Illness Coping  Outcome: Ongoing, Progressing     Problem: Functional Ability Impaired COPD (Chronic Obstructive Pulmonary Disease)  Goal: Optimal Level of Functional [place]  Outcome: Ongoing, Progressing     Problem: Infection COPD (Chronic Obstructive Pulmonary Disease)  Goal: Absence of Infection Signs and Symptoms  Outcome: Ongoing, Progressing     Problem: Oral Intake Inadequate COPD (Chronic Obstructive Pulmonary Disease)  Goal: Improved Nutrition Intake  Outcome: Ongoing, Progressing     Problem: Respiratory Compromise COPD (Chronic Obstructive Pulmonary Disease)  Goal: Effective Oxygenation and Ventilation  Outcome: Ongoing, Progressing     Problem: Skin Injury Risk Increased  Goal: Skin Health and Integrity  Outcome: Ongoing, Progressing     Problem: Gas Exchange Impaired  Goal: Optimal Gas Exchange  Outcome: Ongoing, Progressing

## 2022-08-09 NOTE — HOSPITAL COURSE
55 yo w/ hx of HTN, Anxiety, PE, Chronic Hypoxic respiratory Failure 2/2 Chronic COPD admitted for acute hypoxic/hypercapnic respiratory failure initially requiring BiPAP. Weaned to LFNC. On scheduled duonebs, abx, steroids. Once she returned to baseline, she was discharged home in good condition to complete steroid and antibiotic course. Discharged to return to using home oxgyen.

## 2022-08-09 NOTE — PLAN OF CARE
08/09/22 0830   Discharge Reassessment   Assessment Type Discharge Planning Reassessment   Did the patient's condition or plan change since previous assessment? No   Discharge Plan discussed with: Patient   Communicated KIM with patient/caregiver Date not available/Unable to determine   Discharge Plan A Home   Discharge Plan B Home   DME Needed Upon Discharge  none   Discharge Barriers Identified None   Why the patient remains in the hospital Requires continued medical care   Post-Acute Status   Discharge Delays None known at this time   Patient resting in bed. States not feeling great. Doctor will order a different respiratory treatment to help her. Denies any needs at this time. Will continue to follow.

## 2022-08-09 NOTE — PT/OT/SLP EVAL
Occupational Therapy   Evaluation    Name: Zayra White  MRN: [id number]  Admitting Diagnosis:  Acute respiratory failure with hypoxia and hypercarbia  Recent Surgery: * No surgery found *      Recommendations:     Discharge Recommendations:  Home with family support  Discharge Equipment Recommendations:    Will continue to assess  Barriers to discharge:   None    Assessment:   55 yo w/ hx of HTN, Anxiety, PE, Chronic Hypoxic respiratory Failure 2/2 Chronic COPD presenting w/ SOB. Symptoms started 3 days ago and have worsened. She has noticed a change in sputum. Positive sick contact w/ one of her grandchildren. She had some elevated temperatures at home. Denies any CP. No NVD. Patient has had multiple admissions in the past for COPD exacerbations. She has never required intubation. Patient states that her baseline functional status has been impaired for the past 1-2 months 2/2 her COPD.       Rehab Prognosis: Good      Plan:     Patient to be seen   to address the above listed problems via    · Plan of Care Expires:  When patient is discharged from hospital.  Patient will be treated 3-5x per week while hospitalized.  · Plan of Care Reviewed with:  Patient     Subjective       Occupational Profile:  Living Environment: Patient lives alone in a camper with family nearby.  Previous level of function: Patient was independent with all ADLs prior to being hospitalized.  Equipment Used at Home:   None  Assistance upon Discharge: Family    Pain/Comfort:  ·  Patient did not repot having pain during evaluation.         Objective:     Communicated with: OT spoke with patient's nurse prior to entering patient's room for evaluation.    General Precautions: Standard,     Orthopedic Precautions:    Braces:    Respiratory Status: 1.5L O2    Occupational Performance:    Bed Mobility:    · Patient is independent with bed mobility.    Functional Mobility/Transfers:  · Patient is modified independent with functional transfers and  when ambulating 20 feet.    Activities of Daily Living:  · Patient is independent with ADLs.    Cognitive/Visual Perceptual:  Patient is alert and oriented x4.    Physical Exam:  -Patient's BUE AROM is WNL.  -Patient's BUE MMT grade is 4/5.  -Patient's BUE Sensation is intact.    Treatment & Education:  Patient tolerated evaluation well on this date with OT.  Patient appears to be motivated to return home at [facility].  Patient is independent with bed mobility and ADLs.  Patient is modified independent with functional transfers and when ambulating 20 feet.    Education:    Patient was placed back in supine with all needs met and call light in reach at the conclusion of evaluation.     GOALS:   1. Patient will tolerated 10 minutes of functional activities without requiring a rest break.  2.  Patient will incorporate energy conservation techniques into her daily routine.  3. Patient will take rest breaks when performing ADLs.    History:     Past Medical History:   Diagnosis Date    Anxiety     Arthritis     Asthma     Back pain     COPD (chronic obstructive pulmonary disease)     Pulmonary embolism     10 years ago       Past Surgical History:   Procedure Laterality Date    BIOPSY      right breast    BREAST CYST EXCISION       SECTION      CHOLECYSTECTOMY      COLONOSCOPY N/A 2022    Procedure: COLONOSCOPY;  Surgeon: Chavez Gonzales MD;  Location: [facility] ENDO;  Service: General;  Laterality: N/A;    ESOPHAGOGASTRODUODENOSCOPY N/A 2022    Procedure: ESOPHAGOGASTRODUODENOSCOPY (EGD);  Surgeon: Chavez Gonzales MD;  Location: [facility] ENDO;  Service: General;  Laterality: N/A;    INCISION AND DRAINAGE OF ABSCESS Left 3/16/2020    Procedure: INCISION AND DRAINAGE, ABSCESS;  Surgeon: Irvin Villarreal MD;  Location: [facility] OR;  Service: General;  Laterality: Left;       Time Tracking:     OT Date of Treatment:  2022  OT Start Time:  10:30  OT Stop Time:  10:55  OT Total Time (min):  25  minutes     Bebo Menchaca, OTR/L    8/9/2022

## 2022-08-09 NOTE — PT/OT/SLP EVAL
Physical Therapy Evaluation    Patient Name:  Zayra White   MRN:  [id number]    Recommendations:     Discharge Recommendations:   (home with family support and home health)   Discharge Equipment Recommendations:  (none identified)   Barriers to discharge: None    Assessment:     Zayra White is a 56 y.o. female admitted with a medical diagnosis of Acute respiratory failure with hypoxia and hypercarbia.  She presents with the following impairments/functional limitations:  weakness, impaired endurance, impaired cardiopulmonary response to activity.    Rehab Prognosis: Good; patient would benefit from acute skilled PT services to address these deficits and reach maximum level of function.    Recent Surgery: * No surgery found *      Plan:     During this hospitalization, patient to be seen  (3-5 x/wk) to address the identified rehab impairments via gait training, therapeutic activities, therapeutic exercises and progress toward the following goals:    · Plan of Care Expires:   (upon hospital discharge)    Subjective     Chief Complaint:  Acute respiratory failure with hypoxia and hypercarbia  Patient/Family Comments/goals: patient reporting SOB with productive cough  Pain/Comfort:  · Pain Rating 1:  (patient reporting mild abdominal pain secondary to coughing, numerical pain rating not provided)    Patients cultural, spiritual, Anabaptist conflicts given the current situation: no    Living Environment:  Patient lives in a camper next door to her sister that has 4-5 steps to enter and 1 step to access bed and bathroom.  Prior to admission, patients level of function was mod I with ADL's, ambulatory without AD, mobility limited by respiratory status.  Equipment used at home: nebulizer, oxygen, shower chair.  DME owned (not currently used): none.  Upon discharge, patient will have assistance from her sister.    Objective:     Communicated with RN prior to session.  Patient found HOB elevated with oxygen,  peripheral IV, pulse ox (continuous), telemetry, SCD  upon PT entry to room.    General Precautions: Standard, respiratory   Orthopedic Precautions:N/A   Braces: N/A  Respiratory Status: Nasal cannula, flow 1.5 L/min    Exams:  · Cognitive Exam:  Patient is oriented to Person, Place, Time and Situation  · RLE ROM: WFL  · RLE Strength: 3+5 - 4-/5   · LLE ROM: WFL  · LLE Strength: 3+/5 - 4-/5    Functional Mobility:  · Bed Mobility:  Rolling Left:  modified independence  · Rolling Right: modified independence  · Supine to Sit: modified independence  · Sit to Supine: modified independence  · Transfers:  Sit to Stand:  stand by assistance with no AD  · Gait: patient ambulated 40' with CGA in room w/o AD    Therapeutic Activities and Exercises:  Patient performs bed mobility with mod I, transfers with SBA. Patient ambulated 40' with CGA in room w/o AD. Mobility limited by cardiopulmonary response to activity.     Patient Educated in call light use, energy conservation and mindful breathing techniques, importance of OOB activity and functional mobility to negate the negative effects of prolonged bed rest during this hospitalization, safe transfers/ambulation and discharge planning recommendations/options.    Patient left sitting edge of bed with all lines intact, call button in reach and RN notified.    GOALS:   1. Patient to ambulate 100' with SBA using LRAD and requiring no more than 2 brief standing breaks  2. Patient to demonstrate mindful breathing techniques during activity to increase respiratory efficiency  3. Patient to perform 10 consecutive sit to stands from bedside with SBA and no use to UE's to assist    History:     Past Medical History:   Diagnosis Date    Anxiety     Arthritis     Asthma     Back pain     COPD (chronic obstructive pulmonary disease)     Pulmonary embolism     10 years ago       Past Surgical History:   Procedure Laterality Date    BIOPSY      right breast    BREAST CYST EXCISION        SECTION      CHOLECYSTECTOMY      COLONOSCOPY N/A 2022    Procedure: COLONOSCOPY;  Surgeon: Chavez Gonzales MD;  Location: [facility] ENDO;  Service: General;  Laterality: N/A;    ESOPHAGOGASTRODUODENOSCOPY N/A 2022    Procedure: ESOPHAGOGASTRODUODENOSCOPY (EGD);  Surgeon: Chavez Gonzales MD;  Location: [facility] ENDO;  Service: General;  Laterality: N/A;    INCISION AND DRAINAGE OF ABSCESS Left 3/16/2020    Procedure: INCISION AND DRAINAGE, ABSCESS;  Surgeon: Irvin Villarreal MD;  Location: [facility] OR;  Service: General;  Laterality: Left;       Time Tracking:     PT Received On: 22  PT Start Time: 1411     PT Stop Time: 1431  PT Total Time (min): 20 min     Billable Minutes: Evaluation 20 min      2022

## 2022-08-09 NOTE — SUBJECTIVE & OBJECTIVE
Interval History: NAEON. Not at baseline. Still very SOB with activity    Review of Systems   All other systems reviewed and are negative.  Objective:     Vital Signs (Most Recent):  Temp: 97 °F (36.1 °C) (08/09/22 1059)  Pulse: 82 (08/09/22 1059)  Resp: 17 (08/09/22 1059)  BP: 124/67 (08/09/22 1059)  SpO2: 96 % (08/09/22 1059) Vital Signs (24h Range):  Temp:  [96.4 °F (35.8 °C)-98.2 °F (36.8 °C)] 97 °F (36.1 °C)  Pulse:  [] 82  Resp:  [16-20] 17  SpO2:  [92 %-98 %] 96 %  BP: (113-139)/(56-71) 124/67     Weight: 45.9 kg (101 lb 3.1 oz)  Body mass index is 16.84 kg/m².    Intake/Output Summary (Last 24 hours) at 8/9/2022 1247  Last data filed at 8/9/2022 0800  Gross per 24 hour   Intake 360 ml   Output --   Net 360 ml      Physical Exam  Vitals reviewed.   Constitutional:       Appearance: Normal appearance.   Eyes:      Extraocular Movements: Extraocular movements intact.      Pupils: Pupils are equal, round, and reactive to light.   Cardiovascular:      Rate and Rhythm: Regular rhythm. Tachycardia present.   Pulmonary:      Effort: Pulmonary effort is normal.      Breath sounds: Wheezing present.   Abdominal:      Palpations: Abdomen is soft.      Tenderness: There is no abdominal tenderness.   Musculoskeletal:      Right lower leg: No edema.      Left lower leg: No edema.   Skin:     General: Skin is warm and dry.   Neurological:      General: No focal deficit present.      Mental Status: She is alert and oriented to person, place, and time.   Psychiatric:         Mood and Affect: Mood normal.         Behavior: Behavior normal.       Significant Labs: All pertinent labs within the past 24 hours have been reviewed.    Significant Imaging: I have reviewed all pertinent imaging results/findings within the past 24 hours.

## 2022-08-09 NOTE — ASSESSMENT & PLAN NOTE
Patient with Hypercapnic and Hypoxic Respiratory failure which is Acute on chronic.  she is on home oxygen at 1 LPM. Supplemental oxygen was provided and noted-  .   Signs/symptoms of respiratory failure include- tachypnea and respiratory distress. Contributing diagnoses includes - COPD Labs and images were reviewed. Patient Has recent ABG, which has been reviewed. Will treat underlying causes and adjust management of respiratory failure as follows-     Acute on chronic hypoxic/hypercapnic respiratory failure 2/2 Acute on chronic COPD exacerbation caused by likely viral infection  - Duoneb q6h  - pred 40  - Azithro/Rocephin daily   - Wean O2 as tolerated  - Continuous pulse ox and tele  - Continue home inhaler when able  - D-dimer wnl  - F/U respiratory culture  - Procalcitonin elevated  - Start pulmicort  - Acapella Valve ordered

## 2022-08-10 VITALS
BODY MASS INDEX: 16.86 KG/M2 | RESPIRATION RATE: 16 BRPM | SYSTOLIC BLOOD PRESSURE: 134 MMHG | HEART RATE: 84 BPM | HEIGHT: 65 IN | OXYGEN SATURATION: 96 % | TEMPERATURE: 97 F | WEIGHT: 101.19 LBS | DIASTOLIC BLOOD PRESSURE: 61 MMHG

## 2022-08-10 LAB
ANION GAP SERPL CALC-SCNC: 12 MMOL/L (ref 8–16)
BASOPHILS # BLD AUTO: 0.01 K/UL (ref 0–0.2)
BASOPHILS NFR BLD: 0.1 % (ref 0–1.9)
BUN SERPL-MCNC: 6 MG/DL (ref 6–20)
CALCIUM SERPL-MCNC: 9 MG/DL (ref 8.7–10.5)
CHLORIDE SERPL-SCNC: 99 MMOL/L (ref 95–110)
CO2 SERPL-SCNC: 32 MMOL/L (ref 23–29)
CREAT SERPL-MCNC: 0.6 MG/DL (ref 0.5–1.4)
DIFFERENTIAL METHOD: ABNORMAL
EOSINOPHIL # BLD AUTO: 0 K/UL (ref 0–0.5)
EOSINOPHIL NFR BLD: 0.6 % (ref 0–8)
ERYTHROCYTE [DISTWIDTH] IN BLOOD BY AUTOMATED COUNT: 11.5 % (ref 11.5–14.5)
EST. GFR  (NO RACE VARIABLE): >60 ML/MIN/1.73 M^2
GLUCOSE SERPL-MCNC: 86 MG/DL (ref 70–110)
HCT VFR BLD AUTO: 34.9 % (ref 37–48.5)
HGB BLD-MCNC: 12.1 G/DL (ref 12–16)
IMM GRANULOCYTES # BLD AUTO: 0.03 K/UL (ref 0–0.04)
IMM GRANULOCYTES NFR BLD AUTO: 0.4 % (ref 0–0.5)
LYMPHOCYTES # BLD AUTO: 1.9 K/UL (ref 1–4.8)
LYMPHOCYTES NFR BLD: 26.1 % (ref 18–48)
MAGNESIUM SERPL-MCNC: 2 MG/DL (ref 1.6–2.6)
MCH RBC QN AUTO: 32.4 PG (ref 27–31)
MCHC RBC AUTO-ENTMCNC: 34.7 G/DL (ref 32–36)
MCV RBC AUTO: 94 FL (ref 82–98)
MONOCYTES # BLD AUTO: 0.4 K/UL (ref 0.3–1)
MONOCYTES NFR BLD: 5.8 % (ref 4–15)
NEUTROPHILS # BLD AUTO: 4.9 K/UL (ref 1.8–7.7)
NEUTROPHILS NFR BLD: 67 % (ref 38–73)
NRBC BLD-RTO: 0 /100 WBC
PHOSPHATE SERPL-MCNC: 3.5 MG/DL (ref 2.7–4.5)
PLATELET # BLD AUTO: 224 K/UL (ref 150–450)
PMV BLD AUTO: 9.6 FL (ref 9.2–12.9)
POTASSIUM SERPL-SCNC: 3.1 MMOL/L (ref 3.5–5.1)
RBC # BLD AUTO: 3.73 M/UL (ref 4–5.4)
SODIUM SERPL-SCNC: 143 MMOL/L (ref 136–145)
WBC # BLD AUTO: 7.27 K/UL (ref 3.9–12.7)

## 2022-08-10 PROCEDURE — 99900035 HC TECH TIME PER 15 MIN (STAT)

## 2022-08-10 PROCEDURE — 63600175 PHARM REV CODE 636 W HCPCS: Performed by: STUDENT IN AN ORGANIZED HEALTH CARE EDUCATION/TRAINING PROGRAM

## 2022-08-10 PROCEDURE — 27000221 HC OXYGEN, UP TO 24 HOURS

## 2022-08-10 PROCEDURE — 63700000 PHARM REV CODE 250 ALT 637 W/O HCPCS: Performed by: STUDENT IN AN ORGANIZED HEALTH CARE EDUCATION/TRAINING PROGRAM

## 2022-08-10 PROCEDURE — 94664 DEMO&/EVAL PT USE INHALER: CPT

## 2022-08-10 PROCEDURE — 25000242 PHARM REV CODE 250 ALT 637 W/ HCPCS: Performed by: STUDENT IN AN ORGANIZED HEALTH CARE EDUCATION/TRAINING PROGRAM

## 2022-08-10 PROCEDURE — 80048 BASIC METABOLIC PNL TOTAL CA: CPT | Performed by: STUDENT IN AN ORGANIZED HEALTH CARE EDUCATION/TRAINING PROGRAM

## 2022-08-10 PROCEDURE — 85025 COMPLETE CBC W/AUTO DIFF WBC: CPT | Performed by: STUDENT IN AN ORGANIZED HEALTH CARE EDUCATION/TRAINING PROGRAM

## 2022-08-10 PROCEDURE — 25000003 PHARM REV CODE 250: Performed by: STUDENT IN AN ORGANIZED HEALTH CARE EDUCATION/TRAINING PROGRAM

## 2022-08-10 PROCEDURE — 36415 COLL VENOUS BLD VENIPUNCTURE: CPT | Performed by: STUDENT IN AN ORGANIZED HEALTH CARE EDUCATION/TRAINING PROGRAM

## 2022-08-10 PROCEDURE — 94640 AIRWAY INHALATION TREATMENT: CPT

## 2022-08-10 PROCEDURE — 99239 PR HOSPITAL DISCHARGE DAY,>30 MIN: ICD-10-PCS | Mod: ,,, | Performed by: FAMILY MEDICINE

## 2022-08-10 PROCEDURE — 99239 HOSP IP/OBS DSCHRG MGMT >30: CPT | Mod: ,,, | Performed by: FAMILY MEDICINE

## 2022-08-10 PROCEDURE — 83735 ASSAY OF MAGNESIUM: CPT | Performed by: STUDENT IN AN ORGANIZED HEALTH CARE EDUCATION/TRAINING PROGRAM

## 2022-08-10 PROCEDURE — 84100 ASSAY OF PHOSPHORUS: CPT | Performed by: STUDENT IN AN ORGANIZED HEALTH CARE EDUCATION/TRAINING PROGRAM

## 2022-08-10 RX ADMIN — CEFTRIAXONE 1 G: 1 INJECTION, SOLUTION INTRAVENOUS at 08:08

## 2022-08-10 RX ADMIN — ASPIRIN 81 MG: 81 TABLET, CHEWABLE ORAL at 08:08

## 2022-08-10 RX ADMIN — FAMOTIDINE 20 MG: 20 TABLET ORAL at 08:08

## 2022-08-10 RX ADMIN — IPRATROPIUM BROMIDE AND ALBUTEROL SULFATE 3 ML: 2.5; .5 SOLUTION RESPIRATORY (INHALATION) at 12:08

## 2022-08-10 RX ADMIN — AZITHROMYCIN MONOHYDRATE 500 MG: 250 TABLET ORAL at 08:08

## 2022-08-10 RX ADMIN — DULOXETINE 40 MG: 40 CAPSULE, DELAYED RELEASE ORAL at 09:08

## 2022-08-10 RX ADMIN — GUAIFENESIN 1200 MG: 600 TABLET, EXTENDED RELEASE ORAL at 08:08

## 2022-08-10 RX ADMIN — IPRATROPIUM BROMIDE AND ALBUTEROL SULFATE 3 ML: 2.5; .5 SOLUTION RESPIRATORY (INHALATION) at 06:08

## 2022-08-10 RX ADMIN — ATENOLOL 12.5 MG: 25 TABLET ORAL at 08:08

## 2022-08-10 RX ADMIN — MUPIROCIN: 20 OINTMENT TOPICAL at 08:08

## 2022-08-10 RX ADMIN — PREDNISONE 40 MG: 10 TABLET ORAL at 08:08

## 2022-08-10 NOTE — PLAN OF CARE
Problem: Adult Inpatient Plan of Care  Goal: Plan of Care Review  Outcome: Ongoing, Progressing  Goal: Patient-Specific Goal (Individualized)  Outcome: Ongoing, Progressing  Goal: Absence of Hospital-Acquired Illness or Injury  Outcome: Ongoing, Progressing  Goal: Optimal Comfort and Wellbeing  Outcome: Ongoing, Progressing  Goal: Readiness for Transition of Care  Outcome: Ongoing, Progressing     Problem: Adjustment to Illness COPD (Chronic Obstructive Pulmonary Disease)  Goal: Optimal Chronic Illness Coping  Outcome: Ongoing, Progressing     Problem: Functional Ability Impaired COPD (Chronic Obstructive Pulmonary Disease)  Goal: Optimal Level of Functional Wheeler  Outcome: Ongoing, Progressing     Problem: Infection COPD (Chronic Obstructive Pulmonary Disease)  Goal: Absence of Infection Signs and Symptoms  Outcome: Ongoing, Progressing     Problem: Oral Intake Inadequate COPD (Chronic Obstructive Pulmonary Disease)  Goal: Improved Nutrition Intake  Outcome: Ongoing, Progressing     Problem: Respiratory Compromise COPD (Chronic Obstructive Pulmonary Disease)  Goal: Effective Oxygenation and Ventilation  Outcome: Ongoing, Progressing     Problem: Skin Injury Risk Increased  Goal: Skin Health and Integrity  Outcome: Ongoing, Progressing     Problem: Gas Exchange Impaired  Goal: Optimal Gas Exchange  Outcome: Ongoing, Progressing   POC reviewed at bedside. Questions and concerns addressed. VSS. Placed bed in low and locked position. Call light within reach. Side rails up x2. Instructed to call for any needs. Verbalized understanding of all instructions. Frequent rounds.

## 2022-08-10 NOTE — PLAN OF CARE
Problem: Adult Inpatient Plan of Care  Goal: Plan of Care Review  Outcome: Met     Problem: Adult Inpatient Plan of Care  Goal: Absence of Hospital-Acquired Illness or Injury  Outcome: Met     Problem: Adult Inpatient Plan of Care  Goal: Optimal Comfort and Wellbeing  Outcome: Met     Problem: Functional Ability Impaired COPD (Chronic Obstructive Pulmonary Disease)  Goal: Optimal Level of Functional [place]  Outcome: Met     Problem: Infection COPD (Chronic Obstructive Pulmonary Disease)  Goal: Absence of Infection Signs and Symptoms  Outcome: Met

## 2022-08-10 NOTE — NURSING
Patient AAOx4. Respirations even and unlabored. No distress noted. Discharge instructions reviewed with the patient and verbalizes understanding. New prescriptions and medication changes reviewed with the patient, verbalizes understanding. Patient transported to personal vehicle via wheelchair to be discharged home with family. All personal belongings sent with the patient.

## 2022-08-10 NOTE — PLAN OF CARE
08/10/22 1030   Final Note   Assessment Type Final Discharge Note   Anticipated Discharge Disposition Home   What phone number can be called within the next 1-3 days to see how you are doing after discharge? [phone]   Hospital Resources/Appts/Education Provided Appointments scheduled and added to AVS   Post-Acute Status   Discharge Delays None known at this time   Verbal & written follow up appointments with Dr Gonzales & Dr Singh provided to patient. Reverified the time with Dr Singh's office as patient thought it was a different time. Also notified patient that someone from Chloe Leung's office will call her with follow up appointment. Demonstrated understanding by verbal feedback. Denies needing home health at this time. Denies any other needs at this time. Waiting for her sister to pick her up.

## 2022-08-10 NOTE — PLAN OF CARE
08/10/22 0950   Discharge Reassessment   Assessment Type Discharge Planning Reassessment   Did the patient's condition or plan change since previous assessment? No   Discharge Plan discussed with: Patient   Communicated KIM with patient/caregiver Yes   Discharge Plan A Home Health   DME Needed Upon Discharge  none   Discharge Barriers Identified None   Why the patient remains in the hospital Requires continued medical care   Post-Acute Status   Post-Acute Authorization Home Health   Home Health Status Pending medical clearance/testing   Discharge Delays None known at this time   Patient resting in bed. Alert & more oriented. She is in agreement to home health with [facility] upon discharge. Her daughter will stay with her. She is being started on lovenox for blood thinner & may go home tomorrow on Eliquis. Denies any other needs at this time.

## 2022-08-11 ENCOUNTER — OFFICE VISIT (OUTPATIENT)
Dept: FAMILY MEDICINE | Facility: CLINIC | Age: 57
End: 2022-08-11
Payer: MEDICAID

## 2022-08-11 ENCOUNTER — OFFICE VISIT (OUTPATIENT)
Dept: SURGERY | Facility: CLINIC | Age: 57
End: 2022-08-11
Payer: MEDICAID

## 2022-08-11 VITALS
HEART RATE: 85 BPM | OXYGEN SATURATION: 96 % | RESPIRATION RATE: 18 BRPM | SYSTOLIC BLOOD PRESSURE: 158 MMHG | BODY MASS INDEX: 17.33 KG/M2 | HEIGHT: 65 IN | DIASTOLIC BLOOD PRESSURE: 91 MMHG | WEIGHT: 104 LBS

## 2022-08-11 VITALS
TEMPERATURE: 98 F | DIASTOLIC BLOOD PRESSURE: 86 MMHG | OXYGEN SATURATION: 97 % | HEART RATE: 85 BPM | BODY MASS INDEX: 17.33 KG/M2 | SYSTOLIC BLOOD PRESSURE: 134 MMHG | WEIGHT: 104 LBS | RESPIRATION RATE: 14 BRPM | HEIGHT: 65 IN

## 2022-08-11 DIAGNOSIS — K29.70 GASTRITIS, PRESENCE OF BLEEDING UNSPECIFIED, UNSPECIFIED CHRONICITY, UNSPECIFIED GASTRITIS TYPE: ICD-10-CM

## 2022-08-11 DIAGNOSIS — D12.6 TUBULAR ADENOMA OF COLON: Primary | ICD-10-CM

## 2022-08-11 DIAGNOSIS — Z09 HOSPITAL DISCHARGE FOLLOW-UP: Primary | ICD-10-CM

## 2022-08-11 PROCEDURE — 99214 OFFICE O/P EST MOD 30 MIN: CPT | Mod: S$PBB,,, | Performed by: SURGERY

## 2022-08-11 PROCEDURE — 99214 OFFICE O/P EST MOD 30 MIN: CPT | Mod: S$PBB,,, | Performed by: FAMILY MEDICINE

## 2022-08-11 PROCEDURE — 99999 PR PBB SHADOW E&M-EST. PATIENT-LVL IV: CPT | Mod: PBBFAC,,, | Performed by: FAMILY MEDICINE

## 2022-08-11 PROCEDURE — 3080F PR MOST RECENT DIASTOLIC BLOOD PRESSURE >= 90 MM HG: ICD-10-PCS | Mod: CPTII,,, | Performed by: SURGERY

## 2022-08-11 PROCEDURE — 1111F PR DISCHARGE MEDS RECONCILED W/ CURRENT OUTPATIENT MED LIST: ICD-10-PCS | Mod: CPTII,,, | Performed by: SURGERY

## 2022-08-11 PROCEDURE — 3008F BODY MASS INDEX DOCD: CPT | Mod: CPTII,,, | Performed by: FAMILY MEDICINE

## 2022-08-11 PROCEDURE — 1111F DSCHRG MED/CURRENT MED MERGE: CPT | Mod: CPTII,,, | Performed by: SURGERY

## 2022-08-11 PROCEDURE — 1160F PR REVIEW ALL MEDS BY PRESCRIBER/CLIN PHARMACIST DOCUMENTED: ICD-10-PCS | Mod: CPTII,,, | Performed by: FAMILY MEDICINE

## 2022-08-11 PROCEDURE — 3008F BODY MASS INDEX DOCD: CPT | Mod: CPTII,,, | Performed by: SURGERY

## 2022-08-11 PROCEDURE — 3079F PR MOST RECENT DIASTOLIC BLOOD PRESSURE 80-89 MM HG: ICD-10-PCS | Mod: CPTII,,, | Performed by: FAMILY MEDICINE

## 2022-08-11 PROCEDURE — 3077F SYST BP >= 140 MM HG: CPT | Mod: CPTII,,, | Performed by: SURGERY

## 2022-08-11 PROCEDURE — 3008F PR BODY MASS INDEX (BMI) DOCUMENTED: ICD-10-PCS | Mod: CPTII,,, | Performed by: SURGERY

## 2022-08-11 PROCEDURE — 1159F PR MEDICATION LIST DOCUMENTED IN MEDICAL RECORD: ICD-10-PCS | Mod: CPTII,,, | Performed by: SURGERY

## 2022-08-11 PROCEDURE — 99214 PR OFFICE/OUTPT VISIT, EST, LEVL IV, 30-39 MIN: ICD-10-PCS | Mod: S$PBB,,, | Performed by: FAMILY MEDICINE

## 2022-08-11 PROCEDURE — 99999 PR PBB SHADOW E&M-EST. PATIENT-LVL IV: ICD-10-PCS | Mod: PBBFAC,,, | Performed by: FAMILY MEDICINE

## 2022-08-11 PROCEDURE — 1159F MED LIST DOCD IN RCRD: CPT | Mod: CPTII,,, | Performed by: FAMILY MEDICINE

## 2022-08-11 PROCEDURE — 3080F DIAST BP >= 90 MM HG: CPT | Mod: CPTII,,, | Performed by: SURGERY

## 2022-08-11 PROCEDURE — 3075F PR MOST RECENT SYSTOLIC BLOOD PRESS GE 130-139MM HG: ICD-10-PCS | Mod: CPTII,,, | Performed by: FAMILY MEDICINE

## 2022-08-11 PROCEDURE — 99999 PR PBB SHADOW E&M-EST. PATIENT-LVL III: CPT | Mod: PBBFAC,,, | Performed by: SURGERY

## 2022-08-11 PROCEDURE — 3008F PR BODY MASS INDEX (BMI) DOCUMENTED: ICD-10-PCS | Mod: CPTII,,, | Performed by: FAMILY MEDICINE

## 2022-08-11 PROCEDURE — 99214 OFFICE O/P EST MOD 30 MIN: CPT | Mod: PBBFAC,27 | Performed by: FAMILY MEDICINE

## 2022-08-11 PROCEDURE — 99213 OFFICE O/P EST LOW 20 MIN: CPT | Mod: PBBFAC | Performed by: SURGERY

## 2022-08-11 PROCEDURE — 99999 PR PBB SHADOW E&M-EST. PATIENT-LVL III: ICD-10-PCS | Mod: PBBFAC,,, | Performed by: SURGERY

## 2022-08-11 PROCEDURE — 1159F MED LIST DOCD IN RCRD: CPT | Mod: CPTII,,, | Performed by: SURGERY

## 2022-08-11 PROCEDURE — 3077F PR MOST RECENT SYSTOLIC BLOOD PRESSURE >= 140 MM HG: ICD-10-PCS | Mod: CPTII,,, | Performed by: SURGERY

## 2022-08-11 PROCEDURE — 3075F SYST BP GE 130 - 139MM HG: CPT | Mod: CPTII,,, | Performed by: FAMILY MEDICINE

## 2022-08-11 PROCEDURE — 99214 PR OFFICE/OUTPT VISIT, EST, LEVL IV, 30-39 MIN: ICD-10-PCS | Mod: S$PBB,,, | Performed by: SURGERY

## 2022-08-11 PROCEDURE — 1160F RVW MEDS BY RX/DR IN RCRD: CPT | Mod: CPTII,,, | Performed by: FAMILY MEDICINE

## 2022-08-11 PROCEDURE — 1159F PR MEDICATION LIST DOCUMENTED IN MEDICAL RECORD: ICD-10-PCS | Mod: CPTII,,, | Performed by: FAMILY MEDICINE

## 2022-08-11 PROCEDURE — 3079F DIAST BP 80-89 MM HG: CPT | Mod: CPTII,,, | Performed by: FAMILY MEDICINE

## 2022-08-11 NOTE — PROGRESS NOTES
"[facility] Surgery  Follow-up    Subjective:     Chief Complaint:  Follow-up EGD colonoscopy.    HPI:  Zayra White is a 56 y.o. female presents today for follow-up examination.  Patient underwent EGD and colonoscopy.  EGD showed evidence of gastritis with ulcerations.  Biopsies negative for H pylori species.  Gastritis present.  Patient since EGD colonoscopy has been on Protonix.  Improvement symptoms.  Patient on colonoscopy found to have 2 colon polyps.  Both consistent with adenomatous colon polyps.  No dysplasia.  Patient has had no bleeding.  Doing well today.  Discharge from the hospital for different reason.  She presents today for follow-up.    Review of Systems   Constitutional: Negative for activity change, appetite change, chills and fever.   HENT: Negative for congestion, dental problem and ear discharge.    Eyes: Negative for discharge and itching.   Respiratory: Negative for apnea, choking and chest tightness.    Cardiovascular: Negative for chest pain and leg swelling.   Gastrointestinal: Negative for abdominal distention, abdominal pain, anal bleeding, constipation, diarrhea and nausea.   Endocrine: Negative for cold intolerance and heat intolerance.   Genitourinary: Negative for difficulty urinating and dyspareunia.   Musculoskeletal: Negative for arthralgias and back pain.   Skin: Negative for color change and pallor.   Neurological: Negative for dizziness and facial asymmetry.   Hematological: Negative for adenopathy. Does not bruise/bleed easily.   Psychiatric/Behavioral: Negative for agitation and behavioral problems.       Objective:      Vitals:    08/11/22 0911   BP: (!) 158/91   Pulse: 85   Resp: 18   SpO2: 96%   Weight: 47.2 kg (104 lb)   Height: 5' 5" (1.651 m)     Physical Exam  Constitutional:       General: She is not in acute distress.     Appearance: She is well-developed.   HENT:      Head: Normocephalic and atraumatic.   Eyes:      Pupils: Pupils are equal, round, " and reactive to light.   Neck:      Thyroid: No thyromegaly.   Cardiovascular:      Rate and Rhythm: Normal rate and regular rhythm.   Pulmonary:      Effort: Pulmonary effort is normal.      Breath sounds: Normal breath sounds.   Abdominal:      General: Bowel sounds are normal. There is no distension.      Palpations: Abdomen is soft.      Tenderness: There is no abdominal tenderness.   Musculoskeletal:         General: No deformity. Normal range of motion.      Cervical back: Normal range of motion and neck supple.   Skin:     General: Skin is warm.      Capillary Refill: Capillary refill takes less than 2 seconds.      Findings: No erythema.   Neurological:      Mental Status: She is alert and oriented to person, place, and time.      Cranial Nerves: No cranial nerve deficit.   Psychiatric:         Behavior: Behavior normal.     EGD colonoscopy reports reviewed.  Pathology reports reviewed.     Assessment:       1. Tubular adenoma of colon    2. Gastritis, presence of bleeding unspecified, unspecified chronicity, unspecified gastritis type        Plan:   Tubular adenoma of colon    Gastritis, presence of bleeding unspecified, unspecified chronicity, unspecified gastritis type        Medical Decision Making/Counseling:  Adenomatous colon polyps.  Recommendation be repeat colonoscopy in 3 years.  Patient voiced understanding common agreement.    Gastritis.  Recommendation be 6 months treatment with Protonix.  After which time patient may be discontinued from Protonix.  Repeat EGD as needed.  Possible need for longer therapy or lifelong therapy if recurrent gastritis with ulcerations present.    Follow up:  3 years repeat scopes.    Patient instructed that best way to communicate with my office staff is for patient to get on the [facility] patient portal to expedite communication and communication issues that may occur.  Patient was given instructions on how to get on the portal.  I encouraged patient to obtain  portal access as well.  Ultimately it is up to the patient to obtain access.  Patient voiced understanding.

## 2022-08-11 NOTE — PROGRESS NOTES
"[facility] - Clinic Note    Subjective      Ms. White is a 56 y.o. female who presents to clinic for Hospital Follow Up    Patient was admitted to the hospital from 2022 to 02/10/2022 for a COPD exacerbation.  She has been breathing better.  She is going to complete a course of steroids and antibiotics.  She has follow-up with pulmonology next week.     [facility] Zayra has a past medical history of Anxiety, Arthritis, Asthma, Back pain, COPD (chronic obstructive pulmonary disease), and Pulmonary embolism.   PSXH Zayra has a past surgical history that includes Breast cyst excision;  section; Cholecystectomy; Biopsy; Incision and drainage of abscess (Left, 3/16/2020); Colonoscopy (N/A, 2022); and Esophagogastroduodenoscopy (N/A, 2022).    Zayra's family history includes Breast cancer in her sister.    Zayra reports that she has been smoking cigarettes. She has a 41.00 pack-year smoking history. She has never used smokeless tobacco. She reports previous alcohol use. She reports that she does not use drugs.   XI Iverson is allergic to ativan [lorazepam].   DYLAN Iverson has a current medication list which includes the following prescription(s): albuterol, albuterol, amoxicillin-clavulanate 875-125mg, aspirin, atorvastatin, cholecalciferol (vitamin d3), clonazepam, trelegy ellipta, ipratropium, pantoprazole, prednisone, promethazine, atenolol, and duloxetine.     Review of Systems   Constitutional: Negative for chills and fever.   Respiratory: Positive for cough.    Cardiovascular: Negative for chest pain.   Musculoskeletal: Negative for back pain.   Psychiatric/Behavioral: The patient is nervous/anxious.      Objective     /86 (BP Location: Left arm, Patient Position: Sitting, BP Method: Small (Manual))   Pulse 85   Temp 97.8 °F (36.6 °C) (Temporal)   Resp 14   Ht 5' 5" (1.651 m)   Wt 47.2 kg (104 lb)   LMP 2017   SpO2 97% Comment: on 2 liters  BMI 17.31 " kg/m²     Physical Exam  Vitals and nursing note reviewed.   Constitutional:       General: She is not in acute distress.     Appearance: Normal appearance. She is well-developed. She is not diaphoretic.   HENT:      Head: Normocephalic and atraumatic.      Right Ear: External ear normal.      Left Ear: External ear normal.   Eyes:      General:         Right eye: No discharge.         Left eye: No discharge.   Cardiovascular:      Rate and Rhythm: Normal rate and regular rhythm.      Heart sounds: Normal heart sounds.   Pulmonary:      Effort: Pulmonary effort is normal.      Breath sounds: Normal breath sounds. No wheezing or rales.   Skin:     General: Skin is warm and dry.   Neurological:      Mental Status: She is alert and oriented to person, place, and time. Mental status is at baseline.   Psychiatric:         Mood and Affect: Mood normal.         Behavior: Behavior normal.         Thought Content: Thought content normal.         Judgment: Judgment normal.        Assessment/Plan     1. Hospital discharge follow-up       Respiratory status has improved.  Reviewed medications.  Continue clonazepam to help with anxiety. Keep follow-up with pulmonology.  Follow-up in 1 month for Pap smear.    Future Appointments   Date Time Provider Department Center   8/16/2022  9:30 AM Chloe Leung NP [facility] PULM [facility]   9/15/2022  2:00 PM Eric Singh MD [facility]         Eric Singh MD  Family Medicine  [facility]

## 2022-08-12 LAB
BACTERIA BLD CULT: NORMAL
BACTERIA BLD CULT: NORMAL

## 2022-08-16 ENCOUNTER — OFFICE VISIT (OUTPATIENT)
Dept: PULMONOLOGY | Facility: CLINIC | Age: 57
End: 2022-08-16
Payer: MEDICAID

## 2022-08-16 VITALS
DIASTOLIC BLOOD PRESSURE: 85 MMHG | OXYGEN SATURATION: 95 % | WEIGHT: 105.38 LBS | BODY MASS INDEX: 17.54 KG/M2 | HEART RATE: 77 BPM | SYSTOLIC BLOOD PRESSURE: 134 MMHG

## 2022-08-16 DIAGNOSIS — J96.11 CHRONIC RESPIRATORY FAILURE WITH HYPOXIA AND HYPERCAPNIA: Primary | ICD-10-CM

## 2022-08-16 DIAGNOSIS — J96.12 CHRONIC RESPIRATORY FAILURE WITH HYPOXIA AND HYPERCAPNIA: ICD-10-CM

## 2022-08-16 DIAGNOSIS — J96.11 CHRONIC RESPIRATORY FAILURE WITH HYPOXIA AND HYPERCAPNIA: ICD-10-CM

## 2022-08-16 DIAGNOSIS — J44.9 CHRONIC OBSTRUCTIVE PULMONARY DISEASE, UNSPECIFIED COPD TYPE: Primary | ICD-10-CM

## 2022-08-16 DIAGNOSIS — Z72.0 TOBACCO USE: ICD-10-CM

## 2022-08-16 DIAGNOSIS — J96.12 CHRONIC RESPIRATORY FAILURE WITH HYPOXIA AND HYPERCAPNIA: Primary | ICD-10-CM

## 2022-08-16 DIAGNOSIS — R91.8 MULTIPLE LUNG NODULES ON CT: ICD-10-CM

## 2022-08-16 DIAGNOSIS — F41.9 ANXIETY: ICD-10-CM

## 2022-08-16 PROCEDURE — 3075F PR MOST RECENT SYSTOLIC BLOOD PRESS GE 130-139MM HG: ICD-10-PCS | Mod: CPTII,,, | Performed by: NURSE PRACTITIONER

## 2022-08-16 PROCEDURE — 1159F MED LIST DOCD IN RCRD: CPT | Mod: CPTII,,, | Performed by: NURSE PRACTITIONER

## 2022-08-16 PROCEDURE — 99214 PR OFFICE/OUTPT VISIT, EST, LEVL IV, 30-39 MIN: ICD-10-PCS | Mod: S$PBB,,, | Performed by: NURSE PRACTITIONER

## 2022-08-16 PROCEDURE — 3008F PR BODY MASS INDEX (BMI) DOCUMENTED: ICD-10-PCS | Mod: CPTII,,, | Performed by: NURSE PRACTITIONER

## 2022-08-16 PROCEDURE — 99214 OFFICE O/P EST MOD 30 MIN: CPT | Mod: S$PBB,,, | Performed by: NURSE PRACTITIONER

## 2022-08-16 PROCEDURE — 99213 OFFICE O/P EST LOW 20 MIN: CPT | Mod: PBBFAC,PO | Performed by: NURSE PRACTITIONER

## 2022-08-16 PROCEDURE — 99999 PR PBB SHADOW E&M-EST. PATIENT-LVL III: ICD-10-PCS | Mod: PBBFAC,,, | Performed by: NURSE PRACTITIONER

## 2022-08-16 PROCEDURE — 1159F PR MEDICATION LIST DOCUMENTED IN MEDICAL RECORD: ICD-10-PCS | Mod: CPTII,,, | Performed by: NURSE PRACTITIONER

## 2022-08-16 PROCEDURE — 99999 PR PBB SHADOW E&M-EST. PATIENT-LVL III: CPT | Mod: PBBFAC,,, | Performed by: NURSE PRACTITIONER

## 2022-08-16 PROCEDURE — 1111F DSCHRG MED/CURRENT MED MERGE: CPT | Mod: CPTII,,, | Performed by: NURSE PRACTITIONER

## 2022-08-16 PROCEDURE — 3008F BODY MASS INDEX DOCD: CPT | Mod: CPTII,,, | Performed by: NURSE PRACTITIONER

## 2022-08-16 PROCEDURE — 3079F PR MOST RECENT DIASTOLIC BLOOD PRESSURE 80-89 MM HG: ICD-10-PCS | Mod: CPTII,,, | Performed by: NURSE PRACTITIONER

## 2022-08-16 PROCEDURE — 3079F DIAST BP 80-89 MM HG: CPT | Mod: CPTII,,, | Performed by: NURSE PRACTITIONER

## 2022-08-16 PROCEDURE — 3075F SYST BP GE 130 - 139MM HG: CPT | Mod: CPTII,,, | Performed by: NURSE PRACTITIONER

## 2022-08-16 PROCEDURE — 1111F PR DISCHARGE MEDS RECONCILED W/ CURRENT OUTPATIENT MED LIST: ICD-10-PCS | Mod: CPTII,,, | Performed by: NURSE PRACTITIONER

## 2022-08-16 RX ORDER — PREDNISONE 20 MG/1
TABLET ORAL
Qty: 9 TABLET | Refills: 1 | Status: SHIPPED | OUTPATIENT
Start: 2022-08-16 | End: 2022-10-08 | Stop reason: ALTCHOICE

## 2022-08-16 RX ORDER — IPRATROPIUM BROMIDE AND ALBUTEROL SULFATE 2.5; .5 MG/3ML; MG/3ML
3 SOLUTION RESPIRATORY (INHALATION) EVERY 6 HOURS PRN
Qty: 75 ML | Refills: 3 | Status: SHIPPED | OUTPATIENT
Start: 2022-08-16 | End: 2023-09-18 | Stop reason: SDUPTHER

## 2022-08-16 RX ORDER — ALBUTEROL SULFATE 90 UG/1
2 AEROSOL, METERED RESPIRATORY (INHALATION) EVERY 6 HOURS PRN
Qty: 18 G | Refills: 11 | Status: SHIPPED | OUTPATIENT
Start: 2022-08-16 | End: 2023-08-16

## 2022-08-16 NOTE — PROGRESS NOTES
8/16/2022    Zayra White  In office visit    Chief Complaint   Patient presents with    Hospital Follow Up    COPD       HPI:  8/16/2022:  Patient did not keep follow up - I saw patient last in Feb 2022.   Patient required hospitalization for SIRS, COPD at [facility] on 8/7 - was subsequently discharged home on 8/10 (no discharge summary available in Epic). Treated with IV Abx and Bipap therapy while inpatient. States she was discharged home with Rx for steroid and abx, in which she completed both regiments. Patient has had THREE hospitalizations for COPD exacerbations in 2022 alone.  Still using Trelegy, one puff once per day with reported benefit. Using Albuterol inhaler as needed. Using nebulizer machine with Duoneb as needed.   Still smoking cigarettes, approximately 5 cigarettes per day.  Has supplemental oxygen at home and POC - wears as needed for shortness of breath, at night time - uses 1-2L via NC.  ABG obtained in the hospital on 8/7/2022 revealing pH 7.309, CO2 63.6, O2 122, and HCO3 32.0        2/18/2022: In office today with sister. Hx: COPD, Chronic Resp failure.  Recently Hospitalized at [facility] on 2/7 for COPD exacerbation, was treated with IV steroids and neb treatments, subsequently discharged home on 2/10 with Rx for Prednisone and Levaquin, which she reports completion and compliance.  Had Prior hospitalization in November 2021 at [facility] for COPD exacerbation - complicated hospitalization with intramuscular hematoma of the left rectus muscle of the anterior abdominal wall - general surgery consulted, resolved spontaneously. Discharged home at that time with supplemental oxygen. Patient also endorses prior ER visit in Feb 2021 for COPD exacerbation and ER visit at [facility] for same.  Currently taking Anora once per day - Albuterol rescue inhaler approx 2-5 times per day. Using Nebulizer (Albuterol and Ipatropium) scheduled per day.  Using oxygen at  night time, and as needed throughout the day - 1L via NC.  Shortness of breath: Varies with time - states worse over the last week due to sinus congestion, weather change, pollen. Exertional, improves with rest and tripod position. Affecting ADLS.   Cough: Worsening, productive with clear mucous production. Worse in the morning, Denies wheezing, chest tightness. Taking Mucinex without benefit. Ran out of Tessalon pearls - they seemed to help.   Patient Instructions   You have two areas of suspicion noted on CT Chest.   One area in the R lower lung - initially noted on CT from 2/8/2022.  Area in the L lung has been present since at least 12/2020 - appears to have grown in size.  Will repeat CT in 3 months.  Would benefit from smoking cessation. Declines referral to smoking cessation program at this time. Has tried Chantix in past.  This inhaler Trelegy is your new daily inhaler. It contains an inhaled steroid component. Rinse mouth after each use due to risk for thrush development. If mouth or tongue develops white sores please contact the clinic and I will order a prescription mouth wash.   Continue to use albuterol rescue inhaler as needed.  Zyrtec - take one pill nightly for 30 days - see if it helps with sinus complaints.  Tessalon pearls as needed for cough.   I ordered a Lung Function Test to evaluate lung strength. Please complete prior to next appointment.         Social Hx: Lives alone - no animals in the home. Previously worked as labored. No Asbestosis exposure, Smoking Hx: Current smoker, started age 14 - 0.5 ppd use.   Family Hx: Mother Lung Cancer, No COPD, Son, Brother, Sister Asthma  Medical Hx: Previous pneumonia (did not require intubation) ; No previous shoulder/chest surgery      The chief compliant  problem varies with instability at times.  PFSH:  Past Medical History:   Diagnosis Date    Anxiety     Arthritis     Asthma     Back pain     COPD (chronic obstructive pulmonary disease)      Pulmonary embolism     10 years ago         Past Surgical History:   Procedure Laterality Date    BIOPSY      right breast    BREAST CYST EXCISION       SECTION      CHOLECYSTECTOMY      COLONOSCOPY N/A 2022    Procedure: COLONOSCOPY;  Surgeon: Chavez Gonzales MD;  Location: [facility] ENDO;  Service: General;  Laterality: N/A;    ESOPHAGOGASTRODUODENOSCOPY N/A 2022    Procedure: ESOPHAGOGASTRODUODENOSCOPY (EGD);  Surgeon: Chavez Gonzales MD;  Location: [facility] ENDO;  Service: General;  Laterality: N/A;    INCISION AND DRAINAGE OF ABSCESS Left 3/16/2020    Procedure: INCISION AND DRAINAGE, ABSCESS;  Surgeon: Irvin Villarreal MD;  Location: [facility] OR;  Service: General;  Laterality: Left;     Social History     Tobacco Use    Smoking status: Current Every Day Smoker     Packs/day: 1.00     Years: 41.00     Pack years: 41.00     Types: Cigarettes     Last attempt to quit: 2021     Years since quittin.7    Smokeless tobacco: Never Used   Substance Use Topics    Alcohol use: Not Currently     Comment: occ    Drug use: Never     Family History   Problem Relation Age of Onset    Breast cancer Sister     Ovarian cancer Neg Hx      Review of patient's allergies indicates:   Allergen Reactions    Ativan [lorazepam] Itching     I have reviewed past medical, family, and social history. I have reviewed previous nurse notes.    Performance Status:The patient's activity level is functions out of house.      Review of Systems:  a review of eleven systems covering constitutional, Eye, HEENT, Psych, Respiratory, Cardiac, GI, , Musculoskeletal, Endocrine, Dermatologic was negative except for pertinent findings as listed ABOVE and below: pertinent positive as above, rest is good       Exam:Comprehensive exam done. /85 (BP Location: Left arm, Patient Position: Sitting)   Pulse 77   Wt 47.8 kg (105 lb 6.1 oz)   LMP 2017   SpO2 95%   BMI 17.54 kg/m²   Exam included Vitals as  listed  Constitutional: She is oriented to person, place, and time. She appears well-developed. No distress.   Nose: Nose normal.   Mouth/Throat: Uvula is midline, oropharynx is clear and moist and mucous membranes are normal. No dental caries. No oropharyngeal exudate, posterior oropharyngeal edema, posterior oropharyngeal erythema or tonsillar abscesses.  Mallapatti (M) score 1  Eyes: Pupils are equal, round, and reactive to light.   Neck: No JVD present. No thyromegaly present.   Cardiovascular: Normal rate, regular rhythm and normal heart sounds. Exam reveals no gallop and no friction rub.   No murmur heard.  Pulmonary/Chest: Effort normal and breath sounds normal. No accessory muscle usage or stridor. No apnea and no tachypnea. No respiratory distress, on room air. Diminished breath sounds to bilateral lower lung fields, faint expiratory wheezing noted to lower lung fields.   Abdominal: Soft. She exhibits no mass. There is no tenderness. No hepatosplenomegaly, hernias and normoactive bowel sounds  Musculoskeletal: Normal range of motion. exhibits no edema.   Neurological:  alert and oriented to person, place, and time. not disoriented.   Skin: Skin is warm and dry. Capillary refill takes less 2 sec. No cyanosis or erythema. No pallor. Nails show no clubbing.   Psychiatric: normal mood and affect. behavior is normal. Judgment and thought content normal.       Radiographs (ct chest and cxr) reviewed: view by direct vision     X-Ray Chest AP Portable  8/7/2022   FINDINGS:  Normal cardiomediastinal contour. No focal consolidation, pleural effusion or pneumothorax. The lungs are hyperinflated with flattening of the diaphragms.   Impression:   No acute cardiopulmonary abnormality.   Pulmonary emphysema.       CTA Chest Non-Coronary  2/8/2022   Impression:   No CTA evidence of pulmonary embolus.  Significant centrilobular emphysema.  Stable 1.4 cm spiculated mass of the left lingula.  New 1.9 cm band of spiculated  density in the posterior right lung gutter likely represents atelectasis.  Follow-up CT in 3 months is recommended however.      X-Ray Chest AP Portable  2/8/2022   Impression:   COPD.         Labs reviewed     Lab Results   Component Value Date    WBC 7.27 08/10/2022    RBC 3.73 (L) 08/10/2022    HGB 12.1 08/10/2022    HCT 34.9 (L) 08/10/2022    MCV 94 08/10/2022    MCH 32.4 (H) 08/10/2022    MCHC 34.7 08/10/2022    RDW 11.5 08/10/2022     08/10/2022    MPV 9.6 08/10/2022    GRAN 4.9 08/10/2022    GRAN 67.0 08/10/2022    LYMPH 1.9 08/10/2022    LYMPH 26.1 08/10/2022    MONO 0.4 08/10/2022    MONO 5.8 08/10/2022    EOS 0.0 08/10/2022    BASO 0.01 08/10/2022    EOSINOPHIL 0.6 08/10/2022    BASOPHIL 0.1 08/10/2022       PFT will be done and results to be reviewed  Pulmonary Functions Testing Results:    Patient has chronic respiratory failure secondary to COPD.  Patient will need a specific targeted tidal volume which cannot be provided via a CPAP or BiPAP.  This patient will require a set tidal volume to ensure CO2 levels were lowered adequately; as well as, to assist in establishing proper diaphragmatic functions.  Therefore, NIV is being ordered due to the severe state of this patient's disease.  Without this therapy, the patient runs a higher risk of expiration and readmittance.      Plan:  Clinical impression is ambiguous and will need repeated evaluation wrt.    Zayra was seen today for hospital follow up and copd.    Diagnoses and all orders for this visit:    Chronic obstructive pulmonary disease, unspecified COPD type  -     albuterol-ipratropium (DUO-NEB) 2.5 mg-0.5 mg/3 mL nebulizer solution; Take 3 mLs by nebulization every 6 (six) hours as needed for Wheezing or Shortness of Breath. Rescue  -     albuterol (PROVENTIL/VENTOLIN HFA) 90 mcg/actuation inhaler; Inhale 2 puffs into the lungs every 6 (six) hours as needed for Wheezing or Shortness of Breath. Rescue  -     Complete PFT with  bronchodilator; Future  -     predniSONE (DELTASONE) 20 MG tablet; Take two pills for three days. One pill for three days. Can repeat as needed for shortness of breath, cough.    Anxiety    Chronic respiratory failure with hypoxia and hypercapnia  -     Complete PFT with bronchodilator; Future    Multiple lung nodules on CT    Tobacco use    Assistance with smoking cessation was offered, including:  []  Medications  [x]  Counseling  []  Printed Information on Smoking Cessation  []  Referral to a Smoking Cessation Program  Patient was counseled regarding smoking for >10 minutes.        Follow up in about 2 months (around 10/16/2022), or if symptoms worsen or fail to improve.    Discussed with patient above for education the following:      Patient Instructions   Area of concern on CT from Feb - need to repeat CT Chest now.    I ordered a Lung Function Test to evaluate lung strength. Please complete prior to next appointment.     Continue Trelegy once per day.    Albuterol as needed. Prescription sent for Duoneb to be taken as needed.    NIV machine ordered given chronic resp failure secondary to COPD with recent hospitalization revealing high CO2 level.    Continue the use of supplemental oxygen.    Stop smoking.     Continue current medication regiment. Keep follow up appointment as scheduled. Please call the office if you have any questions or concerns.

## 2022-08-16 NOTE — PATIENT INSTRUCTIONS
Area of concern on CT from Feb - need to repeat CT Chest now.    I ordered a Lung Function Test to evaluate lung strength. Please complete prior to next appointment.     Continue Trelegy once per day.    Albuterol as needed. Prescription sent for Duoneb to be taken as needed.    NIV machine ordered given chronic resp failure secondary to COPD with recent hospitalization revealing high CO2 level.    Continue the use of supplemental oxygen.    Stop smoking.     Continue current medication regiment. Keep follow up appointment as scheduled. Please call the office if you have any questions or concerns.

## 2022-08-17 ENCOUNTER — TELEPHONE (OUTPATIENT)
Dept: PULMONOLOGY | Facility: CLINIC | Age: 57
End: 2022-08-17
Payer: MEDICAID

## 2022-08-18 ENCOUNTER — TELEPHONE (OUTPATIENT)
Dept: PULMONOLOGY | Facility: CLINIC | Age: 57
End: 2022-08-18
Payer: MEDICAID

## 2022-08-18 NOTE — TELEPHONE ENCOUNTER
Faxed sign form but alyssa wants to cxl order.     ----- Message from Di Reagan sent at 8/18/2022  8:34 AM CDT -----  Contact: [phone]  Type: Needs Medical Advice  Who Called: Anna STERLING provider    Best Call Back Number: [phone]  Additional Information:  [employer] was ordered but insurance needs order from MD. New form will be faxed over. Pls have MD sign and date for Life [employer]

## 2022-08-23 ENCOUNTER — TELEPHONE (OUTPATIENT)
Dept: PULMONOLOGY | Facility: CLINIC | Age: 57
End: 2022-08-23
Payer: MEDICAID

## 2022-08-23 ENCOUNTER — HOSPITAL ENCOUNTER (OUTPATIENT)
Dept: RADIOLOGY | Facility: HOSPITAL | Age: 57
Discharge: HOME | End: 2022-08-23
Attending: NURSE PRACTITIONER
Payer: MEDICAID

## 2022-08-23 ENCOUNTER — TELEPHONE (OUTPATIENT)
Dept: TRANSPLANT | Facility: CLINIC | Age: 57
End: 2022-08-23
Payer: MEDICAID

## 2022-08-23 ENCOUNTER — HOSPITAL ENCOUNTER (OUTPATIENT)
Dept: PULMONOLOGY | Facility: HOSPITAL | Age: 57
Discharge: HOME | End: 2022-08-23
Attending: NURSE PRACTITIONER
Payer: MEDICAID

## 2022-08-23 DIAGNOSIS — J44.9 CHRONIC OBSTRUCTIVE PULMONARY DISEASE, UNSPECIFIED COPD TYPE: ICD-10-CM

## 2022-08-23 DIAGNOSIS — J96.12 CHRONIC RESPIRATORY FAILURE WITH HYPOXIA AND HYPERCAPNIA: ICD-10-CM

## 2022-08-23 DIAGNOSIS — J44.9 CHRONIC OBSTRUCTIVE PULMONARY DISEASE, UNSPECIFIED COPD TYPE: Primary | ICD-10-CM

## 2022-08-23 DIAGNOSIS — J96.11 CHRONIC RESPIRATORY FAILURE WITH HYPOXIA AND HYPERCAPNIA: ICD-10-CM

## 2022-08-23 DIAGNOSIS — R91.8 MULTIPLE LUNG NODULES ON CT: ICD-10-CM

## 2022-08-23 PROCEDURE — 94060 PR EVAL OF BRONCHOSPASM: ICD-10-PCS | Mod: 26,,, | Performed by: INTERNAL MEDICINE

## 2022-08-23 PROCEDURE — 94729 DIFFUSING CAPACITY: CPT | Mod: 26,,, | Performed by: INTERNAL MEDICINE

## 2022-08-23 PROCEDURE — 71250 CT THORAX DX C-: CPT | Mod: TC

## 2022-08-23 PROCEDURE — 71250 CT THORAX DX C-: CPT | Mod: 26,,, | Performed by: RADIOLOGY

## 2022-08-23 PROCEDURE — 94727 GAS DIL/WSHOT DETER LNG VOL: CPT | Mod: 26,,, | Performed by: INTERNAL MEDICINE

## 2022-08-23 PROCEDURE — 94729 DIFFUSING CAPACITY: CPT

## 2022-08-23 PROCEDURE — 94060 EVALUATION OF WHEEZING: CPT

## 2022-08-23 PROCEDURE — 94727 PR PULM FUNCTION TEST BY GAS: ICD-10-PCS | Mod: 26,,, | Performed by: INTERNAL MEDICINE

## 2022-08-23 PROCEDURE — 94727 GAS DIL/WSHOT DETER LNG VOL: CPT

## 2022-08-23 PROCEDURE — 71250 CT CHEST WITHOUT CONTRAST: ICD-10-PCS | Mod: 26,,, | Performed by: RADIOLOGY

## 2022-08-23 PROCEDURE — 94729 PR C02/MEMBANE DIFFUSE CAPACITY: ICD-10-PCS | Mod: 26,,, | Performed by: INTERNAL MEDICINE

## 2022-08-23 PROCEDURE — 94060 EVALUATION OF WHEEZING: CPT | Mod: 26,,, | Performed by: INTERNAL MEDICINE

## 2022-08-23 NOTE — TELEPHONE ENCOUNTER
Provider spoke to patient   ----- Message from Chloe Leung NP sent at 8/23/2022 11:13 AM CDT -----  Lung function test with severe lung obstruction, consistent with COPD. Approximately 22% lung strength at this time. Needs to get NIV machine, my staff is trying to arrange. I have also reached out to lung transplant program to see if they will accept your case.

## 2022-08-23 NOTE — TELEPHONE ENCOUNTER
Provider spoke to pt.   ----- Message from Chloe Leung NP sent at 8/23/2022 11:15 AM CDT -----  CT reviewed, no change noted to spot previously concerned about.   Continue current medication regiment. Keep follow up appointment as scheduled. Please call the office if you have any questions or concerns.

## 2022-08-23 NOTE — TELEPHONE ENCOUNTER
Received referral for lung transplantation.  Message sent to Dr. Brock.  Awaiting recommendations from provider.

## 2022-08-23 NOTE — LETTER
September 12, 2022    Chloe Leung  [address]  [address]  [place] [address]  Phone: [phone]  Fax: [phone]      Dear Chloe Leung:    Patient: Zayra White   MR Number: [id number]   YOB: 1965     Thank you for the referral of Zayra White to our transplant program.  Ms. White was contacted to schedule an initial consult appointment, which she declined due to her current smoking status.  She agreed to contact us to schedule an appointment once she is abstinent from all nicotine use.       Thank you again for your trust in our program.  If there is anything we can do for you or your staff, please feel free to contact us.     Sincerely,       Shell Brock D.O.  Medical Director, Lung Transplant  Pulmonary & Critical Care Medicine    [facility]  [address] [place] [id number]  [phone]

## 2022-08-23 NOTE — TELEPHONE ENCOUNTER
"Contacted patient regarding the referral to our department.  Patient stated that she was aware of the referral.  Informed patient that her records were reviewed for consideration of lung transplantation.  Informed her that Dr. Brock has agreed to a consult appointment, but there are a couple of barriers to proceeding with an evaluation for lung transplant at this time.  Informed patient that according to her records, she is still actively smoking.  Inquired if she is still smoking.  Patient confirmed that she is still smoking 4 cigarettes a day.  She stated, "I have been trying to quit."  Also informed patient that her weight / body mass index is also a potential barrier for lung transplant.  Notified her of our BMI requirements.  Instructed her to attempt to gain weight.  Patient verbalized her understanding of all discussed.  She stated that she is attempting to gain weight.  Inquired if she would like to schedule an initial consult appointment.  She stated that she would like to wait until she quits smoking prior to scheduling an appointment.  Patient stated that she would contact us to schedule an appointment once she quits smoking.  Provided patient with my name and our phone number for future reference.      Episode status changed to deferred.  Letter sent to referring provider regarding above.    ----- Message from Shell Brock DO sent at 8/23/2022  2:39 PM CDT -----  Regarding: RE: Lung transplant referral  Happy to see but pt is still smoking with a BMI <19.  Will not be a candidate now.  Thanks    ----- Message -----  From: Meme Long RN  Sent: 8/23/2022   1:48 PM CDT  To: Shell Brock DO  Subject: Lung transplant referral                         Lung Transplant Referral Note    Referral from: Chloe Leung NP    Lung diagnosis: COPD/Emphysema, (3 hospitalizations in last year)    Age: 56 y.o.    Height/Weight/BMI:  HT: 5'5" / WT: 47.8 /Body mass index is 17.54 kg/m².    Smoking " history: Social History    Tobacco Use      Smoking status: Current Every Day Smoker        Packs/day: 1.00        Years: 41.00        Pack years: 41        Types: Cigarettes        Quit date:         Years since quitting:       Smokeless tobacco: Never Used  Per NP note on 8/16/22: Still smoking cigarettes, approximately 5 cigarettes per day.    PFT date: 08/23/2022  FVC      1.67 / 50.3  FEV1     0.56 / 21.5  FEV1/FVC       34  TLC        6.11 / 119.9  DLCO     6.35 / 26.9    No documentation of 6 minute walk test    ABG date: 08/07/2022  pH 7.31  pCO2 63.6  pO2   122  HCO3   32       CXR date: 08/07/2022  Impression: No acute cardiopulmonary abnormality.   Pulmonary emphysema.    Chest CT date: 08/23/2022  Impression:   1. Unchanged extent of pulmonary nodules, including spicular lesion in the lingula.  2. Coronary artery disease and pulmonary emphysema.    Echo date: 03/18/2021    Impression:  · The left ventricle is normal in size with concentric remodeling and normal systolic function. The estimated ejection fraction is 64%  · Normal left ventricular diastolic function.  · The left ventricular global longitudinal strain is -12%. Reduced.  · The mean diastolic gradient across the mitral valve is 1 mmHg at a heart rate of bpm.  · Normal right ventricular size with normal right ventricular systolic function.  · Normal central venous pressure (3 mmHg).  · The estimated PA systolic pressure is 17 mmHg.  · Small pericardial effusion.       Other pertinent medical history: PE, anxiety, back pain    Recommendations?

## 2022-08-24 LAB
BRPFT: NORMAL
DLCO ADJ PRE: 6.35 ML/(MIN*MMHG)
DLCO SINGLE BREATH LLN: 17.9
DLCO SINGLE BREATH PRE REF: 26.9 %
DLCO SINGLE BREATH REF: 23.63
DLCOC SBVA LLN: 3.19
DLCOC SBVA PRE REF: 40.6 %
DLCOC SBVA REF: 4.63
DLCOC SINGLE BREATH LLN: 17.9
DLCOC SINGLE BREATH PRE REF: 26.9 %
DLCOC SINGLE BREATH REF: 23.63
DLCOVA LLN: 3.19
DLCOVA PRE REF: 40.6 %
DLCOVA PRE: 1.88 ML/(MIN*MMHG*L)
DLCOVA REF: 4.63
DLVAADJ PRE: 1.88 ML/(MIN*MMHG*L)
ERVN2 LLN: -16449.16
ERVN2 PRE REF: 42.4 %
ERVN2 PRE: 0.36 L
ERVN2 REF: 0.84
FEF 25 75 CHG: 9.2 %
FEF 25 75 LLN: 1.23
FEF 25 75 POST REF: 10 %
FEF 25 75 PRE REF: 9.2 %
FEF 25 75 REF: 2.37
FET100 CHG: -9 %
FEV1 CHG: -2.5 %
FEV1 FVC CHG: 0.2 %
FEV1 FVC LLN: 67
FEV1 FVC POST REF: 42.5 %
FEV1 FVC PRE REF: 42.4 %
FEV1 FVC REF: 79
FEV1 LLN: 1.98
FEV1 POST REF: 20.9 %
FEV1 PRE REF: 21.5 %
FEV1 REF: 2.61
FRCN2 LLN: 1.93
FRCN2 PRE REF: 175.9 %
FRCN2 REF: 2.75
FVC CHG: -2.7 %
FVC LLN: 2.52
FVC POST REF: 48.9 %
FVC PRE REF: 50.3 %
FVC REF: 3.31
IVC PRE: 1.43 L
IVC SINGLE BREATH LLN: 2.52
IVC SINGLE BREATH PRE REF: 43.1 %
IVC SINGLE BREATH REF: 3.31
MVV LLN: 84
MVV PRE REF: 21.3 %
MVV REF: 99
PEF CHG: 17.3 %
PEF LLN: 4.78
PEF POST REF: 38.8 %
PEF PRE REF: 33.1 %
PEF REF: 6.55
POST FEF 25 75: 0.24 L/S
POST FET 100: 7.18 SEC
POST FEV1 FVC: 33.7 %
POST FEV1: 0.55 L
POST FVC: 1.62 L
POST PEF: 2.54 L/S
PRE DLCO: 6.35 ML/(MIN*MMHG)
PRE FEF 25 75: 0.22 L/S
PRE FET 100: 7.89 SEC
PRE FEV1 FVC: 33.63 %
PRE FEV1: 0.56 L
PRE FRC N2: 4.84 L
PRE FVC: 1.67 L
PRE MVV: 21 L/MIN
PRE PEF: 2.17 L/S
RVN2 LLN: 1.34
RVN2 PRE REF: 232.3 %
RVN2 PRE: 4.45 L
RVN2 REF: 1.91
RVN2TLCN2 LLN: 29.09
RVN2TLCN2 PRE REF: 188.1 %
RVN2TLCN2 PRE: 72.76 %
RVN2TLCN2 REF: 38.68
TLCN2 LLN: 4.11
TLCN2 PRE REF: 119.9 %
TLCN2 PRE: 6.11 L
TLCN2 REF: 5.1
VA PRE: 3.38 L
VA SINGLE BREATH LLN: 4.95
VA SINGLE BREATH PRE REF: 68.2 %
VA SINGLE BREATH REF: 4.95
VCMAXN2 LLN: 2.52
VCMAXN2 PRE REF: 50.3 %
VCMAXN2 PRE: 1.67 L
VCMAXN2 REF: 3.31

## 2022-08-26 ENCOUNTER — TELEPHONE (OUTPATIENT)
Dept: PULMONOLOGY | Facility: CLINIC | Age: 57
End: 2022-08-26
Payer: MEDICAID

## 2022-08-29 NOTE — DISCHARGE SUMMARY
[facility] Medicine  Discharge Summary      Patient Name: Zayra White  MRN: [id number]  Patient Class: IP- Inpatient  Admission Date: 8/7/2022  Hospital Length of Stay: 3 days  Discharge Date and Time: 8/10/2022 11:57 AM  Attending Physician: Lyn Hassan MD  Discharging Provider: Lyn Hassan MD  Primary Care Provider: Eric Singh MD      HPI:   57 yo w/ hx of HTN, Anxiety, PE, Chronic Hypoxic respiratory Failure 2/2 Chronic COPD presenting w/ SOB. Symptoms started 3 days ago and have worsened. She has noticed a change in sputum. Positive sick contact w/ one of her grandchildren. She had some elevated temperatures at home. Denies any CP. No NVD. Patient has had multiple admissions in the past for COPD exacerbations. She has never required intubation. Patient states that her baseline functional status has been impaired for the past 1-2 months 2/2 her COPD.    On day of admission patient used her rescue inhaler multiple times and used 3-4 duonebs w/ no improvement. She then called EMS. In EMS patient received multiple duonebs. In the ED patient placed on continuous nebulizer treatment. ABG w/ respiratory acidosis. Given IV steroids. Placed on BiPAP 12/6 30% FiO2 w/ rapid improvement in symptoms. Patient able to be removed from BiPAP at around 6715-7109 and moved to [facility]. Has remained stable on this regimen. Patient states she now feels near the baseline she has been at for the past 1-2 months.      * No surgery found *        Goals of Care Treatment Preferences:  Code Status: Full Code      Consults:     Hospital Course:   57 yo w/ hx of HTN, Anxiety, PE, Chronic Hypoxic respiratory Failure 2/2 Chronic COPD admitted for acute hypoxic/hypercapnic respiratory failure initially requiring BiPAP. Weaned to LFNC. On scheduled duonebs, abx, steroids. Once she returned to baseline, she was discharged home in good condition to complete steroid and  antibiotic course. Discharged to return to using home oxgyen.   * Chronic respiratory failure with hypoxia and hypercapnia  Patient with Hypercapnic and Hypoxic Respiratory failure which is Acute on chronic.  she is on home oxygen at 1 LPM. Supplemental oxygen was provided and noted-  .   Signs/symptoms of respiratory failure include- tachypnea and respiratory distress. Contributing diagnoses includes - COPD Labs and images were reviewed. Patient Has recent ABG, which has been reviewed. Will treat underlying causes and adjust management of respiratory failure as follows-     Acute on chronic hypoxic/hypercapnic respiratory failure 2/2 Acute on chronic COPD exacerbation caused by likely viral infection  - Duoneb q6h  - pred 40  - Azithro/Rocephin daily   - Wean O2 as tolerated  - Continuous pulse ox and tele  - Continue home inhaler when able  - D-dimer wnl  - F/U respiratory culture  - Procalcitonin elevated  - Start pulmicort  - Acapella Valve ordered      Hyperlipidemia  Continue home ASA/statin      Anxiety  Continue home xanax  Continue home duloxetine            Final Active Diagnoses:    Diagnosis Date Noted POA    PRINCIPAL PROBLEM:  Chronic respiratory failure with hypoxia and hypercapnia [J96.11, J96.12] 11/30/2021 Yes    Hyperlipidemia [E78.5] 02/08/2022 Yes    Anxiety [F41.9] 11/27/2021 Yes      Problems Resolved During this Admission:       Discharged Condition: good    Disposition: Home or Self Care    Follow Up:   Follow-up Information       Eric Singh MD. Go on 8/11/2022.    Specialty: Family Medicine  Why: per schedule patient scheduled August 11th at 11:20am for hospital follow up appointment  Contact information:  [address] [facility] [address]  [phone]               Chavez Gonzales MD. Go on 8/11/2022.    Specialty: General Surgery  Why: appointemnt August 11th at 9:30am for previous scheduled appointment  Contact information:  [facility] [place]  [address]  [phone]               Chloe Leung NP Follow up.    Specialty: Pulmonary Disease  Contact information:  [address] [facility]  [address]  [place] [address]  [phone]                           Patient Instructions:      Diet Adult Regular     Notify your health care provider if you experience any of the following:  temperature >100.4     Notify your health care provider if you experience any of the following:  difficulty breathing or increased cough     Activity as tolerated       Significant Diagnostic Studies:   Results for orders placed or performed during the hospital encounter of 08/07/22   Blood culture #1 **CANNOT BE ORDERED STAT**    Specimen: Peripheral, Forearm, Left; Blood   Result Value Ref Range    Blood Culture, Routine No growth after 5 days.    Blood culture #2 **CANNOT BE ORDERED STAT**    Specimen: Peripheral, Forearm, Right; Blood   Result Value Ref Range    Blood Culture, Routine No growth after 5 days.    Culture, Respiratory with Gram Stain    Specimen: Sputum; Respiratory   Result Value Ref Range    Respiratory Culture Normal respiratory keegan     Respiratory Culture No S aureus or Pseudomonas isolated.     Gram Stain (Respiratory) <10 epithelial cells per low power field.     Gram Stain (Respiratory) Rare WBC's     Gram Stain (Respiratory) Rare Gram positive cocci    Complete Blood Count (CBC)   Result Value Ref Range    WBC 13.92 (H) 3.90 - 12.70 K/uL    RBC 4.16 4.00 - 5.40 M/uL    Hemoglobin 13.7 12.0 - 16.0 g/dL    Hematocrit 39.4 37.0 - 48.5 %    MCV 95 82 - 98 fL    MCH 32.9 (H) 27.0 - 31.0 pg    MCHC 34.8 32.0 - 36.0 g/dL    RDW 11.8 11.5 - 14.5 %    Platelets 246 150 - 450 K/uL    MPV 9.7 9.2 - 12.9 fL    Immature Granulocytes 1.1 (H) 0.0 - 0.5 %    Gran # (ANC) 10.8 (H) 1.8 - 7.7 K/uL    Immature Grans (Abs) 0.16 (H) 0.00 - 0.04 K/uL    Lymph # 1.8 1.0 - 4.8 K/uL    Mono # 1.1 (H) 0.3 - 1.0 K/uL    Eos # 0.0 0.0 - 0.5 K/uL    Baso # 0.03 0.00 - 0.20 K/uL    nRBC 0 0 /100 WBC     Gran % 78.0 (H) 38.0 - 73.0 %    Lymph % 12.9 (L) 18.0 - 48.0 %    Mono % 7.6 4.0 - 15.0 %    Eosinophil % 0.2 0.0 - 8.0 %    Basophil % 0.2 0.0 - 1.9 %    Differential Method Automated    Comprehensive Metabolic Panel (CMP)   Result Value Ref Range    Sodium 143 136 - 145 mmol/L    Potassium 3.5 3.5 - 5.1 mmol/L    Chloride 102 95 - 110 mmol/L    CO2 24 23 - 29 mmol/L    Glucose 144 (H) 70 - 110 mg/dL    BUN 5 (L) 6 - 20 mg/dL    Creatinine 0.7 0.5 - 1.4 mg/dL    Calcium 9.2 8.7 - 10.5 mg/dL    Total Protein 7.4 6.0 - 8.4 g/dL    Albumin 3.8 3.5 - 5.2 g/dL    Total Bilirubin 0.9 0.1 - 1.0 mg/dL    Alkaline Phosphatase 85 55 - 135 U/L    AST 19 10 - 40 U/L    ALT 12 10 - 44 U/L    Anion Gap 17 (H) 8 - 16 mmol/L    eGFR >60.0 >60 mL/min/1.73 m^2   Lactic acid, plasma   Result Value Ref Range    Lactate (Lactic Acid) 0.9 0.5 - 2.2 mmol/L   COVID-19 Rapid Screening   Result Value Ref Range    SARS-CoV-2 RNA, Amplification, Qual Negative Negative   Magnesium   Result Value Ref Range    Magnesium 1.7 1.6 - 2.6 mg/dL   D dimer, quantitative   Result Value Ref Range    D-Dimer 0.33 <0.50 mg/L FEU   Troponin I   Result Value Ref Range    Troponin I <0.006 0.000 - 0.026 ng/mL   Procalcitonin   Result Value Ref Range    Procalcitonin 0.37 (H) <0.25 ng/mL   CBC auto differential   Result Value Ref Range    WBC 11.11 3.90 - 12.70 K/uL    RBC 3.72 (L) 4.00 - 5.40 M/uL    Hemoglobin 12.2 12.0 - 16.0 g/dL    Hematocrit 35.6 (L) 37.0 - 48.5 %    MCV 96 82 - 98 fL    MCH 32.8 (H) 27.0 - 31.0 pg    MCHC 34.3 32.0 - 36.0 g/dL    RDW 11.6 11.5 - 14.5 %    Platelets 219 150 - 450 K/uL    MPV 9.4 9.2 - 12.9 fL    Immature Granulocytes 0.3 0.0 - 0.5 %    Gran # (ANC) 9.6 (H) 1.8 - 7.7 K/uL    Immature Grans (Abs) 0.03 0.00 - 0.04 K/uL    Lymph # 0.8 (L) 1.0 - 4.8 K/uL    Mono # 0.7 0.3 - 1.0 K/uL    Eos # 0.0 0.0 - 0.5 K/uL    Baso # 0.02 0.00 - 0.20 K/uL    nRBC 0 0 /100 WBC    Gran % 86.6 (H) 38.0 - 73.0 %    Lymph % 7.0 (L) 18.0 - 48.0  %    Mono % 5.9 4.0 - 15.0 %    Eosinophil % 0.0 0.0 - 8.0 %    Basophil % 0.2 0.0 - 1.9 %    Differential Method Automated    Basic metabolic panel   Result Value Ref Range    Sodium 143 136 - 145 mmol/L    Potassium 3.4 (L) 3.5 - 5.1 mmol/L    Chloride 102 95 - 110 mmol/L    CO2 27 23 - 29 mmol/L    Glucose 149 (H) 70 - 110 mg/dL    BUN 6 6 - 20 mg/dL    Creatinine 0.6 0.5 - 1.4 mg/dL    Calcium 9.2 8.7 - 10.5 mg/dL    Anion Gap 14 8 - 16 mmol/L    eGFR >60.0 >60 mL/min/1.73 m^2   Magnesium   Result Value Ref Range    Magnesium 2.0 1.6 - 2.6 mg/dL   Phosphorus   Result Value Ref Range    Phosphorus 2.3 (L) 2.7 - 4.5 mg/dL   CBC auto differential   Result Value Ref Range    WBC 7.27 3.90 - 12.70 K/uL    RBC 3.73 (L) 4.00 - 5.40 M/uL    Hemoglobin 12.1 12.0 - 16.0 g/dL    Hematocrit 34.9 (L) 37.0 - 48.5 %    MCV 94 82 - 98 fL    MCH 32.4 (H) 27.0 - 31.0 pg    MCHC 34.7 32.0 - 36.0 g/dL    RDW 11.5 11.5 - 14.5 %    Platelets 224 150 - 450 K/uL    MPV 9.6 9.2 - 12.9 fL    Immature Granulocytes 0.4 0.0 - 0.5 %    Gran # (ANC) 4.9 1.8 - 7.7 K/uL    Immature Grans (Abs) 0.03 0.00 - 0.04 K/uL    Lymph # 1.9 1.0 - 4.8 K/uL    Mono # 0.4 0.3 - 1.0 K/uL    Eos # 0.0 0.0 - 0.5 K/uL    Baso # 0.01 0.00 - 0.20 K/uL    nRBC 0 0 /100 WBC    Gran % 67.0 38.0 - 73.0 %    Lymph % 26.1 18.0 - 48.0 %    Mono % 5.8 4.0 - 15.0 %    Eosinophil % 0.6 0.0 - 8.0 %    Basophil % 0.1 0.0 - 1.9 %    Differential Method Automated    Basic metabolic panel   Result Value Ref Range    Sodium 143 136 - 145 mmol/L    Potassium 3.1 (L) 3.5 - 5.1 mmol/L    Chloride 99 95 - 110 mmol/L    CO2 32 (H) 23 - 29 mmol/L    Glucose 86 70 - 110 mg/dL    BUN 6 6 - 20 mg/dL    Creatinine 0.6 0.5 - 1.4 mg/dL    Calcium 9.0 8.7 - 10.5 mg/dL    Anion Gap 12 8 - 16 mmol/L    eGFR >60.0 >60 mL/min/1.73 m^2   Magnesium   Result Value Ref Range    Magnesium 2.0 1.6 - 2.6 mg/dL   Phosphorus   Result Value Ref Range    Phosphorus 3.5 2.7 - 4.5 mg/dL   ISTAT PROCEDURE    Result Value Ref Range    POC PH 7.309 (L) 7.35 - 7.45    POC PCO2 63.6 (HH) 35 - 45 mmHg    POC PO2 122 (H) 80 - 100 mmHg    POC HCO3 32.0 (H) 24 - 28 mmol/L    POC BE 6 -2 to 2 mmol/L    POC SATURATED O2 98 95 - 100 %    POC TCO2 34 (H) 23 - 27 mmol/L    Sample ARTERIAL     Site RB     Allens Test N/A     DelSys Nasal Can      X-Ray Chest AP Portable   Final Result      No acute cardiopulmonary abnormality.      Pulmonary emphysema.         Electronically signed by: Debbi Rosenberg   Date:    08/07/2022   Time:    07:17            Pending Diagnostic Studies:       None           Medications:  Reconciled Home Medications:      Medication List        CHANGE how you take these medications      albuterol 90 mcg/actuation inhaler  Commonly known as: VENTOLIN HFA  inhale 2 puff by inhalation route  every 4 - 6 hours as needed  What changed: Another medication with the same name was removed. Continue taking this medication, and follow the directions you see here.            CONTINUE taking these medications      aspirin 81 MG Chew  81 mg.     atenoloL 25 MG tablet  Commonly known as: TENORMIN  Take 0.5 tablets (12.5 mg total) by mouth once daily.     atorvastatin 20 MG tablet  Commonly known as: LIPITOR  TAKE ONE TABLET BY MOUTH EVERY DAY **THANK YOU**     cholecalciferol (vitamin D3) 25 mcg (1,000 unit) capsule  Commonly known as: VITAMIN D3  Take 2 capsules (2,000 Units total) by mouth once daily.     clonazePAM 0.5 MG tablet  Commonly known as: KlonoPIN  Take ONE tablet (0.5 mg total) by mouth daily as needed for Anxiety **THANK YOU**     DULoxetine 40 mg Cpdr  Take 40 mg by mouth once daily.     pantoprazole 40 MG tablet  Commonly known as: PROTONIX  Take 1 tablet (40 mg total) by mouth once daily. Take in the morning before breakfast.  Wait 30 minutes before eating or drinking anything     promethazine 25 MG tablet  Commonly known as: PHENERGAN  Take 25 mg by mouth every 6 (six) hours as needed.     TRELEGY ELLIPTA  200-62.5-25 mcg inhaler  Generic drug: fluticasone-umeclidin-vilanter  Inhale 1 puff into the lungs once daily.            STOP taking these medications      ipratropium 0.02 % nebulizer solution  Commonly known as: ATROVENT     predniSONE 20 MG tablet  Commonly known as: DELTASONE            ASK your doctor about these medications      amoxicillin-clavulanate 875-125mg 875-125 mg per tablet  Commonly known as: AUGMENTIN  Take 1 tablet by mouth every 12 (twelve) hours. for 3 days  Ask about: Should I take this medication?     azithromycin 500 MG tablet  Commonly known as: ZITHROMAX  Take 1 tablet (500 mg total) by mouth once daily. for 2 days  Ask about: Should I take this medication?     predniSONE 20 MG tablet  Commonly known as: DELTASONE  Take 2 tablets (40 mg total) by mouth once daily. for 3 days  Ask about: Should I take this medication?              Indwelling Lines/Drains at time of discharge:   Lines/Drains/Airways       None                   Time spent on the discharge of patient: 55 minutes         Lyn Hassan MD  Department of Hospital Medicine  [facility]

## 2022-09-12 ENCOUNTER — TELEPHONE (OUTPATIENT)
Dept: PULMONOLOGY | Facility: CLINIC | Age: 57
End: 2022-09-12
Payer: MEDICAID

## 2022-09-20 ENCOUNTER — OFFICE VISIT (OUTPATIENT)
Dept: FAMILY MEDICINE | Facility: CLINIC | Age: 57
End: 2022-09-20
Payer: MEDICAID

## 2022-09-20 VITALS
BODY MASS INDEX: 17.52 KG/M2 | WEIGHT: 105.19 LBS | HEIGHT: 65 IN | HEART RATE: 79 BPM | SYSTOLIC BLOOD PRESSURE: 110 MMHG | DIASTOLIC BLOOD PRESSURE: 64 MMHG | RESPIRATION RATE: 14 BRPM | TEMPERATURE: 98 F | OXYGEN SATURATION: 97 %

## 2022-09-20 DIAGNOSIS — R82.90 ABNORMAL URINE ODOR: ICD-10-CM

## 2022-09-20 DIAGNOSIS — M25.50 ARTHRALGIA, UNSPECIFIED JOINT: ICD-10-CM

## 2022-09-20 DIAGNOSIS — R87.610 ASCUS OF CERVIX WITH NEGATIVE HIGH RISK HPV: Primary | ICD-10-CM

## 2022-09-20 PROCEDURE — 88175 CYTOPATH C/V AUTO FLUID REDO: CPT | Mod: TXP | Performed by: FAMILY MEDICINE

## 2022-09-20 PROCEDURE — 99214 OFFICE O/P EST MOD 30 MIN: CPT | Mod: PBBFAC,NTX | Performed by: FAMILY MEDICINE

## 2022-09-20 PROCEDURE — 1159F MED LIST DOCD IN RCRD: CPT | Mod: CPTII,,, | Performed by: FAMILY MEDICINE

## 2022-09-20 PROCEDURE — 1159F PR MEDICATION LIST DOCUMENTED IN MEDICAL RECORD: ICD-10-PCS | Mod: CPTII,,, | Performed by: FAMILY MEDICINE

## 2022-09-20 PROCEDURE — 3078F DIAST BP <80 MM HG: CPT | Mod: CPTII,,, | Performed by: FAMILY MEDICINE

## 2022-09-20 PROCEDURE — 81003 URINALYSIS AUTO W/O SCOPE: CPT | Mod: TXP | Performed by: FAMILY MEDICINE

## 2022-09-20 PROCEDURE — 99999 PR PBB SHADOW E&M-EST. PATIENT-LVL IV: CPT | Mod: PBBFAC,,, | Performed by: FAMILY MEDICINE

## 2022-09-20 PROCEDURE — 1160F PR REVIEW ALL MEDS BY PRESCRIBER/CLIN PHARMACIST DOCUMENTED: ICD-10-PCS | Mod: CPTII,,, | Performed by: FAMILY MEDICINE

## 2022-09-20 PROCEDURE — 99999 PR PBB SHADOW E&M-EST. PATIENT-LVL IV: ICD-10-PCS | Mod: PBBFAC,,, | Performed by: FAMILY MEDICINE

## 2022-09-20 PROCEDURE — 3008F BODY MASS INDEX DOCD: CPT | Mod: CPTII,,, | Performed by: FAMILY MEDICINE

## 2022-09-20 PROCEDURE — 87624 HPV HI-RISK TYP POOLED RSLT: CPT | Mod: NTX | Performed by: FAMILY MEDICINE

## 2022-09-20 PROCEDURE — 3074F PR MOST RECENT SYSTOLIC BLOOD PRESSURE < 130 MM HG: ICD-10-PCS | Mod: CPTII,,, | Performed by: FAMILY MEDICINE

## 2022-09-20 PROCEDURE — 1160F RVW MEDS BY RX/DR IN RCRD: CPT | Mod: CPTII,,, | Performed by: FAMILY MEDICINE

## 2022-09-20 PROCEDURE — 3074F SYST BP LT 130 MM HG: CPT | Mod: CPTII,,, | Performed by: FAMILY MEDICINE

## 2022-09-20 PROCEDURE — 99213 OFFICE O/P EST LOW 20 MIN: CPT | Mod: S$PBB,,, | Performed by: FAMILY MEDICINE

## 2022-09-20 PROCEDURE — 3008F PR BODY MASS INDEX (BMI) DOCUMENTED: ICD-10-PCS | Mod: CPTII,,, | Performed by: FAMILY MEDICINE

## 2022-09-20 PROCEDURE — 3078F PR MOST RECENT DIASTOLIC BLOOD PRESSURE < 80 MM HG: ICD-10-PCS | Mod: CPTII,,, | Performed by: FAMILY MEDICINE

## 2022-09-20 PROCEDURE — 99213 PR OFFICE/OUTPT VISIT, EST, LEVL III, 20-29 MIN: ICD-10-PCS | Mod: S$PBB,,, | Performed by: FAMILY MEDICINE

## 2022-09-20 RX ORDER — DULOXETINE 40 MG/1
40 CAPSULE, DELAYED RELEASE ORAL DAILY
Qty: 90 CAPSULE | Refills: 3 | Status: SHIPPED | OUTPATIENT
Start: 2022-09-20 | End: 2023-09-01

## 2022-09-20 NOTE — PROGRESS NOTES
"[facility] Clinic Note    Subjective      Ms. White is a 56 y.o. female who presents to clinic for Follow-up (PAP/REFILLS)    Presents for Pap smear.  Needs refill of Cymbalta.  It has been helping with shoulder pain.  Has an odor to her urine.    [facility] Zayra has a past medical history of Anxiety, Arthritis, Asthma, Back pain, COPD (chronic obstructive pulmonary disease), and Pulmonary embolism.   PSXH Zayra has a past surgical history that includes Breast cyst excision;  section; Cholecystectomy; Biopsy; Incision and drainage of abscess (Left, 3/16/2020); Colonoscopy (N/A, 2022); and Esophagogastroduodenoscopy (N/A, 2022).    Zayra's family history includes Breast cancer in her sister.    Zayra reports that she has been smoking cigarettes. She has a 41.00 pack-year smoking history. She has never used smokeless tobacco. She reports that she does not currently use alcohol. She reports that she does not use drugs.   ALG Zayra is allergic to ativan [lorazepam].   MED Zayra has a current medication list which includes the following prescription(s): albuterol, albuterol, albuterol-ipratropium, aspirin, atenolol, atorvastatin, cholecalciferol (vitamin d3), clonazepam, trelegy ellipta, pantoprazole, prednisone, promethazine, and duloxetine.     Review of Systems   Constitutional:  Negative for chills and fever.   Respiratory:  Negative for cough.    Cardiovascular:  Negative for chest pain.   Musculoskeletal:  Negative for back pain.   Psychiatric/Behavioral:  The patient is nervous/anxious.    Objective     /64 (BP Location: Left arm, Patient Position: Sitting, BP Method: Small (Manual))   Pulse 79   Temp 97.6 °F (36.4 °C) (Temporal)   Resp 14   Ht 5' 5" (1.651 m)   Wt 47.7 kg (105 lb 3.2 oz)   LMP 2017   SpO2 97%   BMI 17.51 kg/m²     Physical Exam  Vitals and nursing note reviewed. Exam conducted with a chaperone present.   Constitutional:       General: " She is not in acute distress.     Appearance: Normal appearance. She is well-developed. She is not diaphoretic.   HENT:      Head: Normocephalic and atraumatic.      Right Ear: External ear normal.      Left Ear: External ear normal.   Eyes:      General:         Right eye: No discharge.         Left eye: No discharge.   Cardiovascular:      Rate and Rhythm: Normal rate and regular rhythm.      Heart sounds: Normal heart sounds.   Pulmonary:      Effort: Pulmonary effort is normal.      Breath sounds: Normal breath sounds. No wheezing or rales.   Genitourinary:     Vagina: Normal.      Cervix: Normal.   Skin:     General: Skin is warm and dry.   Neurological:      Mental Status: She is alert and oriented to person, place, and time. Mental status is at baseline.   Psychiatric:         Mood and Affect: Mood normal.         Behavior: Behavior normal.         Thought Content: Thought content normal.         Judgment: Judgment normal.      Assessment/Plan     1. ASCUS of cervix with negative high risk HPV  Liquid-Based Pap Smear, Screening    HPV High Risk Genotypes, PCR      2. Abnormal urine odor  Urinalysis, Reflex to Urine Culture      3. Arthralgia, unspecified joint  DULoxetine 40 mg CpDR        Repeat Pap smear today due to history of ascus.  Urinalysis.  Refilled Cymbalta.    Future Appointments   Date Time Provider Department Center   10/17/2022  9:30 AM Chloe Leung NP [facility] PULTATYANA Prettyll RATNA Singh MD  Family Medicine  [facility]

## 2022-09-21 LAB
BILIRUB UR QL STRIP: NEGATIVE
CLARITY UR: CLEAR
COLOR UR: YELLOW
GLUCOSE UR QL STRIP: NEGATIVE
HGB UR QL STRIP: NEGATIVE
KETONES UR QL STRIP: NEGATIVE
LEUKOCYTE ESTERASE UR QL STRIP: NEGATIVE
NITRITE UR QL STRIP: NEGATIVE
PH UR STRIP: 6 [PH] (ref 5–8)
PROT UR QL STRIP: NEGATIVE
SP GR UR STRIP: <=1.005 (ref 1–1.03)
URN SPEC COLLECT METH UR: NORMAL
UROBILINOGEN UR STRIP-ACNC: NEGATIVE EU/DL

## 2022-09-25 LAB
HPV HR 12 DNA SPEC QL NAA+PROBE: NEGATIVE
HPV16 AG SPEC QL: NEGATIVE
HPV18 DNA SPEC QL NAA+PROBE: NEGATIVE

## 2022-09-26 LAB
FINAL PATHOLOGIC DIAGNOSIS: NORMAL
Lab: NORMAL

## 2022-09-28 ENCOUNTER — PATIENT MESSAGE (OUTPATIENT)
Dept: PULMONOLOGY | Facility: CLINIC | Age: 57
End: 2022-09-28
Payer: MEDICAID

## 2022-09-29 ENCOUNTER — HOSPITAL ENCOUNTER (OUTPATIENT)
Dept: RADIOLOGY | Facility: HOSPITAL | Age: 57
Discharge: HOME | End: 2022-09-29
Attending: NURSE PRACTITIONER
Payer: MEDICAID

## 2022-09-29 DIAGNOSIS — J44.9 CHRONIC OBSTRUCTIVE PULMONARY DISEASE, UNSPECIFIED COPD TYPE: Primary | ICD-10-CM

## 2022-09-29 DIAGNOSIS — J44.9 CHRONIC OBSTRUCTIVE PULMONARY DISEASE, UNSPECIFIED COPD TYPE: ICD-10-CM

## 2022-09-29 PROCEDURE — 71046 X-RAY EXAM CHEST 2 VIEWS: CPT | Mod: TC,TXP

## 2022-09-29 PROCEDURE — 71046 XR CHEST PA AND LATERAL: ICD-10-PCS | Mod: 26,NTX,, | Performed by: RADIOLOGY

## 2022-09-29 PROCEDURE — 71046 X-RAY EXAM CHEST 2 VIEWS: CPT | Mod: 26,NTX,, | Performed by: RADIOLOGY

## 2022-09-29 NOTE — PROGRESS NOTES
Patient messaged stating she is experiencing shortness of breath, worsening since initiation of NIV use.  I called the patient and discussed the case with her on 09/29/2022  at 0940.  Patient states she received her NIV machine approximately 2 weeks ago, has been using daily for at least 4 hours per day.  States she can tolerate the pressure well while using - with initially experiencing headaches with use, respiratory therapist changed pressures.  Patient states shortness of breath is worsening from baseline, cannot walk from living room to kitchen without getting short-winded, patient states she lives in a camper currently.  Patient using supplemental oxygen throughout the day and at night as needed.  On Trelegy once per day.  Using nebulized treatments every 4-6 hours.  Patient currently taking prednisone regimen that was prescribed at prior visit, 6 days total -states she has 2 days left.  Reports benefit with prednisone use.  Patient denies mucus production.  Patient will proceed with getting chest x-ray at this time.  May need repeat/prolonged prednisone taper.  Patient states she spoke with lung transplant service, however a consultation appointment was not scheduled due to current smoking status.  Patient states she is currently smoking 1-2 cigarettes per day.

## 2022-09-30 ENCOUNTER — TELEPHONE (OUTPATIENT)
Dept: PULMONOLOGY | Facility: CLINIC | Age: 57
End: 2022-09-30
Payer: MEDICAID

## 2022-09-30 NOTE — TELEPHONE ENCOUNTER
Spoke with Codi gave verbal to lower pressures to patient comfort label.       ----- Message from Charis Garcia sent at 9/30/2022  4:34 PM CDT -----  Contact: therapist  Type:  Patient Call          Who Called: Aero Cody Kincaid         Does the patient know what this is regarding?:requesting a call back to have pt pressure lowered on her ventilator to max pressure 15 ; Pt unable to tolerate 20 ; Verbal order is need to lower ;please advise           Would the patient rather a call back or a response via [facility]? Call           Best Call Back Number:[phone]            Additional Information:

## 2022-10-07 ENCOUNTER — LAB VISIT (OUTPATIENT)
Dept: LAB | Facility: HOSPITAL | Age: 57
End: 2022-10-07
Attending: NURSE PRACTITIONER
Payer: MEDICAID

## 2022-10-07 DIAGNOSIS — J44.9 CHRONIC OBSTRUCTIVE PULMONARY DISEASE, UNSPECIFIED COPD TYPE: ICD-10-CM

## 2022-10-07 LAB
ALBUMIN SERPL BCP-MCNC: 3.4 G/DL (ref 3.5–5.2)
ALP SERPL-CCNC: 82 U/L (ref 55–135)
ALT SERPL W/O P-5'-P-CCNC: 17 U/L (ref 10–44)
ANION GAP SERPL CALC-SCNC: 12 MMOL/L (ref 8–16)
AST SERPL-CCNC: 20 U/L (ref 10–40)
BASOPHILS # BLD AUTO: 0.07 K/UL (ref 0–0.2)
BASOPHILS NFR BLD: 0.8 % (ref 0–1.9)
BILIRUB SERPL-MCNC: 0.7 MG/DL (ref 0.1–1)
BNP SERPL-MCNC: <10 PG/ML (ref 0–99)
BUN SERPL-MCNC: 13 MG/DL (ref 6–20)
CALCIUM SERPL-MCNC: 9.9 MG/DL (ref 8.7–10.5)
CHLORIDE SERPL-SCNC: 98 MMOL/L (ref 95–110)
CO2 SERPL-SCNC: 27 MMOL/L (ref 23–29)
CREAT SERPL-MCNC: 0.8 MG/DL (ref 0.5–1.4)
D DIMER PPP IA.FEU-MCNC: 0.4 MG/L FEU
DIFFERENTIAL METHOD: ABNORMAL
EOSINOPHIL # BLD AUTO: 0.1 K/UL (ref 0–0.5)
EOSINOPHIL NFR BLD: 1.1 % (ref 0–8)
ERYTHROCYTE [DISTWIDTH] IN BLOOD BY AUTOMATED COUNT: 12.3 % (ref 11.5–14.5)
EST. GFR  (NO RACE VARIABLE): >60 ML/MIN/1.73 M^2
GLUCOSE SERPL-MCNC: 109 MG/DL (ref 70–110)
HCT VFR BLD AUTO: 40.6 % (ref 37–48.5)
HGB BLD-MCNC: 13.7 G/DL (ref 12–16)
IMM GRANULOCYTES # BLD AUTO: 0.03 K/UL (ref 0–0.04)
IMM GRANULOCYTES NFR BLD AUTO: 0.3 % (ref 0–0.5)
LYMPHOCYTES # BLD AUTO: 1.5 K/UL (ref 1–4.8)
LYMPHOCYTES NFR BLD: 17.3 % (ref 18–48)
MCH RBC QN AUTO: 31.7 PG (ref 27–31)
MCHC RBC AUTO-ENTMCNC: 33.7 G/DL (ref 32–36)
MCV RBC AUTO: 94 FL (ref 82–98)
MONOCYTES # BLD AUTO: 0.9 K/UL (ref 0.3–1)
MONOCYTES NFR BLD: 10.5 % (ref 4–15)
NEUTROPHILS # BLD AUTO: 6.2 K/UL (ref 1.8–7.7)
NEUTROPHILS NFR BLD: 70 % (ref 38–73)
NRBC BLD-RTO: 0 /100 WBC
PLATELET # BLD AUTO: 332 K/UL (ref 150–450)
PMV BLD AUTO: 9.1 FL (ref 9.2–12.9)
POTASSIUM SERPL-SCNC: 4 MMOL/L (ref 3.5–5.1)
PROCALCITONIN SERPL IA-MCNC: 0.07 NG/ML
PROT SERPL-MCNC: 7.3 G/DL (ref 6–8.4)
RBC # BLD AUTO: 4.32 M/UL (ref 4–5.4)
SODIUM SERPL-SCNC: 137 MMOL/L (ref 136–145)
WBC # BLD AUTO: 8.86 K/UL (ref 3.9–12.7)

## 2022-10-07 PROCEDURE — 85025 COMPLETE CBC W/AUTO DIFF WBC: CPT | Mod: NTX | Performed by: NURSE PRACTITIONER

## 2022-10-07 PROCEDURE — 83880 ASSAY OF NATRIURETIC PEPTIDE: CPT | Mod: NTX | Performed by: NURSE PRACTITIONER

## 2022-10-07 PROCEDURE — 84145 PROCALCITONIN (PCT): CPT | Mod: NTX | Performed by: NURSE PRACTITIONER

## 2022-10-07 PROCEDURE — 85379 FIBRIN DEGRADATION QUANT: CPT | Mod: TXP | Performed by: NURSE PRACTITIONER

## 2022-10-07 PROCEDURE — 80053 COMPREHEN METABOLIC PANEL: CPT | Mod: NTX | Performed by: NURSE PRACTITIONER

## 2022-10-07 PROCEDURE — 36415 COLL VENOUS BLD VENIPUNCTURE: CPT | Mod: TXP | Performed by: NURSE PRACTITIONER

## 2022-10-08 ENCOUNTER — HOSPITAL ENCOUNTER (EMERGENCY)
Facility: HOSPITAL | Age: 57
Discharge: HOME | End: 2022-10-08
Attending: INTERNAL MEDICINE
Payer: MEDICAID

## 2022-10-08 ENCOUNTER — PATIENT MESSAGE (OUTPATIENT)
Dept: PULMONOLOGY | Facility: CLINIC | Age: 57
End: 2022-10-08
Payer: MEDICAID

## 2022-10-08 VITALS
HEART RATE: 96 BPM | BODY MASS INDEX: 17.49 KG/M2 | SYSTOLIC BLOOD PRESSURE: 122 MMHG | HEIGHT: 65 IN | TEMPERATURE: 99 F | RESPIRATION RATE: 20 BRPM | OXYGEN SATURATION: 97 % | WEIGHT: 105 LBS | DIASTOLIC BLOOD PRESSURE: 76 MMHG

## 2022-10-08 DIAGNOSIS — J40 BRONCHITIS WITH CHRONIC WHEEZING: ICD-10-CM

## 2022-10-08 DIAGNOSIS — J44.1 ACUTE EXACERBATION OF CHRONIC OBSTRUCTIVE PULMONARY DISEASE (COPD): Primary | ICD-10-CM

## 2022-10-08 DIAGNOSIS — R06.02 SHORTNESS OF BREATH: ICD-10-CM

## 2022-10-08 LAB
ALBUMIN SERPL BCP-MCNC: 3.5 G/DL (ref 3.5–5.2)
ALLENS TEST: ABNORMAL
ALP SERPL-CCNC: 84 U/L (ref 55–135)
ALT SERPL W/O P-5'-P-CCNC: 12 U/L (ref 10–44)
ANION GAP SERPL CALC-SCNC: 15 MMOL/L (ref 8–16)
AST SERPL-CCNC: 24 U/L (ref 10–40)
BASOPHILS # BLD AUTO: 0.05 K/UL (ref 0–0.2)
BASOPHILS NFR BLD: 0.8 % (ref 0–1.9)
BILIRUB SERPL-MCNC: 0.5 MG/DL (ref 0.1–1)
BNP SERPL-MCNC: <10 PG/ML (ref 0–99)
BUN SERPL-MCNC: 8 MG/DL (ref 6–20)
CALCIUM SERPL-MCNC: 9.2 MG/DL (ref 8.7–10.5)
CHLORIDE SERPL-SCNC: 97 MMOL/L (ref 95–110)
CO2 SERPL-SCNC: 25 MMOL/L (ref 23–29)
CREAT SERPL-MCNC: 0.7 MG/DL (ref 0.5–1.4)
DELSYS: ABNORMAL
DIFFERENTIAL METHOD: ABNORMAL
EOSINOPHIL # BLD AUTO: 0.1 K/UL (ref 0–0.5)
EOSINOPHIL NFR BLD: 1.5 % (ref 0–8)
ERYTHROCYTE [DISTWIDTH] IN BLOOD BY AUTOMATED COUNT: 12.3 % (ref 11.5–14.5)
EST. GFR  (NO RACE VARIABLE): >60 ML/MIN/1.73 M^2
FIO2: 32
FLOW: 3
GLUCOSE SERPL-MCNC: 108 MG/DL (ref 70–110)
HCO3 UR-SCNC: 28.8 MMOL/L (ref 24–28)
HCT VFR BLD AUTO: 39.7 % (ref 37–48.5)
HGB BLD-MCNC: 13.4 G/DL (ref 12–16)
IMM GRANULOCYTES # BLD AUTO: 0.02 K/UL (ref 0–0.04)
IMM GRANULOCYTES NFR BLD AUTO: 0.3 % (ref 0–0.5)
LYMPHOCYTES # BLD AUTO: 1 K/UL (ref 1–4.8)
LYMPHOCYTES NFR BLD: 15.8 % (ref 18–48)
MCH RBC QN AUTO: 31.8 PG (ref 27–31)
MCHC RBC AUTO-ENTMCNC: 33.8 G/DL (ref 32–36)
MCV RBC AUTO: 94 FL (ref 82–98)
MODE: ABNORMAL
MONOCYTES # BLD AUTO: 0.6 K/UL (ref 0.3–1)
MONOCYTES NFR BLD: 9 % (ref 4–15)
NEUTROPHILS # BLD AUTO: 4.7 K/UL (ref 1.8–7.7)
NEUTROPHILS NFR BLD: 72.6 % (ref 38–73)
NRBC BLD-RTO: 0 /100 WBC
PCO2 BLDA: 46.2 MMHG (ref 35–45)
PH SMN: 7.4 [PH] (ref 7.35–7.45)
PLATELET # BLD AUTO: 279 K/UL (ref 150–450)
PMV BLD AUTO: 9.3 FL (ref 9.2–12.9)
PO2 BLDA: 95 MMHG (ref 80–100)
POC BE: 4 MMOL/L
POC SATURATED O2: 97 % (ref 95–100)
POC TCO2: 30 MMOL/L (ref 23–27)
POTASSIUM SERPL-SCNC: 4 MMOL/L (ref 3.5–5.1)
PROT SERPL-MCNC: 7.4 G/DL (ref 6–8.4)
RBC # BLD AUTO: 4.22 M/UL (ref 4–5.4)
SAMPLE: ABNORMAL
SARS-COV-2 RDRP RESP QL NAA+PROBE: NEGATIVE
SITE: ABNORMAL
SODIUM SERPL-SCNC: 137 MMOL/L (ref 136–145)
SP02: 97
TROPONIN I SERPL DL<=0.01 NG/ML-MCNC: <0.006 NG/ML (ref 0–0.03)
WBC # BLD AUTO: 6.53 K/UL (ref 3.9–12.7)

## 2022-10-08 PROCEDURE — 94761 N-INVAS EAR/PLS OXIMETRY MLT: CPT | Mod: NTX

## 2022-10-08 PROCEDURE — 93010 ELECTROCARDIOGRAM REPORT: CPT | Mod: NTX,,, | Performed by: INTERNAL MEDICINE

## 2022-10-08 PROCEDURE — U0002 COVID-19 LAB TEST NON-CDC: HCPCS | Mod: NTX | Performed by: INTERNAL MEDICINE

## 2022-10-08 PROCEDURE — 71045 X-RAY EXAM CHEST 1 VIEW: CPT | Mod: 26,NTX,, | Performed by: RADIOLOGY

## 2022-10-08 PROCEDURE — 96374 THER/PROPH/DIAG INJ IV PUSH: CPT | Mod: NTX

## 2022-10-08 PROCEDURE — 99285 EMERGENCY DEPT VISIT HI MDM: CPT | Mod: 25,NTX

## 2022-10-08 PROCEDURE — 71045 X-RAY EXAM CHEST 1 VIEW: CPT | Mod: TC,NTX

## 2022-10-08 PROCEDURE — 84484 ASSAY OF TROPONIN QUANT: CPT | Mod: NTX | Performed by: INTERNAL MEDICINE

## 2022-10-08 PROCEDURE — 82803 BLOOD GASES ANY COMBINATION: CPT | Mod: NTX

## 2022-10-08 PROCEDURE — 63600175 PHARM REV CODE 636 W HCPCS: Mod: NTX | Performed by: INTERNAL MEDICINE

## 2022-10-08 PROCEDURE — 99900035 HC TECH TIME PER 15 MIN (STAT): Mod: NTX

## 2022-10-08 PROCEDURE — 87040 BLOOD CULTURE FOR BACTERIA: CPT | Mod: NTX | Performed by: INTERNAL MEDICINE

## 2022-10-08 PROCEDURE — 93010 EKG 12-LEAD: ICD-10-PCS | Mod: NTX,,, | Performed by: INTERNAL MEDICINE

## 2022-10-08 PROCEDURE — 94640 AIRWAY INHALATION TREATMENT: CPT | Mod: NTX

## 2022-10-08 PROCEDURE — 80053 COMPREHEN METABOLIC PANEL: CPT | Mod: NTX | Performed by: INTERNAL MEDICINE

## 2022-10-08 PROCEDURE — 71045 XR CHEST 1 VIEW: ICD-10-PCS | Mod: 26,NTX,, | Performed by: RADIOLOGY

## 2022-10-08 PROCEDURE — 36600 WITHDRAWAL OF ARTERIAL BLOOD: CPT | Mod: NTX

## 2022-10-08 PROCEDURE — 93005 ELECTROCARDIOGRAM TRACING: CPT | Mod: NTX

## 2022-10-08 PROCEDURE — 25000242 PHARM REV CODE 250 ALT 637 W/ HCPCS: Mod: NTX | Performed by: INTERNAL MEDICINE

## 2022-10-08 PROCEDURE — 83880 ASSAY OF NATRIURETIC PEPTIDE: CPT | Mod: NTX | Performed by: INTERNAL MEDICINE

## 2022-10-08 PROCEDURE — 85025 COMPLETE CBC W/AUTO DIFF WBC: CPT | Mod: NTX | Performed by: INTERNAL MEDICINE

## 2022-10-08 RX ORDER — METHYLPREDNISOLONE SOD SUCC 125 MG
125 VIAL (EA) INJECTION
Status: COMPLETED | OUTPATIENT
Start: 2022-10-08 | End: 2022-10-08

## 2022-10-08 RX ORDER — IPRATROPIUM BROMIDE AND ALBUTEROL SULFATE 2.5; .5 MG/3ML; MG/3ML
3 SOLUTION RESPIRATORY (INHALATION)
Status: COMPLETED | OUTPATIENT
Start: 2022-10-08 | End: 2022-10-08

## 2022-10-08 RX ORDER — PREDNISONE 10 MG/1
10 TABLET ORAL DAILY
Qty: 21 TABLET | Refills: 0 | Status: SHIPPED | OUTPATIENT
Start: 2022-10-08 | End: 2022-12-13

## 2022-10-08 RX ORDER — AZITHROMYCIN 250 MG/1
250 TABLET, FILM COATED ORAL DAILY
Qty: 6 TABLET | Refills: 0 | Status: SHIPPED | OUTPATIENT
Start: 2022-10-08 | End: 2022-12-13

## 2022-10-08 RX ORDER — AMOXICILLIN AND CLAVULANATE POTASSIUM 600; 42.9 MG/5ML; MG/5ML
10 POWDER, FOR SUSPENSION ORAL
Status: DISCONTINUED | OUTPATIENT
Start: 2022-10-08 | End: 2022-10-08 | Stop reason: CLARIF

## 2022-10-08 RX ADMIN — METHYLPREDNISOLONE SODIUM SUCCINATE 125 MG: 125 INJECTION, POWDER, FOR SOLUTION INTRAMUSCULAR; INTRAVENOUS at 09:10

## 2022-10-08 RX ADMIN — IPRATROPIUM BROMIDE AND ALBUTEROL SULFATE 3 ML: .5; 2.5 SOLUTION RESPIRATORY (INHALATION) at 09:10

## 2022-10-08 NOTE — ED TRIAGE NOTES
Patient presents to ED POV with c/o COPD exacerbation. She reports that family members in her home have been sick with sinusitis and strep. She reports that she normally is on 1 liters O2 by NC at home but she has had to increase it to 3 due to to being unable to catch her breath. She presents to triage on 3 liters by NC. Brought to ED room 5 and continued. Dr. Hernández present for initial eval.

## 2022-10-08 NOTE — ED PROVIDER NOTES
Encounter Date: 10/8/2022       History     Chief Complaint   Patient presents with    COPD     PATIENT COMES IN COMPLAINING HIS ASPIRATION WAS COPD FOR LAST SEVERAL DAYS.  Patient has COPD and was started on BiPAP 6 weeks ago but the dose had to be adjusted.  She has not seen her pulmonologist since she was started on BiPAP.  She was placed on steroids several weeks ago that helped but she now is off steroids.  She was on the transplant list but has continued to smoke.  She states that required her oxygen is gone up to 3 the past 24 hours.  She also feels she has had more shortness of breath but denies any chest pain nausea vomiting.    Patient had outpatient labs done yesterday including CBC, chemistry, D-dimer, BNP and they were all normal.      Review of patient's allergies indicates:   Allergen Reactions    Ativan [lorazepam] Itching     Past Medical History:   Diagnosis Date    Anxiety     Arthritis     Asthma     Back pain     COPD (chronic obstructive pulmonary disease)     Pulmonary embolism     10 years ago     Past Surgical History:   Procedure Laterality Date    BIOPSY      right breast    BREAST CYST EXCISION       SECTION      CHOLECYSTECTOMY      COLONOSCOPY N/A 2022    Procedure: COLONOSCOPY;  Surgeon: Chavez Gonzales MD;  Location: [facility] ENDO;  Service: General;  Laterality: N/A;    ESOPHAGOGASTRODUODENOSCOPY N/A 2022    Procedure: ESOPHAGOGASTRODUODENOSCOPY (EGD);  Surgeon: Chavez Gonzales MD;  Location: [facility] ENDO;  Service: General;  Laterality: N/A;    INCISION AND DRAINAGE OF ABSCESS Left 3/16/2020    Procedure: INCISION AND DRAINAGE, ABSCESS;  Surgeon: Irvin Villarreal MD;  Location: [facility] OR;  Service: General;  Laterality: Left;     Family History   Problem Relation Age of Onset    Breast cancer Sister     Ovarian cancer Neg Hx      Social History     Tobacco Use    Smoking status: Every Day     Packs/day: 1.00     Years: 41.00     Pack years: 41.00     Types:  Cigarettes     Last attempt to quit: 2021     Years since quittin.8    Smokeless tobacco: Never   Substance Use Topics    Alcohol use: Not Currently     Comment: occ    Drug use: Never     Review of Systems   Constitutional:  Negative for fever.   HENT:  Negative for sore throat.    Respiratory:  Negative for shortness of breath.    Cardiovascular:  Negative for chest pain.   Gastrointestinal:  Negative for nausea.   Genitourinary:  Negative for dysuria.   Musculoskeletal:  Negative for back pain.   Skin:  Negative for rash.   Neurological:  Negative for weakness.   Hematological:  Does not bruise/bleed easily.     Physical Exam     Initial Vitals [10/08/22 0842]   BP Pulse Resp Temp SpO2   122/76 110 20 99.1 °F (37.3 °C) (!) 94 %      MAP       --         Physical Exam    Vitals reviewed.  Constitutional: She appears well-developed. No distress.   Eyes: Pupils are equal, round, and reactive to light.   Neck:   Normal range of motion.  Cardiovascular:  Normal rate.           Pulmonary/Chest: Accessory muscle usage present. No respiratory distress. She has decreased breath sounds. She has wheezes. She has rhonchi.   Abdominal: Abdomen is soft.   Musculoskeletal:         General: Normal range of motion.      Cervical back: Normal range of motion.     Neurological: She is alert. She has normal strength and normal reflexes. No cranial nerve deficit or sensory deficit. GCS eye subscore is 4. GCS verbal subscore is 5. GCS motor subscore is 6.   Skin: Skin is warm.   Psychiatric: She has a normal mood and affect.       ED Course   Procedures  Labs Reviewed   CBC W/ AUTO DIFFERENTIAL - Abnormal; Notable for the following components:       Result Value    MCH 31.8 (*)     Lymph % 15.8 (*)     All other components within normal limits   ISTAT PROCEDURE - Abnormal; Notable for the following components:    POC PCO2 46.2 (*)     POC HCO3 28.8 (*)     POC TCO2 30 (*)     All other components within normal limits    CULTURE, BLOOD   CULTURE, BLOOD   COMPREHENSIVE METABOLIC PANEL   SARS-COV-2 RNA AMPLIFICATION, QUAL    Narrative:     Is the patient symptomatic?->No   TROPONIN I   B-TYPE NATRIURETIC PEPTIDE        ECG Results              EKG 12-lead (Preliminary result)  Result time 10/08/22 09:03:04      ED Interpretation by Umer Hernández MD (10/08/22 09:03:04, [facility] Emergency [facility], Emergency Medicine)    Normal sinus rhythm rate of 97 and no acute ischemic changes                                  Imaging Results              X-Ray Chest 1 View (Final result)  Result time 10/08/22 09:43:36      Final result by Corina Jarquin MD (10/08/22 09:43:36)                   Impression:      Marked interval improvement in previously noted patchy segmental airspace consolidation at the left lung base.  There are a few tiny residual nodular densities at the left lung base.  Continued follow-up is recommended to ensure resolution..  Consider a follow-up chest x-ray in 4 weeks.      Electronically signed by: Jose Jarquin MD  Date:    10/08/2022  Time:    09:43               Narrative:    EXAMINATION:  XR CHEST 1 VIEW    CLINICAL HISTORY:  shortness of breath;    TECHNIQUE:  A single portable AP chest radiograph was acquired.    COMPARISON:  Chest x-ray-09/29/2022    FINDINGS:  The cardiomediastinal silhouette is normal in appearance.  There is atherosclerotic calcification present within the thoracic aortic arch.  No pulmonary vascular congestion appreciated.  There are radiographic findings which suggest chronic obstructive airways disease/emphysema, similar to the prior study.  Previously noted patchy segmental airspace opacity at the left lung base has nearly completely resolved in the interim since the most recent examination of 09/29/2022 with only a few small residual areas of nodular density noted in the periphery of the left lower lobe.  Continued follow-up is recommended to ensure resolution..  No new zone of airspace  consolidation.  No significant volume of pleural fluid or pneumothorax. The bones reveal degenerative changes of the left acromioclavicular joint..                                       Medications   methylPREDNISolone sodium succinate injection 125 mg (125 mg Intravenous Given 10/8/22 0926)   albuterol-ipratropium 2.5 mg-0.5 mg/3 mL nebulizer solution 3 mL (3 mLs Nebulization Given 10/8/22 0912)     Medical Decision Making:   ED Management:  Patient doing much better with no acute exacerbation no wheezes.  Explained x-ray shows resolving infiltrate therefore was started on Zithromax for resolution of bronchitis infiltrate.  Also patient blood gases appears very adequate, need to follow her pulmonologist to re-evaluate the need for oxygen and or BiPAP at home.  Her pulmonologist suggested in her note that she should be on long-term prednisone however she said the prescription was never called in.           ED Course as of 10/08/22 1029   Sat Oct 08, 2022   0903 EKG 12-lead [PW]   0934 ISTAT PROCEDURE(!) [PW]   0956 SARS-CoV-2 RNA, Amplification, Qual: Negative [PW]   0956 CBC Auto Differential(!) [PW]   0956 X-Ray Chest 1 View [PW]   1025 Comprehensive Metabolic Panel [PW]   1025 Troponin I: <0.006 [PW]      ED Course User Index  [PW] Umer Hernández MD                   Clinical Impression:   Final diagnoses:  [R06.02] Shortness of breath  [J44.1] Acute exacerbation of chronic obstructive pulmonary disease (COPD) (Primary)  [J40] Bronchitis with chronic wheezing        ED Disposition Condition    Discharge Stable          ED Prescriptions       Medication Sig Dispense Start Date End Date Auth. Provider    azithromycin (Z-IMANI) 250 MG tablet Take 1 tablet (250 mg total) by mouth once daily. Take first 2 tablets together, then 1 every day until finished. 6 tablet 10/8/2022 -- Umer Hernández MD    predniSONE (DELTASONE) 10 MG tablet Take 1 tablet (10 mg total) by mouth once daily. Take 4 tabs x 3 days, then  Take 2 tabs  x 3 days, then   Take 1 tab x 3 days. 21 tablet 10/8/2022 -- Umer Hernández MD          Follow-up Information       Follow up With Specialties Details Why Contact Info    Eric Singh MD Family Medicine In 2 days  [address] [facility] [place] [address]  [phone]               Umer Hernández MD  10/08/22 5359

## 2022-10-08 NOTE — PLAN OF CARE
Latest Reference Range & Units 10/08/22 09:11   Sample  ARTERIAL   POC PH 7.35 - 7.45  7.403   POC PCO2 35 - 45 mmHg 46.2 (H)   POC PO2 80 - 100 mmHg 95   POC HCO3 24 - 28 mmol/L 28.8 (H)   POC TCO2 23 - 27 mmol/L 30 (H)   POC SATURATED O2 95 - 100 % 97   Allens Test  Pass   POC BE -2 to 2 mmol/L 4   FiO2  32   Flow  3   DelSys  Nasal Can   Site  RR   Mode  SPONT   (H): Data is abnormally high  ABG done on 3 lpm and reported to Dr. Hernández. O2 decreased to 1 lpm. Patient states that she wears 1 lpm at night and as needed. Aerosol treatment given with Duoneb 3 ml.

## 2022-10-10 ENCOUNTER — PATIENT MESSAGE (OUTPATIENT)
Dept: FAMILY MEDICINE | Facility: CLINIC | Age: 57
End: 2022-10-10
Payer: MEDICAID

## 2022-10-10 ENCOUNTER — TELEPHONE (OUTPATIENT)
Dept: FAMILY MEDICINE | Facility: CLINIC | Age: 57
End: 2022-10-10
Payer: MEDICAID

## 2022-10-10 NOTE — TELEPHONE ENCOUNTER
----- Message from Karen Mcintyre sent at 10/10/2022 12:01 PM CDT -----  [facility] F[facility]  Contact: Patient  Type: Patient Call Back         Who called: Patient         What is the request in detail: calling to sched a hospital f/u from 10/8; states she was informed to be seen within two days; please advise         Can the clinic reply by [facility]? yes         Would the patient rather a call back or a response via [facility]? both         Best call back number: [phone]         Additional Information: 1) [place] [facility] [place]           Thank You

## 2022-10-11 ENCOUNTER — OFFICE VISIT (OUTPATIENT)
Dept: FAMILY MEDICINE | Facility: CLINIC | Age: 57
End: 2022-10-11
Payer: MEDICAID

## 2022-10-11 VITALS
BODY MASS INDEX: 17.36 KG/M2 | DIASTOLIC BLOOD PRESSURE: 108 MMHG | OXYGEN SATURATION: 96 % | RESPIRATION RATE: 16 BRPM | WEIGHT: 104.19 LBS | SYSTOLIC BLOOD PRESSURE: 154 MMHG | TEMPERATURE: 98 F | HEART RATE: 72 BPM | HEIGHT: 65 IN

## 2022-10-11 DIAGNOSIS — J44.1 COPD EXACERBATION: Primary | ICD-10-CM

## 2022-10-11 DIAGNOSIS — I25.10 CORONARY ARTERY DISEASE, UNSPECIFIED VESSEL OR LESION TYPE, UNSPECIFIED WHETHER ANGINA PRESENT, UNSPECIFIED WHETHER NATIVE OR TRANSPLANTED HEART: ICD-10-CM

## 2022-10-11 PROCEDURE — 3080F PR MOST RECENT DIASTOLIC BLOOD PRESSURE >= 90 MM HG: ICD-10-PCS | Mod: CPTII,,, | Performed by: FAMILY MEDICINE

## 2022-10-11 PROCEDURE — 3008F PR BODY MASS INDEX (BMI) DOCUMENTED: ICD-10-PCS | Mod: CPTII,,, | Performed by: FAMILY MEDICINE

## 2022-10-11 PROCEDURE — 3077F PR MOST RECENT SYSTOLIC BLOOD PRESSURE >= 140 MM HG: ICD-10-PCS | Mod: CPTII,,, | Performed by: FAMILY MEDICINE

## 2022-10-11 PROCEDURE — 1160F PR REVIEW ALL MEDS BY PRESCRIBER/CLIN PHARMACIST DOCUMENTED: ICD-10-PCS | Mod: CPTII,,, | Performed by: FAMILY MEDICINE

## 2022-10-11 PROCEDURE — 99214 OFFICE O/P EST MOD 30 MIN: CPT | Mod: PBBFAC | Performed by: FAMILY MEDICINE

## 2022-10-11 PROCEDURE — 1160F RVW MEDS BY RX/DR IN RCRD: CPT | Mod: CPTII,,, | Performed by: FAMILY MEDICINE

## 2022-10-11 PROCEDURE — 3077F SYST BP >= 140 MM HG: CPT | Mod: CPTII,,, | Performed by: FAMILY MEDICINE

## 2022-10-11 PROCEDURE — 99214 OFFICE O/P EST MOD 30 MIN: CPT | Mod: S$PBB,,, | Performed by: FAMILY MEDICINE

## 2022-10-11 PROCEDURE — 3008F BODY MASS INDEX DOCD: CPT | Mod: CPTII,,, | Performed by: FAMILY MEDICINE

## 2022-10-11 PROCEDURE — 99999 PR PBB SHADOW E&M-EST. PATIENT-LVL IV: ICD-10-PCS | Mod: PBBFAC,,, | Performed by: FAMILY MEDICINE

## 2022-10-11 PROCEDURE — 3080F DIAST BP >= 90 MM HG: CPT | Mod: CPTII,,, | Performed by: FAMILY MEDICINE

## 2022-10-11 PROCEDURE — 1159F MED LIST DOCD IN RCRD: CPT | Mod: CPTII,,, | Performed by: FAMILY MEDICINE

## 2022-10-11 PROCEDURE — 1159F PR MEDICATION LIST DOCUMENTED IN MEDICAL RECORD: ICD-10-PCS | Mod: CPTII,,, | Performed by: FAMILY MEDICINE

## 2022-10-11 PROCEDURE — 99214 PR OFFICE/OUTPT VISIT, EST, LEVL IV, 30-39 MIN: ICD-10-PCS | Mod: S$PBB,,, | Performed by: FAMILY MEDICINE

## 2022-10-11 PROCEDURE — 99999 PR PBB SHADOW E&M-EST. PATIENT-LVL IV: CPT | Mod: PBBFAC,,, | Performed by: FAMILY MEDICINE

## 2022-10-11 NOTE — PROGRESS NOTES
"[facility] - Clinic Note    Subjective      Ms. White is a 57 y.o. female who presents to clinic for Hospital Follow Up and Shortness of Breath    Seen in the ER for COPD exacerbation.  Put on a Z-Catrachito and steroids.  Still having some shortness of breath intermittently.  Blood pressure is elevated.  Is going to see pulmonology next week.    [facility] Zayra has a past medical history of Anxiety, Arthritis, Asthma, Back pain, COPD (chronic obstructive pulmonary disease), and Pulmonary embolism.   [facility] Zayra has a past surgical history that includes Breast cyst excision;  section; Cholecystectomy; Biopsy; Incision and drainage of abscess (Left, 3/16/2020); Colonoscopy (N/A, 2022); and Esophagogastroduodenoscopy (N/A, 2022).    Zayra's family history includes Breast cancer in her sister.    Zayra reports that she has been smoking cigarettes. She has a 41.00 pack-year smoking history. She has never used smokeless tobacco. She reports that she does not currently use alcohol. She reports that she does not use drugs.   ALG Zayra is allergic to ativan [lorazepam].   MED Zayra has a current medication list which includes the following prescription(s): albuterol, albuterol, albuterol-ipratropium, aspirin, atenolol, atorvastatin, azithromycin, cholecalciferol (vitamin d3), clonazepam, duloxetine, trelegy ellipta, pantoprazole, prednisone, and promethazine.     Review of Systems   Constitutional:  Negative for chills and fever.   Respiratory:  Positive for cough, shortness of breath and wheezing.    Cardiovascular:  Negative for chest pain.   Objective     BP (!) 160/108 (BP Location: Left arm, Patient Position: Sitting, BP Method: Small (Manual))   Pulse 72   Temp 97.9 °F (36.6 °C) (Temporal)   Resp 16   Ht 5' 5" (1.651 m)   Wt 47.3 kg (104 lb 3.2 oz)   LMP 2017   SpO2 96% Comment: on 3 liters  BMI 17.34 kg/m²     Physical Exam  Vitals and nursing note reviewed. "   Constitutional:       General: She is not in acute distress.     Appearance: Normal appearance. She is well-developed. She is not diaphoretic.      Comments: On nasal cannula oxygen   HENT:      Head: Normocephalic and atraumatic.      Right Ear: External ear normal.      Left Ear: External ear normal.   Eyes:      General:         Right eye: No discharge.         Left eye: No discharge.   Cardiovascular:      Rate and Rhythm: Normal rate and regular rhythm.      Heart sounds: Normal heart sounds.   Pulmonary:      Effort: Pulmonary effort is normal.      Breath sounds: Normal breath sounds. No wheezing or rales.   Skin:     General: Skin is warm and dry.   Neurological:      Mental Status: She is alert and oriented to person, place, and time. Mental status is at baseline.   Psychiatric:         Mood and Affect: Mood normal.         Behavior: Behavior normal.         Thought Content: Thought content normal.         Judgment: Judgment normal.      Assessment/Plan     1. COPD exacerbation        2. Coronary artery disease, unspecified vessel or lesion type, unspecified whether angina present, unspecified whether native or transplanted heart  Ambulatory referral/consult to Cardiology        Continue steroids.  Keep using BiPAP.  Keep follow-up with pulmonology.  Referral to cardiology given history.    Future Appointments   Date Time Provider Department Center   10/17/2022  9:30 AM Chloe Leung NP [facility] PULM [facility]   10/25/2022 10:00 AM NURSE SCHEDULE, [facility] FAMILY MEDICINE [facility]   12/21/2022  3:40 PM [facility]  [facility]

## 2022-10-13 ENCOUNTER — OFFICE VISIT (OUTPATIENT)
Dept: PULMONOLOGY | Facility: CLINIC | Age: 57
End: 2022-10-13
Payer: MEDICAID

## 2022-10-13 ENCOUNTER — TELEPHONE (OUTPATIENT)
Dept: PULMONOLOGY | Facility: CLINIC | Age: 57
End: 2022-10-13
Payer: MEDICAID

## 2022-10-13 VITALS
SYSTOLIC BLOOD PRESSURE: 177 MMHG | WEIGHT: 103.63 LBS | OXYGEN SATURATION: 94 % | BODY MASS INDEX: 17.27 KG/M2 | HEIGHT: 65 IN | HEART RATE: 102 BPM | DIASTOLIC BLOOD PRESSURE: 92 MMHG

## 2022-10-13 DIAGNOSIS — Z92.241 STEROID-DEPENDENT COPD: ICD-10-CM

## 2022-10-13 DIAGNOSIS — J44.9 CHRONIC OBSTRUCTIVE PULMONARY DISEASE, UNSPECIFIED COPD TYPE: Primary | ICD-10-CM

## 2022-10-13 DIAGNOSIS — J44.9 STEROID-DEPENDENT COPD: ICD-10-CM

## 2022-10-13 DIAGNOSIS — J96.12 CHRONIC RESPIRATORY FAILURE WITH HYPOXIA AND HYPERCAPNIA: ICD-10-CM

## 2022-10-13 DIAGNOSIS — R91.8 MULTIPLE LUNG NODULES ON CT: ICD-10-CM

## 2022-10-13 DIAGNOSIS — J40 BRONCHITIS: ICD-10-CM

## 2022-10-13 DIAGNOSIS — J96.11 CHRONIC RESPIRATORY FAILURE WITH HYPOXIA AND HYPERCAPNIA: ICD-10-CM

## 2022-10-13 LAB
BACTERIA BLD CULT: NORMAL
BACTERIA BLD CULT: NORMAL

## 2022-10-13 PROCEDURE — 3077F SYST BP >= 140 MM HG: CPT | Mod: CPTII,NTX,, | Performed by: NURSE PRACTITIONER

## 2022-10-13 PROCEDURE — 99214 OFFICE O/P EST MOD 30 MIN: CPT | Mod: PBBFAC,PO,TXP | Performed by: NURSE PRACTITIONER

## 2022-10-13 PROCEDURE — 3080F DIAST BP >= 90 MM HG: CPT | Mod: CPTII,NTX,, | Performed by: NURSE PRACTITIONER

## 2022-10-13 PROCEDURE — 3080F PR MOST RECENT DIASTOLIC BLOOD PRESSURE >= 90 MM HG: ICD-10-PCS | Mod: CPTII,NTX,, | Performed by: NURSE PRACTITIONER

## 2022-10-13 PROCEDURE — 1159F MED LIST DOCD IN RCRD: CPT | Mod: CPTII,NTX,, | Performed by: NURSE PRACTITIONER

## 2022-10-13 PROCEDURE — 99999 PR PBB SHADOW E&M-EST. PATIENT-LVL IV: CPT | Mod: PBBFAC,TXP,, | Performed by: NURSE PRACTITIONER

## 2022-10-13 PROCEDURE — 99214 PR OFFICE/OUTPT VISIT, EST, LEVL IV, 30-39 MIN: ICD-10-PCS | Mod: S$PBB,NTX,, | Performed by: NURSE PRACTITIONER

## 2022-10-13 PROCEDURE — 3077F PR MOST RECENT SYSTOLIC BLOOD PRESSURE >= 140 MM HG: ICD-10-PCS | Mod: CPTII,NTX,, | Performed by: NURSE PRACTITIONER

## 2022-10-13 PROCEDURE — 1159F PR MEDICATION LIST DOCUMENTED IN MEDICAL RECORD: ICD-10-PCS | Mod: CPTII,NTX,, | Performed by: NURSE PRACTITIONER

## 2022-10-13 PROCEDURE — 99999 PR PBB SHADOW E&M-EST. PATIENT-LVL IV: ICD-10-PCS | Mod: PBBFAC,TXP,, | Performed by: NURSE PRACTITIONER

## 2022-10-13 PROCEDURE — 99214 OFFICE O/P EST MOD 30 MIN: CPT | Mod: S$PBB,NTX,, | Performed by: NURSE PRACTITIONER

## 2022-10-13 RX ORDER — PREDNISONE 10 MG/1
10 TABLET ORAL DAILY
Qty: 60 TABLET | Refills: 0 | Status: SHIPPED | OUTPATIENT
Start: 2022-10-13 | End: 2022-12-12

## 2022-10-13 RX ORDER — LEVALBUTEROL INHALATION SOLUTION 1.25 MG/3ML
1 SOLUTION RESPIRATORY (INHALATION) EVERY 4 HOURS PRN
Qty: 75 ML | Refills: 3 | Status: SHIPPED | OUTPATIENT
Start: 2022-10-13 | End: 2022-10-21 | Stop reason: SDUPTHER

## 2022-10-13 NOTE — TELEPHONE ENCOUNTER
Tried to call pt, but LVM to give us a call.    ----- Message from Prateek Pedroza sent at 10/13/2022 10:25 AM CDT -----  Type:  Sooner Appointment Request    Caller is requesting a sooner appointment.  Caller declined first available appointment listed below.  Caller will not accept being placed on the waitlist and is requesting a message be sent to doctor.    Name of Caller:  patient  When is the first available appointment?  --  Symptoms:  2m f/u  Best Call Back Number:  [phone]   Additional Information:  patient called to reschedule her appointment because all her rides are busy that day.

## 2022-10-13 NOTE — TELEPHONE ENCOUNTER
Pt has an appt today @ 2:30.    ----- Message from Vane Hailey sent at 10/13/2022 11:01 AM CDT -----  Contact: Self  Type:  Same Day Appointment Request    Caller is requesting a same day appointment.  Caller declined first available appointment listed below.      Name of Caller:  Patient  When is the first available appointment?  10/17-scheduled  Symptoms:  COPD, hosp F/U  Best Call Back Number:  [phone]  Additional Information:   Please call pt back to get scheduled this week, stated she cannot wait until Monday to be seen. Thank You.

## 2022-10-13 NOTE — PROGRESS NOTES
"  10/13/2022    Zayra Balbina White  In office visit    Chief Complaint   Patient presents with    Shortness of Breath     Pt states that when she went to the hospital it got sever.    Fatigue     Pt states that it started after the hospital.     Cough     Pt states she coughs up clear/white mucus.        HPI:  10/13/2022:  Per prior message on 9/29/2022:  "Patient messaged stating she is experiencing shortness of breath, worsening since initiation of NIV use.  I called the patient and discussed the case with her on 09/29/2022  at 0940.  Patient states she received her NIV machine approximately 2 weeks ago, has been using daily for at least 4 hours per day.  States she can tolerate the pressure well while using - with initially experiencing headaches with use, respiratory therapist changed pressures.  Patient states shortness of breath is worsening from baseline, cannot walk from living room to kitchen without getting short-winded, patient states she lives in a camper currently.  Patient using supplemental oxygen throughout the day and at night as needed.  On Trelegy once per day.  Using nebulized treatments every 4-6 hours.  Patient currently taking prednisone regimen that was prescribed at prior visit, 6 days total -states she has 2 days left.  Reports benefit with prednisone use.  Patient denies mucus production.  Patient will proceed with getting chest x-ray at this time.  May need repeat/prolonged prednisone taper.  Patient states she spoke with lung transplant service, however a consultation appointment was not scheduled due to current smoking status.  Patient states she is currently smoking 1-2 cigarettes per day."  Patient required ER visit on 10/8/2022 for COPD exacerabtion, was discharged with Rx for Azithromycin and 9 day regiment of Prednisone. Completed Zpack and currently has 3 days left of Prednisone.   States overall is having a good day, however yesterday was bad. States she was exposed to several " sick contacts and developed bronchitis.   Currently using supplemental oxygen at night time and as needed throughout the day, dose ranges from 1-3lpm.   Using NIV machine nightly, states bleeds o2 in at 2L. Concerned that NIV is causing illness.  States hasn't smoked cigarettes in 2 days now.  Using Trelegy inhaler once per day, albuterol as needed approximately 2x per day. Taking neb treatments every 4-6 hours. States with Albuterol use she experiences hand shakiness, heart palpitations, jitters, and nervousness.       8/16/2022:  Patient did not keep follow up - I saw patient last in Feb 2022.   Patient required hospitalization for SIRS, COPD at [facility] on 8/7 - was subsequently discharged home on 8/10 (no discharge summary available in Epic). Treated with IV Abx and Bipap therapy while inpatient. States she was discharged home with Rx for steroid and abx, in which she completed both regiments. Patient has had THREE hospitalizations for COPD exacerbations in 2022 alone.  Still using Trelegy, one puff once per day with reported benefit. Using Albuterol inhaler as needed. Using nebulizer machine with Duoneb as needed.   Still smoking cigarettes, approximately 5 cigarettes per day.  Has supplemental oxygen at home and POC - wears as needed for shortness of breath, at night time - uses 1-2L via NC.  ABG obtained in the hospital on 8/7/2022 revealing pH 7.309, CO2 63.6, O2 122, and HCO3 32.0  Patient Instructions   Area of concern on CT from Feb - need to repeat CT Chest now.  I ordered a Lung Function Test to evaluate lung strength. Please complete prior to next appointment.   Continue Trelegy once per day.  Albuterol as needed. Prescription sent for Duoneb to be taken as needed.  NIV machine ordered given chronic resp failure secondary to COPD with recent hospitalization revealing high CO2 level.  Continue the use of supplemental oxygen.  Stop smoking.   Continue current medication regiment. Keep follow up  appointment as scheduled. Please call the office if you have any questions or concerns.             2/18/2022: In office today with sister. Hx: COPD, Chronic Resp failure.  Recently Hospitalized at [facility] on 2/7 for COPD exacerbation, was treated with IV steroids and neb treatments, subsequently discharged home on 2/10 with Rx for Prednisone and Levaquin, which she reports completion and compliance.  Had Prior hospitalization in November 2021 at [facility] for COPD exacerbation - complicated hospitalization with intramuscular hematoma of the left rectus muscle of the anterior abdominal wall - general surgery consulted, resolved spontaneously. Discharged home at that time with supplemental oxygen. Patient also endorses prior ER visit in Feb 2021 for COPD exacerbation and ER visit at [facility] for same.  Currently taking Anora once per day - Albuterol rescue inhaler approx 2-5 times per day. Using Nebulizer (Albuterol and Ipatropium) scheduled per day.  Using oxygen at night time, and as needed throughout the day - 1L via NC.  Shortness of breath: Varies with time - states worse over the last week due to sinus congestion, weather change, pollen. Exertional, improves with rest and tripod position. Affecting ADLS.   Cough: Worsening, productive with clear mucous production. Worse in the morning, Denies wheezing, chest tightness. Taking Mucinex without benefit. Ran out of Tessalon pearls - they seemed to help.   Patient Instructions   You have two areas of suspicion noted on CT Chest.   One area in the R lower lung - initially noted on CT from 2/8/2022.  Area in the L lung has been present since at least 12/2020 - appears to have grown in size.  Will repeat CT in 3 months.  Would benefit from smoking cessation. Declines referral to smoking cessation program at this time. Has tried Chantix in past.  This inhaler Trelegy is your new daily inhaler. It contains an inhaled steroid component. Rinse mouth  after each use due to risk for thrush development. If mouth or tongue develops white sores please contact the clinic and I will order a prescription mouth wash.   Continue to use albuterol rescue inhaler as needed.  Zyrtec - take one pill nightly for 30 days - see if it helps with sinus complaints.  Tessalon pearls as needed for cough.   I ordered a Lung Function Test to evaluate lung strength. Please complete prior to next appointment.         Social Hx: Lives alone - no animals in the home. Previously worked as labored. No Asbestosis exposure, Smoking Hx: Current smoker, started age 14 - 0.5 ppd use.   Family Hx: Mother Lung Cancer, No COPD, Son, Brother, Sister Asthma  Medical Hx: Previous pneumonia (did not require intubation) ; No previous shoulder/chest surgery      The chief compliant  problem varies with instability at times.  PFSH:  Past Medical History:   Diagnosis Date    Anxiety     Arthritis     Asthma     Back pain     COPD (chronic obstructive pulmonary disease)     Pulmonary embolism     10 years ago         Past Surgical History:   Procedure Laterality Date    BIOPSY      right breast    BREAST CYST EXCISION       SECTION      CHOLECYSTECTOMY      COLONOSCOPY N/A 2022    Procedure: COLONOSCOPY;  Surgeon: Chavez Gonzales MD;  Location: [facility] ENDO;  Service: General;  Laterality: N/A;    ESOPHAGOGASTRODUODENOSCOPY N/A 2022    Procedure: ESOPHAGOGASTRODUODENOSCOPY (EGD);  Surgeon: Chavez Gonzales MD;  Location: [facility] ENDO;  Service: General;  Laterality: N/A;    INCISION AND DRAINAGE OF ABSCESS Left 3/16/2020    Procedure: INCISION AND DRAINAGE, ABSCESS;  Surgeon: Irvin Villarreal MD;  Location: [facility] OR;  Service: General;  Laterality: Left;     Social History     Tobacco Use    Smoking status: Every Day     Packs/day: 1.00     Years: 41.00     Pack years: 41.00     Types: Cigarettes     Last attempt to quit: 2021     Years since quittin.8    Smokeless tobacco:  "Never   Substance Use Topics    Alcohol use: Not Currently     Comment: occ    Drug use: Never     Family History   Problem Relation Age of Onset    Breast cancer Sister     Ovarian cancer Neg Hx      Review of patient's allergies indicates:   Allergen Reactions    Ativan [lorazepam] Itching     I have reviewed past medical, family, and social history. I have reviewed previous nurse notes.    Performance Status:The patient's activity level is functions out of house.      Review of Systems:  a review of eleven systems covering constitutional, Eye, HEENT, Psych, Respiratory, Cardiac, GI, , Musculoskeletal, Endocrine, Dermatologic was negative except for pertinent findings as listed ABOVE and below: pertinent positive as above, rest is good       Exam:Comprehensive exam done. BP (!) 177/92 (BP Location: Left arm, Patient Position: Sitting, BP Method: Pediatric (Automatic))   Pulse 102   Ht 5' 5" (1.651 m)   Wt 47 kg (103 lb 9.9 oz)   LMP 09/22/2017   SpO2 (!) 94% Comment: room air at rest  BMI 17.24 kg/m²   Exam included Vitals as listed  Constitutional: She is oriented to person, place, and time. She appears well-developed. No distress.   Nose: Nose normal.   Mouth/Throat: Uvula is midline, oropharynx is clear and moist and mucous membranes are normal. No dental caries. No oropharyngeal exudate, posterior oropharyngeal edema, posterior oropharyngeal erythema or tonsillar abscesses.  Mallapatti (M) score 1  Eyes: Pupils are equal, round, and reactive to light.   Neck: No JVD present. No thyromegaly present.   Cardiovascular: Normal rate, regular rhythm and normal heart sounds. Exam reveals no gallop and no friction rub.   No murmur heard.  Pulmonary/Chest: Effort normal and breath sounds normal. No accessory muscle usage or stridor. No apnea and no tachypnea. No respiratory distress, on room air. Diminished breath sounds throughout, on 2L supplemental oxygen, in no acute distress.  Abdominal: Soft. She exhibits " no mass. There is no tenderness. No hepatosplenomegaly, hernias and normoactive bowel sounds  Musculoskeletal: Normal range of motion. exhibits no edema.   Neurological:  alert and oriented to person, place, and time. not disoriented.   Skin: Skin is warm and dry. Capillary refill takes less 2 sec. No cyanosis or erythema. No pallor. Nails show no clubbing.   Psychiatric: normal mood and affect. behavior is normal. Judgment and thought content normal.       Radiographs (ct chest and cxr) reviewed: view by direct vision     X-Ray Chest 1 View 10/8/2022 Impression:   Marked interval improvement in previously noted patchy segmental airspace consolidation at the left lung base.  There are a few tiny residual nodular densities at the left lung base.  Continued follow-up is recommended to ensure resolution..  Consider a follow-up chest x-ray in 4 weeks.         X-Ray Chest AP Portable  8/7/2022   FINDINGS:  Normal cardiomediastinal contour. No focal consolidation, pleural effusion or pneumothorax. The lungs are hyperinflated with flattening of the diaphragms.   Impression:   No acute cardiopulmonary abnormality.   Pulmonary emphysema.       CTA Chest Non-Coronary  2/8/2022   Impression:   No CTA evidence of pulmonary embolus.  Significant centrilobular emphysema.  Stable 1.4 cm spiculated mass of the left lingula.  New 1.9 cm band of spiculated density in the posterior right lung gutter likely represents atelectasis.  Follow-up CT in 3 months is recommended however.      X-Ray Chest AP Portable  2/8/2022   Impression:   COPD.         Labs reviewed     Lab Results   Component Value Date    WBC 6.53 10/08/2022    RBC 4.22 10/08/2022    HGB 13.4 10/08/2022    HCT 39.7 10/08/2022    MCV 94 10/08/2022    MCH 31.8 (H) 10/08/2022    MCHC 33.8 10/08/2022    RDW 12.3 10/08/2022     10/08/2022    MPV 9.3 10/08/2022    GRAN 4.7 10/08/2022    GRAN 72.6 10/08/2022    LYMPH 1.0 10/08/2022    LYMPH 15.8 (L) 10/08/2022    MONO 0.6  10/08/2022    MONO 9.0 10/08/2022    EOS 0.1 10/08/2022    BASO 0.05 10/08/2022    EOSINOPHIL 1.5 10/08/2022    BASOPHIL 0.8 10/08/2022       PFT will be done and results to be reviewed  Pulmonary Functions Testing Results:    Patient has chronic respiratory failure secondary to COPD.  Patient will need a specific targeted tidal volume which cannot be provided via a CPAP or BiPAP.  This patient will require a set tidal volume to ensure CO2 levels were lowered adequately; as well as, to assist in establishing proper diaphragmatic functions.  Therefore, NIV is being ordered due to the severe state of this patient's disease.  Without this therapy, the patient runs a higher risk of expiration and readmittance.      Plan:  Clinical impression is ambiguous and will need repeated evaluation wrt.    Zayra was seen today for shortness of breath, fatigue and cough.    Diagnoses and all orders for this visit:    Chronic obstructive pulmonary disease, unspecified COPD type  -     levalbuterol (XOPENEX) 1.25 mg/3 mL nebulizer solution; Take 3 mLs (1.25 mg total) by nebulization every 4 (four) hours as needed for Wheezing. Rescue  -     X-Ray Chest PA And Lateral; Future    Chronic respiratory failure with hypoxia and hypercapnia    Multiple lung nodules on CT    Bronchitis    Steroid-dependent COPD  -     predniSONE (DELTASONE) 10 MG tablet; Take 1 tablet (10 mg total) by mouth once daily.      Follow up in about 2 months (around 12/13/2022), or if symptoms worsen or fail to improve.    Discussed with patient above for education the following:      Patient Instructions   Continue Prednisone taper from ER until you finish. Then recommend going to daily Prednisone, 5-10 mg per day based on how you feel.     Continue Trelegy once per day and Albuterol as needed. Have ordered Xopenex for you to use in nebulizer machine since adverse reactions reported to Albuterol.    Continue supplemental oxygen use. Continue NIV use.    Doing  well from smoking standpoint, continue smoking cessation.    Continue current medication regiment. Keep follow up appointment as scheduled. Please call the office if you have any questions or concerns.

## 2022-10-13 NOTE — PATIENT INSTRUCTIONS
Continue Prednisone taper from ER until you finish. Then recommend going to daily Prednisone, 5-10 mg per day based on how you feel.     Continue Trelegy once per day and Albuterol as needed. Have ordered Xopenex for you to use in nebulizer machine since adverse reactions reported to Albuterol.    Continue supplemental oxygen use. Continue NIV use.    Doing well from smoking standpoint, continue smoking cessation.    Continue current medication regiment. Keep follow up appointment as scheduled. Please call the office if you have any questions or concerns.       
No complaints

## 2022-10-21 DIAGNOSIS — J44.9 CHRONIC OBSTRUCTIVE PULMONARY DISEASE, UNSPECIFIED COPD TYPE: ICD-10-CM

## 2022-10-21 RX ORDER — LEVALBUTEROL INHALATION SOLUTION 1.25 MG/3ML
1 SOLUTION RESPIRATORY (INHALATION) EVERY 4 HOURS PRN
Qty: 75 ML | Refills: 3 | Status: SHIPPED | OUTPATIENT
Start: 2022-10-21 | End: 2023-10-17

## 2022-10-21 RX ORDER — IPRATROPIUM BROMIDE AND ALBUTEROL SULFATE 2.5; .5 MG/3ML; MG/3ML
3 SOLUTION RESPIRATORY (INHALATION) EVERY 6 HOURS PRN
Qty: 75 ML | Refills: 3 | Status: CANCELLED | OUTPATIENT
Start: 2022-10-21 | End: 2023-10-21

## 2022-11-02 DIAGNOSIS — Z12.31 OTHER SCREENING MAMMOGRAM: ICD-10-CM

## 2022-11-04 ENCOUNTER — HOSPITAL ENCOUNTER (OUTPATIENT)
Dept: RADIOLOGY | Facility: HOSPITAL | Age: 57
Discharge: HOME | End: 2022-11-04
Attending: NURSE PRACTITIONER
Payer: MEDICAID

## 2022-11-04 DIAGNOSIS — J44.9 CHRONIC OBSTRUCTIVE PULMONARY DISEASE, UNSPECIFIED COPD TYPE: ICD-10-CM

## 2022-11-04 PROCEDURE — 71046 XR CHEST PA AND LATERAL: ICD-10-PCS | Mod: 26,NTX,, | Performed by: RADIOLOGY

## 2022-11-04 PROCEDURE — 71046 X-RAY EXAM CHEST 2 VIEWS: CPT | Mod: 26,NTX,, | Performed by: RADIOLOGY

## 2022-11-04 PROCEDURE — 71046 X-RAY EXAM CHEST 2 VIEWS: CPT | Mod: TC,TXP

## 2022-11-07 ENCOUNTER — PATIENT MESSAGE (OUTPATIENT)
Dept: ADMINISTRATIVE | Facility: HOSPITAL | Age: 57
End: 2022-11-07
Payer: MEDICAID

## 2022-11-20 ENCOUNTER — HOSPITAL ENCOUNTER (EMERGENCY)
Facility: HOSPITAL | Age: 57
Discharge: HOME | End: 2022-11-21
Attending: EMERGENCY MEDICINE
Payer: MEDICAID

## 2022-11-20 DIAGNOSIS — R11.2 NAUSEA AND VOMITING, UNSPECIFIED VOMITING TYPE: Primary | ICD-10-CM

## 2022-11-20 DIAGNOSIS — K52.9 GASTROENTERITIS: ICD-10-CM

## 2022-11-20 DIAGNOSIS — R00.0 TACHYCARDIA: ICD-10-CM

## 2022-11-20 DIAGNOSIS — R10.9 ABDOMINAL PAIN, UNSPECIFIED ABDOMINAL LOCATION: ICD-10-CM

## 2022-11-20 LAB
ALBUMIN SERPL BCP-MCNC: 4.3 G/DL (ref 3.5–5.2)
ALP SERPL-CCNC: 65 U/L (ref 55–135)
ALT SERPL W/O P-5'-P-CCNC: 18 U/L (ref 10–44)
AMPHET+METHAMPHET UR QL: NEGATIVE
ANION GAP SERPL CALC-SCNC: 16 MMOL/L (ref 8–16)
AST SERPL-CCNC: 23 U/L (ref 10–40)
BARBITURATES UR QL SCN>200 NG/ML: NEGATIVE
BASOPHILS # BLD AUTO: 0.05 K/UL (ref 0–0.2)
BASOPHILS NFR BLD: 0.3 % (ref 0–1.9)
BENZODIAZ UR QL SCN>200 NG/ML: NEGATIVE
BILIRUB SERPL-MCNC: 1 MG/DL (ref 0.1–1)
BILIRUB UR QL STRIP: NEGATIVE
BUN SERPL-MCNC: 21 MG/DL (ref 6–20)
BZE UR QL SCN: NEGATIVE
CALCIUM SERPL-MCNC: 9.5 MG/DL (ref 8.7–10.5)
CANNABINOIDS UR QL SCN: NEGATIVE
CHLORIDE SERPL-SCNC: 96 MMOL/L (ref 95–110)
CLARITY UR: CLEAR
CO2 SERPL-SCNC: 28 MMOL/L (ref 23–29)
COLOR UR: YELLOW
CREAT SERPL-MCNC: 0.8 MG/DL (ref 0.5–1.4)
CREAT UR-MCNC: 262.8 MG/DL (ref 15–325)
DIFFERENTIAL METHOD: ABNORMAL
EOSINOPHIL # BLD AUTO: 0 K/UL (ref 0–0.5)
EOSINOPHIL NFR BLD: 0.3 % (ref 0–8)
ERYTHROCYTE [DISTWIDTH] IN BLOOD BY AUTOMATED COUNT: 12.8 % (ref 11.5–14.5)
EST. GFR  (NO RACE VARIABLE): >60 ML/MIN/1.73 M^2
GLUCOSE SERPL-MCNC: 139 MG/DL (ref 70–110)
GLUCOSE UR QL STRIP: NEGATIVE
HCT VFR BLD AUTO: 47.1 % (ref 37–48.5)
HGB BLD-MCNC: 16 G/DL (ref 12–16)
HGB UR QL STRIP: NEGATIVE
IMM GRANULOCYTES # BLD AUTO: 0.05 K/UL (ref 0–0.04)
IMM GRANULOCYTES NFR BLD AUTO: 0.3 % (ref 0–0.5)
KETONES UR QL STRIP: ABNORMAL
LEUKOCYTE ESTERASE UR QL STRIP: NEGATIVE
LIPASE SERPL-CCNC: 33 U/L (ref 4–60)
LYMPHOCYTES # BLD AUTO: 0.5 K/UL (ref 1–4.8)
LYMPHOCYTES NFR BLD: 3.3 % (ref 18–48)
MAGNESIUM SERPL-MCNC: 1.6 MG/DL (ref 1.6–2.6)
MCH RBC QN AUTO: 32.6 PG (ref 27–31)
MCHC RBC AUTO-ENTMCNC: 34 G/DL (ref 32–36)
MCV RBC AUTO: 96 FL (ref 82–98)
METHADONE UR QL SCN>300 NG/ML: NEGATIVE
MONOCYTES # BLD AUTO: 0.5 K/UL (ref 0.3–1)
MONOCYTES NFR BLD: 3.2 % (ref 4–15)
NEUTROPHILS # BLD AUTO: 14.2 K/UL (ref 1.8–7.7)
NEUTROPHILS NFR BLD: 92.6 % (ref 38–73)
NITRITE UR QL STRIP: NEGATIVE
NRBC BLD-RTO: 0 /100 WBC
OPIATES UR QL SCN: NEGATIVE
PCP UR QL SCN>25 NG/ML: NEGATIVE
PH UR STRIP: 5 [PH] (ref 5–8)
PLATELET # BLD AUTO: 314 K/UL (ref 150–450)
PMV BLD AUTO: 9.1 FL (ref 9.2–12.9)
POTASSIUM SERPL-SCNC: 4.1 MMOL/L (ref 3.5–5.1)
PROT SERPL-MCNC: 7.2 G/DL (ref 6–8.4)
PROT UR QL STRIP: ABNORMAL
RBC # BLD AUTO: 4.91 M/UL (ref 4–5.4)
SODIUM SERPL-SCNC: 140 MMOL/L (ref 136–145)
SP GR UR STRIP: >=1.03 (ref 1–1.03)
TOXICOLOGY INFORMATION: NORMAL
URN SPEC COLLECT METH UR: ABNORMAL
UROBILINOGEN UR STRIP-ACNC: NEGATIVE EU/DL
WBC # BLD AUTO: 15.38 K/UL (ref 3.9–12.7)

## 2022-11-20 PROCEDURE — 74177 CT ABD & PELVIS W/CONTRAST: CPT | Mod: 26,NTX,, | Performed by: RADIOLOGY

## 2022-11-20 PROCEDURE — 83735 ASSAY OF MAGNESIUM: CPT | Mod: NTX | Performed by: EMERGENCY MEDICINE

## 2022-11-20 PROCEDURE — 25000242 PHARM REV CODE 250 ALT 637 W/ HCPCS: Mod: NTX | Performed by: EMERGENCY MEDICINE

## 2022-11-20 PROCEDURE — 96375 TX/PRO/DX INJ NEW DRUG ADDON: CPT | Mod: NTX

## 2022-11-20 PROCEDURE — 74177 CT ABD & PELVIS W/CONTRAST: CPT | Mod: TC,NTX

## 2022-11-20 PROCEDURE — 99285 EMERGENCY DEPT VISIT HI MDM: CPT | Mod: 25,NTX

## 2022-11-20 PROCEDURE — 94761 N-INVAS EAR/PLS OXIMETRY MLT: CPT | Mod: NTX

## 2022-11-20 PROCEDURE — 83690 ASSAY OF LIPASE: CPT | Mod: NTX | Performed by: EMERGENCY MEDICINE

## 2022-11-20 PROCEDURE — 93005 ELECTROCARDIOGRAM TRACING: CPT | Mod: NTX

## 2022-11-20 PROCEDURE — 93010 ELECTROCARDIOGRAM REPORT: CPT | Mod: NTX,,, | Performed by: INTERNAL MEDICINE

## 2022-11-20 PROCEDURE — 74177 CT ABDOMEN PELVIS WITH CONTRAST: ICD-10-PCS | Mod: 26,NTX,, | Performed by: RADIOLOGY

## 2022-11-20 PROCEDURE — 96361 HYDRATE IV INFUSION ADD-ON: CPT | Mod: NTX

## 2022-11-20 PROCEDURE — 25000003 PHARM REV CODE 250: Mod: NTX | Performed by: EMERGENCY MEDICINE

## 2022-11-20 PROCEDURE — 87389 HIV-1 AG W/HIV-1&-2 AB AG IA: CPT | Mod: NTX | Performed by: EMERGENCY MEDICINE

## 2022-11-20 PROCEDURE — 25500020 PHARM REV CODE 255: Mod: NTX | Performed by: EMERGENCY MEDICINE

## 2022-11-20 PROCEDURE — 85025 COMPLETE CBC W/AUTO DIFF WBC: CPT | Mod: NTX | Performed by: EMERGENCY MEDICINE

## 2022-11-20 PROCEDURE — 93010 EKG 12-LEAD: ICD-10-PCS | Mod: NTX,,, | Performed by: INTERNAL MEDICINE

## 2022-11-20 PROCEDURE — 63600175 PHARM REV CODE 636 W HCPCS: Mod: NTX | Performed by: EMERGENCY MEDICINE

## 2022-11-20 PROCEDURE — 80307 DRUG TEST PRSMV CHEM ANLYZR: CPT | Mod: NTX | Performed by: EMERGENCY MEDICINE

## 2022-11-20 PROCEDURE — 81003 URINALYSIS AUTO W/O SCOPE: CPT | Mod: NTX,59 | Performed by: EMERGENCY MEDICINE

## 2022-11-20 PROCEDURE — 94640 AIRWAY INHALATION TREATMENT: CPT | Mod: NTX

## 2022-11-20 PROCEDURE — 96365 THER/PROPH/DIAG IV INF INIT: CPT | Mod: 59,NTX

## 2022-11-20 PROCEDURE — 80053 COMPREHEN METABOLIC PANEL: CPT | Mod: NTX | Performed by: EMERGENCY MEDICINE

## 2022-11-20 PROCEDURE — 86803 HEPATITIS C AB TEST: CPT | Mod: NTX | Performed by: EMERGENCY MEDICINE

## 2022-11-20 RX ORDER — METOCLOPRAMIDE HYDROCHLORIDE 5 MG/ML
10 INJECTION INTRAMUSCULAR; INTRAVENOUS
Status: COMPLETED | OUTPATIENT
Start: 2022-11-20 | End: 2022-11-20

## 2022-11-20 RX ORDER — KETOROLAC TROMETHAMINE 30 MG/ML
30 INJECTION, SOLUTION INTRAMUSCULAR; INTRAVENOUS
Status: COMPLETED | OUTPATIENT
Start: 2022-11-20 | End: 2022-11-20

## 2022-11-20 RX ORDER — ONDANSETRON 2 MG/ML
4 INJECTION INTRAMUSCULAR; INTRAVENOUS
Status: COMPLETED | OUTPATIENT
Start: 2022-11-20 | End: 2022-11-20

## 2022-11-20 RX ORDER — IPRATROPIUM BROMIDE AND ALBUTEROL SULFATE 2.5; .5 MG/3ML; MG/3ML
3 SOLUTION RESPIRATORY (INHALATION)
Status: COMPLETED | OUTPATIENT
Start: 2022-11-20 | End: 2022-11-20

## 2022-11-20 RX ADMIN — SODIUM CHLORIDE 500 ML: 0.9 INJECTION, SOLUTION INTRAVENOUS at 10:11

## 2022-11-20 RX ADMIN — SODIUM CHLORIDE 1000 ML: 0.9 INJECTION, SOLUTION INTRAVENOUS at 09:11

## 2022-11-20 RX ADMIN — IPRATROPIUM BROMIDE AND ALBUTEROL SULFATE 3 ML: .5; 2.5 SOLUTION RESPIRATORY (INHALATION) at 09:11

## 2022-11-20 RX ADMIN — PIPERACILLIN AND TAZOBACTAM 3.38 G: 3; .375 INJECTION, POWDER, LYOPHILIZED, FOR SOLUTION INTRAVENOUS; PARENTERAL at 11:11

## 2022-11-20 RX ADMIN — IOHEXOL 75 ML: 350 INJECTION, SOLUTION INTRAVENOUS at 10:11

## 2022-11-20 RX ADMIN — ONDANSETRON 4 MG: 2 INJECTION INTRAMUSCULAR; INTRAVENOUS at 09:11

## 2022-11-20 RX ADMIN — METOCLOPRAMIDE 10 MG: 5 INJECTION, SOLUTION INTRAMUSCULAR; INTRAVENOUS at 11:11

## 2022-11-20 RX ADMIN — KETOROLAC TROMETHAMINE 30 MG: 30 INJECTION, SOLUTION INTRAMUSCULAR at 09:11

## 2022-11-21 VITALS
HEIGHT: 65 IN | WEIGHT: 105 LBS | SYSTOLIC BLOOD PRESSURE: 118 MMHG | HEART RATE: 109 BPM | DIASTOLIC BLOOD PRESSURE: 67 MMHG | RESPIRATION RATE: 24 BRPM | BODY MASS INDEX: 17.49 KG/M2 | TEMPERATURE: 98 F | OXYGEN SATURATION: 97 %

## 2022-11-21 PROBLEM — J96.12 CHRONIC RESPIRATORY FAILURE WITH HYPOXIA AND HYPERCAPNIA: Status: RESOLVED | Noted: 2021-11-30 | Resolved: 2022-11-21

## 2022-11-21 PROBLEM — J96.11 CHRONIC RESPIRATORY FAILURE WITH HYPOXIA AND HYPERCAPNIA: Status: RESOLVED | Noted: 2021-11-30 | Resolved: 2022-11-21

## 2022-11-21 LAB
HCV AB SERPL QL IA: NORMAL
HIV 1+2 AB+HIV1 P24 AG SERPL QL IA: NORMAL

## 2022-11-21 PROCEDURE — 93005 ELECTROCARDIOGRAM TRACING: CPT | Mod: NTX

## 2022-11-21 PROCEDURE — 93010 EKG 12-LEAD: ICD-10-PCS | Mod: NTX,,, | Performed by: INTERNAL MEDICINE

## 2022-11-21 PROCEDURE — 93010 ELECTROCARDIOGRAM REPORT: CPT | Mod: NTX,,, | Performed by: INTERNAL MEDICINE

## 2022-11-21 RX ORDER — AMOXICILLIN AND CLAVULANATE POTASSIUM 875; 125 MG/1; MG/1
1 TABLET, FILM COATED ORAL EVERY 12 HOURS
Qty: 20 TABLET | Refills: 0 | Status: SHIPPED | OUTPATIENT
Start: 2022-11-21 | End: 2022-12-01

## 2022-11-21 RX ORDER — PROMETHAZINE HYDROCHLORIDE 25 MG/1
25 TABLET ORAL EVERY 6 HOURS PRN
Qty: 30 TABLET | Refills: 0 | Status: SHIPPED | OUTPATIENT
Start: 2022-11-21 | End: 2023-09-26

## 2022-11-21 RX ORDER — METRONIDAZOLE 500 MG/1
500 TABLET ORAL EVERY 12 HOURS
Qty: 20 TABLET | Refills: 0 | Status: SHIPPED | OUTPATIENT
Start: 2022-11-21 | End: 2022-12-01

## 2022-11-21 NOTE — ED NOTES
Patient exam complete. Presents to ER with c/o abdominal cramping and low back pain x 24hours with n/v. Expressed approximately 15 episodes of vomiting. Tachycardic 140's with no ectopy. Very anxious upon arrival. Patient well known to myself and staff. C/O SOB which is chronic due to underlying COPD. No worsening expressed. O2 at 2 liters applied per nasal canula. Exam otherwise unremarkable. Generalized abdominal discomfort without enoch tenderness.

## 2022-11-21 NOTE — ED NOTES
D/C IV. Jelco intact. Dressing applied with 2x2 gauze and pressure held x 1 minute. No bleeding noted. Coban dressing applied with instructions for removal. D/C instructions, along with follow up explained. RX provided with instructions for use. Voices understanding of treatment plan.

## 2022-11-21 NOTE — ED NOTES
Patient states feeling much better. No nausea or vomiting expressed. Urine sample obtained via clean catch with patient on bed side commode. Tolerated well.

## 2022-11-21 NOTE — ED NOTES
Repeat EKG with sinus tach and occasional PVC's noted. States remains with slight nausea but no emesis noted. Describes as feeling 50-75% better than she did when she got here.

## 2022-11-21 NOTE — ED PROVIDER NOTES
Encounter Date: 2022       History     Chief Complaint   Patient presents with    Abdominal Pain     Nausea, vomiting, diarrhea, and generalized pain onset this morning, zofran 4mg ODT received in route per EMS     Pt here with c/o NV, LAP and LBP onset this am. Emesis x 10-15. Last BM yesterday was hard. +flatus. No urinary sxs. No fever.     The history is provided by the patient and the EMS personnel.   Emesis   This is a new problem. The current episode started today. The problem has been unchanged. The emesis has an appearance of stomach contents. Associated symptoms include abdominal pain. Pertinent negatives include no arthralgias, no chills, no cough, no diarrhea, no fever, no headaches, no myalgias, no sweats and no URI.   Review of patient's allergies indicates:   Allergen Reactions    Ativan [lorazepam] Itching     Past Medical History:   Diagnosis Date    Anxiety     Arthritis     Asthma     Back pain     COPD (chronic obstructive pulmonary disease)     Pulmonary embolism     10 years ago     Past Surgical History:   Procedure Laterality Date    BIOPSY      right breast    BREAST CYST EXCISION       SECTION      CHOLECYSTECTOMY      COLONOSCOPY N/A 2022    Procedure: COLONOSCOPY;  Surgeon: Chavez Gonzales MD;  Location: [facility] ENDO;  Service: General;  Laterality: N/A;    ESOPHAGOGASTRODUODENOSCOPY N/A 2022    Procedure: ESOPHAGOGASTRODUODENOSCOPY (EGD);  Surgeon: Chavez Gonzales MD;  Location: [facility] ENDO;  Service: General;  Laterality: N/A;    INCISION AND DRAINAGE OF ABSCESS Left 3/16/2020    Procedure: INCISION AND DRAINAGE, ABSCESS;  Surgeon: Irvin Villarreal MD;  Location: [facility] OR;  Service: General;  Laterality: Left;     Family History   Problem Relation Age of Onset    Breast cancer Sister     Ovarian cancer Neg Hx      Social History     Tobacco Use    Smoking status: Former     Packs/day: 1.00     Years: 41.00     Pack years: 41.00     Types: Cigarettes      Quit date: 2022     Years since quittin.0    Smokeless tobacco: Never   Substance Use Topics    Alcohol use: Not Currently     Comment: occ    Drug use: Never     Review of Systems   Constitutional:  Negative for chills and fever.   Respiratory:  Positive for shortness of breath. Negative for cough.    Gastrointestinal:  Positive for abdominal pain, nausea and vomiting. Negative for diarrhea.   Musculoskeletal:  Negative for arthralgias and myalgias.   Neurological:  Negative for headaches.   All other systems reviewed and are negative.    Physical Exam     Initial Vitals [22]   BP Pulse Resp Temp SpO2   (!) 110/94 (!) 157 (!) 36 97.8 °F (36.6 °C) 96 %      MAP       --         Physical Exam    Nursing note and vitals reviewed.  Constitutional: She appears well-developed and well-nourished. She is not diaphoretic. She appears distressed.   Anxious appearing   HENT:   Head: Normocephalic and atraumatic.   Mouth/Throat: Oropharynx is clear and moist.   Eyes: Conjunctivae and EOM are normal. No scleral icterus.   Neck: Neck supple. No JVD present.   Normal range of motion.  Cardiovascular:  Regular rhythm, normal heart sounds and intact distal pulses.           tachy   Pulmonary/Chest: She exhibits no tenderness.   Diminished throughout   Abdominal: Abdomen is soft. Bowel sounds are normal. She exhibits no distension and no mass. There is abdominal tenderness.   Diffuse lower abd ttp There is guarding. There is no rebound.   Musculoskeletal:         General: No tenderness or edema. Normal range of motion.      Cervical back: Normal range of motion and neck supple.     Neurological: She is alert and oriented to person, place, and time.   Skin: Skin is warm and dry. Capillary refill takes less than 2 seconds. No rash noted. No erythema. No pallor.       ED Course   Procedures  Labs Reviewed   CBC W/ AUTO DIFFERENTIAL - Abnormal; Notable for the following components:       Result Value    WBC 15.38 (*)      MCH 32.6 (*)     MPV 9.1 (*)     Gran # (ANC) 14.2 (*)     Immature Grans (Abs) 0.05 (*)     Lymph # 0.5 (*)     Gran % 92.6 (*)     Lymph % 3.3 (*)     Mono % 3.2 (*)     All other components within normal limits   COMPREHENSIVE METABOLIC PANEL - Abnormal; Notable for the following components:    Glucose 139 (*)     BUN 21 (*)     All other components within normal limits   URINALYSIS, REFLEX TO URINE CULTURE - Abnormal; Notable for the following components:    Specific Gravity, UA >=1.030 (*)     Protein, UA Trace (*)     Ketones, UA Trace (*)     All other components within normal limits    Narrative:     Preferred Collection Type->Urine, Clean Catch  Specimen Source->Urine   LIPASE   MAGNESIUM   DRUG SCREEN PANEL, URINE EMERGENCY    Narrative:     Preferred Collection Type->Urine, Clean Catch  Specimen Source->Urine   HIV 1 / 2 ANTIBODY   HEPATITIS C ANTIBODY     EKG Readings: (Independently Interpreted)   Rhythm: Sinus Tachycardia. Heart Rate: 143. Ectopy: No Ectopy. Axis: Right Axis Deviation. Clinical Impression: Sinus Tachycardia Other Impression: with nonspecific ST/T changes.   Repeat EKG unchanged other than rate now 113     Imaging Results              CT Abdomen Pelvis With Contrast (In process)                      Medications   sodium chloride 0.9% bolus 1,000 mL (0 mLs Intravenous Stopped 11/20/22 2229)   ketorolac injection 30 mg (30 mg Intravenous Given 11/20/22 2121)   ondansetron injection 4 mg (4 mg Intravenous Given 11/20/22 2115)   albuterol-ipratropium 2.5 mg-0.5 mg/3 mL nebulizer solution 3 mL (3 mLs Nebulization Given 11/20/22 2124)   iohexoL (OMNIPAQUE 350) injection 75 mL (75 mLs Intravenous Given 11/20/22 2209)   sodium chloride 0.9% bolus 500 mL (0 mLs Intravenous Stopped 11/20/22 2326)   piperacillin-tazobactam 3.375 g in dextrose 5 % 50 mL IVPB (ready to mix system) (0 g Intravenous Stopped 11/21/22 0002)   metoclopramide HCl injection 10 mg (10 mg Intravenous Given 11/20/22 2325)      Medical Decision Making:   Differential Diagnosis:   UTI, colitis, diverticulitis, sepsis, gastroenteritis  Clinical Tests:   Lab Tests: Ordered and Reviewed  Radiological Study: Ordered and Reviewed  Medical Tests: Ordered and Reviewed  ED Management:  Pt presented with NV and abd pain. She was tachycardic which improved with IV hydration. CBC showed leukocytosis of 15k. CMP, Mg, lipase, UA and UDS unremarkable. Pt given zofran and reglan for nausea. She was given a total of 1.5L NS. She has end stage COPD and she was given a neb with improvement in her RR. CT abd showed enteritis and constipation. She was given zosyn. Rx augmentin/flagyl and phenergan. PCP f/u this week.            ED Course as of 11/21/22 0023   Sun Nov 20, 2022   4579 1. Mild wall thickening of proximal and mid small bowel loops suspicious for infectious or inflammatory  enteritis.  2. Moderate constipation.  3. Emphysematous changes of the lung bases.  4. Status post cholecystectomy [DC]      ED Course User Index  [DC] Gee Gordon Jr., MD                 Clinical Impression:   Final diagnoses:  [R00.0] Tachycardia  [R11.2] Nausea and vomiting, unspecified vomiting type (Primary)  [K52.9] Gastroenteritis  [R10.9] Abdominal pain, unspecified abdominal location        ED Disposition Condition    Discharge Stable          ED Prescriptions       Medication Sig Dispense Start Date End Date Auth. Provider    promethazine (PHENERGAN) 25 MG tablet Take 1 tablet (25 mg total) by mouth every 6 (six) hours as needed for Nausea. 30 tablet 11/21/2022 -- Gee Gordon Jr., MD    amoxicillin-clavulanate 875-125mg (AUGMENTIN) 875-125 mg per tablet Take 1 tablet by mouth every 12 (twelve) hours. for 10 days 20 tablet 11/21/2022 12/1/2022 Gee Gordon Jr., MD    metroNIDAZOLE (FLAGYL) 500 MG tablet Take 1 tablet (500 mg total) by mouth every 12 (twelve) hours. for 10 days 20 tablet 11/21/2022 12/1/2022 Gee Gordon Jr., MD          Follow-up  Information       Follow up With Specialties Details Why Contact Info    Eric Singh MD Family Medicine In 2 days  [address] [facility] [place] [address] [phone]               Gee Gordon Jr., MD  11/21/22 0023

## 2022-12-13 ENCOUNTER — OFFICE VISIT (OUTPATIENT)
Dept: PULMONOLOGY | Facility: CLINIC | Age: 57
End: 2022-12-13
Payer: MEDICAID

## 2022-12-13 VITALS
DIASTOLIC BLOOD PRESSURE: 73 MMHG | HEART RATE: 74 BPM | WEIGHT: 106.69 LBS | OXYGEN SATURATION: 97 % | SYSTOLIC BLOOD PRESSURE: 130 MMHG | BODY MASS INDEX: 17.77 KG/M2 | HEIGHT: 65 IN

## 2022-12-13 DIAGNOSIS — J96.12 CHRONIC RESPIRATORY FAILURE WITH HYPOXIA AND HYPERCAPNIA: ICD-10-CM

## 2022-12-13 DIAGNOSIS — Z92.241 STEROID-DEPENDENT COPD: ICD-10-CM

## 2022-12-13 DIAGNOSIS — J44.9 CHRONIC OBSTRUCTIVE PULMONARY DISEASE, UNSPECIFIED COPD TYPE: Primary | ICD-10-CM

## 2022-12-13 DIAGNOSIS — R91.8 MULTIPLE LUNG NODULES ON CT: ICD-10-CM

## 2022-12-13 DIAGNOSIS — Z87.891 PERSONAL HISTORY OF NICOTINE DEPENDENCE: ICD-10-CM

## 2022-12-13 DIAGNOSIS — R09.89 CHRONIC SINUS COMPLAINTS: ICD-10-CM

## 2022-12-13 DIAGNOSIS — J44.9 STEROID-DEPENDENT COPD: ICD-10-CM

## 2022-12-13 DIAGNOSIS — F41.9 ANXIETY: ICD-10-CM

## 2022-12-13 DIAGNOSIS — J96.11 CHRONIC RESPIRATORY FAILURE WITH HYPOXIA AND HYPERCAPNIA: ICD-10-CM

## 2022-12-13 PROCEDURE — 3008F BODY MASS INDEX DOCD: CPT | Mod: CPTII,NTX,, | Performed by: NURSE PRACTITIONER

## 2022-12-13 PROCEDURE — 99999 PR PBB SHADOW E&M-EST. PATIENT-LVL III: ICD-10-PCS | Mod: PBBFAC,TXP,, | Performed by: NURSE PRACTITIONER

## 2022-12-13 PROCEDURE — 99999 PR PBB SHADOW E&M-EST. PATIENT-LVL III: CPT | Mod: PBBFAC,TXP,, | Performed by: NURSE PRACTITIONER

## 2022-12-13 PROCEDURE — 3075F PR MOST RECENT SYSTOLIC BLOOD PRESS GE 130-139MM HG: ICD-10-PCS | Mod: CPTII,NTX,, | Performed by: NURSE PRACTITIONER

## 2022-12-13 PROCEDURE — 99214 OFFICE O/P EST MOD 30 MIN: CPT | Mod: S$PBB,NTX,, | Performed by: NURSE PRACTITIONER

## 2022-12-13 PROCEDURE — 1159F MED LIST DOCD IN RCRD: CPT | Mod: CPTII,NTX,, | Performed by: NURSE PRACTITIONER

## 2022-12-13 PROCEDURE — 99214 PR OFFICE/OUTPT VISIT, EST, LEVL IV, 30-39 MIN: ICD-10-PCS | Mod: S$PBB,NTX,, | Performed by: NURSE PRACTITIONER

## 2022-12-13 PROCEDURE — 99213 OFFICE O/P EST LOW 20 MIN: CPT | Mod: PBBFAC,PO,NTX | Performed by: NURSE PRACTITIONER

## 2022-12-13 PROCEDURE — 3078F DIAST BP <80 MM HG: CPT | Mod: CPTII,NTX,, | Performed by: NURSE PRACTITIONER

## 2022-12-13 PROCEDURE — 3078F PR MOST RECENT DIASTOLIC BLOOD PRESSURE < 80 MM HG: ICD-10-PCS | Mod: CPTII,NTX,, | Performed by: NURSE PRACTITIONER

## 2022-12-13 PROCEDURE — 3008F PR BODY MASS INDEX (BMI) DOCUMENTED: ICD-10-PCS | Mod: CPTII,NTX,, | Performed by: NURSE PRACTITIONER

## 2022-12-13 PROCEDURE — 1159F PR MEDICATION LIST DOCUMENTED IN MEDICAL RECORD: ICD-10-PCS | Mod: CPTII,NTX,, | Performed by: NURSE PRACTITIONER

## 2022-12-13 PROCEDURE — 3075F SYST BP GE 130 - 139MM HG: CPT | Mod: CPTII,NTX,, | Performed by: NURSE PRACTITIONER

## 2022-12-13 RX ORDER — FLUTICASONE FUROATE, UMECLIDINIUM BROMIDE AND VILANTEROL TRIFENATATE 200; 62.5; 25 UG/1; UG/1; UG/1
1 POWDER RESPIRATORY (INHALATION) DAILY
Qty: 60 EACH | Refills: 11 | Status: SHIPPED | OUTPATIENT
Start: 2022-12-13 | End: 2024-01-23 | Stop reason: SDUPTHER

## 2022-12-13 RX ORDER — PREDNISONE 5 MG/1
5 TABLET ORAL DAILY
Qty: 60 TABLET | Refills: 0 | Status: SHIPPED | OUTPATIENT
Start: 2022-12-13 | End: 2023-02-24 | Stop reason: SDUPTHER

## 2022-12-13 NOTE — PATIENT INSTRUCTIONS
Continue current regiment - Trelegy once per day and Albuterol as needed for shortness of breath, wheezing, cough.    Prednisone - start alternating 10 mg and 5 mg every other day. Do this for two weeks and see who you tolerate. If you have no issues, can go down to 5 mg per day.     Continue NIV use, Continue supplemental oxygen.    Doing well from smoking cessation standpoint.    Alpha 1 test today in office.    CT Chest due Aug 2023    Continue current medication regiment. Keep follow up appointment as scheduled. Please call the office if you have any questions or concerns.

## 2022-12-13 NOTE — PROGRESS NOTES
"  12/13/2022    Zayra Balbina Cindy  In office visit    Chief Complaint   Patient presents with    Shortness of Breath     Pt states that she has her normal SOB     Follow-up     Usual check up        HPI:  12/13/2022:  Using Trelegy once per day with benefit, Albuterol nebulized treatments every 6 hours as needed.  Using NIV machine for four hours per day - states ever since starting use has been feeling better. Still using supplemental oxygen, has decreased to only PRN use - not wearing O2 in clinic today and in no acute distress.  Has been on Prednisone 10 mg per day with great benefit, agreeable to decrease to 5 mg.   Not currently smoking - cravings under control, [facility] is concerned with [place] upcoming that cravings will increase.  Interested in lung transplant program - [facility] was told to call back once quit smoking for 2 consecutive months.        10/13/2022:  Per prior message on 9/29/2022:  "Patient messaged stating she is experiencing shortness of breath, worsening since initiation of NIV use.  I called the patient and discussed the case with her on 09/29/2022  at 0940.  Patient states she received her NIV machine approximately 2 weeks ago, has been using daily for at least 4 hours per day.  States she can tolerate the pressure well while using - with initially experiencing headaches with use, respiratory therapist changed pressures.  Patient states shortness of breath is worsening from baseline, cannot walk from living room to kitchen without getting short-winded, patient states she lives in a camper currently.  Patient using supplemental oxygen throughout the day and at night as needed.  On Trelegy once per day.  Using nebulized treatments every 4-6 hours.  Patient currently taking prednisone regimen that was prescribed at prior visit, 6 days total -states she has 2 days left.  Reports benefit with prednisone use.  Patient denies mucus production.  Patient will proceed with getting chest x-ray at " "this time.  May need repeat/prolonged prednisone taper.  Patient states she spoke with lung transplant service, however a consultation appointment was not scheduled due to current smoking status.  Patient states she is currently smoking 1-2 cigarettes per day."  Patient required ER visit on 10/8/2022 for COPD exacerabtion, was discharged with Rx for Azithromycin and 9 day regiment of Prednisone. Completed Zpack and currently has 3 days left of Prednisone.   States overall is having a good day, however yesterday was bad. States she was exposed to several sick contacts and developed bronchitis.   Currently using supplemental oxygen at night time and as needed throughout the day, dose ranges from 1-3lpm.   Using NIV machine nightly, states bleeds o2 in at 2L. Concerned that NIV is causing illness.  States hasn't smoked cigarettes in 2 days now.  Using Trelegy inhaler once per day, albuterol as needed approximately 2x per day. Taking neb treatments every 4-6 hours. States with Albuterol use she experiences hand shakiness, heart palpitations, jitters, and nervousness.   Patient Instructions   Continue Prednisone taper from ER until you finish. Then recommend going to daily Prednisone, 5-10 mg per day based on how you feel.   Continue Trelegy once per day and Albuterol as needed. Have ordered Xopenex for you to use in nebulizer machine since adverse reactions reported to Albuterol.  Continue supplemental oxygen use. Continue NIV use.  Doing well from smoking standpoint, continue smoking cessation.  Continue current medication regiment. Keep follow up appointment as scheduled. Please call the office if you have any questions or concerns.         8/16/2022:  Patient did not keep follow up - I saw patient last in Feb 2022.   Patient required hospitalization for SIRS, COPD at [facility] on 8/7 - was subsequently discharged home on 8/10 (no discharge summary available in Epic). Treated with IV Abx and Bipap therapy while " inpatient. States she was discharged home with Rx for steroid and abx, in which she completed both regiments. Patient has had THREE hospitalizations for COPD exacerbations in 2022 alone.  Still using Trelegy, one puff once per day with reported benefit. Using Albuterol inhaler as needed. Using nebulizer machine with Duoneb as needed.   Still smoking cigarettes, approximately 5 cigarettes per day.  Has supplemental oxygen at home and POC - wears as needed for shortness of breath, at night time - uses 1-2L via NC.  ABG obtained in the hospital on 8/7/2022 revealing pH 7.309, CO2 63.6, O2 122, and HCO3 32.0  Patient Instructions   Area of concern on CT from Feb - need to repeat CT Chest now.  I ordered a Lung Function Test to evaluate lung strength. Please complete prior to next appointment.   Continue Trelegy once per day.  Albuterol as needed. Prescription sent for Duoneb to be taken as needed.  NIV machine ordered given chronic resp failure secondary to COPD with recent hospitalization revealing high CO2 level.  Continue the use of supplemental oxygen.  Stop smoking.   Continue current medication regiment. Keep follow up appointment as scheduled. Please call the office if you have any questions or concerns.             2/18/2022: In office today with sister. Hx: COPD, Chronic Resp failure.  Recently Hospitalized at [facility] on 2/7 for COPD exacerbation, was treated with IV steroids and neb treatments, subsequently discharged home on 2/10 with Rx for Prednisone and Levaquin, which she reports completion and compliance.  Had Prior hospitalization in November 2021 at [facility] for COPD exacerbation - complicated hospitalization with intramuscular hematoma of the left rectus muscle of the anterior abdominal wall - general surgery consulted, resolved spontaneously. Discharged home at that time with supplemental oxygen. Patient also endorses prior ER visit in Feb 2021 for COPD exacerbation and ER visit at  [facility] for same.  Currently taking Anora once per day - Albuterol rescue inhaler approx 2-5 times per day. Using Nebulizer (Albuterol and Ipatropium) scheduled per day.  Using oxygen at night time, and as needed throughout the day - 1L via NC.  Shortness of breath: Varies with time - states worse over the last week due to sinus congestion, weather change, pollen. Exertional, improves with rest and tripod position. Affecting ADLS.   Cough: Worsening, productive with clear mucous production. Worse in the morning, Denies wheezing, chest tightness. Taking Mucinex without benefit. Ran out of Tessalon pearls - they seemed to help.   Patient Instructions   You have two areas of suspicion noted on CT Chest.   One area in the R lower lung - initially noted on CT from 2/8/2022.  Area in the L lung has been present since at least 12/2020 - appears to have grown in size.  Will repeat CT in 3 months.  Would benefit from smoking cessation. Declines referral to smoking cessation program at this time. Has tried Chantix in past.  This inhaler Trelegy is your new daily inhaler. It contains an inhaled steroid component. Rinse mouth after each use due to risk for thrush development. If mouth or tongue develops white sores please contact the clinic and I will order a prescription mouth wash.   Continue to use albuterol rescue inhaler as needed.  Zyrtec - take one pill nightly for 30 days - see if it helps with sinus complaints.  Tessalon pearls as needed for cough.   I ordered a Lung Function Test to evaluate lung strength. Please complete prior to next appointment.         Social Hx: Lives alone - no animals in the home. Previously worked as labored. No Asbestosis exposure, Smoking Hx: Current smoker, started age 14 - 0.5 ppd use.   Family Hx: Mother Lung Cancer, No COPD, Son, Brother, Sister Asthma  Medical Hx: Previous pneumonia (did not require intubation) ; No previous shoulder/chest surgery      The chief compliant   problem varies with instability at times.  PFSH:  Past Medical History:   Diagnosis Date    Anxiety     Arthritis     Asthma     Back pain     COPD (chronic obstructive pulmonary disease)     Pulmonary embolism     10 years ago         Past Surgical History:   Procedure Laterality Date    BIOPSY      right breast    BREAST CYST EXCISION       SECTION      CHOLECYSTECTOMY      COLONOSCOPY N/A 2022    Procedure: COLONOSCOPY;  Surgeon: Chavez Gonzales MD;  Location: [facility] ENDO;  Service: General;  Laterality: N/A;    ESOPHAGOGASTRODUODENOSCOPY N/A 2022    Procedure: ESOPHAGOGASTRODUODENOSCOPY (EGD);  Surgeon: Chavez Gonzales MD;  Location: [facility] ENDO;  Service: General;  Laterality: N/A;    INCISION AND DRAINAGE OF ABSCESS Left 3/16/2020    Procedure: INCISION AND DRAINAGE, ABSCESS;  Surgeon: Irvin Villarreal MD;  Location: [facility] OR;  Service: General;  Laterality: Left;     Social History     Tobacco Use    Smoking status: Former     Packs/day: 1.00     Years: 41.00     Pack years: 41.00     Types: Cigarettes     Quit date: 2022     Years since quittin.1    Smokeless tobacco: Never   Substance Use Topics    Alcohol use: Not Currently     Comment: occ    Drug use: Never     Family History   Problem Relation Age of Onset    Breast cancer Sister     Ovarian cancer Neg Hx      Review of patient's allergies indicates:   Allergen Reactions    Ativan [lorazepam] Itching     I have reviewed past medical, family, and social history. I have reviewed previous nurse notes.    Performance Status:The patient's activity level is functions out of house.      Review of Systems:  a review of eleven systems covering constitutional, Eye, HEENT, Psych, Respiratory, Cardiac, GI, , Musculoskeletal, Endocrine, Dermatologic was negative except for pertinent findings as listed ABOVE and below: pertinent positive as above, rest is good       Exam:Comprehensive exam done. /73 (BP Location: Left arm,  "Patient Position: Sitting, BP Method: Medium (Automatic))   Pulse 74   Ht 5' 5" (1.651 m)   Wt 48.4 kg (106 lb 11.2 oz)   LMP 09/22/2017   SpO2 97% Comment: room air at rest  BMI 17.76 kg/m²   Exam included Vitals as listed  Constitutional: She is oriented to person, place, and time. She appears well-developed. No distress.   Nose: Nose normal.   Mouth/Throat: Uvula is midline, oropharynx is clear and moist and mucous membranes are normal. No dental caries. No oropharyngeal exudate, posterior oropharyngeal edema, posterior oropharyngeal erythema or tonsillar abscesses.  Mallapatti (M) score 1  Eyes: Pupils are equal, round, and reactive to light.   Neck: No JVD present. No thyromegaly present.   Cardiovascular: Normal rate, regular rhythm and normal heart sounds. Exam reveals no gallop and no friction rub.   No murmur heard.  Pulmonary/Chest: Effort normal and breath sounds normal. No accessory muscle usage or stridor. No apnea and no tachypnea. No respiratory distress, on room air. Diminished breath sounds throughout, on 2L supplemental oxygen, in no acute distress.  Abdominal: Soft. She exhibits no mass. There is no tenderness. No hepatosplenomegaly, hernias and normoactive bowel sounds  Musculoskeletal: Normal range of motion. exhibits no edema.   Neurological:  alert and oriented to person, place, and time. not disoriented.   Skin: Skin is warm and dry. Capillary refill takes less 2 sec. No cyanosis or erythema. No pallor. Nails show no clubbing.   Psychiatric: normal mood and affect. behavior is normal. Judgment and thought content normal.       Radiographs (ct chest and cxr) reviewed: view by direct vision     X-Ray Chest 1 View 10/8/2022 Impression:   Marked interval improvement in previously noted patchy segmental airspace consolidation at the left lung base.  There are a few tiny residual nodular densities at the left lung base.  Continued follow-up is recommended to ensure resolution..  Consider a " follow-up chest x-ray in 4 weeks.     CT Chest Without Contrast 8/23/2022 FINDINGS:  Centrilobular and paraseptal emphysema.  Bandlike pleuroparenchymal thickening in the right upper lobe again noted.  Target nodule with spiculation in the lingula measures 1.4 cm on series 4, image 317.  3 mm nodule subpleural location right upper lobe series 4, image 301.  3 mm nodule left upper lobe series 4, image 174.  No filling defects central airways.  No pleural effusion or pneumothorax.   Heart size normal.  Coronary artery disease.  No mediastinal adenopathy.  Cholecystectomy clips.  Partially imaged advanced degenerative disc disease at several lower cervical levels.  Several mild remote compression fractures in the upper thoracic spine.   Impression:   1. Unchanged extent of pulmonary nodules, including spicular lesion in the lingula.  2. Coronary artery disease and pulmonary emphysema.        X-Ray Chest AP Portable  8/7/2022   FINDINGS:  Normal cardiomediastinal contour. No focal consolidation, pleural effusion or pneumothorax. The lungs are hyperinflated with flattening of the diaphragms.   Impression:   No acute cardiopulmonary abnormality.   Pulmonary emphysema.       CTA Chest Non-Coronary  2/8/2022   Impression:   No CTA evidence of pulmonary embolus.  Significant centrilobular emphysema.  Stable 1.4 cm spiculated mass of the left lingula.  New 1.9 cm band of spiculated density in the posterior right lung gutter likely represents atelectasis.  Follow-up CT in 3 months is recommended however.      X-Ray Chest AP Portable  2/8/2022   Impression:   COPD.         Labs reviewed     Lab Results   Component Value Date    WBC 15.38 (H) 11/20/2022    RBC 4.91 11/20/2022    HGB 16.0 11/20/2022    HCT 47.1 11/20/2022    MCV 96 11/20/2022    MCH 32.6 (H) 11/20/2022    MCHC 34.0 11/20/2022    RDW 12.8 11/20/2022     11/20/2022    MPV 9.1 (L) 11/20/2022    GRAN 14.2 (H) 11/20/2022    GRAN 92.6 (H) 11/20/2022    LYMPH 0.5  (L) 11/20/2022    LYMPH 3.3 (L) 11/20/2022    MONO 0.5 11/20/2022    MONO 3.2 (L) 11/20/2022    EOS 0.0 11/20/2022    BASO 0.05 11/20/2022    EOSINOPHIL 0.3 11/20/2022    BASOPHIL 0.3 11/20/2022       PFT will be done and results to be reviewed  Pulmonary Functions Testing Results:    Patient has chronic respiratory failure secondary to COPD.  Patient will need a specific targeted tidal volume which cannot be provided via a CPAP or BiPAP.  This patient will require a set tidal volume to ensure CO2 levels were lowered adequately; as well as, to assist in establishing proper diaphragmatic functions.  Therefore, NIV is being ordered due to the severe state of this patient's disease.  Without this therapy, the patient runs a higher risk of expiration and readmittance.      Plan:  Clinical impression is ambiguous and will need repeated evaluation wrt.    Zayra was seen today for shortness of breath and follow-up.    Diagnoses and all orders for this visit:    Chronic obstructive pulmonary disease, unspecified COPD type  -     fluticasone-umeclidin-vilanter (TRELEGY ELLIPTA) 200-62.5-25 mcg inhaler; Inhale 1 puff into the lungs once daily.  -     predniSONE (DELTASONE) 5 MG tablet; Take 1 tablet (5 mg total) by mouth once daily.    Chronic respiratory failure with hypoxia and hypercapnia    Multiple lung nodules on CT    Steroid-dependent COPD  -     predniSONE (DELTASONE) 5 MG tablet; Take 1 tablet (5 mg total) by mouth once daily.    Anxiety    Chronic sinus complaints    Personal history of nicotine dependence          Follow up in about 3 months (around 3/13/2023), or if symptoms worsen or fail to improve.    Discussed with patient above for education the following:      Patient Instructions   Continue current regiment - Trelegy once per day and Albuterol as needed for shortness of breath, wheezing, cough.    Prednisone - start alternating 10 mg and 5 mg every other day. Do this for two weeks and see who you  tolerate. If you have no issues, can go down to 5 mg per day.     Continue NIV use, Continue supplemental oxygen.    Doing well from smoking cessation standpoint.    Alpha 1 test today in office.    CT Chest due Aug 2023    Continue current medication regiment. Keep follow up appointment as scheduled. Please call the office if you have any questions or concerns.

## 2022-12-28 ENCOUNTER — PATIENT MESSAGE (OUTPATIENT)
Dept: FAMILY MEDICINE | Facility: CLINIC | Age: 57
End: 2022-12-28
Payer: MEDICAID

## 2022-12-28 DIAGNOSIS — J44.1 COPD EXACERBATION: Primary | ICD-10-CM

## 2022-12-28 RX ORDER — AZITHROMYCIN 250 MG/1
TABLET, FILM COATED ORAL
Qty: 6 TABLET | Refills: 0 | Status: SHIPPED | OUTPATIENT
Start: 2022-12-28 | End: 2023-01-02

## 2023-01-01 ENCOUNTER — PATIENT MESSAGE (OUTPATIENT)
Dept: PULMONOLOGY | Facility: CLINIC | Age: 58
End: 2023-01-01
Payer: MEDICAID

## 2023-01-04 ENCOUNTER — OFFICE VISIT (OUTPATIENT)
Dept: FAMILY MEDICINE | Facility: CLINIC | Age: 58
End: 2023-01-04
Payer: MEDICAID

## 2023-01-04 VITALS
WEIGHT: 107.38 LBS | DIASTOLIC BLOOD PRESSURE: 62 MMHG | SYSTOLIC BLOOD PRESSURE: 116 MMHG | RESPIRATION RATE: 16 BRPM | OXYGEN SATURATION: 97 % | TEMPERATURE: 98 F | HEART RATE: 95 BPM | BODY MASS INDEX: 17.89 KG/M2 | HEIGHT: 65 IN

## 2023-01-04 DIAGNOSIS — R05.9 COUGH, UNSPECIFIED TYPE: Primary | ICD-10-CM

## 2023-01-04 DIAGNOSIS — J44.1 COPD EXACERBATION: ICD-10-CM

## 2023-01-04 PROCEDURE — 3078F DIAST BP <80 MM HG: CPT | Mod: CPTII,,, | Performed by: FAMILY MEDICINE

## 2023-01-04 PROCEDURE — 99999 PR PBB SHADOW E&M-EST. PATIENT-LVL IV: ICD-10-PCS | Mod: PBBFAC,,, | Performed by: FAMILY MEDICINE

## 2023-01-04 PROCEDURE — 99214 PR OFFICE/OUTPT VISIT, EST, LEVL IV, 30-39 MIN: ICD-10-PCS | Mod: S$PBB,,, | Performed by: FAMILY MEDICINE

## 2023-01-04 PROCEDURE — 99214 OFFICE O/P EST MOD 30 MIN: CPT | Mod: PBBFAC | Performed by: FAMILY MEDICINE

## 2023-01-04 PROCEDURE — 3008F BODY MASS INDEX DOCD: CPT | Mod: CPTII,,, | Performed by: FAMILY MEDICINE

## 2023-01-04 PROCEDURE — 1160F RVW MEDS BY RX/DR IN RCRD: CPT | Mod: CPTII,,, | Performed by: FAMILY MEDICINE

## 2023-01-04 PROCEDURE — 1160F PR REVIEW ALL MEDS BY PRESCRIBER/CLIN PHARMACIST DOCUMENTED: ICD-10-PCS | Mod: CPTII,,, | Performed by: FAMILY MEDICINE

## 2023-01-04 PROCEDURE — 99999 PR PBB SHADOW E&M-EST. PATIENT-LVL IV: CPT | Mod: PBBFAC,,, | Performed by: FAMILY MEDICINE

## 2023-01-04 PROCEDURE — 99214 OFFICE O/P EST MOD 30 MIN: CPT | Mod: S$PBB,,, | Performed by: FAMILY MEDICINE

## 2023-01-04 PROCEDURE — 1159F PR MEDICATION LIST DOCUMENTED IN MEDICAL RECORD: ICD-10-PCS | Mod: CPTII,,, | Performed by: FAMILY MEDICINE

## 2023-01-04 PROCEDURE — 1159F MED LIST DOCD IN RCRD: CPT | Mod: CPTII,,, | Performed by: FAMILY MEDICINE

## 2023-01-04 PROCEDURE — 3074F PR MOST RECENT SYSTOLIC BLOOD PRESSURE < 130 MM HG: ICD-10-PCS | Mod: CPTII,,, | Performed by: FAMILY MEDICINE

## 2023-01-04 PROCEDURE — 3078F PR MOST RECENT DIASTOLIC BLOOD PRESSURE < 80 MM HG: ICD-10-PCS | Mod: CPTII,,, | Performed by: FAMILY MEDICINE

## 2023-01-04 PROCEDURE — 3008F PR BODY MASS INDEX (BMI) DOCUMENTED: ICD-10-PCS | Mod: CPTII,,, | Performed by: FAMILY MEDICINE

## 2023-01-04 PROCEDURE — 3074F SYST BP LT 130 MM HG: CPT | Mod: CPTII,,, | Performed by: FAMILY MEDICINE

## 2023-01-04 RX ORDER — DULOXETIN HYDROCHLORIDE 20 MG/1
20 CAPSULE, DELAYED RELEASE ORAL DAILY
COMMUNITY
End: 2023-12-05

## 2023-01-04 RX ORDER — PROMETHAZINE HYDROCHLORIDE AND DEXTROMETHORPHAN HYDROBROMIDE 6.25; 15 MG/5ML; MG/5ML
5 SYRUP ORAL EVERY 4 HOURS PRN
Qty: 200 ML | Refills: 3 | Status: SHIPPED | OUTPATIENT
Start: 2023-01-04 | End: 2023-01-14

## 2023-01-04 RX ORDER — DOXYCYCLINE HYCLATE 100 MG
100 TABLET ORAL 2 TIMES DAILY
Qty: 14 TABLET | Refills: 0 | Status: SHIPPED | OUTPATIENT
Start: 2023-01-04 | End: 2023-01-11

## 2023-01-04 NOTE — PROGRESS NOTES
"[facility] - Clinic Note    Subjective      Ms. White is a 57 y.o. female who presents to clinic for Follow-up (3mth follow up), Cough, and Back Pain    Patient is getting over exposure to flu.  COPD exacerbation.  Finished antibiotics.  Mucous had improved but is starting to become a little yellowish.  Has been coughing a lot.  Coughing so much that she is hurting her back.    [facility] Zayra has a past medical history of Anxiety, Arthritis, Asthma, Back pain, COPD (chronic obstructive pulmonary disease), and Pulmonary embolism.   PSXH Zayra has a past surgical history that includes Breast cyst excision;  section; Cholecystectomy; Biopsy; Incision and drainage of abscess (Left, 3/16/2020); Colonoscopy (N/A, 2022); and Esophagogastroduodenoscopy (N/A, 2022).    Zayra's family history includes Breast cancer in her sister.    Zayra reports that she quit smoking about 2 months ago. Her smoking use included cigarettes. She has a 41.00 pack-year smoking history. She has never used smokeless tobacco. She reports that she does not currently use alcohol. She reports that she does not use drugs.   ALG Zayra is allergic to ativan [lorazepam].   MED Zayra has a current medication list which includes the following prescription(s): albuterol, albuterol, albuterol-ipratropium, aspirin, atenolol, atorvastatin, cholecalciferol (vitamin d3), clonazepam, duloxetine, trelegy ellipta, levalbuterol, pantoprazole, prednisone, promethazine, doxycycline, duloxetine, and promethazine-dextromethorphan.     Review of Systems   Constitutional:  Negative for fever.   Respiratory:  Positive for cough and wheezing.    Cardiovascular:  Negative for chest pain.   Musculoskeletal:  Positive for back pain.   Objective     /62 (BP Location: Left arm, Patient Position: Sitting, BP Method: Small (Manual))   Pulse 95   Temp 97.9 °F (36.6 °C) (Temporal)   Resp 16   Ht 5' 5" (1.651 m)   Wt 48.7 kg (107 lb " 6.4 oz)   LMP 09/22/2017   SpO2 97%   BMI 17.87 kg/m²     Physical Exam  Vitals and nursing note reviewed.   Constitutional:       General: She is not in acute distress.     Appearance: Normal appearance. She is well-developed. She is not diaphoretic.   HENT:      Head: Normocephalic and atraumatic.      Right Ear: External ear normal.      Left Ear: External ear normal.   Eyes:      General:         Right eye: No discharge.         Left eye: No discharge.   Cardiovascular:      Rate and Rhythm: Normal rate and regular rhythm.      Heart sounds: Normal heart sounds.   Pulmonary:      Effort: Pulmonary effort is normal.      Breath sounds: Wheezing present. No rales.   Skin:     General: Skin is warm and dry.   Neurological:      Mental Status: She is alert and oriented to person, place, and time. Mental status is at baseline.   Psychiatric:         Mood and Affect: Mood normal.         Behavior: Behavior normal.         Thought Content: Thought content normal.         Judgment: Judgment normal.      Assessment/Plan     1. Cough, unspecified type  promethazine-dextromethorphan (PROMETHAZINE-DM) 6.25-15 mg/5 mL Syrp      2. COPD exacerbation  doxycycline (VIBRA-TABS) 100 MG tablet        Treat cough with promethazine DM cough syrup.  Antibiotics provided if mucus continues to be yellowish in color or gets worse.    Future Appointments   Date Time Provider Department Center   3/13/2023 10:00 AM Chloe Leung NP [facility] [place] [facility]   4/24/2023 10:40 AM Eric Singh MD [facility]         Eric Singh MD  Family Medicine  [facility]

## 2023-01-05 ENCOUNTER — PATIENT MESSAGE (OUTPATIENT)
Dept: FAMILY MEDICINE | Facility: CLINIC | Age: 58
End: 2023-01-05
Payer: MEDICAID

## 2023-01-17 ENCOUNTER — PATIENT MESSAGE (OUTPATIENT)
Dept: ADMINISTRATIVE | Facility: HOSPITAL | Age: 58
End: 2023-01-17
Payer: MEDICAID

## 2023-02-24 ENCOUNTER — PATIENT MESSAGE (OUTPATIENT)
Dept: PULMONOLOGY | Facility: CLINIC | Age: 58
End: 2023-02-24
Payer: MEDICAID

## 2023-02-24 DIAGNOSIS — Z92.241 STEROID-DEPENDENT COPD: ICD-10-CM

## 2023-02-24 DIAGNOSIS — J44.9 STEROID-DEPENDENT COPD: ICD-10-CM

## 2023-02-24 DIAGNOSIS — J44.9 CHRONIC OBSTRUCTIVE PULMONARY DISEASE, UNSPECIFIED COPD TYPE: ICD-10-CM

## 2023-02-24 RX ORDER — PREDNISONE 5 MG/1
5 TABLET ORAL DAILY
Qty: 30 TABLET | Refills: 0 | Status: SHIPPED | OUTPATIENT
Start: 2023-02-24 | End: 2023-04-03 | Stop reason: SDUPTHER

## 2023-03-13 ENCOUNTER — OFFICE VISIT (OUTPATIENT)
Dept: PULMONOLOGY | Facility: CLINIC | Age: 58
End: 2023-03-13
Payer: MEDICAID

## 2023-03-13 VITALS
HEART RATE: 59 BPM | BODY MASS INDEX: 17.87 KG/M2 | SYSTOLIC BLOOD PRESSURE: 119 MMHG | HEIGHT: 65 IN | DIASTOLIC BLOOD PRESSURE: 73 MMHG | OXYGEN SATURATION: 98 % | WEIGHT: 107.25 LBS

## 2023-03-13 DIAGNOSIS — Z87.891 PERSONAL HISTORY OF NICOTINE DEPENDENCE: ICD-10-CM

## 2023-03-13 DIAGNOSIS — J96.11 CHRONIC RESPIRATORY FAILURE WITH HYPOXIA AND HYPERCAPNIA: ICD-10-CM

## 2023-03-13 DIAGNOSIS — R45.89 ANXIETY ABOUT HEALTH: ICD-10-CM

## 2023-03-13 DIAGNOSIS — R09.89 CHRONIC SINUS COMPLAINTS: ICD-10-CM

## 2023-03-13 DIAGNOSIS — J44.9 CHRONIC OBSTRUCTIVE PULMONARY DISEASE, UNSPECIFIED COPD TYPE: Primary | ICD-10-CM

## 2023-03-13 DIAGNOSIS — R91.8 MULTIPLE LUNG NODULES ON CT: ICD-10-CM

## 2023-03-13 DIAGNOSIS — J96.12 CHRONIC RESPIRATORY FAILURE WITH HYPOXIA AND HYPERCAPNIA: ICD-10-CM

## 2023-03-13 DIAGNOSIS — Z79.52 CURRENT CHRONIC USE OF SYSTEMIC STEROIDS: ICD-10-CM

## 2023-03-13 PROBLEM — F41.8 ANXIETY ABOUT HEALTH: Status: ACTIVE | Noted: 2021-11-27

## 2023-03-13 PROCEDURE — 99999 PR PBB SHADOW E&M-EST. PATIENT-LVL IV: CPT | Mod: PBBFAC,TXP,, | Performed by: NURSE PRACTITIONER

## 2023-03-13 PROCEDURE — 99214 OFFICE O/P EST MOD 30 MIN: CPT | Mod: S$PBB,NTX,, | Performed by: NURSE PRACTITIONER

## 2023-03-13 PROCEDURE — 1159F MED LIST DOCD IN RCRD: CPT | Mod: CPTII,NTX,, | Performed by: NURSE PRACTITIONER

## 2023-03-13 PROCEDURE — 3008F PR BODY MASS INDEX (BMI) DOCUMENTED: ICD-10-PCS | Mod: CPTII,NTX,, | Performed by: NURSE PRACTITIONER

## 2023-03-13 PROCEDURE — 99214 OFFICE O/P EST MOD 30 MIN: CPT | Mod: PBBFAC,PO,NTX | Performed by: NURSE PRACTITIONER

## 2023-03-13 PROCEDURE — 1159F PR MEDICATION LIST DOCUMENTED IN MEDICAL RECORD: ICD-10-PCS | Mod: CPTII,NTX,, | Performed by: NURSE PRACTITIONER

## 2023-03-13 PROCEDURE — 3074F PR MOST RECENT SYSTOLIC BLOOD PRESSURE < 130 MM HG: ICD-10-PCS | Mod: CPTII,NTX,, | Performed by: NURSE PRACTITIONER

## 2023-03-13 PROCEDURE — 3078F PR MOST RECENT DIASTOLIC BLOOD PRESSURE < 80 MM HG: ICD-10-PCS | Mod: CPTII,NTX,, | Performed by: NURSE PRACTITIONER

## 2023-03-13 PROCEDURE — 99999 PR PBB SHADOW E&M-EST. PATIENT-LVL IV: ICD-10-PCS | Mod: PBBFAC,TXP,, | Performed by: NURSE PRACTITIONER

## 2023-03-13 PROCEDURE — 3078F DIAST BP <80 MM HG: CPT | Mod: CPTII,NTX,, | Performed by: NURSE PRACTITIONER

## 2023-03-13 PROCEDURE — 3074F SYST BP LT 130 MM HG: CPT | Mod: CPTII,NTX,, | Performed by: NURSE PRACTITIONER

## 2023-03-13 PROCEDURE — 99214 PR OFFICE/OUTPT VISIT, EST, LEVL IV, 30-39 MIN: ICD-10-PCS | Mod: S$PBB,NTX,, | Performed by: NURSE PRACTITIONER

## 2023-03-13 PROCEDURE — 3008F BODY MASS INDEX DOCD: CPT | Mod: CPTII,NTX,, | Performed by: NURSE PRACTITIONER

## 2023-03-13 NOTE — PATIENT INSTRUCTIONS
Overall doing well!    Continue to use current inhalers including Trelegy once per day and Albuterol as needed.    Continue to use the supplemental oxygen.  Continue to use the NIV machine nightly.    May benefit from portable NIV machine to be used throughout the day. Order placed, will see if insurance approves.    CT Chest due in August 2023.    Can check PFT later in the year and see if lung strength improved.    Continue 5 mg prednisone per day.    Continue current medication regiment. Keep follow up appointment as scheduled. Please call the office if you have any questions or concerns.

## 2023-03-13 NOTE — PROGRESS NOTES
3/23/2023    Zayra White  In office visit    Chief Complaint   Patient presents with    Shortness of Breath     Pt her normal.     Chest Pain     Sometimes .     Medication Refill     Prednisone        HPI:  03/13/2023:  Doing well overall from a resp perspective. No recent hospitalizations since prior office visit. Using Trelegy once per day and Albuterol only as needed, states depends on the day, when having a bad day may use 6x per day. Using supplemental oxygen PRN throughout the day - at 2L via NC and at night time in correlation with NIV machine. States tolerating NIV overall however does experience some difficulty with mask. [facility] is looking forward to being more active throughout the day, traveling more. Inquiring whether portable NIV would be beneficial for daytime use.  Using Prednisone 5 mg daily with benefit. Not currently smoking however using vape with nicotine.       12/13/2022:  Using Trelegy once per day with benefit, Albuterol nebulized treatments every 6 hours as needed.  Using NIV machine for four hours per day - states ever since starting use has been feeling better. Still using supplemental oxygen, has decreased to only PRN use - not wearing O2 in clinic today and in no acute distress.  Has been on Prednisone 10 mg per day with great benefit, agreeable to decrease to 5 mg.   Not currently smoking - cravings under control, [facility] is concerned with [place] upcoming that cravings will increase.  Interested in lung transplant program - [facility] was told to call back once quit smoking for 2 consecutive months.  Patient Instructions   Continue current regiment - Trelegy once per day and Albuterol as needed for shortness of breath, wheezing, cough.  Prednisone - start alternating 10 mg and 5 mg every other day. Do this for two weeks and see who you tolerate. If you have no issues, can go down to 5 mg per day.   Continue NIV use, Continue supplemental oxygen.  Doing well from smoking  "cessation standpoint.  Alpha 1 test today in office.  CT Chest due Aug 2023  Continue current medication regiment. Keep follow up appointment as scheduled. Please call the office if you have any questions or concerns.         10/13/2022:  Per prior message on 9/29/2022:  "Patient messaged stating she is experiencing shortness of breath, worsening since initiation of NIV use.  I called the patient and discussed the case with her on 09/29/2022  at 0940.  Patient states she received her NIV machine approximately 2 weeks ago, has been using daily for at least 4 hours per day.  States she can tolerate the pressure well while using - with initially experiencing headaches with use, respiratory therapist changed pressures.  Patient states shortness of breath is worsening from baseline, cannot walk from living room to kitchen without getting short-winded, patient states she lives in a camper currently.  Patient using supplemental oxygen throughout the day and at night as needed.  On Trelegy once per day.  Using nebulized treatments every 4-6 hours.  Patient currently taking prednisone regimen that was prescribed at prior visit, 6 days total -states she has 2 days left.  Reports benefit with prednisone use.  Patient denies mucus production.  Patient will proceed with getting chest x-ray at this time.  May need repeat/prolonged prednisone taper.  Patient states she spoke with lung transplant service, however a consultation appointment was not scheduled due to current smoking status.  Patient states she is currently smoking 1-2 cigarettes per day."  Patient required ER visit on 10/8/2022 for COPD exacerabtion, was discharged with Rx for Azithromycin and 9 day regiment of Prednisone. Completed Zpack and currently has 3 days left of Prednisone.   States overall is having a good day, however yesterday was bad. States she was exposed to several sick contacts and developed bronchitis.   Currently using supplemental oxygen at night " time and as needed throughout the day, dose ranges from 1-3lpm.   Using NIV machine nightly, states bleeds o2 in at 2L. Concerned that NIV is causing illness.  States hasn't smoked cigarettes in 2 days now.  Using Trelegy inhaler once per day, albuterol as needed approximately 2x per day. Taking neb treatments every 4-6 hours. States with Albuterol use she experiences hand shakiness, heart palpitations, jitters, and nervousness.   Patient Instructions   Continue Prednisone taper from ER until you finish. Then recommend going to daily Prednisone, 5-10 mg per day based on how you feel.   Continue Trelegy once per day and Albuterol as needed. Have ordered Xopenex for you to use in nebulizer machine since adverse reactions reported to Albuterol.  Continue supplemental oxygen use. Continue NIV use.  Doing well from smoking standpoint, continue smoking cessation.  Continue current medication regiment. Keep follow up appointment as scheduled. Please call the office if you have any questions or concerns.         8/16/2022:  Patient did not keep follow up - I saw patient last in Feb 2022.   Patient required hospitalization for SIRS, COPD at [facility] on 8/7 - was subsequently discharged home on 8/10 (no discharge summary available in Epic). Treated with IV Abx and Bipap therapy while inpatient. States she was discharged home with Rx for steroid and abx, in which she completed both regiments. Patient has had THREE hospitalizations for COPD exacerbations in 2022 alone.  Still using Trelegy, one puff once per day with reported benefit. Using Albuterol inhaler as needed. Using nebulizer machine with Duoneb as needed.   Still smoking cigarettes, approximately 5 cigarettes per day.  Has supplemental oxygen at home and POC - wears as needed for shortness of breath, at night time - uses 1-2L via NC.  ABG obtained in the hospital on 8/7/2022 revealing pH 7.309, CO2 63.6, O2 122, and HCO3 32.0  Patient Instructions   Area of  concern on CT from Feb - need to repeat CT Chest now.  I ordered a Lung Function Test to evaluate lung strength. Please complete prior to next appointment.   Continue Trelegy once per day.  Albuterol as needed. Prescription sent for Duoneb to be taken as needed.  NIV machine ordered given chronic resp failure secondary to COPD with recent hospitalization revealing high CO2 level.  Continue the use of supplemental oxygen.  Stop smoking.   Continue current medication regiment. Keep follow up appointment as scheduled. Please call the office if you have any questions or concerns.             2/18/2022: In office today with sister. Hx: COPD, Chronic Resp failure.  Recently Hospitalized at [facility] on 2/7 for COPD exacerbation, was treated with IV steroids and neb treatments, subsequently discharged home on 2/10 with Rx for Prednisone and Levaquin, which she reports completion and compliance.  Had Prior hospitalization in November 2021 at [facility] for COPD exacerbation - complicated hospitalization with intramuscular hematoma of the left rectus muscle of the anterior abdominal wall - general surgery consulted, resolved spontaneously. Discharged home at that time with supplemental oxygen. Patient also endorses prior ER visit in Feb 2021 for COPD exacerbation and ER visit at [facility] for same.  Currently taking Anora once per day - Albuterol rescue inhaler approx 2-5 times per day. Using Nebulizer (Albuterol and Ipatropium) scheduled per day.  Using oxygen at night time, and as needed throughout the day - 1L via NC.  Shortness of breath: Varies with time - states worse over the last week due to sinus congestion, weather change, pollen. Exertional, improves with rest and tripod position. Affecting ADLS.   Cough: Worsening, productive with clear mucous production. Worse in the morning, Denies wheezing, chest tightness. Taking Mucinex without benefit. Ran out of [place] - they seemed to help.    Patient Instructions   You have two areas of suspicion noted on CT Chest.   One area in the R lower lung - initially noted on CT from 2022.  Area in the L lung has been present since at least 2020 - appears to have grown in size.  Will repeat CT in 3 months.  Would benefit from smoking cessation. Declines referral to smoking cessation program at this time. Has tried Chantix in past.  This inhaler Trelegy is your new daily inhaler. It contains an inhaled steroid component. Rinse mouth after each use due to risk for thrush development. If mouth or tongue develops white sores please contact the clinic and I will order a prescription mouth wash.   Continue to use albuterol rescue inhaler as needed.  Zyrtec - take one pill nightly for 30 days - see if it helps with sinus complaints.  Tessalon pearls as needed for cough.   I ordered a Lung Function Test to evaluate lung strength. Please complete prior to next appointment.         Social Hx: Lives alone - no animals in the home. Previously worked as labored. No Asbestosis exposure, Smoking Hx: Current smoker, started age 14 - 0.5 ppd use.   Family Hx: Mother Lung Cancer, No COPD, Son, Brother, Sister Asthma  Medical Hx: Previous pneumonia (did not require intubation) ; No previous shoulder/chest surgery      The chief compliant  problem varies with instability at times.  PFSH:  Past Medical History:   Diagnosis Date    Anxiety     Arthritis     Asthma     Back pain     COPD (chronic obstructive pulmonary disease)     Pulmonary embolism     10 years ago         Past Surgical History:   Procedure Laterality Date    BIOPSY      right breast    BREAST CYST EXCISION       SECTION      CHOLECYSTECTOMY      COLONOSCOPY N/A 2022    Procedure: COLONOSCOPY;  Surgeon: Chavez Gonzales MD;  Location: [facility];  Service: General;  Laterality: N/A;    ESOPHAGOGASTRODUODENOSCOPY N/A 2022    Procedure: ESOPHAGOGASTRODUODENOSCOPY (EGD);  Surgeon: Chavez ROSALES  "MD Christian;  Location: [facility] ENDO;  Service: General;  Laterality: N/A;    INCISION AND DRAINAGE OF ABSCESS Left 3/16/2020    Procedure: INCISION AND DRAINAGE, ABSCESS;  Surgeon: Irvin Villarreal MD;  Location: [facility] OR;  Service: General;  Laterality: Left;     Social History     Tobacco Use    Smoking status: Former     Packs/day: 1.00     Years: 41.00     Pack years: 41.00     Types: Cigarettes     Quit date: 2022     Years since quittin.3    Smokeless tobacco: Never   Substance Use Topics    Alcohol use: Not Currently     Comment: occ    Drug use: Never     Family History   Problem Relation Age of Onset    Breast cancer Sister     Ovarian cancer Neg Hx      Review of patient's allergies indicates:   Allergen Reactions    Ativan [lorazepam] Itching     I have reviewed past medical, family, and social history. I have reviewed previous nurse notes.    Performance Status:The patient's activity level is functions out of house.      Review of Systems:  a review of eleven systems covering constitutional, Eye, HEENT, Psych, Respiratory, Cardiac, GI, , Musculoskeletal, Endocrine, Dermatologic was negative except for pertinent findings as listed ABOVE and below: pertinent positive as above, rest is good       Exam:Comprehensive exam done. /73 (BP Location: Left arm, Patient Position: Sitting, BP Method: Pediatric (Automatic))   Pulse (!) 59   Ht 5' 5" (1.651 m)   Wt 48.6 kg (107 lb 4.1 oz)   LMP 2017   SpO2 98% Comment: room air at rest  BMI 17.85 kg/m²   Exam included Vitals as listed  Constitutional: She is oriented to person, place, and time. She appears well-developed. No distress.   Nose: Nose normal.   Mouth/Throat: Uvula is midline, oropharynx is clear and moist and mucous membranes are normal. No dental caries. No oropharyngeal exudate, posterior oropharyngeal edema, posterior oropharyngeal erythema or tonsillar abscesses.  Mallapatti (M) score 1  Eyes: Pupils are equal, round, and " reactive to light.   Neck: No JVD present. No thyromegaly present.   Cardiovascular: Normal rate, regular rhythm and normal heart sounds. Exam reveals no gallop and no friction rub.   No murmur heard.  Pulmonary/Chest: Effort normal and breath sounds normal. No accessory muscle usage or stridor. No apnea and no tachypnea. No respiratory distress, on room air. Diminished breath sounds throughout, on room air, in no acute distress.  Abdominal: Soft. She exhibits no mass. There is no tenderness. No hepatosplenomegaly, hernias and normoactive bowel sounds  Musculoskeletal: Normal range of motion. exhibits no edema.   Neurological:  alert and oriented to person, place, and time. not disoriented.   Skin: Skin is warm and dry. Capillary refill takes less 2 sec. No cyanosis or erythema. No pallor. Nails show no clubbing.   Psychiatric: normal mood and affect. behavior is normal. Judgment and thought content normal.       Radiographs (ct chest and cxr) reviewed: view by direct vision     X-Ray Chest 1 View 10/8/2022 Impression:   Marked interval improvement in previously noted patchy segmental airspace consolidation at the left lung base.  There are a few tiny residual nodular densities at the left lung base.  Continued follow-up is recommended to ensure resolution..  Consider a follow-up chest x-ray in 4 weeks.     CT Chest Without Contrast 8/23/2022 FINDINGS:  Centrilobular and paraseptal emphysema.  Bandlike pleuroparenchymal thickening in the right upper lobe again noted.  Target nodule with spiculation in the lingula measures 1.4 cm on series 4, image 317.  3 mm nodule subpleural location right upper lobe series 4, image 301.  3 mm nodule left upper lobe series 4, image 174.  No filling defects central airways.  No pleural effusion or pneumothorax.   Heart size normal.  Coronary artery disease.  No mediastinal adenopathy.  Cholecystectomy clips.  Partially imaged advanced degenerative disc disease at several lower  cervical levels.  Several mild remote compression fractures in the upper thoracic spine.   Impression:   1. Unchanged extent of pulmonary nodules, including spicular lesion in the lingula.  2. Coronary artery disease and pulmonary emphysema.        X-Ray Chest AP Portable  8/7/2022   FINDINGS:  Normal cardiomediastinal contour. No focal consolidation, pleural effusion or pneumothorax. The lungs are hyperinflated with flattening of the diaphragms.   Impression:   No acute cardiopulmonary abnormality.   Pulmonary emphysema.       CTA Chest Non-Coronary  2/8/2022   Impression:   No CTA evidence of pulmonary embolus.  Significant centrilobular emphysema.  Stable 1.4 cm spiculated mass of the left lingula.  New 1.9 cm band of spiculated density in the posterior right lung gutter likely represents atelectasis.  Follow-up CT in 3 months is recommended however.      X-Ray Chest AP Portable  2/8/2022   Impression:   COPD.         Labs reviewed     Lab Results   Component Value Date    WBC 15.38 (H) 11/20/2022    RBC 4.91 11/20/2022    HGB 16.0 11/20/2022    HCT 47.1 11/20/2022    MCV 96 11/20/2022    MCH 32.6 (H) 11/20/2022    MCHC 34.0 11/20/2022    RDW 12.8 11/20/2022     11/20/2022    MPV 9.1 (L) 11/20/2022    GRAN 14.2 (H) 11/20/2022    GRAN 92.6 (H) 11/20/2022    LYMPH 0.5 (L) 11/20/2022    LYMPH 3.3 (L) 11/20/2022    MONO 0.5 11/20/2022    MONO 3.2 (L) 11/20/2022    EOS 0.0 11/20/2022    BASO 0.05 11/20/2022    EOSINOPHIL 0.3 11/20/2022    BASOPHIL 0.3 11/20/2022       PFT will be done and results to be reviewed  Pulmonary Functions Testing Results:    Patient has chronic respiratory failure secondary to COPD.  Patient will need a specific targeted tidal volume which cannot be provided via a CPAP or BiPAP.  This patient will require a set tidal volume to ensure CO2 levels were lowered adequately; as well as, to assist in establishing proper diaphragmatic functions.  Therefore, NIV is being ordered due to the  severe state of this patient's disease.  Without this therapy, the patient runs a higher risk of expiration and readmittance.  Patient is benefiting from nightly NIV use however would also benefit from portable NIV for daytime use to help reduce shortness of breath and increase patient's overall daytime physical activities. Supplemental oxygen during the day is not enough support to complete patient's daily activities.     Plan:  Clinical impression is ambiguous and will need repeated evaluation wrt.    Zayra was seen today for shortness of breath, chest pain and medication refill.    Diagnoses and all orders for this visit:    Chronic obstructive pulmonary disease, unspecified COPD type    Multiple lung nodules on CT    Chronic respiratory failure with hypoxia and hypercapnia  -     NIPPV FOR HOME USE    Current chronic use of systemic steroids    Anxiety about health    Chronic sinus complaints    Personal history of nicotine dependence  -     CT Chest Lung Screening Low Dose; Future            Follow up in about 3 months (around 6/13/2023), or if symptoms worsen or fail to improve.    Discussed with patient above for education the following:      Patient Instructions   Overall doing well!    Continue to use current inhalers including Trelegy once per day and Albuterol as needed.    Continue to use the supplemental oxygen.  Continue to use the NIV machine nightly.    May benefit from portable NIV machine to be used throughout the day. Order placed, will see if insurance approves.    CT Chest due in August 2023.    Can check PFT later in the year and see if lung strength improved.    Continue 5 mg prednisone per day.    Continue current medication regiment. Keep follow up appointment as scheduled. Please call the office if you have any questions or concerns.

## 2023-03-16 ENCOUNTER — PATIENT MESSAGE (OUTPATIENT)
Dept: FAMILY MEDICINE | Facility: CLINIC | Age: 58
End: 2023-03-16
Payer: MEDICAID

## 2023-03-24 ENCOUNTER — PATIENT MESSAGE (OUTPATIENT)
Dept: PULMONOLOGY | Facility: CLINIC | Age: 58
End: 2023-03-24
Payer: MEDICAID

## 2023-03-24 ENCOUNTER — PATIENT MESSAGE (OUTPATIENT)
Dept: FAMILY MEDICINE | Facility: CLINIC | Age: 58
End: 2023-03-24
Payer: MEDICAID

## 2023-03-27 RX ORDER — LATANOPROST 50 UG/ML
1 SOLUTION/ DROPS OPHTHALMIC NIGHTLY
Status: ON HOLD | COMMUNITY
Start: 2023-02-24

## 2023-04-03 ENCOUNTER — PATIENT MESSAGE (OUTPATIENT)
Dept: PULMONOLOGY | Facility: CLINIC | Age: 58
End: 2023-04-03
Payer: MEDICAID

## 2023-04-05 ENCOUNTER — TELEPHONE (OUTPATIENT)
Dept: PULMONOLOGY | Facility: CLINIC | Age: 58
End: 2023-04-05
Payer: MEDICAID

## 2023-04-05 NOTE — TELEPHONE ENCOUNTER
----- Message from Lyn Rogers sent at 4/3/2023  9:48 AM CDT -----  Contact: coleman palafox/Jaleel Veronica    ----- Message -----  From: Ledy Navarrete LPN  Sent: 4/3/2023   9:18 AM CDT  To: Lyn Rogers      ----- Message -----  From: Shannon Sheldon  Sent: 4/3/2023   9:12 AM CDT  To: Juan Castillo states that the order they received from you had white out of the signature which made it invalid so please send over a new order.  Call back if needed at [phone] and thanks

## 2023-04-10 ENCOUNTER — PATIENT MESSAGE (OUTPATIENT)
Dept: PULMONOLOGY | Facility: CLINIC | Age: 58
End: 2023-04-10
Payer: MEDICAID

## 2023-04-24 ENCOUNTER — OFFICE VISIT (OUTPATIENT)
Dept: FAMILY MEDICINE | Facility: CLINIC | Age: 58
End: 2023-04-24
Payer: MEDICAID

## 2023-04-24 VITALS
OXYGEN SATURATION: 93 % | RESPIRATION RATE: 18 BRPM | DIASTOLIC BLOOD PRESSURE: 64 MMHG | HEIGHT: 60 IN | TEMPERATURE: 98 F | SYSTOLIC BLOOD PRESSURE: 122 MMHG | HEART RATE: 67 BPM | WEIGHT: 110.38 LBS | BODY MASS INDEX: 21.67 KG/M2

## 2023-04-24 DIAGNOSIS — M54.50 CHRONIC BILATERAL LOW BACK PAIN WITHOUT SCIATICA: Primary | ICD-10-CM

## 2023-04-24 DIAGNOSIS — G89.29 CHRONIC BILATERAL LOW BACK PAIN WITHOUT SCIATICA: Primary | ICD-10-CM

## 2023-04-24 DIAGNOSIS — F41.9 ANXIETY: ICD-10-CM

## 2023-04-24 PROCEDURE — 1159F MED LIST DOCD IN RCRD: CPT | Mod: CPTII,,, | Performed by: FAMILY MEDICINE

## 2023-04-24 PROCEDURE — 1160F PR REVIEW ALL MEDS BY PRESCRIBER/CLIN PHARMACIST DOCUMENTED: ICD-10-PCS | Mod: CPTII,,, | Performed by: FAMILY MEDICINE

## 2023-04-24 PROCEDURE — 1159F PR MEDICATION LIST DOCUMENTED IN MEDICAL RECORD: ICD-10-PCS | Mod: CPTII,,, | Performed by: FAMILY MEDICINE

## 2023-04-24 PROCEDURE — 99999 PR PBB SHADOW E&M-EST. PATIENT-LVL IV: CPT | Mod: PBBFAC,,, | Performed by: FAMILY MEDICINE

## 2023-04-24 PROCEDURE — 3074F PR MOST RECENT SYSTOLIC BLOOD PRESSURE < 130 MM HG: ICD-10-PCS | Mod: CPTII,,, | Performed by: FAMILY MEDICINE

## 2023-04-24 PROCEDURE — 3074F SYST BP LT 130 MM HG: CPT | Mod: CPTII,,, | Performed by: FAMILY MEDICINE

## 2023-04-24 PROCEDURE — 1160F RVW MEDS BY RX/DR IN RCRD: CPT | Mod: CPTII,,, | Performed by: FAMILY MEDICINE

## 2023-04-24 PROCEDURE — 3078F DIAST BP <80 MM HG: CPT | Mod: CPTII,,, | Performed by: FAMILY MEDICINE

## 2023-04-24 PROCEDURE — 99214 OFFICE O/P EST MOD 30 MIN: CPT | Mod: S$PBB,,, | Performed by: FAMILY MEDICINE

## 2023-04-24 PROCEDURE — 99214 OFFICE O/P EST MOD 30 MIN: CPT | Mod: PBBFAC | Performed by: FAMILY MEDICINE

## 2023-04-24 PROCEDURE — 3008F PR BODY MASS INDEX (BMI) DOCUMENTED: ICD-10-PCS | Mod: CPTII,,, | Performed by: FAMILY MEDICINE

## 2023-04-24 PROCEDURE — 3008F BODY MASS INDEX DOCD: CPT | Mod: CPTII,,, | Performed by: FAMILY MEDICINE

## 2023-04-24 PROCEDURE — 3078F PR MOST RECENT DIASTOLIC BLOOD PRESSURE < 80 MM HG: ICD-10-PCS | Mod: CPTII,,, | Performed by: FAMILY MEDICINE

## 2023-04-24 PROCEDURE — 99214 PR OFFICE/OUTPT VISIT, EST, LEVL IV, 30-39 MIN: ICD-10-PCS | Mod: S$PBB,,, | Performed by: FAMILY MEDICINE

## 2023-04-24 PROCEDURE — 99999 PR PBB SHADOW E&M-EST. PATIENT-LVL IV: ICD-10-PCS | Mod: PBBFAC,,, | Performed by: FAMILY MEDICINE

## 2023-04-24 RX ORDER — TIZANIDINE 4 MG/1
4 TABLET ORAL EVERY 8 HOURS PRN
Qty: 30 TABLET | Refills: 1 | Status: SHIPPED | OUTPATIENT
Start: 2023-04-24 | End: 2023-05-04

## 2023-04-24 RX ORDER — CLONAZEPAM 0.5 MG/1
TABLET ORAL
Qty: 30 TABLET | Refills: 1 | Status: SHIPPED | OUTPATIENT
Start: 2023-04-24 | End: 2023-06-22 | Stop reason: SDUPTHER

## 2023-04-24 NOTE — PROGRESS NOTES
[facility] - Clinic Note    Subjective      Ms. White is a 57 y.o. female who presents to clinic for Follow-up (3mth follow up)    Has been feeling well.  Noninvasive ventilator has been kind of uncomfortable.  Breathing has been good though.  Some pain in the back.    [facility] Zayra has a past medical history of Anxiety, Arthritis, Asthma, Back pain, COPD (chronic obstructive pulmonary disease), and Pulmonary embolism.   PSXH Zayra has a past surgical history that includes Breast cyst excision;  section; Cholecystectomy; Biopsy; Incision and drainage of abscess (Left, 3/16/2020); Colonoscopy (N/A, 2022); and Esophagogastroduodenoscopy (N/A, 2022).    Zayra's family history includes Breast cancer in her sister.    Zayra reports that she quit smoking about 5 months ago. Her smoking use included cigarettes. She has a 41.00 pack-year smoking history. She has never used smokeless tobacco. She reports that she does not currently use alcohol. She reports that she does not use drugs.   ALG Zayra is allergic to ativan [lorazepam].   DYLAN Iverson has a current medication list which includes the following prescription(s): albuterol, albuterol, albuterol-ipratropium, aspirin, atenolol, atorvastatin, cholecalciferol (vitamin d3), duloxetine, trelegy ellipta, latanoprost, levalbuterol, pantoprazole, prednisone, promethazine, clonazepam, duloxetine, and tizanidine.     Review of Systems   Constitutional:  Negative for fever.   Respiratory:  Negative for cough, shortness of breath and wheezing.    Cardiovascular:  Negative for chest pain.   Musculoskeletal:  Positive for back pain.   Objective     /64 (BP Location: Left arm, Patient Position: Sitting, BP Method: Medium (Manual))   Pulse 67   Temp 97.6 °F (36.4 °C) (Temporal)   Resp 18   Ht 5' (1.524 m)   Wt 50.1 kg (110 lb 6.4 oz)   LMP 2017   SpO2 (!) 93%   BMI 21.56 kg/m²     Physical Exam  Vitals and nursing note  reviewed.   Constitutional:       General: She is not in acute distress.     Appearance: Normal appearance. She is well-developed. She is not diaphoretic.   HENT:      Head: Normocephalic and atraumatic.      Right Ear: External ear normal.      Left Ear: External ear normal.   Eyes:      General:         Right eye: No discharge.         Left eye: No discharge.   Cardiovascular:      Rate and Rhythm: Normal rate and regular rhythm.      Heart sounds: Normal heart sounds.   Pulmonary:      Effort: Pulmonary effort is normal.      Breath sounds: Normal breath sounds. No wheezing or rales.   Skin:     General: Skin is warm and dry.   Neurological:      Mental Status: She is alert and oriented to person, place, and time. Mental status is at baseline.   Psychiatric:         Mood and Affect: Mood normal.         Behavior: Behavior normal.         Thought Content: Thought content normal.         Judgment: Judgment normal.      Assessment/Plan     1. Chronic bilateral low back pain without sciatica  tiZANidine (ZANAFLEX) 4 MG tablet      2. Anxiety  clonazePAM (KLONOPIN) 0.5 MG tablet        Refilled clonazepam.   reviewed.  No red flags.  Start tizanidine for back pain.  Follow-up in 3 months.    Future Appointments   Date Time Provider Department Center   5/29/2023  7:40 AM [facility] MAMMO1 [facility] MAMMO [facility]   6/14/2023 10:00 AM Chloe Leung NP [facility] PULTATYANA [place] [facility]   8/14/2023 11:00 AM [facility] CT1 [facility] CT [facility]         Eric Singh MD  Family Medicine  [facility]

## 2023-04-26 ENCOUNTER — PATIENT MESSAGE (OUTPATIENT)
Dept: FAMILY MEDICINE | Facility: CLINIC | Age: 58
End: 2023-04-26
Payer: MEDICAID

## 2023-05-01 ENCOUNTER — PATIENT MESSAGE (OUTPATIENT)
Dept: PULMONOLOGY | Facility: CLINIC | Age: 58
End: 2023-05-01
Payer: MEDICAID

## 2023-05-08 DIAGNOSIS — Z92.241 STEROID-DEPENDENT COPD: ICD-10-CM

## 2023-05-08 DIAGNOSIS — E78.5 HYPERLIPIDEMIA, UNSPECIFIED HYPERLIPIDEMIA TYPE: ICD-10-CM

## 2023-05-08 DIAGNOSIS — J44.9 CHRONIC OBSTRUCTIVE PULMONARY DISEASE, UNSPECIFIED COPD TYPE: ICD-10-CM

## 2023-05-08 DIAGNOSIS — J44.9 STEROID-DEPENDENT COPD: ICD-10-CM

## 2023-05-08 RX ORDER — PREDNISONE 5 MG/1
5 TABLET ORAL DAILY
Qty: 30 TABLET | Refills: 0 | Status: SHIPPED | OUTPATIENT
Start: 2023-05-08 | End: 2023-06-08 | Stop reason: SDUPTHER

## 2023-05-08 RX ORDER — ATORVASTATIN CALCIUM 20 MG/1
20 TABLET, FILM COATED ORAL DAILY
Qty: 90 TABLET | Refills: 0 | Status: SHIPPED | OUTPATIENT
Start: 2023-05-08 | End: 2023-08-13 | Stop reason: SDUPTHER

## 2023-05-08 NOTE — TELEPHONE ENCOUNTER
Last Office Visit: 4/24/2023  Medication Renewal Request  Received: Today  Zayra White Staff  Phone Number: [phone]     Refills have been requested for the following medications:         atorvastatin (LIPITOR) 20 MG tablet [Eric Singh MD]     Preferred pharmacy: [facility], [place] - [id number]  [address]   Delivery method: Pickup

## 2023-05-22 ENCOUNTER — PATIENT MESSAGE (OUTPATIENT)
Dept: SURGERY | Facility: CLINIC | Age: 58
End: 2023-05-22
Payer: MEDICAID

## 2023-05-22 DIAGNOSIS — K29.70 GASTRITIS, PRESENCE OF BLEEDING UNSPECIFIED, UNSPECIFIED CHRONICITY, UNSPECIFIED GASTRITIS TYPE: Primary | ICD-10-CM

## 2023-05-25 RX ORDER — PANTOPRAZOLE SODIUM 40 MG/1
40 TABLET, DELAYED RELEASE ORAL DAILY
Qty: 30 TABLET | Refills: 11 | Status: ON HOLD | OUTPATIENT
Start: 2023-05-25 | End: 2024-05-24

## 2023-05-29 ENCOUNTER — HOSPITAL ENCOUNTER (OUTPATIENT)
Dept: RADIOLOGY | Facility: HOSPITAL | Age: 58
Discharge: HOME | End: 2023-05-29
Attending: FAMILY MEDICINE
Payer: MEDICAID

## 2023-05-29 DIAGNOSIS — Z12.31 OTHER SCREENING MAMMOGRAM: ICD-10-CM

## 2023-05-29 PROCEDURE — 77067 MAMMO DIGITAL SCREENING BILAT WITH TOMO: ICD-10-PCS | Mod: 26,NTX,, | Performed by: RADIOLOGY

## 2023-05-29 PROCEDURE — 77063 BREAST TOMOSYNTHESIS BI: CPT | Mod: 26,NTX,, | Performed by: RADIOLOGY

## 2023-05-29 PROCEDURE — 77063 MAMMO DIGITAL SCREENING BILAT WITH TOMO: ICD-10-PCS | Mod: 26,NTX,, | Performed by: RADIOLOGY

## 2023-05-29 PROCEDURE — 77067 SCR MAMMO BI INCL CAD: CPT | Mod: 26,NTX,, | Performed by: RADIOLOGY

## 2023-05-29 PROCEDURE — 77067 SCR MAMMO BI INCL CAD: CPT | Mod: TC,NTX

## 2023-06-01 DIAGNOSIS — R92.8 ABNORMAL MAMMOGRAM OF RIGHT BREAST: Primary | ICD-10-CM

## 2023-06-05 ENCOUNTER — PATIENT MESSAGE (OUTPATIENT)
Dept: FAMILY MEDICINE | Facility: CLINIC | Age: 58
End: 2023-06-05
Payer: MEDICAID

## 2023-06-06 ENCOUNTER — TELEPHONE (OUTPATIENT)
Dept: FAMILY MEDICINE | Facility: CLINIC | Age: 58
End: 2023-06-06
Payer: MEDICAID

## 2023-06-06 NOTE — TELEPHONE ENCOUNTER
----- Message from Shannon iNcoharris sent at 6/6/2023  7:57 AM CDT -----  Contact: self  Type:  Same Day Appointment Request    Caller is requesting a same day appointment.  Caller declined first available appointment listed below.      Name of Caller:  patient  When is the first available appointment?  Saw on the portal that there is appts today and tomorrow to see the doctor. But with medicaid I couldn't eulalia  Symptoms:  back is really getting worse and she is having trouble working and since she can't walk it is making her copd worse.  The doctor wanted to do something else next   Best Call Back Number:  [phone]  Additional Information:   thanks

## 2023-06-07 ENCOUNTER — OFFICE VISIT (OUTPATIENT)
Dept: FAMILY MEDICINE | Facility: CLINIC | Age: 58
End: 2023-06-07
Payer: MEDICAID

## 2023-06-07 VITALS
DIASTOLIC BLOOD PRESSURE: 68 MMHG | BODY MASS INDEX: 21.6 KG/M2 | RESPIRATION RATE: 18 BRPM | SYSTOLIC BLOOD PRESSURE: 112 MMHG | TEMPERATURE: 98 F | HEART RATE: 82 BPM | WEIGHT: 110 LBS | OXYGEN SATURATION: 89 % | HEIGHT: 60 IN

## 2023-06-07 DIAGNOSIS — G89.29 CHRONIC BILATERAL LOW BACK PAIN WITHOUT SCIATICA: Primary | ICD-10-CM

## 2023-06-07 DIAGNOSIS — M54.50 CHRONIC BILATERAL LOW BACK PAIN WITHOUT SCIATICA: Primary | ICD-10-CM

## 2023-06-07 PROCEDURE — 1159F MED LIST DOCD IN RCRD: CPT | Mod: CPTII,,, | Performed by: FAMILY MEDICINE

## 2023-06-07 PROCEDURE — 1160F PR REVIEW ALL MEDS BY PRESCRIBER/CLIN PHARMACIST DOCUMENTED: ICD-10-PCS | Mod: CPTII,,, | Performed by: FAMILY MEDICINE

## 2023-06-07 PROCEDURE — 99999 PR PBB SHADOW E&M-EST. PATIENT-LVL III: ICD-10-PCS | Mod: PBBFAC,,, | Performed by: FAMILY MEDICINE

## 2023-06-07 PROCEDURE — 99213 PR OFFICE/OUTPT VISIT, EST, LEVL III, 20-29 MIN: ICD-10-PCS | Mod: S$PBB,,, | Performed by: FAMILY MEDICINE

## 2023-06-07 PROCEDURE — 3074F SYST BP LT 130 MM HG: CPT | Mod: CPTII,,, | Performed by: FAMILY MEDICINE

## 2023-06-07 PROCEDURE — 3008F PR BODY MASS INDEX (BMI) DOCUMENTED: ICD-10-PCS | Mod: CPTII,,, | Performed by: FAMILY MEDICINE

## 2023-06-07 PROCEDURE — 3078F DIAST BP <80 MM HG: CPT | Mod: CPTII,,, | Performed by: FAMILY MEDICINE

## 2023-06-07 PROCEDURE — 99213 OFFICE O/P EST LOW 20 MIN: CPT | Mod: PBBFAC | Performed by: FAMILY MEDICINE

## 2023-06-07 PROCEDURE — 1160F RVW MEDS BY RX/DR IN RCRD: CPT | Mod: CPTII,,, | Performed by: FAMILY MEDICINE

## 2023-06-07 PROCEDURE — 3078F PR MOST RECENT DIASTOLIC BLOOD PRESSURE < 80 MM HG: ICD-10-PCS | Mod: CPTII,,, | Performed by: FAMILY MEDICINE

## 2023-06-07 PROCEDURE — 99999 PR PBB SHADOW E&M-EST. PATIENT-LVL III: CPT | Mod: PBBFAC,,, | Performed by: FAMILY MEDICINE

## 2023-06-07 PROCEDURE — 1159F PR MEDICATION LIST DOCUMENTED IN MEDICAL RECORD: ICD-10-PCS | Mod: CPTII,,, | Performed by: FAMILY MEDICINE

## 2023-06-07 PROCEDURE — 99213 OFFICE O/P EST LOW 20 MIN: CPT | Mod: S$PBB,,, | Performed by: FAMILY MEDICINE

## 2023-06-07 PROCEDURE — 3008F BODY MASS INDEX DOCD: CPT | Mod: CPTII,,, | Performed by: FAMILY MEDICINE

## 2023-06-07 PROCEDURE — 3074F PR MOST RECENT SYSTOLIC BLOOD PRESSURE < 130 MM HG: ICD-10-PCS | Mod: CPTII,,, | Performed by: FAMILY MEDICINE

## 2023-06-07 RX ORDER — TIZANIDINE 4 MG/1
4 TABLET ORAL EVERY 8 HOURS PRN
Qty: 60 TABLET | Refills: 2 | Status: ON HOLD | OUTPATIENT
Start: 2023-06-07

## 2023-06-07 NOTE — PROGRESS NOTES
[facility] - Clinic Note    Subjective      Ms. White is a 57 y.o. female who presents to clinic for Back Pain    Pain in the back has been helped with the muscle relaxer.    [facility] Zayra has a past medical history of Anxiety, Arthritis, Asthma, Back pain, COPD (chronic obstructive pulmonary disease), and Pulmonary embolism.   [facility] Zayra has a past surgical history that includes Breast cyst excision;  section; Cholecystectomy; Biopsy; Incision and drainage of abscess (Left, 3/16/2020); Colonoscopy (N/A, 2022); and Esophagogastroduodenoscopy (N/A, 2022).    Zayra's family history includes Breast cancer in her sister.    Zayra reports that she has quit smoking. Her smoking use included vaping with nicotine. She has never used smokeless tobacco. She reports that she does not currently use alcohol. She reports that she does not use drugs.   XI Iverson is allergic to ativan [lorazepam].   [facility] Zayra has a current medication list which includes the following prescription(s): albuterol, albuterol, albuterol-ipratropium, aspirin, atenolol, atorvastatin, cholecalciferol (vitamin d3), clonazepam, duloxetine, duloxetine, trelegy ellipta, latanoprost, levalbuterol, pantoprazole, prednisone, promethazine, and tizanidine.     Review of Systems   Constitutional:  Negative for fever.   Respiratory:  Negative for cough, shortness of breath and wheezing.    Cardiovascular:  Negative for chest pain.   Musculoskeletal:  Positive for back pain.   Objective     /68 (BP Location: Left arm, Patient Position: Sitting, BP Method: Medium (Manual))   Pulse 82   Temp 98.4 °F (36.9 °C) (Temporal)   Resp 18   Ht 5' (1.524 m)   Wt 49.9 kg (110 lb)   LMP 2017   SpO2 (!) 89%   BMI 21.48 kg/m²     Physical Exam  Vitals and nursing note reviewed.   Constitutional:       General: She is not in acute distress.     Appearance: Normal appearance. She is well-developed. She is not diaphoretic.    HENT:      Head: Normocephalic and atraumatic.      Right Ear: External ear normal.      Left Ear: External ear normal.   Eyes:      General:         Right eye: No discharge.         Left eye: No discharge.   Cardiovascular:      Rate and Rhythm: Normal rate and regular rhythm.      Heart sounds: Normal heart sounds.   Pulmonary:      Effort: Pulmonary effort is normal.      Breath sounds: Normal breath sounds. No wheezing or rales.   Skin:     General: Skin is warm and dry.   Neurological:      Mental Status: She is alert and oriented to person, place, and time. Mental status is at baseline.   Psychiatric:         Mood and Affect: Mood normal.         Behavior: Behavior normal.         Thought Content: Thought content normal.         Judgment: Judgment normal.      Assessment/Plan     1. Chronic bilateral low back pain without sciatica  tiZANidine (ZANAFLEX) 4 MG tablet        Continue tizanidine for back pain.  Follow-up in 3 months.    Future Appointments   Date Time Provider Department Center   6/13/2023  1:00 PM [facility] MAMMO1 [facility] MAMMO [facility] [place]   6/13/2023  1:45 PM [facility] US1 [facility] US [facility] [place]   6/26/2023 11:00 AM Chloe Leung NP [facility] PULM [place] [facility]   8/14/2023 11:00 AM [facility] CT1 [facility] CT [facility] [place]   9/19/2023 10:00 AM [facility]         Eric Singh MD  Family Medicine  [facility]

## 2023-06-12 PROBLEM — J96.12 CHRONIC RESPIRATORY FAILURE WITH HYPOXIA AND HYPERCAPNIA: Status: RESOLVED | Noted: 2021-11-30 | Resolved: 2023-06-12

## 2023-06-12 PROBLEM — J96.11 CHRONIC RESPIRATORY FAILURE WITH HYPOXIA AND HYPERCAPNIA: Status: RESOLVED | Noted: 2021-11-30 | Resolved: 2023-06-12

## 2023-06-13 ENCOUNTER — HOSPITAL ENCOUNTER (OUTPATIENT)
Dept: RADIOLOGY | Facility: HOSPITAL | Age: 58
Discharge: HOME | End: 2023-06-13
Attending: FAMILY MEDICINE
Payer: MEDICAID

## 2023-06-13 DIAGNOSIS — R92.8 ABNORMAL MAMMOGRAM OF RIGHT BREAST: ICD-10-CM

## 2023-06-13 DIAGNOSIS — R92.1 CALCIFICATION OF RIGHT BREAST ON MAMMOGRAPHY: ICD-10-CM

## 2023-06-13 PROCEDURE — 77061 BREAST TOMOSYNTHESIS UNI: CPT | Mod: 26,RT,NTX, | Performed by: RADIOLOGY

## 2023-06-13 PROCEDURE — 77061 MAMMO DIGITAL DIAGNOSTIC RIGHT WITH TOMO: ICD-10-PCS | Mod: 26,RT,NTX, | Performed by: RADIOLOGY

## 2023-06-13 PROCEDURE — 77061 BREAST TOMOSYNTHESIS UNI: CPT | Mod: TC,RT,TXP

## 2023-06-13 PROCEDURE — 77065 MAMMO DIGITAL DIAGNOSTIC RIGHT WITH TOMO: ICD-10-PCS | Mod: 26,RT,NTX, | Performed by: RADIOLOGY

## 2023-06-13 PROCEDURE — 77065 DX MAMMO INCL CAD UNI: CPT | Mod: 26,RT,NTX, | Performed by: RADIOLOGY

## 2023-06-14 ENCOUNTER — PATIENT MESSAGE (OUTPATIENT)
Dept: FAMILY MEDICINE | Facility: CLINIC | Age: 58
End: 2023-06-14
Payer: MEDICAID

## 2023-06-14 DIAGNOSIS — R92.8 ABNORMAL MAMMOGRAM OF RIGHT BREAST: Primary | ICD-10-CM

## 2023-06-21 ENCOUNTER — TELEPHONE (OUTPATIENT)
Dept: PULMONOLOGY | Facility: CLINIC | Age: 58
End: 2023-06-21
Payer: MEDICAID

## 2023-06-21 NOTE — TELEPHONE ENCOUNTER
Patient rescheduled.  ----- Message from Tiffany Long sent at 6/21/2023  9:24 AM CDT -----  Type:  Sooner Appointment Request    Caller is requesting a sooner appointment.  Caller declined first available appointment listed below.  Caller will not accept being placed on the waitlist and is requesting a message be sent to doctor.    Name of Caller:  pt   When is the first available appointment?  Unk   Symptoms:  copd 6 month f/u   Best Call Back Number:  [phone] (home)   Additional Information:  pt is requesting appt in the morning not on the 6/26 ,.. any other date is okay please advise thank you

## 2023-06-22 DIAGNOSIS — F41.9 ANXIETY: ICD-10-CM

## 2023-06-22 RX ORDER — CLONAZEPAM 0.5 MG/1
TABLET ORAL
Qty: 30 TABLET | Refills: 1 | Status: SHIPPED | OUTPATIENT
Start: 2023-06-22 | End: 2023-09-26 | Stop reason: SDUPTHER

## 2023-07-07 ENCOUNTER — OFFICE VISIT (OUTPATIENT)
Dept: PULMONOLOGY | Facility: CLINIC | Age: 58
End: 2023-07-07
Payer: MEDICAID

## 2023-07-07 VITALS
OXYGEN SATURATION: 95 % | HEART RATE: 74 BPM | SYSTOLIC BLOOD PRESSURE: 128 MMHG | WEIGHT: 107.06 LBS | BODY MASS INDEX: 21.02 KG/M2 | DIASTOLIC BLOOD PRESSURE: 81 MMHG | HEIGHT: 60 IN

## 2023-07-07 DIAGNOSIS — Z92.241 STEROID-DEPENDENT COPD: ICD-10-CM

## 2023-07-07 DIAGNOSIS — Z79.52 CURRENT CHRONIC USE OF SYSTEMIC STEROIDS: ICD-10-CM

## 2023-07-07 DIAGNOSIS — Z87.891 PERSONAL HISTORY OF NICOTINE DEPENDENCE: ICD-10-CM

## 2023-07-07 DIAGNOSIS — J44.9 CHRONIC OBSTRUCTIVE PULMONARY DISEASE, UNSPECIFIED COPD TYPE: ICD-10-CM

## 2023-07-07 DIAGNOSIS — J44.9 STEROID-DEPENDENT COPD: ICD-10-CM

## 2023-07-07 DIAGNOSIS — J96.12 CHRONIC RESPIRATORY FAILURE WITH HYPOXIA AND HYPERCAPNIA: ICD-10-CM

## 2023-07-07 DIAGNOSIS — G47.33 OSA (OBSTRUCTIVE SLEEP APNEA): Primary | ICD-10-CM

## 2023-07-07 DIAGNOSIS — R09.89 CHRONIC SINUS COMPLAINTS: ICD-10-CM

## 2023-07-07 DIAGNOSIS — R91.8 MULTIPLE LUNG NODULES ON CT: ICD-10-CM

## 2023-07-07 DIAGNOSIS — J96.11 CHRONIC RESPIRATORY FAILURE WITH HYPOXIA AND HYPERCAPNIA: ICD-10-CM

## 2023-07-07 PROCEDURE — 99999 PR PBB SHADOW E&M-EST. PATIENT-LVL IV: ICD-10-PCS | Mod: PBBFAC,TXP,, | Performed by: NURSE PRACTITIONER

## 2023-07-07 PROCEDURE — 1159F MED LIST DOCD IN RCRD: CPT | Mod: CPTII,NTX,, | Performed by: NURSE PRACTITIONER

## 2023-07-07 PROCEDURE — 1159F PR MEDICATION LIST DOCUMENTED IN MEDICAL RECORD: ICD-10-PCS | Mod: CPTII,NTX,, | Performed by: NURSE PRACTITIONER

## 2023-07-07 PROCEDURE — 3008F PR BODY MASS INDEX (BMI) DOCUMENTED: ICD-10-PCS | Mod: CPTII,NTX,, | Performed by: NURSE PRACTITIONER

## 2023-07-07 PROCEDURE — 3074F PR MOST RECENT SYSTOLIC BLOOD PRESSURE < 130 MM HG: ICD-10-PCS | Mod: CPTII,NTX,, | Performed by: NURSE PRACTITIONER

## 2023-07-07 PROCEDURE — 3074F SYST BP LT 130 MM HG: CPT | Mod: CPTII,NTX,, | Performed by: NURSE PRACTITIONER

## 2023-07-07 PROCEDURE — 99999 PR PBB SHADOW E&M-EST. PATIENT-LVL IV: CPT | Mod: PBBFAC,TXP,, | Performed by: NURSE PRACTITIONER

## 2023-07-07 PROCEDURE — 99214 OFFICE O/P EST MOD 30 MIN: CPT | Mod: PBBFAC,PO,TXP | Performed by: NURSE PRACTITIONER

## 2023-07-07 PROCEDURE — 99214 OFFICE O/P EST MOD 30 MIN: CPT | Mod: S$PBB,NTX,, | Performed by: NURSE PRACTITIONER

## 2023-07-07 PROCEDURE — 3008F BODY MASS INDEX DOCD: CPT | Mod: CPTII,NTX,, | Performed by: NURSE PRACTITIONER

## 2023-07-07 PROCEDURE — 3079F PR MOST RECENT DIASTOLIC BLOOD PRESSURE 80-89 MM HG: ICD-10-PCS | Mod: CPTII,NTX,, | Performed by: NURSE PRACTITIONER

## 2023-07-07 PROCEDURE — 99214 PR OFFICE/OUTPT VISIT, EST, LEVL IV, 30-39 MIN: ICD-10-PCS | Mod: S$PBB,NTX,, | Performed by: NURSE PRACTITIONER

## 2023-07-07 PROCEDURE — 3079F DIAST BP 80-89 MM HG: CPT | Mod: CPTII,NTX,, | Performed by: NURSE PRACTITIONER

## 2023-07-07 NOTE — PATIENT INSTRUCTIONS
Overall doing well!    Continue to use current inhalers including Trelegy once per day and Albuterol as needed.    Continue to use the supplemental oxygen.    Continue to use the NIV machine nightly.    CT Chest due in August 2023.    Can check PFT later in the year and see if lung strength improved. Will plan on PFT the same day as your next appt with me.     Continue 5 mg prednisone per day.    Recommend stop using vape.    Continue current medication regiment. Keep follow up appointment as scheduled. Please call the office if you have any questions or concerns.

## 2023-07-07 NOTE — PROGRESS NOTES
7/7/2023    Zayra White  In office visit    Chief Complaint   Patient presents with    3 month f/u        HPI:  07/07/2023:  Has NIV machine - using nightly without issue. Recently got Life 2000 portable vent for daytime use, states she couldn't tolerate - Life 2000 was recalled? States she is waiting for the DME company to come  the machine.  Using supplemental oxygen PRN throughout the day - at 2L via NC. Bleeding in supplemental oxygen into NIV nightly.  Using Trelegy once per day and Albuterol only as needed, states depends on the day, when having a bad day may use 6x per day. States has worse days when humidity, heat are high.  Using Prednisone 5 mg daily with benefit.   Is scheduled to have CT in August 2023.  Stopped smoking - now vaping with nicotine.       03/13/2023:  Doing well overall from a resp perspective. No recent hospitalizations since prior office visit. Using Trelegy once per day and Albuterol only as needed, states depends on the day, when having a bad day may use 6x per day. Using supplemental oxygen PRN throughout the day - at 2L via NC and at night time in correlation with NIV machine. States tolerating NIV overall however does experience some difficulty with mask. \[facility]\"" is looking forward to being more active throughout the day, traveling more. Inquiring whether portable NIV would be beneficial for daytime use.  Using Prednisone 5 mg daily with benefit. Not currently smoking however using vape with nicotine.   Patient Instructions   Overall doing well!  Continue to use current inhalers including Trelegy once per day and Albuterol as needed.  Continue to use the supplemental oxygen.  Continue to use the NIV machine nightly.  May benefit from portable NIV machine to be used throughout the day. Order placed, will see if insurance approves.  CT Chest due in August 2023.  Can check PFT later in the year and see if lung strength improved.  Continue 5 mg prednisone per  "day.  Continue current medication regiment. Keep follow up appointment as scheduled. Please call the office if you have any questions or concerns.         12/13/2022:  Using Trelegy once per day with benefit, Albuterol nebulized treatments every 6 hours as needed.  Using NIV machine for four hours per day - states ever since starting use has been feeling better. Still using supplemental oxygen, has decreased to only PRN use - not wearing O2 in clinic today and in no acute distress.  Has been on Prednisone 10 mg per day with great benefit, agreeable to decrease to 5 mg.   Not currently smoking - cravings under control, [facility] is concerned with Kuldeep upcoming that cravings will increase.  Interested in lung transplant program - [facility] was told to call back once quit smoking for 2 consecutive months.  Patient Instructions   Continue current regiment - Trelegy once per day and Albuterol as needed for shortness of breath, wheezing, cough.  Prednisone - start alternating 10 mg and 5 mg every other day. Do this for two weeks and see who you tolerate. If you have no issues, can go down to 5 mg per day.   Continue NIV use, Continue supplemental oxygen.  Doing well from smoking cessation standpoint.  Alpha 1 test today in office.  CT Chest due Aug 2023  Continue current medication regiment. Keep follow up appointment as scheduled. Please call the office if you have any questions or concerns.         10/13/2022:  Per prior message on 9/29/2022:  "Patient messaged stating she is experiencing shortness of breath, worsening since initiation of NIV use.  I called the patient and discussed the case with her on 09/29/2022  at 0940.  Patient states she received her NIV machine approximately 2 weeks ago, has been using daily for at least 4 hours per day.  States she can tolerate the pressure well while using - with initially experiencing headaches with use, respiratory therapist changed pressures.  Patient states shortness of " "breath is worsening from baseline, cannot walk from living room to kitchen without getting short-winded, patient states she lives in a camper currently.  Patient using supplemental oxygen throughout the day and at night as needed.  On Trelegy once per day.  Using nebulized treatments every 4-6 hours.  Patient currently taking prednisone regimen that was prescribed at prior visit, 6 days total -states she has 2 days left.  Reports benefit with prednisone use.  Patient denies mucus production.  Patient will proceed with getting chest x-ray at this time.  May need repeat/prolonged prednisone taper.  Patient states she spoke with lung transplant service, however a consultation appointment was not scheduled due to current smoking status.  Patient states she is currently smoking 1-2 cigarettes per day."  Patient required ER visit on 10/8/2022 for COPD exacerabtion, was discharged with Rx for Azithromycin and 9 day regiment of Prednisone. Completed Zpack and currently has 3 days left of Prednisone.   States overall is having a good day, however yesterday was bad. States she was exposed to several sick contacts and developed bronchitis.   Currently using supplemental oxygen at night time and as needed throughout the day, dose ranges from 1-3lpm.   Using NIV machine nightly, states bleeds o2 in at 2L. Concerned that NIV is causing illness.  States hasn't smoked cigarettes in 2 days now.  Using Trelegy inhaler once per day, albuterol as needed approximately 2x per day. Taking neb treatments every 4-6 hours. States with Albuterol use she experiences hand shakiness, heart palpitations, jitters, and nervousness.   Patient Instructions   Continue Prednisone taper from ER until you finish. Then recommend going to daily Prednisone, 5-10 mg per day based on how you feel.   Continue Trelegy once per day and Albuterol as needed. Have ordered Xopenex for you to use in nebulizer machine since adverse reactions reported to " Albuterol.  Continue supplemental oxygen use. Continue NIV use.  Doing well from smoking standpoint, continue smoking cessation.  Continue current medication regiment. Keep follow up appointment as scheduled. Please call the office if you have any questions or concerns.         8/16/2022:  Patient did not keep follow up - I saw patient last in Feb 2022.   Patient required hospitalization for SIRS, COPD at [facility] on 8/7 - was subsequently discharged home on 8/10 (no discharge summary available in Epic). Treated with IV Abx and Bipap therapy while inpatient. States she was discharged home with Rx for steroid and abx, in which she completed both regiments. Patient has had THREE hospitalizations for COPD exacerbations in 2022 alone.  Still using Trelegy, one puff once per day with reported benefit. Using Albuterol inhaler as needed. Using nebulizer machine with Duoneb as needed.   Still smoking cigarettes, approximately 5 cigarettes per day.  Has supplemental oxygen at home and POC - wears as needed for shortness of breath, at night time - uses 1-2L via NC.  ABG obtained in the hospital on 8/7/2022 revealing pH 7.309, CO2 63.6, O2 122, and HCO3 32.0  Patient Instructions   Area of concern on CT from Feb - need to repeat CT Chest now.  I ordered a Lung Function Test to evaluate lung strength. Please complete prior to next appointment.   Continue Trelegy once per day.  Albuterol as needed. Prescription sent for Duoneb to be taken as needed.  NIV machine ordered given chronic resp failure secondary to COPD with recent hospitalization revealing high CO2 level.  Continue the use of supplemental oxygen.  Stop smoking.   Continue current medication regiment. Keep follow up appointment as scheduled. Please call the office if you have any questions or concerns.             2/18/2022: In office today with sister. Hx: COPD, Chronic Resp failure.  Recently Hospitalized at [facility] on 2/7 for COPD exacerbation, was  treated with IV steroids and neb treatments, subsequently discharged home on 2/10 with Rx for Prednisone and Levaquin, which she reports completion and compliance.  Had Prior hospitalization in November 2021 at [facility] for COPD exacerbation - complicated hospitalization with intramuscular hematoma of the left rectus muscle of the anterior abdominal wall - general surgery consulted, resolved spontaneously. Discharged home at that time with supplemental oxygen. Patient also endorses prior ER visit in Feb 2021 for COPD exacerbation and ER visit at [facility] for same.  Currently taking Anora once per day - Albuterol rescue inhaler approx 2-5 times per day. Using Nebulizer (Albuterol and Ipatropium) scheduled per day.  Using oxygen at night time, and as needed throughout the day - 1L via NC.  Shortness of breath: Varies with time - states worse over the last week due to sinus congestion, weather change, pollen. Exertional, improves with rest and tripod position. Affecting ADLS.   Cough: Worsening, productive with clear mucous production. Worse in the morning, Denies wheezing, chest tightness. Taking Mucinex without benefit. Ran out of Tessalon pearls - they seemed to help.   Patient Instructions   You have two areas of suspicion noted on CT Chest.   One area in the R lower lung - initially noted on CT from 2/8/2022.  Area in the L lung has been present since at least 12/2020 - appears to have grown in size.  Will repeat CT in 3 months.  Would benefit from smoking cessation. Declines referral to smoking cessation program at this time. Has tried Chantix in past.  This inhaler Trelegy is your new daily inhaler. It contains an inhaled steroid component. Rinse mouth after each use due to risk for thrush development. If mouth or tongue develops white sores please contact the clinic and I will order a prescription mouth wash.   Continue to use albuterol rescue inhaler as needed.  Zyrtec - take one pill nightly  for 30 days - see if it helps with sinus complaints.  Tessalon pearls as needed for cough.   I ordered a Lung Function Test to evaluate lung strength. Please complete prior to next appointment.         Social Hx: Lives alone - no animals in the home. Previously worked as labored. No Asbestosis exposure, Smoking Hx: Current smoker, started age 14 - 0.5 ppd use.   Family Hx: Mother Lung Cancer, No COPD, Son, Brother, Sister Asthma  Medical Hx: Previous pneumonia (did not require intubation) ; No previous shoulder/chest surgery      The chief compliant  problem varies with instability at times.  PFSH:  Past Medical History:   Diagnosis Date    Anxiety     Arthritis     Asthma     Back pain     COPD (chronic obstructive pulmonary disease)     Pulmonary embolism     10 years ago         Past Surgical History:   Procedure Laterality Date    BIOPSY      right breast    BREAST CYST EXCISION       SECTION      CHOLECYSTECTOMY      COLONOSCOPY N/A 2022    Procedure: COLONOSCOPY;  Surgeon: Chavez Gonzales MD;  Location: [facility] ENDO;  Service: General;  Laterality: N/A;    ESOPHAGOGASTRODUODENOSCOPY N/A 2022    Procedure: ESOPHAGOGASTRODUODENOSCOPY (EGD);  Surgeon: Chavez Gonzales MD;  Location: [facility] ENDO;  Service: General;  Laterality: N/A;    INCISION AND DRAINAGE OF ABSCESS Left 3/16/2020    Procedure: INCISION AND DRAINAGE, ABSCESS;  Surgeon: Irvin Villarreal MD;  Location: [facility] OR;  Service: General;  Laterality: Left;     Social History     Tobacco Use    Smoking status: Former     Types: Vaping with nicotine    Smokeless tobacco: Never   Substance Use Topics    Alcohol use: Not Currently     Comment: occ    Drug use: Never     Family History   Problem Relation Age of Onset    Breast cancer Sister     Ovarian cancer Neg Hx      Review of patient's allergies indicates:   Allergen Reactions    Ativan [lorazepam] Itching     I have reviewed past medical, family, and social history. I have  reviewed previous nurse notes.    Performance Status:The patient's activity level is functions out of house.      Review of Systems:  a review of eleven systems covering constitutional, Eye, HEENT, Psych, Respiratory, Cardiac, GI, , Musculoskeletal, Endocrine, Dermatologic was negative except for pertinent findings as listed ABOVE and below: pertinent positive as above, rest is good       Exam:Comprehensive exam done. /81 (BP Location: Left arm, Patient Position: Sitting, BP Method: Pediatric (Automatic))   Pulse 74   Ht 5' (1.524 m)   Wt 48.5 kg (107 lb 0.5 oz)   LMP 09/22/2017   SpO2 95% Comment: room air at rest  BMI 20.90 kg/m²   Exam included Vitals as listed  Constitutional: She is oriented to person, place, and time. She appears well-developed. No distress.   Nose: Nose normal.   Mouth/Throat: Uvula is midline, oropharynx is clear and moist and mucous membranes are normal. No dental caries. No oropharyngeal exudate, posterior oropharyngeal edema, posterior oropharyngeal erythema or tonsillar abscesses.  Mallapatti (M) score 1  Eyes: Pupils are equal, round, and reactive to light.   Neck: No JVD present. No thyromegaly present.   Cardiovascular: Normal rate, regular rhythm and normal heart sounds. Exam reveals no gallop and no friction rub.   No murmur heard.  Pulmonary/Chest: Effort normal and breath sounds normal. No accessory muscle usage or stridor. No apnea and no tachypnea. No respiratory distress, on room air. Diminished breath sounds throughout, on room air, in no acute distress.  Abdominal: Soft. She exhibits no mass. There is no tenderness. No hepatosplenomegaly, hernias and normoactive bowel sounds  Musculoskeletal: Normal range of motion. exhibits no edema.   Neurological:  alert and oriented to person, place, and time. not disoriented.   Skin: Skin is warm and dry. Capillary refill takes less 2 sec. No cyanosis or erythema. No pallor. Nails show no clubbing.   Psychiatric: normal  mood and affect. behavior is normal. Judgment and thought content normal.       Radiographs (ct chest and cxr) reviewed: view by direct vision     X-Ray Chest 1 View 10/8/2022 Impression:   Marked interval improvement in previously noted patchy segmental airspace consolidation at the left lung base.  There are a few tiny residual nodular densities at the left lung base.  Continued follow-up is recommended to ensure resolution..  Consider a follow-up chest x-ray in 4 weeks.     CT Chest Without Contrast 8/23/2022 FINDINGS:  Centrilobular and paraseptal emphysema.  Bandlike pleuroparenchymal thickening in the right upper lobe again noted.  Target nodule with spiculation in the lingula measures 1.4 cm on series 4, image 317.  3 mm nodule subpleural location right upper lobe series 4, image 301.  3 mm nodule left upper lobe series 4, image 174.  No filling defects central airways.  No pleural effusion or pneumothorax.   Heart size normal.  Coronary artery disease.  No mediastinal adenopathy.  Cholecystectomy clips.  Partially imaged advanced degenerative disc disease at several lower cervical levels.  Several mild remote compression fractures in the upper thoracic spine.   Impression:   1. Unchanged extent of pulmonary nodules, including spicular lesion in the lingula.  2. Coronary artery disease and pulmonary emphysema.        X-Ray Chest AP Portable  8/7/2022   FINDINGS:  Normal cardiomediastinal contour. No focal consolidation, pleural effusion or pneumothorax. The lungs are hyperinflated with flattening of the diaphragms.   Impression:   No acute cardiopulmonary abnormality.   Pulmonary emphysema.       CTA Chest Non-Coronary  2/8/2022   Impression:   No CTA evidence of pulmonary embolus.  Significant centrilobular emphysema.  Stable 1.4 cm spiculated mass of the left lingula.  New 1.9 cm band of spiculated density in the posterior right lung gutter likely represents atelectasis.  Follow-up CT in 3 months is  recommended however.      X-Ray Chest AP Portable  2/8/2022   Impression:   COPD.         Labs reviewed   Patient's labs were reviewed including CBC and CMP    Lab Results   Component Value Date    WBC 15.38 (H) 11/20/2022    RBC 4.91 11/20/2022    HGB 16.0 11/20/2022    HCT 47.1 11/20/2022    MCV 96 11/20/2022    MCH 32.6 (H) 11/20/2022    MCHC 34.0 11/20/2022    RDW 12.8 11/20/2022     11/20/2022    MPV 9.1 (L) 11/20/2022    GRAN 14.2 (H) 11/20/2022    GRAN 92.6 (H) 11/20/2022    LYMPH 0.5 (L) 11/20/2022    LYMPH 3.3 (L) 11/20/2022    MONO 0.5 11/20/2022    MONO 3.2 (L) 11/20/2022    EOS 0.0 11/20/2022    BASO 0.05 11/20/2022    EOSINOPHIL 0.3 11/20/2022    BASOPHIL 0.3 11/20/2022   CMP  Sodium   Date Value Ref Range Status   11/20/2022 140 136 - 145 mmol/L Final     Potassium   Date Value Ref Range Status   11/20/2022 4.1 3.5 - 5.1 mmol/L Final     Chloride   Date Value Ref Range Status   11/20/2022 96 95 - 110 mmol/L Final     CO2   Date Value Ref Range Status   11/20/2022 28 23 - 29 mmol/L Final     Glucose   Date Value Ref Range Status   11/20/2022 139 (H) 70 - 110 mg/dL Final     BUN   Date Value Ref Range Status   11/20/2022 21 (H) 6 - 20 mg/dL Final     Creatinine   Date Value Ref Range Status   11/20/2022 0.8 0.5 - 1.4 mg/dL Final     Calcium   Date Value Ref Range Status   11/20/2022 9.5 8.7 - 10.5 mg/dL Final     Total Protein   Date Value Ref Range Status   11/20/2022 7.2 6.0 - 8.4 g/dL Final     Albumin   Date Value Ref Range Status   11/20/2022 4.3 3.5 - 5.2 g/dL Final     Total Bilirubin   Date Value Ref Range Status   11/20/2022 1.0 0.1 - 1.0 mg/dL Final     Comment:     For infants and newborns, interpretation of results should be based  on gestational age, weight and in agreement with clinical  observations.    Premature Infant recommended reference ranges:  Up to 24 hours.............<8.0 mg/dL  Up to 48 hours............<12.0 mg/dL  3-5 days..................<15.0 mg/dL  6-29  days.................<15.0 mg/dL       Alkaline Phosphatase   Date Value Ref Range Status   11/20/2022 65 55 - 135 U/L Final     AST   Date Value Ref Range Status   11/20/2022 23 10 - 40 U/L Final     ALT   Date Value Ref Range Status   11/20/2022 18 10 - 44 U/L Final     Anion Gap   Date Value Ref Range Status   11/20/2022 16 8 - 16 mmol/L Final     eGFR   Date Value Ref Range Status   11/20/2022 >60.0 >60 mL/min/1.73 m^2 Final         PFT will be done and results to be reviewed  Pulmonary Functions Testing Results:    Patient has chronic respiratory failure secondary to COPD.  Patient will need a specific targeted tidal volume which cannot be provided via a CPAP or BiPAP.  This patient will require a set tidal volume to ensure CO2 levels were lowered adequately; as well as, to assist in establishing proper diaphragmatic functions.  Therefore, NIV is being ordered due to the severe state of this patient's disease.  Without this therapy, the patient runs a higher risk of expiration and readmittance.  Patient is benefiting from nightly NIV use however would also benefit from portable NIV for daytime use to help reduce shortness of breath and increase patient's overall daytime physical activities. Supplemental oxygen during the day is not enough support to complete patient's daily activities.     Plan:  Clinical impression is ambiguous and will need repeated evaluation wrtCollins    Zayra was seen today for 3 month f/u .    Diagnoses and all orders for this visit:    DASH (obstructive sleep apnea)    Chronic obstructive pulmonary disease, unspecified COPD type  -     Complete PFT with bronchodilator; Future    Multiple lung nodules on CT    Chronic respiratory failure with hypoxia and hypercapnia    Current chronic use of systemic steroids    Chronic sinus complaints    Personal history of nicotine dependence    Steroid-dependent COPD        Follow up in about 3 months (around 10/7/2023), or if symptoms worsen or fail to  improve.    Discussed with patient above for education the following:      Patient Instructions   Overall doing well!    Continue to use current inhalers including Trelegy once per day and Albuterol as needed.    Continue to use the supplemental oxygen.    Continue to use the NIV machine nightly.    CT Chest due in August 2023.    Can check PFT later in the year and see if lung strength improved. Will plan on PFT the same day as your next appt with me.     Continue 5 mg prednisone per day.    Recommend stop using vape.    Continue current medication regiment. Keep follow up appointment as scheduled. Please call the office if you have any questions or concerns.

## 2023-07-12 DIAGNOSIS — J44.9 STEROID-DEPENDENT COPD: ICD-10-CM

## 2023-07-12 DIAGNOSIS — Z92.241 STEROID-DEPENDENT COPD: ICD-10-CM

## 2023-07-12 DIAGNOSIS — J44.9 CHRONIC OBSTRUCTIVE PULMONARY DISEASE, UNSPECIFIED COPD TYPE: ICD-10-CM

## 2023-07-13 RX ORDER — PREDNISONE 5 MG/1
5 TABLET ORAL DAILY
Qty: 30 TABLET | Refills: 0 | Status: SHIPPED | OUTPATIENT
Start: 2023-07-13 | End: 2023-08-12 | Stop reason: SDUPTHER

## 2023-07-27 ENCOUNTER — HOSPITAL ENCOUNTER (EMERGENCY)
Facility: HOSPITAL | Age: 58
Discharge: HOME | End: 2023-07-27
Attending: FAMILY MEDICINE
Payer: MEDICAID

## 2023-07-27 VITALS
WEIGHT: 107 LBS | HEART RATE: 96 BPM | DIASTOLIC BLOOD PRESSURE: 66 MMHG | BODY MASS INDEX: 21.01 KG/M2 | SYSTOLIC BLOOD PRESSURE: 118 MMHG | HEIGHT: 60 IN | OXYGEN SATURATION: 94 % | TEMPERATURE: 98 F | RESPIRATION RATE: 12 BRPM

## 2023-07-27 DIAGNOSIS — R05.2 SUBACUTE COUGH: ICD-10-CM

## 2023-07-27 DIAGNOSIS — J44.1 COPD EXACERBATION: Primary | ICD-10-CM

## 2023-07-27 DIAGNOSIS — J44.9 COPD (CHRONIC OBSTRUCTIVE PULMONARY DISEASE): ICD-10-CM

## 2023-07-27 DIAGNOSIS — R91.8 BILATERAL PULMONARY INFILTRATES ON CXR: ICD-10-CM

## 2023-07-27 DIAGNOSIS — R06.02 SHORTNESS OF BREATH: ICD-10-CM

## 2023-07-27 LAB
ALBUMIN SERPL BCP-MCNC: 3.2 G/DL (ref 3.5–5.2)
ALP SERPL-CCNC: 70 U/L (ref 55–135)
ALT SERPL W/O P-5'-P-CCNC: 15 U/L (ref 10–44)
ANION GAP SERPL CALC-SCNC: 10 MMOL/L (ref 8–16)
AST SERPL-CCNC: 17 U/L (ref 10–40)
BASOPHILS # BLD AUTO: 0.05 K/UL (ref 0–0.2)
BASOPHILS NFR BLD: 0.4 % (ref 0–1.9)
BILIRUB SERPL-MCNC: 0.6 MG/DL (ref 0.1–1)
BUN SERPL-MCNC: 10 MG/DL (ref 6–20)
CALCIUM SERPL-MCNC: 8.8 MG/DL (ref 8.7–10.5)
CHLORIDE SERPL-SCNC: 100 MMOL/L (ref 95–110)
CO2 SERPL-SCNC: 26 MMOL/L (ref 23–29)
CREAT SERPL-MCNC: 0.7 MG/DL (ref 0.5–1.4)
DIFFERENTIAL METHOD: ABNORMAL
EOSINOPHIL # BLD AUTO: 0.1 K/UL (ref 0–0.5)
EOSINOPHIL NFR BLD: 0.4 % (ref 0–8)
ERYTHROCYTE [DISTWIDTH] IN BLOOD BY AUTOMATED COUNT: 11.7 % (ref 11.5–14.5)
EST. GFR  (NO RACE VARIABLE): >60 ML/MIN/1.73 M^2
GLUCOSE SERPL-MCNC: 100 MG/DL (ref 70–110)
HCT VFR BLD AUTO: 35.3 % (ref 37–48.5)
HGB BLD-MCNC: 12.4 G/DL (ref 12–16)
IMM GRANULOCYTES # BLD AUTO: 0.05 K/UL (ref 0–0.04)
IMM GRANULOCYTES NFR BLD AUTO: 0.4 % (ref 0–0.5)
INFLUENZA A, MOLECULAR: NEGATIVE
INFLUENZA B, MOLECULAR: NEGATIVE
LACTATE SERPL-SCNC: 0.8 MMOL/L (ref 0.5–2.2)
LYMPHOCYTES # BLD AUTO: 0.8 K/UL (ref 1–4.8)
LYMPHOCYTES NFR BLD: 6 % (ref 18–48)
MAGNESIUM SERPL-MCNC: 1.6 MG/DL (ref 1.6–2.6)
MCH RBC QN AUTO: 33.5 PG (ref 27–31)
MCHC RBC AUTO-ENTMCNC: 35.1 G/DL (ref 32–36)
MCV RBC AUTO: 95 FL (ref 82–98)
MONOCYTES # BLD AUTO: 0.5 K/UL (ref 0.3–1)
MONOCYTES NFR BLD: 3.4 % (ref 4–15)
NEUTROPHILS # BLD AUTO: 12.5 K/UL (ref 1.8–7.7)
NEUTROPHILS NFR BLD: 89.4 % (ref 38–73)
NRBC BLD-RTO: 0 /100 WBC
PLATELET # BLD AUTO: 269 K/UL (ref 150–450)
PMV BLD AUTO: 9.2 FL (ref 9.2–12.9)
POTASSIUM SERPL-SCNC: 3.8 MMOL/L (ref 3.5–5.1)
PROT SERPL-MCNC: 6.5 G/DL (ref 6–8.4)
RBC # BLD AUTO: 3.7 M/UL (ref 4–5.4)
SARS-COV-2 RDRP RESP QL NAA+PROBE: NEGATIVE
SODIUM SERPL-SCNC: 136 MMOL/L (ref 136–145)
SPECIMEN SOURCE: NORMAL
TROPONIN I SERPL DL<=0.01 NG/ML-MCNC: <0.006 NG/ML (ref 0–0.03)
WBC # BLD AUTO: 14 K/UL (ref 3.9–12.7)

## 2023-07-27 PROCEDURE — 71045 X-RAY EXAM CHEST 1 VIEW: CPT | Mod: 26,NTX,, | Performed by: RADIOLOGY

## 2023-07-27 PROCEDURE — 71045 X-RAY EXAM CHEST 1 VIEW: CPT | Mod: TC,NTX

## 2023-07-27 PROCEDURE — 25000242 PHARM REV CODE 250 ALT 637 W/ HCPCS: Mod: NTX | Performed by: FAMILY MEDICINE

## 2023-07-27 PROCEDURE — 71045 XR CHEST AP PORTABLE: ICD-10-PCS | Mod: 26,NTX,, | Performed by: RADIOLOGY

## 2023-07-27 PROCEDURE — 93010 ELECTROCARDIOGRAM REPORT: CPT | Mod: NTX,,, | Performed by: INTERNAL MEDICINE

## 2023-07-27 PROCEDURE — 84484 ASSAY OF TROPONIN QUANT: CPT | Mod: NTX | Performed by: FAMILY MEDICINE

## 2023-07-27 PROCEDURE — 96366 THER/PROPH/DIAG IV INF ADDON: CPT | Mod: NTX

## 2023-07-27 PROCEDURE — 96375 TX/PRO/DX INJ NEW DRUG ADDON: CPT | Mod: NTX

## 2023-07-27 PROCEDURE — 99285 EMERGENCY DEPT VISIT HI MDM: CPT | Mod: 25,NTX

## 2023-07-27 PROCEDURE — 96365 THER/PROPH/DIAG IV INF INIT: CPT | Mod: NTX

## 2023-07-27 PROCEDURE — 63600175 PHARM REV CODE 636 W HCPCS: Mod: UD,NTX | Performed by: FAMILY MEDICINE

## 2023-07-27 PROCEDURE — 25000003 PHARM REV CODE 250: Mod: NTX | Performed by: FAMILY MEDICINE

## 2023-07-27 PROCEDURE — 27000221 HC OXYGEN, UP TO 24 HOURS: Mod: NTX

## 2023-07-27 PROCEDURE — 93010 EKG 12-LEAD: ICD-10-PCS | Mod: NTX,,, | Performed by: INTERNAL MEDICINE

## 2023-07-27 PROCEDURE — 83605 ASSAY OF LACTIC ACID: CPT | Mod: NTX | Performed by: FAMILY MEDICINE

## 2023-07-27 PROCEDURE — 87502 INFLUENZA DNA AMP PROBE: CPT | Mod: NTX | Performed by: FAMILY MEDICINE

## 2023-07-27 PROCEDURE — 93005 ELECTROCARDIOGRAM TRACING: CPT | Mod: NTX

## 2023-07-27 PROCEDURE — 80053 COMPREHEN METABOLIC PANEL: CPT | Mod: NTX | Performed by: FAMILY MEDICINE

## 2023-07-27 PROCEDURE — 94761 N-INVAS EAR/PLS OXIMETRY MLT: CPT | Mod: NTX

## 2023-07-27 PROCEDURE — 85025 COMPLETE CBC W/AUTO DIFF WBC: CPT | Mod: NTX | Performed by: FAMILY MEDICINE

## 2023-07-27 PROCEDURE — 87040 BLOOD CULTURE FOR BACTERIA: CPT | Mod: 59,NTX | Performed by: FAMILY MEDICINE

## 2023-07-27 PROCEDURE — 94640 AIRWAY INHALATION TREATMENT: CPT | Mod: NTX

## 2023-07-27 PROCEDURE — 83735 ASSAY OF MAGNESIUM: CPT | Mod: NTX | Performed by: FAMILY MEDICINE

## 2023-07-27 PROCEDURE — U0002 COVID-19 LAB TEST NON-CDC: HCPCS | Mod: NTX | Performed by: FAMILY MEDICINE

## 2023-07-27 PROCEDURE — 96367 TX/PROPH/DG ADDL SEQ IV INF: CPT | Mod: NTX

## 2023-07-27 RX ORDER — HYDROCODONE POLISTIREX AND CHLORPHENIRAMINE POLISTIREX 10; 8 MG/5ML; MG/5ML
5 SUSPENSION, EXTENDED RELEASE ORAL
Status: COMPLETED | OUTPATIENT
Start: 2023-07-27 | End: 2023-07-27

## 2023-07-27 RX ORDER — PROMETHAZINE HYDROCHLORIDE AND DEXTROMETHORPHAN HYDROBROMIDE 6.25; 15 MG/5ML; MG/5ML
5 SYRUP ORAL EVERY 4 HOURS PRN
COMMUNITY
Start: 2023-07-18 | End: 2023-09-26

## 2023-07-27 RX ORDER — IPRATROPIUM BROMIDE AND ALBUTEROL SULFATE 2.5; .5 MG/3ML; MG/3ML
6 SOLUTION RESPIRATORY (INHALATION)
Status: COMPLETED | OUTPATIENT
Start: 2023-07-27 | End: 2023-07-27

## 2023-07-27 RX ORDER — HYDROCODONE POLISTIREX AND CHLORPHENIRAMINE POLISTIREX 10; 8 MG/5ML; MG/5ML
5 SUSPENSION, EXTENDED RELEASE ORAL EVERY 12 HOURS PRN
Qty: 70 ML | Refills: 0 | Status: SHIPPED | OUTPATIENT
Start: 2023-07-27 | End: 2023-09-26

## 2023-07-27 RX ORDER — LEVOFLOXACIN 750 MG/1
750 TABLET ORAL DAILY
Qty: 9 TABLET | Refills: 0 | Status: SHIPPED | OUTPATIENT
Start: 2023-07-28 | End: 2023-08-06

## 2023-07-27 RX ORDER — METHYLPREDNISOLONE SOD SUCC 125 MG
125 VIAL (EA) INJECTION
Status: COMPLETED | OUTPATIENT
Start: 2023-07-27 | End: 2023-07-27

## 2023-07-27 RX ORDER — PREDNISONE 20 MG/1
60 TABLET ORAL DAILY
Qty: 12 TABLET | Refills: 0 | Status: SHIPPED | OUTPATIENT
Start: 2023-07-27 | End: 2023-07-31

## 2023-07-27 RX ORDER — LEVOFLOXACIN 5 MG/ML
750 INJECTION, SOLUTION INTRAVENOUS
Status: COMPLETED | OUTPATIENT
Start: 2023-07-27 | End: 2023-07-27

## 2023-07-27 RX ORDER — MAGNESIUM SULFATE HEPTAHYDRATE 40 MG/ML
2 INJECTION, SOLUTION INTRAVENOUS
Status: COMPLETED | OUTPATIENT
Start: 2023-07-27 | End: 2023-07-27

## 2023-07-27 RX ADMIN — IPRATROPIUM BROMIDE AND ALBUTEROL SULFATE 6 ML: 2.5; .5 SOLUTION RESPIRATORY (INHALATION) at 01:07

## 2023-07-27 RX ADMIN — IPRATROPIUM BROMIDE AND ALBUTEROL SULFATE 6 ML: .5; 3 SOLUTION RESPIRATORY (INHALATION) at 11:07

## 2023-07-27 RX ADMIN — LEVOFLOXACIN 750 MG: 750 INJECTION, SOLUTION INTRAVENOUS at 12:07

## 2023-07-27 RX ADMIN — METHYLPREDNISOLONE SODIUM SUCCINATE 125 MG: 125 INJECTION, POWDER, FOR SOLUTION INTRAMUSCULAR; INTRAVENOUS at 11:07

## 2023-07-27 RX ADMIN — HYDROCODONE POLISTIREX AND CHLORPHENIRAMINE POLISTIREX 5 ML: 10; 8 SUSPENSION, EXTENDED RELEASE ORAL at 01:07

## 2023-07-27 RX ADMIN — MAGNESIUM SULFATE HEPTAHYDRATE 2 G: 40 INJECTION, SOLUTION INTRAVENOUS at 02:07

## 2023-07-27 NOTE — ED PROVIDER NOTES
Encounter Date: 2023       History     Chief Complaint   Patient presents with    Shortness of Breath     The patient is a 57-year-old female with history of asthma and COPD.  Patient is oxygen dependent at home usually wearing 1-2 L per nasal cannula during the daytime and BiPAP at night.  Patient states that she is got progressively more short of breath over the last now 3 days.  She describes a cough over the last few days that is productive of yellow to green sputum.  She describes some increased chest tightness but denies wheezing.  Patient has increased her home oxygen use to 3 L per nasal cannula.  She denies any fevers, chills, contact with any known sick individuals, or chest pains.  Patient does take daily prednisone 5 mg daily.  Patient describes some myalgias over the last 2 days.    Review of patient's allergies indicates:   Allergen Reactions    Ativan [lorazepam] Itching     Past Medical History:   Diagnosis Date    Anxiety     Arthritis     Asthma     Back pain     COPD (chronic obstructive pulmonary disease)     Pulmonary embolism     10 years ago     Past Surgical History:   Procedure Laterality Date    BIOPSY      right breast    BREAST CYST EXCISION       SECTION      CHOLECYSTECTOMY      COLONOSCOPY N/A 2022    Procedure: COLONOSCOPY;  Surgeon: Chavez Gonzales MD;  Location: [facility] ENDO;  Service: General;  Laterality: N/A;    ESOPHAGOGASTRODUODENOSCOPY N/A 2022    Procedure: ESOPHAGOGASTRODUODENOSCOPY (EGD);  Surgeon: Chavez Gonzales MD;  Location: [facility] ENDO;  Service: General;  Laterality: N/A;    INCISION AND DRAINAGE OF ABSCESS Left 3/16/2020    Procedure: INCISION AND DRAINAGE, ABSCESS;  Surgeon: Irvin Villarreal MD;  Location: [facility] OR;  Service: General;  Laterality: Left;     Family History   Problem Relation Age of Onset    Breast cancer Sister     Ovarian cancer Neg Hx      Social History     Tobacco Use    Smoking status: Former     Types: Vaping with  nicotine    Smokeless tobacco: Never   Substance Use Topics    Alcohol use: Not Currently     Comment: occ    Drug use: Never     Review of Systems   Respiratory:  Positive for cough, chest tightness and shortness of breath. Negative for apnea, choking, wheezing and stridor.    Cardiovascular:  Negative for chest pain, palpitations and leg swelling.   All other systems reviewed and are negative.    Physical Exam     Initial Vitals   BP Pulse Resp Temp SpO2   07/27/23 1049 07/27/23 1044 07/27/23 1044 07/27/23 1053 07/27/23 1041   (!) 118/58 97 (!) 24 98.8 °F (37.1 °C) (!) 90 %      MAP       --                Physical Exam    Nursing note and vitals reviewed.  Constitutional: She appears well-developed and well-nourished. She is not diaphoretic. No distress.   HENT:   Head: Normocephalic and atraumatic.   Nose: Nose normal.   Mouth/Throat: No oropharyngeal exudate.   Eyes: Conjunctivae and EOM are normal. Right eye exhibits no discharge. Left eye exhibits no discharge. No scleral icterus.   Neck: Neck supple. No thyromegaly present.   Normal range of motion.  Cardiovascular:  Normal rate, regular rhythm, normal heart sounds and intact distal pulses.           No murmur heard.  Pulmonary/Chest: No respiratory distress. She has no wheezes. She has no rhonchi. She has no rales. She exhibits no tenderness.   Decreased breath sounds diffusely   Abdominal: Abdomen is soft. Bowel sounds are normal. She exhibits no distension. There is no abdominal tenderness.   Musculoskeletal:         General: No tenderness or edema. Normal range of motion.      Cervical back: Normal range of motion and neck supple.     Lymphadenopathy:     She has no cervical adenopathy.   Neurological: She is alert and oriented to person, place, and time. She has normal strength. GCS score is 15. GCS eye subscore is 4. GCS verbal subscore is 5. GCS motor subscore is 6.   Skin: Skin is warm and dry. Capillary refill takes less than 2 seconds. No rash  noted. No erythema.   Psychiatric: She has a normal mood and affect. Her behavior is normal. Judgment and thought content normal.       ED Course   Procedures  Labs Reviewed   CBC W/ AUTO DIFFERENTIAL - Abnormal; Notable for the following components:       Result Value    WBC 14.00 (*)     RBC 3.70 (*)     Hematocrit 35.3 (*)     MCH 33.5 (*)     Gran # (ANC) 12.5 (*)     Immature Grans (Abs) 0.05 (*)     Lymph # 0.8 (*)     Gran % 89.4 (*)     Lymph % 6.0 (*)     Mono % 3.4 (*)     All other components within normal limits   COMPREHENSIVE METABOLIC PANEL - Abnormal; Notable for the following components:    Albumin 3.2 (*)     All other components within normal limits   INFLUENZA A & B BY MOLECULAR   CULTURE, BLOOD   CULTURE, BLOOD   MAGNESIUM   TROPONIN I   SARS-COV-2 RNA AMPLIFICATION, QUAL    Narrative:     Is the patient symptomatic?->Yes   LACTIC ACID, PLASMA     EKG Readings: (Independently Interpreted)   Sinus rhythm with sinus arrhythmia.  Ventricular rate 94.  No ST elevations or depressions.     Imaging Results              X-Ray Chest AP Portable (Final result)  Result time 07/27/23 11:47:01      Final result by Ledy Doan MD (07/27/23 11:47:01)                   Impression:      Findings concerning for pneumonia in the lung bases.      Electronically signed by: Ledy Doan  Date:    07/27/2023  Time:    11:47               Narrative:    EXAMINATION:  XR CHEST AP PORTABLE    CLINICAL HISTORY:  Chronic obstructive pulmonary disease, unspecified    TECHNIQUE:  Single frontal view of the chest was performed.    COMPARISON:  11/04/2022    FINDINGS:  The lungs are deeply inflated consistent with history of COPD.  The cardiomediastinal silhouette is within normal limits.  There is been interval development of patchy opacification of both lung bases.  The upper lung zones remain clear.                                       Medications   magnesium sulfate 2g in water 50mL IVPB (premix) (2 g  Intravenous New Bag 7/27/23 1427)   albuterol-ipratropium 2.5 mg-0.5 mg/3 mL nebulizer solution 6 mL (6 mLs Nebulization Given 7/27/23 1146)   methylPREDNISolone sodium succinate injection 125 mg (125 mg Intravenous Given 7/27/23 1135)   levoFLOXacin 750 mg/150 mL IVPB 750 mg (0 mg Intravenous Stopped 7/27/23 1424)   hydrocodone-chlorpheniramine 10-8 mg/5 mL suspension 5 mL (5 mLs Oral Given 7/27/23 1329)   albuterol-ipratropium 2.5 mg-0.5 mg/3 mL nebulizer solution 6 mL (6 mLs Nebulization Given 7/27/23 1315)     Medical Decision Making:   Initial Assessment:   Viral infection, pneumonia, COPD exacerbation, pulmonary edema  ED Management:  Patient comes our facility with mild worsening of her chronic dyspnea, oxygen-dependent COPD.  Patient denies any fevers or chills.  Patient denies any chest pain.  Patient does have a leukocytosis with left shift but does take chronic steroids, prednisone 5 mg daily.  Patient has nebulizer DuoNeb breathing treatments at home did not take any today prior to coming to the ER.  Chest x-ray showed questionable possible infiltrates in lower lobes.  Patient was treated for COPD exacerbation with Solu-Medrol, DuoNeb breathing treatments, Levaquin.    After treatment, patient had improved respiratory status.  Patient is tolerating her 2 L per nasal cannula of oxygen as she uses at home.  Patient had significant improvement after cough medicine was administered in addition to her COPD exacerbation treatment.  Chest x-ray does show possible lower lobe infiltrates.  Patient be treated empirically with Levaquin.  Cultures are pending.  Patient be discharged home as she already has DuoNeb breathing treatments at home.  I will increase her prednisone over the next 4 days and she will return to her baseline dose.  Patient will be continued on Levaquin for 9 more days.  Patient was instructed to return to the ER she felt her respiratory status was worsening despite our treatment plan.  Patient  voiced understanding.  Otherwise patient to follow-up with PCP.                        Clinical Impression:   Final diagnoses:  [R06.02] Shortness of breath  [J44.9] COPD (chronic obstructive pulmonary disease)  [J44.1] COPD exacerbation (Primary)  [R05.2] Subacute cough  [R91.8] Bilateral pulmonary infiltrates on CXR - questionable early CAP        ED Disposition Condition    Discharge Stable          ED Prescriptions       Medication Sig Dispense Start Date End Date Auth. Provider    levoFLOXacin (LEVAQUIN) 750 MG tablet Take 1 tablet (750 mg total) by mouth once daily. for 9 days 9 tablet 7/28/2023 8/6/2023 Daniel Aguilar MD    hydrocodone-chlorpheniramine (TUSSIONEX) 10-8 mg/5 mL suspension Take 5 mLs by mouth every 12 (twelve) hours as needed for Cough. 70 mL 7/27/2023 -- Daniel Aguilar MD    predniSONE (DELTASONE) 20 MG tablet Take 3 tablets (60 mg total) by mouth once daily. for 4 days 12 tablet 7/27/2023 7/31/2023 Daniel Aguilar MD          Follow-up Information    None     Follow-up with primary care physician.  Return to ER if condition worsens despite treatment plan.     Daniel Aguilar MD  07/27/23 2188

## 2023-07-27 NOTE — DISCHARGE INSTRUCTIONS
Take medications as prescribed.  After 4 days of increased prednisone, please return here baseline 5 mg per day dose.  Return to ER if condition worsens despite treatment plan.  Please use your home nebulizer treatments as discussed.  Follow-up with primary care physician.

## 2023-08-01 LAB
BACTERIA BLD CULT: NORMAL
BACTERIA BLD CULT: NORMAL

## 2023-08-09 NOTE — TELEPHONE ENCOUNTER
Refill Routing Note     Refill Routing Note   Medication(s) are not appropriate for processing by [facility] for the following reason(s):      Non-participating provider    [facility] action(s):  Route Care Due:  None identified            Appointments  past 12m or future 3m with PCP    Date Provider   Last Visit   6/7/2023 Eric Singh MD   Next Visit   9/22/2023 Eric Singh MD   ED visits in past 90 days: 1        Note composed:1:57 PM 08/09/2023

## 2023-08-10 RX ORDER — ATENOLOL 25 MG/1
TABLET ORAL
Qty: 45 TABLET | Refills: 3 | Status: SHIPPED | OUTPATIENT
Start: 2023-08-10 | End: 2024-03-05 | Stop reason: SDUPTHER

## 2023-08-11 ENCOUNTER — PATIENT MESSAGE (OUTPATIENT)
Dept: PULMONOLOGY | Facility: CLINIC | Age: 58
End: 2023-08-11
Payer: MEDICAID

## 2023-08-12 ENCOUNTER — PATIENT MESSAGE (OUTPATIENT)
Dept: PULMONOLOGY | Facility: CLINIC | Age: 58
End: 2023-08-12
Payer: MEDICAID

## 2023-08-12 DIAGNOSIS — J44.9 STEROID-DEPENDENT COPD: ICD-10-CM

## 2023-08-12 DIAGNOSIS — Z92.241 STEROID-DEPENDENT COPD: ICD-10-CM

## 2023-08-12 DIAGNOSIS — J44.9 CHRONIC OBSTRUCTIVE PULMONARY DISEASE, UNSPECIFIED COPD TYPE: ICD-10-CM

## 2023-08-13 DIAGNOSIS — E78.5 HYPERLIPIDEMIA, UNSPECIFIED HYPERLIPIDEMIA TYPE: ICD-10-CM

## 2023-08-14 DIAGNOSIS — J44.9 CHRONIC OBSTRUCTIVE PULMONARY DISEASE, UNSPECIFIED COPD TYPE: Primary | ICD-10-CM

## 2023-08-14 RX ORDER — ATORVASTATIN CALCIUM 20 MG/1
20 TABLET, FILM COATED ORAL DAILY
Qty: 90 TABLET | Refills: 0 | Status: SHIPPED | OUTPATIENT
Start: 2023-08-14 | End: 2023-11-17 | Stop reason: SDUPTHER

## 2023-08-14 RX ORDER — PREDNISONE 20 MG/1
20 TABLET ORAL DAILY
COMMUNITY
End: 2023-08-14 | Stop reason: SDUPTHER

## 2023-08-14 RX ORDER — PREDNISONE 5 MG/1
5 TABLET ORAL DAILY
Qty: 30 TABLET | Refills: 0 | Status: SHIPPED | OUTPATIENT
Start: 2023-08-14 | End: 2023-09-20 | Stop reason: SDUPTHER

## 2023-08-14 RX ORDER — PREDNISONE 20 MG/1
TABLET ORAL
Qty: 9 TABLET | Refills: 1 | Status: SHIPPED | OUTPATIENT
Start: 2023-08-14 | End: 2023-09-26

## 2023-08-22 DIAGNOSIS — Z87.891 PERSONAL HISTORY OF NICOTINE DEPENDENCE: Primary | ICD-10-CM

## 2023-08-29 ENCOUNTER — HOSPITAL ENCOUNTER (OUTPATIENT)
Dept: RADIOLOGY | Facility: HOSPITAL | Age: 58
Discharge: HOME | End: 2023-08-29
Attending: NURSE PRACTITIONER
Payer: MEDICAID

## 2023-08-29 DIAGNOSIS — Z87.891 PERSONAL HISTORY OF NICOTINE DEPENDENCE: ICD-10-CM

## 2023-08-29 PROCEDURE — 71271 CT THORAX LUNG CANCER SCR C-: CPT | Mod: TC

## 2023-08-29 PROCEDURE — 71271 CT CHEST LUNG SCREENING LOW DOSE: ICD-10-PCS | Mod: 26,,, | Performed by: RADIOLOGY

## 2023-08-29 PROCEDURE — 71271 CT THORAX LUNG CANCER SCR C-: CPT | Mod: 26,,, | Performed by: RADIOLOGY

## 2023-08-30 ENCOUNTER — PATIENT MESSAGE (OUTPATIENT)
Dept: FAMILY MEDICINE | Facility: CLINIC | Age: 58
End: 2023-08-30
Payer: MEDICAID

## 2023-08-30 DIAGNOSIS — R91.1 LUNG NODULE: Primary | ICD-10-CM

## 2023-08-31 DIAGNOSIS — M25.50 ARTHRALGIA, UNSPECIFIED JOINT: ICD-10-CM

## 2023-09-01 RX ORDER — DULOXETIN HYDROCHLORIDE 20 MG/1
CAPSULE, DELAYED RELEASE ORAL
Qty: 180 CAPSULE | Refills: 3 | Status: SHIPPED | OUTPATIENT
Start: 2023-09-01 | End: 2023-12-05

## 2023-09-12 ENCOUNTER — TELEPHONE (OUTPATIENT)
Dept: PULMONOLOGY | Facility: CLINIC | Age: 58
End: 2023-09-12
Payer: MEDICAID

## 2023-09-12 NOTE — TELEPHONE ENCOUNTER
Spoke to patient.  Advised her to call Joe to schedule PFT's since [facility] is out of network for her insurance ([employer]).  She expressed understanding.

## 2023-09-12 NOTE — TELEPHONE ENCOUNTER
Pt was informed and v/u  she will contact the [facility] at [phone] to see about getting it scheduled there

## 2023-09-12 NOTE — TELEPHONE ENCOUNTER
----- Message from Dionna Fernandez sent at 9/12/2023  9:58 AM CDT -----  Good morning.  Patient is scheduled for a PFT on 10/9 and her insurance is not contracted with us.  Please make other arrangements for your patient.  Thank you

## 2023-09-12 NOTE — TELEPHONE ENCOUNTER
----- Message from Riaz Ahumada sent at 9/12/2023  2:31 PM CDT -----  Type: Needs Medical Advice  Who Called: Elba [facility]   Symptoms (please be specific):  questions regarding PA for PFT   Best Call Back Number: [phone]    Additional Information: Elba stated she is reaching in regards to pt on behalf of pt regarding pt stating they were advised that PA was denied when [facility] has no history of PA being sent for pt to do PFT please advise asap thanks!

## 2023-09-12 NOTE — TELEPHONE ENCOUNTER
----- Message from Riaz Ahumada sent at 9/12/2023  2:31 PM CDT -----  Type: Needs Medical Advice  Who Called: Elba [facility]   Symptoms (please be specific):  questions regarding PA for PFT   Best Call Back Number: [phone]    Additional Information: Elba stated she is reaching in regards to pt on behalf of pt regarding pt stating they were advised that PA was denied when [facility] has no history of PA being sent for pt to do PFT please advise asap thanks!

## 2023-09-13 ENCOUNTER — PATIENT MESSAGE (OUTPATIENT)
Dept: PULMONOLOGY | Facility: CLINIC | Age: 58
End: 2023-09-13
Payer: MEDICAID

## 2023-09-18 DIAGNOSIS — J44.9 CHRONIC OBSTRUCTIVE PULMONARY DISEASE, UNSPECIFIED COPD TYPE: ICD-10-CM

## 2023-09-19 RX ORDER — IPRATROPIUM BROMIDE AND ALBUTEROL SULFATE 2.5; .5 MG/3ML; MG/3ML
3 SOLUTION RESPIRATORY (INHALATION) EVERY 6 HOURS PRN
Qty: 75 ML | Refills: 3 | Status: ON HOLD | OUTPATIENT
Start: 2023-09-19 | End: 2024-09-18

## 2023-09-20 DIAGNOSIS — J44.9 STEROID-DEPENDENT COPD: ICD-10-CM

## 2023-09-20 DIAGNOSIS — Z92.241 STEROID-DEPENDENT COPD: ICD-10-CM

## 2023-09-20 DIAGNOSIS — J44.9 CHRONIC OBSTRUCTIVE PULMONARY DISEASE, UNSPECIFIED COPD TYPE: ICD-10-CM

## 2023-09-20 RX ORDER — PREDNISONE 5 MG/1
5 TABLET ORAL DAILY
Qty: 30 TABLET | Refills: 0 | Status: SHIPPED | OUTPATIENT
Start: 2023-09-20 | End: 2023-10-20 | Stop reason: SDUPTHER

## 2023-09-26 ENCOUNTER — OFFICE VISIT (OUTPATIENT)
Dept: FAMILY MEDICINE | Facility: CLINIC | Age: 58
End: 2023-09-26
Payer: MEDICAID

## 2023-09-26 VITALS
HEART RATE: 68 BPM | BODY MASS INDEX: 21.1 KG/M2 | OXYGEN SATURATION: 92 % | SYSTOLIC BLOOD PRESSURE: 130 MMHG | HEIGHT: 60 IN | WEIGHT: 107.5 LBS | RESPIRATION RATE: 16 BRPM | DIASTOLIC BLOOD PRESSURE: 74 MMHG

## 2023-09-26 DIAGNOSIS — F41.9 ANXIETY: ICD-10-CM

## 2023-09-26 DIAGNOSIS — Z23 NEED FOR INFLUENZA VACCINATION: Primary | ICD-10-CM

## 2023-09-26 PROCEDURE — 1160F PR REVIEW ALL MEDS BY PRESCRIBER/CLIN PHARMACIST DOCUMENTED: ICD-10-PCS | Mod: CPTII,,, | Performed by: FAMILY MEDICINE

## 2023-09-26 PROCEDURE — 3075F SYST BP GE 130 - 139MM HG: CPT | Mod: CPTII,,, | Performed by: FAMILY MEDICINE

## 2023-09-26 PROCEDURE — 3078F DIAST BP <80 MM HG: CPT | Mod: CPTII,,, | Performed by: FAMILY MEDICINE

## 2023-09-26 PROCEDURE — 99214 OFFICE O/P EST MOD 30 MIN: CPT | Mod: PBBFAC | Performed by: FAMILY MEDICINE

## 2023-09-26 PROCEDURE — 99999PBSHW FLU VACCINE (QUAD) GREATER THAN OR EQUAL TO 3YO PRESERVATIVE FREE IM: ICD-10-PCS | Mod: PBBFAC,,,

## 2023-09-26 PROCEDURE — 99999 PR PBB SHADOW E&M-EST. PATIENT-LVL IV: ICD-10-PCS | Mod: PBBFAC,,, | Performed by: FAMILY MEDICINE

## 2023-09-26 PROCEDURE — 99999PBSHW FLU VACCINE (QUAD) GREATER THAN OR EQUAL TO 3YO PRESERVATIVE FREE IM: Mod: PBBFAC,,,

## 2023-09-26 PROCEDURE — 1159F MED LIST DOCD IN RCRD: CPT | Mod: CPTII,,, | Performed by: FAMILY MEDICINE

## 2023-09-26 PROCEDURE — 99213 PR OFFICE/OUTPT VISIT, EST, LEVL III, 20-29 MIN: ICD-10-PCS | Mod: S$PBB,,, | Performed by: FAMILY MEDICINE

## 2023-09-26 PROCEDURE — 1159F PR MEDICATION LIST DOCUMENTED IN MEDICAL RECORD: ICD-10-PCS | Mod: CPTII,,, | Performed by: FAMILY MEDICINE

## 2023-09-26 PROCEDURE — 99999 PR PBB SHADOW E&M-EST. PATIENT-LVL IV: CPT | Mod: PBBFAC,,, | Performed by: FAMILY MEDICINE

## 2023-09-26 PROCEDURE — 90686 IIV4 VACC NO PRSV 0.5 ML IM: CPT | Mod: PBBFAC

## 2023-09-26 PROCEDURE — 3008F BODY MASS INDEX DOCD: CPT | Mod: CPTII,,, | Performed by: FAMILY MEDICINE

## 2023-09-26 PROCEDURE — 99213 OFFICE O/P EST LOW 20 MIN: CPT | Mod: S$PBB,,, | Performed by: FAMILY MEDICINE

## 2023-09-26 PROCEDURE — 1160F RVW MEDS BY RX/DR IN RCRD: CPT | Mod: CPTII,,, | Performed by: FAMILY MEDICINE

## 2023-09-26 PROCEDURE — 3075F PR MOST RECENT SYSTOLIC BLOOD PRESS GE 130-139MM HG: ICD-10-PCS | Mod: CPTII,,, | Performed by: FAMILY MEDICINE

## 2023-09-26 PROCEDURE — 3008F PR BODY MASS INDEX (BMI) DOCUMENTED: ICD-10-PCS | Mod: CPTII,,, | Performed by: FAMILY MEDICINE

## 2023-09-26 PROCEDURE — 3078F PR MOST RECENT DIASTOLIC BLOOD PRESSURE < 80 MM HG: ICD-10-PCS | Mod: CPTII,,, | Performed by: FAMILY MEDICINE

## 2023-09-26 RX ORDER — CLONAZEPAM 0.5 MG/1
TABLET ORAL
Qty: 30 TABLET | Refills: 2 | Status: SHIPPED | OUTPATIENT
Start: 2023-09-26 | End: 2023-12-05 | Stop reason: SDUPTHER

## 2023-09-26 NOTE — PROGRESS NOTES
[facility] - Clinic Note    Subjective      Ms. White is a 57 y.o. female who presents to clinic for Follow-up (3mth follow up)    Patient has been doing well.    PMH Zayra has a past medical history of Anxiety, Arthritis, Asthma, Back pain, COPD (chronic obstructive pulmonary disease), and Pulmonary embolism.   PSXH Zayra has a past surgical history that includes Breast cyst excision;  section; Cholecystectomy; Biopsy; Incision and drainage of abscess (Left, 3/16/2020); Colonoscopy (N/A, 2022); and Esophagogastroduodenoscopy (N/A, 2022).    Zayra's family history includes Breast cancer in her sister.    Zayra reports that she has quit smoking. Her smoking use included vaping with nicotine. She has never used smokeless tobacco. She reports that she does not currently use alcohol. She reports that she does not use drugs.   XI Iverson is allergic to ativan [lorazepam].   MED Zayra has a current medication list which includes the following prescription(s): albuterol, albuterol-ipratropium, aspirin, atenolol, atorvastatin, cholecalciferol (vitamin d3), clonazepam, duloxetine, duloxetine, trelegy ellipta, latanoprost, levalbuterol, pantoprazole, prednisone, and tizanidine.     Review of Systems   Constitutional:  Negative for fever.   Respiratory:  Negative for cough, shortness of breath and wheezing.    Cardiovascular:  Negative for chest pain.   Musculoskeletal:  Negative for back pain.     Objective     /74 (BP Location: Left arm, Patient Position: Sitting, BP Method: Medium (Manual))   Pulse 68   Resp 16   Ht 5' (1.524 m)   Wt 48.8 kg (107 lb 8 oz)   LMP 2017   SpO2 (!) 92% Comment: on 2-3 liter  BMI 20.99 kg/m²     Physical Exam  Vitals and nursing note reviewed.   Constitutional:       General: She is not in acute distress.     Appearance: Normal appearance. She is well-developed. She is not diaphoretic.   HENT:      Head: Normocephalic and  atraumatic.      Right Ear: External ear normal.      Left Ear: External ear normal.   Eyes:      General:         Right eye: No discharge.         Left eye: No discharge.   Cardiovascular:      Rate and Rhythm: Normal rate and regular rhythm.      Heart sounds: Normal heart sounds.   Pulmonary:      Effort: Pulmonary effort is normal.      Breath sounds: Normal breath sounds. No wheezing or rales.   Skin:     General: Skin is warm and dry.   Neurological:      Mental Status: She is alert and oriented to person, place, and time. Mental status is at baseline.   Psychiatric:         Mood and Affect: Mood normal.         Behavior: Behavior normal.         Thought Content: Thought content normal.         Judgment: Judgment normal.        Assessment/Plan     1. Need for influenza vaccination  Influenza - Quadrivalent *Preferred* (6 months+) (PF)      2. Anxiety  clonazePAM (KLONOPIN) 0.5 MG tablet        Continue current medications as prescribed.   reviewed.  No red flags.  Flu vaccination today.    Future Appointments   Date Time Provider Department Center   10/9/2023  3:00 PM Chloe Leung, NP [facility] PULM [facility]   11/30/2023  3:30 PM [facility] CT1 [facility] CT [facility]   12/14/2023  9:00 AM [facility] MAMMO1 [facility] MAMMO [facility]   12/14/2023  9:15 AM [facility] US3 BREAST ONLY [facility] US [facility]         Eric Singh MD  Family Medicine  [facility]

## 2023-09-28 ENCOUNTER — TELEPHONE (OUTPATIENT)
Dept: PULMONOLOGY | Facility: CLINIC | Age: 58
End: 2023-09-28
Payer: MEDICAID

## 2023-10-02 ENCOUNTER — PATIENT MESSAGE (OUTPATIENT)
Dept: PULMONOLOGY | Facility: CLINIC | Age: 58
End: 2023-10-02
Payer: MEDICAID

## 2023-10-02 DIAGNOSIS — J44.9 CHRONIC OBSTRUCTIVE PULMONARY DISEASE, UNSPECIFIED COPD TYPE: ICD-10-CM

## 2023-10-07 RX ORDER — ALBUTEROL SULFATE 90 UG/1
AEROSOL, METERED RESPIRATORY (INHALATION)
Qty: 36 G | Refills: 6 | Status: SHIPPED | OUTPATIENT
Start: 2023-10-07 | End: 2023-12-18 | Stop reason: SDUPTHER

## 2023-10-09 ENCOUNTER — TELEPHONE (OUTPATIENT)
Dept: PULMONOLOGY | Facility: CLINIC | Age: 58
End: 2023-10-09
Payer: MEDICAID

## 2023-10-09 NOTE — TELEPHONE ENCOUNTER
----- Message from Allison Siddiqi sent at 10/9/2023 10:24 AM CDT -----  Type:  Reschedule Appointment Request    Caller is requesting to reschedule appointment.      Name of Caller:  Pt    When is the first available appointment?  Has appt 10/9 at 3 pm    Would the patient rather a call back or a response via [facility]?  Call back    Best Call Back Number:  [phone]    Additional Information:  Pt needs to reschedule appt.  Please call back to advise. Thanks!

## 2023-10-10 DIAGNOSIS — R91.1 LUNG NODULE: Primary | ICD-10-CM

## 2023-10-17 ENCOUNTER — OFFICE VISIT (OUTPATIENT)
Dept: PULMONOLOGY | Facility: CLINIC | Age: 58
End: 2023-10-17
Payer: MEDICAID

## 2023-10-17 VITALS
HEIGHT: 60 IN | SYSTOLIC BLOOD PRESSURE: 129 MMHG | OXYGEN SATURATION: 95 % | DIASTOLIC BLOOD PRESSURE: 83 MMHG | BODY MASS INDEX: 21.2 KG/M2 | HEART RATE: 94 BPM | WEIGHT: 108 LBS

## 2023-10-17 DIAGNOSIS — R91.8 MULTIPLE LUNG NODULES ON CT: ICD-10-CM

## 2023-10-17 DIAGNOSIS — Z87.891 PERSONAL HISTORY OF NICOTINE DEPENDENCE: ICD-10-CM

## 2023-10-17 DIAGNOSIS — J98.11 ATELECTASIS OF BOTH LUNGS: ICD-10-CM

## 2023-10-17 DIAGNOSIS — J44.9 STAGE 4 VERY SEVERE COPD BY GOLD CLASSIFICATION: Primary | ICD-10-CM

## 2023-10-17 DIAGNOSIS — R09.89 CHRONIC SINUS COMPLAINTS: ICD-10-CM

## 2023-10-17 DIAGNOSIS — Z79.52 CURRENT CHRONIC USE OF SYSTEMIC STEROIDS: ICD-10-CM

## 2023-10-17 PROCEDURE — 99214 OFFICE O/P EST MOD 30 MIN: CPT | Mod: S$PBB,,, | Performed by: NURSE PRACTITIONER

## 2023-10-17 PROCEDURE — 99214 PR OFFICE/OUTPT VISIT, EST, LEVL IV, 30-39 MIN: ICD-10-PCS | Mod: S$PBB,,, | Performed by: NURSE PRACTITIONER

## 2023-10-17 PROCEDURE — 99214 OFFICE O/P EST MOD 30 MIN: CPT | Mod: PBBFAC,PO | Performed by: NURSE PRACTITIONER

## 2023-10-17 PROCEDURE — 3008F BODY MASS INDEX DOCD: CPT | Mod: CPTII,,, | Performed by: NURSE PRACTITIONER

## 2023-10-17 PROCEDURE — 1159F PR MEDICATION LIST DOCUMENTED IN MEDICAL RECORD: ICD-10-PCS | Mod: CPTII,,, | Performed by: NURSE PRACTITIONER

## 2023-10-17 PROCEDURE — 3074F PR MOST RECENT SYSTOLIC BLOOD PRESSURE < 130 MM HG: ICD-10-PCS | Mod: CPTII,,, | Performed by: NURSE PRACTITIONER

## 2023-10-17 PROCEDURE — 3079F DIAST BP 80-89 MM HG: CPT | Mod: CPTII,,, | Performed by: NURSE PRACTITIONER

## 2023-10-17 PROCEDURE — 3008F PR BODY MASS INDEX (BMI) DOCUMENTED: ICD-10-PCS | Mod: CPTII,,, | Performed by: NURSE PRACTITIONER

## 2023-10-17 PROCEDURE — 99999 PR PBB SHADOW E&M-EST. PATIENT-LVL IV: CPT | Mod: PBBFAC,,, | Performed by: NURSE PRACTITIONER

## 2023-10-17 PROCEDURE — 3074F SYST BP LT 130 MM HG: CPT | Mod: CPTII,,, | Performed by: NURSE PRACTITIONER

## 2023-10-17 PROCEDURE — 3079F PR MOST RECENT DIASTOLIC BLOOD PRESSURE 80-89 MM HG: ICD-10-PCS | Mod: CPTII,,, | Performed by: NURSE PRACTITIONER

## 2023-10-17 PROCEDURE — 1159F MED LIST DOCD IN RCRD: CPT | Mod: CPTII,,, | Performed by: NURSE PRACTITIONER

## 2023-10-17 PROCEDURE — 99999 PR PBB SHADOW E&M-EST. PATIENT-LVL IV: ICD-10-PCS | Mod: PBBFAC,,, | Performed by: NURSE PRACTITIONER

## 2023-10-17 NOTE — PATIENT INSTRUCTIONS
Reach out to [facility] in regards to malfunctioning POC machine.    Continue to use current inhalers including Trelegy once per day and Albuterol as needed.    Continue to use the supplemental oxygen.    Continue to use the NIV machine nightly.    CT Chest due in November 2023. The one you had done in August shows a few new nodules - this could be scarring vs infectious as you endorse you were sick at that time. Can check Quantiferon Gold blood test.    Still would like to get PFT - will see if my staff can help facilitate scheduling.    Continue 5 mg prednisone per day.    Recommend stop using vape.    Nodifylung blood testing unrevealing.    Continue current medication regiment. Keep follow up appointment as scheduled. Please call the office if you have any questions or concerns.

## 2023-10-17 NOTE — PROGRESS NOTES
10/17/2023    Zayra White  In office visit    Chief Complaint   Patient presents with    3m F/U       HPI:  10/17/2023:  Using supplemental oxygen only as needed throughout the day - mostly when humidity levels are elevated - using at 2-3L via NC. Bleeding in supplemental oxygen into NIV nightly.  Using NIV nightly with much reported benefit.   Seen in [facility] on 7/27 for shortness of breath, diagnosed with COPD exacerbation - completed regimen of Levaquin and Prednisone 60 mg x 4 days.   Using Trelegy once per day and Albuterol only as needed, states depends on the day, when having a bad day may use 6x per day. Using nebulized treatments 1-2x per day.  Using Prednisone 5 mg per day.   Underwent CT Chest in Aug 2023 - states at that time may have had a cold/chest congestion - new nodules are noted throughout - recommend repeat CT in 3 months.  States did not undergo PFT - is having trouble scheduling due to insurance reasons.          07/07/2023:  Has NIV machine - using nightly without issue. Recently got Life 2000 portable vent for daytime use, states she couldn't tolerate - Life 2000 was recalled? States she is waiting for the DME company to come  the machine.  Using supplemental oxygen PRN throughout the day - at 2L via NC. Bleeding in supplemental oxygen into NIV nightly.  Using Trelegy once per day and Albuterol only as needed, states depends on the day, when having a bad day may use 6x per day. States has worse days when humidity, heat are high.  Using Prednisone 5 mg daily with benefit.   Is scheduled to have CT in August 2023.  Stopped smoking - now vaping with nicotine.   Patient Instructions   Overall doing well!  Continue to use current inhalers including Trelegy once per day and Albuterol as needed.  Continue to use the supplemental oxygen.  Continue to use the NIV machine nightly.  CT Chest due in August 2023.  Can check PFT later in the year and see if lung strength improved. Will  plan on PFT the same day as your next appt with me.   Continue 5 mg prednisone per day.  Recommend stop using vape.  Continue current medication regiment. Keep follow up appointment as scheduled. Please call the office if you have any questions or concerns.         03/13/2023:  Doing well overall from a resp perspective. No recent hospitalizations since prior office visit. Using Trelegy once per day and Albuterol only as needed, states depends on the day, when having a bad day may use 6x per day. Using supplemental oxygen PRN throughout the day - at 2L via NC and at night time in correlation with NIV machine. States tolerating NIV overall however does experience some difficulty with mask. States is looking forward to being more active throughout the day, traveling more. Inquiring whether portable NIV would be beneficial for daytime use.  Using Prednisone 5 mg daily with benefit. Not currently smoking however using vape with nicotine.   Patient Instructions   Overall doing well!  Continue to use current inhalers including Trelegy once per day and Albuterol as needed.  Continue to use the supplemental oxygen.  Continue to use the NIV machine nightly.  May benefit from portable NIV machine to be used throughout the day. Order placed, will see if insurance approves.  CT Chest due in August 2023.  Can check PFT later in the year and see if lung strength improved.  Continue 5 mg prednisone per day.  Continue current medication regiment. Keep follow up appointment as scheduled. Please call the office if you have any questions or concerns.         12/13/2022:  Using Trelegy once per day with benefit, Albuterol nebulized treatments every 6 hours as needed.  Using NIV machine for four hours per day - states ever since starting use has been feeling better. Still using supplemental oxygen, has decreased to only PRN use - not wearing O2 in clinic today and in no acute distress.  Has been on Prednisone 10 mg per day with great  "benefit, agreeable to decrease to 5 mg.   Not currently smoking - cravings under control, [facility] is concerned with [place] upcoming that cravings will increase.  Interested in lung transplant program - [facility] was told to call back once quit smoking for 2 consecutive months.  Patient Instructions   Continue current regiment - Trelegy once per day and Albuterol as needed for shortness of breath, wheezing, cough.  Prednisone - start alternating 10 mg and 5 mg every other day. Do this for two weeks and see who you tolerate. If you have no issues, can go down to 5 mg per day.   Continue NIV use, Continue supplemental oxygen.  Doing well from smoking cessation standpoint.  Alpha 1 test today in office.  CT Chest due Aug 2023  Continue current medication regiment. Keep follow up appointment as scheduled. Please call the office if you have any questions or concerns.         10/13/2022:  Per prior message on 9/29/2022:  "Patient messaged stating she is experiencing shortness of breath, worsening since initiation of NIV use.  I called the patient and discussed the case with her on 09/29/2022  at 0940.  Patient states she received her NIV machine approximately 2 weeks ago, has been using daily for at least 4 hours per day.  States she can tolerate the pressure well while using - with initially experiencing headaches with use, respiratory therapist changed pressures.  Patient states shortness of breath is worsening from baseline, cannot walk from living room to kitchen without getting short-winded, patient states she lives in a camper currently.  Patient using supplemental oxygen throughout the day and at night as needed.  On Trelegy once per day.  Using nebulized treatments every 4-6 hours.  Patient currently taking prednisone regimen that was prescribed at prior visit, 6 days total -states she has 2 days left.  Reports benefit with prednisone use.  Patient denies mucus production.  Patient will proceed with getting chest " "x-ray at this time.  May need repeat/prolonged prednisone taper.  Patient states she spoke with lung transplant service, however a consultation appointment was not scheduled due to current smoking status.  Patient states she is currently smoking 1-2 cigarettes per day."  Patient required ER visit on 10/8/2022 for COPD exacerabtion, was discharged with Rx for Azithromycin and 9 day regiment of Prednisone. Completed Zpack and currently has 3 days left of Prednisone.   States overall is having a good day, however yesterday was bad. States she was exposed to several sick contacts and developed bronchitis.   Currently using supplemental oxygen at night time and as needed throughout the day, dose ranges from 1-3lpm.   Using NIV machine nightly, states bleeds o2 in at 2L. Concerned that NIV is causing illness.  States hasn't smoked cigarettes in 2 days now.  Using Trelegy inhaler once per day, albuterol as needed approximately 2x per day. Taking neb treatments every 4-6 hours. States with Albuterol use she experiences hand shakiness, heart palpitations, jitters, and nervousness.   Patient Instructions   Continue Prednisone taper from ER until you finish. Then recommend going to daily Prednisone, 5-10 mg per day based on how you feel.   Continue Trelegy once per day and Albuterol as needed. Have ordered Xopenex for you to use in nebulizer machine since adverse reactions reported to Albuterol.  Continue supplemental oxygen use. Continue NIV use.  Doing well from smoking standpoint, continue smoking cessation.  Continue current medication regiment. Keep follow up appointment as scheduled. Please call the office if you have any questions or concerns.         8/16/2022:  Patient did not keep follow up - I saw patient last in Feb 2022.   Patient required hospitalization for SIRS, COPD at [facility] on 8/7 - was subsequently discharged home on 8/10 (no discharge summary available in Epic). Treated with IV Abx and Bipap " therapy while inpatient. States she was discharged home with Rx for steroid and abx, in which she completed both regiments. Patient has had THREE hospitalizations for COPD exacerbations in 2022 alone.  Still using Trelegy, one puff once per day with reported benefit. Using Albuterol inhaler as needed. Using nebulizer machine with Duoneb as needed.   Still smoking cigarettes, approximately 5 cigarettes per day.  Has supplemental oxygen at home and POC - wears as needed for shortness of breath, at night time - uses 1-2L via NC.  ABG obtained in the hospital on 8/7/2022 revealing pH 7.309, CO2 63.6, O2 122, and HCO3 32.0  Patient Instructions   Area of concern on CT from Feb - need to repeat CT Chest now.  I ordered a Lung Function Test to evaluate lung strength. Please complete prior to next appointment.   Continue Trelegy once per day.  Albuterol as needed. Prescription sent for Duoneb to be taken as needed.  NIV machine ordered given chronic resp failure secondary to COPD with recent hospitalization revealing high CO2 level.  Continue the use of supplemental oxygen.  Stop smoking.   Continue current medication regiment. Keep follow up appointment as scheduled. Please call the office if you have any questions or concerns.             2/18/2022: In office today with sister. Hx: COPD, Chronic Resp failure.  Recently Hospitalized at [facility] on 2/7 for COPD exacerbation, was treated with IV steroids and neb treatments, subsequently discharged home on 2/10 with Rx for Prednisone and Levaquin, which she reports completion and compliance.  Had Prior hospitalization in November 2021 at [facility] for COPD exacerbation - complicated hospitalization with intramuscular hematoma of the left rectus muscle of the anterior abdominal wall - general surgery consulted, resolved spontaneously. Discharged home at that time with supplemental oxygen. Patient also endorses prior ER visit in Feb 2021 for COPD exacerbation and  ER visit at [facility] [place] for same.  Currently taking Anora once per day - Albuterol rescue inhaler approx 2-5 times per day. Using Nebulizer (Albuterol and Ipatropium) scheduled per day.  Using oxygen at night time, and as needed throughout the day - 1L via NC.  Shortness of breath: Varies with time - states worse over the last week due to sinus congestion, weather change, pollen. Exertional, improves with rest and tripod position. Affecting ADLS.   Cough: Worsening, productive with clear mucous production. Worse in the morning, Denies wheezing, chest tightness. Taking Mucinex without benefit. Ran out of Tessalon pearls - they seemed to help.   Patient Instructions   You have two areas of suspicion noted on CT Chest.   One area in the R lower lung - initially noted on CT from 2/8/2022.  Area in the L lung has been present since at least 12/2020 - appears to have grown in size.  Will repeat CT in 3 months.  Would benefit from smoking cessation. Declines referral to smoking cessation program at this time. Has tried Chantix in past.  This inhaler Trelegy is your new daily inhaler. It contains an inhaled steroid component. Rinse mouth after each use due to risk for thrush development. If mouth or tongue develops white sores please contact the clinic and I will order a prescription mouth wash.   Continue to use albuterol rescue inhaler as needed.  Zyrtec - take one pill nightly for 30 days - see if it helps with sinus complaints.  Tessalon pearls as needed for cough.   I ordered a Lung Function Test to evaluate lung strength. Please complete prior to next appointment.         Social Hx: Lives alone - no animals in the home. Previously worked as labored. No Asbestosis exposure, Smoking Hx: Current smoker, started age 14 - 0.5 ppd use.   Family Hx: Mother Lung Cancer, No COPD, Son, Brother, Sister Asthma  Medical Hx: Previous pneumonia (did not require intubation) ; No previous shoulder/chest surgery      The chief  compliant  problem varies with instability at times.  PFSH:  Past Medical History:   Diagnosis Date    Anxiety     Arthritis     Asthma     Back pain     COPD (chronic obstructive pulmonary disease)     Pulmonary embolism     10 years ago         Past Surgical History:   Procedure Laterality Date    BIOPSY      right breast    BREAST CYST EXCISION       SECTION      CHOLECYSTECTOMY      COLONOSCOPY N/A 2022    Procedure: COLONOSCOPY;  Surgeon: Chavez Gonzales MD;  Location: [facility] ENDO;  Service: General;  Laterality: N/A;    ESOPHAGOGASTRODUODENOSCOPY N/A 2022    Procedure: ESOPHAGOGASTRODUODENOSCOPY (EGD);  Surgeon: Chavez Gonzales MD;  Location: [facility] ENDO;  Service: General;  Laterality: N/A;    INCISION AND DRAINAGE OF ABSCESS Left 3/16/2020    Procedure: INCISION AND DRAINAGE, ABSCESS;  Surgeon: Irvin Villarreal MD;  Location: [facility] OR;  Service: General;  Laterality: Left;     Social History     Tobacco Use    Smoking status: Former     Types: Vaping with nicotine    Smokeless tobacco: Never   Substance Use Topics    Alcohol use: Not Currently     Comment: occ    Drug use: Never     Family History   Problem Relation Age of Onset    Breast cancer Sister     Ovarian cancer Neg Hx      Review of patient's allergies indicates:   Allergen Reactions    Ativan [lorazepam] Itching     I have reviewed past medical, family, and social history. I have reviewed previous nurse notes.    Performance Status:The patient's activity level is functions out of house.      Review of Systems:  a review of eleven systems covering constitutional, Eye, HEENT, Psych, Respiratory, Cardiac, GI, , Musculoskeletal, Endocrine, Dermatologic was negative except for pertinent findings as listed ABOVE and below: pertinent positive as above, rest is good       Exam:Comprehensive exam done. /83 (BP Location: Left arm, Patient Position: Sitting, BP Method: Medium (Automatic))   Pulse 94   Ht 5' (1.524 m)   Wt  49 kg (108 lb 0.4 oz)   LMP 09/22/2017   SpO2 95% Comment: on room air at rest  BMI 21.10 kg/m²   Exam included Vitals as listed  Constitutional: She is oriented to person, place, and time. She appears well-developed. No distress.   Nose: Nose normal.   Mouth/Throat: Uvula is midline, oropharynx is clear and moist and mucous membranes are normal. No dental caries. No oropharyngeal exudate, posterior oropharyngeal edema, posterior oropharyngeal erythema or tonsillar abscesses.  Mallapatti (M) score 1  Eyes: Pupils are equal, round, and reactive to light.   Neck: No JVD present. No thyromegaly present.   Cardiovascular: Normal rate, regular rhythm and normal heart sounds. Exam reveals no gallop and no friction rub.   No murmur heard.  Pulmonary/Chest: Effort normal and breath sounds normal. No accessory muscle usage or stridor. No apnea and no tachypnea. No respiratory distress, on room air. Diminished breath sounds throughout, on room air, in no acute distress.  Abdominal: Soft. She exhibits no mass. There is no tenderness. No hepatosplenomegaly, hernias and normoactive bowel sounds  Musculoskeletal: Normal range of motion. exhibits no edema.   Neurological:  alert and oriented to person, place, and time. not disoriented.   Skin: Skin is warm and dry. Capillary refill takes less 2 sec. No cyanosis or erythema. No pallor. Nails show no clubbing.   Psychiatric: normal mood and affect. behavior is normal. Judgment and thought content normal.       Radiographs (ct chest and cxr) reviewed: view by direct vision     CT Chest Lung Screening Low Dose 8/30/2023 - new areas of concern, lung nodules    X-Ray Chest 1 View 10/8/2022 Impression:   Marked interval improvement in previously noted patchy segmental airspace consolidation at the left lung base.  There are a few tiny residual nodular densities at the left lung base.  Continued follow-up is recommended to ensure resolution..  Consider a follow-up chest x-ray in 4  weeks.     CT Chest Without Contrast 8/23/2022 FINDINGS:  Centrilobular and paraseptal emphysema.  Bandlike pleuroparenchymal thickening in the right upper lobe again noted.  Target nodule with spiculation in the lingula measures 1.4 cm on series 4, image 317.  3 mm nodule subpleural location right upper lobe series 4, image 301.  3 mm nodule left upper lobe series 4, image 174.  No filling defects central airways.  No pleural effusion or pneumothorax.   Heart size normal.  Coronary artery disease.  No mediastinal adenopathy.  Cholecystectomy clips.  Partially imaged advanced degenerative disc disease at several lower cervical levels.  Several mild remote compression fractures in the upper thoracic spine.   Impression:   1. Unchanged extent of pulmonary nodules, including spicular lesion in the lingula.  2. Coronary artery disease and pulmonary emphysema.        X-Ray Chest AP Portable  8/7/2022   FINDINGS:  Normal cardiomediastinal contour. No focal consolidation, pleural effusion or pneumothorax. The lungs are hyperinflated with flattening of the diaphragms.   Impression:   No acute cardiopulmonary abnormality.   Pulmonary emphysema.       CTA Chest Non-Coronary  2/8/2022   Impression:   No CTA evidence of pulmonary embolus.  Significant centrilobular emphysema.  Stable 1.4 cm spiculated mass of the left lingula.  New 1.9 cm band of spiculated density in the posterior right lung gutter likely represents atelectasis.  Follow-up CT in 3 months is recommended however.      X-Ray Chest AP Portable  2/8/2022   Impression:   COPD.         Labs reviewed   Patient's labs were reviewed including CBC and CMP    Lab Results   Component Value Date    WBC 14.00 (H) 07/27/2023    RBC 3.70 (L) 07/27/2023    HGB 12.4 07/27/2023    HCT 35.3 (L) 07/27/2023    MCV 95 07/27/2023    MCH 33.5 (H) 07/27/2023    MCHC 35.1 07/27/2023    RDW 11.7 07/27/2023     07/27/2023    MPV 9.2 07/27/2023    GRAN 12.5 (H) 07/27/2023    GRAN  89.4 (H) 07/27/2023    LYMPH 0.8 (L) 07/27/2023    LYMPH 6.0 (L) 07/27/2023    MONO 0.5 07/27/2023    MONO 3.4 (L) 07/27/2023    EOS 0.1 07/27/2023    BASO 0.05 07/27/2023    EOSINOPHIL 0.4 07/27/2023    BASOPHIL 0.4 07/27/2023   CMP  Sodium   Date Value Ref Range Status   07/27/2023 136 136 - 145 mmol/L Final     Potassium   Date Value Ref Range Status   07/27/2023 3.8 3.5 - 5.1 mmol/L Final     Chloride   Date Value Ref Range Status   07/27/2023 100 95 - 110 mmol/L Final     CO2   Date Value Ref Range Status   07/27/2023 26 23 - 29 mmol/L Final     Glucose   Date Value Ref Range Status   07/27/2023 100 70 - 110 mg/dL Final     BUN   Date Value Ref Range Status   07/27/2023 10 6 - 20 mg/dL Final     Creatinine   Date Value Ref Range Status   07/27/2023 0.7 0.5 - 1.4 mg/dL Final     Calcium   Date Value Ref Range Status   07/27/2023 8.8 8.7 - 10.5 mg/dL Final     Total Protein   Date Value Ref Range Status   07/27/2023 6.5 6.0 - 8.4 g/dL Final     Albumin   Date Value Ref Range Status   07/27/2023 3.2 (L) 3.5 - 5.2 g/dL Final     Total Bilirubin   Date Value Ref Range Status   07/27/2023 0.6 0.1 - 1.0 mg/dL Final     Comment:     For infants and newborns, interpretation of results should be based  on gestational age, weight and in agreement with clinical  observations.    Premature Infant recommended reference ranges:  Up to 24 hours.............<8.0 mg/dL  Up to 48 hours............<12.0 mg/dL  3-5 days..................<15.0 mg/dL  6-29 days.................<15.0 mg/dL       Alkaline Phosphatase   Date Value Ref Range Status   07/27/2023 70 55 - 135 U/L Final     AST   Date Value Ref Range Status   07/27/2023 17 10 - 40 U/L Final     ALT   Date Value Ref Range Status   07/27/2023 15 10 - 44 U/L Final     Anion Gap   Date Value Ref Range Status   07/27/2023 10 8 - 16 mmol/L Final     eGFR   Date Value Ref Range Status   07/27/2023 >60.0 >60 mL/min/1.73 m^2 Final         PFT will be done and results to be  reviewed  Pulmonary Functions Testing Results:    Patient has chronic respiratory failure secondary to COPD.  Patient will need a specific targeted tidal volume which cannot be provided via a CPAP or BiPAP.  This patient will require a set tidal volume to ensure CO2 levels were lowered adequately; as well as, to assist in establishing proper diaphragmatic functions.  Therefore, NIV is being ordered due to the severe state of this patient's disease.  Without this therapy, the patient runs a higher risk of expiration and readmittance.  Patient is benefiting from nightly NIV use however would also benefit from portable NIV for daytime use to help reduce shortness of breath and increase patient's overall daytime physical activities. Supplemental oxygen during the day is not enough support to complete patient's daily activities.     Plan:  Clinical impression is ambiguous and will need repeated evaluation wrt.    Zayra was seen today for 3m f/u.    Diagnoses and all orders for this visit:    Stage 4 very severe COPD by GOLD classification    Multiple lung nodules on CT    Current chronic use of systemic steroids    Chronic sinus complaints    Personal history of nicotine dependence    Atelectasis of both lungs          Follow up in about 3 months (around 1/17/2024), or if symptoms worsen or fail to improve.    Discussed with patient above for education the following:      Patient Instructions   Reach out to AERMIGUEL ANGELE in regards to malfunctioning POC machine.    Continue to use current inhalers including Trelegy once per day and Albuterol as needed.    Continue to use the supplemental oxygen.    Continue to use the NIV machine nightly.    CT Chest due in November 2023. The one you had done in August shows a few new nodules - this could be scarring vs infectious as you endorse you were sick at that time. Can check Quantiferon Gold blood test.    Still would like to get PFT - will see if my staff can help facilitate  scheduling.    Continue 5 mg prednisone per day.    Recommend stop using vape.    Nodifylung blood testing unrevealing.    Continue current medication regiment. Keep follow up appointment as scheduled. Please call the office if you have any questions or concerns.

## 2023-10-20 DIAGNOSIS — J44.9 CHRONIC OBSTRUCTIVE PULMONARY DISEASE, UNSPECIFIED COPD TYPE: ICD-10-CM

## 2023-10-20 DIAGNOSIS — Z92.241 STEROID-DEPENDENT COPD: ICD-10-CM

## 2023-10-20 DIAGNOSIS — J44.9 STEROID-DEPENDENT COPD: ICD-10-CM

## 2023-10-20 RX ORDER — PREDNISONE 5 MG/1
5 TABLET ORAL DAILY
Qty: 30 TABLET | Refills: 0 | Status: SHIPPED | OUTPATIENT
Start: 2023-10-20 | End: 2023-11-21 | Stop reason: SDUPTHER

## 2023-10-23 ENCOUNTER — PATIENT MESSAGE (OUTPATIENT)
Dept: FAMILY MEDICINE | Facility: CLINIC | Age: 58
End: 2023-10-23
Payer: MEDICAID

## 2023-10-23 DIAGNOSIS — R11.0 NAUSEA: Primary | ICD-10-CM

## 2023-10-23 RX ORDER — ONDANSETRON 4 MG/1
4 TABLET, ORALLY DISINTEGRATING ORAL EVERY 8 HOURS PRN
Qty: 30 TABLET | Refills: 2 | Status: SHIPPED | OUTPATIENT
Start: 2023-10-23 | End: 2024-03-05 | Stop reason: SDUPTHER

## 2023-10-24 ENCOUNTER — PATIENT MESSAGE (OUTPATIENT)
Dept: PULMONOLOGY | Facility: CLINIC | Age: 58
End: 2023-10-24
Payer: MEDICAID

## 2023-10-25 ENCOUNTER — TELEPHONE (OUTPATIENT)
Dept: PULMONOLOGY | Facility: CLINIC | Age: 58
End: 2023-10-25
Payer: MEDICAID

## 2023-10-25 NOTE — TELEPHONE ENCOUNTER
Scheduled  ----- Message from Thi Jason NP sent at 10/25/2023  1:30 PM CDT -----  Please schedule patient for blood work that was ordered if not already scheduled. Can do on same day as CT chest.

## 2023-10-27 ENCOUNTER — TELEPHONE (OUTPATIENT)
Dept: PULMONOLOGY | Facility: CLINIC | Age: 58
End: 2023-10-27
Payer: MEDICAID

## 2023-11-17 ENCOUNTER — DOCUMENTATION ONLY (OUTPATIENT)
Dept: PULMONOLOGY | Facility: CLINIC | Age: 58
End: 2023-11-17
Payer: MEDICAID

## 2023-11-17 DIAGNOSIS — E78.5 HYPERLIPIDEMIA, UNSPECIFIED HYPERLIPIDEMIA TYPE: ICD-10-CM

## 2023-11-17 RX ORDER — ATORVASTATIN CALCIUM 20 MG/1
20 TABLET, FILM COATED ORAL DAILY
Qty: 90 TABLET | Refills: 3 | Status: ON HOLD | OUTPATIENT
Start: 2023-11-17

## 2023-11-17 NOTE — PROGRESS NOTES
PFT reviewed from 11/07/2023 - Done at [facility]    FEV1/FVC 34  FEV1 0.82L or 32.8%  %  DLCO 34%

## 2023-11-21 ENCOUNTER — PATIENT MESSAGE (OUTPATIENT)
Dept: PULMONOLOGY | Facility: CLINIC | Age: 58
End: 2023-11-21
Payer: MEDICAID

## 2023-11-21 DIAGNOSIS — J44.9 STEROID-DEPENDENT COPD: ICD-10-CM

## 2023-11-21 DIAGNOSIS — J44.9 CHRONIC OBSTRUCTIVE PULMONARY DISEASE, UNSPECIFIED COPD TYPE: ICD-10-CM

## 2023-11-21 DIAGNOSIS — Z92.241 STEROID-DEPENDENT COPD: ICD-10-CM

## 2023-11-21 RX ORDER — PREDNISONE 5 MG/1
5 TABLET ORAL DAILY
Qty: 30 TABLET | Refills: 0 | Status: SHIPPED | OUTPATIENT
Start: 2023-11-21 | End: 2023-12-18 | Stop reason: SDUPTHER

## 2023-11-21 NOTE — TELEPHONE ENCOUNTER
Patient has one pill left and needs a refill on prednisone.  Chloe Leung NP is out of the office this week.  Pt states she states prednisone.

## 2023-11-30 ENCOUNTER — PATIENT MESSAGE (OUTPATIENT)
Dept: PULMONOLOGY | Facility: CLINIC | Age: 58
End: 2023-11-30
Payer: MEDICAID

## 2023-12-01 ENCOUNTER — HOSPITAL ENCOUNTER (OUTPATIENT)
Dept: RADIOLOGY | Facility: HOSPITAL | Age: 58
Discharge: HOME | End: 2023-12-01
Attending: NURSE PRACTITIONER
Payer: MEDICAID

## 2023-12-01 DIAGNOSIS — R91.1 LUNG NODULE: ICD-10-CM

## 2023-12-01 PROCEDURE — 71250 CT THORAX DX C-: CPT | Mod: 26,,, | Performed by: RADIOLOGY

## 2023-12-01 PROCEDURE — 71250 CT THORAX DX C-: CPT | Mod: TC

## 2023-12-01 PROCEDURE — 71250 CT CHEST WITHOUT CONTRAST: ICD-10-PCS | Mod: 26,,, | Performed by: RADIOLOGY

## 2023-12-04 DIAGNOSIS — R91.8 MULTIPLE LUNG NODULES ON CT: Primary | ICD-10-CM

## 2023-12-05 ENCOUNTER — OFFICE VISIT (OUTPATIENT)
Dept: FAMILY MEDICINE | Facility: CLINIC | Age: 58
End: 2023-12-05
Payer: MEDICAID

## 2023-12-05 VITALS
OXYGEN SATURATION: 96 % | HEART RATE: 68 BPM | SYSTOLIC BLOOD PRESSURE: 138 MMHG | DIASTOLIC BLOOD PRESSURE: 74 MMHG | HEIGHT: 60 IN | WEIGHT: 109.38 LBS | BODY MASS INDEX: 21.47 KG/M2 | RESPIRATION RATE: 19 BRPM

## 2023-12-05 DIAGNOSIS — R45.89 ANXIETY ABOUT HEALTH: ICD-10-CM

## 2023-12-05 DIAGNOSIS — Z79.899 CHRONIC PRESCRIPTION BENZODIAZEPINE USE: ICD-10-CM

## 2023-12-05 DIAGNOSIS — F41.9 ANXIETY: ICD-10-CM

## 2023-12-05 DIAGNOSIS — J44.9 CHRONIC OBSTRUCTIVE PULMONARY DISEASE, UNSPECIFIED COPD TYPE: Primary | ICD-10-CM

## 2023-12-05 DIAGNOSIS — R91.8 MULTIPLE LUNG NODULES ON CT: Primary | ICD-10-CM

## 2023-12-05 PROCEDURE — 99213 OFFICE O/P EST LOW 20 MIN: CPT | Mod: PBBFAC | Performed by: FAMILY MEDICINE

## 2023-12-05 PROCEDURE — 1159F MED LIST DOCD IN RCRD: CPT | Mod: CPTII,,, | Performed by: FAMILY MEDICINE

## 2023-12-05 PROCEDURE — 99214 PR OFFICE/OUTPT VISIT, EST, LEVL IV, 30-39 MIN: ICD-10-PCS | Mod: S$PBB,,, | Performed by: FAMILY MEDICINE

## 2023-12-05 PROCEDURE — 99999 PR PBB SHADOW E&M-EST. PATIENT-LVL III: CPT | Mod: PBBFAC,,, | Performed by: FAMILY MEDICINE

## 2023-12-05 PROCEDURE — 3075F PR MOST RECENT SYSTOLIC BLOOD PRESS GE 130-139MM HG: ICD-10-PCS | Mod: CPTII,,, | Performed by: FAMILY MEDICINE

## 2023-12-05 PROCEDURE — 3008F BODY MASS INDEX DOCD: CPT | Mod: CPTII,,, | Performed by: FAMILY MEDICINE

## 2023-12-05 PROCEDURE — 80326 AMPHETAMINES 5 OR MORE: CPT | Performed by: FAMILY MEDICINE

## 2023-12-05 PROCEDURE — 99999 PR PBB SHADOW E&M-EST. PATIENT-LVL III: ICD-10-PCS | Mod: PBBFAC,,, | Performed by: FAMILY MEDICINE

## 2023-12-05 PROCEDURE — 1159F PR MEDICATION LIST DOCUMENTED IN MEDICAL RECORD: ICD-10-PCS | Mod: CPTII,,, | Performed by: FAMILY MEDICINE

## 2023-12-05 PROCEDURE — 3008F PR BODY MASS INDEX (BMI) DOCUMENTED: ICD-10-PCS | Mod: CPTII,,, | Performed by: FAMILY MEDICINE

## 2023-12-05 PROCEDURE — 3078F DIAST BP <80 MM HG: CPT | Mod: CPTII,,, | Performed by: FAMILY MEDICINE

## 2023-12-05 PROCEDURE — 80355 GABAPENTIN NON-BLOOD: CPT | Performed by: FAMILY MEDICINE

## 2023-12-05 PROCEDURE — 99214 OFFICE O/P EST MOD 30 MIN: CPT | Mod: S$PBB,,, | Performed by: FAMILY MEDICINE

## 2023-12-05 PROCEDURE — 3075F SYST BP GE 130 - 139MM HG: CPT | Mod: CPTII,,, | Performed by: FAMILY MEDICINE

## 2023-12-05 PROCEDURE — 3078F PR MOST RECENT DIASTOLIC BLOOD PRESSURE < 80 MM HG: ICD-10-PCS | Mod: CPTII,,, | Performed by: FAMILY MEDICINE

## 2023-12-05 RX ORDER — DULOXETIN HYDROCHLORIDE 20 MG/1
CAPSULE, DELAYED RELEASE ORAL
Qty: 180 CAPSULE | Refills: 3 | Status: ON HOLD | OUTPATIENT
Start: 2023-12-05

## 2023-12-05 RX ORDER — CLONAZEPAM 0.5 MG/1
TABLET ORAL
Qty: 30 TABLET | Refills: 2 | Status: SHIPPED | OUTPATIENT
Start: 2023-12-05 | End: 2024-03-05 | Stop reason: SDUPTHER

## 2023-12-05 NOTE — PROGRESS NOTES
Subjective:       Patient ID: Zayra White is a 58 y.o. female.    Chief Complaint: Establish Care and Medication Refill    Ms. White is a 58-year-old female here to establish care.  She has COPD, anxiety, hyperlipidemia, CT lung nodules, chronic use of steroids, nicotine dependence and atelectasis bilaterally. She has no complaints but has had a CT chest recently that showed pulmonary nodules, one which is a new 9mm spiculated nodule, and a PET scan is recommended. Follow up with Chloe Leung is on Dec 18th    Medication Refill    Her last labs were done 07/27/2023.  They have been reviewed and results are as follows:  1. CBC with [place] 198[address] (maybe due to chronic steroid use) and mildly decreased hematocrit of 35.3   2. CMP within normal limits except for an albumin of 3.2  3. Troponin 0.006 and venous lactate 0.8  4. Flu a and B negative SARS negative    Review of Systems   Constitutional:         Thin, ill appearing 59 yo female   Respiratory:  Positive for shortness of breath.         Decreased breath sounds due to severe emphysema   Psychiatric/Behavioral:  Positive for decreased concentration. Negative for agitation. The patient is nervous/anxious and is hyperactive.        Patient Active Problem List   Diagnosis    Chronic obstructive pulmonary disease    Anxiety about health    Left inguinal pain    Rectus sheath hematoma    Hyperlipidemia    Multiple lung nodules on CT    Current chronic use of systemic steroids    Chronic sinus complaints    Personal history of nicotine dependence    Atelectasis of both lungs       Objective:      Physical Exam  Vitals and nursing note reviewed.   Constitutional:       Appearance: Normal appearance. She is ill-appearing.   HENT:      Head: Normocephalic.      Nose: Nose normal.   Eyes:      Extraocular Movements: Extraocular movements intact.      Conjunctiva/sclera: Conjunctivae normal.      Pupils: Pupils are equal, round, and reactive to light.    Cardiovascular:      Rate and Rhythm: Normal rate and regular rhythm.      Heart sounds: Normal heart sounds. No murmur heard.  Pulmonary:      Effort: Respiratory distress present.      Comments: Decreased breath sounds throughout  Severe COPD and emphysema  Musculoskeletal:         General: Normal range of motion.      Cervical back: Normal range of motion and neck supple.   Skin:     General: Skin is warm and dry.   Neurological:      General: No focal deficit present.      Mental Status: She is alert and oriented to person, place, and time.   Psychiatric:         Mood and Affect: Mood normal.         Behavior: Behavior normal.         Lab Results   Component Value Date    WBC 14.00 (H) 07/27/2023    HGB 12.4 07/27/2023    HCT 35.3 (L) 07/27/2023     07/27/2023    CHOL 184 03/18/2021    TRIG 149 03/18/2021    HDL 60 03/18/2021    ALT 15 07/27/2023    AST 17 07/27/2023     07/27/2023    K 3.8 07/27/2023     07/27/2023    CREATININE 0.7 07/27/2023    BUN 10 07/27/2023    CO2 26 07/27/2023    TSH 0.292 (L) 02/08/2022    INR 0.9 11/30/2021    HGBA1C 5.6 03/18/2021     The 10-year ASCVD risk score (Bjorn CADET, et al., 2019) is: 3.4%    Values used to calculate the score:      Age: 58 years      Sex: Female      Is Non- : No      Diabetic: No      Tobacco smoker: No      Systolic Blood Pressure: 138 mmHg      Is BP treated: No      HDL Cholesterol: 60 mg/dL      Total Cholesterol: 184 mg/dL  Visit Vitals  /74 (BP Location: Right arm, Patient Position: Sitting, BP Method: Medium (Manual))   Pulse 68   Resp 19   Ht 5' (1.524 m)   Wt 49.6 kg (109 lb 6.4 oz)   LMP 09/22/2017   SpO2 96%   BMI 21.37 kg/m²      Assessment:       1. Chronic obstructive pulmonary disease, unspecified COPD type    2. Anxiety about health    3. Chronic prescription benzodiazepine use    4. Anxiety        Plan:       1. Chronic obstructive pulmonary disease, unspecified COPD type  Assessment &  Plan:  On O2, continuous at 2L      2. Anxiety about health  Assessment & Plan:  On klonopin    Orders:  -     DULoxetine (CYMBALTA) 20 MG capsule; Take 2 capsules po daily  Dispense: 180 capsule; Refill: 3    3. Chronic prescription benzodiazepine use  -     POCT Urine Drug Screen Pain Management  -     Cancel: Pain Clinic Drug Screen  -     clonazePAM (KLONOPIN) 0.5 MG tablet; Take ONE tablet (0.5 mg total) by mouth daily as needed for Anxiety **THANK YOU**  Dispense: 30 tablet; Refill: 2  -     [facility] Clinic Drug Screen    4. Anxiety  -     clonazePAM (KLONOPIN) 0.5 MG tablet; Take ONE tablet (0.5 mg total) by mouth daily as needed for Anxiety **THANK YOU**  Dispense: 30 tablet; Refill: 2  -     Pain Clinic Drug Screen       Follow up in about 3 months (around 3/5/2024), or if symptoms worsen or fail to improve.      Future Appointments       Date Provider Specialty Appt Notes    12/14/2023  Radiology     12/14/2023  Radiology     12/18/2023 Chloe Leung NP Pulmonology 2 month f/u

## 2023-12-05 NOTE — PROGRESS NOTES
Talked with patient on 12/05/2023 at approx 0930 regarding recent CT Chest - Thi Jason NP discussed findings with Dr. 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 Patient states she does not bring up sputum on a regular basis - states she is not currently bringing up sputum but may start later today. States she is going to her PCP today, will  sputum cups from their office. I ordered Resp culture, AF[facility] order already in [facility]. Patient is to message me in the next two weeks if she is unable to submit sputum samples and if not, will discuss bronchoscopy further.  Patient verbalizes understanding and acceptance of plan of care. Repeat CT in 3 months anticipated (Due in March 2024).

## 2023-12-06 ENCOUNTER — PATIENT MESSAGE (OUTPATIENT)
Dept: PULMONOLOGY | Facility: CLINIC | Age: 58
End: 2023-12-06
Payer: MEDICAID

## 2023-12-07 ENCOUNTER — TELEPHONE (OUTPATIENT)
Dept: PULMONOLOGY | Facility: CLINIC | Age: 58
End: 2023-12-07
Payer: MEDICAID

## 2023-12-07 NOTE — TELEPHONE ENCOUNTER
Spoke to daughter over the phone. Ms. White is trying to submit sputum samples but is having a hard time getting mucus up. Daughter states they will discuss Bronch at upcoming visit.

## 2023-12-07 NOTE — TELEPHONE ENCOUNTER
----- Message from Karo Michel sent at 12/7/2023 10:31 AM CST -----  Type:  Patient Returning Call    Who Called:pts daughter Shell      Who Left Message for Patient:Yosi    Does the patient know what this is regarding?:yes    Would the patient rather a call back or a response via [facility]? Call back    Best Call Back Number:[phone]    Additional Information:      Please call Back to advise. Thanks!

## 2023-12-07 NOTE — TELEPHONE ENCOUNTER
----- Message from Vero Simons LPN sent at 12/7/2023  1:26 PM CST -----    ----- Message -----  From: Karo Michel  Sent: 12/7/2023  10:32 AM CST  To: Mad[facility] Corona Staff    Type:  Patient Returning Call    Who Called:pts daughter Shell      Who Left Message for Patient:Yosi    Does the patient know what this is regarding?:yes    Would the patient rather a call back or a response via [facility]? Call back    Best Call Back Number:[phone]    Additional Information:      Please call Back to advise. Thanks!

## 2023-12-11 LAB
6MAM UR QL: NOT DETECTED
7AMINOCLONAZEPAM UR QL: PRESENT
A-OH ALPRAZ UR QL: NOT DETECTED
ALPHA-OH-MIDAZOLAM: NOT DETECTED
ALPRAZ UR QL: NOT DETECTED
AMPHET UR QL SCN: NOT DETECTED
ANNOTATION COMMENT IMP: NORMAL
BARBITURATES UR QL: NEGATIVE
BUPRENORPHINE UR QL: NOT DETECTED
BZE UR QL: NEGATIVE
CARBOXYTHC UR QL: NEGATIVE
CARISOPRODOL UR QL: NEGATIVE
CLONAZEPAM UR QL: NOT DETECTED
CODEINE UR QL: NOT DETECTED
CREAT UR-MCNC: 55.2 MG/DL (ref 20–400)
DIAZEPAM UR QL: NOT DETECTED
ETHYL GLUCURONIDE UR QL: NEGATIVE
FENTANYL UR QL: NOT DETECTED
GABAPENTIN: NOT DETECTED
HYDROCODONE UR QL: NOT DETECTED
HYDROMORPHONE UR QL: NOT DETECTED
LORAZEPAM UR QL: NOT DETECTED
MDA UR QL: NOT DETECTED
MDEA UR QL: NOT DETECTED
MDMA UR QL: NOT DETECTED
ME-PHENIDATE UR QL: NOT DETECTED
METHADONE UR QL: NEGATIVE
METHAMPHET UR QL: NOT DETECTED
MIDAZOLAM UR QL SCN: NOT DETECTED
MORPHINE UR QL: NOT DETECTED
NALOXONE: NOT DETECTED
NORBUPRENORPHINE UR QL CFM: NOT DETECTED
NORDIAZEPAM UR QL: NOT DETECTED
NORFENTANYL UR QL: NOT DETECTED
NORHYDROCODONE UR QL CFM: NOT DETECTED
NORMEPERIDINE UR QL CFM: NOT DETECTED
NOROXYCODONE UR QL CFM: NOT DETECTED
NOROXYMORPHONE UR QL SCN: NOT DETECTED
OXAZEPAM UR QL: NOT DETECTED
OXYCODONE UR QL: NOT DETECTED
OXYMORPHONE UR QL: NOT DETECTED
PATHOLOGY STUDY: NORMAL
PCP UR QL: NEGATIVE
PHENTERMINE UR QL: NOT DETECTED
PREGABALIN: NOT DETECTED
SERVICE CMNT-IMP: NORMAL
TAPENTADOL UR QL SCN: NOT DETECTED
TAPENTADOL UR QL SCN: NOT DETECTED
TEMAZEPAM UR QL: NOT DETECTED
TRAMADOL UR QL: NEGATIVE
ZOLPIDEM METABOLITE: NOT DETECTED
ZOLPIDEM UR QL: NOT DETECTED

## 2023-12-11 NOTE — PROGRESS NOTES
Written by Polly Lemos MD on 12/11/2023  1:04 PM CST  Seen by patient Zayra White on 12/11/2023  1:13 PM

## 2023-12-18 ENCOUNTER — OFFICE VISIT (OUTPATIENT)
Dept: PULMONOLOGY | Facility: CLINIC | Age: 58
End: 2023-12-18
Payer: MEDICAID

## 2023-12-18 VITALS
DIASTOLIC BLOOD PRESSURE: 72 MMHG | HEIGHT: 60 IN | OXYGEN SATURATION: 94 % | HEART RATE: 90 BPM | BODY MASS INDEX: 20.46 KG/M2 | WEIGHT: 104.19 LBS | SYSTOLIC BLOOD PRESSURE: 149 MMHG

## 2023-12-18 DIAGNOSIS — Z79.52 CURRENT CHRONIC USE OF SYSTEMIC STEROIDS: ICD-10-CM

## 2023-12-18 DIAGNOSIS — J44.9 CHRONIC OBSTRUCTIVE PULMONARY DISEASE, UNSPECIFIED COPD TYPE: Primary | ICD-10-CM

## 2023-12-18 DIAGNOSIS — R09.89 CHRONIC SINUS COMPLAINTS: ICD-10-CM

## 2023-12-18 DIAGNOSIS — J96.11 CHRONIC RESPIRATORY FAILURE WITH HYPOXIA AND HYPERCAPNIA: ICD-10-CM

## 2023-12-18 DIAGNOSIS — J98.11 ATELECTASIS OF BOTH LUNGS: ICD-10-CM

## 2023-12-18 DIAGNOSIS — Z87.891 PERSONAL HISTORY OF NICOTINE DEPENDENCE: ICD-10-CM

## 2023-12-18 DIAGNOSIS — Z92.241 STEROID-DEPENDENT COPD: ICD-10-CM

## 2023-12-18 DIAGNOSIS — R91.8 MULTIPLE LUNG NODULES ON CT: ICD-10-CM

## 2023-12-18 DIAGNOSIS — J44.9 STAGE 4 VERY SEVERE COPD BY GOLD CLASSIFICATION: ICD-10-CM

## 2023-12-18 DIAGNOSIS — J44.9 STEROID-DEPENDENT COPD: ICD-10-CM

## 2023-12-18 DIAGNOSIS — J96.12 CHRONIC RESPIRATORY FAILURE WITH HYPOXIA AND HYPERCAPNIA: ICD-10-CM

## 2023-12-18 PROCEDURE — 3008F PR BODY MASS INDEX (BMI) DOCUMENTED: ICD-10-PCS | Mod: CPTII,,, | Performed by: NURSE PRACTITIONER

## 2023-12-18 PROCEDURE — 3008F BODY MASS INDEX DOCD: CPT | Mod: CPTII,,, | Performed by: NURSE PRACTITIONER

## 2023-12-18 PROCEDURE — 99213 OFFICE O/P EST LOW 20 MIN: CPT | Mod: PBBFAC,PO | Performed by: NURSE PRACTITIONER

## 2023-12-18 PROCEDURE — 1159F MED LIST DOCD IN RCRD: CPT | Mod: CPTII,,, | Performed by: NURSE PRACTITIONER

## 2023-12-18 PROCEDURE — 3077F SYST BP >= 140 MM HG: CPT | Mod: CPTII,,, | Performed by: NURSE PRACTITIONER

## 2023-12-18 PROCEDURE — 3078F PR MOST RECENT DIASTOLIC BLOOD PRESSURE < 80 MM HG: ICD-10-PCS | Mod: CPTII,,, | Performed by: NURSE PRACTITIONER

## 2023-12-18 PROCEDURE — 99214 OFFICE O/P EST MOD 30 MIN: CPT | Mod: S$PBB,,, | Performed by: NURSE PRACTITIONER

## 2023-12-18 PROCEDURE — 3077F PR MOST RECENT SYSTOLIC BLOOD PRESSURE >= 140 MM HG: ICD-10-PCS | Mod: CPTII,,, | Performed by: NURSE PRACTITIONER

## 2023-12-18 PROCEDURE — 99999 PR PBB SHADOW E&M-EST. PATIENT-LVL III: CPT | Mod: PBBFAC,,, | Performed by: NURSE PRACTITIONER

## 2023-12-18 PROCEDURE — 99214 PR OFFICE/OUTPT VISIT, EST, LEVL IV, 30-39 MIN: ICD-10-PCS | Mod: S$PBB,,, | Performed by: NURSE PRACTITIONER

## 2023-12-18 PROCEDURE — 99999 PR PBB SHADOW E&M-EST. PATIENT-LVL III: ICD-10-PCS | Mod: PBBFAC,,, | Performed by: NURSE PRACTITIONER

## 2023-12-18 PROCEDURE — 1159F PR MEDICATION LIST DOCUMENTED IN MEDICAL RECORD: ICD-10-PCS | Mod: CPTII,,, | Performed by: NURSE PRACTITIONER

## 2023-12-18 PROCEDURE — 3078F DIAST BP <80 MM HG: CPT | Mod: CPTII,,, | Performed by: NURSE PRACTITIONER

## 2023-12-18 RX ORDER — ALBUTEROL SULFATE 90 UG/1
AEROSOL, METERED RESPIRATORY (INHALATION)
Qty: 36 G | Refills: 6 | Status: ON HOLD | OUTPATIENT
Start: 2023-12-18

## 2023-12-18 RX ORDER — PREDNISONE 5 MG/1
5 TABLET ORAL DAILY
Qty: 90 TABLET | Refills: 0 | Status: SHIPPED | OUTPATIENT
Start: 2023-12-18 | End: 2024-03-05 | Stop reason: SDUPTHER

## 2023-12-18 NOTE — PATIENT INSTRUCTIONS
Continue to use current inhalers including Trelegy once per day and Albuterol as needed.    Continue to use the supplemental oxygen.    Continue to use the NIV machine nightly.    CT Chest from November 2023 reviewed - Will discuss with MD further. MAC vs other infectious etiology? You currently do not bring up mucous - may need bronchoscopy? In the least, will plan for repeat CT in 3 months (Due in March 2024).    Continue 5 mg prednisone per day. Can trial up to 20 mg per day only as needed then go back to 5 mg.    Need to get DEXA bone scan.    Nodifylung blood testing unrevealing.    Consider Dupixent in the future.    Continue current medication regiment. Keep follow up appointment as scheduled. Please call the office if you have any questions or concerns.

## 2023-12-18 NOTE — PROGRESS NOTES
12/19/2023    Zayra White  In office visit    Chief Complaint   Patient presents with    2m F/U       HPI:  12/18/2023: Hx: COPD, Chronic Resp Failure on NIV nightly, Former smoker, Lung nodules   Last week had episodes of shortness of breath with recent weather change. States she almost felt as if she could have used a visit to the ER.   Using Trelegy once per day and Albuterol as needed, approx use 6 times per day as of late. Using Duonebs as needed as well.   Taking Prednisone 5 mg per day. States at times she feels like she could taper up for a day or so.  Denies current mucous production - did not submit sputum samples. States if she does get mucous up, it's clear in color.  Using supplemental oxygen only as needed throughout the day at 1-2L via NC. Using NIV nightly.  Stopped vaping since prior office appointment.         12/05/2023: Documentation Only:   Talked with patient on 12/05/2023 at approx 0930 regarding recent CT Chest - Thi Jason NP discussed findings with Dr. 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 Patient states she does not bring up sputum on a regular basis - states she is not currently bringing up sputum but may start later today. States she is going to her PCP today, will  sputum cups from their office. I ordered Resp culture, AFB order already in [facility]. Patient is to message me in the next two weeks if she is unable to submit sputum samples and if not, will discuss bronchoscopy further.  Patient verbalizes understanding and acceptance of plan of care. Repeat CT in 3 months anticipated (Due in March 2024).    Using supplemental oxygen only as needed throughout the day at 1-2L via NC. Using NIV nightly.        10/17/2023:  Using supplemental oxygen only as needed throughout the day - mostly when humidity levels are elevated - using at 2-3L via NC. Bleeding in supplemental oxygen into NIV nightly.  Using NIV nightly with much reported benefit.   Seen in [facility] on 7/27 for  shortness of breath, diagnosed with COPD exacerbation - completed regimen of Levaquin and Prednisone 60 mg x 4 days.   Using Trelegy once per day and Albuterol only as needed, states depends on the day, when having a bad day may use 6x per day. Using nebulized treatments 1-2x per day.  Using Prednisone 5 mg per day.   Underwent CT Chest in Aug 2023 - states at that time may have had a cold/chest congestion - new nodules are noted throughout - recommend repeat CT in 3 months.  States did not undergo PFT - is having trouble scheduling due to insurance reasons.  Patient Instructions   [facility].  Continue to use current inhalers including Trelegy once per day and Albuterol as needed.  Continue to use the supplemental oxygen.  Continue to use the NIV machine nightly.  CT Chest due in November 2023. The one you had done in August shows a few new nodules - this could be scarring vs infectious as you endorse you were sick at that time. Can check Quantiferon Gold blood test.  Still would like to get PFT - will see if my staff can help facilitate scheduling.  Continue 5 mg prednisone per day.  Recommend stop using vape.  Nodifylung blood testing unrevealing.  Continue current medication regiment. Keep follow up appointment as scheduled. Please call the office if you have any questions or concerns.           07/07/2023:  Has NIV machine - using nightly without issue. Recently got Life 2000 portable vent for daytime use, states she couldn't tolerate - Life 2000 was recalled? States she is waiting for the DME company to come  the machine.  Using supplemental oxygen PRN throughout the day - at 2L via NC. Bleeding in supplemental oxygen into NIV nightly.  Using Trelegy once per day and Albuterol only as needed, states depends on the day, when having a bad day may use 6x per day. States has worse days when humidity, heat are high.  Using Prednisone 5 mg daily with  benefit.   Is scheduled to have CT in August 2023.  Stopped smoking - now vaping with nicotine.   Patient Instructions   Overall doing well!  Continue to use current inhalers including Trelegy once per day and Albuterol as needed.  Continue to use the supplemental oxygen.  Continue to use the NIV machine nightly.  CT Chest due in August 2023.  Can check PFT later in the year and see if lung strength improved. Will plan on PFT the same day as your next appt with me.   Continue 5 mg prednisone per day.  Recommend stop using vape.  Continue current medication regiment. Keep follow up appointment as scheduled. Please call the office if you have any questions or concerns.         03/13/2023:  Doing well overall from a resp perspective. No recent hospitalizations since prior office visit. Using Trelegy once per day and Albuterol only as needed, states depends on the day, when having a bad day may use 6x per day. Using supplemental oxygen PRN throughout the day - at 2L via NC and at night time in correlation with NIV machine. States tolerating NIV overall however does experience some difficulty with mask. States is looking forward to being more active throughout the day, traveling more. Inquiring whether portable NIV would be beneficial for daytime use.  Using Prednisone 5 mg daily with benefit. Not currently smoking however using vape with nicotine.   Patient Instructions   Overall doing well!  Continue to use current inhalers including Trelegy once per day and Albuterol as needed.  Continue to use the supplemental oxygen.  Continue to use the NIV machine nightly.  May benefit from portable NIV machine to be used throughout the day. Order placed, will see if insurance approves.  CT Chest due in August 2023.  Can check PFT later in the year and see if lung strength improved.  Continue 5 mg prednisone per day.  Continue current medication regiment. Keep follow up appointment as scheduled. Please call the office if you have  "any questions or concerns.         12/13/2022:  Using Trelegy once per day with benefit, Albuterol nebulized treatments every 6 hours as needed.  Using NIV machine for four hours per day - states ever since starting use has been feeling better. Still using supplemental oxygen, has decreased to only PRN use - not wearing O2 in clinic today and in no acute distress.  Has been on Prednisone 10 mg per day with great benefit, agreeable to decrease to 5 mg.   Not currently smoking - cravings under control, [facility] is concerned with Kuldeep upcoming that cravings will increase.  Interested in lung transplant program - [facility] was told to call back once quit smoking for 2 consecutive months.  Patient Instructions   Continue current regiment - Trelegy once per day and Albuterol as needed for shortness of breath, wheezing, cough.  Prednisone - start alternating 10 mg and 5 mg every other day. Do this for two weeks and see who you tolerate. If you have no issues, can go down to 5 mg per day.   Continue NIV use, Continue supplemental oxygen.  Doing well from smoking cessation standpoint.  Alpha 1 test today in office.  CT Chest due Aug 2023  Continue current medication regiment. Keep follow up appointment as scheduled. Please call the office if you have any questions or concerns.         10/13/2022:  Per prior message on 9/29/2022:  "Patient messaged stating she is experiencing shortness of breath, worsening since initiation of NIV use.  I called the patient and discussed the case with her on 09/29/2022  at 0940.  Patient states she received her NIV machine approximately 2 weeks ago, has been using daily for at least 4 hours per day.  States she can tolerate the pressure well while using - with initially experiencing headaches with use, respiratory therapist changed pressures.  Patient states shortness of breath is worsening from baseline, cannot walk from living room to kitchen without getting short-winded, patient states she " "lives in a camper currently.  Patient using supplemental oxygen throughout the day and at night as needed.  On Trelegy once per day.  Using nebulized treatments every 4-6 hours.  Patient currently taking prednisone regimen that was prescribed at prior visit, 6 days total -states she has 2 days left.  Reports benefit with prednisone use.  Patient denies mucus production.  Patient will proceed with getting chest x-ray at this time.  May need repeat/prolonged prednisone taper.  Patient states she spoke with lung transplant service, however a consultation appointment was not scheduled due to current smoking status.  Patient states she is currently smoking 1-2 cigarettes per day."  Patient required ER visit on 10/8/2022 for COPD exacerabtion, was discharged with Rx for Azithromycin and 9 day regiment of Prednisone. Completed Zpack and currently has 3 days left of Prednisone.   States overall is having a good day, however yesterday was bad. States she was exposed to several sick contacts and developed bronchitis.   Currently using supplemental oxygen at night time and as needed throughout the day, dose ranges from 1-3lpm.   Using NIV machine nightly, states bleeds o2 in at 2L. Concerned that NIV is causing illness.  States hasn't smoked cigarettes in 2 days now.  Using Trelegy inhaler once per day, albuterol as needed approximately 2x per day. Taking neb treatments every 4-6 hours. States with Albuterol use she experiences hand shakiness, heart palpitations, jitters, and nervousness.   Patient Instructions   Continue Prednisone taper from ER until you finish. Then recommend going to daily Prednisone, 5-10 mg per day based on how you feel.   Continue Trelegy once per day and Albuterol as needed. Have ordered Xopenex for you to use in nebulizer machine since adverse reactions reported to Albuterol.  Continue supplemental oxygen use. Continue NIV use.  Doing well from smoking standpoint, continue smoking cessation.  Continue " current medication regiment. Keep follow up appointment as scheduled. Please call the office if you have any questions or concerns.         8/16/2022:  Patient did not keep follow up - I saw patient last in Feb 2022.   Patient required hospitalization for SIRS, COPD at [facility] on 8/7 - was subsequently discharged home on 8/10 (no discharge summary available in Epic). Treated with IV Abx and Bipap therapy while inpatient. States she was discharged home with Rx for steroid and abx, in which she completed both regiments. Patient has had THREE hospitalizations for COPD exacerbations in 2022 alone.  Still using Trelegy, one puff once per day with reported benefit. Using Albuterol inhaler as needed. Using nebulizer machine with Duoneb as needed.   Still smoking cigarettes, approximately 5 cigarettes per day.  Has supplemental oxygen at home and POC - wears as needed for shortness of breath, at night time - uses 1-2L via NC.  ABG obtained in the hospital on 8/7/2022 revealing pH 7.309, CO2 63.6, O2 122, and HCO3 32.0  Patient Instructions   Area of concern on CT from Feb - need to repeat CT Chest now.  I ordered a Lung Function Test to evaluate lung strength. Please complete prior to next appointment.   Continue Trelegy once per day.  Albuterol as needed. Prescription sent for Duoneb to be taken as needed.  NIV machine ordered given chronic resp failure secondary to COPD with recent hospitalization revealing high CO2 level.  Continue the use of supplemental oxygen.  Stop smoking.   Continue current medication regiment. Keep follow up appointment as scheduled. Please call the office if you have any questions or concerns.             2/18/2022: In office today with sister. Hx: COPD, Chronic Resp failure.  Recently Hospitalized at [facility] on 2/7 for COPD exacerbation, was treated with IV steroids and neb treatments, subsequently discharged home on 2/10 with Rx for Prednisone and Levaquin, which she reports  completion and compliance.  Had Prior hospitalization in November 2021 at [facility] for COPD exacerbation - complicated hospitalization with intramuscular hematoma of the left rectus muscle of the anterior abdominal wall - general surgery consulted, resolved spontaneously. Discharged home at that time with supplemental oxygen. Patient also endorses prior ER visit in Feb 2021 for COPD exacerbation and ER visit at [facility] for same.  Currently taking Anora once per day - Albuterol rescue inhaler approx 2-5 times per day. Using Nebulizer (Albuterol and Ipatropium) scheduled per day.  Using oxygen at night time, and as needed throughout the day - 1L via NC.  Shortness of breath: Varies with time - states worse over the last week due to sinus congestion, weather change, pollen. Exertional, improves with rest and tripod position. Affecting ADLS.   Cough: Worsening, productive with clear mucous production. Worse in the morning, Denies wheezing, chest tightness. Taking Mucinex without benefit. Ran out of Tessalon pearls - they seemed to help.   Patient Instructions   You have two areas of suspicion noted on CT Chest.   One area in the R lower lung - initially noted on CT from 2/8/2022.  Area in the L lung has been present since at least 12/2020 - appears to have grown in size.  Will repeat CT in 3 months.  Would benefit from smoking cessation. Declines referral to smoking cessation program at this time. Has tried Chantix in past.  This inhaler Trelegy is your new daily inhaler. It contains an inhaled steroid component. Rinse mouth after each use due to risk for thrush development. If mouth or tongue develops white sores please contact the clinic and I will order a prescription mouth wash.   Continue to use albuterol rescue inhaler as needed.  Zyrtec - take one pill nightly for 30 days - see if it helps with sinus complaints.  Tessalon pearls as needed for cough.   I ordered a Lung Function Test to evaluate  lung strength. Please complete prior to next appointment.         Social Hx: Lives alone - no animals in the home. Previously worked as labored. No Asbestosis exposure, Smoking Hx: Current smoker, started age 14 - 0.5 ppd use.   Family Hx: Mother Lung Cancer, No COPD, Son, Brother, Sister Asthma  Medical Hx: Previous pneumonia (did not require intubation) ; No previous shoulder/chest surgery      The chief compliant  problem varies with instability at times.  PFSH:  Past Medical History:   Diagnosis Date    Anxiety     Arthritis     Asthma     Back pain     COPD (chronic obstructive pulmonary disease)     Pulmonary embolism     10 years ago         Past Surgical History:   Procedure Laterality Date    BIOPSY      right breast    BREAST CYST EXCISION       SECTION      CHOLECYSTECTOMY      COLONOSCOPY N/A 2022    Procedure: COLONOSCOPY;  Surgeon: Chavez Gonzales MD;  Location: [facility] ENDO;  Service: General;  Laterality: N/A;    ESOPHAGOGASTRODUODENOSCOPY N/A 2022    Procedure: ESOPHAGOGASTRODUODENOSCOPY (EGD);  Surgeon: Chavez Gonzales MD;  Location: [facility] ENDO;  Service: General;  Laterality: N/A;    INCISION AND DRAINAGE OF ABSCESS Left 3/16/2020    Procedure: INCISION AND DRAINAGE, ABSCESS;  Surgeon: Irvin Villarreal MD;  Location: [facility] OR;  Service: General;  Laterality: Left;     Social History     Tobacco Use    Smoking status: Former     Types: Vaping with nicotine    Smokeless tobacco: Never   Substance Use Topics    Alcohol use: Not Currently     Comment: occ    Drug use: Never     Family History   Problem Relation Age of Onset    Breast cancer Sister     Ovarian cancer Neg Hx      Review of patient's allergies indicates:   Allergen Reactions    Ativan [lorazepam] Itching     I have reviewed past medical, family, and social history. I have reviewed previous nurse notes.    Performance Status:The patient's activity level is functions out of house.      Review of Systems:  a review  of eleven systems covering constitutional, Eye, HEENT, Psych, Respiratory, Cardiac, GI, , Musculoskeletal, Endocrine, Dermatologic was negative except for pertinent findings as listed ABOVE and below: pertinent positive as above, rest is good       Exam:Comprehensive exam done. BP (!) 149/72 (BP Location: Left arm, Patient Position: Sitting, BP Method: Medium (Automatic))   Pulse 90   Ht 5' (1.524 m)   Wt 47.3 kg (104 lb 2.7 oz)   LMP 09/22/2017   SpO2 (!) 94% Comment: on room air at rest  BMI 20.34 kg/m²   Exam included Vitals as listed  Constitutional: She is oriented to person, place, and time. She appears well-developed. No distress.   Nose: Nose normal.   Mouth/Throat: Uvula is midline, oropharynx is clear and moist and mucous membranes are normal. No dental caries. No oropharyngeal exudate, posterior oropharyngeal edema, posterior oropharyngeal erythema or tonsillar abscesses.  Mallapatti (M) score 1  Eyes: Pupils are equal, round, and reactive to light.   Neck: No JVD present. No thyromegaly present.   Cardiovascular: Normal rate, regular rhythm and normal heart sounds. Exam reveals no gallop and no friction rub.   No murmur heard.  Pulmonary/Chest: Effort normal and breath sounds normal. No accessory muscle usage or stridor. No apnea and no tachypnea. No respiratory distress, on room air. Diminished breath sounds throughout, on room air, in no acute distress.  Abdominal: Soft. She exhibits no mass. There is no tenderness. No hepatosplenomegaly, hernias and normoactive bowel sounds  Musculoskeletal: Normal range of motion. exhibits no edema.   Neurological:  alert and oriented to person, place, and time. not disoriented.   Skin: Skin is warm and dry. Capillary refill takes less 2 sec. No cyanosis or erythema. No pallor. Nails show no clubbing.   Psychiatric: normal mood and affect. behavior is normal. Judgment and thought content normal.       Radiographs (ct chest and cxr) reviewed: view by direct  vision     CT Chest without Contrast: November 2023 - multiple lung nodules, emphysema     CT Chest Lung Screening Low Dose 8/30/2023 - new areas of concern, lung nodules    X-Ray Chest 1 View 10/8/2022 Impression:   Marked interval improvement in previously noted patchy segmental airspace consolidation at the left lung base.  There are a few tiny residual nodular densities at the left lung base.  Continued follow-up is recommended to ensure resolution..  Consider a follow-up chest x-ray in 4 weeks.     CT Chest Without Contrast 8/23/2022 FINDINGS:  Centrilobular and paraseptal emphysema.  Bandlike pleuroparenchymal thickening in the right upper lobe again noted.  Target nodule with spiculation in the lingula measures 1.4 cm on series 4, image 317.  3 mm nodule subpleural location right upper lobe series 4, image 301.  3 mm nodule left upper lobe series 4, image 174.  No filling defects central airways.  No pleural effusion or pneumothorax.   Heart size normal.  Coronary artery disease.  No mediastinal adenopathy.  Cholecystectomy clips.  Partially imaged advanced degenerative disc disease at several lower cervical levels.  Several mild remote compression fractures in the upper thoracic spine.   Impression:   1. Unchanged extent of pulmonary nodules, including spicular lesion in the lingula.  2. Coronary artery disease and pulmonary emphysema.        X-Ray Chest AP Portable  8/7/2022   FINDINGS:  Normal cardiomediastinal contour. No focal consolidation, pleural effusion or pneumothorax. The lungs are hyperinflated with flattening of the diaphragms.   Impression:   No acute cardiopulmonary abnormality.   Pulmonary emphysema.       CTA Chest Non-Coronary  2/8/2022   Impression:   No CTA evidence of pulmonary embolus.  Significant centrilobular emphysema.  Stable 1.4 cm spiculated mass of the left lingula.  New 1.9 cm band of spiculated density in the posterior right lung gutter likely represents atelectasis.   Follow-up CT in 3 months is recommended however.      X-Ray Chest AP Portable  2/8/2022   Impression:   COPD.         Labs reviewed   Patient's labs were reviewed including CBC and CMP    Lab Results   Component Value Date    WBC 14.00 (H) 07/27/2023    RBC 3.70 (L) 07/27/2023    HGB 12.4 07/27/2023    HCT 35.3 (L) 07/27/2023    MCV 95 07/27/2023    MCH 33.5 (H) 07/27/2023    MCHC 35.1 07/27/2023    RDW 11.7 07/27/2023     07/27/2023    MPV 9.2 07/27/2023    GRAN 12.5 (H) 07/27/2023    GRAN 89.4 (H) 07/27/2023    LYMPH 0.8 (L) 07/27/2023    LYMPH 6.0 (L) 07/27/2023    MONO 0.5 07/27/2023    MONO 3.4 (L) 07/27/2023    EOS 0.1 07/27/2023    BASO 0.05 07/27/2023    EOSINOPHIL 0.4 07/27/2023    BASOPHIL 0.4 07/27/2023   CMP  Sodium   Date Value Ref Range Status   07/27/2023 136 136 - 145 mmol/L Final     Potassium   Date Value Ref Range Status   07/27/2023 3.8 3.5 - 5.1 mmol/L Final     Chloride   Date Value Ref Range Status   07/27/2023 100 95 - 110 mmol/L Final     CO2   Date Value Ref Range Status   07/27/2023 26 23 - 29 mmol/L Final     Glucose   Date Value Ref Range Status   07/27/2023 100 70 - 110 mg/dL Final     BUN   Date Value Ref Range Status   07/27/2023 10 6 - 20 mg/dL Final     Creatinine   Date Value Ref Range Status   07/27/2023 0.7 0.5 - 1.4 mg/dL Final     Calcium   Date Value Ref Range Status   07/27/2023 8.8 8.7 - 10.5 mg/dL Final     Total Protein   Date Value Ref Range Status   07/27/2023 6.5 6.0 - 8.4 g/dL Final     Albumin   Date Value Ref Range Status   07/27/2023 3.2 (L) 3.5 - 5.2 g/dL Final     Total Bilirubin   Date Value Ref Range Status   07/27/2023 0.6 0.1 - 1.0 mg/dL Final     Comment:     For infants and newborns, interpretation of results should be based  on gestational age, weight and in agreement with clinical  observations.    Premature Infant recommended reference ranges:  Up to 24 hours.............<8.0 mg/dL  Up to 48 hours............<12.0 mg/dL  3-5  days..................<15.0 mg/dL  6-29 days.................<15.0 mg/dL       Alkaline Phosphatase   Date Value Ref Range Status   07/27/2023 70 55 - 135 U/L Final     AST   Date Value Ref Range Status   07/27/2023 17 10 - 40 U/L Final     ALT   Date Value Ref Range Status   07/27/2023 15 10 - 44 U/L Final     Anion Gap   Date Value Ref Range Status   07/27/2023 10 8 - 16 mmol/L Final     eGFR   Date Value Ref Range Status   07/27/2023 >60.0 >60 mL/min/1.73 m^2 Final         PFT will be done and results to be reviewed  Pulmonary Functions Testing Results:    Patient has chronic respiratory failure secondary to COPD.  Patient will need a specific targeted tidal volume which cannot be provided via a CPAP or BiPAP.  This patient will require a set tidal volume to ensure CO2 levels were lowered adequately; as well as, to assist in establishing proper diaphragmatic functions.  Therefore, NIV is being ordered due to the severe state of this patient's disease.  Without this therapy, the patient runs a higher risk of expiration and readmittance.  Patient is benefiting from nightly NIV use however would also benefit from portable NIV for daytime use to help reduce shortness of breath and increase patient's overall daytime physical activities. Supplemental oxygen during the day is not enough support to complete patient's daily activities.     Plan:  Clinical impression is ambiguous and will need repeated evaluation wrtCollins    Zayra was seen today for 2m f/u.    Diagnoses and all orders for this visit:    Chronic obstructive pulmonary disease, unspecified COPD type  -     albuterol (VENTOLIN HFA) 90 mcg/actuation inhaler; inhale 2 puff by inhalation route  every 4 - 6 hours as needed  -     predniSONE (DELTASONE) 5 MG tablet; Take 1 tablet (5 mg total) by mouth once daily.    Steroid-dependent COPD  -     predniSONE (DELTASONE) 5 MG tablet; Take 1 tablet (5 mg total) by mouth once daily.    Multiple lung nodules on  CT    Stage 4 very severe COPD by GOLD classification    Chronic sinus complaints    Current chronic use of systemic steroids    Personal history of nicotine dependence    Atelectasis of both lungs    Chronic respiratory failure with hypoxia and hypercapnia            Follow up in about 3 months (around 3/18/2024), or if symptoms worsen or fail to improve.    Discussed with patient above for education the following:      Patient Instructions   Continue to use current inhalers including Trelegy once per day and Albuterol as needed.    Continue to use the supplemental oxygen.    Continue to use the NIV machine nightly.    CT Chest from November 2023 reviewed - Will discuss with MD further. MAC vs other infectious etiology? You currently do not bring up mucous - may need bronchoscopy? In the least, will plan for repeat CT in 3 months (Due in March 2024).    Continue 5 mg prednisone per day. Can trial up to 20 mg per day only as needed then go back to 5 mg.    Need to get DEXA bone scan.    Nodifylung blood testing unrevealing.    Consider Dupixent in the future.    Continue current medication regiment. Keep follow up appointment as scheduled. Please call the office if you have any questions or concerns.     *Discussed with JH on 12/19/2023 - CT Chest reviewed - new nodule 9 mm to DHAVAL - not present on CT from August 2023 - recommend repeat CT in three months for follow up. Discussed with patient via telephone on 12/19/2023 at 1603. Ramya ANDREW.

## 2023-12-28 ENCOUNTER — HOSPITAL ENCOUNTER (OUTPATIENT)
Dept: RADIOLOGY | Facility: HOSPITAL | Age: 58
Discharge: HOME | End: 2023-12-28
Attending: NURSE PRACTITIONER
Payer: MEDICAID

## 2023-12-28 ENCOUNTER — HOSPITAL ENCOUNTER (INPATIENT)
Facility: HOSPITAL | Age: 58
LOS: 5 days | Discharge: HOME | End: 2024-01-03
Attending: EMERGENCY MEDICINE | Admitting: STUDENT IN AN ORGANIZED HEALTH CARE EDUCATION/TRAINING PROGRAM
Payer: MEDICAID

## 2023-12-28 DIAGNOSIS — R06.02 SOB (SHORTNESS OF BREATH): ICD-10-CM

## 2023-12-28 DIAGNOSIS — R92.8 ABNORMAL MAMMOGRAM OF RIGHT BREAST: ICD-10-CM

## 2023-12-28 DIAGNOSIS — B34.9 VIRAL ILLNESS: ICD-10-CM

## 2023-12-28 DIAGNOSIS — J44.1 COPD EXACERBATION: Primary | ICD-10-CM

## 2023-12-28 LAB
ALBUMIN SERPL BCP-MCNC: 4 G/DL (ref 3.5–5.2)
ALLENS TEST: ABNORMAL
ALP SERPL-CCNC: 76 U/L (ref 55–135)
ALT SERPL W/O P-5'-P-CCNC: 12 U/L (ref 10–44)
ANION GAP SERPL CALC-SCNC: 15 MMOL/L (ref 8–16)
AST SERPL-CCNC: 21 U/L (ref 10–40)
BASOPHILS # BLD AUTO: 0.05 K/UL (ref 0–0.2)
BASOPHILS NFR BLD: 0.2 % (ref 0–1.9)
BILIRUB SERPL-MCNC: 0.6 MG/DL (ref 0.1–1)
BNP SERPL-MCNC: 54 PG/ML (ref 0–99)
BUN SERPL-MCNC: 10 MG/DL (ref 6–20)
CALCIUM SERPL-MCNC: 9 MG/DL (ref 8.7–10.5)
CHLORIDE SERPL-SCNC: 103 MMOL/L (ref 95–110)
CO2 SERPL-SCNC: 25 MMOL/L (ref 23–29)
CREAT SERPL-MCNC: 0.8 MG/DL (ref 0.5–1.4)
DELSYS: ABNORMAL
DIFFERENTIAL METHOD BLD: ABNORMAL
EOSINOPHIL # BLD AUTO: 0 K/UL (ref 0–0.5)
EOSINOPHIL NFR BLD: 0 % (ref 0–8)
EP: 5
ERYTHROCYTE [DISTWIDTH] IN BLOOD BY AUTOMATED COUNT: 12 % (ref 11.5–14.5)
EST. GFR  (NO RACE VARIABLE): >60 ML/MIN/1.73 M^2
FIO2: 40
GLUCOSE SERPL-MCNC: 123 MG/DL (ref 70–110)
HCO3 UR-SCNC: 28.7 MMOL/L (ref 24–28)
HCT VFR BLD AUTO: 43.6 % (ref 37–48.5)
HGB BLD-MCNC: 15 G/DL (ref 12–16)
IMM GRANULOCYTES # BLD AUTO: 0.08 K/UL (ref 0–0.04)
IMM GRANULOCYTES NFR BLD AUTO: 0.4 % (ref 0–0.5)
INFLUENZA A, MOLECULAR: NEGATIVE
INFLUENZA B, MOLECULAR: NEGATIVE
IP: 10
LACTATE SERPL-SCNC: 2 MMOL/L (ref 0.5–2.2)
LIPASE SERPL-CCNC: 16 U/L (ref 4–60)
LYMPHOCYTES # BLD AUTO: 1.4 K/UL (ref 1–4.8)
LYMPHOCYTES NFR BLD: 6.5 % (ref 18–48)
MAGNESIUM SERPL-MCNC: 2 MG/DL (ref 1.6–2.6)
MCH RBC QN AUTO: 33.4 PG (ref 27–31)
MCHC RBC AUTO-ENTMCNC: 34.4 G/DL (ref 32–36)
MCV RBC AUTO: 97 FL (ref 82–98)
MODE: ABNORMAL
MONOCYTES # BLD AUTO: 0.4 K/UL (ref 0.3–1)
MONOCYTES NFR BLD: 2 % (ref 4–15)
NEUTROPHILS # BLD AUTO: 19.5 K/UL (ref 1.8–7.7)
NEUTROPHILS NFR BLD: 90.9 % (ref 38–73)
NRBC BLD-RTO: 0 /100 WBC
PCO2 BLDA: 57.3 MMHG (ref 35–45)
PH SMN: 7.31 [PH] (ref 7.35–7.45)
PLATELET # BLD AUTO: 360 K/UL (ref 150–450)
PMV BLD AUTO: 8.9 FL (ref 9.2–12.9)
PO2 BLDA: 102 MMHG (ref 80–100)
POC BE: 2 MMOL/L
POC SATURATED O2: 97 % (ref 95–100)
POTASSIUM SERPL-SCNC: 4.4 MMOL/L (ref 3.5–5.1)
PROT SERPL-MCNC: 6.9 G/DL (ref 6–8.4)
RBC # BLD AUTO: 4.49 M/UL (ref 4–5.4)
SAMPLE: ABNORMAL
SARS-COV-2 RDRP RESP QL NAA+PROBE: NEGATIVE
SITE: ABNORMAL
SODIUM SERPL-SCNC: 143 MMOL/L (ref 136–145)
SPECIMEN SOURCE: NORMAL
TROPONIN I SERPL DL<=0.01 NG/ML-MCNC: 0.01 NG/ML (ref 0–0.03)
WBC # BLD AUTO: 21.47 K/UL (ref 3.9–12.7)

## 2023-12-28 PROCEDURE — 27000221 HC OXYGEN, UP TO 24 HOURS

## 2023-12-28 PROCEDURE — 87154 CUL TYP ID BLD PTHGN 6+ TRGT: CPT | Performed by: EMERGENCY MEDICINE

## 2023-12-28 PROCEDURE — 80053 COMPREHEN METABOLIC PANEL: CPT | Performed by: EMERGENCY MEDICINE

## 2023-12-28 PROCEDURE — 93010 ELECTROCARDIOGRAM REPORT: CPT | Mod: ,,, | Performed by: INTERNAL MEDICINE

## 2023-12-28 PROCEDURE — 93005 ELECTROCARDIOGRAM TRACING: CPT

## 2023-12-28 PROCEDURE — 63600175 PHARM REV CODE 636 W HCPCS: Mod: UD | Performed by: EMERGENCY MEDICINE

## 2023-12-28 PROCEDURE — 71045 X-RAY EXAM CHEST 1 VIEW: CPT | Mod: TC

## 2023-12-28 PROCEDURE — 71045 X-RAY EXAM CHEST 1 VIEW: CPT | Mod: 26,,, | Performed by: RADIOLOGY

## 2023-12-28 PROCEDURE — 77065 MAMMO DIGITAL DIAGNOSTIC RIGHT WITH TOMO: ICD-10-PCS | Mod: 26,RT,, | Performed by: RADIOLOGY

## 2023-12-28 PROCEDURE — 96375 TX/PRO/DX INJ NEW DRUG ADDON: CPT

## 2023-12-28 PROCEDURE — 77061 MAMMO DIGITAL DIAGNOSTIC RIGHT WITH TOMO: ICD-10-PCS | Mod: 26,RT,, | Performed by: RADIOLOGY

## 2023-12-28 PROCEDURE — 96368 THER/DIAG CONCURRENT INF: CPT

## 2023-12-28 PROCEDURE — 83880 ASSAY OF NATRIURETIC PEPTIDE: CPT | Performed by: EMERGENCY MEDICINE

## 2023-12-28 PROCEDURE — 77065 DX MAMMO INCL CAD UNI: CPT | Mod: TC,RT

## 2023-12-28 PROCEDURE — 87077 CULTURE AEROBIC IDENTIFY: CPT | Mod: 59 | Performed by: EMERGENCY MEDICINE

## 2023-12-28 PROCEDURE — 84484 ASSAY OF TROPONIN QUANT: CPT | Performed by: EMERGENCY MEDICINE

## 2023-12-28 PROCEDURE — 82803 BLOOD GASES ANY COMBINATION: CPT

## 2023-12-28 PROCEDURE — 87040 BLOOD CULTURE FOR BACTERIA: CPT | Mod: 59 | Performed by: EMERGENCY MEDICINE

## 2023-12-28 PROCEDURE — 87502 INFLUENZA DNA AMP PROBE: CPT | Performed by: EMERGENCY MEDICINE

## 2023-12-28 PROCEDURE — 25000003 PHARM REV CODE 250: Performed by: EMERGENCY MEDICINE

## 2023-12-28 PROCEDURE — 99900035 HC TECH TIME PER 15 MIN (STAT)

## 2023-12-28 PROCEDURE — 85025 COMPLETE CBC W/AUTO DIFF WBC: CPT | Performed by: EMERGENCY MEDICINE

## 2023-12-28 PROCEDURE — 83690 ASSAY OF LIPASE: CPT | Performed by: EMERGENCY MEDICINE

## 2023-12-28 PROCEDURE — 77065 DX MAMMO INCL CAD UNI: CPT | Mod: 26,RT,, | Performed by: RADIOLOGY

## 2023-12-28 PROCEDURE — 27000190 HC CPAP FULL FACE MASK W/VALVE

## 2023-12-28 PROCEDURE — 87185 SC STD ENZYME DETCJ PER NZM: CPT | Mod: 59 | Performed by: EMERGENCY MEDICINE

## 2023-12-28 PROCEDURE — 77061 BREAST TOMOSYNTHESIS UNI: CPT | Mod: 26,RT,, | Performed by: RADIOLOGY

## 2023-12-28 PROCEDURE — 83605 ASSAY OF LACTIC ACID: CPT | Performed by: EMERGENCY MEDICINE

## 2023-12-28 PROCEDURE — 94640 AIRWAY INHALATION TREATMENT: CPT

## 2023-12-28 PROCEDURE — 94660 CPAP INITIATION&MGMT: CPT

## 2023-12-28 PROCEDURE — 36600 WITHDRAWAL OF ARTERIAL BLOOD: CPT

## 2023-12-28 PROCEDURE — 87184 SC STD DISK METHOD PER PLATE: CPT | Performed by: EMERGENCY MEDICINE

## 2023-12-28 PROCEDURE — U0002 COVID-19 LAB TEST NON-CDC: HCPCS | Performed by: EMERGENCY MEDICINE

## 2023-12-28 PROCEDURE — 5A09357 ASSISTANCE WITH RESPIRATORY VENTILATION, LESS THAN 24 CONSECUTIVE HOURS, CONTINUOUS POSITIVE AIRWAY PRESSURE: ICD-10-PCS | Performed by: EMERGENCY MEDICINE

## 2023-12-28 PROCEDURE — 96365 THER/PROPH/DIAG IV INF INIT: CPT

## 2023-12-28 PROCEDURE — 99285 EMERGENCY DEPT VISIT HI MDM: CPT | Mod: 25

## 2023-12-28 PROCEDURE — 83735 ASSAY OF MAGNESIUM: CPT | Performed by: EMERGENCY MEDICINE

## 2023-12-28 PROCEDURE — 25000242 PHARM REV CODE 250 ALT 637 W/ HCPCS: Performed by: EMERGENCY MEDICINE

## 2023-12-28 RX ORDER — KETOROLAC TROMETHAMINE 30 MG/ML
15 INJECTION, SOLUTION INTRAMUSCULAR; INTRAVENOUS
Status: COMPLETED | OUTPATIENT
Start: 2023-12-28 | End: 2023-12-28

## 2023-12-28 RX ORDER — ONDANSETRON 2 MG/ML
4 INJECTION INTRAMUSCULAR; INTRAVENOUS
Status: COMPLETED | OUTPATIENT
Start: 2023-12-28 | End: 2023-12-28

## 2023-12-28 RX ORDER — MAGNESIUM SULFATE HEPTAHYDRATE 40 MG/ML
2 INJECTION, SOLUTION INTRAVENOUS
Status: COMPLETED | OUTPATIENT
Start: 2023-12-28 | End: 2023-12-28

## 2023-12-28 RX ORDER — IPRATROPIUM BROMIDE AND ALBUTEROL SULFATE 2.5; .5 MG/3ML; MG/3ML
3 SOLUTION RESPIRATORY (INHALATION)
Status: COMPLETED | OUTPATIENT
Start: 2023-12-28 | End: 2023-12-28

## 2023-12-28 RX ORDER — LORAZEPAM 2 MG/ML
2 INJECTION INTRAMUSCULAR
Status: COMPLETED | OUTPATIENT
Start: 2023-12-28 | End: 2023-12-28

## 2023-12-28 RX ORDER — METOPROLOL TARTRATE 1 MG/ML
5 INJECTION, SOLUTION INTRAVENOUS
Status: COMPLETED | OUTPATIENT
Start: 2023-12-28 | End: 2023-12-28

## 2023-12-28 RX ADMIN — METOROPROLOL TARTRATE 5 MG: 5 INJECTION, SOLUTION INTRAVENOUS at 10:12

## 2023-12-28 RX ADMIN — CEFTRIAXONE SODIUM 2 G: 2 INJECTION, POWDER, FOR SOLUTION INTRAMUSCULAR; INTRAVENOUS at 09:12

## 2023-12-28 RX ADMIN — ONDANSETRON 4 MG: 2 INJECTION INTRAMUSCULAR; INTRAVENOUS at 08:12

## 2023-12-28 RX ADMIN — IPRATROPIUM BROMIDE AND ALBUTEROL SULFATE 3 ML: .5; 3 SOLUTION RESPIRATORY (INHALATION) at 09:12

## 2023-12-28 RX ADMIN — KETOROLAC TROMETHAMINE 15 MG: 30 INJECTION, SOLUTION INTRAMUSCULAR; INTRAVENOUS at 09:12

## 2023-12-28 RX ADMIN — MAGNESIUM SULFATE HEPTAHYDRATE 2 G: 40 INJECTION, SOLUTION INTRAVENOUS at 08:12

## 2023-12-28 RX ADMIN — LORAZEPAM 2 MG: 2 INJECTION INTRAMUSCULAR; INTRAVENOUS at 09:12

## 2023-12-29 PROBLEM — J44.1 COPD EXACERBATION: Status: ACTIVE | Noted: 2023-12-29

## 2023-12-29 PROBLEM — J96.01 ACUTE RESPIRATORY FAILURE WITH HYPOXIA AND HYPERCARBIA: Status: ACTIVE | Noted: 2023-12-29

## 2023-12-29 PROBLEM — J96.02 ACUTE RESPIRATORY FAILURE WITH HYPOXIA AND HYPERCARBIA: Status: ACTIVE | Noted: 2023-12-29

## 2023-12-29 PROBLEM — J18.9 SEPSIS DUE TO PNEUMONIA: Status: ACTIVE | Noted: 2023-12-29

## 2023-12-29 PROBLEM — A41.9 SEPSIS DUE TO PNEUMONIA: Status: ACTIVE | Noted: 2023-12-29

## 2023-12-29 LAB
ALLENS TEST: ABNORMAL
ANION GAP SERPL CALC-SCNC: 14 MMOL/L (ref 8–16)
BASOPHILS # BLD AUTO: ABNORMAL K/UL (ref 0–0.2)
BASOPHILS NFR BLD: 0 % (ref 0–1.9)
BILIRUB UR QL STRIP: NEGATIVE
BUN SERPL-MCNC: 14 MG/DL (ref 6–20)
CALCIUM SERPL-MCNC: 8.5 MG/DL (ref 8.7–10.5)
CHLORIDE SERPL-SCNC: 102 MMOL/L (ref 95–110)
CLARITY UR: CLEAR
CO2 SERPL-SCNC: 24 MMOL/L (ref 23–29)
COLOR UR: YELLOW
CREAT SERPL-MCNC: 0.9 MG/DL (ref 0.5–1.4)
D DIMER PPP IA.FEU-MCNC: 3.52 MG/L FEU
DELSYS: ABNORMAL
DIFFERENTIAL METHOD BLD: ABNORMAL
EOSINOPHIL # BLD AUTO: ABNORMAL K/UL (ref 0–0.5)
EOSINOPHIL NFR BLD: 0 % (ref 0–8)
ERYTHROCYTE [DISTWIDTH] IN BLOOD BY AUTOMATED COUNT: 12.2 % (ref 11.5–14.5)
EST. GFR  (NO RACE VARIABLE): >60 ML/MIN/1.73 M^2
GLUCOSE SERPL-MCNC: 145 MG/DL (ref 70–110)
GLUCOSE UR QL STRIP: ABNORMAL
HCO3 UR-SCNC: 26.5 MMOL/L (ref 24–28)
HCT VFR BLD AUTO: 41.8 % (ref 37–48.5)
HGB BLD-MCNC: 14.3 G/DL (ref 12–16)
HGB UR QL STRIP: NEGATIVE
IMM GRANULOCYTES # BLD AUTO: ABNORMAL K/UL (ref 0–0.04)
IMM GRANULOCYTES NFR BLD AUTO: ABNORMAL % (ref 0–0.5)
KETONES UR QL STRIP: NEGATIVE
LEUKOCYTE ESTERASE UR QL STRIP: NEGATIVE
LYMPHOCYTES # BLD AUTO: ABNORMAL K/UL (ref 1–4.8)
LYMPHOCYTES NFR BLD: 7 % (ref 18–48)
MAGNESIUM SERPL-MCNC: 2.5 MG/DL (ref 1.6–2.6)
MCH RBC QN AUTO: 33.3 PG (ref 27–31)
MCHC RBC AUTO-ENTMCNC: 34.2 G/DL (ref 32–36)
MCV RBC AUTO: 97 FL (ref 82–98)
METAMYELOCYTES NFR BLD MANUAL: 2 %
MONOCYTES # BLD AUTO: ABNORMAL K/UL (ref 0.3–1)
MONOCYTES NFR BLD: 0 % (ref 4–15)
NEUTROPHILS NFR BLD: 72 % (ref 38–73)
NEUTS BAND NFR BLD MANUAL: 19 %
NITRITE UR QL STRIP: NEGATIVE
NRBC BLD-RTO: 0 /100 WBC
PCO2 BLDA: 46.9 MMHG (ref 35–45)
PH SMN: 7.36 [PH] (ref 7.35–7.45)
PH UR STRIP: 6 [PH] (ref 5–8)
PHOSPHATE SERPL-MCNC: 3 MG/DL (ref 2.7–4.5)
PLATELET # BLD AUTO: 271 K/UL (ref 150–450)
PMV BLD AUTO: 9.4 FL (ref 9.2–12.9)
PO2 BLDA: 83 MMHG (ref 80–100)
POC BE: 1 MMOL/L
POC SATURATED O2: 96 % (ref 95–100)
POTASSIUM SERPL-SCNC: 4.7 MMOL/L (ref 3.5–5.1)
PROT UR QL STRIP: NEGATIVE
RBC # BLD AUTO: 4.29 M/UL (ref 4–5.4)
SAMPLE: ABNORMAL
SITE: ABNORMAL
SODIUM SERPL-SCNC: 140 MMOL/L (ref 136–145)
SP GR UR STRIP: 1.01 (ref 1–1.03)
URN SPEC COLLECT METH UR: ABNORMAL
UROBILINOGEN UR STRIP-ACNC: NEGATIVE EU/DL
WBC # BLD AUTO: 11.33 K/UL (ref 3.9–12.7)

## 2023-12-29 PROCEDURE — 25000242 PHARM REV CODE 250 ALT 637 W/ HCPCS: Performed by: INTERNAL MEDICINE

## 2023-12-29 PROCEDURE — 85007 BL SMEAR W/DIFF WBC COUNT: CPT | Performed by: INTERNAL MEDICINE

## 2023-12-29 PROCEDURE — 85027 COMPLETE CBC AUTOMATED: CPT | Performed by: INTERNAL MEDICINE

## 2023-12-29 PROCEDURE — 63600175 PHARM REV CODE 636 W HCPCS: Mod: UD | Performed by: INTERNAL MEDICINE

## 2023-12-29 PROCEDURE — 94799 UNLISTED PULMONARY SVC/PX: CPT | Mod: XB

## 2023-12-29 PROCEDURE — 21400001 HC TELEMETRY ROOM

## 2023-12-29 PROCEDURE — 25000003 PHARM REV CODE 250: Performed by: INTERNAL MEDICINE

## 2023-12-29 PROCEDURE — 94664 DEMO&/EVAL PT USE INHALER: CPT

## 2023-12-29 PROCEDURE — 63600175 PHARM REV CODE 636 W HCPCS: Performed by: STUDENT IN AN ORGANIZED HEALTH CARE EDUCATION/TRAINING PROGRAM

## 2023-12-29 PROCEDURE — 27000646 HC AEROBIKA DEVICE

## 2023-12-29 PROCEDURE — 87449 NOS EACH ORGANISM AG IA: CPT | Performed by: INTERNAL MEDICINE

## 2023-12-29 PROCEDURE — 94640 AIRWAY INHALATION TREATMENT: CPT | Mod: XB

## 2023-12-29 PROCEDURE — 99223 1ST HOSP IP/OBS HIGH 75: CPT | Mod: GT,,, | Performed by: INTERNAL MEDICINE

## 2023-12-29 PROCEDURE — 94761 N-INVAS EAR/PLS OXIMETRY MLT: CPT

## 2023-12-29 PROCEDURE — 81003 URINALYSIS AUTO W/O SCOPE: CPT | Performed by: INTERNAL MEDICINE

## 2023-12-29 PROCEDURE — 94640 AIRWAY INHALATION TREATMENT: CPT

## 2023-12-29 PROCEDURE — 99900035 HC TECH TIME PER 15 MIN (STAT)

## 2023-12-29 PROCEDURE — 85379 FIBRIN DEGRADATION QUANT: CPT | Performed by: STUDENT IN AN ORGANIZED HEALTH CARE EDUCATION/TRAINING PROGRAM

## 2023-12-29 PROCEDURE — 84100 ASSAY OF PHOSPHORUS: CPT | Performed by: INTERNAL MEDICINE

## 2023-12-29 PROCEDURE — 80048 BASIC METABOLIC PNL TOTAL CA: CPT | Performed by: INTERNAL MEDICINE

## 2023-12-29 PROCEDURE — 83735 ASSAY OF MAGNESIUM: CPT | Performed by: INTERNAL MEDICINE

## 2023-12-29 PROCEDURE — 25500020 PHARM REV CODE 255: Performed by: STUDENT IN AN ORGANIZED HEALTH CARE EDUCATION/TRAINING PROGRAM

## 2023-12-29 PROCEDURE — 27000221 HC OXYGEN, UP TO 24 HOURS

## 2023-12-29 PROCEDURE — 87205 SMEAR GRAM STAIN: CPT | Performed by: INTERNAL MEDICINE

## 2023-12-29 PROCEDURE — 96367 TX/PROPH/DG ADDL SEQ IV INF: CPT

## 2023-12-29 PROCEDURE — 87070 CULTURE OTHR SPECIMN AEROBIC: CPT | Performed by: INTERNAL MEDICINE

## 2023-12-29 PROCEDURE — 36600 WITHDRAWAL OF ARTERIAL BLOOD: CPT

## 2023-12-29 RX ORDER — DULOXETIN HYDROCHLORIDE 20 MG/1
40 CAPSULE, DELAYED RELEASE ORAL DAILY
Status: DISCONTINUED | OUTPATIENT
Start: 2023-12-29 | End: 2023-12-29

## 2023-12-29 RX ORDER — IPRATROPIUM BROMIDE AND ALBUTEROL SULFATE 2.5; .5 MG/3ML; MG/3ML
3 SOLUTION RESPIRATORY (INHALATION) EVERY 4 HOURS
Status: COMPLETED | OUTPATIENT
Start: 2023-12-29 | End: 2023-12-30

## 2023-12-29 RX ORDER — PANTOPRAZOLE SODIUM 40 MG/1
40 TABLET, DELAYED RELEASE ORAL DAILY
Status: DISCONTINUED | OUTPATIENT
Start: 2023-12-29 | End: 2024-01-03 | Stop reason: HOSPADM

## 2023-12-29 RX ORDER — PROCHLORPERAZINE EDISYLATE 5 MG/ML
5 INJECTION INTRAMUSCULAR; INTRAVENOUS EVERY 6 HOURS PRN
Status: DISCONTINUED | OUTPATIENT
Start: 2023-12-29 | End: 2024-01-03 | Stop reason: HOSPADM

## 2023-12-29 RX ORDER — CLONAZEPAM 0.5 MG/1
0.5 TABLET ORAL DAILY PRN
Status: DISCONTINUED | OUTPATIENT
Start: 2023-12-29 | End: 2024-01-03 | Stop reason: HOSPADM

## 2023-12-29 RX ORDER — POLYETHYLENE GLYCOL 3350 17 G/17G
17 POWDER, FOR SOLUTION ORAL DAILY
Status: DISCONTINUED | OUTPATIENT
Start: 2023-12-29 | End: 2024-01-03 | Stop reason: HOSPADM

## 2023-12-29 RX ORDER — MORPHINE SULFATE 2 MG/ML
1 INJECTION, SOLUTION INTRAMUSCULAR; INTRAVENOUS EVERY 6 HOURS PRN
Status: DISCONTINUED | OUTPATIENT
Start: 2023-12-29 | End: 2024-01-03 | Stop reason: HOSPADM

## 2023-12-29 RX ORDER — AMOXICILLIN 250 MG
1 CAPSULE ORAL 2 TIMES DAILY PRN
Status: DISCONTINUED | OUTPATIENT
Start: 2023-12-29 | End: 2024-01-03 | Stop reason: HOSPADM

## 2023-12-29 RX ORDER — SODIUM CHLORIDE, SODIUM LACTATE, POTASSIUM CHLORIDE, CALCIUM CHLORIDE 600; 310; 30; 20 MG/100ML; MG/100ML; MG/100ML; MG/100ML
INJECTION, SOLUTION INTRAVENOUS CONTINUOUS
Status: DISCONTINUED | OUTPATIENT
Start: 2023-12-29 | End: 2023-12-31

## 2023-12-29 RX ORDER — METHYLPREDNISOLONE SOD SUCC 125 MG
125 VIAL (EA) INJECTION ONCE
Status: COMPLETED | OUTPATIENT
Start: 2023-12-29 | End: 2023-12-29

## 2023-12-29 RX ORDER — IPRATROPIUM BROMIDE AND ALBUTEROL SULFATE 2.5; .5 MG/3ML; MG/3ML
3 SOLUTION RESPIRATORY (INHALATION) EVERY 4 HOURS PRN
Status: DISCONTINUED | OUTPATIENT
Start: 2023-12-29 | End: 2024-01-03 | Stop reason: HOSPADM

## 2023-12-29 RX ORDER — GUAIFENESIN 100 MG/5ML
81 LIQUID (ML) ORAL DAILY
Status: DISCONTINUED | OUTPATIENT
Start: 2023-12-29 | End: 2024-01-03 | Stop reason: HOSPADM

## 2023-12-29 RX ORDER — ONDANSETRON 2 MG/ML
4 INJECTION INTRAMUSCULAR; INTRAVENOUS EVERY 6 HOURS PRN
Status: DISCONTINUED | OUTPATIENT
Start: 2023-12-29 | End: 2024-01-03 | Stop reason: HOSPADM

## 2023-12-29 RX ORDER — LATANOPROST 50 UG/ML
1 SOLUTION/ DROPS OPHTHALMIC NIGHTLY
Status: DISCONTINUED | OUTPATIENT
Start: 2023-12-29 | End: 2024-01-03 | Stop reason: HOSPADM

## 2023-12-29 RX ORDER — DULOXETIN HYDROCHLORIDE 20 MG/1
40 CAPSULE, DELAYED RELEASE ORAL DAILY
Status: DISCONTINUED | OUTPATIENT
Start: 2023-12-30 | End: 2024-01-03 | Stop reason: HOSPADM

## 2023-12-29 RX ORDER — ACETAMINOPHEN 325 MG/1
650 TABLET ORAL EVERY 8 HOURS PRN
Status: DISCONTINUED | OUTPATIENT
Start: 2023-12-29 | End: 2024-01-03 | Stop reason: HOSPADM

## 2023-12-29 RX ORDER — BENZONATATE 100 MG/1
100 CAPSULE ORAL 3 TIMES DAILY PRN
Status: DISCONTINUED | OUTPATIENT
Start: 2023-12-29 | End: 2024-01-03 | Stop reason: HOSPADM

## 2023-12-29 RX ORDER — FLUTICASONE FUROATE AND VILANTEROL 200; 25 UG/1; UG/1
1 POWDER RESPIRATORY (INHALATION) DAILY
Status: DISCONTINUED | OUTPATIENT
Start: 2023-12-29 | End: 2024-01-03 | Stop reason: HOSPADM

## 2023-12-29 RX ORDER — PREDNISONE 10 MG/1
40 TABLET ORAL DAILY
Status: DISCONTINUED | OUTPATIENT
Start: 2023-12-30 | End: 2024-01-03 | Stop reason: HOSPADM

## 2023-12-29 RX ORDER — CHOLECALCIFEROL (VITAMIN D3) 50 MCG
2000 TABLET ORAL DAILY
Status: DISCONTINUED | OUTPATIENT
Start: 2023-12-29 | End: 2024-01-03 | Stop reason: HOSPADM

## 2023-12-29 RX ORDER — ATENOLOL 25 MG/1
25 TABLET ORAL DAILY
Status: DISCONTINUED | OUTPATIENT
Start: 2023-12-29 | End: 2023-12-29

## 2023-12-29 RX ORDER — SODIUM CHLORIDE 0.9 % (FLUSH) 0.9 %
3 SYRINGE (ML) INJECTION
Status: DISCONTINUED | OUTPATIENT
Start: 2023-12-29 | End: 2024-01-03 | Stop reason: HOSPADM

## 2023-12-29 RX ORDER — ENOXAPARIN SODIUM 100 MG/ML
40 INJECTION SUBCUTANEOUS EVERY 24 HOURS
Status: DISCONTINUED | OUTPATIENT
Start: 2023-12-29 | End: 2024-01-03 | Stop reason: HOSPADM

## 2023-12-29 RX ORDER — TALC
6 POWDER (GRAM) TOPICAL NIGHTLY PRN
Status: DISCONTINUED | OUTPATIENT
Start: 2023-12-29 | End: 2024-01-03 | Stop reason: HOSPADM

## 2023-12-29 RX ORDER — GUAIFENESIN/DEXTROMETHORPHAN 100-10MG/5
10 SYRUP ORAL EVERY 6 HOURS
Status: COMPLETED | OUTPATIENT
Start: 2023-12-29 | End: 2023-12-30

## 2023-12-29 RX ORDER — ATORVASTATIN CALCIUM 10 MG/1
20 TABLET, FILM COATED ORAL DAILY
Status: DISCONTINUED | OUTPATIENT
Start: 2023-12-29 | End: 2024-01-03 | Stop reason: HOSPADM

## 2023-12-29 RX ADMIN — AZITHROMYCIN MONOHYDRATE 500 MG: 500 INJECTION, POWDER, LYOPHILIZED, FOR SOLUTION INTRAVENOUS at 01:12

## 2023-12-29 RX ADMIN — GUAIFENESIN AND DEXTROMETHORPHAN 10 ML: 100; 10 SYRUP ORAL at 12:12

## 2023-12-29 RX ADMIN — GUAIFENESIN AND DEXTROMETHORPHAN 10 ML: 100; 10 SYRUP ORAL at 07:12

## 2023-12-29 RX ADMIN — METHYLPREDNISOLONE SODIUM SUCCINATE 125 MG: 125 INJECTION, POWDER, FOR SOLUTION INTRAMUSCULAR; INTRAVENOUS at 01:12

## 2023-12-29 RX ADMIN — ASPIRIN 81 MG: 81 TABLET, CHEWABLE ORAL at 10:12

## 2023-12-29 RX ADMIN — GUAIFENESIN AND DEXTROMETHORPHAN 10 ML: 100; 10 SYRUP ORAL at 05:12

## 2023-12-29 RX ADMIN — GUAIFENESIN AND DEXTROMETHORPHAN 10 ML: 100; 10 SYRUP ORAL at 11:12

## 2023-12-29 RX ADMIN — IPRATROPIUM BROMIDE AND ALBUTEROL SULFATE 3 ML: 2.5; .5 SOLUTION RESPIRATORY (INHALATION) at 07:12

## 2023-12-29 RX ADMIN — FLUTICASONE FUROATE AND VILANTEROL TRIFENATATE 1 PUFF: 200; 25 POWDER RESPIRATORY (INHALATION) at 08:12

## 2023-12-29 RX ADMIN — METHYLPREDNISOLONE SODIUM SUCCINATE 80 MG: 40 INJECTION, POWDER, FOR SOLUTION INTRAMUSCULAR; INTRAVENOUS at 10:12

## 2023-12-29 RX ADMIN — SODIUM CHLORIDE, POTASSIUM CHLORIDE, SODIUM LACTATE AND CALCIUM CHLORIDE: 600; 310; 30; 20 INJECTION, SOLUTION INTRAVENOUS at 03:12

## 2023-12-29 RX ADMIN — ENOXAPARIN SODIUM 40 MG: 40 INJECTION SUBCUTANEOUS at 04:12

## 2023-12-29 RX ADMIN — IOHEXOL 75 ML: 350 INJECTION, SOLUTION INTRAVENOUS at 05:12

## 2023-12-29 RX ADMIN — SODIUM CHLORIDE, POTASSIUM CHLORIDE, SODIUM LACTATE AND CALCIUM CHLORIDE 1000 ML: 600; 310; 30; 20 INJECTION, SOLUTION INTRAVENOUS at 10:12

## 2023-12-29 RX ADMIN — MORPHINE SULFATE 1 MG: 2 INJECTION, SOLUTION INTRAMUSCULAR; INTRAVENOUS at 02:12

## 2023-12-29 RX ADMIN — PANTOPRAZOLE SODIUM 40 MG: 40 TABLET, DELAYED RELEASE ORAL at 10:12

## 2023-12-29 RX ADMIN — MORPHINE SULFATE 1 MG: 2 INJECTION, SOLUTION INTRAMUSCULAR; INTRAVENOUS at 11:12

## 2023-12-29 RX ADMIN — Medication 2000 UNITS: at 10:12

## 2023-12-29 RX ADMIN — CLONAZEPAM 0.5 MG: 0.5 TABLET ORAL at 03:12

## 2023-12-29 RX ADMIN — GUAIFENESIN AND DEXTROMETHORPHAN 10 ML: 100; 10 SYRUP ORAL at 01:12

## 2023-12-29 RX ADMIN — IPRATROPIUM BROMIDE AND ALBUTEROL SULFATE 3 ML: 2.5; .5 SOLUTION RESPIRATORY (INHALATION) at 03:12

## 2023-12-29 RX ADMIN — IPRATROPIUM BROMIDE AND ALBUTEROL SULFATE 3 ML: 2.5; .5 SOLUTION RESPIRATORY (INHALATION) at 11:12

## 2023-12-29 RX ADMIN — IPRATROPIUM BROMIDE AND ALBUTEROL SULFATE 3 ML: 2.5; .5 SOLUTION RESPIRATORY (INHALATION) at 06:12

## 2023-12-29 RX ADMIN — ATORVASTATIN CALCIUM 20 MG: 10 TABLET, FILM COATED ORAL at 10:12

## 2023-12-29 RX ADMIN — CEFTRIAXONE 2 G: 2 INJECTION, POWDER, FOR SOLUTION INTRAMUSCULAR; INTRAVENOUS at 09:12

## 2023-12-29 RX ADMIN — MORPHINE SULFATE 1 MG: 2 INJECTION, SOLUTION INTRAMUSCULAR; INTRAVENOUS at 04:12

## 2023-12-29 NOTE — PLAN OF CARE
Problem: Infection COPD (Chronic Obstructive Pulmonary Disease)  Goal: Absence of Infection Signs and Symptoms  Outcome: Ongoing, Progressing     Problem: Oral Intake Inadequate COPD (Chronic Obstructive Pulmonary Disease)  Goal: Improved Nutrition Intake  Outcome: Ongoing, Progressing     Problem: Respiratory Compromise COPD (Chronic Obstructive Pulmonary Disease)  Goal: Effective Oxygenation and Ventilation  Outcome: Ongoing, Progressing     Problem: Adult Inpatient Plan of Care  Goal: Optimal Comfort and Wellbeing  Outcome: Ongoing, Progressing  Goal: Readiness for Transition of Care  Outcome: Ongoing, Progressing     Problem: Skin Injury Risk Increased  Goal: Skin Health and Integrity  Outcome: Ongoing, Progressing     Problem: Fall Injury Risk  Goal: Absence of Fall and Fall-Related Injury  Outcome: Ongoing, Progressing     Problem: Gas Exchange Impaired  Goal: Optimal Gas Exchange  Outcome: Ongoing, Progressing

## 2023-12-29 NOTE — HPI
Ms. White is a 59yo lady with a past medical history of anxiety, OA, COPD and PTE.  She has PRN CPAP for home use as needed, and uses O2 at 2-3L PRN.  Her daughter is a nurse and is at the bedside with her, assisting with the history since Ms. White is on BiPAP currently.    She states that about 2.5 days ago she developed acutely worsening wheezing and nausea.  She always has some chronic dry and hacking cough, but this worsened acutely as well.  She has had no sputum production.  Despite fever noted in the ED, both she and her daughter deny fever and chills at home.  Over the past 24 hours she has become significantly short of breath.  Her daughter went to buy some robitussin and when she got home she found her mother with O2 sats of 82%.  She gave her a duoneb, after which her sats dropped to 75%.  She tried the BiPAP, but Ms. White just finally asked her to call 911 due to SOB.    In the ED she was treated with:  Medications   albuterol-ipratropium 2.5 mg-0.5 mg/3 mL nebulizer solution 3 mL (3 mLs Nebulization Given 12/28/23 2114)   magnesium sulfate 2g in water 50mL IVPB (premix) (0 g Intravenous Stopped 12/28/23 2153)   ondansetron injection 4 mg (4 mg Intravenous Given 12/28/23 2052)   cefTRIAXone (ROCEPHIN) 2 g in dextrose 5 % in water (D5W) 100 mL IVPB (MB+) (0 g Intravenous Stopped 12/28/23 2153)   LORazepam injection 2 mg (2 mg Intravenous Given 12/28/23 2127)   ketorolac injection 15 mg (15 mg Intravenous Given 12/28/23 2125)   metoprolol injection 5 mg (5 mg Intravenous Given 12/28/23 2201)

## 2023-12-29 NOTE — CARE UPDATE
Patient improved from admission. No longer requiring rescue bipap. Does not feel back to baseline but feels significantly improved. Continue current plan of care other than switching to PO steroids.

## 2023-12-29 NOTE — ED NOTES
Assumed care of pt. Pt brought by EMS reporting SOB which got worse today. Uses Bi-pap at home. EMS stated her spo2 was  78% noted on bipap earlier and was transported to ER via ambulance. SPO2 noted to be 98% on NRM at 15LO2. /120 and -160 bpm per EMS. Has hx of COPD. Pt febrile at this time.

## 2023-12-29 NOTE — ASSESSMENT & PLAN NOTE
I have started the COPD best practice pathway order sets         albuterol-ipratropium 2.5 mg-0.5 mg/3 mL nebulizer solution 3 mL, 3 mL, Nebulization, Q4H PRN     albuterol-ipratropium 2.5 mg-0.5 mg/3 mL nebulizer solution 3 mL, 3 mL, Nebulization, Q4H     benzonatate capsule 100 mg, 100 mg, Oral, TID PRN, cough    dextromethorphan-guaiFENesin  mg/5 ml liquid 10 mL, 10 mL, Oral, Q6H     fluticasone furoate-vilanteroL 200-25 mcg/dose diskus inhaler 1 puff, 1 puff, Inhalation, Daily      methylPREDNISolone sodium succinate injection 125 mg, 125 mg, Intravenous, Once     methylPREDNISolone sodium succinate injection 80 mg, 80 mg, Intravenous, Q8H

## 2023-12-29 NOTE — ASSESSMENT & PLAN NOTE
This patient does have evidence of infective focus  CXR isn't super impressive, but maybe some LLL infiltrate  Consider CT chest in am if no improvement  Flu and Covid PCR negative  Legionella CX and UAg    My overall impression is sepsis.  Source: Respiratory  Antibiotics given-   Antibiotics (72h ago, onward)      Start     Stop Route Frequency Ordered    12/29/23 2100  cefTRIAXone (ROCEPHIN) 2 g in dextrose 5 % in water (D5W) 100 mL IVPB (MB+)         -- IV Every 24 hours (non-standard times) 12/29/23 0102 12/29/23 0215  azithromycin (ZITHROMAX) 500 mg in dextrose 5 % (D5W) 250 mL IVPB (Vial-Mate)         01/03/24 0214 IV Every 24 hours (non-standard times) 12/29/23 0102          Latest lactate reviewed-  Recent Labs   Lab 12/28/23 2052   LACTATE 2.0     Organ dysfunction indicated by Acute respiratory failure    Fluid challenge Not needed - patient is not hypotensive      Post- resuscitation assessment No - Post resuscitation assessment not needed       Will Not start Pressors- Levophed for MAP of 65  Source control achieved by: iv antibiotics

## 2023-12-29 NOTE — PLAN OF CARE
Problem: Respiratory Compromise COPD (Chronic Obstructive Pulmonary Disease)  Goal: Effective Oxygenation and Ventilation  Outcome: Ongoing, Progressing  Intervention: Promote Airway Secretion Clearance  Flowsheets (Taken 12/29/2023 1618)  Breathing Techniques/Airway Clearance:   pursed-lip breathing encouraged   deep/controlled cough encouraged  Cough And Deep Breathing: done independently per patient  Intervention: Optimize Oxygenation and Ventilation  Flowsheets (Taken 12/29/2023 1618)  Airway/Ventilation Management: airway patency maintained  Head of Bed (HOB) Positioning: HOB elevated   Patient in no apparent distress. Patient on 3 lpm. She wears home O2 PRN. She wears home BIPAP a couple hours at a time a day/night for respiratory support. Some wheezing noted. Aerosol treatments given Q 4. IS and aerobika started. Breo daily. BIPAP at bedside when needed  . Will continue to monitor.

## 2023-12-29 NOTE — ED PROVIDER NOTES
Encounter Date: 2023       History     Chief Complaint   Patient presents with    Shortness of Breath     Pt brought by EMS reporting SOB getting worse today. Uses Bi-pap at home. EMS stated her SPO2 was 79% while in bipap at home, put her on non-rebreather and spo2 increased to 98%. Has hx of COPD.     58-year-old female comes in via EMS for shortness of breath.  Apparently earlier had a mammogram done and was subsequently dizzy secondary to that.  Has been having nausea and vomiting.  She has COPD normally need oxygen and CPAP at home.  She is on trilogy and albuterol.  Patient in severe respiratory distress limiting history    The history is provided by the patient, the EMS personnel and medical records. No  was used.     Review of patient's allergies indicates:   Allergen Reactions    Ativan [lorazepam] Itching     Past Medical History:   Diagnosis Date    Anxiety     Arthritis     Asthma     Back pain     COPD (chronic obstructive pulmonary disease)     Pulmonary embolism     10 years ago     Past Surgical History:   Procedure Laterality Date    BIOPSY      right breast    BREAST CYST EXCISION       SECTION      CHOLECYSTECTOMY      COLONOSCOPY N/A 2022    Procedure: COLONOSCOPY;  Surgeon: Chavez Gonzales MD;  Location: [facility] ENDO;  Service: General;  Laterality: N/A;    ESOPHAGOGASTRODUODENOSCOPY N/A 2022    Procedure: ESOPHAGOGASTRODUODENOSCOPY (EGD);  Surgeon: Chavez Gonzales MD;  Location: [facility] ENDO;  Service: General;  Laterality: N/A;    INCISION AND DRAINAGE OF ABSCESS Left 3/16/2020    Procedure: INCISION AND DRAINAGE, ABSCESS;  Surgeon: Irvin Villarreal MD;  Location: [facility] OR;  Service: General;  Laterality: Left;     Family History   Problem Relation Age of Onset    Breast cancer Sister     Ovarian cancer Neg Hx      Social History     Tobacco Use    Smoking status: Former     Types: Vaping with nicotine    Smokeless tobacco: Never   Substance Use  Topics    Alcohol use: Not Currently     Comment: occ    Drug use: Never     Review of Systems   Constitutional:  Negative for fever.   HENT:  Negative for sore throat.    Respiratory:  Positive for shortness of breath.    Cardiovascular:  Negative for chest pain.   Gastrointestinal:  Positive for nausea and vomiting.   Genitourinary:  Negative for dysuria.   Musculoskeletal:  Negative for back pain.   Skin:  Negative for rash.   Neurological:  Negative for weakness.   Hematological:  Does not bruise/bleed easily.   All other systems reviewed and are negative.      Physical Exam     Initial Vitals   BP Pulse Resp Temp SpO2   12/28/23 2048 12/28/23 2048 12/28/23 2048 12/28/23 2104 12/28/23 2056   (!) 153/109 (!) 158 (!) 32 (!) 100.8 °F (38.2 °C) (!) 89 %      MAP       --                Physical Exam    Nursing note and vitals reviewed.  Constitutional: She appears well-developed. She appears distressed.   HENT:   Head: Normocephalic and atraumatic.   Eyes: EOM are normal. Pupils are equal, round, and reactive to light.   Neck:   Normal range of motion.  Cardiovascular:  Regular rhythm.           Tachycardic   Pulmonary/Chest: She is in respiratory distress. She has no wheezes. She has no rales.   Abdominal: Abdomen is soft.   Musculoskeletal:         General: Normal range of motion.      Cervical back: Normal range of motion.     Neurological: She is alert and oriented to person, place, and time.   Skin: Skin is warm.         ED Course   Critical Care    Date/Time: 12/28/2023 9:00 PM    Performed by: Nena Wolf MD  Authorized by: Nena Wolf MD  Direct patient critical care time: 10 minutes  Additional history critical care time: 10 minutes  Ordering / reviewing critical care time: 10 minutes  Documentation critical care time: 10 minutes  Total critical care time (exclusive of procedural time) : 40 minutes  Critical care time was exclusive of separately billable procedures and treating other patients  and teaching time.  Critical care was necessary to treat or prevent imminent or life-threatening deterioration of the following conditions: respiratory failure.  Critical care was time spent personally by me on the following activities: interpretation of cardiac output measurements, evaluation of patient's response to treatment, examination of patient, obtaining history from patient or surrogate, ordering and performing treatments and interventions, ordering and review of laboratory studies, ordering and review of radiographic studies, pulse oximetry, re-evaluation of patient's condition and review of old charts.        Labs Reviewed   CBC W/ AUTO DIFFERENTIAL - Abnormal; Notable for the following components:       Result Value    WBC 21.47 (*)     MCH 33.4 (*)     MPV 8.9 (*)     Gran # (ANC) 19.5 (*)     Immature Grans (Abs) 0.08 (*)     Gran % 90.9 (*)     Lymph % 6.5 (*)     Mono % 2.0 (*)     All other components within normal limits   COMPREHENSIVE METABOLIC PANEL - Abnormal; Notable for the following components:    Glucose 123 (*)     All other components within normal limits   URINALYSIS, REFLEX TO URINE CULTURE - Abnormal; Notable for the following components:    Glucose, UA Trace (*)     All other components within normal limits    Narrative:     Preferred Collection Type->Urine, Clean Catch  Specimen Source->Urine   BASIC METABOLIC PANEL - Abnormal; Notable for the following components:    Glucose 145 (*)     Calcium 8.5 (*)     All other components within normal limits   CBC W/ AUTO DIFFERENTIAL - Abnormal; Notable for the following components:    MCH 33.3 (*)     Lymph % 7.0 (*)     Mono % 0.0 (*)     All other components within normal limits   D DIMER, QUANTITATIVE - Abnormal; Notable for the following components:    D-Dimer 3.52 (*)     All other components within normal limits   ISTAT PROCEDURE - Abnormal; Notable for the following components:    POC PH 7.308 (*)     POC PCO2 57.3 (*)     POC PO2 102  (*)     POC HCO3 28.7 (*)     All other components within normal limits   ISTAT PROCEDURE - Abnormal; Notable for the following components:    POC PCO2 46.9 (*)     All other components within normal limits   INFLUENZA A & B BY MOLECULAR   TROPONIN I   B-TYPE NATRIURETIC PEPTIDE   MAGNESIUM   LIPASE   LACTIC ACID, PLASMA   SARS-COV-2 RNA AMPLIFICATION, QUAL    Narrative:     Is the patient symptomatic?->No   MAGNESIUM   PHOSPHORUS     EKG Readings: (Independently Interpreted)   Initial Reading: No STEMI.   EKG done at 8:42 p.m. on the 20th of December shows a rate of 152, NV interval 120, QRS duration 68, .  My interpretation of the EKG is this is sinus tachycardia without any findings concerning for acute ischemia or electrical abnormality at this time.     ECG Results              EKG 12-lead (Final result)  Result time 12/29/23 18:41:21      Final result by Interface, Lab In [facility] (12/29/23 18:41:21)                   Narrative:    Test Reason : R06.02,    Vent. Rate : 152 BPM     Atrial Rate : 152 BPM     P-R Int : 120 ms          QRS Dur : 068 ms      QT Int : 260 ms       P-R-T Axes : 087 254 085 degrees     QTc Int : 413 ms    Sinus tachycardia with occasional Premature atrial complexes  Right superior axis deviation  Anterior infarct ,age undetermined  ST and T wave abnormality, consider lateral ischemia  Abnormal ECG  When compared with ECG of 27-JUL-2023 10:41,  Premature atrial complexes are now Present  Vent. rate has increased BY  58 BPM  Questionable change in The axis  Anterior infarct is now suggested  Confirmed by Bryant Morrell MD ([id number]) on 12/29/2023 6:41:10 PM    Referred By: [email or web address]   SELF           Confirmed By:Bryant Morrell MD                                  Imaging Results              CTA Chest Non-Coronary (PE Studies) (Final result)  Result time 12/29/23 17:28:22      Final result by Mathieu Steiner MD (12/29/23 17:28:22)                   Impression:      No evidence for pulmonary  embolus.    Severe emphysema.  Mild bibasilar infiltrate likely pneumonia.  Additional findings of bronchial inflammation and sequelae of an atypical infectious process with small airways component.    Left upper lobe pulmonary nodule suspicious for neoplasm.  Short-term follow-up 3 month chest CT, PET-CT, or percutaneous biopsy is recommended.  Percutaneous biopsy is considered high risk for pneumothorax given the severe degree of emphysema present.    Irregular nodular opacity in the left upper lobe.  This may represent parenchymal scarring however initial short-term follow-up in 3 months is recommended.      Electronically signed by: Mathieu Steiner MD  Date:    12/29/2023  Time:    17:28               Narrative:    EXAMINATION:  CTA CHEST NON CORONARY (PE STUDIES)    CLINICAL HISTORY:  Pulmonary embolism (PE) suspected, positive D-dimer;    TECHNIQUE:  Low dose axial images, sagittal and coronal reformations were obtained from the thoracic inlet to the lung bases following the IV administration of 75 mL of Omnipaque 350.  Contrast timing was optimized to evaluate the pulmonary arteries.  MIP images were performed.    COMPARISON:  None    FINDINGS:  There are no pulmonary arterial filling defects to indicate the presence of pulmonary thromboemboli.    The heart size is normal without pericardial effusion.  There is no hilar or mediastinal lymphadenopathy.    Severe emphysematous changes are present.  There is an irregular 1.2 cm nodular opacity at the left lung apex along with a smaller 6 mm adjacent solid nodule.  There is linear scarring within the right upper lobe.    There is a solid, spiculated nodule inferiorly within the left upper lobe measuring 1.4 cm.    There is some mild patchy airspace disease at the lung bases.  Moderate scattered clustered micro nodules and tree-in-bud opacities are present within both lungs.  There is generalized lower lobe predominant peribronchial thickening.    No pleural  effusion.    There has been a cholecystectomy.  There is heavy calcification of the abdominal aorta.  There is prominent Schmorl's node formation of the superior endplate of L2.                                       X-Ray Chest AP Portable (Final result)  Result time 12/29/23 08:26:22      Final result by Ledy Doan MD (12/29/23 08:26:22)                   Impression:      COPD with findings worrisome for bibasilar pneumonia.  Please see recent CT report for additional findings.      Electronically signed by: Ledy Doan  Date:    12/29/2023  Time:    08:26               Narrative:    EXAMINATION:  XR CHEST AP PORTABLE    CLINICAL HISTORY:  SOB;    TECHNIQUE:  Single frontal view of the chest was performed.    COMPARISON:  07/27/2023 and CT of 12/01/2023    FINDINGS:  The lungs are hyperinflated and hyperlucent consistent with emphysema.  The heart size is normal.  There are artifacts projecting over the left upper lobe, and the spiculated lung nodule noted on CT is not clearly demonstrated.  There is a persistent band like opacity in the left mid lung, corresponding to 17 mm nodular density on CT.  There is patchy consolidation in the lung bases which is a new development compared to previous films concerning for pneumonia.                                       Medications   albuterol-ipratropium 2.5 mg-0.5 mg/3 mL nebulizer solution 3 mL (3 mLs Nebulization Given 12/28/23 2114)   magnesium sulfate 2g in water 50mL IVPB (premix) (0 g Intravenous Stopped 12/28/23 2153)   ondansetron injection 4 mg (4 mg Intravenous Given 12/28/23 2052)   cefTRIAXone (ROCEPHIN) 2 g in dextrose 5 % in water (D5W) 100 mL IVPB (MB+) (0 g Intravenous Stopped 12/28/23 2153)   LORazepam injection 2 mg (2 mg Intravenous Given 12/28/23 2127)   ketorolac injection 15 mg (15 mg Intravenous Given 12/28/23 2125)   metoprolol injection 5 mg (5 mg Intravenous Given 12/28/23 2201)   methylPREDNISolone sodium succinate injection  125 mg (125 mg Intravenous Given 12/29/23 0138)   albuterol-ipratropium 2.5 mg-0.5 mg/3 mL nebulizer solution 3 mL (3 mLs Nebulization Given 12/30/23 0322)   dextromethorphan-guaiFENesin  mg/5 ml liquid 10 mL (10 mLs Oral Given 12/30/23 0540)   lactated ringers bolus 1,000 mL (0 mLs Intravenous Stopped 12/29/23 1200)   iohexoL (OMNIPAQUE 350) injection 75 mL (75 mLs Intravenous Given 12/29/23 1714)   azithromycin (ZITHROMAX) 500 mg in sodium chloride 0.9% 250 mL IVPB (Vial-Mate) (0 mg Intravenous Stopped 1/2/24 0308)     Medical Decision Making  Differential diagnosis for this patient includes COPD exacerbation, pneumonia, upper respiratory viral illness, CHF, pulmonary embolism, SVT    Patient febrile here leaning more towards infectious etiology, as well as the fact the patient has a 21 white count.  Chest x-ray without obvious fluid overload, clear lung sounds without wheezing patient's tachycardia is likely related to anxiety as well as multiple breathing treatments given by EMS as well as ER providers.  Five of Lopressor given which lower the blood pressure.  Patient doing well on BiPAP.  She will be admitted for presumed COPD exacerbation plus-minus bacterial infection.  Swabs for COVID and influenza negative.    Amount and/or Complexity of Data Reviewed  Labs: ordered. Decision-making details documented in ED Course.  Radiology: ordered. Decision-making details documented in ED Course.  ECG/medicine tests:  Decision-making details documented in ED Course.    Risk  Prescription drug management.  Decision regarding hospitalization.                                      Clinical Impression:  Final diagnoses:  [R06.02] SOB (shortness of breath)  [J44.1] COPD exacerbation (Primary)  [B34.9] Viral illness          ED Disposition Condition    Admit                 Nena Wolf MD  12/29/23 0039       Nena Wolf MD  01/12/24 [id number]

## 2023-12-29 NOTE — ASSESSMENT & PLAN NOTE
Patient with Hypercapnic and Hypoxic Respiratory failure which is Acute on chronic.  she is on home oxygen at 2-3L PRN LPM. Supplemental oxygen was provided and noted- Oxygen Concentration (%):  [40] 40    Signs/symptoms of respiratory failure include- tachypnea, increased work of breathing, respiratory distress, use of accessory muscles, wheezing, and lethargy. Contributing diagnoses includes - COPD and Pneumonia Labs and images were reviewed. Patient Has recent ABG, which has been reviewed. Will treat underlying causes and adjust management of respiratory failure as follows- BiPAP 10/5 started in the ED    Will increase to 15/5 for now with goal sats of 92-93%

## 2023-12-29 NOTE — H&P
[facility] Emergency [facility] Medicine  History & Physical    Patient Name: Zayra White  MRN: [id number]  Admission Date: 12/28/2023  Attending Physician: Nena Wolf MD   Primary Care Provider: Polly Lemos MD         Patient information was obtained from patient, relative(s), past medical records, and ER records.       Subjective:     Principal Problem:COPD exacerbation    Chief Complaint:   Chief Complaint   Patient presents with    Shortness of Breath     Pt brought by EMS reporting SOB getting worse today. Uses Bi-pap at home. EMS stated her SPO2 was 79% while in bipap at home, put her on non-rebreather and spo2 increased to 98%. Has hx of COPD.        HPI: Ms. White is a 59yo lady with a past medical history of anxiety, OA, COPD and PTE.  She has PRN CPAP for home use as needed, and uses O2 at 2-3L PRN.  Her daughter is a nurse and is at the bedside with her, assisting with the history since Ms. White is on BiPAP currently.    She states that about 2.5 days ago she developed acutely worsening wheezing and nausea.  She always has some chronic dry and hacking cough, but this worsened acutely as well.  She has had no sputum production.  Despite fever noted in the ED, both she and her daughter deny fever and chills at home.  Over the past 24 hours she has become significantly short of breath.  Her daughter went to buy some robitussin and when she got home she found her mother with O2 sats of 82%.  She gave her a duoneb, after which her sats dropped to 75%.  She tried the BiPAP, but Ms. White just finally asked her to call 911 due to SOB.    In the ED she was treated with:  Medications   albuterol-ipratropium 2.5 mg-0.5 mg/3 mL nebulizer solution 3 mL (3 mLs Nebulization Given 12/28/23 2114)   magnesium sulfate 2g in water 50mL IVPB (premix) (0 g Intravenous Stopped 12/28/23 2153)   ondansetron injection 4 mg (4 mg Intravenous Given 12/28/23 2052)   cefTRIAXone (ROCEPHIN) 2 g in dextrose 5 % in  water (D5W) 100 mL IVPB (MB+) (0 g Intravenous Stopped 23)   LORazepam injection 2 mg (2 mg Intravenous Given 23)   ketorolac injection 15 mg (15 mg Intravenous Given 23)   metoprolol injection 5 mg (5 mg Intravenous Given 23)           Past Medical History:   Diagnosis Date    Anxiety     Arthritis     Asthma     Back pain     COPD (chronic obstructive pulmonary disease)     Pulmonary embolism     10 years ago       Past Surgical History:   Procedure Laterality Date    BIOPSY      right breast    BREAST CYST EXCISION       SECTION      CHOLECYSTECTOMY      COLONOSCOPY N/A 2022    Procedure: COLONOSCOPY;  Surgeon: Chavez Gonzales MD;  Location: [facility] ENDO;  Service: General;  Laterality: N/A;    ESOPHAGOGASTRODUODENOSCOPY N/A 2022    Procedure: ESOPHAGOGASTRODUODENOSCOPY (EGD);  Surgeon: Chavez Gonzales MD;  Location: [facility] ENDO;  Service: General;  Laterality: N/A;    INCISION AND DRAINAGE OF ABSCESS Left 3/16/2020    Procedure: INCISION AND DRAINAGE, ABSCESS;  Surgeon: Irvin Villarreal MD;  Location: [facility] OR;  Service: General;  Laterality: Left;       Review of patient's allergies indicates:   Allergen Reactions    Ativan [lorazepam] Itching       No current facility-administered medications on file prior to encounter.     Current Outpatient Medications on File Prior to Encounter   Medication Sig    albuterol (VENTOLIN HFA) 90 mcg/actuation inhaler inhale 2 puff by inhalation route  every 4 - 6 hours as needed    albuterol-ipratropium (DUO-NEB) 2.5 mg-0.5 mg/3 mL nebulizer solution Take 3 mLs by nebulization every 6 (six) hours as needed for Wheezing or Shortness of Breath. Rescue    aspirin 81 MG Chew 81 mg.    atenoloL (TENORMIN) 25 MG tablet TAKE 1/2 TABLET BY MOUTH EVERY DAY **THANK YOU**    atorvastatin (LIPITOR) 20 MG tablet Take 1 tablet (20 mg total) by mouth once daily.    cholecalciferol, vitamin D3, (VITAMIN D3) 25 mcg (1,000 unit)  capsule Take 2 capsules (2,000 Units total) by mouth once daily.    clonazePAM (KLONOPIN) 0.5 MG tablet Take ONE tablet (0.5 mg total) by mouth daily as needed for Anxiety **THANK YOU**    DULoxetine (CYMBALTA) 20 MG capsule Take 2 capsules po daily    fluticasone-umeclidin-vilanter (TRELEGY ELLIPTA) 200-62.5-25 mcg inhaler Inhale 1 puff into the lungs once daily.    latanoprost 0.005 % ophthalmic solution Place 1 drop into both eyes every evening.    ondansetron (ZOFRAN-ODT) 4 MG TbDL Take 1 tablet (4 mg total) by mouth every 8 (eight) hours as needed.    pantoprazole (PROTONIX) 40 MG tablet Take 1 tablet (40 mg total) by mouth once daily. Take in the morning before breakfast.  Wait 30 minutes before eating or drinking anything    predniSONE (DELTASONE) 5 MG tablet Take 1 tablet (5 mg total) by mouth once daily.    tiZANidine (ZANAFLEX) 4 MG tablet Take 1 tablet (4 mg total) by mouth every 8 (eight) hours as needed.     Family History       Problem Relation (Age of Onset)    Breast cancer Sister          Tobacco Use    Smoking status: Former     Types: Vaping with nicotine    Smokeless tobacco: Never   Substance and Sexual Activity    Alcohol use: Not Currently     Comment: occ    Drug use: Never    Sexual activity: Yes     Birth control/protection: Post-menopausal     Review of Systems   Constitutional:  Positive for activity change, appetite change and fatigue. Negative for chills and fever.   HENT:  Positive for congestion.    Eyes:  Negative for visual disturbance.   Respiratory:  Positive for cough, chest tightness, shortness of breath and wheezing.    Cardiovascular:  Negative for chest pain.   Gastrointestinal:  Positive for nausea. Negative for diarrhea and vomiting.   Endocrine: Positive for cold intolerance.   Genitourinary:  Negative for dysuria.   Musculoskeletal:  Negative for myalgias.   Skin:  Negative for rash.   Allergic/Immunologic: Negative for immunocompromised state.   Neurological:  Positive  for dizziness and light-headedness.   Hematological:  Bruises/bleeds easily.   Psychiatric/Behavioral:  Positive for confusion, decreased concentration and sleep disturbance.      Objective:     Vital Signs (Most Recent):  Temp: (!) 100.8 °F (38.2 °C) (12/28/23 2104)  Pulse: (!) 113 (12/29/23 0029)  Resp: (!) 33 (12/29/23 0029)  BP: 115/85 (12/29/23 0001)  SpO2: 96 % (12/29/23 0029) Vital Signs (24h Range):  Temp:  [100.8 °F (38.2 °C)] 100.8 °F (38.2 °C)  Pulse:  [113-161] 113  Resp:  [19-48] 33  SpO2:  [89 %-99 %] 96 %  BP: (110-153)/() 115/85     Weight: 59 kg (130 lb)  Body mass index is 21.63 kg/m².     Physical Exam  Constitutional:       General: She is not in acute distress.     Appearance: She is normal weight. She is ill-appearing. She is not toxic-appearing or diaphoretic.   HENT:      Head: Normocephalic and atraumatic.      Comments: BiPAP is in place  Pulmonary:      Effort: Tachypnea present.   Genitourinary:     Comments: No lópez in place  Neurological:      Mental Status: She is oriented to person, place, and time. She is lethargic.      GCS: GCS eye subscore is 4. GCS verbal subscore is 5. GCS motor subscore is 6.   Psychiatric:         Attention and Perception: She is inattentive.         Behavior: Behavior is slowed and withdrawn.         Cognition and Memory: Cognition is impaired. Memory is impaired. She exhibits impaired recent memory and impaired remote memory.                Significant Labs: All pertinent labs within the past 24 hours have been reviewed.  Recent Results (from the past 24 hour(s))   Influenza A & B by Molecular    Collection Time: 12/28/23  8:47 PM    Specimen: Nasopharyngeal Swab   Result Value Ref Range    Influenza A, Molecular Negative Negative    Influenza B, Molecular Negative Negative    Flu A & B Source Nasal Swab    COVID-19 Rapid Screening    Collection Time: 12/28/23  8:47 PM   Result Value Ref Range    SARS-CoV-2 RNA, Amplification, Qual Negative Negative    Complete Blood Count (CBC)    Collection Time: 12/28/23  8:52 PM   Result Value Ref Range    WBC 21.47 (H) 3.90 - 12.70 K/uL    RBC 4.49 4.00 - 5.40 M/uL    Hemoglobin 15.0 12.0 - 16.0 g/dL    Hematocrit 43.6 37.0 - 48.5 %    MCV 97 82 - 98 fL    MCH 33.4 (H) 27.0 - 31.0 pg    MCHC 34.4 32.0 - 36.0 g/dL    RDW 12.0 11.5 - 14.5 %    Platelets 360 150 - 450 K/uL    MPV 8.9 (L) 9.2 - 12.9 fL    Immature Granulocytes 0.4 0.0 - 0.5 %    Gran # (ANC) 19.5 (H) 1.8 - 7.7 K/uL    Immature Grans (Abs) 0.08 (H) 0.00 - 0.04 K/uL    Lymph # 1.4 1.0 - 4.8 K/uL    Mono # 0.4 0.3 - 1.0 K/uL    Eos # 0.0 0.0 - 0.5 K/uL    Baso # 0.05 0.00 - 0.20 K/uL    nRBC 0 0 /100 WBC    Gran % 90.9 (H) 38.0 - 73.0 %    Lymph % 6.5 (L) 18.0 - 48.0 %    Mono % 2.0 (L) 4.0 - 15.0 %    Eosinophil % 0.0 0.0 - 8.0 %    Basophil % 0.2 0.0 - 1.9 %    Differential Method Automated    Comprehensive Metabolic Panel (CMP)    Collection Time: 12/28/23  8:52 PM   Result Value Ref Range    Sodium 143 136 - 145 mmol/L    Potassium 4.4 3.5 - 5.1 mmol/L    Chloride 103 95 - 110 mmol/L    CO2 25 23 - 29 mmol/L    Glucose 123 (H) 70 - 110 mg/dL    BUN 10 6 - 20 mg/dL    Creatinine 0.8 0.5 - 1.4 mg/dL    Calcium 9.0 8.7 - 10.5 mg/dL    Total Protein 6.9 6.0 - 8.4 g/dL    Albumin 4.0 3.5 - 5.2 g/dL    Total Bilirubin 0.6 0.1 - 1.0 mg/dL    Alkaline Phosphatase 76 55 - 135 U/L    AST 21 10 - 40 U/L    ALT 12 10 - 44 U/L    eGFR >60.0 >60 mL/min/1.73 m^2    Anion Gap 15 8 - 16 mmol/L   Troponin I    Collection Time: 12/28/23  8:52 PM   Result Value Ref Range    Troponin I 0.015 0.000 - 0.026 ng/mL   Brain natriuretic peptide    Collection Time: 12/28/23  8:52 PM   Result Value Ref Range    BNP 54 0 - 99 pg/mL   Magnesium    Collection Time: 12/28/23  8:52 PM   Result Value Ref Range    Magnesium 2.0 1.6 - 2.6 mg/dL   Lipase    Collection Time: 12/28/23  8:52 PM   Result Value Ref Range    Lipase 16 4 - 60 U/L   Lactic acid, plasma    Collection Time: 12/28/23  8:52  PM   Result Value Ref Range    Lactate (Lactic Acid) 2.0 0.5 - 2.2 mmol/L   ISTAT PROCEDURE    Collection Time: 12/28/23  9:50 PM   Result Value Ref Range    POC PH 7.308 (L) 7.35 - 7.45    POC PCO2 57.3 (HH) 35 - 45 mmHg    POC PO2 102 (H) 80 - 100 mmHg    POC HCO3 28.7 (H) 24 - 28 mmol/L    POC BE 2 -2 to 2 mmol/L    POC SATURATED O2 97 95 - 100 %    Sample ARTERIAL     Site RB     Allens Test Pass     DelSys CPAP/BiPAP     Mode BiPAP     FiO2 40     IP 10     EP 5          Significant Imaging: I have reviewed all pertinent imaging results/findings within the past 24 hours.    CXR (personal read): Improving airspace opacities at the bases, though some residual at left base, changes of COPD.      Assessment/Plan:     * COPD exacerbation  I have started the COPD best practice pathway order sets         albuterol-ipratropium 2.5 mg-0.5 mg/3 mL nebulizer solution 3 mL, 3 mL, Nebulization, Q4H PRN     albuterol-ipratropium 2.5 mg-0.5 mg/3 mL nebulizer solution 3 mL, 3 mL, Nebulization, Q4H     benzonatate capsule 100 mg, 100 mg, Oral, TID PRN, cough    dextromethorphan-guaiFENesin  mg/5 ml liquid 10 mL, 10 mL, Oral, Q6H     fluticasone furoate-vilanteroL 200-25 mcg/dose diskus inhaler 1 puff, 1 puff, Inhalation, Daily      methylPREDNISolone sodium succinate injection 125 mg, 125 mg, Intravenous, Once     methylPREDNISolone sodium succinate injection 80 mg, 80 mg, Intravenous, Q8H     Sepsis due to pneumonia  This patient does have evidence of infective focus  CXR isn't super impressive, but maybe some LLL infiltrate  Consider CT chest in am if no improvement  Flu and Covid PCR negative  Legionella CX and UAg    My overall impression is sepsis.  Source: Respiratory  Antibiotics given-   Antibiotics (72h ago, onward)      Start     Stop Route Frequency Ordered    12/29/23 2100  cefTRIAXone (ROCEPHIN) 2 g in dextrose 5 % in water (D5W) 100 mL IVPB (MB+)         -- IV Every 24 hours (non-standard times) 12/29/23  0102 12/29/23 0215  azithromycin (ZITHROMAX) 500 mg in dextrose 5 % (D5W) 250 mL IVPB (Vial-Mate)         01/03/24 0214 IV Every 24 hours (non-standard times) 12/29/23 0102          Latest lactate reviewed-  Recent Labs   Lab 12/28/23 2052   LACTATE 2.0     Organ dysfunction indicated by Acute respiratory failure    Fluid challenge Not needed - patient is not hypotensive      Post- resuscitation assessment No - Post resuscitation assessment not needed       Will Not start Pressors- Levophed for MAP of 65  Source control achieved by: iv antibiotics    Acute respiratory failure with hypoxia and hypercarbia  Patient with Hypercapnic and Hypoxic Respiratory failure which is Acute on chronic.  she is on home oxygen at 2-3L PRN LPM. Supplemental oxygen was provided and noted- Oxygen Concentration (%):  [40] 40    Signs/symptoms of respiratory failure include- tachypnea, increased work of breathing, respiratory distress, use of accessory muscles, wheezing, and lethargy. Contributing diagnoses includes - COPD and Pneumonia Labs and images were reviewed. Patient Has recent ABG, which has been reviewed. Will treat underlying causes and adjust management of respiratory failure as follows- BiPAP 10/5 started in the ED    Will increase to 15/5 for now with goal sats of 92-93%    Multiple lung nodules on CT  Follow up with Pulmonary        VTE Risk Mitigation (From admission, onward)           Ordered     enoxaparin injection 40 mg  Daily         12/29/23 0110     IP VTE HIGH RISK PATIENT  Once         12/29/23 0110     Place sequential compression device  Until discontinued         12/29/23 0110     Place FAUSTINO hose  Until discontinued         12/29/23 0110                         The attending portion of this evaluation, treatment, and documentation was performed per TY Hernadez MD via Telemedicine AudioVisual using the secure Vid[employer] software platform with 2 way audio/video. The provider was located off-site and the  patient is located in the hospital. The aforementioned video software was utilized to document the relevant history and physical exam      Critical care time spent on the evaluation and treatment of severe organ dysfunction, review of pertinent labs and imaging studies, discussions with consulting providers and discussions with patient/family: 60 minutes.       TY Hernadez MD  Department of Hospital Medicine   [place] - Emergency Dept

## 2023-12-29 NOTE — SUBJECTIVE & OBJECTIVE
Past Medical History:   Diagnosis Date    Anxiety     Arthritis     Asthma     Back pain     COPD (chronic obstructive pulmonary disease)     Pulmonary embolism     10 years ago       Past Surgical History:   Procedure Laterality Date    BIOPSY      right breast    BREAST CYST EXCISION       SECTION      CHOLECYSTECTOMY      COLONOSCOPY N/A 2022    Procedure: COLONOSCOPY;  Surgeon: Chavez Gonzales MD;  Location: [facility] ENDO;  Service: General;  Laterality: N/A;    ESOPHAGOGASTRODUODENOSCOPY N/A 2022    Procedure: ESOPHAGOGASTRODUODENOSCOPY (EGD);  Surgeon: Chavez Gonzales MD;  Location: [facility] ENDO;  Service: General;  Laterality: N/A;    INCISION AND DRAINAGE OF ABSCESS Left 3/16/2020    Procedure: INCISION AND DRAINAGE, ABSCESS;  Surgeon: Irvin Villarreal MD;  Location: [facility] OR;  Service: General;  Laterality: Left;       Review of patient's allergies indicates:   Allergen Reactions    Ativan [lorazepam] Itching       No current facility-administered medications on file prior to encounter.     Current Outpatient Medications on File Prior to Encounter   Medication Sig    albuterol (VENTOLIN HFA) 90 mcg/actuation inhaler inhale 2 puff by inhalation route  every 4 - 6 hours as needed    albuterol-ipratropium (DUO-NEB) 2.5 mg-0.5 mg/3 mL nebulizer solution Take 3 mLs by nebulization every 6 (six) hours as needed for Wheezing or Shortness of Breath. Rescue    aspirin 81 MG Chew 81 mg.    atenoloL (TENORMIN) 25 MG tablet TAKE 1/2 TABLET BY MOUTH EVERY DAY **THANK YOU**    atorvastatin (LIPITOR) 20 MG tablet Take 1 tablet (20 mg total) by mouth once daily.    cholecalciferol, vitamin D3, (VITAMIN D3) 25 mcg (1,000 unit) capsule Take 2 capsules (2,000 Units total) by mouth once daily.    clonazePAM (KLONOPIN) 0.5 MG tablet Take ONE tablet (0.5 mg total) by mouth daily as needed for Anxiety **THANK YOU**    DULoxetine (CYMBALTA) 20 MG capsule Take 2 capsules po daily     fluticasone-umeclidin-vilanter (TRELEGY ELLIPTA) 200-62.5-25 mcg inhaler Inhale 1 puff into the lungs once daily.    latanoprost 0.005 % ophthalmic solution Place 1 drop into both eyes every evening.    ondansetron (ZOFRAN-ODT) 4 MG TbDL Take 1 tablet (4 mg total) by mouth every 8 (eight) hours as needed.    pantoprazole (PROTONIX) 40 MG tablet Take 1 tablet (40 mg total) by mouth once daily. Take in the morning before breakfast.  Wait 30 minutes before eating or drinking anything    predniSONE (DELTASONE) 5 MG tablet Take 1 tablet (5 mg total) by mouth once daily.    tiZANidine (ZANAFLEX) 4 MG tablet Take 1 tablet (4 mg total) by mouth every 8 (eight) hours as needed.     Family History       Problem Relation (Age of Onset)    Breast cancer Sister          Tobacco Use    Smoking status: Former     Types: Vaping with nicotine    Smokeless tobacco: Never   Substance and Sexual Activity    Alcohol use: Not Currently     Comment: occ    Drug use: Never    Sexual activity: Yes     Birth control/protection: Post-menopausal     Review of Systems   Constitutional:  Positive for activity change, appetite change and fatigue. Negative for chills and fever.   HENT:  Positive for congestion.    Eyes:  Negative for visual disturbance.   Respiratory:  Positive for cough, chest tightness, shortness of breath and wheezing.    Cardiovascular:  Negative for chest pain.   Gastrointestinal:  Positive for nausea. Negative for diarrhea and vomiting.   Endocrine: Positive for cold intolerance.   Genitourinary:  Negative for dysuria.   Musculoskeletal:  Negative for myalgias.   Skin:  Negative for rash.   Allergic/Immunologic: Negative for immunocompromised state.   Neurological:  Positive for dizziness and light-headedness.   Hematological:  Bruises/bleeds easily.   Psychiatric/Behavioral:  Positive for confusion, decreased concentration and sleep disturbance.      Objective:     Vital Signs (Most Recent):  Temp: (!) 100.8 °F (38.2 °C)  (12/28/23 2104)  Pulse: (!) 113 (12/29/23 0029)  Resp: (!) 33 (12/29/23 0029)  BP: 115/85 (12/29/23 0001)  SpO2: 96 % (12/29/23 0029) Vital Signs (24h Range):  Temp:  [100.8 °F (38.2 °C)] 100.8 °F (38.2 °C)  Pulse:  [113-161] 113  Resp:  [19-48] 33  SpO2:  [89 %-99 %] 96 %  BP: (110-153)/() 115/85     Weight: 59 kg (130 lb)  Body mass index is 21.63 kg/m².     Physical Exam  Constitutional:       General: She is not in acute distress.     Appearance: She is normal weight. She is ill-appearing. She is not toxic-appearing or diaphoretic.   HENT:      Head: Normocephalic and atraumatic.      Comments: BiPAP is in place  Pulmonary:      Effort: Tachypnea present.   Genitourinary:     Comments: No lópez in place  Neurological:      Mental Status: She is oriented to person, place, and time. She is lethargic.      GCS: GCS eye subscore is 4. GCS verbal subscore is 5. GCS motor subscore is 6.   Psychiatric:         Attention and Perception: She is inattentive.         Behavior: Behavior is slowed and withdrawn.         Cognition and Memory: Cognition is impaired. Memory is impaired. She exhibits impaired recent memory and impaired remote memory.                Significant Labs: All pertinent labs within the past 24 hours have been reviewed.  Recent Results (from the past 24 hour(s))   Influenza A & B by Molecular    Collection Time: 12/28/23  8:47 PM    Specimen: Nasopharyngeal Swab   Result Value Ref Range    Influenza A, Molecular Negative Negative    Influenza B, Molecular Negative Negative    Flu A & B Source Nasal Swab    COVID-19 Rapid Screening    Collection Time: 12/28/23  8:47 PM   Result Value Ref Range    SARS-CoV-2 RNA, Amplification, Qual Negative Negative   Complete Blood Count (CBC)    Collection Time: 12/28/23  8:52 PM   Result Value Ref Range    WBC 21.47 (H) 3.90 - 12.70 K/uL    RBC 4.49 4.00 - 5.40 M/uL    Hemoglobin 15.0 12.0 - 16.0 g/dL    Hematocrit 43.6 37.0 - 48.5 %    MCV 97 82 - 98 fL    MCH  33.4 (H) 27.0 - 31.0 pg    MCHC 34.4 32.0 - 36.0 g/dL    RDW 12.0 11.5 - 14.5 %    Platelets 360 150 - 450 K/uL    MPV 8.9 (L) 9.2 - 12.9 fL    Immature Granulocytes 0.4 0.0 - 0.5 %    Gran # (ANC) 19.5 (H) 1.8 - 7.7 K/uL    Immature Grans (Abs) 0.08 (H) 0.00 - 0.04 K/uL    Lymph # 1.4 1.0 - 4.8 K/uL    Mono # 0.4 0.3 - 1.0 K/uL    Eos # 0.0 0.0 - 0.5 K/uL    Baso # 0.05 0.00 - 0.20 K/uL    nRBC 0 0 /100 WBC    Gran % 90.9 (H) 38.0 - 73.0 %    Lymph % 6.5 (L) 18.0 - 48.0 %    Mono % 2.0 (L) 4.0 - 15.0 %    Eosinophil % 0.0 0.0 - 8.0 %    Basophil % 0.2 0.0 - 1.9 %    Differential Method Automated    Comprehensive Metabolic Panel (CMP)    Collection Time: 12/28/23  8:52 PM   Result Value Ref Range    Sodium 143 136 - 145 mmol/L    Potassium 4.4 3.5 - 5.1 mmol/L    Chloride 103 95 - 110 mmol/L    CO2 25 23 - 29 mmol/L    Glucose 123 (H) 70 - 110 mg/dL    BUN 10 6 - 20 mg/dL    Creatinine 0.8 0.5 - 1.4 mg/dL    Calcium 9.0 8.7 - 10.5 mg/dL    Total Protein 6.9 6.0 - 8.4 g/dL    Albumin 4.0 3.5 - 5.2 g/dL    Total Bilirubin 0.6 0.1 - 1.0 mg/dL    Alkaline Phosphatase 76 55 - 135 U/L    AST 21 10 - 40 U/L    ALT 12 10 - 44 U/L    eGFR >60.0 >60 mL/min/1.73 m^2    Anion Gap 15 8 - 16 mmol/L   Troponin I    Collection Time: 12/28/23  8:52 PM   Result Value Ref Range    Troponin I 0.015 0.000 - 0.026 ng/mL   Brain natriuretic peptide    Collection Time: 12/28/23  8:52 PM   Result Value Ref Range    BNP 54 0 - 99 pg/mL   Magnesium    Collection Time: 12/28/23  8:52 PM   Result Value Ref Range    Magnesium 2.0 1.6 - 2.6 mg/dL   Lipase    Collection Time: 12/28/23  8:52 PM   Result Value Ref Range    Lipase 16 4 - 60 U/L   Lactic acid, plasma    Collection Time: 12/28/23  8:52 PM   Result Value Ref Range    Lactate (Lactic Acid) 2.0 0.5 - 2.2 mmol/L   ISTAT PROCEDURE    Collection Time: 12/28/23  9:50 PM   Result Value Ref Range    POC PH 7.308 (L) 7.35 - 7.45    POC PCO2 57.3 (HH) 35 - 45 mmHg    POC PO2 102 (H) 80 - 100 mmHg     POC HCO3 28.7 (H) 24 - 28 mmol/L    POC BE 2 -2 to 2 mmol/L    POC SATURATED O2 97 95 - 100 %    Sample ARTERIAL     Site RB     Allens Test Pass     DelSys CPAP/BiPAP     Mode BiPAP     FiO2 40     IP 10     EP 5          Significant Imaging: I have reviewed all pertinent imaging results/findings within the past 24 hours.    CXR (personal read): Improving airspace opacities at the bases, though some residual at left base, changes of COPD.

## 2023-12-30 LAB
ACINETOBACTER CALCOACETICUS/BAUMANNII COMPLEX: NOT DETECTED
ANION GAP SERPL CALC-SCNC: 15 MMOL/L (ref 8–16)
BACTEROIDES FRAGILIS: NOT DETECTED
BASOPHILS # BLD AUTO: 0.05 K/UL (ref 0–0.2)
BASOPHILS NFR BLD: 0.3 % (ref 0–1.9)
BUN SERPL-MCNC: 12 MG/DL (ref 6–20)
CALCIUM SERPL-MCNC: 8.4 MG/DL (ref 8.7–10.5)
CANDIDA ALBICANS: NOT DETECTED
CANDIDA AURIS: NOT DETECTED
CANDIDA GLABRATA: NOT DETECTED
CANDIDA KRUSEI: NOT DETECTED
CANDIDA PARAPSILOSIS: NOT DETECTED
CANDIDA TROPICALIS: NOT DETECTED
CHLORIDE SERPL-SCNC: 102 MMOL/L (ref 95–110)
CO2 SERPL-SCNC: 24 MMOL/L (ref 23–29)
CREAT SERPL-MCNC: 0.7 MG/DL (ref 0.5–1.4)
CRYPTOCOCCUS NEOFORMANS/GATTII: NOT DETECTED
CTX-M GENE (ESBL PRODUCER): ABNORMAL
DIFFERENTIAL METHOD BLD: ABNORMAL
ENTEROBACTER CLOACAE COMPLEX: NOT DETECTED
ENTEROBACTERALES: NOT DETECTED
ENTEROCOCCUS FAECALIS: NOT DETECTED
ENTEROCOCCUS FAECIUM: NOT DETECTED
EOSINOPHIL # BLD AUTO: 0 K/UL (ref 0–0.5)
EOSINOPHIL NFR BLD: 0.2 % (ref 0–8)
ERYTHROCYTE [DISTWIDTH] IN BLOOD BY AUTOMATED COUNT: 11.9 % (ref 11.5–14.5)
ESCHERICHIA COLI: NOT DETECTED
EST. GFR  (NO RACE VARIABLE): >60 ML/MIN/1.73 M^2
GLUCOSE SERPL-MCNC: 140 MG/DL (ref 70–110)
HAEMOPHILUS INFLUENZAE: DETECTED
HCT VFR BLD AUTO: 34.6 % (ref 37–48.5)
HGB BLD-MCNC: 12 G/DL (ref 12–16)
IMM GRANULOCYTES # BLD AUTO: 0.15 K/UL (ref 0–0.04)
IMM GRANULOCYTES NFR BLD AUTO: 0.9 % (ref 0–0.5)
IMP GENE (CARBAPENEM RESISTANT): ABNORMAL
KLEBSIELLA AEROGENES: NOT DETECTED
KLEBSIELLA OXYTOCA: NOT DETECTED
KLEBSIELLA PNEUMONIAE GROUP: NOT DETECTED
KPC RESISTANCE GENE (CARBAPENEM): ABNORMAL
LISTERIA MONOCYTOGENES: NOT DETECTED
LYMPHOCYTES # BLD AUTO: 0.7 K/UL (ref 1–4.8)
LYMPHOCYTES NFR BLD: 4.1 % (ref 18–48)
MAGNESIUM SERPL-MCNC: 1.9 MG/DL (ref 1.6–2.6)
MCH RBC QN AUTO: 33.7 PG (ref 27–31)
MCHC RBC AUTO-ENTMCNC: 34.7 G/DL (ref 32–36)
MCR-1: ABNORMAL
MCV RBC AUTO: 97 FL (ref 82–98)
MEC A/C AND MREJ (MRSA): ABNORMAL
MEC A/C: ABNORMAL
MONOCYTES # BLD AUTO: 0.5 K/UL (ref 0.3–1)
MONOCYTES NFR BLD: 3.2 % (ref 4–15)
NDM GENE (CARBAPENEM RESISTANT): ABNORMAL
NEISSERIA MENINGITIDIS: NOT DETECTED
NEUTROPHILS # BLD AUTO: 14.6 K/UL (ref 1.8–7.7)
NEUTROPHILS NFR BLD: 91.3 % (ref 38–73)
NRBC BLD-RTO: 0 /100 WBC
OXA-48-LIKE (CARBAPENEM RESISTANT): ABNORMAL
PHOSPHATE SERPL-MCNC: 3.2 MG/DL (ref 2.7–4.5)
PLATELET # BLD AUTO: 193 K/UL (ref 150–450)
PMV BLD AUTO: 10 FL (ref 9.2–12.9)
POTASSIUM SERPL-SCNC: 4.6 MMOL/L (ref 3.5–5.1)
PROTEUS SPECIES: NOT DETECTED
PSEUDOMONAS AERUGINOSA: NOT DETECTED
RBC # BLD AUTO: 3.56 M/UL (ref 4–5.4)
SALMONELLA SP: NOT DETECTED
SERRATIA MARCESCENS: NOT DETECTED
SODIUM SERPL-SCNC: 141 MMOL/L (ref 136–145)
STAPHYLOCOCCUS AUREUS: NOT DETECTED
STAPHYLOCOCCUS EPIDERMIDIS: NOT DETECTED
STAPHYLOCOCCUS LUGDUNESIS: NOT DETECTED
STAPHYLOCOCCUS SPECIES: NOT DETECTED
STENOTROPHOMONAS MALTOPHILIA: NOT DETECTED
STREPTOCOCCUS AGALACTIAE: NOT DETECTED
STREPTOCOCCUS PNEUMONIAE: NOT DETECTED
STREPTOCOCCUS PYOGENES: NOT DETECTED
STREPTOCOCCUS SPECIES: NOT DETECTED
VAN A/B (VRE GENE): ABNORMAL
VIM GENE (CARBAPENEM RESISTANT): ABNORMAL
WBC # BLD AUTO: 16.02 K/UL (ref 3.9–12.7)

## 2023-12-30 PROCEDURE — 27000207 HC ISOLATION

## 2023-12-30 PROCEDURE — 85025 COMPLETE CBC W/AUTO DIFF WBC: CPT | Performed by: INTERNAL MEDICINE

## 2023-12-30 PROCEDURE — 99900031 HC PATIENT EDUCATION (STAT)

## 2023-12-30 PROCEDURE — 94640 AIRWAY INHALATION TREATMENT: CPT

## 2023-12-30 PROCEDURE — 83735 ASSAY OF MAGNESIUM: CPT | Performed by: INTERNAL MEDICINE

## 2023-12-30 PROCEDURE — 99233 SBSQ HOSP IP/OBS HIGH 50: CPT | Mod: ,,, | Performed by: STUDENT IN AN ORGANIZED HEALTH CARE EDUCATION/TRAINING PROGRAM

## 2023-12-30 PROCEDURE — 27000221 HC OXYGEN, UP TO 24 HOURS

## 2023-12-30 PROCEDURE — 87081 CULTURE SCREEN ONLY: CPT | Performed by: INTERNAL MEDICINE

## 2023-12-30 PROCEDURE — 11000001 HC ACUTE MED/SURG PRIVATE ROOM

## 2023-12-30 PROCEDURE — 63600175 PHARM REV CODE 636 W HCPCS: Performed by: STUDENT IN AN ORGANIZED HEALTH CARE EDUCATION/TRAINING PROGRAM

## 2023-12-30 PROCEDURE — 94660 CPAP INITIATION&MGMT: CPT

## 2023-12-30 PROCEDURE — 21400001 HC TELEMETRY ROOM

## 2023-12-30 PROCEDURE — 25000003 PHARM REV CODE 250: Performed by: INTERNAL MEDICINE

## 2023-12-30 PROCEDURE — 25000242 PHARM REV CODE 250 ALT 637 W/ HCPCS: Performed by: INTERNAL MEDICINE

## 2023-12-30 PROCEDURE — 94761 N-INVAS EAR/PLS OXIMETRY MLT: CPT

## 2023-12-30 PROCEDURE — 27100171 HC OXYGEN HIGH FLOW UP TO 24 HOURS

## 2023-12-30 PROCEDURE — 99900035 HC TECH TIME PER 15 MIN (STAT)

## 2023-12-30 PROCEDURE — 84100 ASSAY OF PHOSPHORUS: CPT | Performed by: INTERNAL MEDICINE

## 2023-12-30 PROCEDURE — 25000003 PHARM REV CODE 250: Performed by: STUDENT IN AN ORGANIZED HEALTH CARE EDUCATION/TRAINING PROGRAM

## 2023-12-30 PROCEDURE — 63600175 PHARM REV CODE 636 W HCPCS: Mod: UD | Performed by: INTERNAL MEDICINE

## 2023-12-30 PROCEDURE — 80048 BASIC METABOLIC PNL TOTAL CA: CPT | Performed by: INTERNAL MEDICINE

## 2023-12-30 PROCEDURE — 94664 DEMO&/EVAL PT USE INHALER: CPT

## 2023-12-30 RX ORDER — MUPIROCIN 20 MG/G
OINTMENT TOPICAL 2 TIMES DAILY
Status: DISCONTINUED | OUTPATIENT
Start: 2023-12-30 | End: 2024-01-03 | Stop reason: HOSPADM

## 2023-12-30 RX ORDER — ATENOLOL 25 MG/1
25 TABLET ORAL DAILY
Status: DISCONTINUED | OUTPATIENT
Start: 2023-12-30 | End: 2024-01-03 | Stop reason: HOSPADM

## 2023-12-30 RX ADMIN — ATORVASTATIN CALCIUM 20 MG: 10 TABLET, FILM COATED ORAL at 09:12

## 2023-12-30 RX ADMIN — BENZONATATE 100 MG: 100 CAPSULE ORAL at 12:12

## 2023-12-30 RX ADMIN — ATENOLOL 25 MG: 25 TABLET ORAL at 09:12

## 2023-12-30 RX ADMIN — PREDNISONE 40 MG: 10 TABLET ORAL at 09:12

## 2023-12-30 RX ADMIN — BENZONATATE 100 MG: 100 CAPSULE ORAL at 08:12

## 2023-12-30 RX ADMIN — IPRATROPIUM BROMIDE AND ALBUTEROL SULFATE 3 ML: 2.5; .5 SOLUTION RESPIRATORY (INHALATION) at 08:12

## 2023-12-30 RX ADMIN — MUPIROCIN: 20 OINTMENT TOPICAL at 08:12

## 2023-12-30 RX ADMIN — ACETAMINOPHEN 650 MG: 325 TABLET ORAL at 08:12

## 2023-12-30 RX ADMIN — IPRATROPIUM BROMIDE AND ALBUTEROL SULFATE 3 ML: 2.5; .5 SOLUTION RESPIRATORY (INHALATION) at 11:12

## 2023-12-30 RX ADMIN — PANTOPRAZOLE SODIUM 40 MG: 40 TABLET, DELAYED RELEASE ORAL at 09:12

## 2023-12-30 RX ADMIN — GUAIFENESIN AND DEXTROMETHORPHAN 10 ML: 100; 10 SYRUP ORAL at 05:12

## 2023-12-30 RX ADMIN — Medication 6 MG: at 08:12

## 2023-12-30 RX ADMIN — Medication 2000 UNITS: at 09:12

## 2023-12-30 RX ADMIN — IPRATROPIUM BROMIDE AND ALBUTEROL SULFATE 3 ML: 2.5; .5 SOLUTION RESPIRATORY (INHALATION) at 03:12

## 2023-12-30 RX ADMIN — FLUTICASONE FUROATE AND VILANTEROL TRIFENATATE 1 PUFF: 200; 25 POWDER RESPIRATORY (INHALATION) at 07:12

## 2023-12-30 RX ADMIN — ASPIRIN 81 MG: 81 TABLET, CHEWABLE ORAL at 09:12

## 2023-12-30 RX ADMIN — MUPIROCIN: 20 OINTMENT TOPICAL at 09:12

## 2023-12-30 RX ADMIN — DULOXETINE HYDROCHLORIDE 40 MG: 20 CAPSULE, DELAYED RELEASE ORAL at 12:12

## 2023-12-30 RX ADMIN — CEFTRIAXONE 2 G: 2 INJECTION, POWDER, FOR SOLUTION INTRAMUSCULAR; INTRAVENOUS at 10:12

## 2023-12-30 RX ADMIN — CLONAZEPAM 0.5 MG: 0.5 TABLET ORAL at 03:12

## 2023-12-30 RX ADMIN — ACETAMINOPHEN 650 MG: 325 TABLET ORAL at 12:12

## 2023-12-30 RX ADMIN — AZITHROMYCIN MONOHYDRATE 500 MG: 500 INJECTION, POWDER, LYOPHILIZED, FOR SOLUTION INTRAVENOUS at 02:12

## 2023-12-30 RX ADMIN — ENOXAPARIN SODIUM 40 MG: 40 INJECTION SUBCUTANEOUS at 05:12

## 2023-12-30 RX ADMIN — IPRATROPIUM BROMIDE AND ALBUTEROL SULFATE 3 ML: 2.5; .5 SOLUTION RESPIRATORY (INHALATION) at 07:12

## 2023-12-30 NOTE — SUBJECTIVE & OBJECTIVE
Interval History: BC positive with haemophilus influenzae. Continue IV abx. Continue PO steroids. Restart home atenolol.     Review of Systems  Objective:     Vital Signs (Most Recent):  Temp: 97.8 °F (36.6 °C) (12/30/23 0801)  Pulse: (!) 138 (12/30/23 0801)  Resp: 20 (12/30/23 0801)  BP: (!) 177/79 (12/30/23 0801)  SpO2: (!) 93 % (12/30/23 0801) Vital Signs (24h Range):  Temp:  [97.8 °F (36.6 °C)-98.2 °F (36.8 °C)] 97.8 °F (36.6 °C)  Pulse:  [] 138  Resp:  [17-54] 20  SpO2:  [89 %-99 %] 93 %  BP: (106-177)/(47-79) 177/79     Weight: 52.8 kg (116 lb 6.5 oz)  Body mass index is 19.37 kg/m².    Intake/Output Summary (Last 24 hours) at 12/30/2023 1032  Last data filed at 12/29/2023 2137  Gross per 24 hour   Intake 1525 ml   Output 1350 ml   Net 175 ml         Physical Exam      Vitals reviewed  General: NAD, Thin, frail  Head: NC/AT  Eyes: EOMI, NANY  Cardiovascular: Pulses intact distally, tachycardia, regular rhythm   Pulmonary: Normal Respiratory Rate, No respiratory distress  Gi: Soft, Non-tender  Extremities: Warm, No edema present  Skin: Warm, dry  Neuro: Alert, Oriented x3, No focal Deficit  Psych: Anxious appearing    Significant Labs: All pertinent labs within the past 24 hours have been reviewed.    Significant Imaging: I have reviewed all pertinent imaging results/findings within the past 24 hours.

## 2023-12-30 NOTE — PLAN OF CARE
Problem: Adjustment to Illness COPD (Chronic Obstructive Pulmonary Disease)  Goal: Optimal Chronic Illness Coping  Outcome: Ongoing, Progressing     Problem: Functional Ability Impaired COPD (Chronic Obstructive Pulmonary Disease)  Goal: Optimal Level of Functional [place]  Outcome: Ongoing, Progressing     Problem: Infection COPD (Chronic Obstructive Pulmonary Disease)  Goal: Absence of Infection Signs and Symptoms  Outcome: Ongoing, Progressing     Problem: Oral Intake Inadequate COPD (Chronic Obstructive Pulmonary Disease)  Goal: Improved Nutrition Intake  Outcome: Ongoing, Progressing     Problem: Respiratory Compromise COPD (Chronic Obstructive Pulmonary Disease)  Goal: Effective Oxygenation and Ventilation  Outcome: Ongoing, Progressing     Problem: Adult Inpatient Plan of Care  Goal: Plan of Care Review  Outcome: Ongoing, Progressing

## 2023-12-30 NOTE — PLAN OF CARE
[facility] - Intensive Care  Initial Discharge Assessment       Primary Care Provider: Polly Lemos MD    Admission Diagnosis: SOB (shortness of breath) [R06.02]  Viral illness [B34.9]  COPD exacerbation [J44.1]    Admission Date: 12/28/2023  Expected Discharge Date:     Transition of Care Barriers: None    Patient alert & oriented lives at home alone; her sister lives next door & her daughters assist her as needed. She is independent at home. She does use nebulizer & oxygen  from [facility]. She uses [facility]; if closed will use [facility] in [place]. She uses Dr Carol Lemos for PCP. Denies any needs at this time.       Payor: [place] / Plan[place] [facility] COMMUNITY PLAN [place] / Product Type: Managed Medicaid /     Extended Emergency Contact Information  Primary Emergency Contact: Noreen Wong  Address: [address]            [place] [address] [place]  Mobile Phone: [phone]  Relation: Sister  Preferred language: English   needed? No  Secondary Emergency Contact: Shell Wilkerson   [place]  Mobile Phone: [phone]  Relation: Daughter    Discharge Plan A: Home  Discharge Plan B: Home      [facility] [place] - [address]  [address]  [place] [address]  Phone: [phone] Fax: [phone]      Initial Assessment (most recent)       Adult Discharge Assessment - 12/29/23 1650          Discharge Assessment    Assessment Type Discharge Planning Assessment     Confirmed/corrected address, phone number and insurance Yes     Confirmed Demographics Correct on Facesheet     Source of Information patient     When was your last doctors appointment? --   2 weeks ago    Communicated IKM with patient/caregiver Yes     Reason For Admission COPD     People in Home alone     Do you expect to return to your current living situation? Yes     Do you have help at home or someone to help you manage your care at home? No     Prior to  hospitilization cognitive status: Alert/Oriented     Current cognitive status: Alert/Oriented     Walking or Climbing Stairs Difficulty no     Dressing/Bathing Difficulty yes     Dressing/Bathing bathing difficulty, requires equipment     Dressing/Bathing Management  chair & grab bars     Home Accessibility stairs to enter home     Number of Stairs, Main Entrance six     Home Layout Able to live on 1st floor     Equipment Currently Used at Home nebulizer;oxygen;shower chair;grab bar     Readmission within 30 days? No     Patient currently being followed by outpatient case management? Yes     If yes, name of outpatient case management following: insurance company assigned oupatient case management     Do you currently have service(s) that help you manage your care at home? No     Do you take prescription medications? Yes     Do you have prescription coverage? Yes     Coverage Medicaid     Do you have any problems affording any of your prescribed medications? No     Is the patient taking medications as prescribed? yes     Who is going to help you get home at discharge? either her sister or her daughter     How do you get to doctors appointments? family or friend will provide     Are you on dialysis? No     Do you take coumadin? No     Discharge Plan A Home     Discharge Plan B Home     DME Needed Upon Discharge  none     Discharge Plan discussed with: Patient     Transition of Care Barriers None        Physical Activity    On average, how many days per week do you engage in moderate to strenuous exercise (like a brisk walk)? 2 days     On average, how many minutes do you engage in exercise at this level? 120 min        Financial Resource Strain    How hard is it for you to pay for the very basics like food, housing, medical care, and heating? Not hard at all        Housing Stability    In the last 12 months, was there a time when you were not able to pay the mortgage or rent on time? No     In the last 12 months,  how many places have you lived? 1     In the last 12 months, was there a time when you did not have a steady place to sleep or slept in a shelter (including now)? No        Transportation Needs    In the past 12 months, has lack of transportation kept you from medical appointments or from getting medications? Yes     In the past 12 months, has lack of transportation kept you from meetings, work, or from getting things needed for daily living? No        Food Insecurity    Within the past 12 months, you worried that your food would run out before you got the money to buy more. Never true     Within the past 12 months, the food you bought just didn't last and you didn't have money to get more. Never true        Stress    Do you feel stress - tense, restless, nervous, or anxious, or unable to sleep at night because your mind is troubled all the time - these days? Very much        Social Connections    In a typical week, how many times do you talk on the phone with family, friends, or neighbors? More than three times a week     How often do you get together with friends or relatives? More than three times a week     How often do you attend Yazidi or Restorationism services? Never     Do you belong to any clubs or organizations such as Yazidi groups, unions, fraternal or athletic groups, or school groups? No     Are you , , , , never , or living with a partner?         Alcohol Use    Q1: How often do you have a drink containing alcohol? Never     Q2: How many drinks containing alcohol do you have on a typical day when you are drinking? Patient does not drink     Q3: How often do you have six or more drinks on one occasion? Never

## 2023-12-30 NOTE — ASSESSMENT & PLAN NOTE
This patient does have evidence of infective focus  CXR isn't super impressive, but maybe some LLL infiltrate  Consider CT chest in am if no improvement  Flu and Covid PCR negative  Legionella CX and UAg    My overall impression is sepsis.  Source: Respiratory  Antibiotics given-   Antibiotics (72h ago, onward)    Start     Stop Route Frequency Ordered    12/30/23 0915  cefTRIAXone (ROCEPHIN) 2 g in dextrose 5 % in water (D5W) 100 mL IVPB (MB+)         -- IV Every 24 hours (non-standard times) 12/30/23 0808    12/30/23 0900  mupirocin 2 % ointment         01/04/24 0859 Nasl 2 times daily 12/30/23 0751    12/29/23 0215  azithromycin (ZITHROMAX) 500 mg in dextrose 5 % (D5W) 250 mL IVPB (Vial-Mate)         01/03/24 0214 IV Every 24 hours (non-standard times) 12/29/23 0102        Latest lactate reviewed-  Recent Labs   Lab 12/28/23 2052   LACTATE 2.0       Organ dysfunction indicated by Acute respiratory failure    Fluid challenge Not needed - patient is not hypotensive      Post- resuscitation assessment No - Post resuscitation assessment not needed       Will Not start Pressors- Levophed for MAP of 65  Source control achieved by: iv antibiotics

## 2023-12-30 NOTE — ASSESSMENT & PLAN NOTE
Patient with Hypercapnic and Hypoxic Respiratory failure which is Acute on chronic.  she is on home oxygen at 2-3L PRN LPM. Supplemental oxygen was provided and noted- Oxygen Concentration (%):  [28-32] 28    Signs/symptoms of respiratory failure include- tachypnea, increased work of breathing, respiratory distress, use of accessory muscles, wheezing, and lethargy. Contributing diagnoses includes - COPD and Pneumonia Labs and images were reviewed. Patient Has recent ABG, which has been reviewed. Will treat underlying causes and adjust management of respiratory failure as follows- BiPAP 10/5 started in the ED    Back on Home O2 regimen at this time

## 2023-12-30 NOTE — EICU
Intervention Initiated From:  [facility] / EICU    Bhavesh intervened regarding:  Other    Nurse Notified:  Yes    Doctor Notified:  No    Comments: Rounding. Patient not on bedside monitor. B/P 154/70, , resp 18, sat 95 on 2 liter nasal cannula. On no IV fluid

## 2023-12-30 NOTE — PLAN OF CARE
Problem: Respiratory Compromise COPD (Chronic Obstructive Pulmonary Disease)  Goal: Effective Oxygenation and Ventilation  Outcome: Ongoing, Progressing  Intervention: Promote Airway Secretion Clearance  Flowsheets (Taken 12/30/2023 1607)  Breathing Techniques/Airway Clearance:   deep/controlled cough encouraged   pursed-lip breathing encouraged  Cough And Deep Breathing: done independently per patient  Intervention: Optimize Oxygenation and Ventilation  Flowsheets (Taken 12/30/2023 1607)  Airway/Ventilation Management: airway patency maintained  Head of Bed (HOB) Positioning: HOB elevated   Patient in no apparent distress. Patient on 2 lpm nasal cannula. PRN treatments given. IS and aerobika done. Breo daily. BIPAP at bedside if needed  . Will continue to monitor.

## 2023-12-30 NOTE — PROGRESS NOTES
[facility] Medicine  Progress Note    Patient Name: Zayra White  MRN: [id number]  Patient Class: IP- Inpatient   Admission Date: 12/28/2023  Length of Stay: 1 days  Attending Physician: Jeff Boateng MD  Primary Care Provider: Polly Lemos MD        Subjective:     Principal Problem:COPD exacerbation        HPI:  Ms. White is a 57yo lady with a past medical history of anxiety, OA, COPD and PTE.  She has PRN CPAP for home use as needed, and uses O2 at 2-3L PRN.  Her daughter is a nurse and is at the bedside with her, assisting with the history since Ms. White is on BiPAP currently.    She states that about 2.5 days ago she developed acutely worsening wheezing and nausea.  She always has some chronic dry and hacking cough, but this worsened acutely as well.  She has had no sputum production.  Despite fever noted in the ED, both she and her daughter deny fever and chills at home.  Over the past 24 hours she has become significantly short of breath.  Her daughter went to buy some robitussin and when she got home she found her mother with O2 sats of 82%.  She gave her a duoneb, after which her sats dropped to 75%.  She tried the BiPAP, but Ms. White just finally asked her to call 911 due to SOB.    In the ED she was treated with:  Medications   albuterol-ipratropium 2.5 mg-0.5 mg/3 mL nebulizer solution 3 mL (3 mLs Nebulization Given 12/28/23 2114)   magnesium sulfate 2g in water 50mL IVPB (premix) (0 g Intravenous Stopped 12/28/23 2153)   ondansetron injection 4 mg (4 mg Intravenous Given 12/28/23 2052)   cefTRIAXone (ROCEPHIN) 2 g in dextrose 5 % in water (D5W) 100 mL IVPB (MB+) (0 g Intravenous Stopped 12/28/23 2153)   LORazepam injection 2 mg (2 mg Intravenous Given 12/28/23 2127)   ketorolac injection 15 mg (15 mg Intravenous Given 12/28/23 2125)   metoprolol injection 5 mg (5 mg Intravenous Given 12/28/23 2201)           Overview/Hospital Course:  No notes on  file    Interval History: BC positive with haemophilus influenzae. Continue IV abx. Continue PO steroids. Restart home atenolol.     Review of Systems  Objective:     Vital Signs (Most Recent):  Temp: 97.8 °F (36.6 °C) (12/30/23 0801)  Pulse: (!) 138 (12/30/23 0801)  Resp: 20 (12/30/23 0801)  BP: (!) 177/79 (12/30/23 0801)  SpO2: (!) 93 % (12/30/23 0801) Vital Signs (24h Range):  Temp:  [97.8 °F (36.6 °C)-98.2 °F (36.8 °C)] 97.8 °F (36.6 °C)  Pulse:  [] 138  Resp:  [17-54] 20  SpO2:  [89 %-99 %] 93 %  BP: (106-177)/(47-79) 177/79     Weight: 52.8 kg (116 lb 6.5 oz)  Body mass index is 19.37 kg/m².    Intake/Output Summary (Last 24 hours) at 12/30/2023 1032  Last data filed at 12/29/2023 2137  Gross per 24 hour   Intake 1525 ml   Output 1350 ml   Net 175 ml         Physical Exam      Vitals reviewed  General: NAD, Thin, frail  Head: NC/AT  Eyes: EOMI, NANY  Cardiovascular: Pulses intact distally, tachycardia, regular rhythm   Pulmonary: Normal Respiratory Rate, No respiratory distress  Gi: Soft, Non-tender  Extremities: Warm, No edema present  Skin: Warm, dry  Neuro: Alert, Oriented x3, No focal Deficit  Psych: Anxious appearing    Significant Labs: All pertinent labs within the past 24 hours have been reviewed.    Significant Imaging: I have reviewed all pertinent imaging results/findings within the past 24 hours.    Assessment/Plan:      * COPD exacerbation  COPD exacerbation 2/2 H. Influenzae bacterial pneumonia  Continue IV rocephin- f/u speciation with sensitivities from blood culture but typically H. Influenzae sensitive to penicillins and third generation cephalosporins. Not repeating blood cultures unless patient declines.  Continue PO steroids  Continue duonebs. Space out due to tachcyardia  PRN nebs  Acapella and IS    Acute respiratory failure with hypoxia and hypercarbia  Patient with Hypercapnic and Hypoxic Respiratory failure which is Acute on chronic.  she is on home oxygen at 2-3L PRN LPM.  Supplemental oxygen was provided and noted- Oxygen Concentration (%):  [28-32] 28    Signs/symptoms of respiratory failure include- tachypnea, increased work of breathing, respiratory distress, use of accessory muscles, wheezing, and lethargy. Contributing diagnoses includes - COPD and Pneumonia Labs and images were reviewed. Patient Has recent ABG, which has been reviewed. Will treat underlying causes and adjust management of respiratory failure as follows- BiPAP 10/5 started in the ED    Back on Home O2 regimen at this time    Sepsis due to pneumonia  This patient does have evidence of infective focus  CXR isn't super impressive, but maybe some LLL infiltrate  Consider CT chest in am if no improvement  Flu and Covid PCR negative  Legionella CX and UAg    My overall impression is sepsis.  Source: Respiratory  Antibiotics given-   Antibiotics (72h ago, onward)      Start     Stop Route Frequency Ordered    12/30/23 0915  cefTRIAXone (ROCEPHIN) 2 g in dextrose 5 % in water (D5W) 100 mL IVPB (MB+)         -- IV Every 24 hours (non-standard times) 12/30/23 0808    12/30/23 0900  mupirocin 2 % ointment         01/04/24 0859 Nasl 2 times daily 12/30/23 0751    12/29/23 0215  azithromycin (ZITHROMAX) 500 mg in dextrose 5 % (D5W) 250 mL IVPB (Vial-Mate)         01/03/24 0214 IV Every 24 hours (non-standard times) 12/29/23 0102          Latest lactate reviewed-  Recent Labs   Lab 12/28/23 2052   LACTATE 2.0       Organ dysfunction indicated by Acute respiratory failure    Fluid challenge Not needed - patient is not hypotensive      Post- resuscitation assessment No - Post resuscitation assessment not needed       Will Not start Pressors- Levophed for MAP of 65  Source control achieved by: iv antibiotics    Multiple lung nodules on CT  Follow up with Pulmonary        VTE Risk Mitigation (From admission, onward)           Ordered     enoxaparin injection 40 mg  Daily         12/29/23 0110     IP VTE HIGH RISK PATIENT  Once          12/29/23 0110     Place sequential compression device  Until discontinued         12/29/23 0110     Place FAUSTINO hose  Until discontinued         12/29/23 0110                    Discharge Planning   KIM:      Code Status: Full Code   Is the patient medically ready for discharge?:     Reason for patient still in hospital (select all that apply): Treatment  Discharge Plan A: Home                  Jeff Boateng MD  Department of [facility] Medicine   [facility]ensive [facility]

## 2023-12-30 NOTE — ASSESSMENT & PLAN NOTE
COPD exacerbation 2/2 H. Influenzae bacterial pneumonia  Continue IV rocephin- f/u speciation with sensitivities from blood culture but typically H. Influenzae sensitive to penicillins and third generation cephalosporins. Not repeating blood cultures unless patient declines.  Continue PO steroids  Continue duonebs. Space out due to tachcyardia  PRN nebs  Acapella and IS

## 2023-12-31 LAB
ANION GAP SERPL CALC-SCNC: 11 MMOL/L (ref 8–16)
BASOPHILS # BLD AUTO: 0.01 K/UL (ref 0–0.2)
BASOPHILS NFR BLD: 0.1 % (ref 0–1.9)
BUN SERPL-MCNC: 10 MG/DL (ref 6–20)
CALCIUM SERPL-MCNC: 8.3 MG/DL (ref 8.7–10.5)
CHLORIDE SERPL-SCNC: 102 MMOL/L (ref 95–110)
CO2 SERPL-SCNC: 26 MMOL/L (ref 23–29)
CREAT SERPL-MCNC: 0.7 MG/DL (ref 0.5–1.4)
DIFFERENTIAL METHOD BLD: ABNORMAL
EOSINOPHIL # BLD AUTO: 0 K/UL (ref 0–0.5)
EOSINOPHIL NFR BLD: 0 % (ref 0–8)
ERYTHROCYTE [DISTWIDTH] IN BLOOD BY AUTOMATED COUNT: 11.9 % (ref 11.5–14.5)
EST. GFR  (NO RACE VARIABLE): >60 ML/MIN/1.73 M^2
GLUCOSE SERPL-MCNC: 176 MG/DL (ref 70–110)
HCT VFR BLD AUTO: 32.6 % (ref 37–48.5)
HGB BLD-MCNC: 11 G/DL (ref 12–16)
IMM GRANULOCYTES # BLD AUTO: 0.1 K/UL (ref 0–0.04)
IMM GRANULOCYTES NFR BLD AUTO: 1 % (ref 0–0.5)
LYMPHOCYTES # BLD AUTO: 0.7 K/UL (ref 1–4.8)
LYMPHOCYTES NFR BLD: 6.5 % (ref 18–48)
MAGNESIUM SERPL-MCNC: 1.8 MG/DL (ref 1.6–2.6)
MCH RBC QN AUTO: 32.7 PG (ref 27–31)
MCHC RBC AUTO-ENTMCNC: 33.7 G/DL (ref 32–36)
MCV RBC AUTO: 97 FL (ref 82–98)
MONOCYTES # BLD AUTO: 0.3 K/UL (ref 0.3–1)
MONOCYTES NFR BLD: 3.2 % (ref 4–15)
NEUTROPHILS # BLD AUTO: 9 K/UL (ref 1.8–7.7)
NEUTROPHILS NFR BLD: 89.2 % (ref 38–73)
NRBC BLD-RTO: 0 /100 WBC
PHOSPHATE SERPL-MCNC: 3 MG/DL (ref 2.7–4.5)
PLATELET # BLD AUTO: 188 K/UL (ref 150–450)
PMV BLD AUTO: 9.6 FL (ref 9.2–12.9)
POTASSIUM SERPL-SCNC: 3.9 MMOL/L (ref 3.5–5.1)
RBC # BLD AUTO: 3.36 M/UL (ref 4–5.4)
SODIUM SERPL-SCNC: 139 MMOL/L (ref 136–145)
WBC # BLD AUTO: 10.04 K/UL (ref 3.9–12.7)

## 2023-12-31 PROCEDURE — 84100 ASSAY OF PHOSPHORUS: CPT | Performed by: INTERNAL MEDICINE

## 2023-12-31 PROCEDURE — 94664 DEMO&/EVAL PT USE INHALER: CPT

## 2023-12-31 PROCEDURE — 25000242 PHARM REV CODE 250 ALT 637 W/ HCPCS: Performed by: STUDENT IN AN ORGANIZED HEALTH CARE EDUCATION/TRAINING PROGRAM

## 2023-12-31 PROCEDURE — 99233 SBSQ HOSP IP/OBS HIGH 50: CPT | Mod: ,,, | Performed by: STUDENT IN AN ORGANIZED HEALTH CARE EDUCATION/TRAINING PROGRAM

## 2023-12-31 PROCEDURE — 94640 AIRWAY INHALATION TREATMENT: CPT

## 2023-12-31 PROCEDURE — 11000001 HC ACUTE MED/SURG PRIVATE ROOM

## 2023-12-31 PROCEDURE — 25000003 PHARM REV CODE 250: Performed by: INTERNAL MEDICINE

## 2023-12-31 PROCEDURE — 25000003 PHARM REV CODE 250: Performed by: STUDENT IN AN ORGANIZED HEALTH CARE EDUCATION/TRAINING PROGRAM

## 2023-12-31 PROCEDURE — 25000242 PHARM REV CODE 250 ALT 637 W/ HCPCS: Performed by: INTERNAL MEDICINE

## 2023-12-31 PROCEDURE — 85025 COMPLETE CBC W/AUTO DIFF WBC: CPT | Performed by: INTERNAL MEDICINE

## 2023-12-31 PROCEDURE — 94761 N-INVAS EAR/PLS OXIMETRY MLT: CPT

## 2023-12-31 PROCEDURE — 63600175 PHARM REV CODE 636 W HCPCS: Mod: UD | Performed by: INTERNAL MEDICINE

## 2023-12-31 PROCEDURE — 63600175 PHARM REV CODE 636 W HCPCS: Mod: UD | Performed by: STUDENT IN AN ORGANIZED HEALTH CARE EDUCATION/TRAINING PROGRAM

## 2023-12-31 PROCEDURE — 27000221 HC OXYGEN, UP TO 24 HOURS

## 2023-12-31 PROCEDURE — 80048 BASIC METABOLIC PNL TOTAL CA: CPT | Performed by: INTERNAL MEDICINE

## 2023-12-31 PROCEDURE — 99900035 HC TECH TIME PER 15 MIN (STAT)

## 2023-12-31 PROCEDURE — 83735 ASSAY OF MAGNESIUM: CPT | Performed by: INTERNAL MEDICINE

## 2023-12-31 PROCEDURE — 21400001 HC TELEMETRY ROOM

## 2023-12-31 PROCEDURE — 27000207 HC ISOLATION

## 2023-12-31 RX ORDER — IPRATROPIUM BROMIDE AND ALBUTEROL SULFATE 2.5; .5 MG/3ML; MG/3ML
3 SOLUTION RESPIRATORY (INHALATION) EVERY 6 HOURS
Status: DISCONTINUED | OUTPATIENT
Start: 2023-12-31 | End: 2024-01-03 | Stop reason: HOSPADM

## 2023-12-31 RX ADMIN — Medication 6 MG: at 08:12

## 2023-12-31 RX ADMIN — CEFTRIAXONE 2 G: 2 INJECTION, POWDER, FOR SOLUTION INTRAMUSCULAR; INTRAVENOUS at 09:12

## 2023-12-31 RX ADMIN — AZITHROMYCIN MONOHYDRATE 500 MG: 500 INJECTION, POWDER, LYOPHILIZED, FOR SOLUTION INTRAVENOUS at 02:12

## 2023-12-31 RX ADMIN — ATENOLOL 25 MG: 25 TABLET ORAL at 09:12

## 2023-12-31 RX ADMIN — PREDNISONE 40 MG: 10 TABLET ORAL at 09:12

## 2023-12-31 RX ADMIN — BENZONATATE 100 MG: 100 CAPSULE ORAL at 08:12

## 2023-12-31 RX ADMIN — Medication 2000 UNITS: at 09:12

## 2023-12-31 RX ADMIN — MUPIROCIN: 20 OINTMENT TOPICAL at 08:12

## 2023-12-31 RX ADMIN — BENZONATATE 100 MG: 100 CAPSULE ORAL at 01:12

## 2023-12-31 RX ADMIN — ACETAMINOPHEN 650 MG: 325 TABLET ORAL at 07:12

## 2023-12-31 RX ADMIN — FLUTICASONE FUROATE AND VILANTEROL TRIFENATATE 1 PUFF: 200; 25 POWDER RESPIRATORY (INHALATION) at 07:12

## 2023-12-31 RX ADMIN — BENZONATATE 100 MG: 100 CAPSULE ORAL at 09:12

## 2023-12-31 RX ADMIN — PANTOPRAZOLE SODIUM 40 MG: 40 TABLET, DELAYED RELEASE ORAL at 09:12

## 2023-12-31 RX ADMIN — DULOXETINE HYDROCHLORIDE 40 MG: 20 CAPSULE, DELAYED RELEASE ORAL at 09:12

## 2023-12-31 RX ADMIN — ENOXAPARIN SODIUM 40 MG: 40 INJECTION SUBCUTANEOUS at 04:12

## 2023-12-31 RX ADMIN — IPRATROPIUM BROMIDE AND ALBUTEROL SULFATE 3 ML: 2.5; .5 SOLUTION RESPIRATORY (INHALATION) at 01:12

## 2023-12-31 RX ADMIN — ASPIRIN 81 MG: 81 TABLET, CHEWABLE ORAL at 09:12

## 2023-12-31 RX ADMIN — ATORVASTATIN CALCIUM 20 MG: 10 TABLET, FILM COATED ORAL at 09:12

## 2023-12-31 RX ADMIN — IPRATROPIUM BROMIDE AND ALBUTEROL SULFATE 3 ML: 2.5; .5 SOLUTION RESPIRATORY (INHALATION) at 07:12

## 2023-12-31 RX ADMIN — MUPIROCIN: 20 OINTMENT TOPICAL at 09:12

## 2023-12-31 NOTE — SUBJECTIVE & OBJECTIVE
Interval History: Patient improved some today but continues to have significant SOB with any activity.    Review of Systems   All other systems reviewed and are negative.    Objective:     Vital Signs (Most Recent):  Temp: 97.9 °F (36.6 °C) (12/31/23 1055)  Pulse: 87 (12/31/23 1055)  Resp: (!) 22 (12/31/23 1055)  BP: (!) 154/74 (12/31/23 1055)  SpO2: (!) 94 % (12/31/23 1055) Vital Signs (24h Range):  Temp:  [97.5 °F (36.4 °C)-98.1 °F (36.7 °C)] 97.9 °F (36.6 °C)  Pulse:  [] 87  Resp:  [16-22] 22  SpO2:  [93 %-99 %] 94 %  BP: (120-170)/(63-80) 154/74     Weight: 49.7 kg (109 lb 9.1 oz)  Body mass index is 18.23 kg/m².    Intake/Output Summary (Last 24 hours) at 12/31/2023 1122  Last data filed at 12/31/2023 0404  Gross per 24 hour   Intake 2135.74 ml   Output --   Net 2135.74 ml           Physical Exam      Vitals reviewed  General: NAD, Thin, frail  Head: NC/AT  Eyes: EOMI, NANY  Cardiovascular: Pulses intact distally, tachycardia, regular rhythm   Pulmonary: Increased Respiratory Rate, No respiratory distress  Gi: Soft, Non-tender  Extremities: Warm, No edema present  Skin: Warm, dry  Neuro: Alert, Oriented x3, No focal Deficit  Psych: Anxious appearing    Significant Labs: All pertinent labs within the past 24 hours have been reviewed.    Significant Imaging: I have reviewed all pertinent imaging results/findings within the past 24 hours.

## 2023-12-31 NOTE — PROGRESS NOTES
[facility] Medicine  Progress Note    Patient Name: Zayra White  MRN: [id number]  Patient Class: IP- Inpatient   Admission Date: 12/28/2023  Length of Stay: 2 days  Attending Physician: Jeff Boateng MD  Primary Care Provider: Polly Lemos MD        Subjective:     Principal Problem:COPD exacerbation        HPI:  Ms. White is a 57yo lady with a past medical history of anxiety, OA, COPD and PTE.  She has PRN CPAP for home use as needed, and uses O2 at 2-3L PRN.  Her daughter is a nurse and is at the bedside with her, assisting with the history since Ms. White is on BiPAP currently.    She states that about 2.5 days ago she developed acutely worsening wheezing and nausea.  She always has some chronic dry and hacking cough, but this worsened acutely as well.  She has had no sputum production.  Despite fever noted in the ED, both she and her daughter deny fever and chills at home.  Over the past 24 hours she has become significantly short of breath.  Her daughter went to buy some robitussin and when she got home she found her mother with O2 sats of 82%.  She gave her a duoneb, after which her sats dropped to 75%.  She tried the BiPAP, but Ms. White just finally asked her to call 911 due to SOB.    In the ED she was treated with:  Medications   albuterol-ipratropium 2.5 mg-0.5 mg/3 mL nebulizer solution 3 mL (3 mLs Nebulization Given 12/28/23 2114)   magnesium sulfate 2g in water 50mL IVPB (premix) (0 g Intravenous Stopped 12/28/23 2153)   ondansetron injection 4 mg (4 mg Intravenous Given 12/28/23 2052)   cefTRIAXone (ROCEPHIN) 2 g in dextrose 5 % in water (D5W) 100 mL IVPB (MB+) (0 g Intravenous Stopped 12/28/23 2153)   LORazepam injection 2 mg (2 mg Intravenous Given 12/28/23 2127)   ketorolac injection 15 mg (15 mg Intravenous Given 12/28/23 2125)   metoprolol injection 5 mg (5 mg Intravenous Given 12/28/23 2201)           Overview/Hospital Course:  No notes on  file    Interval History: Patient improved some today but continues to have significant SOB with any activity.    Review of Systems   All other systems reviewed and are negative.    Objective:     Vital Signs (Most Recent):  Temp: 97.9 °F (36.6 °C) (12/31/23 1055)  Pulse: 87 (12/31/23 1055)  Resp: (!) 22 (12/31/23 1055)  BP: (!) 154/74 (12/31/23 1055)  SpO2: (!) 94 % (12/31/23 1055) Vital Signs (24h Range):  Temp:  [97.5 °F (36.4 °C)-98.1 °F (36.7 °C)] 97.9 °F (36.6 °C)  Pulse:  [] 87  Resp:  [16-22] 22  SpO2:  [93 %-99 %] 94 %  BP: (120-170)/(63-80) 154/74     Weight: 49.7 kg (109 lb 9.1 oz)  Body mass index is 18.23 kg/m².    Intake/Output Summary (Last 24 hours) at 12/31/2023 1122  Last data filed at 12/31/2023 0404  Gross per 24 hour   Intake 2135.74 ml   Output --   Net 2135.74 ml           Physical Exam      Vitals reviewed  General: NAD, Thin, frail  Head: NC/AT  Eyes: EOMI, NANY  Cardiovascular: Pulses intact distally, tachycardia, regular rhythm   Pulmonary: Increased Respiratory Rate, No respiratory distress  Gi: Soft, Non-tender  Extremities: Warm, No edema present  Skin: Warm, dry  Neuro: Alert, Oriented x3, No focal Deficit  Psych: Anxious appearing    Significant Labs: All pertinent labs within the past 24 hours have been reviewed.    Significant Imaging: I have reviewed all pertinent imaging results/findings within the past 24 hours.    Assessment/Plan:      * COPD exacerbation  COPD exacerbation 2/2 H. Influenzae bacterial pneumonia  Continue IV rocephin- f/u speciation with sensitivities from blood culture but typically H. Influenzae sensitive to penicillins and third generation cephalosporins. Not repeating blood cultures unless patient declines.  Continue PO steroids  Continue duonebs. Space out due to tachcyardia  PRN nebs  Acapella and IS    Acute respiratory failure with hypoxia and hypercarbia  Patient with Hypercapnic and Hypoxic Respiratory failure which is Acute on chronic.  she is on  home oxygen at 2-3L PRN LPM. Supplemental oxygen was provided and noted- Oxygen Concentration (%):  [28-32] 28    Signs/symptoms of respiratory failure include- tachypnea, increased work of breathing, respiratory distress, use of accessory muscles, wheezing, and lethargy. Contributing diagnoses includes - COPD and Pneumonia Labs and images were reviewed. Patient Has recent ABG, which has been reviewed. Will treat underlying causes and adjust management of respiratory failure as follows- BiPAP 10/5 started in the ED    Back on Home O2 regimen at this time    Sepsis due to pneumonia  This patient does have evidence of infective focus  CXR isn't super impressive, but maybe some LLL infiltrate  Consider CT chest in am if no improvement  Flu and Covid PCR negative  Legionella CX and UAg    My overall impression is sepsis.  Source: Respiratory  Antibiotics given-   Antibiotics (72h ago, onward)      Start     Stop Route Frequency Ordered    12/30/23 0915  cefTRIAXone (ROCEPHIN) 2 g in dextrose 5 % in water (D5W) 100 mL IVPB (MB+)         -- IV Every 24 hours (non-standard times) 12/30/23 0808    12/30/23 0900  mupirocin 2 % ointment         01/04/24 0859 Nasl 2 times daily 12/30/23 0751    12/29/23 0215  azithromycin (ZITHROMAX) 500 mg in dextrose 5 % (D5W) 250 mL IVPB (Vial-Mate)         01/03/24 0214 IV Every 24 hours (non-standard times) 12/29/23 0102          Latest lactate reviewed-  Recent Labs   Lab 12/28/23 2052   LACTATE 2.0       Organ dysfunction indicated by Acute respiratory failure    Fluid challenge Not needed - patient is not hypotensive      Post- resuscitation assessment No - Post resuscitation assessment not needed       Will Not start Pressors- Levophed for MAP of 65  Source control achieved by: iv antibiotics    Multiple lung nodules on CT  Follow up with Pulmonary        VTE Risk Mitigation (From admission, onward)           Ordered     enoxaparin injection 40 mg  Daily         12/29/23 0110     IP  VTE HIGH RISK PATIENT  Once         12/29/23 0110     Place sequential compression device  Until discontinued         12/29/23 0110     Place FAUSTINO hose  Until discontinued         12/29/23 0110                    Discharge Planning   KIM:      Code Status: Full Code   Is the patient medically ready for discharge?:     Reason for patient still in hospital (select all that apply): Treatment  Discharge Plan A: Home                  Jeff Boateng MD  Department of Hospital Medicine   [facility]

## 2023-12-31 NOTE — PLAN OF CARE
Problem: Adjustment to Illness COPD (Chronic Obstructive Pulmonary Disease)  Goal: Optimal Chronic Illness Coping  Outcome: Ongoing, Progressing     Problem: Functional Ability Impaired COPD (Chronic Obstructive Pulmonary Disease)  Goal: Optimal Level of Functional [place]  Outcome: Ongoing, Progressing     Problem: Infection COPD (Chronic Obstructive Pulmonary Disease)  Goal: Absence of Infection Signs and Symptoms  Outcome: Ongoing, Progressing     Problem: Oral Intake Inadequate COPD (Chronic Obstructive Pulmonary Disease)  Goal: Improved Nutrition Intake  Outcome: Ongoing, Progressing     Problem: Respiratory Compromise COPD (Chronic Obstructive Pulmonary Disease)  Goal: Effective Oxygenation and Ventilation  Outcome: Ongoing, Progressing     Problem: Adult Inpatient Plan of Care  Goal: Plan of Care Review  Outcome: Ongoing, Progressing  Goal: Patient-Specific Goal (Individualized)  Outcome: Ongoing, Progressing  Goal: Absence of Hospital-Acquired Illness or Injury  Outcome: Ongoing, Progressing  Goal: Optimal Comfort and Wellbeing  Outcome: Ongoing, Progressing  Goal: Readiness for Transition of Care  Outcome: Ongoing, Progressing

## 2023-12-31 NOTE — SUBJECTIVE & OBJECTIVE
Interval History:     Review of Systems   Constitutional:  Negative for activity change, appetite change, fatigue and fever.   HENT:  Negative for congestion, ear discharge, mouth sores, nosebleeds, rhinorrhea, sinus pressure, sinus pain and tinnitus.    Eyes: Negative.  Negative for pain, redness and itching.   Respiratory:  Positive for cough, shortness of breath and wheezing. Negative for apnea, choking, chest tightness and stridor.    Cardiovascular:  Negative for chest pain, palpitations and leg swelling.   Gastrointestinal:  Negative for abdominal distention, abdominal pain, anal bleeding, blood in stool, constipation and diarrhea.   Endocrine: Negative.    Genitourinary:  Negative for difficulty urinating, flank pain, frequency and urgency.   Musculoskeletal:  Negative for arthralgias, back pain, gait problem and myalgias.   Skin:  Negative for color change and pallor.   Allergic/Immunologic: Negative.    Neurological:  Negative for dizziness, facial asymmetry, weakness, light-headedness and headaches.   Hematological:  Negative for adenopathy. Does not bruise/bleed easily.     Objective:     Vital Signs (Most Recent):  Temp: 98.1 °F (36.7 °C) (12/31/23 1301)  Pulse: 87 (12/31/23 1301)  Resp: 20 (12/31/23 1301)  BP: (!) 163/77 (12/31/23 1301)  SpO2: 96 % (12/31/23 1301) Vital Signs (24h Range):  Temp:  [97.6 °F (36.4 °C)-98.1 °F (36.7 °C)] 98.1 °F (36.7 °C)  Pulse:  [] 87  Resp:  [16-22] 20  SpO2:  [94 %-99 %] 96 %  BP: (120-163)/(64-77) 163/77     Weight: 49.7 kg (109 lb 9.1 oz)  Body mass index is 18.23 kg/m².    Intake/Output Summary (Last 24 hours) at 12/31/2023 1734  Last data filed at 12/31/2023 1705  Gross per 24 hour   Intake 3215.74 ml   Output 1700 ml   Net 1515.74 ml         Physical Exam  Constitutional:       Appearance: She is well-developed.   HENT:      Head: Normocephalic and atraumatic.   Eyes:      Pupils: Pupils are equal, round, and reactive to light.   Neck:      Thyroid: No  thyromegaly.      Trachea: No tracheal deviation.   Cardiovascular:      Rate and Rhythm: Normal rate and regular rhythm.      Heart sounds: Normal heart sounds.   Pulmonary:      Effort: Respiratory distress present.      Breath sounds: Wheezing and rhonchi present.   Abdominal:      General: Bowel sounds are normal. There is no distension.      Palpations: Abdomen is soft.      Tenderness: There is no guarding or rebound.   Musculoskeletal:         General: Normal range of motion.      Cervical back: Normal range of motion and neck supple.   Lymphadenopathy:      Cervical: No cervical adenopathy.   Skin:     General: Skin is warm and dry.      Capillary Refill: Capillary refill takes less than 2 seconds.   Neurological:      Mental Status: She is alert and oriented to person, place, and time.   Psychiatric:         Behavior: Behavior normal.         Thought Content: Thought content normal.         Judgment: Judgment normal.             Significant Labs: All pertinent labs within the past 24 hours have been reviewed.  Recent Lab Results         01/02/24  0432        Albumin 2.7       ALP 55       ALT 17       Anion Gap 11       AST 16       Baso # 0.00       Basophil % 0.0       BILIRUBIN TOTAL 0.3  Comment: For infants and newborns, interpretation of results should be based  on gestational age, weight and in agreement with clinical  observations.    Premature Infant recommended reference ranges:  Up to 24 hours.............<8.0 mg/dL  Up to 48 hours............<12.0 mg/dL  3-5 days..................<15.0 mg/dL  6-29 days.................<15.0 mg/dL         BUN 10       Calcium 8.0       Chloride 103       CO2 29       Creatinine 0.6       Differential Method Automated       eGFR >60.0       Eos # 0.0       Eosinophil % 0.2       Glucose 90       Gran # (ANC) 3.8       Gran % 70.3       Hematocrit 33.7       Hemoglobin 11.8       Immature Grans (Abs) 0.02  Comment: Mild elevation in immature granulocytes is non  specific and   can be seen in a variety of conditions including stress response,   acute inflammation, trauma and pregnancy. Correlation with other   laboratory and clinical findings is essential.         Immature Granulocytes 0.4       Lymph # 1.2       Lymph % 22.6       Magnesium  1.9       MCH 33.2       MCHC 35.0       MCV 95       Mono # 0.4       Mono % 6.5       MPV 9.0       nRBC 0       Phosphorus Level 3.2       Platelet Count 192       Potassium 3.3       PROTEIN TOTAL 5.3       RBC 3.55       RDW 11.4       Sodium 143       WBC 5.39               Significant Imaging: I have reviewed all pertinent imaging results/findings within the past 24 hours.  I have reviewed and interpreted all pertinent imaging results/findings within the past 24 hours.

## 2023-12-31 NOTE — PLAN OF CARE
Problem: Respiratory Compromise COPD (Chronic Obstructive Pulmonary Disease)  Goal: Effective Oxygenation and Ventilation  Outcome: Ongoing, Progressing  Intervention: Promote Airway Secretion Clearance  Flowsheets (Taken 12/31/2023 1400)  Breathing Techniques/Airway Clearance: pursed-lip breathing encouraged  Cough And Deep Breathing: done independently per patient  Intervention: Optimize Oxygenation and Ventilation  Flowsheets (Taken 12/31/2023 1400)  Airway/Ventilation Management: airway patency maintained  Head of Bed (HOB) Positioning: HOB elevated   Patient in no apparent distress. Shortness of breath and wheezing present. Patient on 3 lpm nasal cannula. Aerosol treatments given Q 6. IS and aerobika done by patient. Breo daily. BIPAP at bedside . Will continue to monitor.

## 2023-12-31 NOTE — PLAN OF CARE
Problem: Adjustment to Illness COPD (Chronic Obstructive Pulmonary Disease)  Goal: Optimal Chronic Illness Coping  Outcome: Ongoing, Progressing     Problem: Adult Inpatient Plan of Care  Goal: Plan of Care Review  Outcome: Ongoing, Progressing  Goal: Patient-Specific Goal (Individualized)  Outcome: Ongoing, Progressing  Goal: Absence of Hospital-Acquired Illness or Injury  Outcome: Ongoing, Progressing  Goal: Optimal Comfort and Wellbeing  Outcome: Ongoing, Progressing  Goal: Readiness for Transition of Care  Outcome: Ongoing, Progressing     Problem: Gas Exchange Impaired  Goal: Optimal Gas Exchange  Outcome: Ongoing, Progressing

## 2024-01-01 LAB
ANION GAP SERPL CALC-SCNC: 10 MMOL/L (ref 8–16)
BACTERIA BLD CULT: ABNORMAL
BACTERIA SPEC AEROBE CULT: NORMAL
BASOPHILS # BLD AUTO: 0.01 K/UL (ref 0–0.2)
BASOPHILS NFR BLD: 0.1 % (ref 0–1.9)
BUN SERPL-MCNC: 11 MG/DL (ref 6–20)
CALCIUM SERPL-MCNC: 8.2 MG/DL (ref 8.7–10.5)
CHLORIDE SERPL-SCNC: 102 MMOL/L (ref 95–110)
CO2 SERPL-SCNC: 29 MMOL/L (ref 23–29)
CREAT SERPL-MCNC: 0.6 MG/DL (ref 0.5–1.4)
DIFFERENTIAL METHOD BLD: ABNORMAL
EOSINOPHIL # BLD AUTO: 0 K/UL (ref 0–0.5)
EOSINOPHIL NFR BLD: 0 % (ref 0–8)
ERYTHROCYTE [DISTWIDTH] IN BLOOD BY AUTOMATED COUNT: 11.7 % (ref 11.5–14.5)
EST. GFR  (NO RACE VARIABLE): >60 ML/MIN/1.73 M^2
GLUCOSE SERPL-MCNC: 149 MG/DL (ref 70–110)
GRAM STN SPEC: NORMAL
HCT VFR BLD AUTO: 32.8 % (ref 37–48.5)
HGB BLD-MCNC: 11.4 G/DL (ref 12–16)
IMM GRANULOCYTES # BLD AUTO: 0.03 K/UL (ref 0–0.04)
IMM GRANULOCYTES NFR BLD AUTO: 0.4 % (ref 0–0.5)
LYMPHOCYTES # BLD AUTO: 1.1 K/UL (ref 1–4.8)
LYMPHOCYTES NFR BLD: 14 % (ref 18–48)
MAGNESIUM SERPL-MCNC: 1.9 MG/DL (ref 1.6–2.6)
MCH RBC QN AUTO: 33.3 PG (ref 27–31)
MCHC RBC AUTO-ENTMCNC: 34.8 G/DL (ref 32–36)
MCV RBC AUTO: 96 FL (ref 82–98)
MONOCYTES # BLD AUTO: 0.3 K/UL (ref 0.3–1)
MONOCYTES NFR BLD: 3.7 % (ref 4–15)
NEUTROPHILS # BLD AUTO: 6.7 K/UL (ref 1.8–7.7)
NEUTROPHILS NFR BLD: 81.8 % (ref 38–73)
NRBC BLD-RTO: 0 /100 WBC
PHOSPHATE SERPL-MCNC: 3.6 MG/DL (ref 2.7–4.5)
PLATELET # BLD AUTO: 189 K/UL (ref 150–450)
PMV BLD AUTO: 9.2 FL (ref 9.2–12.9)
POTASSIUM SERPL-SCNC: 3.5 MMOL/L (ref 3.5–5.1)
RBC # BLD AUTO: 3.42 M/UL (ref 4–5.4)
SODIUM SERPL-SCNC: 141 MMOL/L (ref 136–145)
WBC # BLD AUTO: 8.17 K/UL (ref 3.9–12.7)

## 2024-01-01 PROCEDURE — 99900035 HC TECH TIME PER 15 MIN (STAT)

## 2024-01-01 PROCEDURE — 94640 AIRWAY INHALATION TREATMENT: CPT

## 2024-01-01 PROCEDURE — 11000001 HC ACUTE MED/SURG PRIVATE ROOM

## 2024-01-01 PROCEDURE — 94761 N-INVAS EAR/PLS OXIMETRY MLT: CPT

## 2024-01-01 PROCEDURE — 94664 DEMO&/EVAL PT USE INHALER: CPT

## 2024-01-01 PROCEDURE — 27000207 HC ISOLATION

## 2024-01-01 PROCEDURE — 84100 ASSAY OF PHOSPHORUS: CPT | Performed by: INTERNAL MEDICINE

## 2024-01-01 PROCEDURE — 25000003 PHARM REV CODE 250: Performed by: STUDENT IN AN ORGANIZED HEALTH CARE EDUCATION/TRAINING PROGRAM

## 2024-01-01 PROCEDURE — 80048 BASIC METABOLIC PNL TOTAL CA: CPT | Performed by: INTERNAL MEDICINE

## 2024-01-01 PROCEDURE — 85025 COMPLETE CBC W/AUTO DIFF WBC: CPT | Performed by: INTERNAL MEDICINE

## 2024-01-01 PROCEDURE — 25000003 PHARM REV CODE 250: Mod: UD | Performed by: INTERNAL MEDICINE

## 2024-01-01 PROCEDURE — 25000242 PHARM REV CODE 250 ALT 637 W/ HCPCS: Performed by: STUDENT IN AN ORGANIZED HEALTH CARE EDUCATION/TRAINING PROGRAM

## 2024-01-01 PROCEDURE — 63600175 PHARM REV CODE 636 W HCPCS: Performed by: STUDENT IN AN ORGANIZED HEALTH CARE EDUCATION/TRAINING PROGRAM

## 2024-01-01 PROCEDURE — 83735 ASSAY OF MAGNESIUM: CPT | Performed by: INTERNAL MEDICINE

## 2024-01-01 PROCEDURE — 21400001 HC TELEMETRY ROOM

## 2024-01-01 PROCEDURE — 27000221 HC OXYGEN, UP TO 24 HOURS

## 2024-01-01 PROCEDURE — 63600175 PHARM REV CODE 636 W HCPCS: Mod: UD | Performed by: INTERNAL MEDICINE

## 2024-01-01 RX ADMIN — Medication 6 MG: at 08:01

## 2024-01-01 RX ADMIN — ASPIRIN 81 MG: 81 TABLET, CHEWABLE ORAL at 09:01

## 2024-01-01 RX ADMIN — FLUTICASONE FUROATE AND VILANTEROL TRIFENATATE 1 PUFF: 200; 25 POWDER RESPIRATORY (INHALATION) at 07:01

## 2024-01-01 RX ADMIN — BENZONATATE 100 MG: 100 CAPSULE ORAL at 04:01

## 2024-01-01 RX ADMIN — PREDNISONE 40 MG: 10 TABLET ORAL at 09:01

## 2024-01-01 RX ADMIN — ATORVASTATIN CALCIUM 20 MG: 10 TABLET, FILM COATED ORAL at 09:01

## 2024-01-01 RX ADMIN — CLONAZEPAM 0.5 MG: 0.5 TABLET ORAL at 09:01

## 2024-01-01 RX ADMIN — IPRATROPIUM BROMIDE AND ALBUTEROL SULFATE 3 ML: 2.5; .5 SOLUTION RESPIRATORY (INHALATION) at 07:01

## 2024-01-01 RX ADMIN — PANTOPRAZOLE SODIUM 40 MG: 40 TABLET, DELAYED RELEASE ORAL at 09:01

## 2024-01-01 RX ADMIN — ENOXAPARIN SODIUM 40 MG: 40 INJECTION SUBCUTANEOUS at 05:01

## 2024-01-01 RX ADMIN — CEFTRIAXONE 2 G: 2 INJECTION, POWDER, FOR SOLUTION INTRAMUSCULAR; INTRAVENOUS at 09:01

## 2024-01-01 RX ADMIN — Medication 2000 UNITS: at 09:01

## 2024-01-01 RX ADMIN — AZITHROMYCIN MONOHYDRATE 500 MG: 500 INJECTION, POWDER, LYOPHILIZED, FOR SOLUTION INTRAVENOUS at 03:01

## 2024-01-01 RX ADMIN — IPRATROPIUM BROMIDE AND ALBUTEROL SULFATE 3 ML: 2.5; .5 SOLUTION RESPIRATORY (INHALATION) at 12:01

## 2024-01-01 RX ADMIN — MUPIROCIN: 20 OINTMENT TOPICAL at 09:01

## 2024-01-01 RX ADMIN — ATENOLOL 25 MG: 25 TABLET ORAL at 09:01

## 2024-01-01 RX ADMIN — ACETAMINOPHEN 650 MG: 325 TABLET ORAL at 08:01

## 2024-01-01 RX ADMIN — MUPIROCIN: 20 OINTMENT TOPICAL at 08:01

## 2024-01-01 RX ADMIN — DULOXETINE HYDROCHLORIDE 40 MG: 20 CAPSULE, DELAYED RELEASE ORAL at 09:01

## 2024-01-01 NOTE — PLAN OF CARE
Problem: Adjustment to Illness COPD (Chronic Obstructive Pulmonary Disease)  Goal: Optimal Chronic Illness Coping  Outcome: Ongoing, Progressing     Problem: Respiratory Compromise COPD (Chronic Obstructive Pulmonary Disease)  Goal: Effective Oxygenation and Ventilation  Outcome: Ongoing, Progressing     Problem: Adult Inpatient Plan of Care  Goal: Plan of Care Review  Outcome: Ongoing, Progressing  Goal: Patient-Specific Goal (Individualized)  Outcome: Ongoing, Progressing  Goal: Absence of Hospital-Acquired Illness or Injury  Outcome: Ongoing, Progressing  Goal: Optimal Comfort and Wellbeing  Outcome: Ongoing, Progressing  Goal: Readiness for Transition of Care  Outcome: Ongoing, Progressing

## 2024-01-01 NOTE — PLAN OF CARE
Problem: Respiratory Compromise COPD (Chronic Obstructive Pulmonary Disease)  Goal: Effective Oxygenation and Ventilation  Outcome: Ongoing, Progressing  Intervention: Promote Airway Secretion Clearance  Flowsheets (Taken 1/1/2024 1246)  Breathing Techniques/Airway Clearance: pursed-lip breathing encouraged  Cough And Deep Breathing: done independently per patient  Intervention: Optimize Oxygenation and Ventilation  Flowsheets (Taken 1/1/2024 1246)  Airway/Ventilation Management: airway patency maintained  Head of Bed (HOB) Positioning: HOB elevated   Patient in no apparent distress. Patient on 3 lpm nasal cannula. Aerosol treatments given Q 6. IS and aerobika done. Decreased to 2 lpm. Patient wears home O2 PRN and uses BIPAP as needed  . Will continue to monitor.

## 2024-01-01 NOTE — PLAN OF CARE
Free from falls, skin breakdown, or injury. NADN. Respirations even and unlabored on O2 #lnc A&O x 4. Tolerating diet well as ordered. Denies pain. Bed in lowest, locked position, side rails elevated x2.  Call bell in reach. Purposeful rounding done every hour.

## 2024-01-02 ENCOUNTER — TELEPHONE (OUTPATIENT)
Dept: FAMILY MEDICINE | Facility: CLINIC | Age: 59
End: 2024-01-02
Payer: MEDICAID

## 2024-01-02 LAB
ALBUMIN SERPL BCP-MCNC: 2.7 G/DL (ref 3.5–5.2)
ALP SERPL-CCNC: 55 U/L (ref 55–135)
ALT SERPL W/O P-5'-P-CCNC: 17 U/L (ref 10–44)
ANION GAP SERPL CALC-SCNC: 11 MMOL/L (ref 8–16)
AST SERPL-CCNC: 16 U/L (ref 10–40)
BASOPHILS # BLD AUTO: 0 K/UL (ref 0–0.2)
BASOPHILS NFR BLD: 0 % (ref 0–1.9)
BILIRUB SERPL-MCNC: 0.3 MG/DL (ref 0.1–1)
BUN SERPL-MCNC: 10 MG/DL (ref 6–20)
CALCIUM SERPL-MCNC: 8 MG/DL (ref 8.7–10.5)
CHLORIDE SERPL-SCNC: 103 MMOL/L (ref 95–110)
CO2 SERPL-SCNC: 29 MMOL/L (ref 23–29)
CREAT SERPL-MCNC: 0.6 MG/DL (ref 0.5–1.4)
DIFFERENTIAL METHOD BLD: ABNORMAL
EOSINOPHIL # BLD AUTO: 0 K/UL (ref 0–0.5)
EOSINOPHIL NFR BLD: 0.2 % (ref 0–8)
ERYTHROCYTE [DISTWIDTH] IN BLOOD BY AUTOMATED COUNT: 11.4 % (ref 11.5–14.5)
EST. GFR  (NO RACE VARIABLE): >60 ML/MIN/1.73 M^2
GLUCOSE SERPL-MCNC: 90 MG/DL (ref 70–110)
HCT VFR BLD AUTO: 33.7 % (ref 37–48.5)
HGB BLD-MCNC: 11.8 G/DL (ref 12–16)
IMM GRANULOCYTES # BLD AUTO: 0.02 K/UL (ref 0–0.04)
IMM GRANULOCYTES NFR BLD AUTO: 0.4 % (ref 0–0.5)
LYMPHOCYTES # BLD AUTO: 1.2 K/UL (ref 1–4.8)
LYMPHOCYTES NFR BLD: 22.6 % (ref 18–48)
MAGNESIUM SERPL-MCNC: 1.9 MG/DL (ref 1.6–2.6)
MCH RBC QN AUTO: 33.2 PG (ref 27–31)
MCHC RBC AUTO-ENTMCNC: 35 G/DL (ref 32–36)
MCV RBC AUTO: 95 FL (ref 82–98)
MONOCYTES # BLD AUTO: 0.4 K/UL (ref 0.3–1)
MONOCYTES NFR BLD: 6.5 % (ref 4–15)
NEUTROPHILS # BLD AUTO: 3.8 K/UL (ref 1.8–7.7)
NEUTROPHILS NFR BLD: 70.3 % (ref 38–73)
NRBC BLD-RTO: 0 /100 WBC
PHOSPHATE SERPL-MCNC: 3.2 MG/DL (ref 2.7–4.5)
PLATELET # BLD AUTO: 192 K/UL (ref 150–450)
PMV BLD AUTO: 9 FL (ref 9.2–12.9)
POTASSIUM SERPL-SCNC: 3.3 MMOL/L (ref 3.5–5.1)
PROT SERPL-MCNC: 5.3 G/DL (ref 6–8.4)
RBC # BLD AUTO: 3.55 M/UL (ref 4–5.4)
SODIUM SERPL-SCNC: 143 MMOL/L (ref 136–145)
WBC # BLD AUTO: 5.39 K/UL (ref 3.9–12.7)

## 2024-01-02 PROCEDURE — 94640 AIRWAY INHALATION TREATMENT: CPT

## 2024-01-02 PROCEDURE — 94664 DEMO&/EVAL PT USE INHALER: CPT

## 2024-01-02 PROCEDURE — 63600175 PHARM REV CODE 636 W HCPCS: Performed by: STUDENT IN AN ORGANIZED HEALTH CARE EDUCATION/TRAINING PROGRAM

## 2024-01-02 PROCEDURE — 27000207 HC ISOLATION

## 2024-01-02 PROCEDURE — 84100 ASSAY OF PHOSPHORUS: CPT | Performed by: INTERNAL MEDICINE

## 2024-01-02 PROCEDURE — 27000221 HC OXYGEN, UP TO 24 HOURS

## 2024-01-02 PROCEDURE — 25000003 PHARM REV CODE 250: Mod: UD | Performed by: INTERNAL MEDICINE

## 2024-01-02 PROCEDURE — 85025 COMPLETE CBC W/AUTO DIFF WBC: CPT | Performed by: INTERNAL MEDICINE

## 2024-01-02 PROCEDURE — 63600175 PHARM REV CODE 636 W HCPCS: Mod: UD | Performed by: INTERNAL MEDICINE

## 2024-01-02 PROCEDURE — 80053 COMPREHEN METABOLIC PANEL: CPT | Performed by: INTERNAL MEDICINE

## 2024-01-02 PROCEDURE — 25000242 PHARM REV CODE 250 ALT 637 W/ HCPCS: Performed by: STUDENT IN AN ORGANIZED HEALTH CARE EDUCATION/TRAINING PROGRAM

## 2024-01-02 PROCEDURE — 25000003 PHARM REV CODE 250: Performed by: INTERNAL MEDICINE

## 2024-01-02 PROCEDURE — 25000003 PHARM REV CODE 250: Performed by: STUDENT IN AN ORGANIZED HEALTH CARE EDUCATION/TRAINING PROGRAM

## 2024-01-02 PROCEDURE — 99900035 HC TECH TIME PER 15 MIN (STAT)

## 2024-01-02 PROCEDURE — 11000001 HC ACUTE MED/SURG PRIVATE ROOM

## 2024-01-02 PROCEDURE — 83735 ASSAY OF MAGNESIUM: CPT | Performed by: INTERNAL MEDICINE

## 2024-01-02 RX ADMIN — ACETAMINOPHEN 650 MG: 325 TABLET ORAL at 01:01

## 2024-01-02 RX ADMIN — AZITHROMYCIN MONOHYDRATE 500 MG: 500 INJECTION, POWDER, LYOPHILIZED, FOR SOLUTION INTRAVENOUS at 02:01

## 2024-01-02 RX ADMIN — MUPIROCIN: 20 OINTMENT TOPICAL at 09:01

## 2024-01-02 RX ADMIN — ENOXAPARIN SODIUM 40 MG: 40 INJECTION SUBCUTANEOUS at 04:01

## 2024-01-02 RX ADMIN — IPRATROPIUM BROMIDE AND ALBUTEROL SULFATE 3 ML: 2.5; .5 SOLUTION RESPIRATORY (INHALATION) at 02:01

## 2024-01-02 RX ADMIN — PANTOPRAZOLE SODIUM 40 MG: 40 TABLET, DELAYED RELEASE ORAL at 09:01

## 2024-01-02 RX ADMIN — Medication 2000 UNITS: at 09:01

## 2024-01-02 RX ADMIN — ASPIRIN 81 MG: 81 TABLET, CHEWABLE ORAL at 09:01

## 2024-01-02 RX ADMIN — DULOXETINE HYDROCHLORIDE 40 MG: 20 CAPSULE, DELAYED RELEASE ORAL at 09:01

## 2024-01-02 RX ADMIN — IPRATROPIUM BROMIDE AND ALBUTEROL SULFATE 3 ML: 2.5; .5 SOLUTION RESPIRATORY (INHALATION) at 06:01

## 2024-01-02 RX ADMIN — LATANOPROST 1 DROP: 50 SOLUTION OPHTHALMIC at 09:01

## 2024-01-02 RX ADMIN — ATORVASTATIN CALCIUM 20 MG: 10 TABLET, FILM COATED ORAL at 09:01

## 2024-01-02 RX ADMIN — ATENOLOL 25 MG: 25 TABLET ORAL at 09:01

## 2024-01-02 RX ADMIN — PREDNISONE 40 MG: 10 TABLET ORAL at 09:01

## 2024-01-02 RX ADMIN — FLUTICASONE FUROATE AND VILANTEROL TRIFENATATE 1 PUFF: 200; 25 POWDER RESPIRATORY (INHALATION) at 06:01

## 2024-01-02 RX ADMIN — CEFTRIAXONE 2 G: 2 INJECTION, POWDER, FOR SOLUTION INTRAMUSCULAR; INTRAVENOUS at 09:01

## 2024-01-02 NOTE — NURSING
Assessment complete.  O2 at 2L per N/C resp regular, assist to BSC SBA, some SOB with activity.  Instructed to call for any needs, voiced understanding.  Bed in low locked position with side rails up x2 and call light in reach.

## 2024-01-02 NOTE — TELEPHONE ENCOUNTER
----- Message from Di Reagan sent at 1/2/2024 12:22 PM CST -----  Contact: pt [phone]  Type:  Sooner Appointment Request    Caller is requesting a sooner appointment.  Caller declined first available appointment listed below.  Caller will not accept being placed on the waitlist and is requesting a message be sent to doctor.    Name of Caller:  [facility] discharge nurse   When is the first available appointment?  March   Symptoms:  Hospital f/u - week   Would the patient rather a call back or a response via [facility]? Call   Best Call Back Number:  [phone]    Additional Information:  [facility] call back and advise

## 2024-01-02 NOTE — NURSING
Bed alarm heard going off. Pt needing to toilet and assisted to BSCC. Pt awake, alert,and oriented x 4. Requested coke and tylenol for headache per patient request. Blinds adjusted. Call light in reach. SR up times 2. Pt reminded to use call light for assistance. Voiced understanding.

## 2024-01-02 NOTE — PROGRESS NOTES
[facility] Medicine  Progress Note    Patient Name: Zayra White  MRN: [id number]  Patient Class: IP- Inpatient   Admission Date: 12/28/2023  Length of Stay: 3 days  Attending Physician: Servando Rice MD  Primary Care Provider: Polly Lemos MD        Subjective:     Principal Problem:COPD exacerbation    Interval History: Patient improved. Less SOB at rest , Increases on exertion    Review of Systems   Constitutional:  Negative for activity change, appetite change, fatigue and fever.   HENT:  Negative for congestion, ear discharge, mouth sores, nosebleeds, rhinorrhea, sinus pressure, sinus pain and tinnitus.    Eyes: Negative.  Negative for pain, redness and itching.   Respiratory:  Positive for cough, chest tightness, shortness of breath and wheezing. Negative for apnea, choking and stridor.    Cardiovascular:  Negative for chest pain, palpitations and leg swelling.   Gastrointestinal:  Negative for abdominal distention, abdominal pain, anal bleeding, blood in stool, constipation and diarrhea.   Endocrine: Negative.    Genitourinary:  Negative for difficulty urinating, flank pain, frequency and urgency.   Musculoskeletal:  Negative for arthralgias, back pain, gait problem and myalgias.   Skin:  Negative for color change and pallor.   Allergic/Immunologic: Negative.    Neurological:  Positive for weakness and light-headedness. Negative for dizziness, facial asymmetry and headaches.   Hematological:  Negative for adenopathy. Does not bruise/bleed easily.   Psychiatric/Behavioral:  Positive for agitation. The patient is nervous/anxious.      Objective:     Vital Signs (Most Recent):  Temp: 98 °F (36.7 °C) (01/01/24 1856)  Pulse: 84 (01/01/24 1917)  Resp: 18 (01/01/24 1917)  BP: (!) 155/77 (01/01/24 1856)  SpO2: 96 % (01/01/24 1917) Vital Signs (24h Range):  Temp:  [93.3 °F (34.1 °C)-98.2 °F (36.8 °C)] 98 °F (36.7 °C)  Pulse:  [72-97] 84  Resp:  [16-20] 18  SpO2:  [93 %-99  "%] 96 %  BP: (117-178)/(64-84) 155/77     Weight: 49.7 kg (109 lb 9.1 oz)  Body mass index is 18.23 kg/m².    Intake/Output Summary (Last 24 hours) at 1/1/2024 2223  Last data filed at 1/1/2024 0429  Gross per 24 hour   Intake 490 ml   Output --   Net 490 ml      Physical Exam  Constitutional:       Appearance: She is well-developed.   HENT:      Head: Normocephalic and atraumatic.   Eyes:      Pupils: Pupils are equal, round, and reactive to light.   Neck:      Thyroid: No thyromegaly.      Trachea: No tracheal deviation.   Cardiovascular:      Rate and Rhythm: Regular rhythm. Tachycardia present.      Heart sounds: Normal heart sounds.   Pulmonary:      Effort: Respiratory distress present.      Breath sounds: Wheezing and rhonchi present.   Abdominal:      General: Bowel sounds are normal. There is no distension.      Palpations: Abdomen is soft.      Tenderness: There is no guarding or rebound.   Musculoskeletal:         General: Normal range of motion.      Cervical back: Normal range of motion and neck supple.   Lymphadenopathy:      Cervical: No cervical adenopathy.   Skin:     General: Skin is warm and dry.      Capillary Refill: Capillary refill takes less than 2 seconds.   Neurological:      Mental Status: She is alert and oriented to person, place, and time.   Psychiatric:         Behavior: Behavior normal.         Thought Content: Thought content normal.         Judgment: Judgment normal.           Overview/Hospital Course: WBC ct has trended down. No new findings.  Patient improvecd since admission. Is on Noninvasive honme ventilator.     Significant Labs: All pertinent labs within the past 24 hours have been reviewed.  ABGs: No results for input(s): "PH", "PCO2", "HCO3", "POCSATURATED", "BE", "TOTALHB", "COHB", "METHB", "O2HB", "POCFIO2", "PO2" in the last 48 hours.  CBC:   Recent Labs   Lab 12/31/23  0359 01/01/24  0436   WBC 10.04 8.17   HGB 11.0* 11.4*   HCT 32.6* 32.8*    189     CMP: " "  Recent Labs   Lab 12/31/23  0359 01/01/24  0436    141   K 3.9 3.5    102   CO2 26 29   * 149*   BUN 10 11   CREATININE 0.7 0.6   CALCIUM 8.3* 8.2*   ANIONGAP 11 10     Lactic Acid: No results for input(s): "LACTATE" in the last 48 hours.  Magnesium:   Recent Labs   Lab 12/31/23 0359 01/01/24  0436   MG 1.8 1.9       Significant Imaging: I have reviewed all pertinent imaging results/findings within the past 24 hours.  CT: I have reviewed all pertinent results/findings within the past 24 hours and my personal findings are:  no pulmonary embolus, LLL nodular density FU 3 months  CXR: I have reviewed all pertinent results/findings within the past 24 hours and my personal findings are:  Severe emphysema    Assessment/Plan:      Active Diagnoses:    Diagnosis Date Noted POA    PRINCIPAL PROBLEM:  COPD exacerbation [J44.1] 12/29/2023 Yes    Sepsis due to pneumonia [J18.9, A41.9] 12/29/2023 Yes    Acute respiratory failure with hypoxia and hypercarbia [J96.01, J96.02] 12/29/2023 Yes    Multiple lung nodules on CT [R91.8] 02/18/2022 Yes      Problems Resolved During this Admission:     VTE Risk Mitigation (From admission, onward)           Ordered     enoxaparin injection 40 mg  Daily         12/29/23 0110     IP VTE HIGH RISK PATIENT  Once         12/29/23 0110     Place sequential compression device  Until discontinued         12/29/23 0110     Place FAUSTINO hose  Until discontinued         12/29/23 0110                   Plan:  Sputum + for H. Influenza , nonbetalactamase producing.   Continue current meds  FU ;Labs and xray in the Am  FU CXR in AM portable  Probably home in next 24-48 hours    Servando Rice MD  Department of Hospital Medicine   [place] - Intensive [facility]    "

## 2024-01-02 NOTE — PLAN OF CARE
Problem: Adjustment to Illness COPD (Chronic Obstructive Pulmonary Disease)  Goal: Optimal Chronic Illness Coping  Outcome: Ongoing, Progressing     Problem: Functional Ability Impaired COPD (Chronic Obstructive Pulmonary Disease)  Goal: Optimal Level of Functional [place]  Outcome: Ongoing, Progressing     Problem: Infection COPD (Chronic Obstructive Pulmonary Disease)  Goal: Absence of Infection Signs and Symptoms  Outcome: Ongoing, Progressing     Problem: Oral Intake Inadequate COPD (Chronic Obstructive Pulmonary Disease)  Goal: Improved Nutrition Intake  Outcome: Ongoing, Progressing     Problem: Respiratory Compromise COPD (Chronic Obstructive Pulmonary Disease)  Goal: Effective Oxygenation and Ventilation  Outcome: Ongoing, Progressing     Problem: Adult Inpatient Plan of Care  Goal: Plan of Care Review  Outcome: Ongoing, Progressing  Goal: Patient-Specific Goal (Individualized)  Outcome: Ongoing, Progressing  Goal: Absence of Hospital-Acquired Illness or Injury  Outcome: Ongoing, Progressing  Goal: Optimal Comfort and Wellbeing  Outcome: Ongoing, Progressing  Goal: Readiness for Transition of Care  Outcome: Ongoing, Progressing     Problem: Skin Injury Risk Increased  Goal: Skin Health and Integrity  Outcome: Ongoing, Progressing     Problem: Fall Injury Risk  Goal: Absence of Fall and Fall-Related Injury  Outcome: Ongoing, Progressing     Problem: Infection  Goal: Absence of Infection Signs and Symptoms  Outcome: Ongoing, Progressing     Problem: Gas Exchange Impaired  Goal: Optimal Gas Exchange  Outcome: Ongoing, Progressing     Problem: Adjustment to Illness (Sepsis/Septic Shock)  Goal: Optimal Coping  Outcome: Ongoing, Progressing     Problem: Bleeding (Sepsis/Septic Shock)  Goal: Absence of Bleeding  Outcome: Ongoing, Progressing     Problem: Glycemic Control Impaired (Sepsis/Septic Shock)  Goal: Blood Glucose Level Within Desired Range  Outcome: Ongoing, Progressing     Problem: Infection  Progression (Sepsis/Septic Shock)  Goal: Absence of Infection Signs and Symptoms  Outcome: Ongoing, Progressing     Problem: Nutrition Impaired (Sepsis/Septic Shock)  Goal: Optimal Nutrition Intake  Outcome: Ongoing, Progressing     Problem: Fluid Imbalance (Pneumonia)  Goal: Fluid Balance  Outcome: Ongoing, Progressing     Problem: Infection (Pneumonia)  Goal: Resolution of Infection Signs and Symptoms  Outcome: Ongoing, Progressing     Problem: Respiratory Compromise (Pneumonia)  Goal: Effective Oxygenation and Ventilation  Outcome: Ongoing, Progressing

## 2024-01-02 NOTE — PLAN OF CARE
Free from falls, skin breakdown, or injury. NADN. Respirations even and unlabored on O2 @ 2LNC. A&O x 4. Tolerating diet well as ordered. Denies pain. Bed in lowest, locked position, side rails elevated x2.  Call bell in reach. Purposeful rounding done every hour.

## 2024-01-03 VITALS
TEMPERATURE: 98 F | SYSTOLIC BLOOD PRESSURE: 167 MMHG | HEIGHT: 65 IN | WEIGHT: 110.25 LBS | OXYGEN SATURATION: 96 % | BODY MASS INDEX: 18.37 KG/M2 | DIASTOLIC BLOOD PRESSURE: 77 MMHG | HEART RATE: 91 BPM | RESPIRATION RATE: 22 BRPM

## 2024-01-03 PROBLEM — J44.1 COPD EXACERBATION: Status: RESOLVED | Noted: 2023-12-29 | Resolved: 2024-01-03

## 2024-01-03 PROBLEM — A41.9 SEPSIS DUE TO PNEUMONIA: Status: RESOLVED | Noted: 2023-12-29 | Resolved: 2024-01-03

## 2024-01-03 PROBLEM — J18.9 SEPSIS DUE TO PNEUMONIA: Status: RESOLVED | Noted: 2023-12-29 | Resolved: 2024-01-03

## 2024-01-03 LAB
BASOPHILS # BLD AUTO: 0.01 K/UL (ref 0–0.2)
BASOPHILS NFR BLD: 0.2 % (ref 0–1.9)
DIFFERENTIAL METHOD BLD: ABNORMAL
EOSINOPHIL # BLD AUTO: 0 K/UL (ref 0–0.5)
EOSINOPHIL NFR BLD: 0.2 % (ref 0–8)
ERYTHROCYTE [DISTWIDTH] IN BLOOD BY AUTOMATED COUNT: 11.4 % (ref 11.5–14.5)
HCT VFR BLD AUTO: 33.8 % (ref 37–48.5)
HGB BLD-MCNC: 12.2 G/DL (ref 12–16)
IMM GRANULOCYTES # BLD AUTO: 0.04 K/UL (ref 0–0.04)
IMM GRANULOCYTES NFR BLD AUTO: 0.7 % (ref 0–0.5)
LYMPHOCYTES # BLD AUTO: 1.3 K/UL (ref 1–4.8)
LYMPHOCYTES NFR BLD: 22.4 % (ref 18–48)
MAGNESIUM SERPL-MCNC: 1.8 MG/DL (ref 1.6–2.6)
MCH RBC QN AUTO: 33.1 PG (ref 27–31)
MCHC RBC AUTO-ENTMCNC: 36.1 G/DL (ref 32–36)
MCV RBC AUTO: 92 FL (ref 82–98)
MONOCYTES # BLD AUTO: 0.4 K/UL (ref 0.3–1)
MONOCYTES NFR BLD: 6.6 % (ref 4–15)
NEUTROPHILS # BLD AUTO: 4.2 K/UL (ref 1.8–7.7)
NEUTROPHILS NFR BLD: 69.9 % (ref 38–73)
NRBC BLD-RTO: 0 /100 WBC
PHOSPHATE SERPL-MCNC: 2.9 MG/DL (ref 2.7–4.5)
PLATELET # BLD AUTO: 221 K/UL (ref 150–450)
PMV BLD AUTO: 9 FL (ref 9.2–12.9)
RBC # BLD AUTO: 3.69 M/UL (ref 4–5.4)
WBC # BLD AUTO: 5.95 K/UL (ref 3.9–12.7)

## 2024-01-03 PROCEDURE — 99900035 HC TECH TIME PER 15 MIN (STAT)

## 2024-01-03 PROCEDURE — 94660 CPAP INITIATION&MGMT: CPT

## 2024-01-03 PROCEDURE — 94640 AIRWAY INHALATION TREATMENT: CPT

## 2024-01-03 PROCEDURE — 83735 ASSAY OF MAGNESIUM: CPT | Performed by: INTERNAL MEDICINE

## 2024-01-03 PROCEDURE — 94664 DEMO&/EVAL PT USE INHALER: CPT

## 2024-01-03 PROCEDURE — 84100 ASSAY OF PHOSPHORUS: CPT | Performed by: INTERNAL MEDICINE

## 2024-01-03 PROCEDURE — 25000003 PHARM REV CODE 250: Performed by: STUDENT IN AN ORGANIZED HEALTH CARE EDUCATION/TRAINING PROGRAM

## 2024-01-03 PROCEDURE — 63600175 PHARM REV CODE 636 W HCPCS: Mod: UD | Performed by: STUDENT IN AN ORGANIZED HEALTH CARE EDUCATION/TRAINING PROGRAM

## 2024-01-03 PROCEDURE — 25000003 PHARM REV CODE 250: Performed by: INTERNAL MEDICINE

## 2024-01-03 PROCEDURE — 94761 N-INVAS EAR/PLS OXIMETRY MLT: CPT

## 2024-01-03 PROCEDURE — 85025 COMPLETE CBC W/AUTO DIFF WBC: CPT | Performed by: INTERNAL MEDICINE

## 2024-01-03 PROCEDURE — 27000221 HC OXYGEN, UP TO 24 HOURS

## 2024-01-03 PROCEDURE — 25000242 PHARM REV CODE 250 ALT 637 W/ HCPCS: Performed by: STUDENT IN AN ORGANIZED HEALTH CARE EDUCATION/TRAINING PROGRAM

## 2024-01-03 RX ORDER — BENZONATATE 100 MG/1
100 CAPSULE ORAL 3 TIMES DAILY PRN
Qty: 30 CAPSULE | Refills: 0 | Status: SHIPPED | OUTPATIENT
Start: 2024-01-03 | End: 2024-01-13

## 2024-01-03 RX ORDER — MONTELUKAST SODIUM 10 MG/1
10 TABLET ORAL NIGHTLY
Qty: 30 TABLET | Refills: 0 | Status: SHIPPED | OUTPATIENT
Start: 2024-01-03 | End: 2024-02-02

## 2024-01-03 RX ORDER — GUAIFENESIN 600 MG/1
600 TABLET, EXTENDED RELEASE ORAL 2 TIMES DAILY
Status: DISCONTINUED | OUTPATIENT
Start: 2024-01-03 | End: 2024-01-03 | Stop reason: HOSPADM

## 2024-01-03 RX ORDER — LEVOFLOXACIN 500 MG/1
500 TABLET, FILM COATED ORAL DAILY
Qty: 10 TABLET | Refills: 0 | Status: SHIPPED | OUTPATIENT
Start: 2024-01-03

## 2024-01-03 RX ADMIN — DULOXETINE HYDROCHLORIDE 40 MG: 20 CAPSULE, DELAYED RELEASE ORAL at 09:01

## 2024-01-03 RX ADMIN — IPRATROPIUM BROMIDE AND ALBUTEROL SULFATE 3 ML: 2.5; .5 SOLUTION RESPIRATORY (INHALATION) at 07:01

## 2024-01-03 RX ADMIN — PREDNISONE 40 MG: 10 TABLET ORAL at 09:01

## 2024-01-03 RX ADMIN — ASPIRIN 81 MG: 81 TABLET, CHEWABLE ORAL at 09:01

## 2024-01-03 RX ADMIN — ATORVASTATIN CALCIUM 20 MG: 10 TABLET, FILM COATED ORAL at 09:01

## 2024-01-03 RX ADMIN — POLYETHYLENE GLYCOL (3350) 17 G: 17 POWDER, FOR SOLUTION ORAL at 09:01

## 2024-01-03 RX ADMIN — FLUTICASONE FUROATE AND VILANTEROL TRIFENATATE 1 PUFF: 200; 25 POWDER RESPIRATORY (INHALATION) at 07:01

## 2024-01-03 RX ADMIN — IPRATROPIUM BROMIDE AND ALBUTEROL SULFATE 3 ML: 2.5; .5 SOLUTION RESPIRATORY (INHALATION) at 12:01

## 2024-01-03 RX ADMIN — CEFTRIAXONE 2 G: 2 INJECTION, POWDER, FOR SOLUTION INTRAMUSCULAR; INTRAVENOUS at 09:01

## 2024-01-03 RX ADMIN — IPRATROPIUM BROMIDE AND ALBUTEROL SULFATE 3 ML: 2.5; .5 SOLUTION RESPIRATORY (INHALATION) at 01:01

## 2024-01-03 RX ADMIN — MUPIROCIN: 20 OINTMENT TOPICAL at 09:01

## 2024-01-03 RX ADMIN — Medication 2000 UNITS: at 09:01

## 2024-01-03 RX ADMIN — ATENOLOL 25 MG: 25 TABLET ORAL at 09:01

## 2024-01-03 RX ADMIN — PANTOPRAZOLE SODIUM 40 MG: 40 TABLET, DELAYED RELEASE ORAL at 09:01

## 2024-01-03 NOTE — PROGRESS NOTES
Patient discharge instructions highlighted and reviewed with patient, all questions answered. Peripheral IV removed without problem, patient tolerated well. Patient able to dress self without assist, can ambulate short distances. Patient gathered her belongings with assist of sister. Patient taken in wheelchair to sisters car, transferred herself from wheelchair to car w/o problem.

## 2024-01-03 NOTE — DISCHARGE SUMMARY
[facility] Medicine  Discharge Summary      Patient Name: Zayra White  MRN: [id number]  [facility]: [id number]  Patient Class: IP- Inpatient  Admission Date: 12/28/2023  Hospital Length of Stay: 5 days  Discharge Date and Time:  01/03/2024 1:35 PM  Attending Physician: Servando Rice MD   Discharging Provider: Servando Rice MD  Primary Care Provider: Polly Lemos MD    Primary Care Team: Networked reference to record PCT     HPI:   Ms. White is a 57yo lady with a past medical history of anxiety, OA, COPD and PTE.  She has PRN CPAP for home use as needed, and uses O2 at 2-3L PRN.  Her daughter is a nurse and is at the bedside with her, assisting with the history since Ms. White is on BiPAP currently.    She states that about 2.5 days ago she developed acutely worsening wheezing and nausea.  She always has some chronic dry and hacking cough, but this worsened acutely as well.  She has had no sputum production.  Despite fever noted in the ED, both she and her daughter deny fever and chills at home.  Over the past 24 hours she has become significantly short of breath.  Her daughter went to buy some robitussin and when she got home she found her mother with O2 sats of 82%.  She gave her a duoneb, after which her sats dropped to 75%.  She tried the BiPAP, but Ms. White just finally asked her to call 911 due to SOB.    In the ED she was treated with:  Medications   albuterol-ipratropium 2.5 mg-0.5 mg/3 mL nebulizer solution 3 mL (3 mLs Nebulization Given 12/28/23 2114)   magnesium sulfate 2g in water 50mL IVPB (premix) (0 g Intravenous Stopped 12/28/23 2153)   ondansetron injection 4 mg (4 mg Intravenous Given 12/28/23 2052)   cefTRIAXone (ROCEPHIN) 2 g in dextrose 5 % in water (D5W) 100 mL IVPB (MB+) (0 g Intravenous Stopped 12/28/23 2153)   LORazepam injection 2 mg (2 mg Intravenous Given 12/28/23 2127)   ketorolac injection 15 mg (15 mg Intravenous Given 12/28/23 2125)   metoprolol injection 5  mg (5 mg Intravenous Given 12/28/23 2201)           * No surgery found *      Hospital Course:   Patient is without new complaints of shortness of breath and states that she is feeling better although she does have chronic oxygen demand and 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  Patient does appear to be nearing discharge status.  Patient does need home oxygen and we will requalify her for continuous home oxygen prior to discharge.  Patient also has had blood cultures positive for Haemophilus influenzae sensitivities are pending     Goals of Care Treatment Preferences:  Code Status: Full Code      Consults:     No new Assessment & Plan notes have been filed under this hospital service since the last note was generated.  Service: Hospital Medicine    Final Active Diagnoses:    Diagnosis Date Noted POA    Acute respiratory failure with hypoxia and hypercarbia [J96.01, J96.02] 12/29/2023 Yes    Multiple lung nodules on CT [R91.8] 02/18/2022 Yes      Problems Resolved During this Admission:    Diagnosis Date Noted Date Resolved POA    PRINCIPAL PROBLEM:  COPD exacerbation [J44.1] 12/29/2023 01/03/2024 Yes    Sepsis due to pneumonia [J18.9, A41.9] 12/29/2023 01/03/2024 Yes       Discharged Condition: good    Disposition:     Follow Up:   Follow-up Information       Polly Lemos MD. Go on 1/9/2024.    Specialty: Family Medicine  Why: appointment Tuesday Jan 9th at 11:00am for hospital follow up  Contact information:  [address] [facility] [address]  [phone]                           Patient Instructions:   No discharge procedures on file.    Significant Diagnostic Studies: Labs: All labs within the past 24 hours have been reviewed    Pending Diagnostic Studies:       Procedure Component Value Units Date/Time    Legionella antigen, urine [[id number]] Collected: 12/29/23 1233    Order Status: Sent Lab Status: In process Updated: 12/30/23 1230    Specimen: Urine, Clean Catch             Medications:  Reconciled Home Medications:      Medication List        START taking these medications      benzonatate 100 MG capsule  Commonly known as: TESSALON  Take 1 capsule (100 mg total) by mouth 3 (three) times daily as needed for Cough.     levoFLOXacin 500 MG tablet  Commonly known as: LEVAQUIN  Take 1 tablet (500 mg total) by mouth once daily.     montelukast 10 mg tablet  Commonly known as: SINGULAIR  Take 1 tablet (10 mg total) by mouth every evening.            CONTINUE taking these medications      albuterol 90 mcg/actuation inhaler  Commonly known as: VENTOLIN HFA  inhale 2 puff by inhalation route  every 4 - 6 hours as needed     albuterol-ipratropium 2.5 mg-0.5 mg/3 mL nebulizer solution  Commonly known as: DUO-NEB  Take 3 mLs by nebulization every 6 (six) hours as needed for Wheezing or Shortness of Breath. Rescue     aspirin 81 MG Chew  81 mg.     atenoloL 25 MG tablet  Commonly known as: TENORMIN  TAKE 1/2 TABLET BY MOUTH EVERY DAY **THANK YOU**     atorvastatin 20 MG tablet  Commonly known as: LIPITOR  Take 1 tablet (20 mg total) by mouth once daily.     cholecalciferol (vitamin D3) 25 mcg (1,000 unit) capsule  Commonly known as: VITAMIN D3  Take 2 capsules (2,000 Units total) by mouth once daily.     clonazePAM 0.5 MG tablet  Commonly known as: KlonoPIN  Take ONE tablet (0.5 mg total) by mouth daily as needed for Anxiety **THANK YOU**     DULoxetine 20 MG capsule  Commonly known as: CYMBALTA  Take 2 capsules po daily     latanoprost 0.005 % ophthalmic solution  Place 1 drop into both eyes every evening.     ondansetron 4 MG Tbdl  Commonly known as: ZOFRAN-ODT  Take 1 tablet (4 mg total) by mouth every 8 (eight) hours as needed.     pantoprazole 40 MG tablet  Commonly known as: PROTONIX  Take 1 tablet (40 mg total) by mouth once daily. Take in the morning before breakfast.  Wait 30 minutes before eating or drinking anything     predniSONE 5 MG tablet  Commonly known as: DELTASONE  Take 1  tablet (5 mg total) by mouth once daily.     tiZANidine 4 MG tablet  Commonly known as: ZANAFLEX  Take 1 tablet (4 mg total) by mouth every 8 (eight) hours as needed.     TRELEGY ELLIPTA 200-62.5-25 mcg inhaler  Generic drug: fluticasone-umeclidin-vilanter  Inhale 1 puff into the lungs once daily.              Indwelling Lines/Drains at time of discharge:   Lines/Drains/Airways       Drain  Duration             Female External Urinary Catheter w/ Suction 12/29/23 0055 5 days                    Time spent on the discharge of patient: 30 minutes         Servando Rice MD  Department of Hospital Medicine  [facility]

## 2024-01-03 NOTE — PROGRESS NOTES
[facility] Medicine  Progress Note    Patient Name: Zayra White  MRN: [id number]  Patient Class: IP- Inpatient   Admission Date: 12/28/2023  Length of Stay: 4 days  Attending Physician: Servando Rice MD  Primary Care Provider: Polly Lemos MD        Subjective:     Principal Problem:COPD exacerbation        HPI:  Ms. White is a 59yo lady with a past medical history of anxiety, OA, COPD and PTE.  She has PRN CPAP for home use as needed, and uses O2 at 2-3L PRN.  Her daughter is a nurse and is at the bedside with her, assisting with the history since Ms. White is on BiPAP currently.    She states that about 2.5 days ago she developed acutely worsening wheezing and nausea.  She always has some chronic dry and hacking cough, but this worsened acutely as well.  She has had no sputum production.  Despite fever noted in the ED, both she and her daughter deny fever and chills at home.  Over the past 24 hours she has become significantly short of breath.  Her daughter went to buy some robitussin and when she got home she found her mother with O2 sats of 82%.  She gave her a duoneb, after which her sats dropped to 75%.  She tried the BiPAP, but Ms. White just finally asked her to call 911 due to SOB.    In the ED she was treated with:  Medications   albuterol-ipratropium 2.5 mg-0.5 mg/3 mL nebulizer solution 3 mL (3 mLs Nebulization Given 12/28/23 2114)   magnesium sulfate 2g in water 50mL IVPB (premix) (0 g Intravenous Stopped 12/28/23 2153)   ondansetron injection 4 mg (4 mg Intravenous Given 12/28/23 2052)   cefTRIAXone (ROCEPHIN) 2 g in dextrose 5 % in water (D5W) 100 mL IVPB (MB+) (0 g Intravenous Stopped 12/28/23 2153)   LORazepam injection 2 mg (2 mg Intravenous Given 12/28/23 2127)   ketorolac injection 15 mg (15 mg Intravenous Given 12/28/23 2125)   metoprolol injection 5 mg (5 mg Intravenous Given 12/28/23 2201)           Overview/Hospital Course:  Patient is without new complaints of  shortness of breath and states that she is feeling better although she does have chronic oxygen demand and 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  Patient does appear to be nearing discharge status.  Patient does need home oxygen and we will requalify her for continuous home oxygen prior to discharge.  Patient also has had blood cultures positive for Haemophilus influenzae sensitivities are pending    Interval History:     Review of Systems   Constitutional:  Negative for activity change, appetite change, fatigue and fever.   HENT:  Negative for congestion, ear discharge, mouth sores, nosebleeds, rhinorrhea, sinus pressure, sinus pain and tinnitus.    Eyes: Negative.  Negative for pain, redness and itching.   Respiratory:  Positive for cough, shortness of breath and wheezing. Negative for apnea, choking, chest tightness and stridor.    Cardiovascular:  Negative for chest pain, palpitations and leg swelling.   Gastrointestinal:  Negative for abdominal distention, abdominal pain, anal bleeding, blood in stool, constipation and diarrhea.   Endocrine: Negative.    Genitourinary:  Negative for difficulty urinating, flank pain, frequency and urgency.   Musculoskeletal:  Negative for arthralgias, back pain, gait problem and myalgias.   Skin:  Negative for color change and pallor.   Allergic/Immunologic: Negative.    Neurological:  Negative for dizziness, facial asymmetry, weakness, light-headedness and headaches.   Hematological:  Negative for adenopathy. Does not bruise/bleed easily.     Objective:     Vital Signs (Most Recent):  Temp: 98.1 °F (36.7 °C) (12/31/23 1301)  Pulse: 87 (12/31/23 1301)  Resp: 20 (12/31/23 1301)  BP: (!) 163/77 (12/31/23 1301)  SpO2: 96 % (12/31/23 1301) Vital Signs (24h Range):  Temp:  [97.6 °F (36.4 °C)-98.1 °F (36.7 °C)] 98.1 °F (36.7 °C)  Pulse:  [] 87  Resp:  [16-22] 20  SpO2:  [94 %-99 %] 96 %  BP: (120-163)/(64-77) 163/77     Weight: 49.7 kg (109 lb 9.1 oz)  Body mass  index is 18.23 kg/m².    Intake/Output Summary (Last 24 hours) at 12/31/2023 1734  Last data filed at 12/31/2023 1705  Gross per 24 hour   Intake 3215.74 ml   Output 1700 ml   Net 1515.74 ml         Physical Exam  Constitutional:       Appearance: She is well-developed.   HENT:      Head: Normocephalic and atraumatic.   Eyes:      Pupils: Pupils are equal, round, and reactive to light.   Neck:      Thyroid: No thyromegaly.      Trachea: No tracheal deviation.   Cardiovascular:      Rate and Rhythm: Normal rate and regular rhythm.      Heart sounds: Normal heart sounds.   Pulmonary:      Effort: Respiratory distress present.      Breath sounds: Wheezing and rhonchi present.   Abdominal:      General: Bowel sounds are normal. There is no distension.      Palpations: Abdomen is soft.      Tenderness: There is no guarding or rebound.   Musculoskeletal:         General: Normal range of motion.      Cervical back: Normal range of motion and neck supple.   Lymphadenopathy:      Cervical: No cervical adenopathy.   Skin:     General: Skin is warm and dry.      Capillary Refill: Capillary refill takes less than 2 seconds.   Neurological:      Mental Status: She is alert and oriented to person, place, and time.   Psychiatric:         Behavior: Behavior normal.         Thought Content: Thought content normal.         Judgment: Judgment normal.             Significant Labs: All pertinent labs within the past 24 hours have been reviewed.  Recent Lab Results         01/02/24  0432        Albumin 2.7       ALP 55       ALT 17       Anion Gap 11       AST 16       Baso # 0.00       Basophil % 0.0       BILIRUBIN TOTAL 0.3  Comment: For infants and newborns, interpretation of results should be based  on gestational age, weight and in agreement with clinical  observations.    Premature Infant recommended reference ranges:  Up to 24 hours.............<8.0 mg/dL  Up to 48 hours............<12.0 mg/dL  3-5 days..................<15.0  mg/dL  6-29 days.................<15.0 mg/dL         BUN 10       Calcium 8.0       Chloride 103       CO2 29       Creatinine 0.6       Differential Method Automated       eGFR >60.0       Eos # 0.0       Eosinophil % 0.2       Glucose 90       Gran # (ANC) 3.8       Gran % 70.3       Hematocrit 33.7       Hemoglobin 11.8       Immature Grans (Abs) 0.02  Comment: Mild elevation in immature granulocytes is non specific and   can be seen in a variety of conditions including stress response,   acute inflammation, trauma and pregnancy. Correlation with other   laboratory and clinical findings is essential.         Immature Granulocytes 0.4       Lymph # 1.2       Lymph % 22.6       Magnesium  1.9       MCH 33.2       MCHC 35.0       MCV 95       Mono # 0.4       Mono % 6.5       MPV 9.0       nRBC 0       Phosphorus Level 3.2       Platelet Count 192       Potassium 3.3       PROTEIN TOTAL 5.3       RBC 3.55       RDW 11.4       Sodium 143       WBC 5.39               Significant Imaging: I have reviewed all pertinent imaging results/findings within the past 24 hours.  I have reviewed and interpreted all pertinent imaging results/findings within the past 24 hours.    Assessment/Plan:      * COPD exacerbation  COPD exacerbation 2/2 H. Influenzae bacterial pneumonia  Continue IV rocephin- f/u speciation with sensitivities from blood culture but typically H. Influenzae sensitive to penicillins and third generation cephalosporins. Not repeating blood cultures unless patient declines.  Continue PO steroids  Continue duonebs. Space out due to tachcyardia  PRN nebs  Acapella and IS    1/2/24  Continue current antibiotics due to positive blood cultures  We will attempt to get sensitivities prior to discharge   Patient is nearing time for discharge but we will continue monitoring closely for any decline  Continue current antibiotics   Continue current respiratory treatments.    Acute respiratory failure with hypoxia and  hypercarbia  Patient with Hypercapnic and Hypoxic Respiratory failure which is Acute on chronic.  she is on home oxygen at 2-3L PRN LPM. Supplemental oxygen was provided and noted- Oxygen Concentration (%):  [28-32] 28    Signs/symptoms of respiratory failure include- tachypnea, increased work of breathing, respiratory distress, use of accessory muscles, wheezing, and lethargy. Contributing diagnoses includes - COPD and Pneumonia Labs and images were reviewed. Patient Has recent ABG, which has been reviewed. Will treat underlying causes and adjust management of respiratory failure as follows- BiPAP 10/5 started in the ED    Back on Home O2 regimen at this time    Sepsis due to pneumonia  This patient does have evidence of infective focus  CXR isn't super impressive, but maybe some LLL infiltrate  Consider CT chest in am if no improvement  Flu and Covid PCR negative  Legionella CX and UAg    My overall impression is sepsis.  Source: Respiratory  Antibiotics given-   Antibiotics (72h ago, onward)      Start     Stop Route Frequency Ordered    12/30/23 0915  cefTRIAXone (ROCEPHIN) 2 g in dextrose 5 % in water (D5W) 100 mL IVPB (MB+)         -- IV Every 24 hours (non-standard times) 12/30/23 0808    12/30/23 0900  mupirocin 2 % ointment         01/04/24 0859 Nasl 2 times daily 12/30/23 0751    12/29/23 0215  azithromycin (ZITHROMAX) 500 mg in dextrose 5 % (D5W) 250 mL IVPB (Vial-Mate)         01/03/24 0214 IV Every 24 hours (non-standard times) 12/29/23 0102          Latest lactate reviewed-  Recent Labs   Lab 12/28/23  2052   LACTATE 2.0       Organ dysfunction indicated by Acute respiratory failure    Fluid challenge Not needed - patient is not hypotensive      Post- resuscitation assessment No - Post resuscitation assessment not needed       Will Not start Pressors- Levophed for MAP of 65  Source control achieved by: iv antibiotics    Multiple lung nodules on CT  Follow up with Pulmonary        VTE Risk Mitigation  (From admission, onward)           Ordered     enoxaparin injection 40 mg  Daily         12/29/23 0110     IP VTE HIGH RISK PATIENT  Once         12/29/23 0110     Place sequential compression device  Until discontinued         12/29/23 0110     Place FAUSTINO hose  Until discontinued         12/29/23 0110                    Discharge Planning   KIM:      Code Status: Full Code   Is the patient medically ready for discharge?:     Reason for patient still in hospital (select all that apply): Laboratory test  Discharge Plan A: Home   Discharge Delays: None known at this time              Servando Rice MD  Department of Hospital Medicine   [facility]

## 2024-01-03 NOTE — ASSESSMENT & PLAN NOTE
COPD exacerbation 2/2 H. Influenzae bacterial pneumonia  Continue IV rocephin- f/u speciation with sensitivities from blood culture but typically H. Influenzae sensitive to penicillins and third generation cephalosporins. Not repeating blood cultures unless patient declines.  Continue PO steroids  Continue duonebs. Space out due to tachcyardia  PRN nebs  Acapella and IS    1/2/24  Continue current antibiotics due to positive blood cultures  We will attempt to get sensitivities prior to discharge   Patient is nearing time for discharge but we will continue monitoring closely for any decline  Continue current antibiotics   Continue current respiratory treatments.

## 2024-01-03 NOTE — PLAN OF CARE
Problem: Oral Intake Inadequate COPD (Chronic Obstructive Pulmonary Disease)  Goal: Improved Nutrition Intake  Outcome: Ongoing, Progressing     Problem: Adult Inpatient Plan of Care  Goal: Optimal Comfort and Wellbeing  Outcome: Ongoing, Progressing  Goal: Readiness for Transition of Care  Outcome: Ongoing, Progressing     Problem: Skin Injury Risk Increased  Goal: Skin Health and Integrity  Outcome: Ongoing, Progressing     Problem: Fall Injury Risk  Goal: Absence of Fall and Fall-Related Injury  Outcome: Ongoing, Progressing     Problem: Gas Exchange Impaired  Goal: Optimal Gas Exchange  Outcome: Ongoing, Progressing     Problem: Infection (Pneumonia)  Goal: Resolution of Infection Signs and Symptoms  Outcome: Ongoing, Progressing     Problem: Respiratory Compromise (Pneumonia)  Goal: Effective Oxygenation and Ventilation  Outcome: Ongoing, Progressing

## 2024-01-03 NOTE — PLAN OF CARE
Plan to DC today.  Pt has home o2 and nebulizer for home   Per pt, daughter will bring o2 tank for transportation home when she arrives    PCP already scheduled and added to AVS.   No further CM needs identified.        01/03/24 9160   Discharge Reassessment   Assessment Type Discharge Planning Reassessment

## 2024-01-03 NOTE — PLAN OF CARE
Problem: Adjustment to Illness COPD (Chronic Obstructive Pulmonary Disease)  Goal: Optimal Chronic Illness Coping  Outcome: Ongoing, Progressing     Problem: Functional Ability Impaired COPD (Chronic Obstructive Pulmonary Disease)  Goal: Optimal Level of Functional [place]  Outcome: Ongoing, Progressing     Problem: Infection COPD (Chronic Obstructive Pulmonary Disease)  Goal: Absence of Infection Signs and Symptoms  Outcome: Ongoing, Progressing     Problem: Oral Intake Inadequate COPD (Chronic Obstructive Pulmonary Disease)  Goal: Improved Nutrition Intake  Outcome: Ongoing, Progressing     Problem: Respiratory Compromise COPD (Chronic Obstructive Pulmonary Disease)  Goal: Effective Oxygenation and Ventilation  Outcome: Ongoing, Progressing     Problem: Adult Inpatient Plan of Care  Goal: Plan of Care Review  Outcome: Ongoing, Progressing  Goal: Patient-Specific Goal (Individualized)  Outcome: Ongoing, Progressing  Goal: Absence of Hospital-Acquired Illness or Injury  Outcome: Ongoing, Progressing  Goal: Optimal Comfort and Wellbeing  Outcome: Ongoing, Progressing  Goal: Readiness for Transition of Care  Outcome: Ongoing, Progressing     Problem: Skin Injury Risk Increased  Goal: Skin Health and Integrity  Outcome: Ongoing, Progressing     Problem: Fall Injury Risk  Goal: Absence of Fall and Fall-Related Injury  Outcome: Ongoing, Progressing     Problem: Infection  Goal: Absence of Infection Signs and Symptoms  Outcome: Ongoing, Progressing     Problem: Gas Exchange Impaired  Goal: Optimal Gas Exchange  Outcome: Ongoing, Progressing     Problem: Adjustment to Illness (Sepsis/Septic Shock)  Goal: Optimal Coping  Outcome: Ongoing, Progressing     Problem: Bleeding (Sepsis/Septic Shock)  Goal: Absence of Bleeding  Outcome: Ongoing, Progressing     Problem: Glycemic Control Impaired (Sepsis/Septic Shock)  Goal: Blood Glucose Level Within Desired Range  Outcome: Ongoing, Progressing     Problem: Infection  Progression (Sepsis/Septic Shock)  Goal: Absence of Infection Signs and Symptoms  Outcome: Ongoing, Progressing     Problem: Nutrition Impaired (Sepsis/Septic Shock)  Goal: Optimal Nutrition Intake  Outcome: Ongoing, Progressing     Problem: Fluid Imbalance (Pneumonia)  Goal: Fluid Balance  Outcome: Ongoing, Progressing     Problem: Infection (Pneumonia)  Goal: Resolution of Infection Signs and Symptoms  Outcome: Ongoing, Progressing     Problem: Respiratory Compromise (Pneumonia)  Goal: Effective Oxygenation and Ventilation  Outcome: Ongoing, Progressing

## 2024-01-03 NOTE — PLAN OF CARE
Pt clear for DC from case management standpoint. Discharging to home           01/03/24 1516   Final Note   Assessment Type Final Discharge Note   Anticipated Discharge Disposition Home

## 2024-01-03 NOTE — PLAN OF CARE
Patient in no apparent distress. Patient on 2 lpm. She wears home O2 prn. Aerosol treatments given Q 6. IS and aerobika done. Breo daily. BIPAP at bedside if needed. Will continue to monitor. Will continue to monitor.

## 2024-01-03 NOTE — PLAN OF CARE
[facility]  Discharge Reassessment    Primary Care Provider: Polly Lemos MD    Expected Discharge Date:     She may get to go home today but will probably go tomorrow. She is ok with going tomorrow. Will get follow up appointment with Dr Lemos. Denies any other needs at this time. Will continue to follow.     Reassessment (most recent)       Discharge Reassessment - 01/02/24 1130          Discharge Reassessment    Assessment Type Discharge Planning Reassessment     Did the patient's condition or plan change since previous assessment? No     Discharge Plan discussed with: Patient     Communicated KIM with patient/caregiver Yes     Discharge Plan A Home     Discharge Plan B Home     DME Needed Upon Discharge  none     Transition of Care Barriers None     Why the patient remains in the hospital Requires continued medical care        Post-Acute Status    Discharge Delays None known at this time

## 2024-01-03 NOTE — PLAN OF CARE
Problem: Adjustment to Illness COPD (Chronic Obstructive Pulmonary Disease)  Goal: Optimal Chronic Illness Coping  Outcome: Met     Problem: Functional Ability Impaired COPD (Chronic Obstructive Pulmonary Disease)  Goal: Optimal Level of Functional [place]  Outcome: Met     Problem: Infection COPD (Chronic Obstructive Pulmonary Disease)  Goal: Absence of Infection Signs and Symptoms  Outcome: Met     Problem: Oral Intake Inadequate COPD (Chronic Obstructive Pulmonary Disease)  Goal: Improved Nutrition Intake  1/3/2024 1449 by Jay Tong RN  Outcome: Met  1/3/2024 1240 by Jay Tong RN  Outcome: Ongoing, Progressing     Problem: Respiratory Compromise COPD (Chronic Obstructive Pulmonary Disease)  Goal: Effective Oxygenation and Ventilation  Outcome: Met     Problem: Adult Inpatient Plan of Care  Goal: Plan of Care Review  Outcome: Met  Goal: Patient-Specific Goal (Individualized)  Outcome: Met  Goal: Absence of Hospital-Acquired Illness or Injury  Outcome: Met  Goal: Optimal Comfort and Wellbeing  1/3/2024 1449 by Jay Tong RN  Outcome: Met  1/3/2024 1240 by Jay Tong RN  Outcome: Ongoing, Progressing  Goal: Readiness for Transition of Care  1/3/2024 1449 by Jay Tong RN  Outcome: Met  1/3/2024 1240 by Jay Tong RN  Outcome: Ongoing, Progressing     Problem: Skin Injury Risk Increased  Goal: Skin Health and Integrity  1/3/2024 1449 by Jay Tong RN  Outcome: Met  1/3/2024 1240 by Jay Tong RN  Outcome: Ongoing, Progressing     Problem: Fall Injury Risk  Goal: Absence of Fall and Fall-Related Injury  1/3/2024 1449 by Jay Togn RN  Outcome: Met  1/3/2024 1240 by Jay Tong RN  Outcome: Ongoing, Progressing     Problem: Infection  Goal: Absence of Infection Signs and Symptoms  Outcome: Met     Problem: Gas Exchange Impaired  Goal: Optimal Gas Exchange  1/3/2024 1449 by Jay Tong RN  Outcome: Met  1/3/2024 1240 by Jay Tong  RN  Outcome: Ongoing, Progressing     Problem: Adjustment to Illness (Sepsis/Septic Shock)  Goal: Optimal Coping  Outcome: Met     Problem: Bleeding (Sepsis/Septic Shock)  Goal: Absence of Bleeding  Outcome: Met     Problem: Glycemic Control Impaired (Sepsis/Septic Shock)  Goal: Blood Glucose Level Within Desired Range  Outcome: Met     Problem: Infection Progression (Sepsis/Septic Shock)  Goal: Absence of Infection Signs and Symptoms  Outcome: Met     Problem: Nutrition Impaired (Sepsis/Septic Shock)  Goal: Optimal Nutrition Intake  Outcome: Met     Problem: Fluid Imbalance (Pneumonia)  Goal: Fluid Balance  Outcome: Met     Problem: Infection (Pneumonia)  Goal: Resolution of Infection Signs and Symptoms  1/3/2024 1449 by Jay Tong RN  Outcome: Met  1/3/2024 1240 by Jay Tong RN  Outcome: Ongoing, Progressing     Problem: Respiratory Compromise (Pneumonia)  Goal: Effective Oxygenation and Ventilation  1/3/2024 1449 by Jay Tong RN  Outcome: Met  1/3/2024 1240 by Jay Tong RN  Outcome: Ongoing, Progressing

## 2024-01-04 LAB — L PNEUMO AG UR QL IA: NEGATIVE

## 2024-01-09 ENCOUNTER — OFFICE VISIT (OUTPATIENT)
Dept: FAMILY MEDICINE | Facility: CLINIC | Age: 59
End: 2024-01-09
Payer: MEDICAID

## 2024-01-09 VITALS
SYSTOLIC BLOOD PRESSURE: 136 MMHG | DIASTOLIC BLOOD PRESSURE: 88 MMHG | RESPIRATION RATE: 18 BRPM | HEART RATE: 90 BPM | BODY MASS INDEX: 18.33 KG/M2 | OXYGEN SATURATION: 94 % | HEIGHT: 65 IN | WEIGHT: 110 LBS

## 2024-01-09 DIAGNOSIS — J43.8 OTHER EMPHYSEMA: Primary | ICD-10-CM

## 2024-01-09 DIAGNOSIS — E78.5 HYPERLIPIDEMIA, UNSPECIFIED HYPERLIPIDEMIA TYPE: ICD-10-CM

## 2024-01-09 DIAGNOSIS — R91.8 MULTIPLE LUNG NODULES ON CT: ICD-10-CM

## 2024-01-09 DIAGNOSIS — J96.01 ACUTE RESPIRATORY FAILURE WITH HYPOXIA AND HYPERCARBIA: ICD-10-CM

## 2024-01-09 DIAGNOSIS — J96.02 ACUTE RESPIRATORY FAILURE WITH HYPOXIA AND HYPERCARBIA: ICD-10-CM

## 2024-01-09 PROCEDURE — 3079F DIAST BP 80-89 MM HG: CPT | Mod: CPTII,,, | Performed by: FAMILY MEDICINE

## 2024-01-09 PROCEDURE — 99214 OFFICE O/P EST MOD 30 MIN: CPT | Mod: S$PBB,,, | Performed by: FAMILY MEDICINE

## 2024-01-09 PROCEDURE — 99214 OFFICE O/P EST MOD 30 MIN: CPT | Mod: PBBFAC | Performed by: FAMILY MEDICINE

## 2024-01-09 PROCEDURE — 1111F DSCHRG MED/CURRENT MED MERGE: CPT | Mod: CPTII,,, | Performed by: FAMILY MEDICINE

## 2024-01-09 PROCEDURE — 3008F BODY MASS INDEX DOCD: CPT | Mod: CPTII,,, | Performed by: FAMILY MEDICINE

## 2024-01-09 PROCEDURE — 1159F MED LIST DOCD IN RCRD: CPT | Mod: CPTII,,, | Performed by: FAMILY MEDICINE

## 2024-01-09 PROCEDURE — 99999 PR PBB SHADOW E&M-EST. PATIENT-LVL IV: CPT | Mod: PBBFAC,,, | Performed by: FAMILY MEDICINE

## 2024-01-09 PROCEDURE — 3075F SYST BP GE 130 - 139MM HG: CPT | Mod: CPTII,,, | Performed by: FAMILY MEDICINE

## 2024-01-09 NOTE — ASSESSMENT & PLAN NOTE
Spiculated nodule left lung upper lobe noted on CT done 12/29/2023, repeat CT to be done 03/21/2024, followed by pulmonology

## 2024-01-09 NOTE — PROGRESS NOTES
Subjective:       Patient ID: Zayra White is a 58 y.o. female.    Chief Complaint: Follow-up (Hospital follow up last wekk) and Pneumonia (Hospital follow up)    Ms. White is a 58-year-old female with chronic obstructive pulmonary disease, hyperlipidemia, lung nodules on CT, nicotine dependence, on oxygen for acute respiratory failure with hypoxia and hypercarbia, emphysema, who is just been discharged from the hospital with a recent pneumonia.  Her last labs were done on 01/03/2024.  These labs have been reviewed Ms. Dennison is currently stable and states she is feeling much better.  While hospitalized, on 12/29/2023 a CT with and without contrast was done of her chest.  It showed a spiculated lesion in the upper lobe of the left lung.  Her pulmonologist (Madhu) is following this and has a follow-up CT scheduled for 03/21/2024.    Ms. White has no complaints today, and states that although she is tired she is feeling much better than she was over the last few weeks.    Follow-up  Associated symptoms include coughing, fatigue and weakness.   Pneumonia  She complains of cough and shortness of breath.     Review of Systems   Constitutional:  Positive for fatigue.   Respiratory:  Positive for cough and shortness of breath.         On continuous O2 by nasal cannula    Just discharged from the hospital after suffering pneumonia    CT chest showed a spiculated lesion in the left upper lobe, repeat CT 03/21/2024   Neurological:  Positive for weakness. Negative for speech difficulty.       Patient Active Problem List   Diagnosis    Chronic obstructive pulmonary disease    Anxiety about health    Left inguinal pain    Rectus sheath hematoma    Hyperlipidemia    Multiple lung nodules on CT    Current chronic use of systemic steroids    Chronic sinus complaints    Personal history of nicotine dependence    Atelectasis of both lungs    Acute respiratory failure with hypoxia and hypercarbia       Objective:      Physical  "Exam  Constitutional:       Comments: Ill appearing, in wheelchair with O2 by nasal cannula.  However alert and interactive with her only complete being fatigue   Cardiovascular:      Rate and Rhythm: Normal rate and regular rhythm.      Heart sounds: Normal heart sounds.   Pulmonary:      Comments: Decreased breath sounds in all lung fields consistent with moderate to severe COPD/emphysema.  No rhonchi rales or wheezes  Skin:     General: Skin is warm and dry.   Neurological:      General: No focal deficit present.      Mental Status: She is alert and oriented to person, place, and time.   Psychiatric:         Mood and Affect: Mood normal.         Behavior: Behavior normal.         Lab Results   Component Value Date    WBC 5.95 01/03/2024    HGB 12.2 01/03/2024    HCT 33.8 (L) 01/03/2024     01/03/2024    CHOL 184 03/18/2021    TRIG 149 03/18/2021    HDL 60 03/18/2021    ALT 17 01/02/2024    AST 16 01/02/2024     01/02/2024    K 3.3 (L) 01/02/2024     01/02/2024    CREATININE 0.6 01/02/2024    BUN 10 01/02/2024    CO2 29 01/02/2024    TSH 0.292 (L) 02/08/2022    INR 0.9 11/30/2021    HGBA1C 5.6 03/18/2021     The 10-year ASCVD risk score (Bjorn CADET, et al., 2019) is: 3.3%    Values used to calculate the score:      Age: 58 years      Sex: Female      Is Non- : No      Diabetic: No      Tobacco smoker: No      Systolic Blood Pressure: 136 mmHg      Is BP treated: No      HDL Cholesterol: 60 mg/dL      Total Cholesterol: 184 mg/dL  Visit Vitals  /88 (BP Location: Left arm, Patient Position: Sitting, BP Method: Large (Manual))   Pulse 90   Resp 18   Ht 5' 5" (1.651 m)   Wt 49.9 kg (110 lb)   LMP 09/22/2017   SpO2 (!) 94%   BMI 18.30 kg/m²      Assessment:       1. Other emphysema    2. Multiple lung nodules on CT    3. Hyperlipidemia, unspecified hyperlipidemia type    4. Acute respiratory failure with hypoxia and hypercarbia        Plan:       1. Other " emphysema  Assessment & Plan:  Stable continue current meds      2. Multiple lung nodules on CT  Assessment & Plan:  Spiculated nodule left lung upper lobe noted on CT done 12/29/2023, repeat CT to be done 03/21/2024, followed by pulmonology      3. Hyperlipidemia, unspecified hyperlipidemia type  Assessment & Plan:  Stable      4. Acute respiratory failure with hypoxia and hypercarbia  Assessment & Plan:  Currently stable, no longer in acute respiratory failure, on continuous O2         Follow up if symptoms worsen or fail to improve, routine three-month follow up has already been scheduled.      Future Appointments       Date Provider Specialty Appt Notes    3/7/2024 Polly Lemos MD Family Medicine Follow up    3/21/2024  Radiology Madhu

## 2024-01-09 NOTE — PROGRESS NOTES
"[facility] Note    Subjective:    Chief Complaint: No chief complaint on file.      {[facility]    My Last Note    Chart Review      Change Chief Complaint   :[id number]}    58 y.o. female for hospital follow-up  The HPI and pertinent ROS is included in the Diagnostic Impression Remarks section at the end of the note. Please see below for further details.     The following portions of the patient's history were reviewed and updated as appropriate: allergies, current medications, past family history, past medical history, past social history, past surgical history and problem list.    She  has a past medical history of Anxiety, Arthritis, Asthma, Back pain, COPD (chronic obstructive pulmonary disease), and Pulmonary embolism.  She  has a past surgical history that includes Breast cyst excision;  section; Cholecystectomy; Biopsy; Incision and drainage of abscess (Left, 3/16/2020); Colonoscopy (N/A, 2022); and Esophagogastroduodenoscopy (N/A, 2022).    She  reports that she has quit smoking. Her smoking use included vaping with nicotine. She has never used smokeless tobacco. She reports that she does not currently use alcohol. She reports that she does not use drugs.  She family history includes Breast cancer in her sister.    Review of patient's allergies indicates:   Allergen Reactions    Ativan [lorazepam] Itching       Physical Examination  LMP 2017    {Add new vitals  Synopsis        :13030}274}  Wt Readings from Last 3 Encounters:   24 50 kg (110 lb 3.7 oz)   23 47.3 kg (104 lb 2.7 oz)   23 49.6 kg (109 lb 6.4 oz)     BP Readings from Last 3 Encounters:   24 (!) 167/77   23 (!) 149/72   23 138/74                Estimated body mass index is 18.34 kg/m² as calculated from the following:    Height as of 23: 5' 5" (1.651 m).    Weight as of 24: 50 kg (110 lb 3.7 oz).     Physical Exam   X-Ray Chest AP " Portable  Narrative: EXAMINATION:  XR CHEST AP PORTABLE    CLINICAL HISTORY:  Severe CoPD, hypoxia;    TECHNIQUE:  Portable view of the chest was performed.    COMPARISON:  12/28/2023.    FINDINGS:  Pulmonary hyperinflation consistent with COPD.  Mild chronic interstitial change.  No focal consolidation.  Heart size normal.  Bony thorax intact with demineralization.  Impression: COPD.    Electronically signed by: Mathieu Thakur  Date:    01/02/2024  Time:    07:12      Data reviewed { BRANDI ALL REVIEWED          :[id number]}  Previous medical records reviewed and summarized in HPI.   Health Maintenance Due   Topic Date Due    Pneumococcal Vaccines (Age 0-64) (2 - PCV) 12/18/2021    Shingles Vaccine (2 of 2) 08/10/2022    COVID-19 Vaccine (4 - 2023-24 season) 09/01/2023    RSV Vaccine (Age 60+ and Pregnant patients) (1 - 1-dose 60+ series) Never done         Laboratory  { Labs Micro GFR   :[id number]}274}  I have reviewed old labs below:  Lab Results   Component Value Date    WBC 5.95 01/03/2024    HGB 12.2 01/03/2024    HCT 33.8 (L) 01/03/2024    MCV 92 01/03/2024     01/03/2024     01/02/2024    K 3.3 (L) 01/02/2024     01/02/2024    CALCIUM 8.0 (L) 01/02/2024    PHOS 2.9 01/03/2024    CO2 29 01/02/2024    GLU 90 01/02/2024    BUN 10 01/02/2024    CREATININE 0.6 01/02/2024    ANIONGAP 11 01/02/2024    ESTGFRAFRICA >60.0 06/16/2022    EGFRNONAA >60.0 06/16/2022    PROT 5.3 (L) 01/02/2024    ALBUMIN 2.7 (L) 01/02/2024    BILITOT 0.3 01/02/2024    ALKPHOS 55 01/02/2024    ALT 17 01/02/2024    AST 16 01/02/2024    INR 0.9 11/30/2021    CHOL 184 03/18/2021    TRIG 149 03/18/2021    HDL 60 03/18/2021    LDLCALC 94.2 03/18/2021    TSH 0.292 (L) 02/08/2022    HGBA1C 5.6 03/18/2021     Lab reviewed by me: Particular labs of significance that I will monitor, workup, or treat to improve are mentioned below in diagnostic impression remarks.  :[id number]}274}  Imaging/EKG: I have reviewed the pertinent results/findings  "and my personal findings are noted below in diagnostic impression remarks.  {    Imaging    EKG    Echo  :[id number]}274}    Assessment/Plan  Zayra White is a 58 y.o. female who presents to clinic with:    No diagnosis found.    Diagnostic Impression Remarks + HPI     Documentation entered by me for this encounter may have been done in part using speech-recognition technology. Although I have made an effort to ensure accuracy, "sound like" errors may exist and should be interpreted in context.         This is the extent of the patient's complaints at this present time. She denies chest pain upon exertion, dyspnea, nausea, vomiting, diaphoresis, and syncope. No pleuritic chest pain, unilateral leg swelling, calf tenderness, or calf pain.     Zayra will return to clinic in a few months for further workup and reassessment or sooner as needed. She was instructed to call the clinic or go to the emergency department if her symptoms do not improve, worsens, or if new symptoms develop. As we discussed that symptoms could worsen over the next 24 hours she was advised that if any increased swelling, pain, or numbness arise to go immediately to the ED. Patient knows to call any time if an emergency arises. Shared decision making occurred and she verbalized understanding in agreement with this plan.     {   Plan+     Wrap Up + Instructions     Pt Instructions   SmartSets   Send Letter          BRANDI ALL REVIEWED       MAR               :[id number]}    BMI Goal    Counseled patient on her ideal body weight, health consequences of being obese and current recommendations including weekly exercise and a heart healthy diet.  She is aware that ideal BMI < 25  Estimated body mass index is 18.34 kg/m² as calculated from the following:    Height as of 12/28/23: 5' 5" (1.651 m).    Weight as of 1/2/24: 50 kg (110 lb 3.7 oz).     She was counseled about the importance of healthy dietary habits as well as routine physical activity " and exercise for better health outcomes. I also discussed the importance of cancer screening.     Medication Monitoring    In today's visit, monitoring for drug toxicity was accomplished. Proper use of medications was also discussed.     I discussed imaging findings, diagnosis, possibilities, treatment options, medications, risks, and benefits. She had many questions regarding the options and long-term effects. All questions were answered. She expressed understanding after counseling regarding the diagnosis and recommendations. She was capable and demonstrated competence with understanding of these options. Shared decision making was performed resulting in her choosing the current treatment plan. Patient handout was given with instructions and recommendations. Advised the patient that if they become pregnant to alert us immediately to assess for medication changes.     I also discussed the importance of close follow up to discuss labs, change or modify her medications if needed, monitor side effects, and further evaluation of medical problems.     Additional workup planned: see labs ordered below.    See below for labs and meds ordered with associated diagnosis      There are no diagnoses linked to this encounter.  Medication List with Changes/Refills   Current Medications    ALBUTEROL (VENTOLIN HFA) 90 MCG/ACTUATION INHALER    inhale 2 puff by inhalation route  every 4 - 6 hours as needed    ALBUTEROL-IPRATROPIUM (DUO-NEB) 2.5 MG-0.5 MG/3 ML NEBULIZER SOLUTION    Take 3 mLs by nebulization every 6 (six) hours as needed for Wheezing or Shortness of Breath. Rescue    ASPIRIN 81 MG CHEW    81 mg.    ATENOLOL (TENORMIN) 25 MG TABLET    TAKE 1/2 TABLET BY MOUTH EVERY DAY **THANK YOU**    ATORVASTATIN (LIPITOR) 20 MG TABLET    Take 1 tablet (20 mg total) by mouth once daily.    BENZONATATE (TESSALON) 100 MG CAPSULE    Take 1 capsule (100 mg total) by mouth 3 (three) times daily as needed for Cough.    CHOLECALCIFEROL,  VITAMIN D3, (VITAMIN D3) 25 MCG (1,000 UNIT) CAPSULE    Take 2 capsules (2,000 Units total) by mouth once daily.    CLONAZEPAM (KLONOPIN) 0.5 MG TABLET    Take ONE tablet (0.5 mg total) by mouth daily as needed for Anxiety **THANK YOU**    DULOXETINE (CYMBALTA) 20 MG CAPSULE    Take 2 capsules po daily    FLUTICASONE-UMECLIDIN-VILANTER (TRELEGY ELLIPTA) 200-62.5-25 MCG INHALER    Inhale 1 puff into the lungs once daily.    LATANOPROST 0.005 % OPHTHALMIC SOLUTION    Place 1 drop into both eyes every evening.    LEVOFLOXACIN (LEVAQUIN) 500 MG TABLET    Take 1 tablet (500 mg total) by mouth once daily.    MONTELUKAST (SINGULAIR) 10 MG TABLET    Take 1 tablet (10 mg total) by mouth every evening.    ONDANSETRON (ZOFRAN-ODT) 4 MG TBDL    Take 1 tablet (4 mg total) by mouth every 8 (eight) hours as needed.    PANTOPRAZOLE (PROTONIX) 40 MG TABLET    Take 1 tablet (40 mg total) by mouth once daily. Take in the morning before breakfast.  Wait 30 minutes before eating or drinking anything    PREDNISONE (DELTASONE) 5 MG TABLET    Take 1 tablet (5 mg total) by mouth once daily.    TIZANIDINE (ZANAFLEX) 4 MG TABLET    Take 1 tablet (4 mg total) by mouth every 8 (eight) hours as needed.     Modified Medications    No medications on file       Polly Lemos MD  {[facility]    My Last Note    Chart Review     :[id number]}    01/09/2024     If you are due for any health screening(s) below please notify me so we can arrange them to be ordered and scheduled to maintain your health.     Health Maintenance Due   Topic Date Due    Pneumococcal Vaccines (Age 0-64) (2 - PCV) 12/18/2021    Shingles Vaccine (2 of 2) 08/10/2022    COVID-19 Vaccine (4 - 2023-24 season) 09/01/2023    RSV Vaccine (Age 60+ and Pregnant patients) (1 - 1-dose 60+ series) Never done

## 2024-01-10 ENCOUNTER — PATIENT MESSAGE (OUTPATIENT)
Dept: PULMONOLOGY | Facility: CLINIC | Age: 59
End: 2024-01-10
Payer: MEDICAID

## 2024-01-12 LAB
LEGIONELLA CULTURE STATUS: NORMAL
LEGIONELLA SPEC CULT: NORMAL
SPECIMEN SOURCE: NORMAL

## 2024-01-23 ENCOUNTER — PATIENT MESSAGE (OUTPATIENT)
Dept: PULMONOLOGY | Facility: CLINIC | Age: 59
End: 2024-01-23
Payer: MEDICAID

## 2024-01-23 DIAGNOSIS — J44.9 CHRONIC OBSTRUCTIVE PULMONARY DISEASE, UNSPECIFIED COPD TYPE: ICD-10-CM

## 2024-01-24 RX ORDER — FLUTICASONE FUROATE, UMECLIDINIUM BROMIDE AND VILANTEROL TRIFENATATE 200; 62.5; 25 UG/1; UG/1; UG/1
1 POWDER RESPIRATORY (INHALATION) DAILY
Qty: 60 EACH | Refills: 11 | Status: SHIPPED | OUTPATIENT
Start: 2024-01-24

## 2024-03-04 ENCOUNTER — TELEPHONE (OUTPATIENT)
Dept: FAMILY MEDICINE | Facility: CLINIC | Age: 59
End: 2024-03-04
Payer: MEDICAID

## 2024-03-04 NOTE — TELEPHONE ENCOUNTER
----- Message from Dominga Obrien sent at 3/4/2024  7:35 AM CST -----  Regarding: Needs return call  Type: Needs Medical Advice  Who Called:  DaughterJames Wilkerson    Best Call Back Number: [phone]  Additional Information: They were calling to see if her mom can move her appt to tomorrow or next Tuesday by chance. That is the only day off they have and they are the only ones who can bring her to her appointments, please anupama

## 2024-03-05 ENCOUNTER — OFFICE VISIT (OUTPATIENT)
Dept: FAMILY MEDICINE | Facility: CLINIC | Age: 59
End: 2024-03-05
Payer: MEDICAID

## 2024-03-05 VITALS
SYSTOLIC BLOOD PRESSURE: 132 MMHG | DIASTOLIC BLOOD PRESSURE: 64 MMHG | HEIGHT: 65 IN | WEIGHT: 101.38 LBS | OXYGEN SATURATION: 94 % | HEART RATE: 65 BPM | BODY MASS INDEX: 16.89 KG/M2

## 2024-03-05 DIAGNOSIS — J30.1 ALLERGIC RHINITIS DUE TO POLLEN, UNSPECIFIED SEASONALITY: ICD-10-CM

## 2024-03-05 DIAGNOSIS — Z79.899 CHRONIC PRESCRIPTION BENZODIAZEPINE USE: ICD-10-CM

## 2024-03-05 DIAGNOSIS — R11.0 NAUSEA: ICD-10-CM

## 2024-03-05 DIAGNOSIS — R63.4 WEIGHT LOSS: ICD-10-CM

## 2024-03-05 DIAGNOSIS — J44.9 STEROID-DEPENDENT COPD: ICD-10-CM

## 2024-03-05 DIAGNOSIS — F41.9 ANXIETY: ICD-10-CM

## 2024-03-05 DIAGNOSIS — Z92.241 STEROID-DEPENDENT COPD: ICD-10-CM

## 2024-03-05 DIAGNOSIS — J44.9 CHRONIC OBSTRUCTIVE PULMONARY DISEASE, UNSPECIFIED COPD TYPE: ICD-10-CM

## 2024-03-05 DIAGNOSIS — I10 HYPERTENSION, UNSPECIFIED TYPE: ICD-10-CM

## 2024-03-05 DIAGNOSIS — R45.89 ANXIETY ABOUT HEALTH: Primary | ICD-10-CM

## 2024-03-05 PROCEDURE — 99213 OFFICE O/P EST LOW 20 MIN: CPT | Mod: PBBFAC | Performed by: FAMILY MEDICINE

## 2024-03-05 PROCEDURE — 99999 PR PBB SHADOW E&M-EST. PATIENT-LVL III: CPT | Mod: PBBFAC,,, | Performed by: FAMILY MEDICINE

## 2024-03-05 PROCEDURE — 3008F BODY MASS INDEX DOCD: CPT | Mod: CPTII,,, | Performed by: FAMILY MEDICINE

## 2024-03-05 PROCEDURE — 80347 BENZODIAZEPINES 13 OR MORE: CPT | Performed by: FAMILY MEDICINE

## 2024-03-05 PROCEDURE — 80326 AMPHETAMINES 5 OR MORE: CPT | Performed by: FAMILY MEDICINE

## 2024-03-05 PROCEDURE — 3078F DIAST BP <80 MM HG: CPT | Mod: CPTII,,, | Performed by: FAMILY MEDICINE

## 2024-03-05 PROCEDURE — 99214 OFFICE O/P EST MOD 30 MIN: CPT | Mod: S$PBB,,, | Performed by: FAMILY MEDICINE

## 2024-03-05 PROCEDURE — 3075F SYST BP GE 130 - 139MM HG: CPT | Mod: CPTII,,, | Performed by: FAMILY MEDICINE

## 2024-03-05 RX ORDER — PREDNISONE 5 MG/1
5 TABLET ORAL DAILY
Qty: 90 TABLET | Refills: 0 | Status: ON HOLD | OUTPATIENT
Start: 2024-03-05 | End: 2024-04-04

## 2024-03-05 RX ORDER — CYPROHEPTADINE HYDROCHLORIDE 4 MG/1
4 TABLET ORAL 3 TIMES DAILY PRN
Qty: 90 TABLET | Refills: 5 | Status: SHIPPED | OUTPATIENT
Start: 2024-03-05 | End: 2024-05-03

## 2024-03-05 RX ORDER — ATENOLOL 25 MG/1
TABLET ORAL
Qty: 45 TABLET | Refills: 3 | Status: SHIPPED | OUTPATIENT
Start: 2024-03-05

## 2024-03-05 RX ORDER — CLONAZEPAM 0.5 MG/1
TABLET ORAL
Qty: 30 TABLET | Refills: 2 | Status: SHIPPED | OUTPATIENT
Start: 2024-03-05 | End: 2024-03-28

## 2024-03-05 RX ORDER — ONDANSETRON 4 MG/1
4 TABLET, ORALLY DISINTEGRATING ORAL EVERY 12 HOURS PRN
Qty: 30 TABLET | Refills: 2 | Status: SHIPPED | OUTPATIENT
Start: 2024-03-05

## 2024-03-05 NOTE — PROGRESS NOTES
Subjective:       Patient ID: Zayra White is a 58 y.o. female.    Chief Complaint: Follow-up and Medication Refill    Ms. Austin is a 58-year-old female with COPD, anxiety, hyperlipidemia, lung nodules on CT, nicotine dependence, chronic atelectasis bilaterally and benzodiazepine dependence.  She is here for her routine three-month follow up to get her medication. She is complaining of allergic rhinitis and neck issues. She does see the chiropractor for her neck issue but is requesting an antihistamine for her allergies. She has lost approx 8# int he past month.     Follow-up  Associated symptoms include congestion. Pertinent negatives include no abdominal pain, chest pain or nausea.   Medication Refill  Associated symptoms include congestion. Pertinent negatives include no abdominal pain, chest pain or nausea.     Review of Systems   HENT:  Positive for congestion, postnasal drip and rhinorrhea.    Respiratory:  Positive for shortness of breath.         On Continuous O2, but only uses it as needed (usually when ambulating)   Cardiovascular:  Negative for chest pain and leg swelling.   Gastrointestinal:  Negative for abdominal pain and nausea.   Genitourinary:  Negative for dysuria.       Patient Active Problem List   Diagnosis    Chronic obstructive pulmonary disease    Anxiety about health    Left inguinal pain    Rectus sheath hematoma    Hyperlipidemia    Multiple lung nodules on CT    Current chronic use of systemic steroids    Chronic sinus complaints    Personal history of nicotine dependence    Atelectasis of both lungs    Acute respiratory failure with hypoxia and hypercarbia       Objective:      Physical Exam  Vitals and nursing note reviewed.   Constitutional:       Appearance: Normal appearance. She is normal weight.   HENT:      Head: Normocephalic.      Nose: Nose normal.   Eyes:      Extraocular Movements: Extraocular movements intact.      Conjunctiva/sclera: Conjunctivae normal.      Pupils:  "Pupils are equal, round, and reactive to light.   Cardiovascular:      Rate and Rhythm: Normal rate and regular rhythm.      Heart sounds: Normal heart sounds. No murmur heard.  Pulmonary:      Effort: Pulmonary effort is normal.      Breath sounds: No wheezing or rhonchi.      Comments: Decreased BS secondary to COPD. No wheezing no rhonchi, on continuous O2  Musculoskeletal:      Cervical back: Normal range of motion and neck supple.   Skin:     General: Skin is warm and dry.   Neurological:      General: No focal deficit present.      Mental Status: She is alert and oriented to person, place, and time.   Psychiatric:         Mood and Affect: Mood normal.         Behavior: Behavior normal.         Lab Results   Component Value Date    WBC 5.95 01/03/2024    HGB 12.2 01/03/2024    HCT 33.8 (L) 01/03/2024     01/03/2024    CHOL 184 03/18/2021    TRIG 149 03/18/2021    HDL 60 03/18/2021    ALT 17 01/02/2024    AST 16 01/02/2024     01/02/2024    K 3.3 (L) 01/02/2024     01/02/2024    CREATININE 0.6 01/02/2024    BUN 10 01/02/2024    CO2 29 01/02/2024    TSH 0.292 (L) 02/08/2022    INR 0.9 11/30/2021    HGBA1C 5.6 03/18/2021     The 10-year ASCVD risk score (Bjorn CADET, et al., 2019) is: 3.1%    Values used to calculate the score:      Age: 58 years      Sex: Female      Is Non- : No      Diabetic: No      Tobacco smoker: No      Systolic Blood Pressure: 132 mmHg      Is BP treated: No      HDL Cholesterol: 60 mg/dL      Total Cholesterol: 184 mg/dL  Visit Vitals  /64 (BP Location: Left arm, Patient Position: Sitting, BP Method: Medium (Manual))   Pulse 65   Ht 5' 5" (1.651 m)   Wt 46 kg (101 lb 6.4 oz)   LMP 09/22/2017   SpO2 (!) 94%   BMI 16.87 kg/m²      Assessment:       1. Anxiety about health    2. Chronic prescription benzodiazepine use    3. Anxiety    4. Nausea    5. Chronic obstructive pulmonary disease, unspecified COPD type    6. Steroid-dependent COPD    7. " Hypertension, unspecified type    8. Allergic rhinitis due to pollen, unspecified seasonality    9. Weight loss        Plan:       1. Anxiety about health  -     clonazePAM (KLONOPIN) 0.5 MG tablet; Take ONE tablet (0.5 mg total) by mouth daily as needed for Anxiety **THANK YOU**  Dispense: 30 tablet; Refill: 2  -     [facility]    2. Chronic prescription benzodiazepine use  -     [facility] Drug Screen    3. Anxiety  -     clonazePAM (KLONOPIN) 0.5 MG tablet; Take ONE tablet (0.5 mg total) by mouth daily as needed for Anxiety **THANK YOU**  Dispense: 30 tablet; Refill: 2    4. Nausea  -     ondansetron (ZOFRAN-ODT) 4 MG TbDL; Take 1 tablet (4 mg total) by mouth every 12 (twelve) hours as needed (Nausea).  Dispense: 30 tablet; Refill: 2    5. Chronic obstructive pulmonary disease, unspecified COPD type  -     predniSONE (DELTASONE) 5 MG tablet; Take 1 tablet (5 mg total) by mouth once daily.  Dispense: 90 tablet; Refill: 0    6. Steroid-dependent COPD  -     predniSONE (DELTASONE) 5 MG tablet; Take 1 tablet (5 mg total) by mouth once daily.  Dispense: 90 tablet; Refill: 0    7. Hypertension, unspecified type  -     atenoloL (TENORMIN) 25 MG tablet; TAKE 1/2 TABLET BY MOUTH EVERY DAY **THANK YOU**  Dispense: 45 tablet; Refill: 3    8. Allergic rhinitis due to pollen, unspecified seasonality    9. Weight loss  -     cyproheptadine (PERIACTIN) 4 mg tablet; Take 1 tablet (4 mg total) by mouth 3 (three) times daily as needed (appetitie /allergies).  Dispense: 90 tablet; Refill: 5       Follow up in about 4 weeks (around 4/2/2024), or if symptoms worsen or fail to improve.      Future Appointments       Date Provider Specialty Appt Notes    3/21/2024  Radiology Madhu    3/25/2024 Madhu, Chloe, NP Pulmonology f/u CT

## 2024-03-06 ENCOUNTER — TELEPHONE (OUTPATIENT)
Dept: PULMONOLOGY | Facility: CLINIC | Age: 59
End: 2024-03-06
Payer: MEDICAID

## 2024-03-06 NOTE — TELEPHONE ENCOUNTER
Approved on March 5  Request Reference Number: [id number]. [facility] [place]05/[place]. For further questions, call [facility] at [phone].  Authorization Expiration Date: 3/5/2025

## 2024-03-10 LAB
6MAM UR QL: NOT DETECTED
7AMINOCLONAZEPAM UR QL: PRESENT
A-OH ALPRAZ UR QL: NOT DETECTED
ALPHA-OH-MIDAZOLAM: NOT DETECTED
ALPRAZ UR QL: NOT DETECTED
AMPHET UR QL SCN: NOT DETECTED
ANNOTATION COMMENT IMP: NORMAL
BARBITURATES UR QL: NEGATIVE
BUPRENORPHINE UR QL: NOT DETECTED
BZE UR QL: NEGATIVE
CARBOXYTHC UR QL: NEGATIVE
CARISOPRODOL UR QL: NEGATIVE
CLONAZEPAM UR QL: NOT DETECTED
CODEINE UR QL: NOT DETECTED
CREAT UR-MCNC: 88.5 MG/DL (ref 20–400)
DIAZEPAM UR QL: NOT DETECTED
ETHYL GLUCURONIDE UR QL: NEGATIVE
FENTANYL UR QL: NOT DETECTED
GABAPENTIN: NOT DETECTED
HYDROCODONE UR QL: NOT DETECTED
HYDROMORPHONE UR QL: NOT DETECTED
LORAZEPAM UR QL: NOT DETECTED
MDA UR QL: NOT DETECTED
MDEA UR QL: NOT DETECTED
MDMA UR QL: NOT DETECTED
ME-PHENIDATE UR QL: NOT DETECTED
METHADONE UR QL: NEGATIVE
METHAMPHET UR QL: NOT DETECTED
MIDAZOLAM UR QL SCN: NOT DETECTED
MORPHINE UR QL: NOT DETECTED
NALOXONE: NOT DETECTED
NORBUPRENORPHINE UR QL CFM: NOT DETECTED
NORDIAZEPAM UR QL: NOT DETECTED
NORFENTANYL UR QL: NOT DETECTED
NORHYDROCODONE UR QL CFM: NOT DETECTED
NORMEPERIDINE UR QL CFM: NOT DETECTED
NOROXYCODONE UR QL CFM: NOT DETECTED
NOROXYMORPHONE UR QL SCN: NOT DETECTED
OXAZEPAM UR QL: NOT DETECTED
OXYCODONE UR QL: NOT DETECTED
OXYMORPHONE UR QL: NOT DETECTED
PATHOLOGY STUDY: NORMAL
PCP UR QL: NEGATIVE
PHENTERMINE UR QL: NOT DETECTED
PREGABALIN: NOT DETECTED
SERVICE CMNT-IMP: NORMAL
TAPENTADOL UR QL SCN: NOT DETECTED
TAPENTADOL UR QL SCN: NOT DETECTED
TEMAZEPAM UR QL: NOT DETECTED
TRAMADOL UR QL: NEGATIVE
ZOLPIDEM METABOLITE: NOT DETECTED
ZOLPIDEM UR QL: NOT DETECTED

## 2024-03-18 ENCOUNTER — PATIENT MESSAGE (OUTPATIENT)
Dept: FAMILY MEDICINE | Facility: CLINIC | Age: 59
End: 2024-03-18
Payer: MEDICAID

## 2024-03-20 ENCOUNTER — TELEPHONE (OUTPATIENT)
Dept: PULMONOLOGY | Facility: CLINIC | Age: 59
End: 2024-03-20
Payer: MEDICAID

## 2024-03-21 ENCOUNTER — HOSPITAL ENCOUNTER (OUTPATIENT)
Dept: RADIOLOGY | Facility: HOSPITAL | Age: 59
Discharge: HOME | End: 2024-03-21
Attending: NURSE PRACTITIONER
Payer: MEDICAID

## 2024-03-21 DIAGNOSIS — R91.8 MULTIPLE LUNG NODULES ON CT: ICD-10-CM

## 2024-03-21 PROCEDURE — 71250 CT THORAX DX C-: CPT | Mod: 26,,, | Performed by: RADIOLOGY

## 2024-03-21 PROCEDURE — 71250 CT THORAX DX C-: CPT | Mod: TC

## 2024-03-25 ENCOUNTER — OFFICE VISIT (OUTPATIENT)
Dept: PULMONOLOGY | Facility: CLINIC | Age: 59
End: 2024-03-25
Payer: MEDICAID

## 2024-03-25 VITALS
HEIGHT: 65 IN | HEART RATE: 92 BPM | OXYGEN SATURATION: 88 % | DIASTOLIC BLOOD PRESSURE: 93 MMHG | SYSTOLIC BLOOD PRESSURE: 176 MMHG | WEIGHT: 104.81 LBS | BODY MASS INDEX: 17.46 KG/M2

## 2024-03-25 DIAGNOSIS — R91.8 MULTIPLE LUNG NODULES ON CT: ICD-10-CM

## 2024-03-25 DIAGNOSIS — J96.11 CHRONIC RESPIRATORY FAILURE WITH HYPOXIA AND HYPERCAPNIA: ICD-10-CM

## 2024-03-25 DIAGNOSIS — R09.89 CHRONIC SINUS COMPLAINTS: ICD-10-CM

## 2024-03-25 DIAGNOSIS — F41.9 ANXIETY: ICD-10-CM

## 2024-03-25 DIAGNOSIS — Z92.241 STEROID-DEPENDENT COPD: Primary | ICD-10-CM

## 2024-03-25 DIAGNOSIS — J44.9 STEROID-DEPENDENT COPD: Primary | ICD-10-CM

## 2024-03-25 DIAGNOSIS — Z87.891 PERSONAL HISTORY OF NICOTINE DEPENDENCE: ICD-10-CM

## 2024-03-25 DIAGNOSIS — J44.9 STAGE 4 VERY SEVERE COPD BY GOLD CLASSIFICATION: ICD-10-CM

## 2024-03-25 DIAGNOSIS — J98.11 ATELECTASIS OF BOTH LUNGS: ICD-10-CM

## 2024-03-25 DIAGNOSIS — J96.12 CHRONIC RESPIRATORY FAILURE WITH HYPOXIA AND HYPERCAPNIA: ICD-10-CM

## 2024-03-25 DIAGNOSIS — G47.33 OSA (OBSTRUCTIVE SLEEP APNEA): ICD-10-CM

## 2024-03-25 DIAGNOSIS — Z79.52 CURRENT CHRONIC USE OF SYSTEMIC STEROIDS: ICD-10-CM

## 2024-03-25 PROCEDURE — 99999 PR PBB SHADOW E&M-EST. PATIENT-LVL IV: CPT | Mod: PBBFAC,,, | Performed by: NURSE PRACTITIONER

## 2024-03-25 PROCEDURE — 1159F MED LIST DOCD IN RCRD: CPT | Mod: CPTII,,, | Performed by: NURSE PRACTITIONER

## 2024-03-25 PROCEDURE — 99214 OFFICE O/P EST MOD 30 MIN: CPT | Mod: S$PBB,,, | Performed by: NURSE PRACTITIONER

## 2024-03-25 PROCEDURE — 3008F BODY MASS INDEX DOCD: CPT | Mod: CPTII,,, | Performed by: NURSE PRACTITIONER

## 2024-03-25 PROCEDURE — 99214 OFFICE O/P EST MOD 30 MIN: CPT | Mod: PBBFAC,PO | Performed by: NURSE PRACTITIONER

## 2024-03-25 PROCEDURE — 3080F DIAST BP >= 90 MM HG: CPT | Mod: CPTII,,, | Performed by: NURSE PRACTITIONER

## 2024-03-25 PROCEDURE — 3077F SYST BP >= 140 MM HG: CPT | Mod: CPTII,,, | Performed by: NURSE PRACTITIONER

## 2024-03-25 NOTE — PROGRESS NOTES
3/25/2024    Zayra White  In office visit    Chief Complaint   Patient presents with    Follow up CT       HPI:  03/25/2024: Hx: COPD, Chronic Resp Failure on NIV nightly, Former smoker, Lung nodules   Using Trelegy once per day and Albuterol as needed, approx use 3 times per day as of late. Using Duonebs as needed as well - started back recently with benefit.  Using supplemental oxygen only as needed throughout the day at 1-2L via NC. Using NIV nightly.  Taking Prednisone 5 mg per day. States at times she feels like she could taper up for a day or so but has not tapered up since prior office visit.  Was experiencing unintentional weight loss - started on PERIACTIN at the beginning of March - has gained approx 4# since starting.  Underwent repeat CT Chest recently revealing atelectasis to DHAVAL nodule that was previously noted. No new or enlarging nodules noted.  HTN noted in clinic today - /93 - states she is compliant with antihypertensives - thinks may be secondary to anxiety.         12/18/2023: Hx: COPD, Chronic Resp Failure on NIV nightly, Former smoker, Lung nodules   Last week had episodes of shortness of breath with recent weather change. States she almost felt as if she could have used a visit to the ER.   Using Trelegy once per day and Albuterol as needed, approx use 6 times per day as of late. Using Duonebs as needed as well.   Taking Prednisone 5 mg per day. States at times she feels like she could taper up for a day or so.  Denies current mucous production - did not submit sputum samples. States if she does get mucous up, it's clear in color.  Using supplemental oxygen only as needed throughout the day at 1-2L via NC. Using NIV nightly.  Stopped vaping since prior office appointment.   Patient Instructions   Continue to use current inhalers including Trelegy once per day and Albuterol as needed.  Continue to use the supplemental oxygen.  Continue to use the NIV machine nightly.  CT Chest  from November 2023 reviewed - Will discuss with MD further. MAC vs other infectious etiology? You currently do not bring up mucous - may need bronchoscopy? In the least, will plan for repeat CT in 3 months (Due in March 2024).  Continue 5 mg prednisone per day. Can trial up to 20 mg per day only as needed then go back to 5 mg.  Need to get DEXA bone scan.  Nodifylung blood testing unrevealing.  Consider Dupixent in the future.  Continue current medication regiment. Keep follow up appointment as scheduled. Please call the office if you have any questions or concerns.   *Discussed with J[facility] on 12/19/2023 - CT Chest reviewed - new nodule 9 mm to DHAVAL - not present on CT from August 2023 - recommend repeat CT in three months for follow up. Discussed with patient via telephone on 12/19/2023 at 1603. She VU.       12/05/2023: Documentation Only:   Talked with patient on 12/05/2023 at approx 0930 regarding recent CT Chest - Thi Jason NP discussed findings with Dr. 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 Patient states she does not bring up sputum on a regular basis - states she is not currently bringing up sputum but may start later today. States she is going to her PCP today, will  sputum cups from their office. I ordered Resp culture, AFB order already in [facility] [facility]. Patient is to message me in the next two weeks if she is unable to submit sputum samples and if not, will discuss bronchoscopy further.  Patient verbalizes understanding and acceptance of plan of care. Repeat CT in 3 months anticipated (Due in March 2024).    Using supplemental oxygen only as needed throughout the day at 1-2L via NC. Using NIV nightly.        10/17/2023:  Using supplemental oxygen only as needed throughout the day - mostly when humidity levels are elevated - using at 2-3L via NC. Bleeding in supplemental oxygen into NIV nightly.  Using NIV nightly with much reported benefit.   Seen in [facility] on 7/27 for shortness of breath, diagnosed with  COPD exacerbation - completed regimen of Levaquin and Prednisone 60 mg x 4 days.   Using Trelegy once per day and Albuterol only as needed, states depends on the day, when having a bad day may use 6x per day. Using nebulized treatments 1-2x per day.  Using Prednisone 5 mg per day.   Underwent CT Chest in Aug 2023 - states at that time may have had a cold/chest congestion - new nodules are noted throughout - recommend repeat CT in 3 months.  States did not undergo PFT - is having trouble scheduling due to insurance reasons.  Patient Instructions   Reach out to [facility] in regards to malfunctioning POC machine.  Continue to use current inhalers including Trelegy once per day and Albuterol as needed.  Continue to use the supplemental oxygen.  Continue to use the NIV machine nightly.  CT Chest due in November 2023. The one you had done in August shows a few new nodules - this could be scarring vs infectious as you endorse you were sick at that time. Can check Quantiferon Gold blood test.  Still would like to get PFT - will see if my staff can help facilitate scheduling.  Continue 5 mg prednisone per day.  Recommend stop using vape.  Nodifylung blood testing unrevealing.  Continue current medication regiment. Keep follow up appointment as scheduled. Please call the office if you have any questions or concerns.           07/07/2023:  Has NIV machine - using nightly without issue. Recently got Life 2000 portable vent for daytime use, states she couldn't tolerate - Life 2000 was recalled? States she is waiting for the DME company to come  the machine.  Using supplemental oxygen PRN throughout the day - at 2L via NC. Bleeding in supplemental oxygen into NIV nightly.  Using Trelegy once per day and Albuterol only as needed, states depends on the day, when having a bad day may use 6x per day. States has worse days when humidity, heat are high.  Using Prednisone 5 mg daily with benefit.   Is scheduled to have CT in  August 2023.  Stopped smoking - now vaping with nicotine.   Patient Instructions   Overall doing well!  Continue to use current inhalers including Trelegy once per day and Albuterol as needed.  Continue to use the supplemental oxygen.  Continue to use the NIV machine nightly.  CT Chest due in August 2023.  Can check PFT later in the year and see if lung strength improved. Will plan on PFT the same day as your next appt with me.   Continue 5 mg prednisone per day.  Recommend stop using vape.  Continue current medication regiment. Keep follow up appointment as scheduled. Please call the office if you have any questions or concerns.         03/13/2023:  Doing well overall from a resp perspective. No recent hospitalizations since prior office visit. Using Trelegy once per day and Albuterol only as needed, states depends on the day, when having a bad day may use 6x per day. Using supplemental oxygen PRN throughout the day - at 2L via NC and at night time in correlation with NIV machine. States tolerating NIV overall however does experience some difficulty with mask. [facility] is looking forward to being more active throughout the day, traveling more. Inquiring whether portable NIV would be beneficial for daytime use.  Using Prednisone 5 mg daily with benefit. Not currently smoking however using vape with nicotine.   Patient Instructions   Overall doing well!  Continue to use current inhalers including Trelegy once per day and Albuterol as needed.  Continue to use the supplemental oxygen.  Continue to use the NIV machine nightly.  May benefit from portable NIV machine to be used throughout the day. Order placed, will see if insurance approves.  CT Chest due in August 2023.  Can check PFT later in the year and see if lung strength improved.  Continue 5 mg prednisone per day.  Continue current medication regiment. Keep follow up appointment as scheduled. Please call the office if you have any questions or concerns.  "        12/13/2022:  Using Trelegy once per day with benefit, Albuterol nebulized treatments every 6 hours as needed.  Using NIV machine for four hours per day - states ever since starting use has been feeling better. Still using supplemental oxygen, has decreased to only PRN use - not wearing O2 in clinic today and in no acute distress.  Has been on Prednisone 10 mg per day with great benefit, agreeable to decrease to 5 mg.   Not currently smoking - cravings under control, [facility] is concerned with [place] upcoming that cravings will increase.  Interested in lung transplant program - [facility] was told to call back once quit smoking for 2 consecutive months.  Patient Instructions   Continue current regiment - Trelegy once per day and Albuterol as needed for shortness of breath, wheezing, cough.  Prednisone - start alternating 10 mg and 5 mg every other day. Do this for two weeks and see who you tolerate. If you have no issues, can go down to 5 mg per day.   Continue NIV use, Continue supplemental oxygen.  Doing well from smoking cessation standpoint.  Alpha 1 test today in office.  CT Chest due Aug 2023  Continue current medication regiment. Keep follow up appointment as scheduled. Please call the office if you have any questions or concerns.         10/13/2022:  Per prior message on 9/29/2022:  "Patient messaged stating she is experiencing shortness of breath, worsening since initiation of NIV use.  I called the patient and discussed the case with her on 09/29/2022  at 0940.  Patient states she received her NIV machine approximately 2 weeks ago, has been using daily for at least 4 hours per day.  States she can tolerate the pressure well while using - with initially experiencing headaches with use, respiratory therapist changed pressures.  Patient states shortness of breath is worsening from baseline, cannot walk from living room to kitchen without getting short-winded, patient states she lives in a camper " "currently.  Patient using supplemental oxygen throughout the day and at night as needed.  On Trelegy once per day.  Using nebulized treatments every 4-6 hours.  Patient currently taking prednisone regimen that was prescribed at prior visit, 6 days total -states she has 2 days left.  Reports benefit with prednisone use.  Patient denies mucus production.  Patient will proceed with getting chest x-ray at this time.  May need repeat/prolonged prednisone taper.  Patient states she spoke with lung transplant service, however a consultation appointment was not scheduled due to current smoking status.  Patient states she is currently smoking 1-2 cigarettes per day."  Patient required ER visit on 10/8/2022 for COPD exacerabtion, was discharged with Rx for Azithromycin and 9 day regiment of Prednisone. Completed Zpack and currently has 3 days left of Prednisone.   States overall is having a good day, however yesterday was bad. States she was exposed to several sick contacts and developed bronchitis.   Currently using supplemental oxygen at night time and as needed throughout the day, dose ranges from 1-3lpm.   Using NIV machine nightly, states bleeds o2 in at 2L. Concerned that NIV is causing illness.  States hasn't smoked cigarettes in 2 days now.  Using Trelegy inhaler once per day, albuterol as needed approximately 2x per day. Taking neb treatments every 4-6 hours. States with Albuterol use she experiences hand shakiness, heart palpitations, jitters, and nervousness.   Patient Instructions   Continue Prednisone taper from ER until you finish. Then recommend going to daily Prednisone, 5-10 mg per day based on how you feel.   Continue Trelegy once per day and Albuterol as needed. Have ordered Xopenex for you to use in nebulizer machine since adverse reactions reported to Albuterol.  Continue supplemental oxygen use. Continue NIV use.  Doing well from smoking standpoint, continue smoking cessation.  Continue current " medication regiment. Keep follow up appointment as scheduled. Please call the office if you have any questions or concerns.         8/16/2022:  Patient did not keep follow up - I saw patient last in Feb 2022.   Patient required hospitalization for SIRS, COPD at [facility] on 8/7 - was subsequently discharged home on 8/10 (no discharge summary available in Epic). Treated with IV Abx and Bipap therapy while inpatient. States she was discharged home with Rx for steroid and abx, in which she completed both regiments. Patient has had THREE hospitalizations for COPD exacerbations in 2022 alone.  Still using Trelegy, one puff once per day with reported benefit. Using Albuterol inhaler as needed. Using nebulizer machine with Duoneb as needed.   Still smoking cigarettes, approximately 5 cigarettes per day.  Has supplemental oxygen at home and POC - wears as needed for shortness of breath, at night time - uses 1-2L via NC.  ABG obtained in the hospital on 8/7/2022 revealing pH 7.309, CO2 63.6, O2 122, and HCO3 32.0  Patient Instructions   Area of concern on CT from Feb - need to repeat CT Chest now.  I ordered a Lung Function Test to evaluate lung strength. Please complete prior to next appointment.   Continue Trelegy once per day.  Albuterol as needed. Prescription sent for Duoneb to be taken as needed.  NIV machine ordered given chronic resp failure secondary to COPD with recent hospitalization revealing high CO2 level.  Continue the use of supplemental oxygen.  Stop smoking.   Continue current medication regiment. Keep follow up appointment as scheduled. Please call the office if you have any questions or concerns.             2/18/2022: In office today with sister. Hx: COPD, Chronic Resp failure.  Recently Hospitalized at [facility] on 2/7 for COPD exacerbation, was treated with IV steroids and neb treatments, subsequently discharged home on 2/10 with Rx for Prednisone and Levaquin, which she reports completion  and compliance.  Had Prior hospitalization in November 2021 at [facility] for COPD exacerbation - complicated hospitalization with intramuscular hematoma of the left rectus muscle of the anterior abdominal wall - general surgery consulted, resolved spontaneously. Discharged home at that time with supplemental oxygen. Patient also endorses prior ER visit in Feb 2021 for COPD exacerbation and ER visit at [facility] for same.  Currently taking Anora once per day - Albuterol rescue inhaler approx 2-5 times per day. Using Nebulizer (Albuterol and Ipatropium) scheduled per day.  Using oxygen at night time, and as needed throughout the day - 1L via NC.  Shortness of breath: Varies with time - states worse over the last week due to sinus congestion, weather change, pollen. Exertional, improves with rest and tripod position. Affecting ADLS.   Cough: Worsening, productive with clear mucous production. Worse in the morning, Denies wheezing, chest tightness. Taking Mucinex without benefit. Ran out of Tessalon pearls - they seemed to help.   Patient Instructions   You have two areas of suspicion noted on CT Chest.   One area in the R lower lung - initially noted on CT from 2/8/2022.  Area in the L lung has been present since at least 12/2020 - appears to have grown in size.  Will repeat CT in 3 months.  Would benefit from smoking cessation. Declines referral to smoking cessation program at this time. Has tried Chantix in past.  This inhaler Trelegy is your new daily inhaler. It contains an inhaled steroid component. Rinse mouth after each use due to risk for thrush development. If mouth or tongue develops white sores please contact the clinic and I will order a prescription mouth wash.   Continue to use albuterol rescue inhaler as needed.  Zyrtec - take one pill nightly for 30 days - see if it helps with sinus complaints.  Tessalon pearls as needed for cough.   I ordered a Lung Function Test to evaluate lung  strength. Please complete prior to next appointment.         Social Hx: Lives alone - no animals in the home. Previously worked as labored. No Asbestosis exposure, Smoking Hx: Current smoker, started age 14 - 0.5 ppd use.   Family Hx: Mother Lung Cancer, No COPD, Son, Brother, Sister Asthma  Medical Hx: Previous pneumonia (did not require intubation) ; No previous shoulder/chest surgery        The chief compliant  problem varies with instability at times.  PFSH:  Past Medical History:   Diagnosis Date    Anxiety     Arthritis     Asthma     Back pain     COPD (chronic obstructive pulmonary disease)     Pulmonary embolism     10 years ago         Past Surgical History:   Procedure Laterality Date    BIOPSY      right breast    BREAST CYST EXCISION       SECTION      CHOLECYSTECTOMY      COLONOSCOPY N/A 2022    Procedure: COLONOSCOPY;  Surgeon: Chavez Gonzales MD;  Location: [facility] ENDO;  Service: General;  Laterality: N/A;    ESOPHAGOGASTRODUODENOSCOPY N/A 2022    Procedure: ESOPHAGOGASTRODUODENOSCOPY (EGD);  Surgeon: Chavez Gonzales MD;  Location: [facility] ENDO;  Service: General;  Laterality: N/A;    INCISION AND DRAINAGE OF ABSCESS Left 3/16/2020    Procedure: INCISION AND DRAINAGE, ABSCESS;  Surgeon: Irvin Villarreal MD;  Location: [facility] OR;  Service: General;  Laterality: Left;     Social History     Tobacco Use    Smoking status: Former     Types: Vaping with nicotine    Smokeless tobacco: Never   Substance Use Topics    Alcohol use: Not Currently     Comment: occ    Drug use: Never     Family History   Problem Relation Age of Onset    Breast cancer Sister     Ovarian cancer Neg Hx      Review of patient's allergies indicates:   Allergen Reactions    Ativan [lorazepam] Itching     I have reviewed past medical, family, and social history. I have reviewed previous nurse notes.    Performance Status:The patient's activity level is functions out of house.      Review of Systems:  a review of  "eleven systems covering constitutional, Eye, HEENT, Psych, Respiratory, Cardiac, GI, , Musculoskeletal, Endocrine, Dermatologic was negative except for pertinent findings as listed ABOVE and below: pertinent positive as above, rest is good       Exam:Comprehensive exam done. BP (!) 176/93 (BP Location: Left arm, Patient Position: Sitting, BP Method: Medium (Automatic))   Pulse 92   Ht 5' 5" (1.651 m)   Wt 47.6 kg (104 lb 13.3 oz)   LMP 09/22/2017   SpO2 (!) 88% Comment: on room air at rest  BMI 17.44 kg/m²   Exam included Vitals as listed  Constitutional: She is oriented to person, place, and time. She appears well-developed. No distress.   Nose: Nose normal.   Mouth/Throat: Uvula is midline, oropharynx is clear and moist and mucous membranes are normal. No dental caries. No oropharyngeal exudate, posterior oropharyngeal edema, posterior oropharyngeal erythema or tonsillar abscesses.  Mallapatti (M) score 1  Eyes: Pupils are equal, round, and reactive to light.   Neck: No JVD present. No thyromegaly present.   Cardiovascular: Normal rate, regular rhythm and normal heart sounds. Exam reveals no gallop and no friction rub.   No murmur heard.  Pulmonary/Chest: Effort normal and breath sounds normal. No accessory muscle usage or stridor. No apnea and no tachypnea. No respiratory distress, on room air. Diminished breath sounds throughout, on supplemental oxygen, in no acute distress.  Abdominal: Soft. She exhibits no mass. There is no tenderness. No hepatosplenomegaly, hernias and normoactive bowel sounds  Musculoskeletal: Normal range of motion. exhibits no edema.   Neurological:  alert and oriented to person, place, and time. not disoriented.   Skin: Skin is warm and dry. Capillary refill takes less 2 sec. No cyanosis or erythema. No pallor. Nails show no clubbing.   Psychiatric: normal mood and affect. behavior is normal. Judgment and thought content normal.       Radiographs (ct chest and cxr) reviewed: view " by direct vision     CT Chest without Contrast 03/21/2024 - No acute process. DHAVAL Atelectasis. Prior noted lung nodules without change.     CT Chest without Contrast: November 2023 - multiple lung nodules, emphysema     CT Chest Lung Screening Low Dose 8/30/2023 - new areas of concern, lung nodules    X-Ray Chest 1 View 10/8/2022 Impression:   Marked interval improvement in previously noted patchy segmental airspace consolidation at the left lung base.  There are a few tiny residual nodular densities at the left lung base.  Continued follow-up is recommended to ensure resolution..  Consider a follow-up chest x-ray in 4 weeks.     CT Chest Without Contrast 8/23/2022 FINDINGS:  Centrilobular and paraseptal emphysema.  Bandlike pleuroparenchymal thickening in the right upper lobe again noted.  Target nodule with spiculation in the lingula measures 1.4 cm on series 4, image 317.  3 mm nodule subpleural location right upper lobe series 4, image 301.  3 mm nodule left upper lobe series 4, image 174.  No filling defects central airways.  No pleural effusion or pneumothorax.   Heart size normal.  Coronary artery disease.  No mediastinal adenopathy.  Cholecystectomy clips.  Partially imaged advanced degenerative disc disease at several lower cervical levels.  Several mild remote compression fractures in the upper thoracic spine.   Impression:   1. Unchanged extent of pulmonary nodules, including spicular lesion in the lingula.  2. Coronary artery disease and pulmonary emphysema.        X-Ray Chest AP Portable  8/7/2022   FINDINGS:  Normal cardiomediastinal contour. No focal consolidation, pleural effusion or pneumothorax. The lungs are hyperinflated with flattening of the diaphragms.   Impression:   No acute cardiopulmonary abnormality.   Pulmonary emphysema.       CTA Chest Non-Coronary  2/8/2022   Impression:   No CTA evidence of pulmonary embolus.  Significant centrilobular emphysema.  Stable 1.4 cm spiculated mass of the  left lingula.  New 1.9 cm band of spiculated density in the posterior right lung gutter likely represents atelectasis.  Follow-up CT in 3 months is recommended however.      X-Ray Chest AP Portable  2/8/2022   Impression:   COPD.         Labs reviewed   Patient's labs were reviewed including CBC and CMP    Lab Results   Component Value Date    WBC 5.95 01/03/2024    RBC 3.69 (L) 01/03/2024    HGB 12.2 01/03/2024    HCT 33.8 (L) 01/03/2024    MCV 92 01/03/2024    MCH 33.1 (H) 01/03/2024    MCHC 36.1 (H) 01/03/2024    RDW 11.4 (L) 01/03/2024     01/03/2024    MPV 9.0 (L) 01/03/2024    GRAN 4.2 01/03/2024    GRAN 69.9 01/03/2024    LYMPH 1.3 01/03/2024    LYMPH 22.4 01/03/2024    MONO 0.4 01/03/2024    MONO 6.6 01/03/2024    EOS 0.0 01/03/2024    BASO 0.01 01/03/2024    EOSINOPHIL 0.2 01/03/2024    BASOPHIL 0.2 01/03/2024   CMP  Sodium   Date Value Ref Range Status   01/02/2024 143 136 - 145 mmol/L Final     Potassium   Date Value Ref Range Status   01/02/2024 3.3 (L) 3.5 - 5.1 mmol/L Final     Chloride   Date Value Ref Range Status   01/02/2024 103 95 - 110 mmol/L Final     CO2   Date Value Ref Range Status   01/02/2024 29 23 - 29 mmol/L Final     Glucose   Date Value Ref Range Status   01/02/2024 90 70 - 110 mg/dL Final     BUN   Date Value Ref Range Status   01/02/2024 10 6 - 20 mg/dL Final     Creatinine   Date Value Ref Range Status   01/02/2024 0.6 0.5 - 1.4 mg/dL Final     Calcium   Date Value Ref Range Status   01/02/2024 8.0 (L) 8.7 - 10.5 mg/dL Final     Total Protein   Date Value Ref Range Status   01/02/2024 5.3 (L) 6.0 - 8.4 g/dL Final     Albumin   Date Value Ref Range Status   01/02/2024 2.7 (L) 3.5 - 5.2 g/dL Final     Total Bilirubin   Date Value Ref Range Status   01/02/2024 0.3 0.1 - 1.0 mg/dL Final     Comment:     For infants and newborns, interpretation of results should be based  on gestational age, weight and in agreement with clinical  observations.    Premature Infant recommended  reference ranges:  Up to 24 hours.............<8.0 mg/dL  Up to 48 hours............<12.0 mg/dL  3-5 days..................<15.0 mg/dL  6-29 days.................<15.0 mg/dL       Alkaline Phosphatase   Date Value Ref Range Status   01/02/2024 55 55 - 135 U/L Final     AST   Date Value Ref Range Status   01/02/2024 16 10 - 40 U/L Final     ALT   Date Value Ref Range Status   01/02/2024 17 10 - 44 U/L Final     Anion Gap   Date Value Ref Range Status   01/02/2024 11 8 - 16 mmol/L Final     eGFR   Date Value Ref Range Status   01/02/2024 >60.0 >60 mL/min/1.73 m^2 Final         PFT will be done and results to be reviewed  Pulmonary Functions Testing Results:    Patient has chronic respiratory failure secondary to COPD.  Patient will need a specific targeted tidal volume which cannot be provided via a CPAP or BiPAP.  This patient will require a set tidal volume to ensure CO2 levels were lowered adequately; as well as, to assist in establishing proper diaphragmatic functions.  Therefore, NIV is being ordered due to the severe state of this patient's disease.  Without this therapy, the patient runs a higher risk of expiration and readmittance.  Patient is benefiting from nightly NIV use however would also benefit from portable NIV for daytime use to help reduce shortness of breath and increase patient's overall daytime physical activities. Supplemental oxygen during the day is not enough support to complete patient's daily activities.     Plan:  Clinical impression is ambiguous and will need repeated evaluation wrtCollins Iverson was seen today for follow up ct.    Diagnoses and all orders for this visit:    Steroid-dependent COPD  -     DEXA Bone Density Appendicular Skeleton W/TBS (XPD); Future    Multiple lung nodules on CT    Stage 4 very severe COPD by GOLD classification    Chronic sinus complaints    Current chronic use of systemic steroids    Personal history of nicotine dependence    Atelectasis of both  lungs    Chronic respiratory failure with hypoxia and hypercapnia    DASH (obstructive sleep apnea)    Anxiety        Follow up in about 3 months (around 6/25/2024), or if symptoms worsen or fail to improve.    Discussed with patient above for education the following:      Patient Instructions   Continue to use current inhalers including Trelegy once per day and Albuterol as needed.    Continue to use the supplemental oxygen.    Continue to use the NIV machine nightly.    CT Chest reviewed - does not show any acute process. Prior noted area of concern to DHAVAL now less mass like appearing, likely atelectasis. Will plan for CT Chest in 12 months at the latest.    Continue 5 mg prednisone per day. Can trial up to 20 mg per day only as needed then go back to 5 mg.    Need to get DEXA bone scan.    Continue current medication regiment. Keep follow up appointment as scheduled. Please call the office if you have any questions or concerns.

## 2024-03-25 NOTE — PATIENT INSTRUCTIONS
Continue to use current inhalers including Trelegy once per day and Albuterol as needed.    Continue to use the supplemental oxygen.    Continue to use the NIV machine nightly.    CT Chest reviewed - does not show any acute process. Prior noted area of concern to DHAVAL now less mass like appearing, likely atelectasis. Will plan for CT Chest in 12 months at the latest.    Continue 5 mg prednisone per day. Can trial up to 20 mg per day only as needed then go back to 5 mg.    Need to get DEXA bone scan.    Continue current medication regiment. Keep follow up appointment as scheduled. Please call the office if you have any questions or concerns.

## 2024-03-26 ENCOUNTER — HOSPITAL ENCOUNTER (OUTPATIENT)
Dept: RADIOLOGY | Facility: HOSPITAL | Age: 59
Discharge: HOME | End: 2024-03-26
Attending: NURSE PRACTITIONER
Payer: MEDICAID

## 2024-03-26 DIAGNOSIS — Z92.241 STEROID-DEPENDENT COPD: ICD-10-CM

## 2024-03-26 DIAGNOSIS — J44.9 STEROID-DEPENDENT COPD: ICD-10-CM

## 2024-03-26 PROCEDURE — 77080 DXA BONE DENSITY AXIAL: CPT | Mod: 26,,, | Performed by: RADIOLOGY

## 2024-03-26 PROCEDURE — 77080 DXA BONE DENSITY AXIAL: CPT | Mod: TC

## 2024-03-28 ENCOUNTER — PATIENT MESSAGE (OUTPATIENT)
Dept: FAMILY MEDICINE | Facility: CLINIC | Age: 59
End: 2024-03-28

## 2024-03-28 ENCOUNTER — OFFICE VISIT (OUTPATIENT)
Dept: FAMILY MEDICINE | Facility: CLINIC | Age: 59
End: 2024-03-28
Payer: MEDICAID

## 2024-03-28 VITALS
WEIGHT: 105.38 LBS | HEART RATE: 78 BPM | DIASTOLIC BLOOD PRESSURE: 62 MMHG | BODY MASS INDEX: 17.56 KG/M2 | HEIGHT: 65 IN | SYSTOLIC BLOOD PRESSURE: 110 MMHG | TEMPERATURE: 98 F | OXYGEN SATURATION: 98 % | RESPIRATION RATE: 18 BRPM

## 2024-03-28 DIAGNOSIS — Z79.899 CHRONIC PRESCRIPTION BENZODIAZEPINE USE: Primary | ICD-10-CM

## 2024-03-28 DIAGNOSIS — M85.80 OSTEOPENIA, UNSPECIFIED LOCATION: Primary | ICD-10-CM

## 2024-03-28 PROCEDURE — 3074F SYST BP LT 130 MM HG: CPT | Mod: CPTII,,, | Performed by: FAMILY MEDICINE

## 2024-03-28 PROCEDURE — 3008F BODY MASS INDEX DOCD: CPT | Mod: CPTII,,, | Performed by: FAMILY MEDICINE

## 2024-03-28 PROCEDURE — 3078F DIAST BP <80 MM HG: CPT | Mod: CPTII,,, | Performed by: FAMILY MEDICINE

## 2024-03-28 PROCEDURE — 99214 OFFICE O/P EST MOD 30 MIN: CPT | Mod: PBBFAC | Performed by: FAMILY MEDICINE

## 2024-03-28 PROCEDURE — 1159F MED LIST DOCD IN RCRD: CPT | Mod: CPTII,,, | Performed by: FAMILY MEDICINE

## 2024-03-28 PROCEDURE — 99999 PR PBB SHADOW E&M-EST. PATIENT-LVL IV: CPT | Mod: PBBFAC,,, | Performed by: FAMILY MEDICINE

## 2024-03-28 PROCEDURE — 99214 OFFICE O/P EST MOD 30 MIN: CPT | Mod: S$PBB,,, | Performed by: FAMILY MEDICINE

## 2024-03-28 RX ORDER — CLONAZEPAM 0.5 MG/1
0.5 TABLET ORAL 2 TIMES DAILY PRN
Qty: 45 TABLET | Refills: 2 | Status: SHIPPED | OUTPATIENT
Start: 2024-03-28 | End: 2024-05-03

## 2024-03-28 RX ORDER — ALENDRONATE SODIUM 70 MG/1
70 TABLET ORAL
Qty: 13 TABLET | Refills: 3 | Status: SHIPPED | OUTPATIENT
Start: 2024-03-28 | End: 2025-03-28

## 2024-03-28 NOTE — PROGRESS NOTES
Subjective:       Patient ID: Zayra White is a 58 y.o. female.    Chief Complaint: Follow-up (4 week)      Past Medical History:   Diagnosis Date    Anxiety     Arthritis     Asthma     Back pain     COPD (chronic obstructive pulmonary disease)     Pulmonary embolism     10 years ago       Past Surgical History:   Procedure Laterality Date    BIOPSY      right breast    BREAST CYST EXCISION       SECTION      CHOLECYSTECTOMY      COLONOSCOPY N/A 2022    Procedure: COLONOSCOPY;  Surgeon: Chavez Gonzales MD;  Location: [facility] ENDO;  Service: General;  Laterality: N/A;    ESOPHAGOGASTRODUODENOSCOPY N/A 2022    Procedure: ESOPHAGOGASTRODUODENOSCOPY (EGD);  Surgeon: Chavez Gonzales MD;  Location: [facility] ENDO;  Service: General;  Laterality: N/A;    INCISION AND DRAINAGE OF ABSCESS Left 3/16/2020    Procedure: INCISION AND DRAINAGE, ABSCESS;  Surgeon: Irvin Villarreal MD;  Location: [facility] OR;  Service: General;  Laterality: Left;        Social History     Socioeconomic History    Marital status: Legally    Tobacco Use    Smoking status: Former    Smokeless tobacco: Never   Substance and Sexual Activity    Alcohol use: Not Currently     Comment: occ    Drug use: Never    Sexual activity: Yes     Birth control/protection: Post-menopausal   Social History Narrative    ** Merged History Encounter **          Social Determinants of Health     Financial Resource Strain: Low Risk  (2023)    Overall Financial Resource Strain (CARDIA)     Difficulty of Paying Living Expenses: Not hard at all   Food Insecurity: No Food Insecurity (2023)    Hunger Vital Sign     Worried About Running Out of Food in the Last Year: Never true     Ran Out of Food in the Last Year: Never true   Transportation Needs: Unmet Transportation Needs (2023)    PRAPARE - Transportation     Lack of Transportation (Medical): Yes     Lack of Transportation (Non-Medical): No   Physical Activity: Sufficiently  Active (12/29/2023)    Exercise Vital Sign     Days of Exercise per Week: 2 days     Minutes of Exercise per Session: 120 min   Stress: Stress Concern Present (12/29/2023)    [place] [employer] - Occupational Stress Questionnaire     Feeling of Stress : Very much   Social Connections: Socially Isolated (12/29/2023)    Social Connection and Isolation Panel [NHANES]     Frequency of Communication with Friends and Family: More than three times a week     Frequency of Social Gatherings with Friends and Family: More than three times a week     Attends Evangelical Services: Never     Active Member of Clubs or Organizations: No     Marital Status:    Housing Stability: Low Risk  (12/29/2023)    Housing Stability Vital Sign     Unable to Pay for Housing in the Last Year: No     Number of Places Lived in the Last Year: 1     Unstable Housing in the Last Year: No       Family History   Problem Relation Age of Onset    Breast cancer Sister     Ovarian cancer Neg Hx        Review of patient's allergies indicates:   Allergen Reactions    Ativan [lorazepam] Itching          Current Outpatient Medications:     albuterol (VENTOLIN HFA) 90 mcg/actuation inhaler, inhale 2 puff by inhalation route  every 4 - 6 hours as needed, Disp: 36 g, Rfl: 6    albuterol-ipratropium (DUO-NEB) 2.5 mg-0.5 mg/3 mL nebulizer solution, Take 3 mLs by nebulization every 6 (six) hours as needed for Wheezing or Shortness of Breath. Rescue, Disp: 75 mL, Rfl: 3    aspirin 81 MG Chew, 81 mg., Disp: , Rfl:     atenoloL (TENORMIN) 25 MG tablet, TAKE 1/2 TABLET BY MOUTH EVERY DAY **THANK YOU**, Disp: 45 tablet, Rfl: 3    atorvastatin (LIPITOR) 20 MG tablet, Take 1 tablet (20 mg total) by mouth once daily., Disp: 90 tablet, Rfl: 3    cholecalciferol, vitamin D3, (VITAMIN D3) 25 mcg (1,000 unit) capsule, Take 2 capsules (2,000 Units total) by mouth once daily., Disp: 60 capsule, Rfl: 0    cyproheptadine (PERIACTIN) 4 mg tablet, Take 1  tablet (4 mg total) by mouth 3 (three) times daily as needed (appetitie /allergies)., Disp: 90 tablet, Rfl: 5    DULoxetine (CYMBALTA) 20 MG capsule, Take 2 capsules po daily, Disp: 180 capsule, Rfl: 3    fluticasone-umeclidin-vilanter (TRELEGY ELLIPTA) 200-62.5-25 mcg inhaler, Inhale 1 puff into the lungs once daily., Disp: 60 each, Rfl: 11    latanoprost 0.005 % ophthalmic solution, Place 1 drop into both eyes every evening., Disp: , Rfl:     levoFLOXacin (LEVAQUIN) 500 MG tablet, Take 1 tablet (500 mg total) by mouth once daily., Disp: 10 tablet, Rfl: 0    ondansetron (ZOFRAN-ODT) 4 MG TbDL, Take 1 tablet (4 mg total) by mouth every 12 (twelve) hours as needed (Nausea)., Disp: 30 tablet, Rfl: 2    pantoprazole (PROTONIX) 40 MG tablet, Take 1 tablet (40 mg total) by mouth once daily. Take in the morning before breakfast.  Wait 30 minutes before eating or drinking anything, Disp: 30 tablet, Rfl: 11    predniSONE (DELTASONE) 5 MG tablet, Take 1 tablet (5 mg total) by mouth once daily., Disp: 90 tablet, Rfl: 0    tiZANidine (ZANAFLEX) 4 MG tablet, Take 1 tablet (4 mg total) by mouth every 8 (eight) hours as needed., Disp: 60 tablet, Rfl: 2    clonazePAM (KLONOPIN) 0.5 MG tablet, Take 1 tablet (0.5 mg total) by mouth 2 (two) times daily as needed for Anxiety., Disp: 45 tablet, Rfl: 2    Ms White is a 59 yo female here for a 3 month check up. She is O2 dependent and is doing well. She has gained 5# since her last visit. She feels better than she has been, and her only complaint is allergies    Follow-up  Associated symptoms include coughing.     Review of Systems   Constitutional:         Weight gain of 5# on periactin   Respiratory:  Positive for cough.         On O2 at 2L. Coughing clear mucus.        Objective:      Physical Exam  Constitutional:       Appearance: Normal appearance.   HENT:      Mouth/Throat:      Mouth: Mucous membranes are moist.   Eyes:      Extraocular Movements: Extraocular movements intact.       Pupils: Pupils are equal, round, and reactive to light.   Cardiovascular:      Rate and Rhythm: Normal rate and regular rhythm.      Pulses: Normal pulses.      Heart sounds: Normal heart sounds.   Pulmonary:      Comments: Severe COPD on O2, but moving air better than usual today  Musculoskeletal:         General: Normal range of motion.   Skin:     General: Skin is warm.   Neurological:      General: No focal deficit present.      Mental Status: She is alert and oriented to person, place, and time. Mental status is at baseline.   Psychiatric:         Mood and Affect: Mood normal.         Behavior: Behavior normal.      Comments: Increasing anxiety, requested an mild increase on klonpin         Assessment:       1. Chronic prescription benzodiazepine use        Plan:         Chronic prescription benzodiazepine use  -     clonazePAM (KLONOPIN) 0.5 MG tablet; Take 1 tablet (0.5 mg total) by mouth 2 (two) times daily as needed for Anxiety.  Dispense: 45 tablet; Refill: 2        Risks, benefits, and side effects were discussed with the patient. All questions were answered to the fullest satisfaction of the patient, and pt verbalized understanding and agreement to treatment plan. Pt was to call with any new or worsening symptoms, or present to the ER.        Polly Lemos MD

## 2024-04-01 PROBLEM — J96.02 ACUTE RESPIRATORY FAILURE WITH HYPOXIA AND HYPERCARBIA: Status: RESOLVED | Noted: 2023-12-29 | Resolved: 2024-04-01

## 2024-04-01 PROBLEM — J96.01 ACUTE RESPIRATORY FAILURE WITH HYPOXIA AND HYPERCARBIA: Status: RESOLVED | Noted: 2023-12-29 | Resolved: 2024-04-01

## 2024-04-02 ENCOUNTER — HOSPITAL ENCOUNTER (INPATIENT)
Facility: HOSPITAL | Age: 59
LOS: 2 days | Discharge: HOME HEALTH | End: 2024-04-04
Attending: EMERGENCY MEDICINE | Admitting: HOSPITALIST
Payer: MEDICAID

## 2024-04-02 DIAGNOSIS — J44.1 COPD EXACERBATION: Primary | ICD-10-CM

## 2024-04-02 DIAGNOSIS — J44.9 STEROID-DEPENDENT COPD: ICD-10-CM

## 2024-04-02 DIAGNOSIS — Z92.241 STEROID-DEPENDENT COPD: ICD-10-CM

## 2024-04-02 DIAGNOSIS — R06.02 SOB (SHORTNESS OF BREATH): ICD-10-CM

## 2024-04-02 DIAGNOSIS — J44.9 CHRONIC OBSTRUCTIVE PULMONARY DISEASE, UNSPECIFIED COPD TYPE: ICD-10-CM

## 2024-04-02 LAB
ALBUMIN SERPL BCP-MCNC: 4.4 G/DL (ref 3.5–5.2)
ALLENS TEST: ABNORMAL
ALP SERPL-CCNC: 63 U/L (ref 55–135)
ALT SERPL W/O P-5'-P-CCNC: 19 U/L (ref 10–44)
ANION GAP SERPL CALC-SCNC: 15 MMOL/L (ref 8–16)
AST SERPL-CCNC: 27 U/L (ref 10–40)
BASOPHILS # BLD AUTO: 0.07 K/UL (ref 0–0.2)
BASOPHILS NFR BLD: 0.5 % (ref 0–1.9)
BILIRUB SERPL-MCNC: 0.7 MG/DL (ref 0.1–1)
BNP SERPL-MCNC: 37 PG/ML (ref 0–99)
BUN SERPL-MCNC: 15 MG/DL (ref 6–20)
CALCIUM SERPL-MCNC: 9.6 MG/DL (ref 8.7–10.5)
CHLORIDE SERPL-SCNC: 102 MMOL/L (ref 95–110)
CO2 SERPL-SCNC: 26 MMOL/L (ref 23–29)
CREAT SERPL-MCNC: 0.8 MG/DL (ref 0.5–1.4)
DELSYS: ABNORMAL
DIFFERENTIAL METHOD BLD: ABNORMAL
EOSINOPHIL # BLD AUTO: 0 K/UL (ref 0–0.5)
EOSINOPHIL NFR BLD: 0.1 % (ref 0–8)
EP: 6
ERYTHROCYTE [DISTWIDTH] IN BLOOD BY AUTOMATED COUNT: 11.8 % (ref 11.5–14.5)
ERYTHROCYTE [SEDIMENTATION RATE] IN BLOOD BY WESTERGREN METHOD: 12 MM/H
EST. GFR  (NO RACE VARIABLE): >60 ML/MIN/1.73 M^2
FIO2: 35
GLUCOSE SERPL-MCNC: 151 MG/DL (ref 70–110)
HCO3 UR-SCNC: 27.8 MMOL/L (ref 24–28)
HCT VFR BLD AUTO: 42.6 % (ref 37–48.5)
HGB BLD-MCNC: 14.9 G/DL (ref 12–16)
IMM GRANULOCYTES # BLD AUTO: 0.16 K/UL (ref 0–0.04)
IMM GRANULOCYTES NFR BLD AUTO: 1.2 % (ref 0–0.5)
IP: 12
LYMPHOCYTES # BLD AUTO: 1.2 K/UL (ref 1–4.8)
LYMPHOCYTES NFR BLD: 9 % (ref 18–48)
MAGNESIUM SERPL-MCNC: 1.9 MG/DL (ref 1.6–2.6)
MCH RBC QN AUTO: 33.6 PG (ref 27–31)
MCHC RBC AUTO-ENTMCNC: 35 G/DL (ref 32–36)
MCV RBC AUTO: 96 FL (ref 82–98)
MODE: ABNORMAL
MONOCYTES # BLD AUTO: 0.7 K/UL (ref 0.3–1)
MONOCYTES NFR BLD: 5 % (ref 4–15)
NEUTROPHILS # BLD AUTO: 11.2 K/UL (ref 1.8–7.7)
NEUTROPHILS NFR BLD: 84.2 % (ref 38–73)
NRBC BLD-RTO: 0 /100 WBC
OHS QRS DURATION: 76 MS
OHS QTC CALCULATION: 431 MS
PCO2 BLDA: 50 MMHG (ref 35–45)
PH SMN: 7.35 [PH] (ref 7.35–7.45)
PLATELET # BLD AUTO: 309 K/UL (ref 150–450)
PMV BLD AUTO: 9.2 FL (ref 9.2–12.9)
PO2 BLDA: 43 MMHG (ref 80–100)
POC BE: 2 MMOL/L
POC SATURATED O2: 75 % (ref 95–100)
POTASSIUM SERPL-SCNC: 4.1 MMOL/L (ref 3.5–5.1)
PROT SERPL-MCNC: 7.2 G/DL (ref 6–8.4)
RBC # BLD AUTO: 4.43 M/UL (ref 4–5.4)
SAMPLE: ABNORMAL
SITE: ABNORMAL
SODIUM SERPL-SCNC: 143 MMOL/L (ref 136–145)
TROPONIN I SERPL DL<=0.01 NG/ML-MCNC: <0.006 NG/ML (ref 0–0.03)
WBC # BLD AUTO: 13.31 K/UL (ref 3.9–12.7)

## 2024-04-02 PROCEDURE — 93005 ELECTROCARDIOGRAM TRACING: CPT

## 2024-04-02 PROCEDURE — 25000242 PHARM REV CODE 250 ALT 637 W/ HCPCS: Performed by: EMERGENCY MEDICINE

## 2024-04-02 PROCEDURE — 83880 ASSAY OF NATRIURETIC PEPTIDE: CPT | Performed by: EMERGENCY MEDICINE

## 2024-04-02 PROCEDURE — 84484 ASSAY OF TROPONIN QUANT: CPT | Performed by: EMERGENCY MEDICINE

## 2024-04-02 PROCEDURE — 27000221 HC OXYGEN, UP TO 24 HOURS

## 2024-04-02 PROCEDURE — 71045 X-RAY EXAM CHEST 1 VIEW: CPT | Mod: 26,,, | Performed by: RADIOLOGY

## 2024-04-02 PROCEDURE — 25000003 PHARM REV CODE 250: Performed by: HOSPITALIST

## 2024-04-02 PROCEDURE — 82803 BLOOD GASES ANY COMBINATION: CPT

## 2024-04-02 PROCEDURE — 25000242 PHARM REV CODE 250 ALT 637 W/ HCPCS: Performed by: HOSPITALIST

## 2024-04-02 PROCEDURE — 11000001 HC ACUTE MED/SURG PRIVATE ROOM

## 2024-04-02 PROCEDURE — 93010 ELECTROCARDIOGRAM REPORT: CPT | Mod: ,,, | Performed by: INTERNAL MEDICINE

## 2024-04-02 PROCEDURE — 71045 X-RAY EXAM CHEST 1 VIEW: CPT | Mod: TC

## 2024-04-02 PROCEDURE — 63600175 PHARM REV CODE 636 W HCPCS: Performed by: HOSPITALIST

## 2024-04-02 PROCEDURE — 99223 1ST HOSP IP/OBS HIGH 75: CPT | Mod: GT,,, | Performed by: HOSPITALIST

## 2024-04-02 PROCEDURE — 87040 BLOOD CULTURE FOR BACTERIA: CPT | Performed by: EMERGENCY MEDICINE

## 2024-04-02 PROCEDURE — 63600175 PHARM REV CODE 636 W HCPCS: Mod: UD | Performed by: EMERGENCY MEDICINE

## 2024-04-02 PROCEDURE — 80053 COMPREHEN METABOLIC PANEL: CPT | Performed by: EMERGENCY MEDICINE

## 2024-04-02 PROCEDURE — 99900035 HC TECH TIME PER 15 MIN (STAT)

## 2024-04-02 PROCEDURE — 85025 COMPLETE CBC W/AUTO DIFF WBC: CPT | Performed by: EMERGENCY MEDICINE

## 2024-04-02 PROCEDURE — 96367 TX/PROPH/DG ADDL SEQ IV INF: CPT

## 2024-04-02 PROCEDURE — 25000003 PHARM REV CODE 250: Mod: UD | Performed by: EMERGENCY MEDICINE

## 2024-04-02 PROCEDURE — 83735 ASSAY OF MAGNESIUM: CPT | Performed by: EMERGENCY MEDICINE

## 2024-04-02 PROCEDURE — 27000190 HC CPAP FULL FACE MASK W/VALVE

## 2024-04-02 PROCEDURE — 94640 AIRWAY INHALATION TREATMENT: CPT

## 2024-04-02 PROCEDURE — 5A09357 ASSISTANCE WITH RESPIRATORY VENTILATION, LESS THAN 24 CONSECUTIVE HOURS, CONTINUOUS POSITIVE AIRWAY PRESSURE: ICD-10-PCS | Performed by: STUDENT IN AN ORGANIZED HEALTH CARE EDUCATION/TRAINING PROGRAM

## 2024-04-02 PROCEDURE — 96365 THER/PROPH/DIAG IV INF INIT: CPT

## 2024-04-02 PROCEDURE — 63600175 PHARM REV CODE 636 W HCPCS: Mod: UD | Performed by: HOSPITALIST

## 2024-04-02 PROCEDURE — 99285 EMERGENCY DEPT VISIT HI MDM: CPT | Mod: 25

## 2024-04-02 PROCEDURE — 94761 N-INVAS EAR/PLS OXIMETRY MLT: CPT | Mod: XB

## 2024-04-02 PROCEDURE — 25000242 PHARM REV CODE 250 ALT 637 W/ HCPCS: Performed by: INTERNAL MEDICINE

## 2024-04-02 PROCEDURE — 36600 WITHDRAWAL OF ARTERIAL BLOOD: CPT

## 2024-04-02 PROCEDURE — 94660 CPAP INITIATION&MGMT: CPT

## 2024-04-02 PROCEDURE — 94640 AIRWAY INHALATION TREATMENT: CPT | Mod: XB

## 2024-04-02 RX ORDER — SODIUM CHLORIDE 0.9 % (FLUSH) 0.9 %
10 SYRINGE (ML) INJECTION
Status: DISCONTINUED | OUTPATIENT
Start: 2024-04-02 | End: 2024-04-04 | Stop reason: HOSPADM

## 2024-04-02 RX ORDER — ATENOLOL 25 MG/1
25 TABLET ORAL DAILY
Status: DISCONTINUED | OUTPATIENT
Start: 2024-04-02 | End: 2024-04-02

## 2024-04-02 RX ORDER — ALBUTEROL SULFATE 0.83 MG/ML
2.5 SOLUTION RESPIRATORY (INHALATION)
Status: COMPLETED | OUTPATIENT
Start: 2024-04-02 | End: 2024-04-02

## 2024-04-02 RX ORDER — NAPROXEN SODIUM 220 MG/1
81 TABLET, FILM COATED ORAL DAILY
Status: DISCONTINUED | OUTPATIENT
Start: 2024-04-02 | End: 2024-04-04 | Stop reason: HOSPADM

## 2024-04-02 RX ORDER — ENOXAPARIN SODIUM 100 MG/ML
40 INJECTION SUBCUTANEOUS EVERY 24 HOURS
Status: DISCONTINUED | OUTPATIENT
Start: 2024-04-02 | End: 2024-04-04 | Stop reason: HOSPADM

## 2024-04-02 RX ORDER — PREDNISONE 1 MG/1
5 TABLET ORAL DAILY
Status: DISCONTINUED | OUTPATIENT
Start: 2024-04-02 | End: 2024-04-02

## 2024-04-02 RX ORDER — IPRATROPIUM BROMIDE AND ALBUTEROL SULFATE 2.5; .5 MG/3ML; MG/3ML
3 SOLUTION RESPIRATORY (INHALATION) EVERY 4 HOURS PRN
Status: DISCONTINUED | OUTPATIENT
Start: 2024-04-02 | End: 2024-04-04 | Stop reason: HOSPADM

## 2024-04-02 RX ORDER — IPRATROPIUM BROMIDE AND ALBUTEROL SULFATE 2.5; .5 MG/3ML; MG/3ML
3 SOLUTION RESPIRATORY (INHALATION)
Status: DISCONTINUED | OUTPATIENT
Start: 2024-04-02 | End: 2024-04-04 | Stop reason: HOSPADM

## 2024-04-02 RX ORDER — PANTOPRAZOLE SODIUM 40 MG/1
40 TABLET, DELAYED RELEASE ORAL DAILY
Status: DISCONTINUED | OUTPATIENT
Start: 2024-04-02 | End: 2024-04-04 | Stop reason: HOSPADM

## 2024-04-02 RX ORDER — MAGNESIUM SULFATE HEPTAHYDRATE 40 MG/ML
2 INJECTION, SOLUTION INTRAVENOUS
Status: COMPLETED | OUTPATIENT
Start: 2024-04-02 | End: 2024-04-02

## 2024-04-02 RX ORDER — TIZANIDINE 4 MG/1
4 TABLET ORAL EVERY 8 HOURS PRN
Status: DISCONTINUED | OUTPATIENT
Start: 2024-04-02 | End: 2024-04-04 | Stop reason: HOSPADM

## 2024-04-02 RX ORDER — ATORVASTATIN CALCIUM 10 MG/1
20 TABLET, FILM COATED ORAL DAILY
Status: DISCONTINUED | OUTPATIENT
Start: 2024-04-02 | End: 2024-04-04 | Stop reason: HOSPADM

## 2024-04-02 RX ORDER — IPRATROPIUM BROMIDE AND ALBUTEROL SULFATE 2.5; .5 MG/3ML; MG/3ML
3 SOLUTION RESPIRATORY (INHALATION)
Status: COMPLETED | OUTPATIENT
Start: 2024-04-02 | End: 2024-04-02

## 2024-04-02 RX ORDER — IPRATROPIUM BROMIDE AND ALBUTEROL SULFATE 2.5; .5 MG/3ML; MG/3ML
SOLUTION RESPIRATORY (INHALATION)
Status: DISPENSED
Start: 2024-04-02 | End: 2024-04-03

## 2024-04-02 RX ORDER — BUDESONIDE 0.25 MG/2ML
0.5 INHALANT ORAL EVERY 12 HOURS
Status: DISCONTINUED | OUTPATIENT
Start: 2024-04-02 | End: 2024-04-04 | Stop reason: HOSPADM

## 2024-04-02 RX ORDER — BUDESONIDE 0.25 MG/2ML
0.5 INHALANT ORAL DAILY
Status: DISCONTINUED | OUTPATIENT
Start: 2024-04-02 | End: 2024-04-02

## 2024-04-02 RX ORDER — DULOXETIN HYDROCHLORIDE 20 MG/1
40 CAPSULE, DELAYED RELEASE ORAL DAILY
Status: DISCONTINUED | OUTPATIENT
Start: 2024-04-02 | End: 2024-04-04 | Stop reason: HOSPADM

## 2024-04-02 RX ORDER — LATANOPROST 50 UG/ML
1 SOLUTION/ DROPS OPHTHALMIC NIGHTLY
Status: DISCONTINUED | OUTPATIENT
Start: 2024-04-02 | End: 2024-04-04 | Stop reason: HOSPADM

## 2024-04-02 RX ADMIN — AZITHROMYCIN MONOHYDRATE 500 MG: 500 INJECTION, POWDER, LYOPHILIZED, FOR SOLUTION INTRAVENOUS at 03:04

## 2024-04-02 RX ADMIN — MAGNESIUM SULFATE HEPTAHYDRATE 2 G: 40 INJECTION, SOLUTION INTRAVENOUS at 01:04

## 2024-04-02 RX ADMIN — BUDESONIDE 0.5 MG: 0.25 INHALANT RESPIRATORY (INHALATION) at 10:04

## 2024-04-02 RX ADMIN — ASPIRIN 81 MG: 81 TABLET, CHEWABLE ORAL at 09:04

## 2024-04-02 RX ADMIN — ATENOLOL 25 MG: 25 TABLET ORAL at 09:04

## 2024-04-02 RX ADMIN — IPRATROPIUM BROMIDE AND ALBUTEROL SULFATE 3 ML: .5; 3 SOLUTION RESPIRATORY (INHALATION) at 01:04

## 2024-04-02 RX ADMIN — ATORVASTATIN CALCIUM 20 MG: 10 TABLET, FILM COATED ORAL at 09:04

## 2024-04-02 RX ADMIN — METHYLPREDNISOLONE SODIUM SUCCINATE 80 MG: 40 INJECTION, POWDER, FOR SOLUTION INTRAMUSCULAR; INTRAVENOUS at 09:04

## 2024-04-02 RX ADMIN — ENOXAPARIN SODIUM 40 MG: 40 INJECTION SUBCUTANEOUS at 05:04

## 2024-04-02 RX ADMIN — METHYLPREDNISOLONE SODIUM SUCCINATE 80 MG: 40 INJECTION, POWDER, FOR SOLUTION INTRAMUSCULAR; INTRAVENOUS at 03:04

## 2024-04-02 RX ADMIN — PANTOPRAZOLE SODIUM 40 MG: 40 TABLET, DELAYED RELEASE ORAL at 09:04

## 2024-04-02 RX ADMIN — DULOXETINE HYDROCHLORIDE 40 MG: 20 CAPSULE, DELAYED RELEASE ORAL at 09:04

## 2024-04-02 RX ADMIN — CEFTRIAXONE SODIUM 1 G: 1 INJECTION, POWDER, FOR SOLUTION INTRAMUSCULAR; INTRAVENOUS at 04:04

## 2024-04-02 RX ADMIN — BUDESONIDE 0.5 MG: 0.25 INHALANT RESPIRATORY (INHALATION) at 07:04

## 2024-04-02 RX ADMIN — ALBUTEROL SULFATE 2.5 MG: 2.5 SOLUTION RESPIRATORY (INHALATION) at 03:04

## 2024-04-02 RX ADMIN — LATANOPROST 1 DROP: 50 SOLUTION OPHTHALMIC at 10:04

## 2024-04-02 RX ADMIN — METHYLPREDNISOLONE SODIUM SUCCINATE 80 MG: 40 INJECTION, POWDER, FOR SOLUTION INTRAMUSCULAR; INTRAVENOUS at 10:04

## 2024-04-02 RX ADMIN — IPRATROPIUM BROMIDE AND ALBUTEROL SULFATE 3 ML: .5; 2.5 SOLUTION RESPIRATORY (INHALATION) at 07:04

## 2024-04-02 RX ADMIN — IPRATROPIUM BROMIDE AND ALBUTEROL SULFATE 3 ML: .5; 2.5 SOLUTION RESPIRATORY (INHALATION) at 03:04

## 2024-04-02 NOTE — PLAN OF CARE
Problem: Adult Inpatient Plan of Care  Goal: Plan of Care Review  4/2/2024 1804 by Farzaneh Medrano RN  Outcome: Ongoing, Progressing  4/2/2024 1747 by Farzaneh Medrano RN  Outcome: Ongoing, Progressing  Goal: Patient-Specific Goal (Individualized)  4/2/2024 1804 by Farzaneh Medrano RN  Outcome: Ongoing, Progressing  4/2/2024 1747 by Farzaneh Medrano RN  Outcome: Ongoing, Progressing  Goal: Absence of Hospital-Acquired Illness or Injury  4/2/2024 1804 by Farzaneh Medrano RN  Outcome: Ongoing, Progressing  4/2/2024 1747 by Farzaneh Medrano RN  Outcome: Ongoing, Progressing  Goal: Optimal Comfort and Wellbeing  4/2/2024 1804 by Farzaneh Medrano RN  Outcome: Ongoing, Progressing  4/2/2024 1747 by Farzaneh Medrano RN  Outcome: Ongoing, Progressing  Goal: Readiness for Transition of Care  4/2/2024 1804 by Farzaneh Medrano RN  Outcome: Ongoing, Progressing  4/2/2024 1747 by Farzaneh Medrano RN  Outcome: Ongoing, Progressing

## 2024-04-02 NOTE — H&P
[facility] Medicine  History & Physical    Patient Name: Zayra White  MRN: [id number]  Admission Date: 2024  Attending Physician: Servando Rice MD   Primary Care Provider: Polly Lemos MD         Patient information was obtained from patient and ER records.       Subjective:     Principal Problem:COPD exacerbation    Chief Complaint:   Chief Complaint   Patient presents with    Shortness of Breath     SOB 1 hr PTA, h/o COPD.        HPI: The patient is a 57 y/o female with PMH of COPD on PRN home O2, HLP, GERD, and anxiety who presents with SOB. She has been feeling SOB over the past few days. She wears O2 at home on a PRN basis but has had to over recently given her increased SOB. No fevers reported. Work up consistent with COPD exacerbation.       Past Medical History:   Diagnosis Date    Anxiety     Arthritis     Asthma     Back pain     COPD (chronic obstructive pulmonary disease)     Pulmonary embolism     10 years ago       Past Surgical History:   Procedure Laterality Date    BIOPSY      right breast    BREAST CYST EXCISION       SECTION      CHOLECYSTECTOMY      COLONOSCOPY N/A 2022    Procedure: COLONOSCOPY;  Surgeon: Chavez Gonzales MD;  Location: [facility] ENDO;  Service: General;  Laterality: N/A;    ESOPHAGOGASTRODUODENOSCOPY N/A 2022    Procedure: ESOPHAGOGASTRODUODENOSCOPY (EGD);  Surgeon: Chavez Gonzales MD;  Location: [facility] ENDO;  Service: General;  Laterality: N/A;    INCISION AND DRAINAGE OF ABSCESS Left 3/16/2020    Procedure: INCISION AND DRAINAGE, ABSCESS;  Surgeon: Irvin Villarreal MD;  Location: [facility] OR;  Service: General;  Laterality: Left;       Review of patient's allergies indicates:   Allergen Reactions    Ativan [lorazepam] Itching       No current facility-administered medications on file prior to encounter.     Current Outpatient Medications on File Prior to Encounter   Medication Sig    albuterol (VENTOLIN HFA) 90  mcg/actuation inhaler inhale 2 puff by inhalation route  every 4 - 6 hours as needed    albuterol-ipratropium (DUO-NEB) 2.5 mg-0.5 mg/3 mL nebulizer solution Take 3 mLs by nebulization every 6 (six) hours as needed for Wheezing or Shortness of Breath. Rescue    alendronate (FOSAMAX) 70 MG tablet Take 1 tablet (70 mg total) by mouth every 7 days.    aspirin 81 MG Chew 81 mg.    atenoloL (TENORMIN) 25 MG tablet TAKE 1/2 TABLET BY MOUTH EVERY DAY **THANK YOU**    atorvastatin (LIPITOR) 20 MG tablet Take 1 tablet (20 mg total) by mouth once daily.    cholecalciferol, vitamin D3, (VITAMIN D3) 25 mcg (1,000 unit) capsule Take 2 capsules (2,000 Units total) by mouth once daily.    clonazePAM (KLONOPIN) 0.5 MG tablet Take 1 tablet (0.5 mg total) by mouth 2 (two) times daily as needed for Anxiety.    cyproheptadine (PERIACTIN) 4 mg tablet Take 1 tablet (4 mg total) by mouth 3 (three) times daily as needed (appetitie /allergies).    DULoxetine (CYMBALTA) 20 MG capsule Take 2 capsules po daily    fluticasone-umeclidin-vilanter (TRELEGY ELLIPTA) 200-62.5-25 mcg inhaler Inhale 1 puff into the lungs once daily.    latanoprost 0.005 % ophthalmic solution Place 1 drop into both eyes every evening.    levoFLOXacin (LEVAQUIN) 500 MG tablet Take 1 tablet (500 mg total) by mouth once daily.    ondansetron (ZOFRAN-ODT) 4 MG TbDL Take 1 tablet (4 mg total) by mouth every 12 (twelve) hours as needed (Nausea).    pantoprazole (PROTONIX) 40 MG tablet Take 1 tablet (40 mg total) by mouth once daily. Take in the morning before breakfast.  Wait 30 minutes before eating or drinking anything    predniSONE (DELTASONE) 5 MG tablet Take 1 tablet (5 mg total) by mouth once daily.    tiZANidine (ZANAFLEX) 4 MG tablet Take 1 tablet (4 mg total) by mouth every 8 (eight) hours as needed.     Family History       Problem Relation (Age of Onset)    Breast cancer Sister          Tobacco Use    Smoking status: Former    Smokeless tobacco: Never   Substance  and Sexual Activity    Alcohol use: Not Currently     Comment: occ    Drug use: Never    Sexual activity: Yes     Birth control/protection: Post-menopausal     Review of Systems   Constitutional:  Positive for fatigue. Negative for chills and fever.   Respiratory:  Positive for shortness of breath.    Cardiovascular:  Negative for chest pain.     Objective:     Vital Signs (Most Recent):  Pulse: (!) 118 (04/02/24 0319)  Resp: (!) 35 (04/02/24 0319)  BP: (!) 141/65 (04/02/24 0149)  SpO2: 97 % (04/02/24 0319) Vital Signs (24h Range):  Pulse:  [118-135] 118  Resp:  [24-35] 35  SpO2:  [95 %-99 %] 97 %  BP: (141-153)/(65-83) 141/65     Weight: 47.6 kg (105 lb)  Body mass index is 17.47 kg/m².     Physical Exam  Constitutional:       Comments: Appears in moderate discomfort    Cardiovascular:      Rate and Rhythm: Tachycardia present.   Pulmonary:      Comments: Tachypenic  Dyspneic with conversation   Neurological:      Mental Status: She is alert.                Significant Labs:  CBC, CMP reviewed     Significant Imaging:  CXR reviewed   Assessment/Plan:     * COPD exacerbation  Case discussed with ER physician   Work up suggests COPD exacerbation   Patient with continued dyspnea and increased WOB; transitioned to BIPAP  Admit to ICU  Continue nebs and steroids; hold PO prednisone   Given mild leukocytosis will continue with azithromycin and ceftriaxone   Stable     Hyperlipidemia  Continue atorvastatin       Anxiety about health  Continue duloxetine         VTE Risk Mitigation (From admission, onward)           Ordered     enoxaparin injection 40 mg  Daily         04/02/24 0348     IP VTE LOW RISK PATIENT  Once         04/02/24 0348                         The attending portion of this evaluation, treatment, and documentation was performed per Maurizio Thomas MD via Telemedicine AudioVisual using the secure [employer]PD[employer] software platform with 2 way audio/video. The provider was located off-site and the patient is located in  the [facility]. The aforementioned video software was utilized to document the relevant history and physical exam            Maurizio Thomas MD  Department of [facility] Medicine   [place] - Emergency Dept

## 2024-04-02 NOTE — HPI
The patient is a 59 y/o female with PMH of COPD on PRN home O2, HLP, GERD, and anxiety who presents with SOB. She has been feeling SOB over the past few days. She wears O2 at home on a PRN basis but has had to over recently given her increased SOB. No fevers reported. Work up consistent with COPD exacerbation.

## 2024-04-02 NOTE — ASSESSMENT & PLAN NOTE
Case discussed with ER physician   Work up suggests COPD exacerbation   Patient with continued dyspnea and increased WOB; transitioned to BIPAP  Admit to ICU  Continue nebs and steroids; hold PO prednisone   Given mild leukocytosis will continue with azithromycin and ceftriaxone   Stable

## 2024-04-02 NOTE — PLAN OF CARE
[facility] Care [facility]  Initial Discharge Assessment    Assessment completed at bedside with Pt, and all information on FaceSheet confirmed, including demographics, PCP, pharmacy and insurance.  Pt has not addressed advance directives, and reports Noreen Wong sister [phone] is her NOK. She currently lives at home alone in a camper on her sisters property. Her PCP is Dr. Lemos, and last appointment was approx 2 weeks ago. Her preferred pharmacy is [facility]. She denies any HH/HD/Blood Thinners. Pt has a shower chair for DME. For transportation to appointments and at discharge, sister to provide. She denies any recent hospitalizations.  Plan for discharge is to return home .  Case Management to continue to follow for discharge planning needs.           Primary Care Provider: Polly Lemos MD    Admission Diagnosis: SOB (shortness of breath) [R06.02]  COPD exacerbation [J44.1]    Admission Date: 4/2/2024  Expected Discharge Date: 4/3/2024    Transition of Care Barriers: None    Payor: [place] / Plan: [place] [facility] COMMUNITY PLAN [place] / Product Type: Managed Medicaid /     Extended Emergency Contact Information  Primary Emergency Contact: Noreen Wong  Address: [address]            [place] [address] [place]  Mobile Phone: [phone]  Relation: Sister  Preferred language: English   needed? No  Secondary Emergency Contact: Shell Wilkerson   [place]  Mobile Phone: [phone]  Relation: Daughter    Discharge Plan A: Home  Discharge Plan B: Home      [facility] - [place] - [address]  [address]  [place] [address]  Phone: [phone] Fax: [phone]    [id number] - [place], [place] - [address]  [address]  [place] [address]  Phone: [phone] Fax: [phone]      Initial Assessment (most recent)       Adult Discharge Assessment - 04/02/24 1551           Discharge Assessment    Assessment Type Discharge Planning Assessment     Confirmed/corrected address, phone number and insurance Yes     Confirmed Demographics Correct on Facesheet     Source of Information patient     Communicated KIM with patient/caregiver Yes     Reason For Admission COPD exacerbation     People in Home alone   sister lives next door    Facility Arrived From: home     Do you expect to return to your current living situation? Yes     Do you have help at home or someone to help you manage your care at home? Yes     Who are your caregiver(s) and their phone number(s)? Noreen Wong (Sister)   [phone]   Sister lives next door, Pt lives in [facility] on property     Prior to hospitilization cognitive status: Alert/Oriented     Current cognitive status: Alert/Oriented     Walking or Climbing Stairs Difficulty yes     Walking or Climbing Stairs ambulation difficulty, dependent;stair climbing difficulty, dependent     Dressing/Bathing Difficulty yes     Dressing/Bathing bathing difficulty, dependent;dressing difficulty, dependent     Do you have any problems with: Errands/Grocery    runs errands    Home Accessibility not wheelchair accessible     Home Layout Able to live on 1st floor     Equipment Currently Used at Home shower chair     Readmission within 30 days? No     Patient currently being followed by outpatient case management? No     Do you currently have service(s) that help you manage your care at home? No     Do you take prescription medications? Yes     Do you have prescription coverage? Yes     Coverage Medicaid     Do you have any problems affording any of your prescribed medications? No     Is the patient taking medications as prescribed? yes     Who is going to help you get home at discharge? Noreen Wong (Sister) [phone]     How do you get to doctors appointments? family or friend will provide     Are you on dialysis? No     Do you take coumadin? No     Discharge Plan A Home      Discharge Plan B Home     DME Needed Upon Discharge  none     Discharge Plan discussed with: Patient     Transition of Care Barriers None

## 2024-04-02 NOTE — ED PROVIDER NOTES
Encounter Date: 2024       History     Chief Complaint   Patient presents with    Shortness of Breath     SOB 1 hr PTA, h/o COPD.     58-year-old history of COPD brought in by EMS for difficulty breathing.  Patient denies recent antibiotic usage or steroid usage.  She has been trying breathing treatments at home without improvement of her symptoms.  Denies any fever,chest pain, n/v/d    The history is provided by the patient. No  was used.     Review of patient's allergies indicates:   Allergen Reactions    Ativan [lorazepam] Itching     Past Medical History:   Diagnosis Date    Anxiety     Arthritis     Asthma     Back pain     COPD (chronic obstructive pulmonary disease)     Pulmonary embolism     10 years ago     Past Surgical History:   Procedure Laterality Date    BIOPSY      right breast    BREAST CYST EXCISION       SECTION      CHOLECYSTECTOMY      COLONOSCOPY N/A 2022    Procedure: COLONOSCOPY;  Surgeon: Chavez Gonzales MD;  Location: [facility] ENDO;  Service: General;  Laterality: N/A;    ESOPHAGOGASTRODUODENOSCOPY N/A 2022    Procedure: ESOPHAGOGASTRODUODENOSCOPY (EGD);  Surgeon: Chavez Gonzales MD;  Location: [facility] ENDO;  Service: General;  Laterality: N/A;    INCISION AND DRAINAGE OF ABSCESS Left 3/16/2020    Procedure: INCISION AND DRAINAGE, ABSCESS;  Surgeon: Irvin Villarreal MD;  Location: [facility] OR;  Service: General;  Laterality: Left;     Family History   Problem Relation Name Age of Onset    Breast cancer Sister      Ovarian cancer Neg Hx       Social History     Tobacco Use    Smoking status: Former    Smokeless tobacco: Never   Substance Use Topics    Alcohol use: Not Currently     Comment: occ    Drug use: Yes     Types: Benzodiazepines     Comment: as perscribed     Review of Systems   Constitutional:  Negative for fever.   HENT:  Negative for sore throat.    Respiratory:  Positive for cough and shortness of breath.    Cardiovascular:  Negative  for chest pain.   Gastrointestinal:  Negative for nausea.   Genitourinary:  Negative for dysuria.   Musculoskeletal:  Negative for back pain.   Skin:  Negative for rash.   Neurological:  Negative for weakness.   Hematological:  Does not bruise/bleed easily.   All other systems reviewed and are negative.      Physical Exam     Initial Vitals   BP Pulse Resp Temp SpO2   04/02/24 0119 04/02/24 0119 04/02/24 0119 04/02/24 0534 04/02/24 0119   (!) 153/83 (!) 135 (!) 24 98.9 °F (37.2 °C) 95 %      MAP       --                Physical Exam    Nursing note and vitals reviewed.  Constitutional: She appears distressed.   HENT:   Head: Normocephalic and atraumatic.   Eyes: EOM are normal. Pupils are equal, round, and reactive to light.   Neck:   Normal range of motion.  Cardiovascular:  Regular rhythm.           Tachycardic   Pulmonary/Chest: She is in respiratory distress. She has wheezes.   Abdominal: Abdomen is soft.   Musculoskeletal:         General: Normal range of motion.      Cervical back: Normal range of motion.     Neurological: She is alert and oriented to person, place, and time. GCS score is 15. GCS eye subscore is 4. GCS verbal subscore is 5. GCS motor subscore is 6.   Skin: Skin is warm.         ED Course   Critical Care    Date/Time: 4/2/2024 4:30 AM    Performed by: Nena Wolf MD  Authorized by: Nena Wolf MD  Direct patient critical care time: 10 minutes  Additional history critical care time: 10 minutes  Ordering / reviewing critical care time: 10 minutes  Documentation critical care time: 10 minutes  Total critical care time (exclusive of procedural time) : 40 minutes  Critical care time was exclusive of separately billable procedures and treating other patients and teaching time.  Critical care was necessary to treat or prevent imminent or life-threatening deterioration of the following conditions: respiratory failure.  Critical care was time spent personally by me on the following  activities: development of treatment plan with patient or surrogate, interpretation of cardiac output measurements, evaluation of patient's response to treatment, examination of patient, obtaining history from patient or surrogate, ordering and performing treatments and interventions, ordering and review of laboratory studies, ordering and review of radiographic studies, re-evaluation of patient's condition, review of old charts and pulse oximetry.        Labs Reviewed   CBC W/ AUTO DIFFERENTIAL - Abnormal; Notable for the following components:       Result Value    WBC 13.31 (*)     MCH 33.6 (*)     Immature Granulocytes 1.2 (*)     Gran # (ANC) 11.2 (*)     Immature Grans (Abs) 0.16 (*)     Gran % 84.2 (*)     Lymph % 9.0 (*)     All other components within normal limits   COMPREHENSIVE METABOLIC PANEL - Abnormal; Notable for the following components:    Glucose 151 (*)     All other components within normal limits   ISTAT PROCEDURE - Abnormal; Notable for the following components:    POC PCO2 50.0 (*)     POC PO2 43 (*)     All other components within normal limits   B-TYPE NATRIURETIC PEPTIDE   MAGNESIUM   TROPONIN I     EKG Readings: (Independently Interpreted)   Initial Reading: No STEMI.   EKG done at 1:30 a.m. shows a rate of 133, FL interval 130, QRS duration 76, .  My interpretation of the EKG is this is sinus tachycardia without any findings concerning for acute ischemia at this time.     ECG Results              EKG 12-lead (Final result)        Collection Time Result Time QRS Duration OHS QTC Calculation    04/02/24 01:30:30 04/02/24 08:40:23 76 431                     Final result by Interface, Lab In [facility] (04/02/24 08:40:26)                   Narrative:    Test Reason : R06.02,    Vent. Rate : 133 BPM     Atrial Rate : 133 BPM     P-R Int : 130 ms          QRS Dur : 076 ms      QT Int : 290 ms       P-R-T Axes : 085 268 082 degrees     QTc Int : 431 ms    Sinus tachycardia  Right atrial  enlargement  Right superior axis deviation  Anterior infarct (cited on or before 28-DEC-2023)  Abnormal ECG  When compared with ECG of 28-DEC-2023 20:42,  Premature atrial complexes are no longer Present  Confirmed by Bryant Morrell MD ([id number]) on 4/2/2024 8:40:22 AM    Referred By: [email or web address]   SELF           Confirmed By:Bryant Morrell MD                                     EKG 12-LEAD (Final result)  Result time 04/09/24 09:30:09      Final result by Unknown User (04/09/24 09:30:09)                                      Imaging Results              X-Ray Chest AP Portable (Final result)  Result time 04/02/24 01:45:07      Final result by Derek Guillaume MD (04/02/24 01:45:07)                   Impression:      As above.      Electronically signed by: Derek Guillaume  Date:    04/02/2024  Time:    01:45               Narrative:    EXAMINATION:  XR CHEST AP PORTABLE    CLINICAL HISTORY:  SOB;    TECHNIQUE:  Single frontal view of the chest was performed.    COMPARISON:  01/02/2024    FINDINGS:  Emphysema.  Bibasilar airspace disease or atelectasis.  Normal heart size.                                       Medications   albuterol-ipratropium (DUO-NEB) 2.5 mg-0.5 mg/3 mL nebulizer solution (  Canceled Entry 4/2/24 1558)   albuterol-ipratropium 2.5 mg-0.5 mg/3 mL nebulizer solution 3 mL (3 mLs Nebulization Given 4/2/24 0130)   albuterol-ipratropium 2.5 mg-0.5 mg/3 mL nebulizer solution 3 mL (3 mLs Nebulization Given 4/2/24 0130)   magnesium sulfate 2g in water 50mL IVPB (premix) (0 g Intravenous Stopped 4/2/24 0204)   albuterol nebulizer solution 2.5 mg (2.5 mg Nebulization Given 4/2/24 0319)   azithromycin (ZITHROMAX) 500 mg in dextrose 5 % (D5W) 250 mL IVPB (Vial-Mate) (0 mg Intravenous Stopped 4/2/24 0417)     Medical Decision Making  Differential diagnosis for this patient includes COPD exacerbation, CHF exacerbation, pneumonia, bronchitis, upper respiratory viral infection.    New line patient was given 25 mg  Solu-Medrol via EMS, as well as breathing treatments and 4 of Zofran.  Patient in moderate to severe respiratory distress on presentation here.  Given 3 breathing treatments, magnesium with some improvement of her breathing but still not moving air great.  We will place her on BiPAP consult tele hospitalist for admission for COPD exacerbation.    Amount and/or Complexity of Data Reviewed  Labs: ordered. Decision-making details documented in ED Course.  Radiology: ordered. Decision-making details documented in ED Course.    Risk  Prescription drug management.  Decision regarding hospitalization.                                      Clinical Impression:  Final diagnoses:  [R06.02] SOB (shortness of breath)  [J44.1] COPD exacerbation (Primary)          ED Disposition Condition    Admit                 Nena Wolf MD  04/02/24 5417       Nena Wolf MD  04/16/24 2067

## 2024-04-02 NOTE — SUBJECTIVE & OBJECTIVE
Past Medical History:   Diagnosis Date    Anxiety     Arthritis     Asthma     Back pain     COPD (chronic obstructive pulmonary disease)     Pulmonary embolism     10 years ago       Past Surgical History:   Procedure Laterality Date    BIOPSY      right breast    BREAST CYST EXCISION       SECTION      CHOLECYSTECTOMY      COLONOSCOPY N/A 2022    Procedure: COLONOSCOPY;  Surgeon: Chavez Gonzales MD;  Location: [facility] ENDO;  Service: General;  Laterality: N/A;    ESOPHAGOGASTRODUODENOSCOPY N/A 2022    Procedure: ESOPHAGOGASTRODUODENOSCOPY (EGD);  Surgeon: Chavez Gonzales MD;  Location: [facility] ENDO;  Service: General;  Laterality: N/A;    INCISION AND DRAINAGE OF ABSCESS Left 3/16/2020    Procedure: INCISION AND DRAINAGE, ABSCESS;  Surgeon: Irvin Villarreal MD;  Location: [facility] OR;  Service: General;  Laterality: Left;       Review of patient's allergies indicates:   Allergen Reactions    Ativan [lorazepam] Itching       No current facility-administered medications on file prior to encounter.     Current Outpatient Medications on File Prior to Encounter   Medication Sig    albuterol (VENTOLIN HFA) 90 mcg/actuation inhaler inhale 2 puff by inhalation route  every 4 - 6 hours as needed    albuterol-ipratropium (DUO-NEB) 2.5 mg-0.5 mg/3 mL nebulizer solution Take 3 mLs by nebulization every 6 (six) hours as needed for Wheezing or Shortness of Breath. Rescue    alendronate (FOSAMAX) 70 MG tablet Take 1 tablet (70 mg total) by mouth every 7 days.    aspirin 81 MG Chew 81 mg.    atenoloL (TENORMIN) 25 MG tablet TAKE 1/2 TABLET BY MOUTH EVERY DAY **THANK YOU**    atorvastatin (LIPITOR) 20 MG tablet Take 1 tablet (20 mg total) by mouth once daily.    cholecalciferol, vitamin D3, (VITAMIN D3) 25 mcg (1,000 unit) capsule Take 2 capsules (2,000 Units total) by mouth once daily.    clonazePAM (KLONOPIN) 0.5 MG tablet Take 1 tablet (0.5 mg total) by mouth 2 (two) times daily as needed for Anxiety.     cyproheptadine (PERIACTIN) 4 mg tablet Take 1 tablet (4 mg total) by mouth 3 (three) times daily as needed (appetitie /allergies).    DULoxetine (CYMBALTA) 20 MG capsule Take 2 capsules po daily    fluticasone-umeclidin-vilanter (TRELEGY ELLIPTA) 200-62.5-25 mcg inhaler Inhale 1 puff into the lungs once daily.    latanoprost 0.005 % ophthalmic solution Place 1 drop into both eyes every evening.    levoFLOXacin (LEVAQUIN) 500 MG tablet Take 1 tablet (500 mg total) by mouth once daily.    ondansetron (ZOFRAN-ODT) 4 MG TbDL Take 1 tablet (4 mg total) by mouth every 12 (twelve) hours as needed (Nausea).    pantoprazole (PROTONIX) 40 MG tablet Take 1 tablet (40 mg total) by mouth once daily. Take in the morning before breakfast.  Wait 30 minutes before eating or drinking anything    predniSONE (DELTASONE) 5 MG tablet Take 1 tablet (5 mg total) by mouth once daily.    tiZANidine (ZANAFLEX) 4 MG tablet Take 1 tablet (4 mg total) by mouth every 8 (eight) hours as needed.     Family History       Problem Relation (Age of Onset)    Breast cancer Sister          Tobacco Use    Smoking status: Former    Smokeless tobacco: Never   Substance and Sexual Activity    Alcohol use: Not Currently     Comment: occ    Drug use: Never    Sexual activity: Yes     Birth control/protection: Post-menopausal     Review of Systems   Constitutional:  Positive for fatigue. Negative for chills and fever.   Respiratory:  Positive for shortness of breath.    Cardiovascular:  Negative for chest pain.     Objective:     Vital Signs (Most Recent):  Pulse: (!) 118 (04/02/24 0319)  Resp: (!) 35 (04/02/24 0319)  BP: (!) 141/65 (04/02/24 0149)  SpO2: 97 % (04/02/24 0319) Vital Signs (24h Range):  Pulse:  [118-135] 118  Resp:  [24-35] 35  SpO2:  [95 %-99 %] 97 %  BP: (141-153)/(65-83) 141/65     Weight: 47.6 kg (105 lb)  Body mass index is 17.47 kg/m².     Physical Exam  Constitutional:       Comments: Appears in moderate discomfort    Cardiovascular:       Rate and Rhythm: Tachycardia present.   Pulmonary:      Comments: Tachypenic  Dyspneic with conversation   Neurological:      Mental Status: She is alert.                Significant Labs:  CBC, CMP reviewed     Significant Imaging:  CXR reviewed

## 2024-04-03 LAB
ALLENS TEST: ABNORMAL
ANION GAP SERPL CALC-SCNC: 15 MMOL/L (ref 8–16)
BASOPHILS # BLD AUTO: 0.02 K/UL (ref 0–0.2)
BASOPHILS NFR BLD: 0.1 % (ref 0–1.9)
BUN SERPL-MCNC: 16 MG/DL (ref 6–20)
CALCIUM SERPL-MCNC: 9.1 MG/DL (ref 8.7–10.5)
CHLORIDE SERPL-SCNC: 100 MMOL/L (ref 95–110)
CO2 SERPL-SCNC: 25 MMOL/L (ref 23–29)
CREAT SERPL-MCNC: 0.8 MG/DL (ref 0.5–1.4)
DELSYS: ABNORMAL
DIFFERENTIAL METHOD BLD: ABNORMAL
EOSINOPHIL # BLD AUTO: 0 K/UL (ref 0–0.5)
EOSINOPHIL NFR BLD: 0 % (ref 0–8)
ERYTHROCYTE [DISTWIDTH] IN BLOOD BY AUTOMATED COUNT: 11.5 % (ref 11.5–14.5)
EST. GFR  (NO RACE VARIABLE): >60 ML/MIN/1.73 M^2
FLOW: 2
GLUCOSE SERPL-MCNC: 140 MG/DL (ref 70–110)
HCO3 UR-SCNC: 31 MMOL/L (ref 24–28)
HCT VFR BLD AUTO: 40.2 % (ref 37–48.5)
HGB BLD-MCNC: 13.7 G/DL (ref 12–16)
IMM GRANULOCYTES # BLD AUTO: 0.16 K/UL (ref 0–0.04)
IMM GRANULOCYTES NFR BLD AUTO: 0.8 % (ref 0–0.5)
LYMPHOCYTES # BLD AUTO: 0.5 K/UL (ref 1–4.8)
LYMPHOCYTES NFR BLD: 2.4 % (ref 18–48)
MAGNESIUM SERPL-MCNC: 2.3 MG/DL (ref 1.6–2.6)
MCH RBC QN AUTO: 33.5 PG (ref 27–31)
MCHC RBC AUTO-ENTMCNC: 34.1 G/DL (ref 32–36)
MCV RBC AUTO: 98 FL (ref 82–98)
MODE: ABNORMAL
MONOCYTES # BLD AUTO: 0.4 K/UL (ref 0.3–1)
MONOCYTES NFR BLD: 1.7 % (ref 4–15)
NEUTROPHILS # BLD AUTO: 19.5 K/UL (ref 1.8–7.7)
NEUTROPHILS NFR BLD: 95 % (ref 38–73)
NRBC BLD-RTO: 0 /100 WBC
PCO2 BLDA: 52.7 MMHG (ref 35–45)
PH SMN: 7.38 [PH] (ref 7.35–7.45)
PHOSPHATE SERPL-MCNC: 4.7 MG/DL (ref 2.7–4.5)
PLATELET # BLD AUTO: 282 K/UL (ref 150–450)
PMV BLD AUTO: 9.4 FL (ref 9.2–12.9)
PO2 BLDA: 77 MMHG (ref 80–100)
POC BE: 6 MMOL/L
POC SATURATED O2: 95 % (ref 95–100)
POTASSIUM SERPL-SCNC: 4.2 MMOL/L (ref 3.5–5.1)
RBC # BLD AUTO: 4.09 M/UL (ref 4–5.4)
SAMPLE: ABNORMAL
SITE: ABNORMAL
SODIUM SERPL-SCNC: 140 MMOL/L (ref 136–145)
WBC # BLD AUTO: 20.51 K/UL (ref 3.9–12.7)

## 2024-04-03 PROCEDURE — 99233 SBSQ HOSP IP/OBS HIGH 50: CPT | Mod: ,,, | Performed by: INTERNAL MEDICINE

## 2024-04-03 PROCEDURE — 84100 ASSAY OF PHOSPHORUS: CPT | Performed by: HOSPITALIST

## 2024-04-03 PROCEDURE — 25000003 PHARM REV CODE 250: Performed by: INTERNAL MEDICINE

## 2024-04-03 PROCEDURE — 85025 COMPLETE CBC W/AUTO DIFF WBC: CPT | Performed by: HOSPITALIST

## 2024-04-03 PROCEDURE — 25000003 PHARM REV CODE 250: Performed by: HOSPITALIST

## 2024-04-03 PROCEDURE — 25000242 PHARM REV CODE 250 ALT 637 W/ HCPCS: Performed by: INTERNAL MEDICINE

## 2024-04-03 PROCEDURE — 94760 N-INVAS EAR/PLS OXIMETRY 1: CPT | Mod: XB

## 2024-04-03 PROCEDURE — 94640 AIRWAY INHALATION TREATMENT: CPT

## 2024-04-03 PROCEDURE — 94761 N-INVAS EAR/PLS OXIMETRY MLT: CPT

## 2024-04-03 PROCEDURE — 80048 BASIC METABOLIC PNL TOTAL CA: CPT | Performed by: HOSPITALIST

## 2024-04-03 PROCEDURE — 94660 CPAP INITIATION&MGMT: CPT

## 2024-04-03 PROCEDURE — 11000001 HC ACUTE MED/SURG PRIVATE ROOM

## 2024-04-03 PROCEDURE — 27000221 HC OXYGEN, UP TO 24 HOURS

## 2024-04-03 PROCEDURE — 63600175 PHARM REV CODE 636 W HCPCS: Performed by: INTERNAL MEDICINE

## 2024-04-03 PROCEDURE — 99900035 HC TECH TIME PER 15 MIN (STAT)

## 2024-04-03 PROCEDURE — 63600175 PHARM REV CODE 636 W HCPCS: Mod: UD | Performed by: HOSPITALIST

## 2024-04-03 PROCEDURE — 36600 WITHDRAWAL OF ARTERIAL BLOOD: CPT

## 2024-04-03 PROCEDURE — 83735 ASSAY OF MAGNESIUM: CPT | Performed by: HOSPITALIST

## 2024-04-03 PROCEDURE — 82803 BLOOD GASES ANY COMBINATION: CPT

## 2024-04-03 RX ADMIN — IPRATROPIUM BROMIDE AND ALBUTEROL SULFATE 3 ML: .5; 3 SOLUTION RESPIRATORY (INHALATION) at 12:04

## 2024-04-03 RX ADMIN — ENOXAPARIN SODIUM 40 MG: 40 INJECTION SUBCUTANEOUS at 05:04

## 2024-04-03 RX ADMIN — CEFTRIAXONE SODIUM 1 G: 1 INJECTION, POWDER, FOR SOLUTION INTRAMUSCULAR; INTRAVENOUS at 05:04

## 2024-04-03 RX ADMIN — IPRATROPIUM BROMIDE AND ALBUTEROL SULFATE 3 ML: .5; 3 SOLUTION RESPIRATORY (INHALATION) at 07:04

## 2024-04-03 RX ADMIN — PANTOPRAZOLE SODIUM 40 MG: 40 TABLET, DELAYED RELEASE ORAL at 09:04

## 2024-04-03 RX ADMIN — IPRATROPIUM BROMIDE AND ALBUTEROL SULFATE 3 ML: .5; 3 SOLUTION RESPIRATORY (INHALATION) at 04:04

## 2024-04-03 RX ADMIN — LATANOPROST 1 DROP: 50 SOLUTION OPHTHALMIC at 08:04

## 2024-04-03 RX ADMIN — BUDESONIDE 0.5 MG: 0.25 INHALANT RESPIRATORY (INHALATION) at 06:04

## 2024-04-03 RX ADMIN — ASPIRIN 81 MG: 81 TABLET, CHEWABLE ORAL at 09:04

## 2024-04-03 RX ADMIN — ATORVASTATIN CALCIUM 20 MG: 10 TABLET, FILM COATED ORAL at 09:04

## 2024-04-03 RX ADMIN — ATENOLOL 12.5 MG: 25 TABLET ORAL at 09:04

## 2024-04-03 RX ADMIN — METHYLPREDNISOLONE SODIUM SUCCINATE 80 MG: 40 INJECTION, POWDER, FOR SOLUTION INTRAMUSCULAR; INTRAVENOUS at 05:04

## 2024-04-03 RX ADMIN — DULOXETINE HYDROCHLORIDE 40 MG: 20 CAPSULE, DELAYED RELEASE ORAL at 09:04

## 2024-04-03 RX ADMIN — BUDESONIDE 0.5 MG: 0.25 INHALANT RESPIRATORY (INHALATION) at 09:04

## 2024-04-03 RX ADMIN — IPRATROPIUM BROMIDE AND ALBUTEROL SULFATE 3 ML: .5; 3 SOLUTION RESPIRATORY (INHALATION) at 09:04

## 2024-04-03 RX ADMIN — AZITHROMYCIN MONOHYDRATE 500 MG: 500 INJECTION, POWDER, LYOPHILIZED, FOR SOLUTION INTRAVENOUS at 03:04

## 2024-04-03 NOTE — PROGRESS NOTES
[facility] Medicine  Progress Note    Patient Name: Zayra White  MRN: [id number]  Patient Class: IP- Inpatient   Admission Date: 4/2/2024  Length of Stay: 0 days  Attending Physician: Servando Rice MD  Primary Care Provider: Polly Lemos MD        Subjective:     Principal Problem:COPD exacerbation        HPI:  The patient is a 57 y/o female with PMH of COPD on PRN home O2, HLP, GERD, and anxiety who presents with SOB. She has been feeling SOB over the past few days. She wears O2 at home on a PRN basis but has had to over recently given her increased SOB. No fevers reported. Work up consistent with COPD exacerbation.       Overview/Hospital Course:  No notes on file    Interval History:  Patient admitted early this morning with increasing shortness of breath and cough.  Patient denied any fever chills nausea or vomiting.  Vital signs are stable with O2 sats 98-99%.  Labs show a ABG of 7.35 pCO2 of 50 PO2 of 43 and O2 sats 75% which appears as a venous stick.    Review of Systems   Constitutional:  Negative for activity change, appetite change, fatigue and fever.   HENT:  Negative for congestion, ear discharge, mouth sores, nosebleeds, rhinorrhea, sinus pressure, sinus pain and tinnitus.    Eyes: Negative.  Negative for pain, redness and itching.   Respiratory:  Positive for cough, chest tightness, shortness of breath and wheezing. Negative for apnea, choking and stridor.    Cardiovascular:  Negative for chest pain, palpitations and leg swelling.   Gastrointestinal:  Negative for abdominal distention, abdominal pain, anal bleeding, blood in stool, constipation and diarrhea.   Endocrine: Negative.    Genitourinary:  Negative for difficulty urinating, flank pain, frequency and urgency.   Musculoskeletal:  Negative for arthralgias, back pain, gait problem and myalgias.   Skin:  Negative for color change and pallor.   Allergic/Immunologic: Negative.    Neurological:  Negative  for dizziness, facial asymmetry, weakness, light-headedness and headaches.   Hematological:  Negative for adenopathy. Does not bruise/bleed easily.   All other systems reviewed and are negative.    Objective:     Vital Signs (Most Recent):  Temp: 97.9 °F (36.6 °C) (04/02/24 1900)  Pulse: 106 (04/02/24 1900)  Resp: 19 (04/02/24 1900)  BP: 129/68 (04/02/24 1900)  SpO2: 99 % (04/02/24 1900) Vital Signs (24h Range):  Temp:  [97.6 °F (36.4 °C)-98.9 °F (37.2 °C)] 97.9 °F (36.6 °C)  Pulse:  [] 106  Resp:  [19-35] 19  SpO2:  [95 %-100 %] 99 %  BP: (107-168)/(55-83) 129/68     Weight: 47.6 kg (105 lb)  Body mass index is 17.47 kg/m².    Intake/Output Summary (Last 24 hours) at 4/2/2024 2209  Last data filed at 4/2/2024 1753  Gross per 24 hour   Intake 518 ml   Output --   Net 518 ml         Physical Exam  Vitals and nursing note reviewed.   Constitutional:       Appearance: She is well-developed.   HENT:      Head: Normocephalic and atraumatic.   Eyes:      Pupils: Pupils are equal, round, and reactive to light.   Neck:      Thyroid: No thyromegaly.      Trachea: No tracheal deviation.   Cardiovascular:      Rate and Rhythm: Regular rhythm. Tachycardia present.      Heart sounds: Normal heart sounds.   Pulmonary:      Effort: Respiratory distress present.      Breath sounds: Wheezing and rhonchi present.   Abdominal:      General: Bowel sounds are normal. There is no distension.      Palpations: Abdomen is soft.      Tenderness: There is no guarding or rebound.   Musculoskeletal:         General: Normal range of motion.      Cervical back: Normal range of motion and neck supple.   Lymphadenopathy:      Cervical: No cervical adenopathy.   Skin:     General: Skin is warm and dry.      Capillary Refill: Capillary refill takes less than 2 seconds.   Neurological:      Mental Status: She is alert and oriented to person, place, and time.   Psychiatric:         Behavior: Behavior normal.         Thought Content: Thought  content normal.         Judgment: Judgment normal.             Significant Labs: All pertinent labs within the past 24 hours have been reviewed.  Recent Lab Results         04/02/24  0426   04/02/24  0130   04/02/24  0128        Albumin     4.4       ALP     63       Allens Test Pass           ALT     19       Anion Gap     15       AST     27       Baso #     0.07       Basophil %     0.5       BILIRUBIN TOTAL     0.7  Comment: For infants and newborns, interpretation of results should be based  on gestational age, weight and in agreement with clinical  observations.    Premature Infant recommended reference ranges:  Up to 24 hours.............<8.0 mg/dL  Up to 48 hours............<12.0 mg/dL  3-5 days..................<15.0 mg/dL  6-29 days.................<15.0 mg/dL         BNP     37  Comment: Values of less than 100 pg/ml are consistent with non-CHF populations.       Site RR           BUN     15       Calcium     9.6       Chloride     102       CO2     26       Creatinine     0.8       DelSys CPAP/BiPAP           Differential Method     Automated       eGFR     >60.0       Eos #     0.0       Eos %     0.1       EP 6           FiO2 35           Glucose     151       Gran # (ANC)     11.2       Gran %     84.2       Hematocrit     42.6       Hemoglobin     14.9       Immature Grans (Abs)     0.16  Comment: Mild elevation in immature granulocytes is non specific and   can be seen in a variety of conditions including stress response,   acute inflammation, trauma and pregnancy. Correlation with other   laboratory and clinical findings is essential.         Immature Granulocytes     1.2       IP 12           Lymph #     1.2       Lymph %     9.0       Magnesium      1.9       MCH     33.6       MCHC     35.0       MCV     96       Mode BiPAP           Mono #     0.7       Mono %     5.0       MPV     9.2       nRBC     0       QRS Duration   76         OHS QTC Calculation   431         Platelet Count     309        POC BE 2           POC HCO3 27.8           POC PCO2 50.0           POC PH 7.353           POC PO2 43           POC SATURATED O2 75           Potassium     4.1       PROTEIN TOTAL     7.2       Rate 12           RBC     4.43       RDW     11.8       Sample ARTERIAL           Sodium     143       Troponin I     <0.006  Comment: The reference interval for Troponin I represents the 99th percentile   cutoff   for our facility and is consistent with 3rd generation assay   performance.         WBC     13.31               Significant Imaging: I have reviewed all pertinent imaging results/findings within the past 24 hours.    Assessment/Plan:      * COPD exacerbation  Case discussed with ER physician   Work up suggests COPD exacerbation   Patient with continued dyspnea and increased WOB; transitioned to BIPAP  Admit to ICU  Continue nebs and steroids; hold PO prednisone   Given mild leukocytosis will continue with azithromycin and ceftriaxone   Stable     Hyperlipidemia  Continue atorvastatin       Anxiety about health  Continue duloxetine         VTE Risk Mitigation (From admission, onward)           Ordered     enoxaparin injection 40 mg  Daily         04/02/24 0348     IP VTE LOW RISK PATIENT  Once         04/02/24 0348                    Discharge Planning   KIM: 4/3/2024     Code Status: Full Code   Is the patient medically ready for discharge?:     Reason for patient still in hospital (select all that apply): Treatment  Discharge Plan A: Home                  Servando Rice MD  Department of Hospital Medicine   [facility]

## 2024-04-03 NOTE — NURSING
Plan of care reviewed with patient. VSS. No changes in status. Patient has no needs or requests at this time. Bed locked in lowest position, side rails x2, call light within reach.

## 2024-04-03 NOTE — SUBJECTIVE & OBJECTIVE
Interval History:  Patient admitted early this morning with increasing shortness of breath and cough.  Patient denied any fever chills nausea or vomiting.  Vital signs are stable with O2 sats 98-99%.  Labs show a ABG of 7.35 pCO2 of 50 PO2 of 43 and O2 sats 75% which appears as a venous stick.    Review of Systems   Constitutional:  Negative for activity change, appetite change, fatigue and fever.   HENT:  Negative for congestion, ear discharge, mouth sores, nosebleeds, rhinorrhea, sinus pressure, sinus pain and tinnitus.    Eyes: Negative.  Negative for pain, redness and itching.   Respiratory:  Positive for cough, chest tightness, shortness of breath and wheezing. Negative for apnea, choking and stridor.    Cardiovascular:  Negative for chest pain, palpitations and leg swelling.   Gastrointestinal:  Negative for abdominal distention, abdominal pain, anal bleeding, blood in stool, constipation and diarrhea.   Endocrine: Negative.    Genitourinary:  Negative for difficulty urinating, flank pain, frequency and urgency.   Musculoskeletal:  Negative for arthralgias, back pain, gait problem and myalgias.   Skin:  Negative for color change and pallor.   Allergic/Immunologic: Negative.    Neurological:  Negative for dizziness, facial asymmetry, weakness, light-headedness and headaches.   Hematological:  Negative for adenopathy. Does not bruise/bleed easily.   All other systems reviewed and are negative.    Objective:     Vital Signs (Most Recent):  Temp: 97.9 °F (36.6 °C) (04/02/24 1900)  Pulse: 106 (04/02/24 1900)  Resp: 19 (04/02/24 1900)  BP: 129/68 (04/02/24 1900)  SpO2: 99 % (04/02/24 1900) Vital Signs (24h Range):  Temp:  [97.6 °F (36.4 °C)-98.9 °F (37.2 °C)] 97.9 °F (36.6 °C)  Pulse:  [] 106  Resp:  [19-35] 19  SpO2:  [95 %-100 %] 99 %  BP: (107-168)/(55-83) 129/68     Weight: 47.6 kg (105 lb)  Body mass index is 17.47 kg/m².    Intake/Output Summary (Last 24 hours) at 4/2/20242/2024 2209  Last data filed at  4/2/2024 1753  Gross per 24 hour   Intake 518 ml   Output --   Net 518 ml         Physical Exam  Vitals and nursing note reviewed.   Constitutional:       Appearance: She is well-developed.   HENT:      Head: Normocephalic and atraumatic.   Eyes:      Pupils: Pupils are equal, round, and reactive to light.   Neck:      Thyroid: No thyromegaly.      Trachea: No tracheal deviation.   Cardiovascular:      Rate and Rhythm: Regular rhythm. Tachycardia present.      Heart sounds: Normal heart sounds.   Pulmonary:      Effort: Respiratory distress present.      Breath sounds: Wheezing and rhonchi present.   Abdominal:      General: Bowel sounds are normal. There is no distension.      Palpations: Abdomen is soft.      Tenderness: There is no guarding or rebound.   Musculoskeletal:         General: Normal range of motion.      Cervical back: Normal range of motion and neck supple.   Lymphadenopathy:      Cervical: No cervical adenopathy.   Skin:     General: Skin is warm and dry.      Capillary Refill: Capillary refill takes less than 2 seconds.   Neurological:      Mental Status: She is alert and oriented to person, place, and time.   Psychiatric:         Behavior: Behavior normal.         Thought Content: Thought content normal.         Judgment: Judgment normal.             Significant Labs: All pertinent labs within the past 24 hours have been reviewed.  Recent Lab Results         04/02/24  0426   04/02/24  0130   04/02/24  0128        Albumin     4.4       ALP     63       Allens Test Pass           ALT     19       Anion Gap     15       AST     27       Baso #     0.07       Basophil %     0.5       BILIRUBIN TOTAL     0.7  Comment: For infants and newborns, interpretation of results should be based  on gestational age, weight and in agreement with clinical  observations.    Premature Infant recommended reference ranges:  Up to 24 hours.............<8.0 mg/dL  Up to 48 hours............<12.0 mg/dL  3-5  days..................<15.0 mg/dL  6-29 days.................<15.0 mg/dL         BNP     37  Comment: Values of less than 100 pg/ml are consistent with non-CHF populations.       Site RR           BUN     15       Calcium     9.6       Chloride     102       CO2     26       Creatinine     0.8       DelSys CPAP/BiPAP           Differential Method     Automated       eGFR     >60.0       Eos #     0.0       Eos %     0.1       EP 6           FiO2 35           Glucose     151       Gran # (ANC)     11.2       Gran %     84.2       Hematocrit     42.6       Hemoglobin     14.9       Immature Grans (Abs)     0.16  Comment: Mild elevation in immature granulocytes is non specific and   can be seen in a variety of conditions including stress response,   acute inflammation, trauma and pregnancy. Correlation with other   laboratory and clinical findings is essential.         Immature Granulocytes     1.2       IP 12           Lymph #     1.2       Lymph %     9.0       Magnesium      1.9       MCH     33.6       MCHC     35.0       MCV     96       Mode BiPAP           Mono #     0.7       Mono %     5.0       MPV     9.2       nRBC     0       QRS Duration   76         OHS QTC Calculation   431         Platelet Count     309       POC BE 2           POC HCO3 27.8           POC PCO2 50.0           POC PH 7.353           POC PO2 43           POC SATURATED O2 75           Potassium     4.1       PROTEIN TOTAL     7.2       Rate 12           RBC     4.43       RDW     11.8       Sample ARTERIAL           Sodium     143       Troponin I     <0.006  Comment: The reference interval for Troponin I represents the 99th percentile   cutoff   for our facility and is consistent with 3rd generation assay   performance.         WBC     13.31               Significant Imaging: I have reviewed all pertinent imaging results/findings within the past 24 hours.

## 2024-04-04 ENCOUNTER — TELEPHONE (OUTPATIENT)
Dept: FAMILY MEDICINE | Facility: CLINIC | Age: 59
End: 2024-04-04
Payer: MEDICAID

## 2024-04-04 VITALS
DIASTOLIC BLOOD PRESSURE: 57 MMHG | RESPIRATION RATE: 20 BRPM | HEART RATE: 84 BPM | WEIGHT: 105 LBS | BODY MASS INDEX: 17.49 KG/M2 | OXYGEN SATURATION: 97 % | TEMPERATURE: 98 F | SYSTOLIC BLOOD PRESSURE: 125 MMHG | HEIGHT: 65 IN

## 2024-04-04 LAB
ALBUMIN SERPL BCP-MCNC: 3.6 G/DL (ref 3.5–5.2)
ALP SERPL-CCNC: 54 U/L (ref 55–135)
ALT SERPL W/O P-5'-P-CCNC: 18 U/L (ref 10–44)
ANION GAP SERPL CALC-SCNC: 11 MMOL/L (ref 8–16)
AST SERPL-CCNC: 25 U/L (ref 10–40)
BASOPHILS # BLD AUTO: 0.02 K/UL (ref 0–0.2)
BASOPHILS NFR BLD: 0.1 % (ref 0–1.9)
BILIRUB SERPL-MCNC: 0.3 MG/DL (ref 0.1–1)
BUN SERPL-MCNC: 17 MG/DL (ref 6–20)
CALCIUM SERPL-MCNC: 8.9 MG/DL (ref 8.7–10.5)
CHLORIDE SERPL-SCNC: 99 MMOL/L (ref 95–110)
CO2 SERPL-SCNC: 28 MMOL/L (ref 23–29)
CREAT SERPL-MCNC: 0.8 MG/DL (ref 0.5–1.4)
DIFFERENTIAL METHOD BLD: ABNORMAL
EOSINOPHIL # BLD AUTO: 0 K/UL (ref 0–0.5)
EOSINOPHIL NFR BLD: 0 % (ref 0–8)
ERYTHROCYTE [DISTWIDTH] IN BLOOD BY AUTOMATED COUNT: 11.8 % (ref 11.5–14.5)
EST. GFR  (NO RACE VARIABLE): >60 ML/MIN/1.73 M^2
GLUCOSE SERPL-MCNC: 229 MG/DL (ref 70–110)
HCT VFR BLD AUTO: 38.3 % (ref 37–48.5)
HGB BLD-MCNC: 13 G/DL (ref 12–16)
IMM GRANULOCYTES # BLD AUTO: 0.12 K/UL (ref 0–0.04)
IMM GRANULOCYTES NFR BLD AUTO: 0.7 % (ref 0–0.5)
LYMPHOCYTES # BLD AUTO: 0.4 K/UL (ref 1–4.8)
LYMPHOCYTES NFR BLD: 2.3 % (ref 18–48)
MAGNESIUM SERPL-MCNC: 2.2 MG/DL (ref 1.6–2.6)
MCH RBC QN AUTO: 33.5 PG (ref 27–31)
MCHC RBC AUTO-ENTMCNC: 33.9 G/DL (ref 32–36)
MCV RBC AUTO: 99 FL (ref 82–98)
MONOCYTES # BLD AUTO: 0.4 K/UL (ref 0.3–1)
MONOCYTES NFR BLD: 2.1 % (ref 4–15)
NEUTROPHILS # BLD AUTO: 16.2 K/UL (ref 1.8–7.7)
NEUTROPHILS NFR BLD: 94.8 % (ref 38–73)
NRBC BLD-RTO: 0 /100 WBC
PHOSPHATE SERPL-MCNC: 3.8 MG/DL (ref 2.7–4.5)
PLATELET # BLD AUTO: 254 K/UL (ref 150–450)
PMV BLD AUTO: 9.4 FL (ref 9.2–12.9)
POTASSIUM SERPL-SCNC: 4 MMOL/L (ref 3.5–5.1)
PROT SERPL-MCNC: 6.1 G/DL (ref 6–8.4)
RBC # BLD AUTO: 3.88 M/UL (ref 4–5.4)
SODIUM SERPL-SCNC: 138 MMOL/L (ref 136–145)
WBC # BLD AUTO: 17.13 K/UL (ref 3.9–12.7)

## 2024-04-04 PROCEDURE — 25000003 PHARM REV CODE 250: Performed by: HOSPITALIST

## 2024-04-04 PROCEDURE — 63600175 PHARM REV CODE 636 W HCPCS: Mod: UD | Performed by: HOSPITALIST

## 2024-04-04 PROCEDURE — 63600175 PHARM REV CODE 636 W HCPCS: Performed by: INTERNAL MEDICINE

## 2024-04-04 PROCEDURE — 84100 ASSAY OF PHOSPHORUS: CPT | Performed by: INTERNAL MEDICINE

## 2024-04-04 PROCEDURE — 85025 COMPLETE CBC W/AUTO DIFF WBC: CPT | Performed by: INTERNAL MEDICINE

## 2024-04-04 PROCEDURE — 83735 ASSAY OF MAGNESIUM: CPT | Performed by: INTERNAL MEDICINE

## 2024-04-04 PROCEDURE — 99238 HOSP IP/OBS DSCHRG MGMT 30/<: CPT | Mod: ,,, | Performed by: STUDENT IN AN ORGANIZED HEALTH CARE EDUCATION/TRAINING PROGRAM

## 2024-04-04 PROCEDURE — 27000221 HC OXYGEN, UP TO 24 HOURS

## 2024-04-04 PROCEDURE — 25000003 PHARM REV CODE 250: Performed by: STUDENT IN AN ORGANIZED HEALTH CARE EDUCATION/TRAINING PROGRAM

## 2024-04-04 PROCEDURE — 94640 AIRWAY INHALATION TREATMENT: CPT

## 2024-04-04 PROCEDURE — 94761 N-INVAS EAR/PLS OXIMETRY MLT: CPT

## 2024-04-04 PROCEDURE — 80053 COMPREHEN METABOLIC PANEL: CPT | Performed by: INTERNAL MEDICINE

## 2024-04-04 PROCEDURE — 25000003 PHARM REV CODE 250: Performed by: INTERNAL MEDICINE

## 2024-04-04 PROCEDURE — 63600175 PHARM REV CODE 636 W HCPCS: Performed by: STUDENT IN AN ORGANIZED HEALTH CARE EDUCATION/TRAINING PROGRAM

## 2024-04-04 PROCEDURE — 25000242 PHARM REV CODE 250 ALT 637 W/ HCPCS: Performed by: INTERNAL MEDICINE

## 2024-04-04 RX ORDER — LEVOFLOXACIN 500 MG/1
500 TABLET, FILM COATED ORAL DAILY
Qty: 5 TABLET | Refills: 0 | Status: SHIPPED | OUTPATIENT
Start: 2024-04-04 | End: 2024-04-09

## 2024-04-04 RX ORDER — PREDNISONE 5 MG/1
TABLET ORAL
Qty: 90 TABLET | Refills: 0 | Status: SHIPPED | OUTPATIENT
Start: 2024-04-04 | End: 2024-07-07

## 2024-04-04 RX ORDER — CLONAZEPAM 0.5 MG/1
0.5 TABLET ORAL 2 TIMES DAILY PRN
Status: DISCONTINUED | OUTPATIENT
Start: 2024-04-04 | End: 2024-04-04 | Stop reason: HOSPADM

## 2024-04-04 RX ORDER — PREDNISONE 20 MG/1
40 TABLET ORAL DAILY
Status: DISCONTINUED | OUTPATIENT
Start: 2024-04-04 | End: 2024-04-04 | Stop reason: HOSPADM

## 2024-04-04 RX ADMIN — AZITHROMYCIN MONOHYDRATE 500 MG: 500 INJECTION, POWDER, LYOPHILIZED, FOR SOLUTION INTRAVENOUS at 03:04

## 2024-04-04 RX ADMIN — ATORVASTATIN CALCIUM 20 MG: 10 TABLET, FILM COATED ORAL at 08:04

## 2024-04-04 RX ADMIN — ATENOLOL 12.5 MG: 25 TABLET ORAL at 08:04

## 2024-04-04 RX ADMIN — PANTOPRAZOLE SODIUM 40 MG: 40 TABLET, DELAYED RELEASE ORAL at 08:04

## 2024-04-04 RX ADMIN — BUDESONIDE 0.5 MG: 0.25 INHALANT RESPIRATORY (INHALATION) at 07:04

## 2024-04-04 RX ADMIN — DULOXETINE HYDROCHLORIDE 40 MG: 20 CAPSULE, DELAYED RELEASE ORAL at 08:04

## 2024-04-04 RX ADMIN — PREDNISONE 40 MG: 20 TABLET ORAL at 08:04

## 2024-04-04 RX ADMIN — METHYLPREDNISOLONE SODIUM SUCCINATE 80 MG: 40 INJECTION, POWDER, FOR SOLUTION INTRAMUSCULAR; INTRAVENOUS at 05:04

## 2024-04-04 RX ADMIN — ASPIRIN 81 MG: 81 TABLET, CHEWABLE ORAL at 08:04

## 2024-04-04 RX ADMIN — IPRATROPIUM BROMIDE AND ALBUTEROL SULFATE 3 ML: .5; 3 SOLUTION RESPIRATORY (INHALATION) at 07:04

## 2024-04-04 RX ADMIN — CEFTRIAXONE SODIUM 1 G: 1 INJECTION, POWDER, FOR SOLUTION INTRAMUSCULAR; INTRAVENOUS at 04:04

## 2024-04-04 RX ADMIN — CLONAZEPAM 0.5 MG: 0.5 TABLET ORAL at 08:04

## 2024-04-04 RX ADMIN — IPRATROPIUM BROMIDE AND ALBUTEROL SULFATE 3 ML: .5; 3 SOLUTION RESPIRATORY (INHALATION) at 11:04

## 2024-04-04 NOTE — PLAN OF CARE
[facility]  Discharge Final Note    Primary Care Provider: Polly Lemos MD    Expected Discharge Date: 4/4/2024    Follow up appt made with Dr. Lemos on 4/12/24 at 1:00pm. Referrals completed and accepted from [facility]. Orders have been sent. No other requests or needs from Pt. Pt complete from  standpoint.       Final Discharge Note (most recent)       Final Note - 04/02/24 1551          Final Note    Assessment Type Discharge Planning Assessment        Post-Acute Status    Coverage Medicaid                     Important Message from Medicare             Contact [facility] Dme   Specialty: DME Provider, Home Health Services    [address] [place] [address]   Phone: [phone]       Next Steps: Follow up    Instructions: Will provide HH services.    Polly Lemos MD   Specialty: Family Medicine   Relationship: PCP - General    [address] [place] [address] [place] [address]   Phone: [phone]       Next Steps: Go on 4/12/2024    Instructions: Hospital follow up appt set for 4/12/24 at 1:00pm with Dr. Lemos

## 2024-04-04 NOTE — PROGRESS NOTES
Attempted to set follow up appt but Dr. Lemos was booked for 2 months. Dr. Lemos's office stated they were going to follow up with [facility]ATEUS[facility] Cline and they will personally call the Pt to set up an appt time.

## 2024-04-04 NOTE — PLAN OF CARE
Patient in no apparent distress. Shortness of breath with exertion. Patient on 2 lpm nasal cannula. She wears home O2 at 2 lpm PRN. BIPAP discontinued. Aerosol treatments given Q 4 wa with  Duoneb. Q 12 Budesonide.. Will continue to monitor.

## 2024-04-04 NOTE — PROGRESS NOTES
[facility]  Progress Note    Patient Name: Zayra White  MRN: [id number]  Patient Class: IP- Inpatient   Admission Date: 4/2/2024  Length of Stay: 1 days  Attending Physician: Servando Rice MD  Primary Care Provider: Polly Lemos MD        Subjective:     Principal Problem:COPD exacerbation        HPI:  The patient is a 57 y/o female with PMH of COPD on PRN home O2, HLP, GERD, and anxiety who presents with SOB. She has been feeling SOB over the past few days. She wears O2 at home on a PRN basis but has had to over recently given her increased SOB. No fevers reported. Work up consistent with COPD exacerbation.       Overview/Hospital Course:  Patient a 58-year-old who was admitted yesterday for COPD exacerbation.  Patient has a long history of COPD.  ABG this morning reveals pH 7.37/pCO2 of 53/PO2 of 77/O2 sat 95% on 2 L nasal cannula which is her setting at home.  Patient on corticosteroids, nebulization treatments in empiric antibiotics should be ready for discharge within the next 24-48 hours.  Leukocytosis thought to be related to corticosteroids    Interval History:     Review of Systems   Constitutional:  Negative for activity change, appetite change, fatigue and fever.   HENT:  Negative for congestion, ear discharge, mouth sores, nosebleeds, rhinorrhea, sinus pressure, sinus pain and tinnitus.    Eyes: Negative.  Negative for pain, redness and itching.   Respiratory:  Positive for cough, chest tightness, shortness of breath and wheezing. Negative for apnea, choking and stridor.    Cardiovascular:  Negative for chest pain, palpitations and leg swelling.   Gastrointestinal:  Negative for abdominal distention, abdominal pain, anal bleeding, blood in stool, constipation and diarrhea.   Endocrine: Negative.    Genitourinary:  Negative for difficulty urinating, flank pain, frequency and urgency.   Musculoskeletal:  Negative for arthralgias, back pain, gait problem  and myalgias.   Skin:  Negative for color change and pallor.   Allergic/Immunologic: Negative.    Neurological:  Negative for dizziness, facial asymmetry, weakness, light-headedness and headaches.   Hematological:  Negative for adenopathy. Does not bruise/bleed easily.   All other systems reviewed and are negative.    Objective:     Vital Signs (Most Recent):  Temp: 98 °F (36.7 °C) (04/03/24 1949)  Pulse: 92 (04/03/24 2105)  Resp: (!) 24 (04/03/24 2105)  BP: (!) 151/71 (04/03/24 1949)  SpO2: 97 % (04/03/24 2105) Vital Signs (24h Range):  Temp:  [97.7 °F (36.5 °C)-98.3 °F (36.8 °C)] 98 °F (36.7 °C)  Pulse:  [] 92  Resp:  [20-39] 24  SpO2:  [94 %-100 %] 97 %  BP: (128-172)/(64-79) 151/71     Weight: 47.6 kg (105 lb)  Body mass index is 17.47 kg/m².    Intake/Output Summary (Last 24 hours) at 4/3/2024 1549  Last data filed at 4/3/2024 1208  Gross per 24 hour   Intake 380 ml   Output 150 ml   Net 230 ml         Physical Exam  Vitals and nursing note reviewed.   Constitutional:       Appearance: She is well-developed.   HENT:      Head: Normocephalic and atraumatic.   Eyes:      Pupils: Pupils are equal, round, and reactive to light.   Neck:      Thyroid: No thyromegaly.      Trachea: No tracheal deviation.   Cardiovascular:      Rate and Rhythm: Regular rhythm. Tachycardia present.      Heart sounds: Normal heart sounds.   Pulmonary:      Effort: Respiratory distress present.      Breath sounds: Wheezing and rhonchi present.   Abdominal:      General: Bowel sounds are normal. There is no distension.      Palpations: Abdomen is soft.      Tenderness: There is no guarding or rebound.   Musculoskeletal:         General: Normal range of motion.      Cervical back: Normal range of motion and neck supple.   Lymphadenopathy:      Cervical: No cervical adenopathy.   Skin:     General: Skin is warm and dry.      Capillary Refill: Capillary refill takes less than 2 seconds.   Neurological:      Mental Status: She is alert  and oriented to person, place, and time.   Psychiatric:         Behavior: Behavior normal.         Thought Content: Thought content normal.         Judgment: Judgment normal.             Significant Labs: All pertinent labs within the past 24 hours have been reviewed.  Recent Lab Results         04/03/24  0941   04/03/24  0334        Allens Test N/A         Anion Gap   15       Baso #   0.02       Basophil %   0.1       Site RR         BUN   16       Calcium   9.1       Chloride   100       CO2   25       Creatinine   0.8       DelSys Nasal Can         Differential Method   Automated       eGFR   >60.0       Eos #   0.0       Eos %   0.0       Flow 2         Glucose   140       Gran # (ANC)   19.5       Gran %   95.0       Hematocrit   40.2       Hemoglobin   13.7       Immature Grans (Abs)   0.16  Comment: Mild elevation in immature granulocytes is non specific and   can be seen in a variety of conditions including stress response,   acute inflammation, trauma and pregnancy. Correlation with other   laboratory and clinical findings is essential.         Immature Granulocytes   0.8       Lymph #   0.5       Lymph %   2.4       Magnesium    2.3       MCH   33.5       MCHC   34.1       MCV   98       Mode SPONT         Mono #   0.4       Mono %   1.7       MPV   9.4       nRBC   0       Phosphorus Level   4.7       Platelet Count   282       POC BE 6         POC HCO3 31.0         POC PCO2 52.7         POC PH 7.378         POC PO2 77         POC SATURATED O2 95         Potassium   4.2       RBC   4.09       RDW   11.5       Sample ARTERIAL         Sodium   140       WBC   20.51               Significant Imaging: I have reviewed all pertinent imaging results/findings within the past 24 hours.    Assessment/Plan:      * COPD exacerbation  Case discussed with ER physician   Work up suggests COPD exacerbation   Patient with continued dyspnea and increased WOB; transitioned to BIPAP  Admit to ICU  Continue nebs and  steroids; hold PO prednisone   Given mild leukocytosis will continue with azithromycin and ceftriaxone   Stable     Hyperlipidemia  Continue atorvastatin       Anxiety about health  Continue duloxetine         VTE Risk Mitigation (From admission, onward)           Ordered     enoxaparin injection 40 mg  Daily         04/02/24 0348     IP VTE LOW RISK PATIENT  Once         04/02/24 0348                    Discharge Planning   KIM: 4/5/2024     Code Status: Full Code   Is the patient medically ready for discharge?:     Reason for patient still in hospital (select all that apply): Treatment  Discharge Plan A: Home                  Servando Rice MD  Department of Hospital Medicine   [facility]

## 2024-04-04 NOTE — HOSPITAL COURSE
Patient a 58-year-old who was admitted for COPD exacerbation.  Patient has a long history of COPD.  ABG  reveals pH 7.37/pCO2 of 53/PO2 of 77/O2 sat 95% on 2 L nasal cannula which is her setting at home.  Patient on corticosteroids, nebulization treatments and antibiotics.  Leukocytosis thought to be related to corticosteroids. Patient improved back to baseline. Patient has received maximum benefit from inpatient stay and is medically clear for discharge. Provided discharge instructions and return precautions.

## 2024-04-04 NOTE — SUBJECTIVE & OBJECTIVE
Interval History:     Review of Systems   Constitutional:  Negative for activity change, appetite change, fatigue and fever.   HENT:  Negative for congestion, ear discharge, mouth sores, nosebleeds, rhinorrhea, sinus pressure, sinus pain and tinnitus.    Eyes: Negative.  Negative for pain, redness and itching.   Respiratory:  Positive for cough, chest tightness, shortness of breath and wheezing. Negative for apnea, choking and stridor.    Cardiovascular:  Negative for chest pain, palpitations and leg swelling.   Gastrointestinal:  Negative for abdominal distention, abdominal pain, anal bleeding, blood in stool, constipation and diarrhea.   Endocrine: Negative.    Genitourinary:  Negative for difficulty urinating, flank pain, frequency and urgency.   Musculoskeletal:  Negative for arthralgias, back pain, gait problem and myalgias.   Skin:  Negative for color change and pallor.   Allergic/Immunologic: Negative.    Neurological:  Negative for dizziness, facial asymmetry, weakness, light-headedness and headaches.   Hematological:  Negative for adenopathy. Does not bruise/bleed easily.   All other systems reviewed and are negative.    Objective:     Vital Signs (Most Recent):  Temp: 98 °F (36.7 °C) (04/03/24 1949)  Pulse: 92 (04/03/24 2105)  Resp: (!) 24 (04/03/24 2105)  BP: (!) 151/71 (04/03/24 1949)  SpO2: 97 % (04/03/24 2105) Vital Signs (24h Range):  Temp:  [97.7 °F (36.5 °C)-98.3 °F (36.8 °C)] 98 °F (36.7 °C)  Pulse:  [] 92  Resp:  [20-39] 24  SpO2:  [94 %-100 %] 97 %  BP: (128-172)/(64-79) 151/71     Weight: 47.6 kg (105 lb)  Body mass index is 17.47 kg/m².    Intake/Output Summary (Last 24 hours) at 4/3/2024 7341  Last data filed at 4/3/2024 1208  Gross per 24 hour   Intake 380 ml   Output 150 ml   Net 230 ml         Physical Exam  Vitals and nursing note reviewed.   Constitutional:       Appearance: She is well-developed.   HENT:      Head: Normocephalic and atraumatic.   Eyes:      Pupils: Pupils are  equal, round, and reactive to light.   Neck:      Thyroid: No thyromegaly.      Trachea: No tracheal deviation.   Cardiovascular:      Rate and Rhythm: Regular rhythm. Tachycardia present.      Heart sounds: Normal heart sounds.   Pulmonary:      Effort: Respiratory distress present.      Breath sounds: Wheezing and rhonchi present.   Abdominal:      General: Bowel sounds are normal. There is no distension.      Palpations: Abdomen is soft.      Tenderness: There is no guarding or rebound.   Musculoskeletal:         General: Normal range of motion.      Cervical back: Normal range of motion and neck supple.   Lymphadenopathy:      Cervical: No cervical adenopathy.   Skin:     General: Skin is warm and dry.      Capillary Refill: Capillary refill takes less than 2 seconds.   Neurological:      Mental Status: She is alert and oriented to person, place, and time.   Psychiatric:         Behavior: Behavior normal.         Thought Content: Thought content normal.         Judgment: Judgment normal.             Significant Labs: All pertinent labs within the past 24 hours have been reviewed.  Recent Lab Results         04/03/24  0941   04/03/24  0334        Allens Test N/A         Anion Gap   15       Baso #   0.02       Basophil %   0.1       Site RR         BUN   16       Calcium   9.1       Chloride   100       CO2   25       Creatinine   0.8       DelSys Nasal Can         Differential Method   Automated       eGFR   >60.0       Eos #   0.0       Eos %   0.0       Flow 2         Glucose   140       Gran # (ANC)   19.5       Gran %   95.0       Hematocrit   40.2       Hemoglobin   13.7       Immature Grans (Abs)   0.16  Comment: Mild elevation in immature granulocytes is non specific and   can be seen in a variety of conditions including stress response,   acute inflammation, trauma and pregnancy. Correlation with other   laboratory and clinical findings is essential.         Immature Granulocytes   0.8       Lymph #    0.5       Lymph %   2.4       Magnesium    2.3       MCH   33.5       MCHC   34.1       MCV   98       Mode SPONT         Mono #   0.4       Mono %   1.7       MPV   9.4       nRBC   0       Phosphorus Level   4.7       Platelet Count   282       POC BE 6         POC HCO3 31.0         POC PCO2 52.7         POC PH 7.378         POC PO2 77         POC SATURATED O2 95         Potassium   4.2       RBC   4.09       RDW   11.5       Sample ARTERIAL         Sodium   140       WBC   20.51               Significant Imaging: I have reviewed all pertinent imaging results/findings within the past 24 hours.

## 2024-04-04 NOTE — TELEPHONE ENCOUNTER
----- Message from Stacie Milian sent at 4/4/2024 11:16 AM CDT -----  Contact: stephon prasad  Type:  Sooner Appointment Request  Caller is requesting a sooner appointment.  Caller declined first available appointment listed below.  Caller will not accept being placed on the waitlist and is requesting a message be sent to doctor.    Name of Caller:  stephon prasad  When is the first available appointment?  5/17/24  Symptoms:  needs 1 wk hosp  f/up  Would the patient rather a call back or a response via [facility]? call  Best Call Back Number:  [phone]  Additional Information:  Please call the patient back to schedule/advise. Thank you!

## 2024-04-04 NOTE — NURSING
D/C instructions provided and explained to patient ,, understanding of D/C instructions verbalized. IV removed, catheter intact. Tolerated well. Telemetry monitor removed and returned to monitor room. VSS. Pt denies complaints. Transported off unit via wheelchair.

## 2024-04-07 LAB
BACTERIA BLD CULT: NORMAL
BACTERIA BLD CULT: NORMAL

## 2024-04-09 ENCOUNTER — TELEPHONE (OUTPATIENT)
Dept: PULMONOLOGY | Facility: CLINIC | Age: 59
End: 2024-04-09
Payer: MEDICAID

## 2024-04-13 ENCOUNTER — HOSPITAL ENCOUNTER (EMERGENCY)
Facility: HOSPITAL | Age: 59
Discharge: HOME | End: 2024-04-13
Attending: STUDENT IN AN ORGANIZED HEALTH CARE EDUCATION/TRAINING PROGRAM
Payer: MEDICAID

## 2024-04-13 VITALS
RESPIRATION RATE: 16 BRPM | WEIGHT: 105 LBS | TEMPERATURE: 98 F | HEART RATE: 93 BPM | DIASTOLIC BLOOD PRESSURE: 94 MMHG | HEIGHT: 65 IN | SYSTOLIC BLOOD PRESSURE: 146 MMHG | BODY MASS INDEX: 17.49 KG/M2 | OXYGEN SATURATION: 99 %

## 2024-04-13 DIAGNOSIS — M25.551 RIGHT HIP PAIN: Primary | ICD-10-CM

## 2024-04-13 PROCEDURE — 99283 EMERGENCY DEPT VISIT LOW MDM: CPT

## 2024-04-13 PROCEDURE — 25000003 PHARM REV CODE 250: Performed by: STUDENT IN AN ORGANIZED HEALTH CARE EDUCATION/TRAINING PROGRAM

## 2024-04-13 RX ORDER — HYDROCODONE BITARTRATE AND ACETAMINOPHEN 5; 325 MG/1; MG/1
1 TABLET ORAL
Status: COMPLETED | OUTPATIENT
Start: 2024-04-13 | End: 2024-04-13

## 2024-04-13 RX ORDER — METHOCARBAMOL 500 MG/1
1000 TABLET, FILM COATED ORAL 3 TIMES DAILY
Qty: 30 TABLET | Refills: 0 | Status: SHIPPED | OUTPATIENT
Start: 2024-04-13 | End: 2024-04-15

## 2024-04-13 RX ADMIN — HYDROCODONE BITARTRATE AND ACETAMINOPHEN 1 TABLET: 5; 325 TABLET ORAL at 10:04

## 2024-04-14 NOTE — DISCHARGE INSTRUCTIONS
MAY TAKE TYLENOL OR IBUPROFEN EVERY 6 HOURS FOR PAIN  MAY TAKE ROBAXIN EVERY 6 HOURS FOR MUSCLE SPASMS.  FOLLOW UP WITH YOUR PRIMARY CARE PHYSICIAN.

## 2024-04-14 NOTE — ED PROVIDER NOTES
"Encounter Date: 2024       History     Chief Complaint   Patient presents with    Hip Pain     Right hip pain x 2 days, denies trauma. Pain 9/10     58-year-old female with a history of anxiety, arthritis, asthma, chronic back pain, COPD.  She presents to ED with complaints of 2 day history of right lower lumbar pain that radiates to the buttocks.  She denies trauma, although reports onset of symptoms was after helping to "lifting my 50 lb nephew"  few days ago. She did not hear a popping sound, and there was no associated fall.  Pain is worsened with movement and improves with rest. Denies associated motor or sensory deficits.  She has not tried any NSAIDs or analgesics for pain    The history is provided by the patient. No  was used.     Review of patient's allergies indicates:   Allergen Reactions    Ativan [lorazepam] Itching     Past Medical History:   Diagnosis Date    Anxiety     Arthritis     Asthma     Back pain     COPD (chronic obstructive pulmonary disease)     Pulmonary embolism     10 years ago     Past Surgical History:   Procedure Laterality Date    BIOPSY      right breast    BREAST CYST EXCISION       SECTION      CHOLECYSTECTOMY      COLONOSCOPY N/A 2022    Procedure: COLONOSCOPY;  Surgeon: Chavez Gonzales MD;  Location: [facility] ENDO;  Service: General;  Laterality: N/A;    ESOPHAGOGASTRODUODENOSCOPY N/A 2022    Procedure: ESOPHAGOGASTRODUODENOSCOPY (EGD);  Surgeon: Chavez Gonzales MD;  Location: [facility] ENDO;  Service: General;  Laterality: N/A;    INCISION AND DRAINAGE OF ABSCESS Left 3/16/2020    Procedure: INCISION AND DRAINAGE, ABSCESS;  Surgeon: Irvin Villarreal MD;  Location: [facility] OR;  Service: General;  Laterality: Left;     Family History   Problem Relation Name Age of Onset    Breast cancer Sister      Ovarian cancer Neg Hx       Social History     Tobacco Use    Smoking status: Former    Smokeless tobacco: Never   Substance Use Topics    " Alcohol use: Not Currently     Comment: occ    Drug use: Never     Review of Systems   Constitutional: Negative.    HENT: Negative.     Eyes: Negative.    Respiratory: Negative.     Cardiovascular: Negative.    Gastrointestinal: Negative.    Endocrine: Negative.    Genitourinary: Negative.    Musculoskeletal:  Positive for back pain.   Skin: Negative.    Neurological: Negative.    Hematological: Negative.    All other systems reviewed and are negative.      Physical Exam     Initial Vitals [04/13/24 2145]   BP Pulse Resp Temp SpO2   (!) 147/69 95 16 97.8 °F (36.6 °C) 96 %      MAP       --         Physical Exam    Nursing note and vitals reviewed.  Constitutional: She appears well-developed.   Eyes: Pupils are equal, round, and reactive to light.   Neck:   Normal range of motion.  Pulmonary/Chest: Breath sounds normal. No respiratory distress.   Abdominal: Abdomen is soft. Bowel sounds are normal.   Musculoskeletal:         General: Normal range of motion.      Cervical back: Normal range of motion.      Comments: Diffuse muscle spasms, right lower paralumbar, and right gluteus pain to palpation,      Neurological: She is oriented to person, place, and time. She has normal strength. GCS score is 15. GCS eye subscore is 4. GCS verbal subscore is 5. GCS motor subscore is 6.   Skin: Skin is warm and dry. Capillary refill takes less than 2 seconds.   Psychiatric: She has a normal mood and affect.         ED Course   Procedures  Labs Reviewed - No data to display       Imaging Results    None          Medications   HYDROcodone-acetaminophen 5-325 mg per tablet 1 tablet (has no administration in time range)     Medical Decision Making  58-year-old female with lower back pain, right hip pain.   Exam is benign, no red flags.  Given a dose of Norco in the ED  Advised p.r.n. NSAIDs.  Rx muscle relaxant p.r.n.  Patient was instructed to follow up with PCP and was given strict return precautions to the ED.   The patient voiced  understanding and agreed with the plan      Risk  Prescription drug management.                                      Clinical Impression:  Final diagnoses:  [M25.551] Right hip pain (Primary)          ED Disposition Condition    Discharge Stable          ED Prescriptions       Medication Sig Dispense Start Date End Date Auth. Provider    methocarbamoL (ROBAXIN) 500 MG Tab Take 2 tablets (1,000 mg total) by mouth 3 (three) times daily. for 5 days 30 tablet 4/13/2024 4/18/2024 Devaughn Keane MD          Follow-up Information       Follow up With Specialties Details Why Contact Info    Polly Lemos MD Family Medicine   [address] [place] [address] [place] [address] [phone]               Devaughn Keane MD  04/13/24 5257

## 2024-04-15 ENCOUNTER — HOSPITAL ENCOUNTER (OUTPATIENT)
Dept: RADIOLOGY | Facility: HOSPITAL | Age: 59
Discharge: HOME | End: 2024-04-15
Attending: FAMILY MEDICINE
Payer: MEDICAID

## 2024-04-15 ENCOUNTER — OFFICE VISIT (OUTPATIENT)
Dept: FAMILY MEDICINE | Facility: CLINIC | Age: 59
End: 2024-04-15
Payer: MEDICAID

## 2024-04-15 ENCOUNTER — TELEPHONE (OUTPATIENT)
Dept: PULMONOLOGY | Facility: CLINIC | Age: 59
End: 2024-04-15
Payer: MEDICAID

## 2024-04-15 VITALS
OXYGEN SATURATION: 97 % | SYSTOLIC BLOOD PRESSURE: 86 MMHG | WEIGHT: 102.19 LBS | DIASTOLIC BLOOD PRESSURE: 62 MMHG | RESPIRATION RATE: 16 BRPM | BODY MASS INDEX: 17.02 KG/M2 | HEART RATE: 72 BPM | HEIGHT: 65 IN

## 2024-04-15 DIAGNOSIS — M25.551 PAIN OF RIGHT HIP: Primary | ICD-10-CM

## 2024-04-15 DIAGNOSIS — M25.551 PAIN OF RIGHT HIP: ICD-10-CM

## 2024-04-15 PROCEDURE — 73521 X-RAY EXAM HIPS BI 2 VIEWS: CPT | Mod: TC

## 2024-04-15 PROCEDURE — 3008F BODY MASS INDEX DOCD: CPT | Mod: CPTII,,, | Performed by: FAMILY MEDICINE

## 2024-04-15 PROCEDURE — 99999 PR PBB SHADOW E&M-EST. PATIENT-LVL IV: CPT | Mod: PBBFAC,,, | Performed by: FAMILY MEDICINE

## 2024-04-15 PROCEDURE — 99214 OFFICE O/P EST MOD 30 MIN: CPT | Mod: PBBFAC,25 | Performed by: FAMILY MEDICINE

## 2024-04-15 PROCEDURE — 73521 X-RAY EXAM HIPS BI 2 VIEWS: CPT | Mod: 26,,, | Performed by: RADIOLOGY

## 2024-04-15 PROCEDURE — 99214 OFFICE O/P EST MOD 30 MIN: CPT | Mod: S$PBB,,, | Performed by: FAMILY MEDICINE

## 2024-04-15 PROCEDURE — 1159F MED LIST DOCD IN RCRD: CPT | Mod: CPTII,,, | Performed by: FAMILY MEDICINE

## 2024-04-15 PROCEDURE — 3078F DIAST BP <80 MM HG: CPT | Mod: CPTII,,, | Performed by: FAMILY MEDICINE

## 2024-04-15 PROCEDURE — 1111F DSCHRG MED/CURRENT MED MERGE: CPT | Mod: CPTII,,, | Performed by: FAMILY MEDICINE

## 2024-04-15 PROCEDURE — 3074F SYST BP LT 130 MM HG: CPT | Mod: CPTII,,, | Performed by: FAMILY MEDICINE

## 2024-04-15 RX ORDER — TIZANIDINE 4 MG/1
4 TABLET ORAL EVERY 8 HOURS
Qty: 30 TABLET | Refills: 0 | Status: SHIPPED | OUTPATIENT
Start: 2024-04-15

## 2024-04-15 RX ORDER — HYDROCODONE BITARTRATE AND ACETAMINOPHEN 7.5; 325 MG/1; MG/1
1 TABLET ORAL EVERY 6 HOURS PRN
Qty: 20 TABLET | Refills: 0 | Status: SHIPPED | OUTPATIENT
Start: 2024-04-15 | End: 2024-05-03

## 2024-04-15 NOTE — PROGRESS NOTES
Subjective:       Patient ID: Zayra White is a 58 y.o. female.    Chief Complaint: Hospital Follow Up (Rt hip pain 24. ) and Hip Pain (Fell on 24)      Past Medical History:   Diagnosis Date    Anxiety     Arthritis     Asthma     Back pain     COPD (chronic obstructive pulmonary disease)     Pulmonary embolism     10 years ago       Past Surgical History:   Procedure Laterality Date    BIOPSY      right breast    BREAST CYST EXCISION       SECTION      CHOLECYSTECTOMY      COLONOSCOPY N/A 2022    Procedure: COLONOSCOPY;  Surgeon: Chavez Gonzales MD;  Location: [facility] ENDO;  Service: General;  Laterality: N/A;    ESOPHAGOGASTRODUODENOSCOPY N/A 2022    Procedure: ESOPHAGOGASTRODUODENOSCOPY (EGD);  Surgeon: Chavez Gonzales MD;  Location: [facility] ENDO;  Service: General;  Laterality: N/A;    INCISION AND DRAINAGE OF ABSCESS Left 3/16/2020    Procedure: INCISION AND DRAINAGE, ABSCESS;  Surgeon: Irvin Villarreal MD;  Location: [facility] OR;  Service: General;  Laterality: Left;        Social History     Socioeconomic History    Marital status: Legally    Tobacco Use    Smoking status: Former    Smokeless tobacco: Never   Substance and Sexual Activity    Alcohol use: Not Currently     Comment: occ    Drug use: Yes     Types: Benzodiazepines     Comment: as perscribed    Sexual activity: Not Currently     Birth control/protection: Post-menopausal   Social History Narrative    ** Merged History Encounter **          Social Determinants of Health     Financial Resource Strain: Low Risk  (2023)    Overall Financial Resource Strain (CARDIA)     Difficulty of Paying Living Expenses: Not hard at all   Food Insecurity: No Food Insecurity (2023)    Hunger Vital Sign     Worried About Running Out of Food in the Last Year: Never true     Ran Out of Food in the Last Year: Never true   Transportation Needs: Unmet Transportation Needs (2023)    PRAPARE - Transportation      Lack of Transportation (Medical): Yes     Lack of Transportation (Non-Medical): No   Physical Activity: Sufficiently Active (12/29/2023)    Exercise Vital Sign     Days of Exercise per Week: 2 days     Minutes of Exercise per Session: 120 min   Stress: Stress Concern Present (12/29/2023)    [employer] - Occupational Stress Questionnaire     Feeling of Stress : Very much   Social Connections: Socially Isolated (12/29/2023)    Social Connection and Isolation Panel [NHANES]     Frequency of Communication with Friends and Family: More than three times a week     Frequency of Social Gatherings with Friends and Family: More than three times a week     Attends Jainism Services: Never     Active Member of Clubs or Organizations: No     Marital Status:    Housing Stability: Low Risk  (12/29/2023)    Housing Stability Vital Sign     Unable to Pay for Housing in the Last Year: No     Number of Places Lived in the Last Year: 1     Unstable Housing in the Last Year: No       Family History   Problem Relation Name Age of Onset    Breast cancer Sister      Ovarian cancer Neg Hx         Review of patient's allergies indicates:   Allergen Reactions    Ativan [lorazepam] Itching          Current Outpatient Medications:     albuterol (VENTOLIN HFA) 90 mcg/actuation inhaler, inhale 2 puff by inhalation route  every 4 - 6 hours as needed, Disp: 36 g, Rfl: 6    albuterol-ipratropium (DUO-NEB) 2.5 mg-0.5 mg/3 mL nebulizer solution, Take 3 mLs by nebulization every 6 (six) hours as needed for Wheezing or Shortness of Breath. Rescue, Disp: 75 mL, Rfl: 3    alendronate (FOSAMAX) 70 MG tablet, Take 1 tablet (70 mg total) by mouth every 7 days., Disp: 13 tablet, Rfl: 3    aspirin 81 MG Chew, 81 mg., Disp: , Rfl:     atenoloL (TENORMIN) 25 MG tablet, TAKE 1/2 TABLET BY MOUTH EVERY DAY **THANK YOU**, Disp: 45 tablet, Rfl: 3    atorvastatin (LIPITOR) 20 MG tablet, Take 1 tablet (20 mg total) by mouth once  daily., Disp: 90 tablet, Rfl: 3    cholecalciferol, vitamin D3, (VITAMIN D3) 25 mcg (1,000 unit) capsule, Take 2 capsules (2,000 Units total) by mouth once daily., Disp: 60 capsule, Rfl: 0    clonazePAM (KLONOPIN) 0.5 MG tablet, Take 1 tablet (0.5 mg total) by mouth 2 (two) times daily as needed for Anxiety., Disp: 45 tablet, Rfl: 2    cyproheptadine (PERIACTIN) 4 mg tablet, Take 1 tablet (4 mg total) by mouth 3 (three) times daily as needed (appetitie /allergies)., Disp: 90 tablet, Rfl: 5    DULoxetine (CYMBALTA) 20 MG capsule, Take 2 capsules po daily, Disp: 180 capsule, Rfl: 3    fluticasone-umeclidin-vilanter (TRELEGY ELLIPTA) 200-62.5-25 mcg inhaler, Inhale 1 puff into the lungs once daily., Disp: 60 each, Rfl: 11    latanoprost 0.005 % ophthalmic solution, Place 1 drop into both eyes every evening., Disp: , Rfl:     ondansetron (ZOFRAN-ODT) 4 MG TbDL, Take 1 tablet (4 mg total) by mouth every 12 (twelve) hours as needed (Nausea)., Disp: 30 tablet, Rfl: 2    pantoprazole (PROTONIX) 40 MG tablet, Take 1 tablet (40 mg total) by mouth once daily. Take in the morning before breakfast.  Wait 30 minutes before eating or drinking anything, Disp: 30 tablet, Rfl: 11    predniSONE (DELTASONE) 5 MG tablet, Take 8 tablets (40 mg total) by mouth once daily for 4 days, THEN 1 tablet (5 mg total) once daily., Disp: 90 tablet, Rfl: 0    HYDROcodone-acetaminophen (NORCO) 7.5-325 mg per tablet, Take 1 tablet by mouth every 6 (six) hours as needed for Pain., Disp: 20 tablet, Rfl: 0    tiZANidine (ZANAFLEX) 4 MG tablet, Take 1 tablet (4 mg total) by mouth every 8 (eight) hours., Disp: 30 tablet, Rfl: 0    Ms. White is a 58-year-old female who presents today with right hip pain.  She was seen in the ER 2 days ago for same.  ER intake note is as follows:    Encounter Date: 4/13/2024  History  Chief Complaint  Patient presents with  · Hip Pain      Right hip pain x 2 days, denies trauma. Pain 9/10     58-year-old female with a history  "of anxiety, arthritis, asthma, chronic back pain, COPD.  She presents to ED with complaints of 2 day history of right lower lumbar pain that radiates to the buttocks.  She denies trauma, although reports onset of symptoms was after helping to "lifting my 50 lb nephew"  few days ago. She did not hear a popping sound, and there was no associated fall.  Pain is worsened with movement and improves with rest. Denies associated motor or sensory deficits.  She has not tried any NSAIDs or analgesics for pain     The history is provided by the patient. No  was used.     She was prescribed Robaxin .  We will x-ray her hip today and possibly change her muscle relaxer if hip x-ray is negative she does have a history of a GI bleed within the past year, so NSAIDs are not an acceptable pain management choice.  I will prescribe a small number of opiate pain relievers with the instructions that she shall not take her Klonopin while on these medications             Hip Pain       Review of Systems   Musculoskeletal:  Positive for arthralgias, back pain and myalgias.        Right hip pain.        Objective:      Physical Exam  Constitutional:       Appearance: Normal appearance.   Cardiovascular:      Rate and Rhythm: Normal rate and regular rhythm.      Pulses: Normal pulses.      Heart sounds: Normal heart sounds.   Pulmonary:      Comments: COPD on O2  Musculoskeletal:         General: Normal range of motion.      Comments: Right hip tender to palpation, unable to ambulate due to pain, in wheelchair   Skin:     General: Skin is warm and dry.   Neurological:      General: No focal deficit present.      Mental Status: She is alert and oriented to person, place, and time.   Psychiatric:         Mood and Affect: Mood normal.         Behavior: Behavior normal.         Assessment:       1. Pain of right hip        Plan:         Pain of right hip  -     X-Ray Hips Bilateral 2 View Inc AP Pelvis; Future; Expected date: " 04/15/2024  -     tiZANidine (ZANAFLEX) 4 MG tablet; Take 1 tablet (4 mg total) by mouth every 8 (eight) hours.  Dispense: 30 tablet; Refill: 0  -     HYDROcodone-acetaminophen (NORCO) 7.5-325 mg per tablet; Take 1 tablet by mouth every 6 (six) hours as needed for Pain.  Dispense: 20 tablet; Refill: 0    Patient was instructed to discontinue use of Klonopin and methocarbamol while using Zanaflex and opiate pain reliever    Risks, benefits, and side effects were discussed with the patient. All questions were answered to the fullest satisfaction of the patient, and pt verbalized understanding and agreement to treatment plan. Pt was to call with any new or worsening symptoms, or present to the ER.        Polly Lemos MD

## 2024-04-18 ENCOUNTER — TELEPHONE (OUTPATIENT)
Dept: PULMONOLOGY | Facility: CLINIC | Age: 59
End: 2024-04-18
Payer: MEDICAID

## 2024-04-18 NOTE — TELEPHONE ENCOUNTER
----- Message from Karen Nunez sent at 4/18/2024  2:28 PM CDT -----  Contact: Abel Rosas  Type: Needs Medical Advice         Who Called:Abel Rosas  Best Call Back Number:[phone]  Additional Information: Requesting a call back regarding  They need the last office visit notes. They are trying to re-auth pt ventilator      Fax# [phone]  Please Advise- Thank you

## 2024-04-18 NOTE — DISCHARGE SUMMARY
[facility] Medicine  Discharge Summary      Patient Name: Zayra White  MRN: [id number]  BISI: [id number]  Patient Class: IP- Inpatient  Admission Date: 4/2/2024  Hospital Length of Stay: 2 days  Discharge Date and Time: 4/4/2024  2:46 PM  Attending Physician: No att. providers found   Discharging Provider: Jeff Boateng MD  Primary Care Provider: Polly Lemos MD    Primary Care Team: Networked reference to record PCT     HPI:   The patient is a 57 y/o female with PMH of COPD on PRN home O2, HLP, GERD, and anxiety who presents with SOB. She has been feeling SOB over the past few days. She wears O2 at home on a PRN basis but has had to over recently given her increased SOB. No fevers reported. Work up consistent with COPD exacerbation.       * No surgery found *      Hospital Course:   Patient a 58-year-old who was admitted for COPD exacerbation.  Patient has a long history of COPD.  ABG  reveals pH 7.37/pCO2 of 53/PO2 of 77/O2 sat 95% on 2 L nasal cannula which is her setting at home.  Patient on corticosteroids, nebulization treatments and antibiotics.  Leukocytosis thought to be related to corticosteroids. Patient improved back to baseline. Patient has received maximum benefit from inpatient stay and is medically clear for discharge. Provided discharge instructions and return precautions.       Goals of Care Treatment Preferences:  Code Status: Full Code      Consults:     No new Assessment & Plan notes have been filed under this hospital service since the last note was generated.  Service: Hospital Medicine    Final Active Diagnoses:    Diagnosis Date Noted POA    PRINCIPAL PROBLEM:  COPD exacerbation [J44.1] 12/29/2023 Yes    Hyperlipidemia [E78.5] 02/08/2022 Yes    Anxiety about health [R45.89] 11/27/2021 Yes      Problems Resolved During this Admission:       Discharged Condition: good    Disposition: Home-Health Care [facility]    Follow Up:   Follow-up Information        [facility] - Follow up.    Specialties: DME Provider, Home Health Services  Why: Will provide  services.  Contact information:  [address] [place] [address]  [phone]               Polly Lemos MD. Go on 4/12/2024.    Specialty: Family Medicine  Why: Hospital follow up appt set for 4/12/24 at 1:00pm with Dr. Lemos  Contact information:  [address] [facility] [place] [address]  [phone]                           Patient Instructions:      Ambulatory referral/consult to Home Health   Standing Status: Future   Referral Priority: Routine Referral Type: Home Health   Referral Reason: Specialty Services Required   Requested Specialty: Home Health Services   Number of Visits Requested: 1     Diet Cardiac     Notify your health care provider if you experience any of the following:  difficulty breathing or increased cough     Activity as tolerated       Significant Diagnostic Studies: N/A    Pending Diagnostic Studies:       None           Medications:  Reconciled Home Medications:      Medication List        CHANGE how you take these medications      predniSONE 5 MG tablet  Commonly known as: DELTASONE  Take 8 tablets (40 mg total) by mouth once daily for 4 days, THEN 1 tablet (5 mg total) once daily.  Start taking on: April 4, 2024  What changed: See the new instructions.            CONTINUE taking these medications      albuterol 90 mcg/actuation inhaler  Commonly known as: VENTOLIN HFA  inhale 2 puff by inhalation route  every 4 - 6 hours as needed     albuterol-ipratropium 2.5 mg-0.5 mg/3 mL nebulizer solution  Commonly known as: DUO-NEB  Take 3 mLs by nebulization every 6 (six) hours as needed for Wheezing or Shortness of Breath. Rescue     alendronate 70 MG tablet  Commonly known as: FOSAMAX  Take 1 tablet (70 mg total) by mouth every 7 days.     aspirin 81 MG Chew  81 mg.     atenoloL 25 MG tablet  Commonly known as: TENORMIN  TAKE 1/2 TABLET BY MOUTH EVERY DAY **THANK YOU**      atorvastatin 20 MG tablet  Commonly known as: LIPITOR  Take 1 tablet (20 mg total) by mouth once daily.     cholecalciferol (vitamin D3) 25 mcg (1,000 unit) capsule  Commonly known as: VITAMIN D3  Take 2 capsules (2,000 Units total) by mouth once daily.     clonazePAM 0.5 MG tablet  Commonly known as: KlonoPIN  Take 1 tablet (0.5 mg total) by mouth 2 (two) times daily as needed for Anxiety.     cyproheptadine 4 mg tablet  Commonly known as: PERIACTIN  Take 1 tablet (4 mg total) by mouth 3 (three) times daily as needed (appetitie /allergies).     DULoxetine 20 MG capsule  Commonly known as: CYMBALTA  Take 2 capsules po daily     latanoprost 0.005 % ophthalmic solution  Place 1 drop into both eyes every evening.     ondansetron 4 MG Tbdl  Commonly known as: ZOFRAN-ODT  Take 1 tablet (4 mg total) by mouth every 12 (twelve) hours as needed (Nausea).     pantoprazole 40 MG tablet  Commonly known as: PROTONIX  Take 1 tablet (40 mg total) by mouth once daily. Take in the morning before breakfast.  Wait 30 minutes before eating or drinking anything     TRELEGY ELLIPTA 200-62.5-25 mcg inhaler  Generic drug: fluticasone-umeclidin-vilanter  Inhale 1 puff into the lungs once daily.            STOP taking these medications      levoFLOXacin 500 MG tablet  Commonly known as: LEVAQUIN            ASK your doctor about these medications      levoFLOXacin 500 MG tablet  Commonly known as: LEVAQUIN  Take 1 tablet (500 mg total) by mouth once daily. for 5 days  Ask about: Should I take this medication?              Indwelling Lines/Drains at time of discharge:   Lines/Drains/Airways       None                   Time spent on the discharge of patient: 15 minutes         Jeff Boateng MD  Department of Hospital Medicine  [facility]

## 2024-04-25 ENCOUNTER — TELEPHONE (OUTPATIENT)
Dept: PULMONOLOGY | Facility: CLINIC | Age: 59
End: 2024-04-25
Payer: MEDICAID

## 2024-04-25 ENCOUNTER — HOSPITAL ENCOUNTER (EMERGENCY)
Facility: HOSPITAL | Age: 59
Discharge: HOME | End: 2024-04-26
Attending: EMERGENCY MEDICINE
Payer: MEDICAID

## 2024-04-25 DIAGNOSIS — R07.9 CHEST PAIN: ICD-10-CM

## 2024-04-25 DIAGNOSIS — M54.31 RIGHT SIDED SCIATICA: ICD-10-CM

## 2024-04-25 DIAGNOSIS — J44.1 COPD EXACERBATION: Primary | ICD-10-CM

## 2024-04-25 LAB
ALBUMIN SERPL BCP-MCNC: 3.7 G/DL (ref 3.5–5.2)
ALP SERPL-CCNC: 92 U/L (ref 55–135)
ALT SERPL W/O P-5'-P-CCNC: 17 U/L (ref 10–44)
ANION GAP SERPL CALC-SCNC: 14 MMOL/L (ref 8–16)
AST SERPL-CCNC: 19 U/L (ref 10–40)
BASOPHILS # BLD AUTO: 0.04 K/UL (ref 0–0.2)
BASOPHILS NFR BLD: 0.4 % (ref 0–1.9)
BILIRUB SERPL-MCNC: 0.7 MG/DL (ref 0.1–1)
BNP SERPL-MCNC: 30 PG/ML (ref 0–99)
BUN SERPL-MCNC: 9 MG/DL (ref 6–20)
CALCIUM SERPL-MCNC: 9.4 MG/DL (ref 8.7–10.5)
CHLORIDE SERPL-SCNC: 103 MMOL/L (ref 95–110)
CO2 SERPL-SCNC: 26 MMOL/L (ref 23–29)
CREAT SERPL-MCNC: 0.7 MG/DL (ref 0.5–1.4)
D DIMER PPP IA.FEU-MCNC: 0.25 MG/L FEU
DIFFERENTIAL METHOD BLD: ABNORMAL
EOSINOPHIL # BLD AUTO: 0.1 K/UL (ref 0–0.5)
EOSINOPHIL NFR BLD: 0.7 % (ref 0–8)
ERYTHROCYTE [DISTWIDTH] IN BLOOD BY AUTOMATED COUNT: 11.8 % (ref 11.5–14.5)
EST. GFR  (NO RACE VARIABLE): >60 ML/MIN/1.73 M^2
GLUCOSE SERPL-MCNC: 107 MG/DL (ref 70–110)
HCT VFR BLD AUTO: 39.3 % (ref 37–48.5)
HGB BLD-MCNC: 13.9 G/DL (ref 12–16)
IMM GRANULOCYTES # BLD AUTO: 0.03 K/UL (ref 0–0.04)
IMM GRANULOCYTES NFR BLD AUTO: 0.3 % (ref 0–0.5)
LYMPHOCYTES # BLD AUTO: 1.9 K/UL (ref 1–4.8)
LYMPHOCYTES NFR BLD: 18.9 % (ref 18–48)
MCH RBC QN AUTO: 33.7 PG (ref 27–31)
MCHC RBC AUTO-ENTMCNC: 35.4 G/DL (ref 32–36)
MCV RBC AUTO: 95 FL (ref 82–98)
MONOCYTES # BLD AUTO: 0.8 K/UL (ref 0.3–1)
MONOCYTES NFR BLD: 7.6 % (ref 4–15)
NEUTROPHILS # BLD AUTO: 7.4 K/UL (ref 1.8–7.7)
NEUTROPHILS NFR BLD: 72.1 % (ref 38–73)
NRBC BLD-RTO: 0 /100 WBC
PLATELET # BLD AUTO: 297 K/UL (ref 150–450)
PMV BLD AUTO: 8.6 FL (ref 9.2–12.9)
POTASSIUM SERPL-SCNC: 3.7 MMOL/L (ref 3.5–5.1)
PROT SERPL-MCNC: 7.2 G/DL (ref 6–8.4)
RBC # BLD AUTO: 4.12 M/UL (ref 4–5.4)
SODIUM SERPL-SCNC: 143 MMOL/L (ref 136–145)
TROPONIN I SERPL DL<=0.01 NG/ML-MCNC: 0.01 NG/ML (ref 0–0.03)
TROPONIN I SERPL DL<=0.01 NG/ML-MCNC: <0.006 NG/ML (ref 0–0.03)
WBC # BLD AUTO: 10.19 K/UL (ref 3.9–12.7)

## 2024-04-25 PROCEDURE — 71045 X-RAY EXAM CHEST 1 VIEW: CPT | Mod: TC

## 2024-04-25 PROCEDURE — 84484 ASSAY OF TROPONIN QUANT: CPT | Performed by: EMERGENCY MEDICINE

## 2024-04-25 PROCEDURE — 83880 ASSAY OF NATRIURETIC PEPTIDE: CPT | Performed by: EMERGENCY MEDICINE

## 2024-04-25 PROCEDURE — 36415 COLL VENOUS BLD VENIPUNCTURE: CPT | Performed by: EMERGENCY MEDICINE

## 2024-04-25 PROCEDURE — 80053 COMPREHEN METABOLIC PANEL: CPT | Performed by: EMERGENCY MEDICINE

## 2024-04-25 PROCEDURE — 87389 HIV-1 AG W/HIV-1&-2 AB AG IA: CPT | Performed by: EMERGENCY MEDICINE

## 2024-04-25 PROCEDURE — 93010 ELECTROCARDIOGRAM REPORT: CPT | Mod: ,,, | Performed by: INTERNAL MEDICINE

## 2024-04-25 PROCEDURE — 93005 ELECTROCARDIOGRAM TRACING: CPT

## 2024-04-25 PROCEDURE — 25000003 PHARM REV CODE 250: Performed by: EMERGENCY MEDICINE

## 2024-04-25 PROCEDURE — 25000242 PHARM REV CODE 250 ALT 637 W/ HCPCS

## 2024-04-25 PROCEDURE — 94640 AIRWAY INHALATION TREATMENT: CPT

## 2024-04-25 PROCEDURE — 86803 HEPATITIS C AB TEST: CPT | Performed by: EMERGENCY MEDICINE

## 2024-04-25 PROCEDURE — 27000221 HC OXYGEN, UP TO 24 HOURS

## 2024-04-25 PROCEDURE — 85025 COMPLETE CBC W/AUTO DIFF WBC: CPT | Performed by: EMERGENCY MEDICINE

## 2024-04-25 PROCEDURE — 71045 X-RAY EXAM CHEST 1 VIEW: CPT | Mod: 26,,, | Performed by: RADIOLOGY

## 2024-04-25 PROCEDURE — 85379 FIBRIN DEGRADATION QUANT: CPT | Performed by: EMERGENCY MEDICINE

## 2024-04-25 PROCEDURE — 99285 EMERGENCY DEPT VISIT HI MDM: CPT | Mod: 25

## 2024-04-25 RX ORDER — HYDROCODONE BITARTRATE AND ACETAMINOPHEN 5; 325 MG/1; MG/1
1 TABLET ORAL
Status: COMPLETED | OUTPATIENT
Start: 2024-04-25 | End: 2024-04-25

## 2024-04-25 RX ORDER — IPRATROPIUM BROMIDE AND ALBUTEROL SULFATE 2.5; .5 MG/3ML; MG/3ML
SOLUTION RESPIRATORY (INHALATION)
Status: COMPLETED
Start: 2024-04-25 | End: 2024-04-25

## 2024-04-25 RX ORDER — GABAPENTIN 100 MG/1
100 CAPSULE ORAL
Status: COMPLETED | OUTPATIENT
Start: 2024-04-25 | End: 2024-04-25

## 2024-04-25 RX ORDER — IPRATROPIUM BROMIDE AND ALBUTEROL SULFATE 2.5; .5 MG/3ML; MG/3ML
3 SOLUTION RESPIRATORY (INHALATION)
Status: COMPLETED | OUTPATIENT
Start: 2024-04-25 | End: 2024-04-25

## 2024-04-25 RX ADMIN — IPRATROPIUM BROMIDE AND ALBUTEROL SULFATE 3 ML: 2.5; .5 SOLUTION RESPIRATORY (INHALATION) at 11:04

## 2024-04-25 RX ADMIN — GABAPENTIN 100 MG: 100 CAPSULE ORAL at 11:04

## 2024-04-25 RX ADMIN — IPRATROPIUM BROMIDE AND ALBUTEROL SULFATE 3 ML: .5; 2.5 SOLUTION RESPIRATORY (INHALATION) at 11:04

## 2024-04-25 RX ADMIN — HYDROCODONE BITARTRATE AND ACETAMINOPHEN 1 TABLET: 5; 325 TABLET ORAL at 11:04

## 2024-04-25 NOTE — TELEPHONE ENCOUNTER
----- Message from Stacie Milian sent at 4/25/2024  3:17 PM CDT -----  Contact: Neetu  Type: Needs Medical Advice    Who Called:  Neetu  What is this regarding?:  Please review and fax back the form. She needs documentation for the pt's orders.  Best Call Back Number:  [phone]  Additional Information:  Please call back at the phone number listed above to advise. Thank you!

## 2024-04-25 NOTE — TELEPHONE ENCOUNTER
Placed a call to [employer] and spoke with Yudith. Pt needing a PA got her NIV machine. Paperwork faxed to [phone].

## 2024-04-26 VITALS
DIASTOLIC BLOOD PRESSURE: 65 MMHG | TEMPERATURE: 98 F | HEIGHT: 65 IN | HEART RATE: 95 BPM | SYSTOLIC BLOOD PRESSURE: 111 MMHG | OXYGEN SATURATION: 100 % | RESPIRATION RATE: 25 BRPM | WEIGHT: 104 LBS | BODY MASS INDEX: 17.33 KG/M2

## 2024-04-26 LAB
HCV AB SERPL QL IA: NORMAL
HIV 1+2 AB+HIV1 P24 AG SERPL QL IA: NORMAL
OHS QRS DURATION: 74 MS
OHS QTC CALCULATION: 435 MS

## 2024-04-26 RX ORDER — GABAPENTIN 100 MG/1
200 CAPSULE ORAL 3 TIMES DAILY
Qty: 180 CAPSULE | Refills: 0 | Status: SHIPPED | OUTPATIENT
Start: 2024-04-26 | End: 2024-05-03

## 2024-04-26 NOTE — ED PROVIDER NOTES
"   History     Chief Complaint   Patient presents with    Chest Pain    Shortness of Breath     C/o CP and SOB that both started a "couple of days ago" and got much worse 2 hours ago. Describes CP at tightness on L chest. Pt on 2L NC baseline at home was 95% on EMS arrival, give duo neb and 125mg solumedral IM, 324mg ASA in route. Pt states 0/10 cp and feels much better now. Denies n/v/d/f/c.      HPI:  Zayra White is a 58 y.o. female with PMH as below who presents to the [facility] emergency department for evaluation of sudden onset SOB with associated left sided chest tightness intermittently over the past 2-3 days but worsened 2 hrs ago at home. She has no other complaints.     An independent history was also taken from the patient's paramedic, because of her transportation via ambulance, who added: gave 125 mg solumedrol, 324 mg aspirin, and DuoNeb en route with significant improvement. Initially had tight breath sounds.      PCP: Polly Lemos MD    Review of patient's allergies indicates:   Allergen Reactions    Ativan [lorazepam] Itching      Past Medical History:   Diagnosis Date    Anxiety     Arthritis     Asthma     Back pain     COPD (chronic obstructive pulmonary disease)     Pulmonary embolism     10 years ago     Past Surgical History:   Procedure Laterality Date    BIOPSY      right breast    BREAST CYST EXCISION       SECTION      CHOLECYSTECTOMY      COLONOSCOPY N/A 2022    Procedure: COLONOSCOPY;  Surgeon: Chavez Gonzales MD;  Location: [facility] ENDO;  Service: General;  Laterality: N/A;    ESOPHAGOGASTRODUODENOSCOPY N/A 2022    Procedure: ESOPHAGOGASTRODUODENOSCOPY (EGD);  Surgeon: Chavez Gonzales MD;  Location: [facility] ENDO;  Service: General;  Laterality: N/A;    INCISION AND DRAINAGE OF ABSCESS Left 3/16/2020    Procedure: INCISION AND DRAINAGE, ABSCESS;  Surgeon: Irvin Villarreal MD;  Location: [facility] OR;  Service: General;  Laterality: Left;       Family " "History   Problem Relation Name Age of Onset    Breast cancer Sister      Ovarian cancer Neg Hx       Social History     Tobacco Use    Smoking status: Former    Smokeless tobacco: Never   Substance and Sexual Activity    Alcohol use: Not Currently     Comment: occ    Drug use: Yes     Types: Benzodiazepines     Comment: as perscribed    Sexual activity: Not Currently     Birth control/protection: Post-menopausal      Review of Systems     Review of Systems   Constitutional: Negative.  Negative for chills and fever.   HENT: Negative.     Eyes: Negative.    Respiratory:  Positive for shortness of breath. Negative for cough.    Cardiovascular:  Positive for chest pain.   Gastrointestinal: Negative.    Endocrine: Negative.    Genitourinary: Negative.    Musculoskeletal:         Right hip/sciatic region pain x2 weeks not relieved by tizanidine   Skin: Negative.    Allergic/Immunologic: Negative.    Neurological: Negative.    Hematological: Negative.    Psychiatric/Behavioral: Negative.     All other systems reviewed and are negative.       Physical Exam     Initial Vitals [04/25/24 2102]   BP Pulse Resp Temp SpO2   (!) 155/70 (!) 113 (!) 24 98.2 °F (36.8 °C) 99 %      MAP       --          Nursing notes and vital signs reviewed.  Constitutional: Patient is in moderate acute distress.   Head: Normocephalic. Atraumatic.   Eyes:  Conjunctivae are not pale. No scleral icterus.   ENT: Mucous membranes moist.   Neck: Supple.   Cardiovascular: Tachycardic. Regular rhythm. No murmurs, rubs, or gallops   Pulmonary: In moderate respiratory distress. No current wheezes. Slightly decreased air exchange.   Abdominal: Non-distended.   Musculoskeletal: Moves all extremities. No obvious deformities.   Skin: Warm and dry.   Neurological:  Alert, awake, and appropriate. Normal speech. No acute lateralizing neurologic deficits appreciated.   Psychiatric: Normal affect. Anxious/"jittery" following nebs.      ED Course   Critical " "Care    Date/Time: 4/25/2024 11:42 PM    Performed by: Charly Alfaro MD  Authorized by: Charly Alfaro MD  Direct patient critical care time: 15 minutes  Additional history critical care time: 5 minutes  Ordering / reviewing critical care time: 7 minutes  Documentation critical care time: 7 minutes  Total critical care time (exclusive of procedural time) : 34 minutes  Critical care time was exclusive of separately billable procedures and treating other patients and teaching time.  Critical care was necessary to treat or prevent imminent or life-threatening deterioration of the following conditions: respiratory failure (severe COPD exacerbation).  Critical care was time spent personally by me on the following activities: blood draw for specimens, development of treatment plan with patient or surrogate, interpretation of cardiac output measurements, evaluation of patient's response to treatment, examination of patient, obtaining history from patient or surrogate, ordering and performing treatments and interventions, ordering and review of laboratory studies, ordering and review of radiographic studies, pulse oximetry, re-evaluation of patient's condition and review of old charts.        Vitals:    04/25/24 2102 04/25/24 2117 04/25/24 2132 04/25/24 2153   BP: (!) 155/70 123/61 120/70    Pulse: (!) 113 109 106 100   Resp: (!) 24 (!) 31 (!) 32 (!) 24   Temp: 98.2 °F (36.8 °C)      TempSrc: Oral      SpO2: 99% 97% 98% 99%   Weight: 47.2 kg (104 lb)      Height: 5' 5" (1.651 m)       04/25/24 2202 04/25/24 2224 04/25/24 2232 04/25/24 2302   BP: 125/69  117/78 116/70   Pulse: 101 102 110 109   Resp: (!) 24 17 (!) 28 (!) 24   Temp:       TempSrc:       SpO2: 98% 100% 99% 98%   Weight:       Height:        04/25/24 2310 04/25/24 2323 04/25/24 2327 04/25/24 2332   BP:    121/67   Pulse: 101 105 107 101   Resp: (!) 24 (!) 22 (!) 21 20   Temp:       TempSrc:       SpO2: 100% 100% 100% 99%   Weight:       Height:     "    04/25/24 2338   BP:    Pulse: 103   Resp: (!) 24   Temp:    TempSrc:    SpO2: 100%   Weight:    Height:      Lab Results Interpreted as Abnormal:  Labs Reviewed   CBC W/ AUTO DIFFERENTIAL - Abnormal; Notable for the following components:       Result Value    MCH 33.7 (*)     MPV 8.6 (*)     All other components within normal limits    Narrative:     Release to patient->Immediate   COMPREHENSIVE METABOLIC PANEL    Narrative:     Release to patient->Immediate   TROPONIN I    Narrative:     Release to patient->Immediate   D DIMER, QUANTITATIVE    Narrative:     Release to patient->Immediate   B-TYPE NATRIURETIC PEPTIDE    Narrative:     Release to patient->Immediate   TROPONIN I   HIV 1 / 2 ANTIBODY   HEPATITIS C ANTIBODY      All Lab Results:  Results for orders placed or performed during the hospital encounter of 04/25/24   CBC auto differential   Result Value Ref Range    WBC 10.19 3.90 - 12.70 K/uL    RBC 4.12 4.00 - 5.40 M/uL    Hemoglobin 13.9 12.0 - 16.0 g/dL    Hematocrit 39.3 37.0 - 48.5 %    MCV 95 82 - 98 fL    MCH 33.7 (H) 27.0 - 31.0 pg    MCHC 35.4 32.0 - 36.0 g/dL    RDW 11.8 11.5 - 14.5 %    Platelets 297 150 - 450 K/uL    MPV 8.6 (L) 9.2 - 12.9 fL    Immature Granulocytes 0.3 0.0 - 0.5 %    Gran # (ANC) 7.4 1.8 - 7.7 K/uL    Immature Grans (Abs) 0.03 0.00 - 0.04 K/uL    Lymph # 1.9 1.0 - 4.8 K/uL    Mono # 0.8 0.3 - 1.0 K/uL    Eos # 0.1 0.0 - 0.5 K/uL    Baso # 0.04 0.00 - 0.20 K/uL    nRBC 0 0 /100 WBC    Gran % 72.1 38.0 - 73.0 %    Lymph % 18.9 18.0 - 48.0 %    Mono % 7.6 4.0 - 15.0 %    Eosinophil % 0.7 0.0 - 8.0 %    Basophil % 0.4 0.0 - 1.9 %    Differential Method Automated    Comprehensive metabolic panel   Result Value Ref Range    Sodium 143 136 - 145 mmol/L    Potassium 3.7 3.5 - 5.1 mmol/L    Chloride 103 95 - 110 mmol/L    CO2 26 23 - 29 mmol/L    Glucose 107 70 - 110 mg/dL    BUN 9 6 - 20 mg/dL    Creatinine 0.7 0.5 - 1.4 mg/dL    Calcium 9.4 8.7 - 10.5 mg/dL    Total Protein 7.2 6.0  - 8.4 g/dL    Albumin 3.7 3.5 - 5.2 g/dL    Total Bilirubin 0.7 0.1 - 1.0 mg/dL    Alkaline Phosphatase 92 55 - 135 U/L    AST 19 10 - 40 U/L    ALT 17 10 - 44 U/L    eGFR >60.0 >60 mL/min/1.73 m^2    Anion Gap 14 8 - 16 mmol/L   Troponin I   Result Value Ref Range    Troponin I 0.006 0.000 - 0.026 ng/mL   D dimer, quantitative   Result Value Ref Range    D-Dimer 0.25 <0.50 mg/L FEU   Brain natriuretic peptide   Result Value Ref Range    BNP 30 0 - 99 pg/mL   Troponin I   Result Value Ref Range    Troponin I <0.006 0.000 - 0.026 ng/mL     Imaging Results              X-Ray Chest AP Portable (Final result)  Result time 04/25/24 22:27:08      Final result by Derek Guillaume MD (04/25/24 22:27:08)                   Impression:      As above.      Electronically signed by: Derek Guillaume  Date:    04/25/2024  Time:    22:27               Narrative:    EXAMINATION:  XR CHEST AP PORTABLE    CLINICAL HISTORY:  Chest pain, unspecified    TECHNIQUE:  Single frontal view of the chest was performed.    COMPARISON:  04/02/2024    FINDINGS:  Emphysema.  Right and left mid lung scarring or atelectasis.  No focal consolidation or pleural effusion.  Normal heart size.                                     ED Physician's independent review of the above imaging: agree with radiologist, no acute infiltrate.    The EKG was ordered, reviewed, and independently interpreted by the ED Physician:  Rhythm: sinus tachycardia   Rate: 110 bpm  No ST-T changes concerning for acute ischemia  Normal intervals     The emergency physician reviewed the vital signs and test results, which are outlined above.     ED Discussion      Patient's evaluation in the ED does not suggest any emergent or life-threatening medical conditions requiring immediate intervention beyond what was provided in the ED, and I believe patient is safe for discharge. Regardless, an unremarkable evaluation in the ED does not preclude the development or presence of a  serious or life-threatening condition. As such, patient was given return instructions for any change or worsening of symptoms.       ED Medication(s) Administered:  Medications   albuterol-ipratropium 2.5 mg-0.5 mg/3 mL nebulizer solution 3 mL (3 mLs Nebulization Given 4/25/24 2327)   HYDROcodone-acetaminophen 5-325 mg per tablet 1 tablet (1 tablet Oral Given 4/25/24 2323)   gabapentin capsule 100 mg (100 mg Oral Given 4/25/24 2336)       Prescription Management: I performed a review of the patient's current Rx medication list as input by nursing staff.    Patient's Medications   New Prescriptions    GABAPENTIN (NEURONTIN) 100 MG CAPSULE    Take 2 capsules (200 mg total) by mouth 3 (three) times daily.   Previous Medications    ALBUTEROL (VENTOLIN HFA) 90 MCG/ACTUATION INHALER    inhale 2 puff by inhalation route  every 4 - 6 hours as needed    ALBUTEROL-IPRATROPIUM (DUO-NEB) 2.5 MG-0.5 MG/3 ML NEBULIZER SOLUTION    Take 3 mLs by nebulization every 6 (six) hours as needed for Wheezing or Shortness of Breath. Rescue    ALENDRONATE (FOSAMAX) 70 MG TABLET    Take 1 tablet (70 mg total) by mouth every 7 days.    ASPIRIN 81 MG CHEW    81 mg.    ATENOLOL (TENORMIN) 25 MG TABLET    TAKE 1/2 TABLET BY MOUTH EVERY DAY **THANK YOU**    ATORVASTATIN (LIPITOR) 20 MG TABLET    Take 1 tablet (20 mg total) by mouth once daily.    CHOLECALCIFEROL, VITAMIN D3, (VITAMIN D3) 25 MCG (1,000 UNIT) CAPSULE    Take 2 capsules (2,000 Units total) by mouth once daily.    CLONAZEPAM (KLONOPIN) 0.5 MG TABLET    Take 1 tablet (0.5 mg total) by mouth 2 (two) times daily as needed for Anxiety.    CYPROHEPTADINE (PERIACTIN) 4 MG TABLET    Take 1 tablet (4 mg total) by mouth 3 (three) times daily as needed (appetitie /allergies).    DULOXETINE (CYMBALTA) 20 MG CAPSULE    Take 2 capsules po daily    FLUTICASONE-UMECLIDIN-VILANTER (TRELEGY ELLIPTA) 200-62.5-25 MCG INHALER    Inhale 1 puff into the lungs once daily.    HYDROCODONE-ACETAMINOPHEN  (NORCO) 7.5-325 MG PER TABLET    Take 1 tablet by mouth every 6 (six) hours as needed for Pain.    LATANOPROST 0.005 % OPHTHALMIC SOLUTION    Place 1 drop into both eyes every evening.    ONDANSETRON (ZOFRAN-ODT) 4 MG TBDL    Take 1 tablet (4 mg total) by mouth every 12 (twelve) hours as needed (Nausea).    PANTOPRAZOLE (PROTONIX) 40 MG TABLET    Take 1 tablet (40 mg total) by mouth once daily. Take in the morning before breakfast.  Wait 30 minutes before eating or drinking anything    PREDNISONE (DELTASONE) 5 MG TABLET    Take 8 tablets (40 mg total) by mouth once daily for 4 days, THEN 1 tablet (5 mg total) once daily.    TIZANIDINE (ZANAFLEX) 4 MG TABLET    Take 1 tablet (4 mg total) by mouth every 8 (eight) hours.   Modified Medications    No medications on file   Discontinued Medications    No medications on file         Follow-up Information       Schedule an appointment as soon as possible for a visit  with Polly Lemos MD.    Specialty: Family Medicine  Contact information:  [address] [facility] [address] [phone]               [facility] Emergency Dept.    Specialty: Emergency Medicine  Why: As needed, If symptoms worsen  Contact information:  [address] [facility] [address] [phone]                          Clinical Impression       ICD-10-CM ICD-9-CM   1. COPD exacerbation  J44.1 491.21   2. Chest pain  R07.9 786.50   3. Right sided sciatica  M54.31 724.3      ED Disposition Condition    Discharge Stable             Charly Alfaro MD  04/26/24 0022

## 2024-05-03 ENCOUNTER — OFFICE VISIT (OUTPATIENT)
Dept: FAMILY MEDICINE | Facility: CLINIC | Age: 59
End: 2024-05-03
Payer: MEDICAID

## 2024-05-03 VITALS
SYSTOLIC BLOOD PRESSURE: 92 MMHG | HEIGHT: 65 IN | RESPIRATION RATE: 12 BRPM | OXYGEN SATURATION: 99 % | BODY MASS INDEX: 16.5 KG/M2 | DIASTOLIC BLOOD PRESSURE: 58 MMHG | WEIGHT: 99 LBS | HEART RATE: 88 BPM

## 2024-05-03 DIAGNOSIS — F41.9 ANXIETY: ICD-10-CM

## 2024-05-03 DIAGNOSIS — M54.31 SCIATICA OF RIGHT SIDE: Primary | ICD-10-CM

## 2024-05-03 PROCEDURE — 1159F MED LIST DOCD IN RCRD: CPT | Mod: CPTII,,, | Performed by: FAMILY MEDICINE

## 2024-05-03 PROCEDURE — 99214 OFFICE O/P EST MOD 30 MIN: CPT | Mod: S$PBB,,, | Performed by: FAMILY MEDICINE

## 2024-05-03 PROCEDURE — 1111F DSCHRG MED/CURRENT MED MERGE: CPT | Mod: CPTII,,, | Performed by: FAMILY MEDICINE

## 2024-05-03 PROCEDURE — 3078F DIAST BP <80 MM HG: CPT | Mod: CPTII,,, | Performed by: FAMILY MEDICINE

## 2024-05-03 PROCEDURE — 3008F BODY MASS INDEX DOCD: CPT | Mod: CPTII,,, | Performed by: FAMILY MEDICINE

## 2024-05-03 PROCEDURE — 99214 OFFICE O/P EST MOD 30 MIN: CPT | Mod: PBBFAC | Performed by: FAMILY MEDICINE

## 2024-05-03 PROCEDURE — 3074F SYST BP LT 130 MM HG: CPT | Mod: CPTII,,, | Performed by: FAMILY MEDICINE

## 2024-05-03 PROCEDURE — 99999 PR PBB SHADOW E&M-EST. PATIENT-LVL IV: CPT | Mod: PBBFAC,,, | Performed by: FAMILY MEDICINE

## 2024-05-03 RX ORDER — HYDROXYZINE PAMOATE 25 MG/1
25 CAPSULE ORAL 4 TIMES DAILY
Qty: 90 CAPSULE | Refills: 5 | Status: SHIPPED | OUTPATIENT
Start: 2024-05-03

## 2024-05-03 RX ORDER — GABAPENTIN 300 MG/1
300 CAPSULE ORAL 3 TIMES DAILY
Qty: 270 CAPSULE | Refills: 3 | Status: SHIPPED | OUTPATIENT
Start: 2024-05-03 | End: 2025-05-03

## 2024-05-03 NOTE — PROGRESS NOTES
Subjective:       Patient ID: Zayra White is a 58 y.o. female.    Chief Complaint: Follow-up (ER follow up)      Past Medical History:   Diagnosis Date    Anxiety     Arthritis     Asthma     Back pain     COPD (chronic obstructive pulmonary disease)     Pulmonary embolism     10 years ago       Past Surgical History:   Procedure Laterality Date    BIOPSY      right breast    BREAST CYST EXCISION       SECTION      CHOLECYSTECTOMY      COLONOSCOPY N/A 2022    Procedure: COLONOSCOPY;  Surgeon: Chavez Gonzales MD;  Location: [facility] ENDO;  Service: General;  Laterality: N/A;    ESOPHAGOGASTRODUODENOSCOPY N/A 2022    Procedure: ESOPHAGOGASTRODUODENOSCOPY (EGD);  Surgeon: Chavez Gonzales MD;  Location: [facility] ENDO;  Service: General;  Laterality: N/A;    INCISION AND DRAINAGE OF ABSCESS Left 3/16/2020    Procedure: INCISION AND DRAINAGE, ABSCESS;  Surgeon: Irvin Villarreal MD;  Location: [facility] OR;  Service: General;  Laterality: Left;        Social History     Socioeconomic History    Marital status: Legally    Tobacco Use    Smoking status: Former    Smokeless tobacco: Never   Substance and Sexual Activity    Alcohol use: Not Currently     Comment: occ    Drug use: Yes     Types: Benzodiazepines     Comment: as perscribed    Sexual activity: Not Currently     Birth control/protection: Post-menopausal   Social History Narrative    ** Merged History Encounter **          Social Determinants of Health     Financial Resource Strain: Low Risk  (2023)    Overall Financial Resource Strain (CARDIA)     Difficulty of Paying Living Expenses: Not hard at all   Food Insecurity: No Food Insecurity (2023)    Hunger Vital Sign     Worried About Running Out of Food in the Last Year: Never true     Ran Out of Food in the Last Year: Never true   Transportation Needs: Unmet Transportation Needs (2023)    PRAPARE - Transportation     Lack of Transportation (Medical): Yes     Lack of  Transportation (Non-Medical): No   Physical Activity: Sufficiently Active (12/29/2023)    Exercise Vital Sign     Days of Exercise per Week: 2 days     Minutes of Exercise per Session: 120 min   Stress: Stress Concern Present (12/29/2023)    [employer] - Occupational Stress Questionnaire     Feeling of Stress : Very much   Housing Stability: Low Risk  (12/29/2023)    Housing Stability Vital Sign     Unable to Pay for Housing in the Last Year: No     Number of Places Lived in the Last Year: 1     Unstable Housing in the Last Year: No       Family History   Problem Relation Name Age of Onset    Breast cancer Sister      Ovarian cancer Neg Hx         Review of patient's allergies indicates:   Allergen Reactions    Ativan [lorazepam] Itching          Current Outpatient Medications:     albuterol (VENTOLIN HFA) 90 mcg/actuation inhaler, inhale 2 puff by inhalation route  every 4 - 6 hours as needed, Disp: 36 g, Rfl: 6    albuterol-ipratropium (DUO-NEB) 2.5 mg-0.5 mg/3 mL nebulizer solution, Take 3 mLs by nebulization every 6 (six) hours as needed for Wheezing or Shortness of Breath. Rescue, Disp: 75 mL, Rfl: 3    alendronate (FOSAMAX) 70 MG tablet, Take 1 tablet (70 mg total) by mouth every 7 days., Disp: 13 tablet, Rfl: 3    aspirin 81 MG Chew, 81 mg., Disp: , Rfl:     atenoloL (TENORMIN) 25 MG tablet, TAKE 1/2 TABLET BY MOUTH EVERY DAY **THANK YOU**, Disp: 45 tablet, Rfl: 3    atorvastatin (LIPITOR) 20 MG tablet, Take 1 tablet (20 mg total) by mouth once daily., Disp: 90 tablet, Rfl: 3    cholecalciferol, vitamin D3, (VITAMIN D3) 25 mcg (1,000 unit) capsule, Take 2 capsules (2,000 Units total) by mouth once daily., Disp: 60 capsule, Rfl: 0    DULoxetine (CYMBALTA) 20 MG capsule, Take 2 capsules po daily, Disp: 180 capsule, Rfl: 3    fluticasone-umeclidin-vilanter (TRELEGY ELLIPTA) 200-62.5-25 mcg inhaler, Inhale 1 puff into the lungs once daily., Disp: 60 each, Rfl: 11    latanoprost 0.005  % ophthalmic solution, Place 1 drop into both eyes every evening., Disp: , Rfl:     ondansetron (ZOFRAN-ODT) 4 MG TbDL, Take 1 tablet (4 mg total) by mouth every 12 (twelve) hours as needed (Nausea)., Disp: 30 tablet, Rfl: 2    pantoprazole (PROTONIX) 40 MG tablet, Take 1 tablet (40 mg total) by mouth once daily. Take in the morning before breakfast.  Wait 30 minutes before eating or drinking anything, Disp: 30 tablet, Rfl: 11    predniSONE (DELTASONE) 5 MG tablet, Take 8 tablets (40 mg total) by mouth once daily for 4 days, THEN 1 tablet (5 mg total) once daily., Disp: 90 tablet, Rfl: 0    tiZANidine (ZANAFLEX) 4 MG tablet, Take 1 tablet (4 mg total) by mouth every 8 (eight) hours., Disp: 30 tablet, Rfl: 0    gabapentin (NEURONTIN) 300 MG capsule, Take 1 capsule (300 mg total) by mouth 3 (three) times daily., Disp: 270 capsule, Rfl: 3    hydrOXYzine pamoate (VISTARIL) 25 MG Cap, Take 1 capsule (25 mg total) by mouth 4 (four) times daily., Disp: 90 capsule, Rfl: 5    Ms White is a 57 yo here as a ER follow up for her sciatica. She was prescribed neurontin, but has not picked it up yet. She continues  to lose weight, and is back at 99#. She has right sided sciatica daily and has not seen a pain mgt specialist. Will refer to pain management     Follow-up  Associated symptoms include fatigue.     Review of Systems   Constitutional:  Positive for fatigue.        Thin, frail female   Respiratory:  Positive for shortness of breath.         On chronic O2 at 2L       Objective:      Physical Exam  Constitutional:       Appearance: Normal appearance. She is ill-appearing.   Cardiovascular:      Rate and Rhythm: Normal rate and regular rhythm.   Pulmonary:      Comments: On O2 at 2L  Abdominal:      General: Abdomen is flat.   Musculoskeletal:         General: Normal range of motion.      Cervical back: Normal range of motion.   Skin:     General: Skin is warm.   Neurological:      General: No focal deficit present.       Mental Status: She is alert and oriented to person, place, and time.   Psychiatric:         Mood and Affect: Mood normal.         Behavior: Behavior normal.         Assessment:       1. Sciatica of right side    2. Anxiety        Plan:         Sciatica of right side  -     Ambulatory referral/consult to Pain Clinic; Future; Expected date: 05/10/2024  -     gabapentin (NEURONTIN) 300 MG capsule; Take 1 capsule (300 mg total) by mouth 3 (three) times daily.  Dispense: 270 capsule; Refill: 3    Anxiety  -     hydrOXYzine pamoate (VISTARIL) 25 MG Cap; Take 1 capsule (25 mg total) by mouth 4 (four) times daily.  Dispense: 90 capsule; Refill: 5        Risks, benefits, and side effects were discussed with the patient. All questions were answered to the fullest satisfaction of the patient, and pt verbalized understanding and agreement to treatment plan. Pt was to call with any new or worsening symptoms, or present to the ER.        Polly Lemos MD

## 2024-05-29 RX ORDER — MONTELUKAST SODIUM 10 MG/1
TABLET ORAL
Qty: 30 TABLET | Refills: 0 | OUTPATIENT
Start: 2024-05-29

## 2024-06-05 ENCOUNTER — PATIENT MESSAGE (OUTPATIENT)
Dept: FAMILY MEDICINE | Facility: CLINIC | Age: 59
End: 2024-06-05
Payer: MEDICAID

## 2024-06-05 DIAGNOSIS — K29.70 GASTRITIS, PRESENCE OF BLEEDING UNSPECIFIED, UNSPECIFIED CHRONICITY, UNSPECIFIED GASTRITIS TYPE: ICD-10-CM

## 2024-06-05 RX ORDER — PANTOPRAZOLE SODIUM 40 MG/1
40 TABLET, DELAYED RELEASE ORAL DAILY
Qty: 90 TABLET | Refills: 3 | Status: SHIPPED | OUTPATIENT
Start: 2024-06-05

## 2024-06-05 NOTE — TELEPHONE ENCOUNTER
No care due was identified.  [facility] [place] Embedded Care Due Messages. Reference number: [id number].   6/05/2024 2:22:54 PM CDT

## 2024-06-05 NOTE — TELEPHONE ENCOUNTER
Refill Routing Note   Medication(s) are not appropriate for processing by [facility] for the following reason(s):        No active prescription written by provider    [facility] action(s):  Defer               Appointments  past 12m or future 3m with PCP    Date Provider   Last Visit   5/3/2024 Polly Lemos MD   Next Visit   8/8/2024 Polly Lemos MD   ED visits in past 90 days: 2        Note composed:3:23 PM 06/05/2024

## 2024-07-01 ENCOUNTER — E-VISIT (OUTPATIENT)
Dept: FAMILY MEDICINE | Facility: CLINIC | Age: 59
End: 2024-07-01
Payer: MEDICAID

## 2024-07-01 ENCOUNTER — PATIENT MESSAGE (OUTPATIENT)
Dept: FAMILY MEDICINE | Facility: CLINIC | Age: 59
End: 2024-07-01

## 2024-07-01 DIAGNOSIS — F41.9 ANXIETY: Primary | ICD-10-CM

## 2024-07-02 NOTE — PROGRESS NOTES
Subjective:       Patient ID: Zayra White is a 58 y.o. female.    Chief Complaint: Medication Management (Entered automatically based on patient selection in Patient Portal.)      Past Medical History:   Diagnosis Date    Anxiety     Arthritis     Asthma     Back pain     COPD (chronic obstructive pulmonary disease)     Pulmonary embolism     10 years ago       Past Surgical History:   Procedure Laterality Date    BIOPSY      right breast    BREAST CYST EXCISION       SECTION      CHOLECYSTECTOMY      COLONOSCOPY N/A 2022    Procedure: COLONOSCOPY;  Surgeon: Chavez Gonzales MD;  Location: [facility] ENDO;  Service: General;  Laterality: N/A;    ESOPHAGOGASTRODUODENOSCOPY N/A 2022    Procedure: ESOPHAGOGASTRODUODENOSCOPY (EGD);  Surgeon: Chavez Gonzales MD;  Location: [facility] ENDO;  Service: General;  Laterality: N/A;    INCISION AND DRAINAGE OF ABSCESS Left 3/16/2020    Procedure: INCISION AND DRAINAGE, ABSCESS;  Surgeon: Irvin Villarreal MD;  Location: [facility] OR;  Service: General;  Laterality: Left;        Social History     Socioeconomic History    Marital status: Legally    Tobacco Use    Smoking status: Former    Smokeless tobacco: Never   Substance and Sexual Activity    Alcohol use: Not Currently     Comment: occ    Drug use: Yes     Types: Benzodiazepines     Comment: as perscribed    Sexual activity: Not Currently     Birth control/protection: Post-menopausal   Social History Narrative    ** Merged History Encounter **          Social Determinants of Health     Financial Resource Strain: Low Risk  (2023)    Overall Financial Resource Strain (CARDIA)     Difficulty of Paying Living Expenses: Not hard at all   Food Insecurity: No Food Insecurity (2023)    Hunger Vital Sign     Worried About Running Out of Food in the Last Year: Never true     Ran Out of Food in the Last Year: Never true   Transportation Needs: Unmet Transportation Needs (2023)    PRAPARE -  Transportation     Lack of Transportation (Medical): Yes     Lack of Transportation (Non-Medical): No   Physical Activity: Sufficiently Active (12/29/2023)    Exercise Vital Sign     Days of Exercise per Week: 2 days     Minutes of Exercise per Session: 120 min   Stress: Stress Concern Present (12/29/2023)    [employer] [place] [employer] - Occupational Stress Questionnaire     Feeling of Stress : Very much   Housing Stability: Low Risk  (12/29/2023)    Housing Stability Vital Sign     Unable to Pay for Housing in the Last Year: No     Number of Places Lived in the Last Year: 1     Unstable Housing in the Last Year: No       Family History   Problem Relation Name Age of Onset    Breast cancer Sister      Ovarian cancer Neg Hx         Review of patient's allergies indicates:   Allergen Reactions    Ativan [lorazepam] Itching          Current Outpatient Medications:     albuterol (VENTOLIN HFA) 90 mcg/actuation inhaler, inhale 2 puff by inhalation route  every 4 - 6 hours as needed, Disp: 36 g, Rfl: 6    albuterol-ipratropium (DUO-NEB) 2.5 mg-0.5 mg/3 mL nebulizer solution, Take 3 mLs by nebulization every 6 (six) hours as needed for Wheezing or Shortness of Breath. Rescue, Disp: 75 mL, Rfl: 3    alendronate (FOSAMAX) 70 MG tablet, Take 1 tablet (70 mg total) by mouth every 7 days., Disp: 13 tablet, Rfl: 3    aspirin 81 MG Chew, 81 mg., Disp: , Rfl:     atenoloL (TENORMIN) 25 MG tablet, TAKE 1/2 TABLET BY MOUTH EVERY DAY **THANK YOU**, Disp: 45 tablet, Rfl: 3    atorvastatin (LIPITOR) 20 MG tablet, Take 1 tablet (20 mg total) by mouth once daily., Disp: 90 tablet, Rfl: 3    busPIRone (BUSPAR) 15 MG tablet, Take 1 tablet (15 mg total) by mouth 3 (three) times daily., Disp: 90 tablet, Rfl: 11    cholecalciferol, vitamin D3, (VITAMIN D3) 25 mcg (1,000 unit) capsule, Take 2 capsules (2,000 Units total) by mouth once daily., Disp: 60 capsule, Rfl: 0    DULoxetine (CYMBALTA) 20 MG capsule, Take 2 capsules po  daily, Disp: 180 capsule, Rfl: 3    fluticasone-umeclidin-vilanter (TRELEGY ELLIPTA) 200-62.5-25 mcg inhaler, Inhale 1 puff into the lungs once daily., Disp: 60 each, Rfl: 11    gabapentin (NEURONTIN) 300 MG capsule, Take 1 capsule (300 mg total) by mouth 3 (three) times daily., Disp: 270 capsule, Rfl: 3    latanoprost 0.005 % ophthalmic solution, Place 1 drop into both eyes every evening., Disp: , Rfl:     ondansetron (ZOFRAN-ODT) 4 MG TbDL, Take 1 tablet (4 mg total) by mouth every 12 (twelve) hours as needed (Nausea)., Disp: 30 tablet, Rfl: 2    pantoprazole (PROTONIX) 40 MG tablet, Take 1 tablet (40 mg total) by mouth once daily. Take in the morning before breakfast.  Wait 30 minutes before eating or drinking anything, Disp: 90 tablet, Rfl: 3    predniSONE (DELTASONE) 5 MG tablet, Take 8 tablets (40 mg total) by mouth once daily for 4 days, THEN 1 tablet (5 mg total) once daily., Disp: 90 tablet, Rfl: 0    tiZANidine (ZANAFLEX) 4 MG tablet, Take 1 tablet (4 mg total) by mouth every 8 (eight) hours., Disp: 30 tablet, Rfl: 0    Ms. Pulido is a 58-year-old female with anxiety.  She was previously on benzodiazepines in has been weaned off.  Her blood pressure consistently on the low-normal too low side.  She was prescribed Vistaril at our last in office appointment, but states it did not work.  She is requesting another medication.  She currently  takes duloxetine, 40 mg p.o. q.day.      Review of Systems   Psychiatric/Behavioral:  Positive for decreased concentration. The patient is nervous/anxious.         Anxiety/depression       Objective:      Physical Exam  Psychiatric:      Comments: Anxiety/depression         Assessment:       1. Anxiety        Plan:         Anxiety  -     busPIRone (BUSPAR) 15 MG tablet; Take 1 tablet (15 mg total) by mouth 3 (three) times daily.  Dispense: 90 tablet; Refill: 11    A total of 12 minutes was spent reviewing this patient's questionnaire, lab results, medications prescribed  and discontinued medications, PDMP documentation and in documenting this patient's current E visit chart    Risks, benefits, and side effects were discussed with the patient. All questions were answered to the fullest satisfaction of the patient, and pt verbalized understanding and agreement to treatment plan. Pt was to call with any new or worsening symptoms, or present to the ER.        Polly Lemos MD

## 2024-07-03 RX ORDER — BUSPIRONE HYDROCHLORIDE 15 MG/1
15 TABLET ORAL 3 TIMES DAILY
Qty: 90 TABLET | Refills: 11 | Status: SHIPPED | OUTPATIENT
Start: 2024-07-03 | End: 2025-07-03

## 2024-07-12 ENCOUNTER — PATIENT MESSAGE (OUTPATIENT)
Dept: FAMILY MEDICINE | Facility: CLINIC | Age: 59
End: 2024-07-12
Payer: MEDICAID

## 2024-07-20 ENCOUNTER — PATIENT MESSAGE (OUTPATIENT)
Dept: FAMILY MEDICINE | Facility: CLINIC | Age: 59
End: 2024-07-20
Payer: MEDICAID

## 2024-07-22 ENCOUNTER — HOSPITAL ENCOUNTER (INPATIENT)
Facility: HOSPITAL | Age: 59
LOS: 1 days | Discharge: HOME | End: 2024-07-25
Attending: EMERGENCY MEDICINE | Admitting: INTERNAL MEDICINE
Payer: MEDICAID

## 2024-07-22 DIAGNOSIS — J44.1 COPD EXACERBATION: Primary | ICD-10-CM

## 2024-07-22 DIAGNOSIS — R06.02 SOB (SHORTNESS OF BREATH): ICD-10-CM

## 2024-07-22 DIAGNOSIS — R06.09 DYSPNEA ON EXERTION: ICD-10-CM

## 2024-07-22 PROBLEM — M19.90 ARTHRITIS: Status: ACTIVE | Noted: 2024-07-22

## 2024-07-22 PROBLEM — J43.9 PULMONARY EMPHYSEMA: Status: ACTIVE | Noted: 2024-07-22

## 2024-07-22 LAB
ALBUMIN SERPL BCP-MCNC: 4 G/DL (ref 3.5–5.2)
ALP SERPL-CCNC: 52 U/L (ref 55–135)
ALT SERPL W/O P-5'-P-CCNC: 16 U/L (ref 10–44)
ANION GAP SERPL CALC-SCNC: 12 MMOL/L (ref 8–16)
ANION GAP SERPL CALC-SCNC: 14 MMOL/L (ref 8–16)
AORTIC ROOT ANNULUS: 2.76 CM
AORTIC VALVE CUSP SEPERATION: 1.73 CM
APICAL FOUR CHAMBER EJECTION FRACTION: 75 %
AST SERPL-CCNC: 23 U/L (ref 10–40)
AV INDEX (PROSTH): 0.78
AV MEAN GRADIENT: 2 MMHG
AV PEAK GRADIENT: 3 MMHG
AV VALVE AREA BY VELOCITY RATIO: 2.82 CM²
AV VALVE AREA: 2.33 CM²
AV VELOCITY RATIO: 0.94
BASOPHILS # BLD AUTO: 0.01 K/UL (ref 0–0.2)
BASOPHILS # BLD AUTO: 0.02 K/UL (ref 0–0.2)
BASOPHILS NFR BLD: 0.1 % (ref 0–1.9)
BASOPHILS NFR BLD: 0.1 % (ref 0–1.9)
BILIRUB SERPL-MCNC: 0.7 MG/DL (ref 0.1–1)
BILIRUB UR QL STRIP: NEGATIVE
BNP SERPL-MCNC: 38 PG/ML (ref 0–99)
BSA FOR ECHO PROCEDURE: 1.47 M2
BUN SERPL-MCNC: 10 MG/DL (ref 6–20)
BUN SERPL-MCNC: 11 MG/DL (ref 6–20)
CALCIUM SERPL-MCNC: 8.8 MG/DL (ref 8.7–10.5)
CALCIUM SERPL-MCNC: 9.3 MG/DL (ref 8.7–10.5)
CHLORIDE SERPL-SCNC: 102 MMOL/L (ref 95–110)
CHLORIDE SERPL-SCNC: 103 MMOL/L (ref 95–110)
CLARITY UR: CLEAR
CO2 SERPL-SCNC: 23 MMOL/L (ref 23–29)
CO2 SERPL-SCNC: 25 MMOL/L (ref 23–29)
COLOR UR: YELLOW
CREAT SERPL-MCNC: 0.7 MG/DL (ref 0.5–1.4)
CREAT SERPL-MCNC: 0.7 MG/DL (ref 0.5–1.4)
CV ECHO LV RWT: 0.5 CM
DIFFERENTIAL METHOD BLD: ABNORMAL
DIFFERENTIAL METHOD BLD: ABNORMAL
DOP CALC AO PEAK VEL: 0.9 M/S
DOP CALC AO VTI: 18.3 CM
DOP CALC LVOT AREA: 3 CM2
DOP CALC LVOT DIAMETER: 1.95 CM
DOP CALC LVOT PEAK VEL: 0.85 M/S
DOP CALC LVOT STROKE VOLUME: 42.68 CM3
DOP CALC MV VTI: 16 CM
DOP CALCLVOT PEAK VEL VTI: 14.3 CM
E WAVE DECELERATION TIME: 202.02 MSEC
E/A RATIO: 0.89
ECHO LV POSTERIOR WALL: 0.91 CM (ref 0.6–1.1)
EJECTION FRACTION: 64 %
EOSINOPHIL # BLD AUTO: 0 K/UL (ref 0–0.5)
EOSINOPHIL # BLD AUTO: 0 K/UL (ref 0–0.5)
EOSINOPHIL NFR BLD: 0 % (ref 0–8)
EOSINOPHIL NFR BLD: 0 % (ref 0–8)
ERYTHROCYTE [DISTWIDTH] IN BLOOD BY AUTOMATED COUNT: 11.3 % (ref 11.5–14.5)
ERYTHROCYTE [DISTWIDTH] IN BLOOD BY AUTOMATED COUNT: 11.5 % (ref 11.5–14.5)
EST. GFR  (NO RACE VARIABLE): >60 ML/MIN/1.73 M^2
EST. GFR  (NO RACE VARIABLE): >60 ML/MIN/1.73 M^2
FRACTIONAL SHORTENING: 34 % (ref 28–44)
GLUCOSE SERPL-MCNC: 137 MG/DL (ref 70–110)
GLUCOSE SERPL-MCNC: 159 MG/DL (ref 70–110)
GLUCOSE UR QL STRIP: NEGATIVE
HCT VFR BLD AUTO: 37.9 % (ref 37–48.5)
HCT VFR BLD AUTO: 39.4 % (ref 37–48.5)
HGB BLD-MCNC: 13.1 G/DL (ref 12–16)
HGB BLD-MCNC: 13.7 G/DL (ref 12–16)
HGB UR QL STRIP: NEGATIVE
IMM GRANULOCYTES # BLD AUTO: 0.02 K/UL (ref 0–0.04)
IMM GRANULOCYTES # BLD AUTO: 0.05 K/UL (ref 0–0.04)
IMM GRANULOCYTES NFR BLD AUTO: 0.2 % (ref 0–0.5)
IMM GRANULOCYTES NFR BLD AUTO: 0.3 % (ref 0–0.5)
INTERVENTRICULAR SEPTUM: 0.8 CM (ref 0.6–1.1)
KETONES UR QL STRIP: NEGATIVE
LA MAJOR: 2.28 CM
LA MINOR: 2.3 CM
LEFT ATRIUM SIZE: 2.41 CM
LEFT INTERNAL DIMENSION IN SYSTOLE: 2.39 CM (ref 2.1–4)
LEFT VENTRICLE DIASTOLIC VOLUME INDEX: 36.83 ML/M2
LEFT VENTRICLE DIASTOLIC VOLUME: 55.24 ML
LEFT VENTRICLE END DIASTOLIC VOLUME APICAL 4 CHAMBER: 26.56 ML
LEFT VENTRICLE MASS INDEX: 58 G/M2
LEFT VENTRICLE SYSTOLIC VOLUME INDEX: 13.3 ML/M2
LEFT VENTRICLE SYSTOLIC VOLUME: 20 ML
LEFT VENTRICULAR INTERNAL DIMENSION IN DIASTOLE: 3.62 CM (ref 3.5–6)
LEFT VENTRICULAR MASS: 87.11 G
LEUKOCYTE ESTERASE UR QL STRIP: NEGATIVE
LVED V (TEICH): 55.24 ML
LVES V (TEICH): 20 ML
LVOT MG: 1.62 MMHG
LVOT MV: 0.6 CM/S
LYMPHOCYTES # BLD AUTO: 0.3 K/UL (ref 1–4.8)
LYMPHOCYTES # BLD AUTO: 0.5 K/UL (ref 1–4.8)
LYMPHOCYTES NFR BLD: 2.7 % (ref 18–48)
LYMPHOCYTES NFR BLD: 3 % (ref 18–48)
MCH RBC QN AUTO: 32.5 PG (ref 27–31)
MCH RBC QN AUTO: 32.9 PG (ref 27–31)
MCHC RBC AUTO-ENTMCNC: 34.6 G/DL (ref 32–36)
MCHC RBC AUTO-ENTMCNC: 34.8 G/DL (ref 32–36)
MCV RBC AUTO: 93 FL (ref 82–98)
MCV RBC AUTO: 95 FL (ref 82–98)
MONOCYTES # BLD AUTO: 0.1 K/UL (ref 0.3–1)
MONOCYTES # BLD AUTO: 1 K/UL (ref 0.3–1)
MONOCYTES NFR BLD: 1 % (ref 4–15)
MONOCYTES NFR BLD: 6 % (ref 4–15)
MV MEAN GRADIENT: 2 MMHG
MV PEAK A VEL: 0.73 M/S
MV PEAK E VEL: 0.65 M/S
MV PEAK GRADIENT: 3 MMHG
MV STENOSIS PRESSURE HALF TIME: 51.27 MS
MV VALVE AREA BY CONTINUITY EQUATION: 2.67 CM2
MV VALVE AREA P 1/2 METHOD: 4.29 CM2
NEUTROPHILS # BLD AUTO: 14.3 K/UL (ref 1.8–7.7)
NEUTROPHILS # BLD AUTO: 9.2 K/UL (ref 1.8–7.7)
NEUTROPHILS NFR BLD: 90.6 % (ref 38–73)
NEUTROPHILS NFR BLD: 96 % (ref 38–73)
NITRITE UR QL STRIP: NEGATIVE
NRBC BLD-RTO: 0 /100 WBC
NRBC BLD-RTO: 0 /100 WBC
OHS CV RV/LV RATIO: 0.53 CM
OHS QRS DURATION: 76 MS
OHS QTC CALCULATION: 436 MS
PH UR STRIP: 6 [PH] (ref 5–8)
PISA TR MAX VEL: 2.24 M/S
PLATELET # BLD AUTO: 246 K/UL (ref 150–450)
PLATELET # BLD AUTO: 301 K/UL (ref 150–450)
PMV BLD AUTO: 9 FL (ref 9.2–12.9)
PMV BLD AUTO: 9.7 FL (ref 9.2–12.9)
POCT GLUCOSE: 157 MG/DL (ref 70–110)
POCT GLUCOSE: 163 MG/DL (ref 70–110)
POTASSIUM SERPL-SCNC: 4 MMOL/L (ref 3.5–5.1)
POTASSIUM SERPL-SCNC: 4.1 MMOL/L (ref 3.5–5.1)
PROT SERPL-MCNC: 6.9 G/DL (ref 6–8.4)
PROT UR QL STRIP: NEGATIVE
PV PEAK GRADIENT: 2 MMHG
PV PEAK VELOCITY: 0.69 M/S
RA MAJOR: 2.7 CM
RA PRESSURE ESTIMATED: 3 MMHG
RA WIDTH: 2.16 CM
RBC # BLD AUTO: 3.98 M/UL (ref 4–5.4)
RBC # BLD AUTO: 4.22 M/UL (ref 4–5.4)
RIGHT VENTRICLE DIASTOLIC BASEL DIMENSION: 2.4 CM
RIGHT VENTRICLE DIASTOLIC LENGTH: 3.4 CM
RIGHT VENTRICLE DIASTOLIC MID DIMENSION: 2.2 CM
RIGHT VENTRICULAR END-DIASTOLIC DIMENSION: 1.93 CM
RIGHT VENTRICULAR LENGTH IN DIASTOLE (APICAL 4-CHAMBER VIEW): 3.4 CM
RV MID DIAMA: 2.23 CM
RV TB RVSP: 5 MMHG
SODIUM SERPL-SCNC: 139 MMOL/L (ref 136–145)
SODIUM SERPL-SCNC: 140 MMOL/L (ref 136–145)
SP GR UR STRIP: 1.01 (ref 1–1.03)
TR MAX PG: 20 MMHG
TRICUSPID ANNULAR PLANE SYSTOLIC EXCURSION: 1.8 CM
TROPONIN I SERPL DL<=0.01 NG/ML-MCNC: <0.006 NG/ML (ref 0–0.03)
TV REST PULMONARY ARTERY PRESSURE: 23 MMHG
URN SPEC COLLECT METH UR: NORMAL
UROBILINOGEN UR STRIP-ACNC: NEGATIVE EU/DL
WBC # BLD AUTO: 15.78 K/UL (ref 3.9–12.7)
WBC # BLD AUTO: 9.6 K/UL (ref 3.9–12.7)
Z-SCORE OF LEFT VENTRICULAR DIMENSION IN END DIASTOLE: -2.02
Z-SCORE OF LEFT VENTRICULAR DIMENSION IN END SYSTOLE: -1.11

## 2024-07-22 PROCEDURE — 27000646 HC AEROBIKA DEVICE

## 2024-07-22 PROCEDURE — 87070 CULTURE OTHR SPECIMN AEROBIC: CPT | Performed by: INTERNAL MEDICINE

## 2024-07-22 PROCEDURE — 93005 ELECTROCARDIOGRAM TRACING: CPT

## 2024-07-22 PROCEDURE — 63600175 PHARM REV CODE 636 W HCPCS: Performed by: INTERNAL MEDICINE

## 2024-07-22 PROCEDURE — G0378 HOSPITAL OBSERVATION PER HR: HCPCS

## 2024-07-22 PROCEDURE — 80053 COMPREHEN METABOLIC PANEL: CPT | Performed by: EMERGENCY MEDICINE

## 2024-07-22 PROCEDURE — 94761 N-INVAS EAR/PLS OXIMETRY MLT: CPT

## 2024-07-22 PROCEDURE — 99291 CRITICAL CARE FIRST HOUR: CPT

## 2024-07-22 PROCEDURE — 63600175 PHARM REV CODE 636 W HCPCS: Mod: UD | Performed by: INTERNAL MEDICINE

## 2024-07-22 PROCEDURE — 99900035 HC TECH TIME PER 15 MIN (STAT)

## 2024-07-22 PROCEDURE — 27000221 HC OXYGEN, UP TO 24 HOURS

## 2024-07-22 PROCEDURE — 93010 ELECTROCARDIOGRAM REPORT: CPT | Mod: ,,, | Performed by: INTERNAL MEDICINE

## 2024-07-22 PROCEDURE — 71275 CT ANGIOGRAPHY CHEST: CPT | Mod: TC

## 2024-07-22 PROCEDURE — 25000242 PHARM REV CODE 250 ALT 637 W/ HCPCS: Performed by: INTERNAL MEDICINE

## 2024-07-22 PROCEDURE — 87205 SMEAR GRAM STAIN: CPT | Performed by: INTERNAL MEDICINE

## 2024-07-22 PROCEDURE — 85025 COMPLETE CBC W/AUTO DIFF WBC: CPT | Mod: 91 | Performed by: EMERGENCY MEDICINE

## 2024-07-22 PROCEDURE — 25000242 PHARM REV CODE 250 ALT 637 W/ HCPCS: Performed by: EMERGENCY MEDICINE

## 2024-07-22 PROCEDURE — 96375 TX/PRO/DX INJ NEW DRUG ADDON: CPT

## 2024-07-22 PROCEDURE — 71275 CT ANGIOGRAPHY CHEST: CPT | Mod: 26,,, | Performed by: RADIOLOGY

## 2024-07-22 PROCEDURE — 25000003 PHARM REV CODE 250: Performed by: INTERNAL MEDICINE

## 2024-07-22 PROCEDURE — 85025 COMPLETE CBC W/AUTO DIFF WBC: CPT | Performed by: INTERNAL MEDICINE

## 2024-07-22 PROCEDURE — 84484 ASSAY OF TROPONIN QUANT: CPT | Performed by: EMERGENCY MEDICINE

## 2024-07-22 PROCEDURE — 81003 URINALYSIS AUTO W/O SCOPE: CPT | Performed by: EMERGENCY MEDICINE

## 2024-07-22 PROCEDURE — 83880 ASSAY OF NATRIURETIC PEPTIDE: CPT | Performed by: EMERGENCY MEDICINE

## 2024-07-22 PROCEDURE — 96366 THER/PROPH/DIAG IV INF ADDON: CPT

## 2024-07-22 PROCEDURE — 63600175 PHARM REV CODE 636 W HCPCS: Mod: UD | Performed by: EMERGENCY MEDICINE

## 2024-07-22 PROCEDURE — 99900031 HC PATIENT EDUCATION (STAT)

## 2024-07-22 PROCEDURE — 94640 AIRWAY INHALATION TREATMENT: CPT | Mod: XB

## 2024-07-22 PROCEDURE — 25500020 PHARM REV CODE 255: Performed by: INTERNAL MEDICINE

## 2024-07-22 PROCEDURE — 80048 BASIC METABOLIC PNL TOTAL CA: CPT | Mod: XB | Performed by: INTERNAL MEDICINE

## 2024-07-22 PROCEDURE — 96365 THER/PROPH/DIAG IV INF INIT: CPT

## 2024-07-22 PROCEDURE — 94664 DEMO&/EVAL PT USE INHALER: CPT | Mod: XB

## 2024-07-22 RX ORDER — ACETAMINOPHEN 325 MG/1
650 TABLET ORAL EVERY 4 HOURS PRN
Status: DISCONTINUED | OUTPATIENT
Start: 2024-07-22 | End: 2024-07-25 | Stop reason: HOSPADM

## 2024-07-22 RX ORDER — GUAIFENESIN 600 MG/1
600 TABLET, EXTENDED RELEASE ORAL 2 TIMES DAILY
Status: DISCONTINUED | OUTPATIENT
Start: 2024-07-22 | End: 2024-07-25 | Stop reason: HOSPADM

## 2024-07-22 RX ORDER — IPRATROPIUM BROMIDE AND ALBUTEROL SULFATE 2.5; .5 MG/3ML; MG/3ML
3 SOLUTION RESPIRATORY (INHALATION) EVERY 4 HOURS PRN
Status: DISCONTINUED | OUTPATIENT
Start: 2024-07-22 | End: 2024-07-25 | Stop reason: HOSPADM

## 2024-07-22 RX ORDER — SODIUM CHLORIDE 9 MG/ML
INJECTION, SOLUTION INTRAVENOUS CONTINUOUS
Status: DISCONTINUED | OUTPATIENT
Start: 2024-07-22 | End: 2024-07-24

## 2024-07-22 RX ORDER — POLYETHYLENE GLYCOL 3350 17 G/17G
17 POWDER, FOR SOLUTION ORAL DAILY
Status: DISCONTINUED | OUTPATIENT
Start: 2024-07-23 | End: 2024-07-25 | Stop reason: HOSPADM

## 2024-07-22 RX ORDER — PROCHLORPERAZINE EDISYLATE 5 MG/ML
5 INJECTION INTRAMUSCULAR; INTRAVENOUS EVERY 6 HOURS PRN
Status: DISCONTINUED | OUTPATIENT
Start: 2024-07-22 | End: 2024-07-25 | Stop reason: HOSPADM

## 2024-07-22 RX ORDER — BUSPIRONE HYDROCHLORIDE 5 MG/1
15 TABLET ORAL 3 TIMES DAILY
Status: DISCONTINUED | OUTPATIENT
Start: 2024-07-22 | End: 2024-07-25 | Stop reason: HOSPADM

## 2024-07-22 RX ORDER — DOXYCYCLINE HYCLATE 100 MG
100 TABLET ORAL EVERY 12 HOURS
Status: DISCONTINUED | OUTPATIENT
Start: 2024-07-22 | End: 2024-07-25 | Stop reason: HOSPADM

## 2024-07-22 RX ORDER — ONDANSETRON HYDROCHLORIDE 2 MG/ML
4 INJECTION, SOLUTION INTRAVENOUS EVERY 6 HOURS PRN
Status: DISCONTINUED | OUTPATIENT
Start: 2024-07-22 | End: 2024-07-25 | Stop reason: HOSPADM

## 2024-07-22 RX ORDER — MAGNESIUM SULFATE HEPTAHYDRATE 40 MG/ML
2 INJECTION, SOLUTION INTRAVENOUS
Status: COMPLETED | OUTPATIENT
Start: 2024-07-22 | End: 2024-07-22

## 2024-07-22 RX ORDER — IPRATROPIUM BROMIDE AND ALBUTEROL SULFATE 2.5; .5 MG/3ML; MG/3ML
3 SOLUTION RESPIRATORY (INHALATION)
Status: DISCONTINUED | OUTPATIENT
Start: 2024-07-22 | End: 2024-07-22

## 2024-07-22 RX ORDER — METHYLPREDNISOLONE SOD SUCC 125 MG
125 VIAL (EA) INJECTION
Status: COMPLETED | OUTPATIENT
Start: 2024-07-22 | End: 2024-07-22

## 2024-07-22 RX ORDER — IPRATROPIUM BROMIDE AND ALBUTEROL SULFATE 2.5; .5 MG/3ML; MG/3ML
3 SOLUTION RESPIRATORY (INHALATION)
Status: COMPLETED | OUTPATIENT
Start: 2024-07-22 | End: 2024-07-22

## 2024-07-22 RX ADMIN — BUSPIRONE HYDROCHLORIDE 15 MG: 5 TABLET ORAL at 09:07

## 2024-07-22 RX ADMIN — DOXYCYCLINE HYCLATE 100 MG: 100 TABLET, COATED ORAL at 08:07

## 2024-07-22 RX ADMIN — IOHEXOL 75 ML: 350 INJECTION, SOLUTION INTRAVENOUS at 12:07

## 2024-07-22 RX ADMIN — METHYLPREDNISOLONE SODIUM SUCCINATE 125 MG: 125 INJECTION, POWDER, FOR SOLUTION INTRAMUSCULAR; INTRAVENOUS at 08:07

## 2024-07-22 RX ADMIN — METHYLPREDNISOLONE SODIUM SUCCINATE 80 MG: 40 INJECTION, POWDER, FOR SOLUTION INTRAMUSCULAR; INTRAVENOUS at 09:07

## 2024-07-22 RX ADMIN — IPRATROPIUM BROMIDE AND ALBUTEROL SULFATE 3 ML: .5; 2.5 SOLUTION RESPIRATORY (INHALATION) at 08:07

## 2024-07-22 RX ADMIN — IPRATROPIUM BROMIDE AND ALBUTEROL SULFATE 3 ML: .5; 3 SOLUTION RESPIRATORY (INHALATION) at 08:07

## 2024-07-22 RX ADMIN — CEFTRIAXONE SODIUM 1 G: 1 INJECTION, POWDER, FOR SOLUTION INTRAMUSCULAR; INTRAVENOUS at 06:07

## 2024-07-22 RX ADMIN — MAGNESIUM SULFATE IN WATER 2 G: 40 INJECTION, SOLUTION INTRAVENOUS at 08:07

## 2024-07-22 RX ADMIN — ONDANSETRON 4 MG: 2 INJECTION INTRAMUSCULAR; INTRAVENOUS at 08:07

## 2024-07-22 RX ADMIN — GUAIFENESIN 600 MG: 600 TABLET, EXTENDED RELEASE ORAL at 08:07

## 2024-07-22 RX ADMIN — ACETAMINOPHEN 650 MG: 325 TABLET ORAL at 08:07

## 2024-07-22 RX ADMIN — SODIUM CHLORIDE: 9 INJECTION, SOLUTION INTRAVENOUS at 06:07

## 2024-07-22 NOTE — ASSESSMENT & PLAN NOTE
Patient's COPD is with exacerbation noted by continued dyspnea currently.  Patient is currently on COPD Pathway. Continue scheduled inhalers Steroids, Antibiotics, and Supplemental oxygen and monitor respiratory status closely.     Longstanding history of advanced COPD with chronic hypoxic respiratory failure presents with a failed outpatient regimen.  Patient will be admitted and initiated on IV antibiotics as well as steroids and nebulized therapy.  Secondary to the lungs being relatively clear other etiologies will be explored.  With echocardiogram and recommend CTA of the chest.

## 2024-07-22 NOTE — PLAN OF CARE
[facility] Care [facility]  Initial Discharge Assessment       Primary Care Provider: Polly Lemos MD    Admission Diagnosis: SOB (shortness of breath) [R06.02]  Dyspnea on exertion [R06.09]  COPD exacerbation [J44.1]    Admission Date: 7/22/2024  Expected Discharge Date:     Patient alert & oriented lives at home alone. Her sister lives next door. Her daughters assist as needed. She uses Dr Lemos for PCP & saw her last month.She uses Dr Chloe Leung for pulmonology & would like follow up with her too. She uses oxygen from [facility]. She would like them to come check her home concentrator as she thinks the filter needs to be changed. She also has a portable concentrator & nebulizer with them. She uses [facility] for prescriptions. Her sister or daughter will bring her home at time of discharge. Denies any other needs at this time.     Transition of Care Barriers: None    Payor: [place] / Plan: [place] [facility] COMM[place] PLAN [place] / Product Type: Managed Medicaid /     Extended Emergency Contact Information  Primary Emergency Contact: Noreen Wong  Address: [address]            [place] [address] [place]  Mobile Phone: [phone]  Relation: Sister  Preferred language: English   needed? No  Secondary Emergency Contact: Shell Wilkerson   [place]  Mobile Phone: [phone]  Relation: Daughter    Discharge Plan A: Home  Discharge Plan B: Home with family      [facility] [place] - [address]  [address]  [place] [address]  Phone: [phone] Fax: [phone]    [id number] - [place], [place] - [address]  [address]  [place] [address]  Phone: [phone] Fax: [phone]      Initial Assessment (most recent)       Adult Discharge Assessment - 07/22/24 1655          Discharge Assessment    Assessment Type Discharge Planning Assessment     Confirmed/corrected address, phone  number and insurance Yes     Confirmed Demographics Correct on Facesheet     Source of Information patient     When was your last doctors appointment? --   about a month ago    Does patient/caregiver understand observation status Yes     Communicated KIM with patient/caregiver Yes     Reason For Admission shortness of breath     People in Home alone     Do you expect to return to your current living situation? Yes     Do you have help at home or someone to help you manage your care at home? Yes     Who are your caregiver(s) and their phone number(s)? Noreen Wong sister [phone]     Prior to hospitilization cognitive status: Alert/Oriented     Current cognitive status: Alert/Oriented     Walking or Climbing Stairs Difficulty no     Dressing/Bathing Difficulty yes     Dressing/Bathing bathing difficulty, requires equipment     Dressing/Bathing Management shower chair & grab bar     Do you have any problems with: Errands/Grocery     Home Accessibility stairs to enter home     Number of Stairs, Main Entrance four     Home Layout Able to live on 1st floor     Equipment Currently Used at Home grab bar;shower chair;nebulizer;oxygen     Readmission within 30 days? No     Patient currently being followed by outpatient case management? Yes     If yes, name of outpatient case management following: insurance company assigned oupatient case management     Do you currently have service(s) that help you manage your care at home? No     Do you take prescription medications? Yes     Do you have prescription coverage? Yes     Coverage [facility] Medicaid     Do you have any problems affording any of your prescribed medications? No     Is the patient taking medications as prescribed? yes     Who is going to help you get home at discharge? her sister or daughter     How do you get to doctors appointments? family or friend will provide     Are you on dialysis? No     Do you take coumadin? No     Discharge Plan A Home     Discharge Plan B  Home with family     DME Needed Upon Discharge  none     Discharge Plan discussed with: Patient     Transition of Care Barriers None        Physical Activity    On average, how many days per week do you engage in moderate to strenuous exercise (like a brisk walk)? 3 days     On average, how many minutes do you engage in exercise at this level? 20 min        Financial Resource Strain    How hard is it for you to pay for the very basics like food, housing, medical care, and heating? Not hard at all        Housing Stability    In the last 12 months, was there a time when you were not able to pay the mortgage or rent on time? No     At any time in the past 12 months, were you homeless or living in a shelter (including now)? No        Transportation Needs    Has the lack of transportation kept you from medical appointments, meetings, work or from getting things needed for daily living? No        Food Insecurity    Within the past 12 months, you worried that your food would run out before you got the money to buy more. Never true     Within the past 12 months, the food you bought just didn't last and you didn't have money to get more. Never true        Stress    Do you feel stress - tense, restless, nervous, or anxious, or unable to sleep at night because your mind is troubled all the time - these days? Rather much        Social Isolation    How often do you feel lonely or isolated from those around you?  Sometimes        Alcohol Use    Q1: How often do you have a drink containing alcohol? Never     Q2: How many drinks containing alcohol do you have on a typical day when you are drinking? Patient does not drink     Q3: How often do you have six or more drinks on one occasion? Never        Utilities    In the past 12 months has the electric, gas, oil, or water company threatened to shut off services in your home? No        Health Literacy    How often do you need to have someone help you when you read instructions,  pamphlets, or other written material from your doctor or pharmacy? Rarely

## 2024-07-22 NOTE — HPI
58-year-old female presents to the emergency room with increasing shortness of breath.  She is on chronic home O2 at 2 L and known to have advanced COPD.  She was a former smoker but quit approximately 2 months ago.  She lives at home alone.  And states that she can ambulate about 20 ft she also missed ongoing weight loss.  When she was weighing about 120 and dropped down into the 90s.  She says she has good family support next door with his sister.  She can still get in and out of her trailer.  But doing any grocery shopping or ambulation more than 10 ft she loses her breath.  He was in the emergency room yesterday.  We got home last night and could not breathe.  She had difficulty with orthopnea PND try to CPAP that she use at night and still felt more short of breath.  No examination on her she is moving fair air she is non wheezing and there.  So this brings the question do we have a COPD exacerbation under we have other etiology on top of the COPD.  Past medical history includes previous cholecystectomy .  Admits to being on med many medications but she could not elaborate.  So this will need to be reviewed through the med rec.  Patient admits that she has been having ongoing decline   Patient vitals have been completed and charted.  Medications and allergies were reviewed with patient.

## 2024-07-22 NOTE — H&P
[facility] Medicine  History & Physical    Patient Name: Zayra White  MRN: [id number]  Patient Class: Emergency  Admission Date: 2024  Attending Physician: Gee Reyes DO   Primary Care Provider: Polly Lemos MD         Patient information was obtained from patient and ER records.     Subjective:     Principal Problem:<principal problem not specified>    Chief Complaint:   Chief Complaint   Patient presents with    Shortness of Breath        HPI: 58-year-old female presents to the emergency room with increasing shortness of breath.  She is on chronic home O2 at 2 L and known to have advanced COPD.  She was a former smoker but quit approximately 2 months ago.  She lives at home alone.  And states that she can ambulate about 20 ft she also missed ongoing weight loss.  When she was weighing about 120 and dropped down into the 90s.  She says she has good family support next door with his sister.  She can still get in and out of her trailer.  But doing any grocery shopping or ambulation more than 10 ft she loses her breath.  He was in the emergency room yesterday.  We got home last night and could not breathe.  She had difficulty with orthopnea PND try to CPAP that she use at night and still felt more short of breath.  No examination on her she is moving fair air she is non wheezing and there.  So this brings the question do we have a COPD exacerbation under we have other etiology on top of the COPD.  Past medical history includes previous cholecystectomy .  Admits to being on med many medications but she could not elaborate.  So this will need to be reviewed through the med rec.  Patient admits that she has been having ongoing decline    No new subjective & objective note has been filed under this hospital service since the last note was generated.    Assessment/Plan:     Anxiety about health  We will follow SSRIs as indicated.  Patient has an Ativan  allergy          Chronic obstructive pulmonary disease  Patient's COPD is with exacerbation noted by continued dyspnea currently.  Patient is currently on COPD Pathway. Continue scheduled inhalers Steroids, Antibiotics, and Supplemental oxygen and monitor respiratory status closely.     Longstanding history of advanced COPD with chronic hypoxic respiratory failure presents with a failed outpatient regimen.  Patient will be admitted and initiated on IV antibiotics as well as steroids and nebulized therapy.  Secondary to the lungs being relatively clear other etiologies will be explored.  With echocardiogram and recommend CTA of the chest.    VT prophylaxis initiate Lovenox 40 mg subQ q.day    History of mixed hyperlipidemia.  Evaluate for statin therapy    Multiple lung nodule on CT scans.  Repeat CTA of the chest      VTE Risk Mitigation (From admission, onward)      None                            Gee Reyes DO  Department of Hospital Medicine  [facility] - Emergency Dept

## 2024-07-22 NOTE — SUBJECTIVE & OBJECTIVE
Interval History:     Review of Systems   Constitutional:  Positive for appetite change and fatigue.   Respiratory:  Positive for chest tightness, shortness of breath and wheezing.    Gastrointestinal:  Negative for blood in stool, diarrhea and nausea.   Endocrine: Negative for polydipsia and polyuria.   Genitourinary:  Negative for difficulty urinating and dysuria.   Neurological:  Positive for weakness.   Psychiatric/Behavioral:  The patient is nervous/anxious.      Objective:     Vital Signs (Most Recent):  Temp: 99.1 °F (37.3 °C) (07/22/24 0803)  Pulse: 109 (07/22/24 0914)  Resp: (!) 23 (07/22/24 0851)  BP: 114/77 (07/22/24 0803)  SpO2: 95 % (07/22/24 0914) Vital Signs (24h Range):  Temp:  [98 °F (36.7 °C)-99.1 °F (37.3 °C)] 99.1 °F (37.3 °C)  Pulse:  [101-121] 109  Resp:  [18-52] 23  SpO2:  [92 %-99 %] 95 %  BP: (114-198)/(68-82) 114/77     Weight: 47.2 kg (104 lb)  Body mass index is 17.31 kg/m².    Intake/Output Summary (Last 24 hours) at 7/22/2024 1147  Last data filed at 7/22/2024 1020  Gross per 24 hour   Intake 50 ml   Output --   Net 50 ml         Physical Exam  Constitutional:       Appearance: She is ill-appearing.   HENT:      Mouth/Throat:      Mouth: Mucous membranes are moist.   Eyes:      Pupils: Pupils are equal, round, and reactive to light.   Neck:      Vascular: No carotid bruit.   Cardiovascular:      Rate and Rhythm: Normal rate and regular rhythm.   Pulmonary:      Breath sounds: No wheezing or rales.   Abdominal:      General: There is no distension.      Tenderness: There is abdominal tenderness. There is right CVA tenderness and rebound.   Musculoskeletal:         General: No swelling.   Skin:     Coloration: Skin is not jaundiced.      Findings: No erythema.   Neurological:      Mental Status: She is alert.   Psychiatric:         Mood and Affect: Mood normal.         Behavior: Behavior normal.             Significant Labs: All pertinent labs within the past 24 hours have been  "reviewed.  A1C: No results for input(s): "HGBA1C" in the last 4320 hours.  ABGs: No results for input(s): "PH", "PCO2", "HCO3", "POCSATURATED", "BE", "TOTALHB", "COHB", "METHB", "O2HB", "POCFIO2", "PO2" in the last 48 hours.  BMP:   Recent Labs   Lab 07/22/24  0828   *      K 4.0      CO2 23   BUN 10   CREATININE 0.7   CALCIUM 9.3     CBC:   Recent Labs   Lab 07/21/24 1628 07/22/24  0828   WBC 10.29 15.78*   HGB 15.3 13.7   HCT 43.4 39.4    301     CMP:   Recent Labs   Lab 07/21/24 1628 07/22/24  0828    140   K 4.1 4.0    103   CO2 21* 23   * 137*   BUN 6 10   CREATININE 0.8 0.7   CALCIUM 9.3 9.3   PROT 7.5 6.9   ALBUMIN 4.5 4.0   BILITOT 0.7 0.7   ALKPHOS 61 52*   AST 24 23   ALT 19 16   ANIONGAP 15 14     Lactic Acid: No results for input(s): "LACTATE" in the last 48 hours.  Troponin:   Recent Labs   Lab 07/21/24 1628 07/22/24  0828   TROPONINI <0.006 <0.006       Significant Imaging: I have reviewed all pertinent imaging results/findings within the past 24 hours.  "

## 2024-07-22 NOTE — H&P
[facility] Medicine  History & Physical    Patient Name: Zayra White  MRN: [id number]  Patient Class: Emergency  Admission Date: 2024  Attending Physician: Gee Reyes DO   Primary Care Provider: Polly Lemos MD         Patient information was obtained from patient and ER records.     Subjective:     Principal Problem:<principal problem not specified>    Chief Complaint:   Chief Complaint   Patient presents with    Shortness of Breath        HPI: 58-year-old female presents to the emergency room with increasing shortness of breath.  She is on chronic home O2 at 2 L and known to have advanced COPD.  She was a former smoker but quit approximately 2 months ago.  She lives at home alone.  And states that she can ambulate about 20 ft she also missed ongoing weight loss.  When she was weighing about 120 and dropped down into the 90s.  She says she has good family support next door with his sister.  She can still get in and out of her trailer.  But doing any grocery shopping or ambulation more than 10 ft she loses her breath.  He was in the emergency room yesterday.  We got home last night and could not breathe.  She had difficulty with orthopnea PND try to CPAP that she use at night and still felt more short of breath.  No examination on her she is moving fair air she is non wheezing and there.  So this brings the question do we have a COPD exacerbation under we have other etiology on top of the COPD.  Past medical history includes previous cholecystectomy .  Admits to being on med many medications but she could not elaborate.  So this will need to be reviewed through the med rec.  Patient admits that she has been having ongoing decline    Interval History:     Review of Systems   Constitutional:  Positive for appetite change and fatigue.   Respiratory:  Positive for chest tightness, shortness of breath and wheezing.    Gastrointestinal:  Negative for blood in  "stool, diarrhea and nausea.   Endocrine: Negative for polydipsia and polyuria.   Genitourinary:  Negative for difficulty urinating and dysuria.   Neurological:  Positive for weakness.   Psychiatric/Behavioral:  The patient is nervous/anxious.      Objective:     Vital Signs (Most Recent):  Temp: 99.1 °F (37.3 °C) (07/22/24 0803)  Pulse: 109 (07/22/24 0914)  Resp: (!) 23 (07/22/24 0851)  BP: 114/77 (07/22/24 0803)  SpO2: 95 % (07/22/24 0914) Vital Signs (24h Range):  Temp:  [98 °F (36.7 °C)-99.1 °F (37.3 °C)] 99.1 °F (37.3 °C)  Pulse:  [101-121] 109  Resp:  [18-52] 23  SpO2:  [92 %-99 %] 95 %  BP: (114-198)/(68-82) 114/77     Weight: 47.2 kg (104 lb)  Body mass index is 17.31 kg/m².    Intake/Output Summary (Last 24 hours) at 7/22/2024 1147  Last data filed at 7/22/2024 1020  Gross per 24 hour   Intake 50 ml   Output --   Net 50 ml         Physical Exam  Constitutional:       Appearance: She is ill-appearing.   HENT:      Mouth/Throat:      Mouth: Mucous membranes are moist.   Eyes:      Pupils: Pupils are equal, round, and reactive to light.   Neck:      Vascular: No carotid bruit.   Cardiovascular:      Rate and Rhythm: Normal rate and regular rhythm.   Pulmonary:      Breath sounds: No wheezing or rales.   Abdominal:      General: There is no distension.      Tenderness: There is abdominal tenderness. There is right CVA tenderness and rebound.   Musculoskeletal:         General: No swelling.   Skin:     Coloration: Skin is not jaundiced.      Findings: No erythema.   Neurological:      Mental Status: She is alert.   Psychiatric:         Mood and Affect: Mood normal.         Behavior: Behavior normal.             Significant Labs: All pertinent labs within the past 24 hours have been reviewed.  A1C: No results for input(s): "HGBA1C" in the last 4320 hours.  ABGs: No results for input(s): "PH", "PCO2", "HCO3", "POCSATURATED", "BE", "TOTALHB", "COHB", "METHB", "O2HB", "POCFIO2", "PO2" in the last 48 hours.  BMP: " "  Recent Labs   Lab 07/22/24  0828   *      K 4.0      CO2 23   BUN 10   CREATININE 0.7   CALCIUM 9.3     CBC:   Recent Labs   Lab 07/21/24  1628 07/22/24  0828   WBC 10.29 15.78*   HGB 15.3 13.7   HCT 43.4 39.4    301     CMP:   Recent Labs   Lab 07/21/24  1628 07/22/24  0828    140   K 4.1 4.0    103   CO2 21* 23   * 137*   BUN 6 10   CREATININE 0.8 0.7   CALCIUM 9.3 9.3   PROT 7.5 6.9   ALBUMIN 4.5 4.0   BILITOT 0.7 0.7   ALKPHOS 61 52*   AST 24 23   ALT 19 16   ANIONGAP 15 14     Lactic Acid: No results for input(s): "LACTATE" in the last 48 hours.  Troponin:   Recent Labs   Lab 07/21/24  1628 07/22/24  0828   TROPONINI <0.006 <0.006       Significant Imaging: I have reviewed all pertinent imaging results/findings within the past 24 hours.  Assessment/Plan:     Anxiety about health  We will follow SSRIs as indicated.  Patient has an Ativan allergy          Chronic obstructive pulmonary disease  Patient's COPD is with exacerbation noted by continued dyspnea currently.  Patient is currently on COPD Pathway. Continue scheduled inhalers Steroids, Antibiotics, and Supplemental oxygen and monitor respiratory status closely.     Longstanding history of advanced COPD with chronic hypoxic respiratory failure presents with a failed outpatient regimen.  Patient will be admitted and initiated on IV antibiotics as well as steroids and nebulized therapy.  Secondary to the lungs being relatively clear other etiologies will be explored.  With echocardiogram and recommend CTA of the chest.      VTE Risk Mitigation (From admission, onward)      None                            Gee Reyes DO  Department of Hospital Medicine  [facility] - Emergency Dept          "

## 2024-07-22 NOTE — ED PROVIDER NOTES
History     Chief Complaint   Patient presents with    Shortness of Breath     HPI:  Zayra White is a 58 y.o. female with PMH as below including severe, steroid-dependent COPD (on 5-10 mg qd) and chronic resp failure on 2L home O2 (wears PRN; supposed to be around-the-clock) who presents to the [facility] emergency department for evaluation of acute on chronic SOB. She was here yesterday for COPD exacerbation with superimposed anxiety component; she was unable to get her higher dose prednisone filled due to weekend pharmacy closure. She feels persistent, severe SOB as well as intermittent central chest tightness x 2 days. She has some recent congestion and cough but no fevers or other complaints.     CXR yesterday was unremarkable; COVID-19 swab was negative.     PCP: Polly Lemos MD    Review of patient's allergies indicates:   Allergen Reactions    Ativan [lorazepam] Itching      Past Medical History:   Diagnosis Date    Anxiety     Arthritis     Asthma     Back pain     COPD (chronic obstructive pulmonary disease)     Pulmonary embolism     10 years ago     Past Surgical History:   Procedure Laterality Date    BIOPSY      right breast    BREAST CYST EXCISION       SECTION      CHOLECYSTECTOMY      COLONOSCOPY N/A 2022    Procedure: COLONOSCOPY;  Surgeon: Chavez Gonzales MD;  Location: [facility] ENDO;  Service: General;  Laterality: N/A;    ESOPHAGOGASTRODUODENOSCOPY N/A 2022    Procedure: ESOPHAGOGASTRODUODENOSCOPY (EGD);  Surgeon: Chavez Gonzales MD;  Location: [facility] ENDO;  Service: General;  Laterality: N/A;    INCISION AND DRAINAGE OF ABSCESS Left 3/16/2020    Procedure: INCISION AND DRAINAGE, ABSCESS;  Surgeon: Irvin Villarreal MD;  Location: [facility] OR;  Service: General;  Laterality: Left;       Family History   Problem Relation Name Age of Onset    Breast cancer Sister      Ovarian cancer Neg Hx       Social History     Tobacco Use    Smoking status: Former     Smokeless tobacco: Never   Substance and Sexual Activity    Alcohol use: Not Currently     Comment: occ    Drug use: Yes     Types: Benzodiazepines     Comment: as prescribed    Sexual activity: Not Currently     Birth control/protection: Post-menopausal      Review of Systems     Review of Systems   Constitutional: Negative.  Negative for fever.   HENT: Negative.     Eyes: Negative.    Respiratory:  Positive for cough and shortness of breath.    Cardiovascular: Negative.    Gastrointestinal: Negative.    Endocrine: Negative.    Genitourinary: Negative.    Musculoskeletal: Negative.    Skin: Negative.    Allergic/Immunologic: Negative.    Neurological: Negative.    Hematological: Negative.    Psychiatric/Behavioral: Negative.     All other systems reviewed and are negative.       Physical Exam     Initial Vitals [07/22/24 0803]   BP Pulse Resp Temp SpO2   114/77 (!) 121 (!) 22 99.1 °F (37.3 °C) 95 %      MAP       --          Nursing notes and vital signs reviewed.  Constitutional: Patient is in moderate distress.   Head: Normocephalic. Atraumatic.   Eyes:  Conjunctivae are not pale. No scleral icterus.   ENT: Mucous membranes moist.   Neck: Supple.   Cardiovascular: Regular rate. Regular rhythm. No murmurs, rubs, or gallops   Pulmonary: Tachypneic, in resp distress. Tight air exchange bilaterally; tight expiratory wheezes.   Abdominal: Non-distended.   Musculoskeletal: Moves all extremities. No obvious deformities. No leg edema.   Skin: Warm and dry.   Neurological:  Alert, awake, and appropriate. Normal speech. No acute lateralizing neurologic deficits appreciated.   Psychiatric: Normal affect.       ED Course   Critical Care    Date/Time: 7/22/2024 8:18 AM    Performed by: Charly Alfaro MD  Authorized by: Charly Alfaro MD  Direct patient critical care time: 15 minutes  Additional history critical care time: 5 minutes  Ordering / reviewing critical care time: 5 minutes  Documentation critical care  "time: 5 minutes  Consulting other physicians critical care time: 5 minutes  Total critical care time (exclusive of procedural time) : 35 minutes  Critical care time was exclusive of separately billable procedures and treating other patients and teaching time.  Critical care was necessary to treat or prevent imminent or life-threatening deterioration of the following conditions: respiratory failure (severe COPD exacerbation).  Critical care was time spent personally by me on the following activities: blood draw for specimens, development of treatment plan with patient or surrogate, interpretation of cardiac output measurements, evaluation of patient's response to treatment, examination of patient, obtaining history from patient or surrogate, ordering and performing treatments and interventions, ordering and review of laboratory studies, ordering and review of radiographic studies, pulse oximetry, re-evaluation of patient's condition, review of old charts and discussions with consultants.        Vitals:    07/22/24 0933 07/22/24 1100 07/22/24 1222 07/22/24 1330   BP: 120/66  (!) 115/57    Pulse: (!) 115  95 104   Resp: (!) 29   (!) 24   Temp:       TempSrc:       SpO2: 95% (!) 94% 95% 95%   Weight:       Height:        07/22/24 1430 07/22/24 1533 07/22/24 1557 07/22/24 1630   BP:   124/64 135/63   Pulse: 97 87 94 94   Resp: (!) 26 (!) 26 (!) 26 (!) 25   Temp:   97.8 °F (36.6 °C) 97.9 °F (36.6 °C)   TempSrc:       SpO2: 96% 95% 95% 95%   Weight:  47.2 kg (104 lb)     Height:  5' 5" (1.651 m)      07/22/24 2000 07/22/24 2007 07/22/24 2050 07/22/24 2246   BP: (!) 158/74      Pulse: 100  104    Resp: (!) 39  20    Temp: 97.9 °F (36.6 °C) 97.9 °F (36.6 °C)     TempSrc:       SpO2: 96%  97% 97%   Weight:       Height:        07/23/24 0013 07/23/24 0330 07/23/24 0530   BP: (!) 126/59 (!) 104/52    Pulse: 93 83 105   Resp: (!) 22 20 (!) 24   Temp: 97 °F (36.1 °C) 97.2 °F (36.2 °C)    TempSrc:      SpO2: 96% 98% 96%   Weight:  "     Height:        Lab Results Interpreted as Abnormal:  Labs Reviewed   CBC W/ AUTO DIFFERENTIAL - Abnormal       Result Value    WBC 15.78 (*)     RBC 4.22      Hemoglobin 13.7      Hematocrit 39.4      MCV 93      MCH 32.5 (*)     MCHC 34.8      RDW 11.3 (*)     Platelets 301      MPV 9.0 (*)     Immature Granulocytes 0.3      Gran # (ANC) 14.3 (*)     Immature Grans (Abs) 0.05 (*)     Lymph # 0.5 (*)     Mono # 1.0      Eos # 0.0      Baso # 0.02      nRBC 0      Gran % 90.6 (*)     Lymph % 3.0 (*)     Mono % 6.0      Eosinophil % 0.0      Basophil % 0.1      Differential Method Automated     COMPREHENSIVE METABOLIC PANEL - Abnormal    Sodium 140      Potassium 4.0      Chloride 103      CO2 23      Glucose 137 (*)     BUN 10      Creatinine 0.7      Calcium 9.3      Total Protein 6.9      Albumin 4.0      Total Bilirubin 0.7      Alkaline Phosphatase 52 (*)     AST 23      ALT 16      eGFR >60.0      Anion Gap 14     CBC W/ AUTO DIFFERENTIAL - Abnormal    WBC 9.60      RBC 3.98 (*)     Hemoglobin 13.1      Hematocrit 37.9      MCV 95      MCH 32.9 (*)     MCHC 34.6      RDW 11.5      Platelets 246      MPV 9.7      Immature Granulocytes 0.2      Gran # (ANC) 9.2 (*)     Immature Grans (Abs) 0.02      Lymph # 0.3 (*)     Mono # 0.1 (*)     Eos # 0.0      Baso # 0.01      nRBC 0      Gran % 96.0 (*)     Lymph % 2.7 (*)     Mono % 1.0 (*)     Eosinophil % 0.0      Basophil % 0.1      Differential Method Automated     TROPONIN I    Troponin I <0.006     B-TYPE NATRIURETIC PEPTIDE    BNP 38     URINALYSIS, REFLEX TO URINE CULTURE    Specimen UA Urine, Unspecified      Color, UA Yellow      Appearance, UA Clear      pH, UA 6.0      Specific Gravity, UA 1.010      Protein, UA Negative      Glucose, UA Negative      Ketones, UA Negative      Bilirubin (UA) Negative      Occult Blood UA Negative      Nitrite, UA Negative      Urobilinogen, UA Negative      Leukocytes, UA Negative      Narrative:     Preferred Collection  Type->Urine, Clean Catch  Specimen Source->Urine   POCT GLUCOSE, HAND-HELD DEVICE      All Lab Results:  Results for orders placed or performed during the hospital encounter of 07/22/24   Culture, Respiratory    Specimen: Sputum, Expectorated; Respiratory   Result Value Ref Range    Gram Stain (Respiratory) <10 epithelial cells per low power field.     Gram Stain (Respiratory) Moderate WBC's     Gram Stain (Respiratory) Many Gram positive cocci    CBC Auto Differential   Result Value Ref Range    WBC 15.78 (H) 3.90 - 12.70 K/uL    RBC 4.22 4.00 - 5.40 M/uL    Hemoglobin 13.7 12.0 - 16.0 g/dL    Hematocrit 39.4 37.0 - 48.5 %    MCV 93 82 - 98 fL    MCH 32.5 (H) 27.0 - 31.0 pg    MCHC 34.8 32.0 - 36.0 g/dL    RDW 11.3 (L) 11.5 - 14.5 %    Platelets 301 150 - 450 K/uL    MPV 9.0 (L) 9.2 - 12.9 fL    Immature Granulocytes 0.3 0.0 - 0.5 %    Gran # (ANC) 14.3 (H) 1.8 - 7.7 K/uL    Immature Grans (Abs) 0.05 (H) 0.00 - 0.04 K/uL    Lymph # 0.5 (L) 1.0 - 4.8 K/uL    Mono # 1.0 0.3 - 1.0 K/uL    Eos # 0.0 0.0 - 0.5 K/uL    Baso # 0.02 0.00 - 0.20 K/uL    nRBC 0 0 /100 WBC    Gran % 90.6 (H) 38.0 - 73.0 %    Lymph % 3.0 (L) 18.0 - 48.0 %    Mono % 6.0 4.0 - 15.0 %    Eosinophil % 0.0 0.0 - 8.0 %    Basophil % 0.1 0.0 - 1.9 %    Differential Method Automated    Comprehensive Metabolic Panel   Result Value Ref Range    Sodium 140 136 - 145 mmol/L    Potassium 4.0 3.5 - 5.1 mmol/L    Chloride 103 95 - 110 mmol/L    CO2 23 23 - 29 mmol/L    Glucose 137 (H) 70 - 110 mg/dL    BUN 10 6 - 20 mg/dL    Creatinine 0.7 0.5 - 1.4 mg/dL    Calcium 9.3 8.7 - 10.5 mg/dL    Total Protein 6.9 6.0 - 8.4 g/dL    Albumin 4.0 3.5 - 5.2 g/dL    Total Bilirubin 0.7 0.1 - 1.0 mg/dL    Alkaline Phosphatase 52 (L) 55 - 135 U/L    AST 23 10 - 40 U/L    ALT 16 10 - 44 U/L    eGFR >60.0 >60 mL/min/1.73 m^2    Anion Gap 14 8 - 16 mmol/L   Troponin I   Result Value Ref Range    Troponin I <0.006 0.000 - 0.026 ng/mL   BNP   Result Value Ref Range    BNP 38 0  - 99 pg/mL   Urinalysis, Reflex to Urine Culture Urine, Clean Catch    Specimen: Urine, Clean Catch   Result Value Ref Range    Specimen UA Urine, Unspecified     Color, UA Yellow Yellow, Straw, [place]    Appearance, UA Clear Clear    pH, UA 6.0 5.0 - 8.0    Specific Gravity, UA 1.010 1.005 - 1.030    Protein, UA Negative Negative    Glucose, UA Negative Negative    Ketones, UA Negative Negative    Bilirubin (UA) Negative Negative    Occult Blood UA Negative Negative    Nitrite, UA Negative Negative    Urobilinogen, UA Negative Negative EU/dL    Leukocytes, UA Negative Negative   CBC auto differential   Result Value Ref Range    WBC 9.60 3.90 - 12.70 K/uL    RBC 3.98 (L) 4.00 - 5.40 M/uL    Hemoglobin 13.1 12.0 - 16.0 g/dL    Hematocrit 37.9 37.0 - 48.5 %    MCV 95 82 - 98 fL    MCH 32.9 (H) 27.0 - 31.0 pg    MCHC 34.6 32.0 - 36.0 g/dL    RDW 11.5 11.5 - 14.5 %    Platelets 246 150 - 450 K/uL    MPV 9.7 9.2 - 12.9 fL    Immature Granulocytes 0.2 0.0 - 0.5 %    Gran # (ANC) 9.2 (H) 1.8 - 7.7 K/uL    Immature Grans (Abs) 0.02 0.00 - 0.04 K/uL    Lymph # 0.3 (L) 1.0 - 4.8 K/uL    Mono # 0.1 (L) 0.3 - 1.0 K/uL    Eos # 0.0 0.0 - 0.5 K/uL    Baso # 0.01 0.00 - 0.20 K/uL    nRBC 0 0 /100 WBC    Gran % 96.0 (H) 38.0 - 73.0 %    Lymph % 2.7 (L) 18.0 - 48.0 %    Mono % 1.0 (L) 4.0 - 15.0 %    Eosinophil % 0.0 0.0 - 8.0 %    Basophil % 0.1 0.0 - 1.9 %    Differential Method Automated    Basic metabolic panel   Result Value Ref Range    Sodium 139 136 - 145 mmol/L    Potassium 4.1 3.5 - 5.1 mmol/L    Chloride 102 95 - 110 mmol/L    CO2 25 23 - 29 mmol/L    Glucose 159 (H) 70 - 110 mg/dL    BUN 11 6 - 20 mg/dL    Creatinine 0.7 0.5 - 1.4 mg/dL    Calcium 8.8 8.7 - 10.5 mg/dL    Anion Gap 12 8 - 16 mmol/L    eGFR >60.0 >60 mL/min/1.73 m^2   EKG 12-lead   Result Value Ref Range    QRS Duration 76 ms    [facility] QTC Calculation 436 ms   EKG 12-lead   Result Value Ref Range    QRS Duration 78 ms    [facility]HS QTC Calculation 435 ms   Echo    Result Value Ref Range    BSA 1.47 m2    A4C EF 75 %    LVOT stroke volume 42.68 cm3    LVIDd 3.62 3.5 - 6.0 cm    LV Systolic Volume 20.00 mL    LV Systolic Volume Index 13.3 mL/m2    LVIDs 2.39 2.1 - 4.0 cm    LV Diastolic Volume 55.24 mL    LV Diastolic Volume Index 36.83 mL/m2    LV EDV A4C 26.56 mL    Left Ventricular End Systolic Volume by Teichholz Method 20.00 mL    Left Ventricular End Diastolic Volume by Teichholz Method 55.24 mL    IVS 0.80 0.6 - 1.1 cm    LVOT diameter 1.95 cm    LVOT area 3.0 cm2    FS 34 28 - 44 %    Left Ventricle Relative Wall Thickness 0.50 cm    Posterior Wall 0.91 0.6 - 1.1 cm    LV mass 87.11 g    LV Mass Index 58 g/m2    MV Peak E Palomo 0.65 m/s    MV Peak A Palomo 0.73 m/s    TR Max Palomo 2.24 m/s    E/A ratio 0.89     E wave deceleration time 202.02 msec    LVOT peak palomo 0.85 m/s    Left Ventricular Outflow Tract Mean Velocity 0.60 cm/s    Left Ventricular Outflow Tract Mean Gradient 1.62 mmHg    RV- umanzor basal diam 2.4 cm    RV-umanzor mid d 2.2 cm    Right ventricular length in diastole (apical 4-chamber view) 3.40 cm    RV-umanzor length 3.4 cm    RV mid diameter 2.23 cm    RV/LV Ratio 0.53 cm    LA size 2.41 cm    Left Atrium Minor Axis 2.30 cm    Left Atrium Major Axis 2.28 cm    RA Major Axis 2.70 cm    RA Width 2.16 cm    AV mean gradient 2 mmHg    AV peak gradient 3 mmHg    Ao peak palomo 0.90 m/s    Ao VTI 18.30 cm    LVOT peak VTI 14.30 cm    AV valve area 2.33 cm²    AV Velocity Ratio 0.94     AV index (prosthetic) 0.78     CRISTOPHER by Velocity Ratio 2.82 cm²    MV mean gradient 2 mmHg    MV peak gradient 3 mmHg    MV stenosis pressure 1/2 time 51.27 ms    MV valve area p 1/2 method 4.29 cm2    MV valve area by continuity eq 2.67 cm2    MV VTI 16.0 cm    Triscuspid Valve Regurgitation Peak Gradient 20 mmHg    PV PEAK VELOCITY 0.69 m/s    PV peak gradient 2 mmHg    Ao root annulus 2.76 cm    ZLVIDS -1.11     ZLVIDD -2.02     RVDD 1.93 cm    AORTIC VALVE CUSP SEPERATION 1.73 cm    EF 64  %    TAPSE 1.80 cm    TV resting pulmonary artery pressure 23 mmHg    RV TB RVSP 5 mmHg    Est. RA pres 3 mmHg   POCT glucose   Result Value Ref Range    POCT Glucose 163 (H) 70 - 110 mg/dL   POCT glucose   Result Value Ref Range    POCT Glucose 157 (H) 70 - 110 mg/dL     Imaging Results               CTA Chest Non-Coronary (PE Studies) (Final result)  Result time 07/22/24 13:19:29      Final result by Mathieu Thakur MD (07/22/24 13:19:29)                   Impression:      1. No CT evidence to suggest an acute pulmonary embolus.  2. COPD with diffuse emphysematous change.  3. Enlarging poorly defined spiculated nodule the left upper lobe.  Neoplasia is not excludable.  Further evaluation with PET scan recommended.  4. Multiple bilateral pulmonary nodules.  Follow-up per Fleischner guidelines.  For multiple solid nodules with any 6 mm or greater, Fleischner Society guidelines recommend follow up with non-contrast chest CT at 3-6 months and 18-24 months after discovery.  5. Mild bronchiectasis of the bilateral lower lobes with scattered mucus plugging.  6. Hiatal hernia.  This report was flagged in Epic as abnormal.      Electronically signed by: Mathieu Thakur  Date:    07/22/2024  Time:    13:19               Narrative:    EXAMINATION:  CTA CHEST NON CORONARY (PE STUDIES)    CLINICAL HISTORY:  Pulmonary embolism (PE) suspected, high prob;    TECHNIQUE:  Low dose axial images, sagittal and coronal reformations were obtained from the thoracic inlet to the lung bases following the IV administration of 75 mL of Omnipaque 350.  Contrast timing was optimized to evaluate the pulmonary arteries.  MIP images were performed.    COMPARISON:  Chest x-ray same day.  CTA chest 12/29/2023.    FINDINGS:  The pulmonary trunk, main right and left pulmonary arteries extending into the segmental and subsegmental pulmonary arteries opacify normally without CT evidence to suggest an acute pulmonary embolus.    There is pulmonary  hyperinflation consistent with COPD.  Diffuse extensive centrilobular emphysematous change throughout both lungs.  There is enlarging poorly defined spiculated nodule the left upper lobe currently measuring 16 x 17 mm (previously measuring 13 x 14 mm).  This is suspicious for neoplasia.  There are multiple bilateral poorly defined pulmonary nodules which have increased in number and size over the interval ranging in size from 3-9 mm.  Dependent discoid atelectasis at the right lung base.  No focal consolidation.  Mild bronchiectasis of the bilateral lower lobes with scattered mucus plugging.    No pleural or pericardial effusions.  No significant axillary or intrathoracic lymphadenopathy.  Heart size is normal.  Thoracic aorta opacifies normally and is normal in caliber.    Limited evaluation of the upper abdomen demonstrates previous cholecystectomy.  There is a moderate sized hiatal hernia.                                     The EKG was ordered, reviewed, and independently interpreted by the ED Physician:  Rhythm: sinus tachycardia   Rate: 112 bpm  No ST-T changes concerning for acute ischemia  Normal intervals     The emergency physician reviewed the vital signs and test results, which are outlined above.     ED Discussion      I discussed the patient's case with Dr. Gee Reyes, hospitalist, who accepts the patient for admission.     Admitting physician: Dr. Reyes  Admitting service:  Hospital Medicine   Admission type: Observation        ED Medication(s) Administered:  Medications   guaiFENesin 12 hr tablet 600 mg (600 mg Oral Given 7/22/24 2007)   0.9%  NaCl infusion ( Intravenous Verify Only 7/22/24 6045)   doxycycline tablet 100 mg (100 mg Oral Given 7/22/24 2007)   cefTRIAXone (Rocephin) 1 g in D5W 100 mL IVPB (MB+) (0 g Intravenous Stopped 7/22/24 2989)   acetaminophen tablet 650 mg (650 mg Oral Given 7/22/24 2007)   polyethylene glycol packet 17 g (has no administration in time range)    methylPREDNISolone sodium succinate injection 80 mg (80 mg Intravenous Given 7/23/24 0517)   albuterol-ipratropium 2.5 mg-0.5 mg/3 mL nebulizer solution 3 mL (3 mLs Nebulization Given 7/23/24 0530)   ondansetron injection 4 mg (4 mg Intravenous Given 7/22/24 2007)   prochlorperazine injection Soln 5 mg (has no administration in time range)   busPIRone tablet 15 mg (15 mg Oral Given 7/22/24 2136)   magnesium sulfate 2g in water 50mL IVPB (premix) (0 g Intravenous Stopped 7/22/24 1020)   methylPREDNISolone sodium succinate injection 125 mg (125 mg Intravenous Given 7/22/24 0833)   albuterol-ipratropium 2.5 mg-0.5 mg/3 mL nebulizer solution 3 mL (3 mLs Nebulization Given 7/22/24 0851)   iohexoL (OMNIPAQUE 350) injection 75 mL (75 mLs Intravenous Given 7/22/24 1243)       Current Discharge Medication List        CONTINUE these medications which have NOT CHANGED    Details   albuterol (VENTOLIN HFA) 90 mcg/actuation inhaler inhale 2 puff by inhalation route  every 4 - 6 hours as needed  Qty: 36 g, Refills: 6    Comments: Please provide 2 inhalers per prescription  Associated Diagnoses: Chronic obstructive pulmonary disease, unspecified COPD type      albuterol-ipratropium (DUO-NEB) 2.5 mg-0.5 mg/3 mL nebulizer solution Take 3 mLs by nebulization every 6 (six) hours as needed for Wheezing or Shortness of Breath. Rescue  Qty: 75 mL, Refills: 3    Associated Diagnoses: Chronic obstructive pulmonary disease, unspecified COPD type      alendronate (FOSAMAX) 70 MG tablet Take 1 tablet (70 mg total) by mouth every 7 days.  Qty: 13 tablet, Refills: 3    Associated Diagnoses: Osteopenia, unspecified location      aspirin 81 MG Chew 81 mg.      atenoloL (TENORMIN) 25 MG tablet TAKE 1/2 TABLET BY MOUTH EVERY DAY **THANK YOU**  Qty: 45 tablet, Refills: 3    Comments: .  Associated Diagnoses: Hypertension, unspecified type      atorvastatin (LIPITOR) 20 MG tablet Take 1 tablet (20 mg total) by mouth once daily.  Qty: 90 tablet,  Refills: 3    Associated Diagnoses: Hyperlipidemia, unspecified hyperlipidemia type      busPIRone (BUSPAR) 15 MG tablet Take 1 tablet (15 mg total) by mouth 3 (three) times daily.  Qty: 90 tablet, Refills: 11    Associated Diagnoses: Anxiety      cholecalciferol, vitamin D3, (VITAMIN D3) 25 mcg (1,000 unit) capsule Take 2 capsules (2,000 Units total) by mouth once daily.  Qty: 60 capsule, Refills: 0    Associated Diagnoses: COVID-19 virus infection      DULoxetine (CYMBALTA) 20 MG capsule Take 2 capsules po daily  Qty: 180 capsule, Refills: 3    Associated Diagnoses: Anxiety about health      fluticasone-umeclidin-vilanter (TRELEGY ELLIPTA) 200-62.5-25 mcg inhaler Inhale 1 puff into the lungs once daily.  Qty: 60 each, Refills: 11    Associated Diagnoses: Chronic obstructive pulmonary disease, unspecified COPD type      gabapentin (NEURONTIN) 300 MG capsule Take 1 capsule (300 mg total) by mouth 3 (three) times daily.  Qty: 270 capsule, Refills: 3    Associated Diagnoses: Sciatica of right side      latanoprost 0.005 % ophthalmic solution Place 1 drop into both eyes every evening.      ondansetron (ZOFRAN-ODT) 4 MG TbDL Take 1 tablet (4 mg total) by mouth every 12 (twelve) hours as needed (Nausea).  Qty: 30 tablet, Refills: 2    Associated Diagnoses: Nausea      pantoprazole (PROTONIX) 40 MG tablet Take 1 tablet (40 mg total) by mouth once daily. Take in the morning before breakfast.  Wait 30 minutes before eating or drinking anything  Qty: 90 tablet, Refills: 3    Associated Diagnoses: Gastritis, presence of bleeding unspecified, unspecified chronicity, unspecified gastritis type      predniSONE (DELTASONE) 50 MG Tab Take 1 tablet (50 mg total) by mouth once daily. for 4 days  Qty: 4 tablet, Refills: 0    Associated Diagnoses: COPD exacerbation                 Clinical Impression       ICD-10-CM ICD-9-CM   1. COPD exacerbation  J44.1 491.21   2. SOB (shortness of breath)  R06.02 786.05   3. Dyspnea on exertion   R06.09 786.09      ED Disposition Condition    Observation Stable             Charly Alfaro MD  07/23/24 0719

## 2024-07-23 LAB
ANION GAP SERPL CALC-SCNC: 12 MMOL/L (ref 8–16)
BUN SERPL-MCNC: 10 MG/DL (ref 6–20)
CALCIUM SERPL-MCNC: 8.1 MG/DL (ref 8.7–10.5)
CHLORIDE SERPL-SCNC: 105 MMOL/L (ref 95–110)
CO2 SERPL-SCNC: 25 MMOL/L (ref 23–29)
CREAT SERPL-MCNC: 0.7 MG/DL (ref 0.5–1.4)
EST. GFR  (NO RACE VARIABLE): >60 ML/MIN/1.73 M^2
GLUCOSE SERPL-MCNC: 148 MG/DL (ref 70–110)
GLUCOSE SERPL-MCNC: 201 MG/DL (ref 70–110)
OHS QRS DURATION: 78 MS
OHS QTC CALCULATION: 435 MS
POCT GLUCOSE: 135 MG/DL (ref 70–110)
POCT GLUCOSE: 157 MG/DL (ref 70–110)
POCT GLUCOSE: 166 MG/DL (ref 70–110)
POCT GLUCOSE: 201 MG/DL (ref 70–110)
POTASSIUM SERPL-SCNC: 3.6 MMOL/L (ref 3.5–5.1)
SODIUM SERPL-SCNC: 142 MMOL/L (ref 136–145)

## 2024-07-23 PROCEDURE — 97116 GAIT TRAINING THERAPY: CPT

## 2024-07-23 PROCEDURE — 94640 AIRWAY INHALATION TREATMENT: CPT | Mod: XB

## 2024-07-23 PROCEDURE — 94799 UNLISTED PULMONARY SVC/PX: CPT

## 2024-07-23 PROCEDURE — 99900035 HC TECH TIME PER 15 MIN (STAT)

## 2024-07-23 PROCEDURE — 25000003 PHARM REV CODE 250: Mod: UD | Performed by: INTERNAL MEDICINE

## 2024-07-23 PROCEDURE — G0378 HOSPITAL OBSERVATION PER HR: HCPCS

## 2024-07-23 PROCEDURE — 94761 N-INVAS EAR/PLS OXIMETRY MLT: CPT

## 2024-07-23 PROCEDURE — 36415 COLL VENOUS BLD VENIPUNCTURE: CPT | Performed by: INTERNAL MEDICINE

## 2024-07-23 PROCEDURE — 25000242 PHARM REV CODE 250 ALT 637 W/ HCPCS: Performed by: INTERNAL MEDICINE

## 2024-07-23 PROCEDURE — 94760 N-INVAS EAR/PLS OXIMETRY 1: CPT

## 2024-07-23 PROCEDURE — 27000221 HC OXYGEN, UP TO 24 HOURS

## 2024-07-23 PROCEDURE — 25000003 PHARM REV CODE 250: Performed by: INTERNAL MEDICINE

## 2024-07-23 PROCEDURE — 63600175 PHARM REV CODE 636 W HCPCS: Mod: UD | Performed by: INTERNAL MEDICINE

## 2024-07-23 PROCEDURE — 96372 THER/PROPH/DIAG INJ SC/IM: CPT | Performed by: INTERNAL MEDICINE

## 2024-07-23 PROCEDURE — 94664 DEMO&/EVAL PT USE INHALER: CPT | Mod: XB

## 2024-07-23 PROCEDURE — 80048 BASIC METABOLIC PNL TOTAL CA: CPT | Performed by: INTERNAL MEDICINE

## 2024-07-23 PROCEDURE — 97162 PT EVAL MOD COMPLEX 30 MIN: CPT

## 2024-07-23 RX ORDER — INSULIN ASPART 100 [IU]/ML
0-10 INJECTION, SOLUTION INTRAVENOUS; SUBCUTANEOUS
Status: DISCONTINUED | OUTPATIENT
Start: 2024-07-23 | End: 2024-07-25 | Stop reason: HOSPADM

## 2024-07-23 RX ORDER — GLUCAGON 1 MG
1 KIT INJECTION
Status: DISCONTINUED | OUTPATIENT
Start: 2024-07-23 | End: 2024-07-25 | Stop reason: HOSPADM

## 2024-07-23 RX ADMIN — INSULIN ASPART 4 UNITS: 100 INJECTION, SOLUTION INTRAVENOUS; SUBCUTANEOUS at 09:07

## 2024-07-23 RX ADMIN — DOXYCYCLINE HYCLATE 100 MG: 100 TABLET, COATED ORAL at 09:07

## 2024-07-23 RX ADMIN — METHYLPREDNISOLONE SODIUM SUCCINATE 80 MG: 40 INJECTION, POWDER, FOR SOLUTION INTRAMUSCULAR; INTRAVENOUS at 05:07

## 2024-07-23 RX ADMIN — BUSPIRONE HYDROCHLORIDE 15 MG: 5 TABLET ORAL at 09:07

## 2024-07-23 RX ADMIN — BUSPIRONE HYDROCHLORIDE 15 MG: 5 TABLET ORAL at 03:07

## 2024-07-23 RX ADMIN — IPRATROPIUM BROMIDE AND ALBUTEROL SULFATE 3 ML: .5; 3 SOLUTION RESPIRATORY (INHALATION) at 07:07

## 2024-07-23 RX ADMIN — GUAIFENESIN 600 MG: 600 TABLET, EXTENDED RELEASE ORAL at 09:07

## 2024-07-23 RX ADMIN — SODIUM CHLORIDE: 9 INJECTION, SOLUTION INTRAVENOUS at 07:07

## 2024-07-23 RX ADMIN — POLYETHYLENE GLYCOL (3350) 17 G: 17 POWDER, FOR SOLUTION ORAL at 09:07

## 2024-07-23 RX ADMIN — CEFTRIAXONE SODIUM 1 G: 1 INJECTION, POWDER, FOR SOLUTION INTRAMUSCULAR; INTRAVENOUS at 03:07

## 2024-07-23 RX ADMIN — METHYLPREDNISOLONE SODIUM SUCCINATE 80 MG: 40 INJECTION, POWDER, FOR SOLUTION INTRAMUSCULAR; INTRAVENOUS at 03:07

## 2024-07-23 RX ADMIN — IPRATROPIUM BROMIDE AND ALBUTEROL SULFATE 3 ML: .5; 3 SOLUTION RESPIRATORY (INHALATION) at 02:07

## 2024-07-23 RX ADMIN — ACETAMINOPHEN 650 MG: 325 TABLET ORAL at 09:07

## 2024-07-23 RX ADMIN — INSULIN ASPART 2 UNITS: 100 INJECTION, SOLUTION INTRAVENOUS; SUBCUTANEOUS at 05:07

## 2024-07-23 RX ADMIN — METHYLPREDNISOLONE SODIUM SUCCINATE 80 MG: 40 INJECTION, POWDER, FOR SOLUTION INTRAMUSCULAR; INTRAVENOUS at 09:07

## 2024-07-23 RX ADMIN — IPRATROPIUM BROMIDE AND ALBUTEROL SULFATE 3 ML: .5; 3 SOLUTION RESPIRATORY (INHALATION) at 05:07

## 2024-07-23 NOTE — PT/OT/SLP EVAL
Physical Therapy Evaluation    Patient Name:  Zayra White   MRN:  [id number]    Recommendations:     Discharge Recommendations: Low Intensity Therapy   Discharge Equipment Recommendations: none   Barriers to discharge: None    Assessment:     Zayra White is a 58 y.o. female admitted with a medical diagnosis of <principal problem not specified>.  She presents with the following impairments/functional limitations: impaired endurance.    Rehab Prognosis: Good; patient would benefit from acute skilled PT services to address these deficits and reach maximum level of function.    Recent Surgery: * No surgery found *      Plan:     During this hospitalization, patient to be seen 5 x/week to address the identified rehab impairments via gait training, therapeutic exercises and progress toward the following goals:    Plan of Care Expires:   (Upon discharge from the hospital.)    Subjective     Chief Complaint: The patient complains of shortness of breath with activity.  She also complains of anxiety and having panic attacks.  Patient/Family Comments/goals: Return home as independent as possible.  Pain/Comfort:  Pain Rating 1: 0/10    Patients cultural, spiritual, Shinto conflicts given the current situation: no    Living Environment:  The patient lives alone in a trailer with 4-5 steps to enter.  Her sister lives next door and is readily available to assist in her care.  Prior to admission, patients level of function was independent.  Equipment used at home: oxygen.  DME owned (not currently used): none.  Upon discharge, patient will have assistance from her family.    Objective:     Communicated with nurse prior to session.  Patient found supine with peripheral IV, telemetry, oxygen  upon PT entry to room.    General Precautions: Standard, fall  Orthopedic Precautions:N/A   Braces: N/A  Respiratory Status: Nasal cannula, flow 2 L/min    Exams:  Cognitive Exam:  Patient is oriented to Person, Place, Time, and  Situation  RLE ROM: WNL  RLE Strength: WNL  LLE ROM: WNL  LLE Strength: WNL    Functional Mobility:  Bed Mobility:     Supine to Sit: independence  Sit to Supine: independence  Transfers:     Sit to Stand:  independence with no AD  Gait: Ambulates with SBA in the room approximately 10 feet.      AM-PAC 6 CLICK MOBILITY  Total Score:20       Treatment & Education:  The patient was seen for an initial evaluation.    Patient left supine with all lines intact, call button in reach, and nurse notified.    GOALS:   Multidisciplinary Problems       Physical Therapy Goals          Problem: Physical Therapy    Goal Priority Disciplines Outcome Goal Variances Interventions   Physical Therapy Goal     PT, PT/OT      Description: Goals    Patient to ambulate independently > 200 feet.                       History:     Past Medical History:   Diagnosis Date    Anxiety     Arthritis     Asthma     Back pain     COPD (chronic obstructive pulmonary disease)     Pulmonary embolism     10 years ago       Past Surgical History:   Procedure Laterality Date    BIOPSY      right breast    BREAST CYST EXCISION       SECTION      CHOLECYSTECTOMY      COLONOSCOPY N/A 2022    Procedure: COLONOSCOPY;  Surgeon: Chavez Gonzales MD;  Location: [facility] ENDO;  Service: General;  Laterality: N/A;    ESOPHAGOGASTRODUODENOSCOPY N/A 2022    Procedure: ESOPHAGOGASTRODUODENOSCOPY (EGD);  Surgeon: Chavez Gonzales MD;  Location: [facility] ENDO;  Service: General;  Laterality: N/A;    INCISION AND DRAINAGE OF ABSCESS Left 3/16/2020    Procedure: INCISION AND DRAINAGE, ABSCESS;  Surgeon: Irvin Villarreal MD;  Location: [facility] OR;  Service: General;  Laterality: Left;       Time Tracking:     PT Received On: 24  PT Start Time: 1050     PT Stop Time: 1110  PT Total Time (min): 20 min     Billable Minutes: Evaluation 2024

## 2024-07-23 NOTE — PROGRESS NOTES
[facility] Medicine  Progress Note    Patient Name: Zayra White  MRN: [id number]  Patient Class: OP- Observation   Admission Date: 7/22/2024  Length of Stay: 0 days  Attending Physician: Gee Reyes DO  Primary Care Provider: Polly Lemos MD        Subjective:     Principal Problem:  Shortness of breaths    Interval History:  58-year-old female presents to the emergency room increasing shortness of breath which shows a known advanced COPD on chronic hypoxic respiratory failure on 2 L at home.  She has been having increasing shortness of breath difficulty sleeping at night even described a 2 pillow orthopnea.  He was admitted to ongoing weight loss she can ambulate approximately 10 ft before she loses her breath.  She had been back and forth the emergency room was admitted yesterday CT scan of the chest showed mucus plugging in the lower lobes.  Patient be initiated on mucolytic steroid therapy antibiotic therapy feels better today no shortness of breath at rest.  Patient was will be instructed on soaking spirometry and flutter valve up with ambulation.  We will evaluate for desaturation studies.  Continue her 2 L.  Adjust as needed labs show sodium 139 CO2 of 25 potassium 4.1 creatinine 0.7 glucose mildly elevated 159 hemoglobin of 13 platelets of 246 and white cells at 9.60 BNP was noted at 38 troponins were negative    Review of Systems   Constitutional:  Negative for diaphoresis and fever.   HENT:  Negative for sore throat.    Respiratory:  Positive for cough. Negative for wheezing.    Gastrointestinal:  Positive for constipation.   Endocrine: Negative for cold intolerance and heat intolerance.   Genitourinary:  Negative for difficulty urinating.     Objective:     Vital Signs (Most Recent):  Temp: 97.2 °F (36.2 °C) (07/23/24 0330)  Pulse: 105 (07/23/24 0530)  Resp: (!) 24 (07/23/24 0530)  BP: (!) 104/52 (07/23/24 0330)  SpO2: 96 % (07/23/24 0530) Vital  "Signs (24h Range):  Temp:  [97 °F (36.1 °C)-97.9 °F (36.6 °C)] 97.2 °F (36.2 °C)  Pulse:  [] 105  Resp:  [20-39] 24  SpO2:  [94 %-98 %] 96 %  BP: (104-158)/(52-74) 104/52     Weight: 47.2 kg (104 lb)  Body mass index is 17.31 kg/m².    Intake/Output Summary (Last 24 hours) at 7/23/2024 0925  Last data filed at 7/22/2024 2255  Gross per 24 hour   Intake 670.93 ml   Output --   Net 670.93 ml      Physical Exam  Constitutional:       Appearance: She is ill-appearing.   HENT:      Mouth/Throat:      Mouth: Mucous membranes are moist.   Eyes:      Pupils: Pupils are equal, round, and reactive to light.   Neck:      Vascular: No carotid bruit.   Cardiovascular:      Rate and Rhythm: Normal rate and regular rhythm.   Pulmonary:      Effort: No respiratory distress.      Breath sounds: No wheezing, rhonchi or rales.   Chest:      Chest wall: No tenderness.   Abdominal:      General: There is no distension.      Palpations: There is no mass.      Tenderness: There is no rebound.   Musculoskeletal:      Cervical back: No rigidity.      Left lower leg: No edema.   Skin:     Coloration: Skin is not jaundiced.      Findings: Bruising present.   Neurological:      General: No focal deficit present.      Mental Status: She is alert and oriented to person, place, and time.      Motor: Weakness present.   Psychiatric:         Mood and Affect: Mood normal.           Overview/Hospital Course:  Patient is clinically showing improvement not had any dyspnea at rest.  Has a nonproductive cough.    Significant Labs: All pertinent labs within the past 24 hours have been reviewed.  ABGs: No results for input(s): "PH", "PCO2", "HCO3", "POCSATURATED", "BE", "TOTALHB", "COHB", "METHB", "O2HB", "POCFIO2", "PO2" in the last 48 hours.  BMP:   Recent Labs   Lab 07/22/24  1708   *      K 4.1      CO2 25   BUN 11   CREATININE 0.7   CALCIUM 8.8     CBC:   Recent Labs   Lab 07/21/24  1628 07/22/24  0828 07/22/24  1544   WBC " "10.29 15.78* 9.60   HGB 15.3 13.7 13.1   HCT 43.4 39.4 37.9    301 246     Lipid Panel: No results for input(s): "CHOL", "HDL", "LDLCALC", "TRIG", "CHOLHDL" in the last 48 hours.  Magnesium: No results for input(s): "MG" in the last 48 hours.    Significant Imaging: I have reviewed all pertinent imaging results/findings within the past 24 hours.    Assessment/Plan:    Acute on chronic hypoxic respiratory failure    Advanced COPD patient has been initiated on antibiotic therapy steroid therapy mucolytics.  And nebulized therapy patient is currently stable and no acute dyspnea at rest.  Patient needs to be up and ambulatory    VT prophylaxis initiated on Lovenox subQ q.day    History of mixed hyperlipidemia  Statin therapy    Hypo magnesium follow.  Repeat Mag level status post treatment    Lung nodule on CT scan.  Patient admitted CTA of the chest yesterday no acute pulmonary embolism mucus plugging noted.    Malnourished in appearance evaluate for calorie intake    Osteoporosis recommend continuing Fosamax high risk secondary to steroid use    Anxiety on BuSpar      VTE Risk Mitigation (From admission, onward)           Ordered     Place sequential compression device  Until discontinued         07/22/24 1508     IP VTE LOW RISK PATIENT  Once         07/22/24 1508                       Gee Reyes DO  Department of Hospital Medicine   [place] [facility]    "

## 2024-07-23 NOTE — PT/OT/SLP PROGRESS
Physical Therapy Treatment    Patient Name:  Zayra White   MRN:  [id number]    Recommendations:     Discharge Recommendations: Low Intensity Therapy  Discharge Equipment Recommendations: none  Barriers to discharge: None    Assessment:     Zayra White is a 58 y.o. female admitted with a medical diagnosis of <principal problem not specified>.  She presents with the following impairments/functional limitations: impaired endurance.    Rehab Prognosis: Good; patient would benefit from acute skilled PT services to address these deficits and reach maximum level of function.    Recent Surgery: * No surgery found *      Plan:     During this hospitalization, patient to be seen 5 x/week to address the identified rehab impairments via gait training, therapeutic exercises in order to progress towards goals.    Plan of Care Expires:   (Upon discharge from the hospital.)    Subjective     Chief Complaint: The patient complains of feeling anxious with therapy this afternoon.  She complains of shortness of breath with ambulation.  Patient/Family Comments/goals: Return home as independent as possible.  Pain/Comfort:  Pain Rating 1: 0/10      Objective:     Communicated with nurse prior to session.  Patient found supine with peripheral IV, telemetry, oxygen upon PT entry to room.     General Precautions: Standard, fall  Orthopedic Precautions: N/A  Braces: N/A  Respiratory Status: Nasal cannula, flow 2 L/min    AM-PAC 6 CLICK MOBILITY  Turning over in bed (including adjusting bedclothes, sheets and blankets)?: 4  Sitting down on and standing up from a chair with arms (e.g., wheelchair, bedside commode, etc.): 4  Moving from lying on back to sitting on the side of the bed?: 4  Moving to and from a bed to a chair (including a wheelchair)?: 4  Need to walk in hospital room?: 3  Climbing 3-5 steps with a railing?: 1  Basic Mobility Total Score: 20       Treatment & Education:  The patient received gait training with SBA  approximately 200 feet, 40 feet respectively with portable O2 at 3L/min.  The patient required one seated rest break due to shortness of breath.  Pulse ox never dropped below 88%.    Patient left supine with all lines intact, call button in reach, and nurse present..    GOALS:   Multidisciplinary Problems       Physical Therapy Goals          Problem: Physical Therapy    Goal Priority Disciplines Outcome Goal Variances Interventions   Physical Therapy Goal     PT, PT/OT      Description: Goals    Patient to ambulate independently > 200 feet.                       Time Tracking:     PT Received On: 07/23/24  PT Start Time: 1335     PT Stop Time: 1350  PT Total Time (min): 15 min     Billable Minutes: Gait Training 15    Treatment Type: Treatment  PT/PTA: PT     Number of PTA visits since last PT visit: 0     07/23/2024

## 2024-07-23 NOTE — PLAN OF CARE
Problem: Adult Inpatient Plan of Care  Goal: Plan of Care Review  Outcome: Progressing  Goal: Patient-Specific Goal (Individualized)  Outcome: Progressing  Goal: Absence of Hospital-Acquired Illness or Injury  Outcome: Progressing  Goal: Optimal Comfort and Wellbeing  Outcome: Progressing  Goal: Readiness for Transition of Care  Outcome: Progressing   No acute events overnight. Pt VSS. Hourly rounding complete. All questions answered. Call light within reach. Bed in locked and low position.

## 2024-07-23 NOTE — PLAN OF CARE
[facility]  Discharge Reassessment    Primary Care Provider: Polly Lemos MD    Expected Discharge Date: 7/24/2024    Resting in bed; states has been using her IS. Spoke to [employer] & will let them know once she is discharged & they will have someone come look at her oxygen equipment. She thinks the filters need to be changed. FU appointment with Dr Lemos made for next week. Will get appointment with Chloe Leung NP. Denies any other needs at this time. Will continue to follow.     Reassessment (most recent)       Discharge Reassessment - 07/23/24 1515          Discharge Reassessment    Assessment Type Discharge Planning Reassessment     Did the patient's condition or plan change since previous assessment? No     Discharge Plan discussed with: Patient     Communicated KIM with patient/caregiver Yes     Discharge Plan A Home     Discharge Plan B Home     DME Needed Upon Discharge  none     Transition of Care Barriers None     Why the patient remains in the hospital Requires continued medical care        Post-Acute Status    Discharge Delays None known at this time

## 2024-07-24 LAB
ANION GAP SERPL CALC-SCNC: 13 MMOL/L (ref 8–16)
BASOPHILS # BLD AUTO: 0.02 K/UL (ref 0–0.2)
BASOPHILS NFR BLD: 0.2 % (ref 0–1.9)
BNP SERPL-MCNC: 158 PG/ML (ref 0–99)
BUN SERPL-MCNC: 9 MG/DL (ref 6–20)
CALCIUM SERPL-MCNC: 7.7 MG/DL (ref 8.7–10.5)
CHLORIDE SERPL-SCNC: 108 MMOL/L (ref 95–110)
CO2 SERPL-SCNC: 24 MMOL/L (ref 23–29)
CREAT SERPL-MCNC: 0.7 MG/DL (ref 0.5–1.4)
DIFFERENTIAL METHOD BLD: ABNORMAL
EOSINOPHIL # BLD AUTO: 0 K/UL (ref 0–0.5)
EOSINOPHIL NFR BLD: 0 % (ref 0–8)
ERYTHROCYTE [DISTWIDTH] IN BLOOD BY AUTOMATED COUNT: 11.6 % (ref 11.5–14.5)
EST. GFR  (NO RACE VARIABLE): >60 ML/MIN/1.73 M^2
GLUCOSE SERPL-MCNC: 188 MG/DL (ref 70–110)
HCT VFR BLD AUTO: 36 % (ref 37–48.5)
HGB BLD-MCNC: 12.2 G/DL (ref 12–16)
IMM GRANULOCYTES # BLD AUTO: 0.07 K/UL (ref 0–0.04)
IMM GRANULOCYTES NFR BLD AUTO: 0.6 % (ref 0–0.5)
LYMPHOCYTES # BLD AUTO: 0.3 K/UL (ref 1–4.8)
LYMPHOCYTES NFR BLD: 2.9 % (ref 18–48)
MCH RBC QN AUTO: 32.6 PG (ref 27–31)
MCHC RBC AUTO-ENTMCNC: 33.9 G/DL (ref 32–36)
MCV RBC AUTO: 96 FL (ref 82–98)
MONOCYTES # BLD AUTO: 0.2 K/UL (ref 0.3–1)
MONOCYTES NFR BLD: 2 % (ref 4–15)
NEUTROPHILS # BLD AUTO: 11.2 K/UL (ref 1.8–7.7)
NEUTROPHILS NFR BLD: 94.3 % (ref 38–73)
NRBC BLD-RTO: 0 /100 WBC
PLATELET # BLD AUTO: 216 K/UL (ref 150–450)
PMV BLD AUTO: 9.1 FL (ref 9.2–12.9)
POCT GLUCOSE: 144 MG/DL (ref 70–110)
POCT GLUCOSE: 147 MG/DL (ref 70–110)
POCT GLUCOSE: 157 MG/DL (ref 70–110)
POCT GLUCOSE: 162 MG/DL (ref 70–110)
POTASSIUM SERPL-SCNC: 3.5 MMOL/L (ref 3.5–5.1)
RBC # BLD AUTO: 3.74 M/UL (ref 4–5.4)
SODIUM SERPL-SCNC: 145 MMOL/L (ref 136–145)
WBC # BLD AUTO: 11.84 K/UL (ref 3.9–12.7)

## 2024-07-24 PROCEDURE — 27000221 HC OXYGEN, UP TO 24 HOURS

## 2024-07-24 PROCEDURE — 63600175 PHARM REV CODE 636 W HCPCS: Mod: UD | Performed by: INTERNAL MEDICINE

## 2024-07-24 PROCEDURE — 36415 COLL VENOUS BLD VENIPUNCTURE: CPT | Performed by: INTERNAL MEDICINE

## 2024-07-24 PROCEDURE — 94640 AIRWAY INHALATION TREATMENT: CPT | Mod: XB

## 2024-07-24 PROCEDURE — 11000001 HC ACUTE MED/SURG PRIVATE ROOM

## 2024-07-24 PROCEDURE — 85025 COMPLETE CBC W/AUTO DIFF WBC: CPT | Performed by: INTERNAL MEDICINE

## 2024-07-24 PROCEDURE — 25000003 PHARM REV CODE 250: Performed by: INTERNAL MEDICINE

## 2024-07-24 PROCEDURE — 83880 ASSAY OF NATRIURETIC PEPTIDE: CPT | Performed by: INTERNAL MEDICINE

## 2024-07-24 PROCEDURE — 94664 DEMO&/EVAL PT USE INHALER: CPT | Mod: XB

## 2024-07-24 PROCEDURE — 97116 GAIT TRAINING THERAPY: CPT

## 2024-07-24 PROCEDURE — 99900035 HC TECH TIME PER 15 MIN (STAT)

## 2024-07-24 PROCEDURE — 94799 UNLISTED PULMONARY SVC/PX: CPT

## 2024-07-24 PROCEDURE — 80048 BASIC METABOLIC PNL TOTAL CA: CPT | Performed by: INTERNAL MEDICINE

## 2024-07-24 PROCEDURE — 25000242 PHARM REV CODE 250 ALT 637 W/ HCPCS: Performed by: INTERNAL MEDICINE

## 2024-07-24 PROCEDURE — 94761 N-INVAS EAR/PLS OXIMETRY MLT: CPT

## 2024-07-24 RX ORDER — OXYMETAZOLINE HCL 0.05 %
2 SPRAY, NON-AEROSOL (ML) NASAL 2 TIMES DAILY
Status: DISCONTINUED | OUTPATIENT
Start: 2024-07-24 | End: 2024-07-25 | Stop reason: HOSPADM

## 2024-07-24 RX ORDER — CLONAZEPAM 0.5 MG/1
0.5 TABLET ORAL 2 TIMES DAILY PRN
Status: DISCONTINUED | OUTPATIENT
Start: 2024-07-24 | End: 2024-07-25 | Stop reason: HOSPADM

## 2024-07-24 RX ORDER — DULOXETIN HYDROCHLORIDE 30 MG/1
30 CAPSULE, DELAYED RELEASE ORAL 2 TIMES DAILY
Status: DISCONTINUED | OUTPATIENT
Start: 2024-07-24 | End: 2024-07-25 | Stop reason: HOSPADM

## 2024-07-24 RX ADMIN — INSULIN ASPART 1 UNITS: 100 INJECTION, SOLUTION INTRAVENOUS; SUBCUTANEOUS at 09:07

## 2024-07-24 RX ADMIN — IPRATROPIUM BROMIDE AND ALBUTEROL SULFATE 3 ML: .5; 3 SOLUTION RESPIRATORY (INHALATION) at 07:07

## 2024-07-24 RX ADMIN — METHYLPREDNISOLONE SODIUM SUCCINATE 80 MG: 40 INJECTION, POWDER, FOR SOLUTION INTRAMUSCULAR; INTRAVENOUS at 09:07

## 2024-07-24 RX ADMIN — BUSPIRONE HYDROCHLORIDE 15 MG: 5 TABLET ORAL at 08:07

## 2024-07-24 RX ADMIN — Medication 2 SPRAY: at 09:07

## 2024-07-24 RX ADMIN — BUSPIRONE HYDROCHLORIDE 15 MG: 5 TABLET ORAL at 09:07

## 2024-07-24 RX ADMIN — METHYLPREDNISOLONE SODIUM SUCCINATE 80 MG: 40 INJECTION, POWDER, FOR SOLUTION INTRAMUSCULAR; INTRAVENOUS at 04:07

## 2024-07-24 RX ADMIN — DULOXETINE HYDROCHLORIDE 30 MG: 30 CAPSULE, DELAYED RELEASE ORAL at 09:07

## 2024-07-24 RX ADMIN — GUAIFENESIN 600 MG: 600 TABLET, EXTENDED RELEASE ORAL at 09:07

## 2024-07-24 RX ADMIN — POLYETHYLENE GLYCOL (3350) 17 G: 17 POWDER, FOR SOLUTION ORAL at 08:07

## 2024-07-24 RX ADMIN — GUAIFENESIN 600 MG: 600 TABLET, EXTENDED RELEASE ORAL at 08:07

## 2024-07-24 RX ADMIN — CEFTRIAXONE SODIUM 1 G: 1 INJECTION, POWDER, FOR SOLUTION INTRAMUSCULAR; INTRAVENOUS at 04:07

## 2024-07-24 RX ADMIN — IPRATROPIUM BROMIDE AND ALBUTEROL SULFATE 3 ML: .5; 3 SOLUTION RESPIRATORY (INHALATION) at 03:07

## 2024-07-24 RX ADMIN — IPRATROPIUM BROMIDE AND ALBUTEROL SULFATE 3 ML: .5; 3 SOLUTION RESPIRATORY (INHALATION) at 12:07

## 2024-07-24 RX ADMIN — DOXYCYCLINE HYCLATE 100 MG: 100 TABLET, COATED ORAL at 09:07

## 2024-07-24 RX ADMIN — Medication 2 SPRAY: at 10:07

## 2024-07-24 RX ADMIN — CLONAZEPAM 0.5 MG: 0.5 TABLET ORAL at 08:07

## 2024-07-24 RX ADMIN — DULOXETINE HYDROCHLORIDE 30 MG: 30 CAPSULE, DELAYED RELEASE ORAL at 08:07

## 2024-07-24 RX ADMIN — IPRATROPIUM BROMIDE AND ALBUTEROL SULFATE 3 ML: .5; 3 SOLUTION RESPIRATORY (INHALATION) at 10:07

## 2024-07-24 RX ADMIN — SODIUM CHLORIDE: 9 INJECTION, SOLUTION INTRAVENOUS at 03:07

## 2024-07-24 RX ADMIN — DOXYCYCLINE HYCLATE 100 MG: 100 TABLET, COATED ORAL at 08:07

## 2024-07-24 RX ADMIN — BUSPIRONE HYDROCHLORIDE 15 MG: 5 TABLET ORAL at 04:07

## 2024-07-24 RX ADMIN — METHYLPREDNISOLONE SODIUM SUCCINATE 80 MG: 40 INJECTION, POWDER, FOR SOLUTION INTRAMUSCULAR; INTRAVENOUS at 05:07

## 2024-07-24 RX ADMIN — CLONAZEPAM 0.5 MG: 0.5 TABLET ORAL at 09:07

## 2024-07-24 NOTE — PROGRESS NOTES
[facility] Medicine  Progress Note    Patient Name: Zayra White  MRN: [id number]  Patient Class: OP- Observation   Admission Date: 7/22/2024  Length of Stay: 0 days  Attending Physician: Gee Reyes DO  Primary Care Provider: Polly Lemos MD        Subjective:     Principal Problem:<principal problem not specified>    Interval History: : 58-year-old female presents to the emergency room with increasing shortness of breath.  She is on chronic home O2 at 2 L and known to have advanced COPD.  She was a former smoker but quit approximately 2 months ago.  She lives at home alone.  And states that she can ambulate about 20 ft she also missed ongoing weight loss.  When she was weighing about 120 and dropped down into the 90s.  She says she has good family support next door with his sister.  She can still get in and out of her trailer.  But doing any grocery shopping or ambulation more than 10 ft she loses her breath.  He was in the emergency room yesterday.  We got home last night and could not breathe.  She had difficulty with orthopnea PND try to CPAP that she use at night and still felt more short of breath.  No examination on her she is moving fair air she is non wheezing and there.     Review of Systems   Constitutional:  Positive for fatigue. Negative for chills and fever.   HENT:  Positive for nosebleeds.    Respiratory:  Positive for cough, shortness of breath and wheezing. Negative for chest tightness.    Gastrointestinal:  Negative for abdominal distention, blood in stool, diarrhea and nausea.   Neurological:  Negative for speech difficulty, light-headedness and headaches.   Psychiatric/Behavioral:  Negative for confusion.      Objective:     Vital Signs (Most Recent):  Temp: 97.5 °F (36.4 °C) (07/24/24 0327)  Pulse: 92 (07/24/24 0713)  Resp: (!) 22 (07/24/24 0713)  BP: (!) 151/86 (07/24/24 0327)  SpO2: 97 % (07/24/24 0713) Vital Signs (24h Range):  Temp:   [97.2 °F (36.2 °C)-98.7 °F (37.1 °C)] 97.5 °F (36.4 °C)  Pulse:  [] 92  Resp:  [19-35] 22  SpO2:  [94 %-100 %] 97 %  BP: (146-187)/() 151/86     Weight: 47.2 kg (104 lb)  Body mass index is 17.31 kg/m².  No intake or output data in the 24 hours ending 07/24/24 0972   Physical Exam  Constitutional:       General: She is not in acute distress.     Appearance: Normal appearance. She is ill-appearing.   HENT:      Nose: Congestion present.      Mouth/Throat:      Mouth: Mucous membranes are dry.   Eyes:      Pupils: Pupils are equal, round, and reactive to light.   Cardiovascular:      Rate and Rhythm: Normal rate and regular rhythm.   Pulmonary:      Effort: Respiratory distress (with movement) present.      Breath sounds: No wheezing (negative wheezes noted very poor air exchange).   Abdominal:      General: Bowel sounds are normal. There is no distension.      Tenderness: There is no abdominal tenderness. There is no rebound.   Neurological:      General: No focal deficit present.      Mental Status: She is alert and oriented to person, place, and time.   Psychiatric:      Comments: Very anxious.           Overview/Hospital Course:  Patient is very anxious.  Having episodes of epistaxis today.  At rest is breathing well and maintaining.  But still has very poor air exchange no wheezing.  CT scan originally showed mucus plugging in the lower parts of the lung still has a very poor cough.  And is on mucolytics.  We are going to start deescalating steroids today help treat her anxiety use Klonopin.  And restart Cymbalta sats are stable at 97 patient does not feel that she is quite ready for home.  Home plan is to return home with home health.  She has a sister that lives next door.  Offered skilled nursing.  Sodium 145 CO2 of 24 hemoglobin of 12.2 platelets of 216 BUN 9 creatinine 0.7 sodium elevated at 147    Significant Labs: All pertinent labs within the past 24 hours have been reviewed.  Blood Culture:  "No results for input(s): "LABBLOO" in the last 48 hours.  BMP:   Recent Labs   Lab 07/24/24  0837   *      K 3.5      CO2 24   BUN 9   CREATININE 0.7   CALCIUM 7.7*     CBC:   Recent Labs   Lab 07/22/24  1544 07/24/24  0338   WBC 9.60 11.84   HGB 13.1 12.2   HCT 37.9 36.0*    216     CMP:   Recent Labs   Lab 07/22/24  1708 07/23/24  0949 07/24/24  0837    142 145   K 4.1 3.6 3.5    105 108   CO2 25 25 24   * 148* 188*   BUN 11 10 9   CREATININE 0.7 0.7 0.7   CALCIUM 8.8 8.1* 7.7*   ANIONGAP 12 12 13       Significant Imaging: I have reviewed all pertinent imaging results/findings within the past 24 hours.    Assessment/Plan:    Acute on chronic hypoxic respiratory failure    Patient requires oxygen.  Sats well at rest.  Has poor exertional activity.  This showed associated severe dyspnea.  Patient will continue on DuoNebs.  We will start deescalating steroid therapy and continue antibiotic therapy    Anxiety reinitiate Klonopin Cymbalta    Epistaxis.  Humidify oxygen.  Initiate Afrin.  As a vasoconstrictor    History of a mixed lipidemia on statin therapy    Hypo magnesium following    Abnormal CT scan with nodule will need follow-up    Mucus plugging continuing flutter valve incentive spirometry.  Mucolytic    Anxiety.  As above on BuSpar Cymbalta and now Klonopin    Patient originally discussed in was accepting of a DNR.  She was changed back to a full code.  We are happy to continue continue to support.  Patient would likely benefit from a pulmonary rehab    VTE Risk Mitigation (From admission, onward)           Ordered     Place sequential compression device  Until discontinued         07/22/24 1508     IP VTE LOW RISK PATIENT  Once         07/22/24 1508                       Gee Reyes DO  Department of Hospital Medicine   [place] [facility]    "

## 2024-07-24 NOTE — PT/OT/SLP PROGRESS
Physical Therapy Treatment    Patient Name:  Zayra White   MRN:  [id number]    Recommendations:     Discharge Recommendations: Low Intensity Therapy  Discharge Equipment Recommendations: none  Barriers to discharge: None    Assessment:     Zayra White is a 58 y.o. female admitted with a medical diagnosis of <principal problem not specified>.  She presents with the following impairments/functional limitations: impaired endurance.    Rehab Prognosis: Good; patient would benefit from acute skilled PT services to address these deficits and reach maximum level of function.    Recent Surgery: * No surgery found *      Plan:     During this hospitalization, patient to be seen 5 x/week to address the identified rehab impairments via gait training, therapeutic exercises in order to progress towards goals.    Plan of Care Expires:   (Upon discharge from the hospital.)    Subjective     Chief Complaint: The patient reports she is feeling a little better today.  Patient/Family Comments/goals: Return home as independent as possible.  Pain/Comfort:  Pain Rating 1: 0/10      Objective:     Communicated with nurse prior to session.  Patient found supine with peripheral IV, telemetry, oxygen upon PT entry to room.     General Precautions: Standard, fall  Orthopedic Precautions: N/A  Braces: N/A  Respiratory Status: Nasal cannula, flow 2 L/min     Treatment & Education:  The patient was seen for gait training with supervision approximately 200 feet, 40 feet respectively using portable oxygen at 2L/min.  Pulse ox monitored and it never dropped below 90%.    After resting for about an hour, the patient ambulated again approximately 240 feet with supervision using portable oxygen at 2L/min.  This time, her pulse ox dropped to 88% but she did walk farther without a rest break.    Patient left supine with all lines intact, call button in reach, and nurse notified..    GOALS:   Multidisciplinary Problems       Physical Therapy Goals           Problem: Physical Therapy    Goal Priority Disciplines Outcome Goal Variances Interventions   Physical Therapy Goal     PT, PT/OT      Description: Goals    Patient to ambulate independently > 200 feet.                       Time Tracking:     PT Received On: 07/24/24  PT Start Time: 1025     PT Stop Time: 1050  PT Total Time (min): 25 min     Billable Minutes: Gait Training 25    Treatment Type: Treatment  PT/PTA: PT     Number of PTA visits since last PT visit: 0     07/24/2024

## 2024-07-25 ENCOUNTER — PATIENT OUTREACH (OUTPATIENT)
Dept: ADMINISTRATIVE | Facility: HOSPITAL | Age: 59
End: 2024-07-25
Payer: MEDICAID

## 2024-07-25 VITALS
OXYGEN SATURATION: 97 % | HEIGHT: 65 IN | BODY MASS INDEX: 17.48 KG/M2 | WEIGHT: 104.94 LBS | SYSTOLIC BLOOD PRESSURE: 134 MMHG | HEART RATE: 91 BPM | TEMPERATURE: 98 F | RESPIRATION RATE: 19 BRPM | DIASTOLIC BLOOD PRESSURE: 74 MMHG

## 2024-07-25 LAB
BASOPHILS # BLD AUTO: 0.01 K/UL (ref 0–0.2)
BASOPHILS NFR BLD: 0.1 % (ref 0–1.9)
DIFFERENTIAL METHOD BLD: ABNORMAL
EOSINOPHIL # BLD AUTO: 0 K/UL (ref 0–0.5)
EOSINOPHIL NFR BLD: 0 % (ref 0–8)
ERYTHROCYTE [DISTWIDTH] IN BLOOD BY AUTOMATED COUNT: 11.3 % (ref 11.5–14.5)
HCT VFR BLD AUTO: 34.6 % (ref 37–48.5)
HGB BLD-MCNC: 11.9 G/DL (ref 12–16)
IMM GRANULOCYTES # BLD AUTO: 0.08 K/UL (ref 0–0.04)
IMM GRANULOCYTES NFR BLD AUTO: 0.9 % (ref 0–0.5)
LYMPHOCYTES # BLD AUTO: 0.5 K/UL (ref 1–4.8)
LYMPHOCYTES NFR BLD: 5.3 % (ref 18–48)
MCH RBC QN AUTO: 32.8 PG (ref 27–31)
MCHC RBC AUTO-ENTMCNC: 34.4 G/DL (ref 32–36)
MCV RBC AUTO: 95 FL (ref 82–98)
MONOCYTES # BLD AUTO: 0.2 K/UL (ref 0.3–1)
MONOCYTES NFR BLD: 1.9 % (ref 4–15)
NEUTROPHILS # BLD AUTO: 7.9 K/UL (ref 1.8–7.7)
NEUTROPHILS NFR BLD: 91.8 % (ref 38–73)
NRBC BLD-RTO: 0 /100 WBC
PLATELET # BLD AUTO: 208 K/UL (ref 150–450)
PMV BLD AUTO: 9.2 FL (ref 9.2–12.9)
POCT GLUCOSE: 133 MG/DL (ref 70–110)
POCT GLUCOSE: 144 MG/DL (ref 70–110)
RBC # BLD AUTO: 3.63 M/UL (ref 4–5.4)
WBC # BLD AUTO: 8.55 K/UL (ref 3.9–12.7)

## 2024-07-25 PROCEDURE — 99900035 HC TECH TIME PER 15 MIN (STAT)

## 2024-07-25 PROCEDURE — 85025 COMPLETE CBC W/AUTO DIFF WBC: CPT | Performed by: INTERNAL MEDICINE

## 2024-07-25 PROCEDURE — 94664 DEMO&/EVAL PT USE INHALER: CPT

## 2024-07-25 PROCEDURE — 25000003 PHARM REV CODE 250: Performed by: INTERNAL MEDICINE

## 2024-07-25 PROCEDURE — 25000242 PHARM REV CODE 250 ALT 637 W/ HCPCS: Performed by: INTERNAL MEDICINE

## 2024-07-25 PROCEDURE — 94761 N-INVAS EAR/PLS OXIMETRY MLT: CPT

## 2024-07-25 PROCEDURE — 94640 AIRWAY INHALATION TREATMENT: CPT

## 2024-07-25 PROCEDURE — 97116 GAIT TRAINING THERAPY: CPT

## 2024-07-25 PROCEDURE — 63600175 PHARM REV CODE 636 W HCPCS: Performed by: INTERNAL MEDICINE

## 2024-07-25 PROCEDURE — 27000221 HC OXYGEN, UP TO 24 HOURS

## 2024-07-25 RX ORDER — GUAIFENESIN 600 MG/1
600 TABLET, EXTENDED RELEASE ORAL 2 TIMES DAILY
Qty: 20 TABLET | Refills: 0 | Status: ON HOLD | OUTPATIENT
Start: 2024-07-25 | End: 2024-08-04

## 2024-07-25 RX ORDER — PREDNISONE 50 MG/1
50 TABLET ORAL DAILY
Qty: 4 TABLET | Refills: 0 | Status: SHIPPED | OUTPATIENT
Start: 2024-07-25 | End: 2024-07-29

## 2024-07-25 RX ORDER — DOXYCYCLINE HYCLATE 100 MG
100 TABLET ORAL EVERY 12 HOURS
Qty: 14 TABLET | Refills: 0 | Status: ON HOLD | OUTPATIENT
Start: 2024-07-25

## 2024-07-25 RX ORDER — PREDNISONE 50 MG/1
50 TABLET ORAL DAILY
Qty: 4 TABLET | Refills: 0 | Status: SHIPPED | OUTPATIENT
Start: 2024-07-25 | End: 2024-07-25

## 2024-07-25 RX ADMIN — Medication 2 SPRAY: at 08:07

## 2024-07-25 RX ADMIN — GUAIFENESIN 600 MG: 600 TABLET, EXTENDED RELEASE ORAL at 08:07

## 2024-07-25 RX ADMIN — DULOXETINE HYDROCHLORIDE 30 MG: 30 CAPSULE, DELAYED RELEASE ORAL at 08:07

## 2024-07-25 RX ADMIN — CLONAZEPAM 0.5 MG: 0.5 TABLET ORAL at 08:07

## 2024-07-25 RX ADMIN — BUSPIRONE HYDROCHLORIDE 15 MG: 5 TABLET ORAL at 08:07

## 2024-07-25 RX ADMIN — IPRATROPIUM BROMIDE AND ALBUTEROL SULFATE 3 ML: .5; 3 SOLUTION RESPIRATORY (INHALATION) at 07:07

## 2024-07-25 RX ADMIN — POLYETHYLENE GLYCOL (3350) 17 G: 17 POWDER, FOR SOLUTION ORAL at 08:07

## 2024-07-25 RX ADMIN — DOXYCYCLINE HYCLATE 100 MG: 100 TABLET, COATED ORAL at 08:07

## 2024-07-25 RX ADMIN — METHYLPREDNISOLONE SODIUM SUCCINATE 80 MG: 40 INJECTION, POWDER, FOR SOLUTION INTRAMUSCULAR; INTRAVENOUS at 05:07

## 2024-07-25 NOTE — PT/OT/SLP PROGRESS
Physical Therapy Treatment    Patient Name:  Zayra White   MRN:  [id number]    Recommendations:     Discharge Recommendations: Low Intensity Therapy  Discharge Equipment Recommendations: none  Barriers to discharge: None    Assessment:     Zayra White is a 58 y.o. female admitted with a medical diagnosis of Chronic obstructive pulmonary disease.  She presents with the following impairments/functional limitations: impaired endurance.    Rehab Prognosis: Good; patient would benefit from acute skilled PT services to address these deficits and reach maximum level of function.    Recent Surgery: * No surgery found *      Plan:     During this hospitalization, patient to be seen 5 x/week to address the identified rehab impairments via gait training, therapeutic exercises in order to progress towards goals.    Plan of Care Expires:   (Upon discharge from the hospital.)    Subjective     Chief Complaint: The patient reports she is feeling more confident with ambulation.  However, she still gets short of breath with activity.  Patient/Family Comments/goals: Return home independent with mobility.  Pain/Comfort:  Pain Rating 1: 0/10      Objective:     Communicated with nurse prior to session.  Patient found supine with peripheral IV, telemetry, oxygen upon PT entry to room.     General Precautions: Standard, fall  Orthopedic Precautions: N/A  Braces: N/A  Respiratory Status: Nasal cannula, flow 2 L/min     Functional Mobility:  Bed Mobility:     Rolling Left:  independence  Rolling Right: independence  Supine to Sit: independence  Sit to Supine: independence  Transfers:     Sit to Stand:  independence with no AD  Bed to Chair: independence with  no AD  using  Stand Pivot  Gait: 240 feet with supervision and portable O2 at 2L/min    Treatment & Education:  The patient received gait training with portable O2 at 2L/min approximately 240 feet.  Pulse ox monitored and never dropped below 89%.    Patient left supine with all  lines intact, call button in reach, and nurse notified..    GOALS:   Multidisciplinary Problems       Physical Therapy Goals          Problem: Physical Therapy    Goal Priority Disciplines Outcome Goal Variances Interventions   Physical Therapy Goal     PT, PT/OT      Description: Goals    Patient to ambulate independently > 200 feet.                       Time Tracking:     PT Received On: 07/25/24  PT Start Time: 0820     PT Stop Time: 0835  PT Total Time (min): 15 min     Billable Minutes: Gait Training 15    Treatment Type: Treatment  PT/PTA: PT     Number of PTA visits since last PT visit: 0     07/25/2024

## 2024-07-25 NOTE — PLAN OF CARE
Patient cleared for discharge from case management standpoint.    Follow up appointments scheduled and added to AVS.    Chart and discharge orders reviewed.  Patient discharged home with no further case management needs.       07/25/24 1034   Final Note   Assessment Type Final Discharge Note   Anticipated Discharge Disposition Home   Hospital Resources/Appts/Education Provided Appointments scheduled and added to AVS   Post-Acute Status   Discharge Delays None known at this time

## 2024-07-25 NOTE — PROGRESS NOTES
Population Health Chart Review & Patient Outreach Details      Additional [facility] Health Notes:               Updates Requested / Reviewed:      Updated Care Coordination Note, Care Everywhere, , External Sources: DIS, and Immunizations Reconciliation Completed or Queried: [facility] Topics Overdue:      [facility] Score: 0     Patient is not due for any topics at this time.    Pneumonia Vaccine  Shingles/Zoster Vaccine  RSV Vaccine                  Health Maintenance Topic(s) Outreach Outcomes & Actions Taken:    Breast Cancer Screening - Outreach Outcomes & Actions Taken  : Patient outreach

## 2024-07-25 NOTE — PLAN OF CARE
Problem: Gas Exchange Impaired  Goal: Optimal Gas Exchange  Outcome: Met  Intervention: Optimize Oxygenation and Ventilation  Flowsheets (Taken 7/25/2024 1239)  Airway/Ventilation Management: airway patency maintained  Head of Bed (HOB) Positioning: HOB elevated   Patient in no apparent distress. Patient on 2 lpm. She wears home O2 at 2 lpm. PRN treatments given as needed. IS and aerobika done . Will continue to monitor.

## 2024-07-25 NOTE — NURSING
D/C instructions provided and explained to patient understanding of D/C instructions verbalized. IV removed, catheter intact. Tolerated well. Telemetry monitor removed and returned to monitor room. VSS. Pt denies complaints. Transported off unit via wheelchair.

## 2024-07-25 NOTE — DISCHARGE SUMMARY
[facility] Medicine  Discharge Summary      Patient Name: Zayra White  MRN: [id number]  Admission Date: 2024  Hospital Length of Stay: 1 days  Discharge Date and Time:  2024 10:33 AM  Attending Physician: Gee Reyes DO   Discharging Provider: Gee Reyes DO  Discharge Provider Team: Networked reference to record PCT   Primary Care Provider: Polly Lemos MD        HPI:   Principal Problem:<principal problem not specified>     Chief Complaint:       Chief Complaint   Patient presents with    Shortness of Breath         HPI: 58-year-old female presents to the emergency room with increasing shortness of breath.  She is on chronic home O2 at 2 L and known to have advanced COPD.  She was a former smoker but quit approximately 2 months ago.  She lives at home alone.  And states that she can ambulate about 20 ft she also missed ongoing weight loss.  When she was weighing about 120 and dropped down into the 90s.  She says she has good family support next door with his sister.  She can still get in and out of her trailer.  But doing any grocery shopping or ambulation more than 10 ft she loses her breath.  He was in the emergency room yesterday.  We got home last night and could not breathe.  She had difficulty with orthopnea PND try to CPAP that she use at night and still felt more short of breath.  No examination on her she is moving fair air she is non wheezing and there.  So this brings the question do we have a COPD exacerbation under we have other etiology on top of the COPD.  Past medical history includes previous cholecystectomy .  Admits to being on med many medications but she could not elaborate.  So this will need to be reviewed through the med rec.  Patient admits that she has been having ongoing decline       * No surgery found *      Hospital Course:  Very pleasant 58-year-old female who has advanced lung disease requiring oxygen  at home comes in with COPD and mucus plugging in the lower part of her lungs.  Patient has been initiated on steroid therapy nebulized therapy mucolytics.  And is shown a good and generalized improvement.  No she lives home alone.  But she has a sister that lives right next door.  She has been offered skilled nursing.  And even 4.4 more cardiopulmonary rehab.  And at this point in time she would like to decline she is up and ambulating around feels like she is getting back to her baseline she admits to being nervous going home.  But we will set her up with home health.  She understands if her conditions were thought to hesitate to return back to the hospital.  And once again reiterated that a skilled nursing to work on her pulmonary endurance would be beneficial.  Patient does have a CPAP at home she can use p.r.n. and clinically doing better labs reviewed see below  Consults:   Consults (From admission, onward)          Status Ordering Provider     Inpatient consult to Respiratory Care  Once        Provider:  (Not yet assigned)    Acknowledged SATYA SANDERS        Acute on chronic hypoxic respiratory failure patient has advanced COPD requiring home oxygen.  Which she will continue on goals to keep sats greater than 88%    Acute COPD improving weaning down steroids taper dose at home continue doxycycline for another 7 days and mucolytic    Anxiety related started it is p.r.n. Klonopin in the hospital and restarted Cymbalta as BuSpar continues    Epistaxis resolved    Mixed lipidemia on statin therapy    Abnormal CT scan with nodule of the chest we will need follow up follow up primary care in 1 week    Mucus plugging is improved her functional capacity has improved    Patient is stable for discharge.  She is a high likely to return back to the hospitalist secondary to advanced lung disease    Final Active Diagnoses:    Diagnosis Date Noted POA    PRINCIPAL PROBLEM:  Chronic obstructive pulmonary disease  [J44.9] 03/15/2020 Yes    Anxiety about health [R45.89] 11/27/2021 Yes      Problems Resolved During this Admission:      Discharged Condition: fair    Disposition: Home-Health Care [facility]    Follow Up:   Follow-up Information       Polly Lemos MD. Go on 7/29/2024.    Specialty: Family Medicine  Why: appointment Monday July 29th at 10:30am for hospital follow up  Contact information:  [address] [place] [address]  [phone]               Madhu, Chloe, NP. Go on 7/30/2024.    Specialty: Pulmonary Disease  Why: appointment Tuesday July 30th at 10:00am for pulmonology follow up  Contact information:  [address]  [address]  [place] [address]  [phone]                           Patient Instructions:   No discharge procedures on file.  Medications:  Reconciled Home Medications:      Medication List        START taking these medications      doxycycline 100 MG tablet  Commonly known as: VIBRA-TABS  Take 1 tablet (100 mg total) by mouth every 12 (twelve) hours.     guaiFENesin 600 mg 12 hr tablet  Commonly known as: MUCINEX  Take 1 tablet (600 mg total) by mouth 2 (two) times daily. for 10 days            CONTINUE taking these medications      albuterol 90 mcg/actuation inhaler  Commonly known as: VENTOLIN HFA  inhale 2 puff by inhalation route  every 4 - 6 hours as needed     albuterol-ipratropium 2.5 mg-0.5 mg/3 mL nebulizer solution  Commonly known as: DUO-NEB  Take 3 mLs by nebulization every 6 (six) hours as needed for Wheezing or Shortness of Breath. Rescue     alendronate 70 MG tablet  Commonly known as: FOSAMAX  Take 1 tablet (70 mg total) by mouth every 7 days.     aspirin 81 MG Chew  81 mg.     atenoloL 25 MG tablet  Commonly known as: TENORMIN  TAKE 1/2 TABLET BY MOUTH EVERY DAY **THANK YOU**     atorvastatin 20 MG tablet  Commonly known as: LIPITOR  Take 1 tablet (20 mg total) by mouth once daily.     busPIRone 15 MG tablet  Commonly known as: BUSPAR  Take 1 tablet (15 mg total) by  "mouth 3 (three) times daily.     cholecalciferol (vitamin D3) 25 mcg (1,000 unit) capsule  Commonly known as: VITAMIN D3  Take 2 capsules (2,000 Units total) by mouth once daily.     DULoxetine 20 MG capsule  Commonly known as: CYMBALTA  Take 2 capsules po daily     gabapentin 300 MG capsule  Commonly known as: NEURONTIN  Take 1 capsule (300 mg total) by mouth 3 (three) times daily.     latanoprost 0.005 % ophthalmic solution  Place 1 drop into both eyes every evening.     ondansetron 4 MG Tbdl  Commonly known as: ZOFRAN-ODT  Take 1 tablet (4 mg total) by mouth every 12 (twelve) hours as needed (Nausea).     pantoprazole 40 MG tablet  Commonly known as: PROTONIX  Take 1 tablet (40 mg total) by mouth once daily. Take in the morning before breakfast.  Wait 30 minutes before eating or drinking anything     predniSONE 50 MG Tab  Commonly known as: DELTASONE  Take 1 tablet (50 mg total) by mouth once daily. for 4 days     TRELEGY ELLIPTA 200-62.5-25 mcg inhaler  Generic drug: fluticasone-umeclidin-vilanter  Inhale 1 puff into the lungs once daily.              Significant Diagnostic Studies: Labs: BMP:   Recent Labs   Lab 07/24/24  0837   *      K 3.5      CO2 24   BUN 9   CREATININE 0.7   CALCIUM 7.7*   , CMP   Recent Labs   Lab 07/24/24  0837      K 3.5      CO2 24   *   BUN 9   CREATININE 0.7   CALCIUM 7.7*   ANIONGAP 13   , CBC   Recent Labs   Lab 07/24/24  0338 07/25/24  0410   WBC 11.84 8.55   HGB 12.2 11.9*   HCT 36.0* 34.6*    208   , and A1C: No results for input(s): "HGBA1C" in the last 4320 hours.  Microbiology: Blood Culture   Lab Results   Component Value Date    LABBLOO No growth after 5 days. 04/02/2024    and Sputum Culture   Lab Results   Component Value Date    GSRESP <10 epithelial cells per low power field. 07/22/2024    GSRESP Moderate WBC's 07/22/2024    GSRESP Many Gram positive cocci 07/22/2024    RESPIRATORYC Normal respiratory keegan 07/22/2024 "     Radiology: X-Ray: CXR: X-Ray Chest 1 View (CXR): No results found for this visit on 07/22/24.    Pending Diagnostic Studies:       Procedure Component Value Units Date/Time    EKG 12-lead [[id number]] Collected: 07/22/24 1729    Order Status: Sent Lab Status: No result           Indwelling Lines/Drains at time of discharge:   Lines/Drains/Airways       None                   Time spent on the discharge of patient:  35 minutes minutes         Gee Reyes DO  Department of Hospital Medicine  [facility]

## 2024-07-26 LAB
BACTERIA SPEC AEROBE CULT: NORMAL
BACTERIA SPEC AEROBE CULT: NORMAL
GRAM STN SPEC: NORMAL

## 2024-08-02 ENCOUNTER — HOSPITAL ENCOUNTER (INPATIENT)
Facility: HOSPITAL | Age: 59
LOS: 3 days | Discharge: HOME | End: 2024-08-05
Attending: STUDENT IN AN ORGANIZED HEALTH CARE EDUCATION/TRAINING PROGRAM | Admitting: STUDENT IN AN ORGANIZED HEALTH CARE EDUCATION/TRAINING PROGRAM
Payer: MEDICAID

## 2024-08-02 DIAGNOSIS — J44.1 COPD EXACERBATION: Primary | ICD-10-CM

## 2024-08-02 DIAGNOSIS — R06.02 SHORTNESS OF BREATH: ICD-10-CM

## 2024-08-02 PROBLEM — D72.829 LEUKOCYTOSIS: Status: ACTIVE | Noted: 2024-08-02

## 2024-08-02 PROBLEM — J96.21 ACUTE ON CHRONIC RESPIRATORY FAILURE WITH HYPOXIA: Status: ACTIVE | Noted: 2021-11-30

## 2024-08-02 LAB
ANION GAP SERPL CALC-SCNC: 12 MMOL/L (ref 8–16)
BASOPHILS # BLD AUTO: 0.05 K/UL (ref 0–0.2)
BASOPHILS NFR BLD: 0.2 % (ref 0–1.9)
BILIRUB UR QL STRIP: NEGATIVE
BUN SERPL-MCNC: 12 MG/DL (ref 6–20)
CALCIUM SERPL-MCNC: 9.2 MG/DL (ref 8.7–10.5)
CHLORIDE SERPL-SCNC: 100 MMOL/L (ref 95–110)
CLARITY UR: ABNORMAL
CO2 SERPL-SCNC: 30 MMOL/L (ref 23–29)
COLOR UR: YELLOW
CREAT SERPL-MCNC: 0.7 MG/DL (ref 0.5–1.4)
DIFFERENTIAL METHOD BLD: ABNORMAL
EOSINOPHIL # BLD AUTO: 0 K/UL (ref 0–0.5)
EOSINOPHIL NFR BLD: 0.1 % (ref 0–8)
ERYTHROCYTE [DISTWIDTH] IN BLOOD BY AUTOMATED COUNT: 11.6 % (ref 11.5–14.5)
EST. GFR  (NO RACE VARIABLE): >60 ML/MIN/1.73 M^2
GLUCOSE SERPL-MCNC: 94 MG/DL (ref 70–110)
GLUCOSE UR QL STRIP: ABNORMAL
HCT VFR BLD AUTO: 44.8 % (ref 37–48.5)
HGB BLD-MCNC: 15.2 G/DL (ref 12–16)
HGB UR QL STRIP: NEGATIVE
IMM GRANULOCYTES # BLD AUTO: 0.25 K/UL (ref 0–0.04)
IMM GRANULOCYTES NFR BLD AUTO: 1.1 % (ref 0–0.5)
KETONES UR QL STRIP: NEGATIVE
LACTATE SERPL-SCNC: 1.7 MMOL/L (ref 0.5–2.2)
LEUKOCYTE ESTERASE UR QL STRIP: NEGATIVE
LYMPHOCYTES # BLD AUTO: 2 K/UL (ref 1–4.8)
LYMPHOCYTES NFR BLD: 8.6 % (ref 18–48)
MCH RBC QN AUTO: 32.3 PG (ref 27–31)
MCHC RBC AUTO-ENTMCNC: 33.9 G/DL (ref 32–36)
MCV RBC AUTO: 95 FL (ref 82–98)
MONOCYTES # BLD AUTO: 0.9 K/UL (ref 0.3–1)
MONOCYTES NFR BLD: 3.6 % (ref 4–15)
NEUTROPHILS # BLD AUTO: 20.6 K/UL (ref 1.8–7.7)
NEUTROPHILS NFR BLD: 86.4 % (ref 38–73)
NITRITE UR QL STRIP: NEGATIVE
NRBC BLD-RTO: 0 /100 WBC
OHS QRS DURATION: 78 MS
OHS QTC CALCULATION: 430 MS
PH UR STRIP: 8 [PH] (ref 5–8)
PLATELET # BLD AUTO: 264 K/UL (ref 150–450)
PMV BLD AUTO: 8.7 FL (ref 9.2–12.9)
POTASSIUM SERPL-SCNC: 3.9 MMOL/L (ref 3.5–5.1)
PROCALCITONIN SERPL IA-MCNC: 0.1 NG/ML
PROT UR QL STRIP: NEGATIVE
RBC # BLD AUTO: 4.7 M/UL (ref 4–5.4)
SODIUM SERPL-SCNC: 142 MMOL/L (ref 136–145)
SP GR UR STRIP: 1.01 (ref 1–1.03)
TROPONIN I SERPL DL<=0.01 NG/ML-MCNC: <0.006 NG/ML (ref 0–0.03)
URN SPEC COLLECT METH UR: ABNORMAL
UROBILINOGEN UR STRIP-ACNC: NEGATIVE EU/DL
WBC # BLD AUTO: 23.8 K/UL (ref 3.9–12.7)

## 2024-08-02 PROCEDURE — 71275 CT ANGIOGRAPHY CHEST: CPT | Mod: TC

## 2024-08-02 PROCEDURE — 63700000 PHARM REV CODE 250 ALT 637 W/O HCPCS: Performed by: STUDENT IN AN ORGANIZED HEALTH CARE EDUCATION/TRAINING PROGRAM

## 2024-08-02 PROCEDURE — 25000003 PHARM REV CODE 250: Performed by: NURSE PRACTITIONER

## 2024-08-02 PROCEDURE — 25500020 PHARM REV CODE 255: Performed by: STUDENT IN AN ORGANIZED HEALTH CARE EDUCATION/TRAINING PROGRAM

## 2024-08-02 PROCEDURE — 81003 URINALYSIS AUTO W/O SCOPE: CPT | Performed by: NURSE PRACTITIONER

## 2024-08-02 PROCEDURE — 84484 ASSAY OF TROPONIN QUANT: CPT | Performed by: STUDENT IN AN ORGANIZED HEALTH CARE EDUCATION/TRAINING PROGRAM

## 2024-08-02 PROCEDURE — 93005 ELECTROCARDIOGRAM TRACING: CPT

## 2024-08-02 PROCEDURE — 63600175 PHARM REV CODE 636 W HCPCS: Performed by: NURSE PRACTITIONER

## 2024-08-02 PROCEDURE — 36415 COLL VENOUS BLD VENIPUNCTURE: CPT | Performed by: STUDENT IN AN ORGANIZED HEALTH CARE EDUCATION/TRAINING PROGRAM

## 2024-08-02 PROCEDURE — 84145 PROCALCITONIN (PCT): CPT | Performed by: NURSE PRACTITIONER

## 2024-08-02 PROCEDURE — 87070 CULTURE OTHR SPECIMN AEROBIC: CPT | Performed by: NURSE PRACTITIONER

## 2024-08-02 PROCEDURE — 94640 AIRWAY INHALATION TREATMENT: CPT | Mod: XB

## 2024-08-02 PROCEDURE — 94640 AIRWAY INHALATION TREATMENT: CPT

## 2024-08-02 PROCEDURE — 87186 SC STD MICRODIL/AGAR DIL: CPT | Performed by: EMERGENCY MEDICINE

## 2024-08-02 PROCEDURE — 94760 N-INVAS EAR/PLS OXIMETRY 1: CPT

## 2024-08-02 PROCEDURE — 25000242 PHARM REV CODE 250 ALT 637 W/ HCPCS: Performed by: NURSE PRACTITIONER

## 2024-08-02 PROCEDURE — 71045 X-RAY EXAM CHEST 1 VIEW: CPT | Mod: 26,,, | Performed by: RADIOLOGY

## 2024-08-02 PROCEDURE — 99285 EMERGENCY DEPT VISIT HI MDM: CPT | Mod: 25

## 2024-08-02 PROCEDURE — 99233 SBSQ HOSP IP/OBS HIGH 50: CPT | Mod: ,,, | Performed by: STUDENT IN AN ORGANIZED HEALTH CARE EDUCATION/TRAINING PROGRAM

## 2024-08-02 PROCEDURE — 87205 SMEAR GRAM STAIN: CPT | Mod: 59 | Performed by: EMERGENCY MEDICINE

## 2024-08-02 PROCEDURE — 96375 TX/PRO/DX INJ NEW DRUG ADDON: CPT

## 2024-08-02 PROCEDURE — 63600175 PHARM REV CODE 636 W HCPCS: Mod: UD | Performed by: STUDENT IN AN ORGANIZED HEALTH CARE EDUCATION/TRAINING PROGRAM

## 2024-08-02 PROCEDURE — 71275 CT ANGIOGRAPHY CHEST: CPT | Mod: 26,,, | Performed by: RADIOLOGY

## 2024-08-02 PROCEDURE — 11000001 HC ACUTE MED/SURG PRIVATE ROOM

## 2024-08-02 PROCEDURE — 85025 COMPLETE CBC W/AUTO DIFF WBC: CPT | Performed by: STUDENT IN AN ORGANIZED HEALTH CARE EDUCATION/TRAINING PROGRAM

## 2024-08-02 PROCEDURE — 93010 ELECTROCARDIOGRAM REPORT: CPT | Mod: ,,, | Performed by: INTERNAL MEDICINE

## 2024-08-02 PROCEDURE — 96361 HYDRATE IV INFUSION ADD-ON: CPT

## 2024-08-02 PROCEDURE — 96374 THER/PROPH/DIAG INJ IV PUSH: CPT

## 2024-08-02 PROCEDURE — 27000221 HC OXYGEN, UP TO 24 HOURS

## 2024-08-02 PROCEDURE — 80048 BASIC METABOLIC PNL TOTAL CA: CPT | Performed by: STUDENT IN AN ORGANIZED HEALTH CARE EDUCATION/TRAINING PROGRAM

## 2024-08-02 PROCEDURE — 25000003 PHARM REV CODE 250: Performed by: STUDENT IN AN ORGANIZED HEALTH CARE EDUCATION/TRAINING PROGRAM

## 2024-08-02 PROCEDURE — 87205 SMEAR GRAM STAIN: CPT | Performed by: NURSE PRACTITIONER

## 2024-08-02 PROCEDURE — 87186 SC STD MICRODIL/AGAR DIL: CPT | Mod: 59 | Performed by: NURSE PRACTITIONER

## 2024-08-02 PROCEDURE — 83605 ASSAY OF LACTIC ACID: CPT | Performed by: STUDENT IN AN ORGANIZED HEALTH CARE EDUCATION/TRAINING PROGRAM

## 2024-08-02 PROCEDURE — 25000242 PHARM REV CODE 250 ALT 637 W/ HCPCS: Performed by: STUDENT IN AN ORGANIZED HEALTH CARE EDUCATION/TRAINING PROGRAM

## 2024-08-02 PROCEDURE — 71045 X-RAY EXAM CHEST 1 VIEW: CPT | Mod: TC

## 2024-08-02 PROCEDURE — 94761 N-INVAS EAR/PLS OXIMETRY MLT: CPT

## 2024-08-02 PROCEDURE — 25000003 PHARM REV CODE 250: Mod: UD | Performed by: STUDENT IN AN ORGANIZED HEALTH CARE EDUCATION/TRAINING PROGRAM

## 2024-08-02 PROCEDURE — 87070 CULTURE OTHR SPECIMN AEROBIC: CPT | Mod: 59 | Performed by: EMERGENCY MEDICINE

## 2024-08-02 PROCEDURE — 63600175 PHARM REV CODE 636 W HCPCS: Performed by: STUDENT IN AN ORGANIZED HEALTH CARE EDUCATION/TRAINING PROGRAM

## 2024-08-02 RX ORDER — IPRATROPIUM BROMIDE AND ALBUTEROL SULFATE 2.5; .5 MG/3ML; MG/3ML
3 SOLUTION RESPIRATORY (INHALATION)
Status: COMPLETED | OUTPATIENT
Start: 2024-08-02 | End: 2024-08-02

## 2024-08-02 RX ORDER — METHYLPREDNISOLONE SOD SUCC 125 MG
125 VIAL (EA) INJECTION
Status: COMPLETED | OUTPATIENT
Start: 2024-08-02 | End: 2024-08-02

## 2024-08-02 RX ORDER — BUSPIRONE HYDROCHLORIDE 5 MG/1
15 TABLET ORAL 3 TIMES DAILY
Status: DISCONTINUED | OUTPATIENT
Start: 2024-08-02 | End: 2024-08-05 | Stop reason: HOSPADM

## 2024-08-02 RX ORDER — ATENOLOL 25 MG/1
25 TABLET ORAL DAILY
Status: DISCONTINUED | OUTPATIENT
Start: 2024-08-02 | End: 2024-08-05 | Stop reason: HOSPADM

## 2024-08-02 RX ORDER — AZITHROMYCIN 250 MG/1
500 TABLET, FILM COATED ORAL DAILY
Status: DISCONTINUED | OUTPATIENT
Start: 2024-08-02 | End: 2024-08-05

## 2024-08-02 RX ORDER — LATANOPROST 50 UG/ML
1 SOLUTION/ DROPS OPHTHALMIC NIGHTLY
Status: DISCONTINUED | OUTPATIENT
Start: 2024-08-02 | End: 2024-08-05 | Stop reason: HOSPADM

## 2024-08-02 RX ORDER — DULOXETIN HYDROCHLORIDE 20 MG/1
20 CAPSULE, DELAYED RELEASE ORAL DAILY
Status: DISCONTINUED | OUTPATIENT
Start: 2024-08-02 | End: 2024-08-05 | Stop reason: HOSPADM

## 2024-08-02 RX ORDER — IPRATROPIUM BROMIDE AND ALBUTEROL SULFATE 2.5; .5 MG/3ML; MG/3ML
3 SOLUTION RESPIRATORY (INHALATION) EVERY 4 HOURS
Status: DISCONTINUED | OUTPATIENT
Start: 2024-08-02 | End: 2024-08-05 | Stop reason: HOSPADM

## 2024-08-02 RX ORDER — GABAPENTIN 300 MG/1
300 CAPSULE ORAL 3 TIMES DAILY
Status: DISCONTINUED | OUTPATIENT
Start: 2024-08-02 | End: 2024-08-05 | Stop reason: HOSPADM

## 2024-08-02 RX ORDER — TALC
6 POWDER (GRAM) TOPICAL NIGHTLY PRN
Status: DISCONTINUED | OUTPATIENT
Start: 2024-08-02 | End: 2024-08-05 | Stop reason: HOSPADM

## 2024-08-02 RX ORDER — ONDANSETRON HYDROCHLORIDE 2 MG/ML
4 INJECTION, SOLUTION INTRAVENOUS
Status: COMPLETED | OUTPATIENT
Start: 2024-08-02 | End: 2024-08-02

## 2024-08-02 RX ORDER — GUAIFENESIN 600 MG/1
600 TABLET, EXTENDED RELEASE ORAL 2 TIMES DAILY
Status: DISCONTINUED | OUTPATIENT
Start: 2024-08-02 | End: 2024-08-05 | Stop reason: HOSPADM

## 2024-08-02 RX ORDER — ACETAMINOPHEN 325 MG/1
650 TABLET ORAL EVERY 6 HOURS PRN
Status: DISCONTINUED | OUTPATIENT
Start: 2024-08-02 | End: 2024-08-05 | Stop reason: HOSPADM

## 2024-08-02 RX ORDER — DOXYCYCLINE HYCLATE 100 MG
100 TABLET ORAL EVERY 12 HOURS
Status: DISCONTINUED | OUTPATIENT
Start: 2024-08-02 | End: 2024-08-02

## 2024-08-02 RX ORDER — PANTOPRAZOLE SODIUM 40 MG/1
40 TABLET, DELAYED RELEASE ORAL DAILY
Status: DISCONTINUED | OUTPATIENT
Start: 2024-08-02 | End: 2024-08-05 | Stop reason: HOSPADM

## 2024-08-02 RX ORDER — KETOROLAC TROMETHAMINE 30 MG/ML
15 INJECTION, SOLUTION INTRAMUSCULAR; INTRAVENOUS
Status: COMPLETED | OUTPATIENT
Start: 2024-08-02 | End: 2024-08-02

## 2024-08-02 RX ORDER — CHOLECALCIFEROL (VITAMIN D3) 50 MCG
2000 TABLET ORAL DAILY
Status: DISCONTINUED | OUTPATIENT
Start: 2024-08-02 | End: 2024-08-05 | Stop reason: HOSPADM

## 2024-08-02 RX ORDER — SODIUM CHLORIDE 0.9 % (FLUSH) 0.9 %
3 SYRINGE (ML) INJECTION
Status: DISCONTINUED | OUTPATIENT
Start: 2024-08-02 | End: 2024-08-05 | Stop reason: HOSPADM

## 2024-08-02 RX ORDER — NAPROXEN SODIUM 220 MG/1
81 TABLET, FILM COATED ORAL DAILY
Status: DISCONTINUED | OUTPATIENT
Start: 2024-08-02 | End: 2024-08-05 | Stop reason: HOSPADM

## 2024-08-02 RX ORDER — AMOXICILLIN 250 MG
1 CAPSULE ORAL DAILY
Status: DISCONTINUED | OUTPATIENT
Start: 2024-08-02 | End: 2024-08-05 | Stop reason: HOSPADM

## 2024-08-02 RX ORDER — ATORVASTATIN CALCIUM 10 MG/1
20 TABLET, FILM COATED ORAL DAILY
Status: DISCONTINUED | OUTPATIENT
Start: 2024-08-02 | End: 2024-08-05 | Stop reason: HOSPADM

## 2024-08-02 RX ORDER — ONDANSETRON HYDROCHLORIDE 2 MG/ML
4 INJECTION, SOLUTION INTRAVENOUS EVERY 8 HOURS PRN
Status: DISCONTINUED | OUTPATIENT
Start: 2024-08-02 | End: 2024-08-05 | Stop reason: HOSPADM

## 2024-08-02 RX ADMIN — IOHEXOL 75 ML: 350 INJECTION, SOLUTION INTRAVENOUS at 05:08

## 2024-08-02 RX ADMIN — IPRATROPIUM BROMIDE AND ALBUTEROL SULFATE 3 ML: .5; 3 SOLUTION RESPIRATORY (INHALATION) at 03:08

## 2024-08-02 RX ADMIN — GABAPENTIN 300 MG: 300 CAPSULE ORAL at 03:08

## 2024-08-02 RX ADMIN — GUAIFENESIN 600 MG: 600 TABLET, EXTENDED RELEASE ORAL at 09:08

## 2024-08-02 RX ADMIN — PANTOPRAZOLE SODIUM 40 MG: 40 TABLET, DELAYED RELEASE ORAL at 09:08

## 2024-08-02 RX ADMIN — ATENOLOL 25 MG: 25 TABLET ORAL at 09:08

## 2024-08-02 RX ADMIN — IPRATROPIUM BROMIDE AND ALBUTEROL SULFATE 3 ML: .5; 3 SOLUTION RESPIRATORY (INHALATION) at 11:08

## 2024-08-02 RX ADMIN — AZITHROMYCIN DIHYDRATE 500 MG: 250 TABLET ORAL at 02:08

## 2024-08-02 RX ADMIN — GABAPENTIN 300 MG: 300 CAPSULE ORAL at 09:08

## 2024-08-02 RX ADMIN — IPRATROPIUM BROMIDE AND ALBUTEROL SULFATE 3 ML: .5; 2.5 SOLUTION RESPIRATORY (INHALATION) at 05:08

## 2024-08-02 RX ADMIN — ACETAMINOPHEN 650 MG: 325 TABLET ORAL at 09:08

## 2024-08-02 RX ADMIN — BUSPIRONE HYDROCHLORIDE 15 MG: 5 TABLET ORAL at 09:08

## 2024-08-02 RX ADMIN — IPRATROPIUM BROMIDE AND ALBUTEROL SULFATE 3 ML: .5; 2.5 SOLUTION RESPIRATORY (INHALATION) at 03:08

## 2024-08-02 RX ADMIN — SODIUM CHLORIDE 1000 ML: 9 INJECTION, SOLUTION INTRAVENOUS at 03:08

## 2024-08-02 RX ADMIN — METHYLPREDNISOLONE SODIUM SUCCINATE 125 MG: 125 INJECTION, POWDER, FOR SOLUTION INTRAMUSCULAR; INTRAVENOUS at 05:08

## 2024-08-02 RX ADMIN — BUSPIRONE HYDROCHLORIDE 15 MG: 5 TABLET ORAL at 03:08

## 2024-08-02 RX ADMIN — Medication 2000 UNITS: at 09:08

## 2024-08-02 RX ADMIN — ASPIRIN 81 MG: 81 TABLET, CHEWABLE ORAL at 09:08

## 2024-08-02 RX ADMIN — KETOROLAC TROMETHAMINE 15 MG: 30 INJECTION, SOLUTION INTRAMUSCULAR; INTRAVENOUS at 03:08

## 2024-08-02 RX ADMIN — ATORVASTATIN CALCIUM 20 MG: 10 TABLET, FILM COATED ORAL at 09:08

## 2024-08-02 RX ADMIN — METHYLPREDNISOLONE SODIUM SUCCINATE 60 MG: 40 INJECTION, POWDER, FOR SOLUTION INTRAMUSCULAR; INTRAVENOUS at 02:08

## 2024-08-02 RX ADMIN — CEFTRIAXONE SODIUM 1 G: 1 INJECTION, POWDER, FOR SOLUTION INTRAMUSCULAR; INTRAVENOUS at 02:08

## 2024-08-02 RX ADMIN — IPRATROPIUM BROMIDE AND ALBUTEROL SULFATE 3 ML: .5; 3 SOLUTION RESPIRATORY (INHALATION) at 07:08

## 2024-08-02 RX ADMIN — ONDANSETRON 4 MG: 2 INJECTION INTRAMUSCULAR; INTRAVENOUS at 05:08

## 2024-08-02 RX ADMIN — DOXYCYCLINE HYCLATE 100 MG: 100 TABLET, COATED ORAL at 09:08

## 2024-08-02 RX ADMIN — SENNOSIDES AND DOCUSATE SODIUM 1 TABLET: 8.6; 5 TABLET ORAL at 09:08

## 2024-08-02 RX ADMIN — METHYLPREDNISOLONE SODIUM SUCCINATE 60 MG: 40 INJECTION, POWDER, FOR SOLUTION INTRAMUSCULAR; INTRAVENOUS at 09:08

## 2024-08-03 LAB
ANION GAP SERPL CALC-SCNC: 9 MMOL/L (ref 8–16)
BASOPHILS # BLD AUTO: 0.03 K/UL (ref 0–0.2)
BASOPHILS NFR BLD: 0.1 % (ref 0–1.9)
BUN SERPL-MCNC: 16 MG/DL (ref 6–20)
CALCIUM SERPL-MCNC: 8.9 MG/DL (ref 8.7–10.5)
CHLORIDE SERPL-SCNC: 102 MMOL/L (ref 95–110)
CO2 SERPL-SCNC: 30 MMOL/L (ref 23–29)
CREAT SERPL-MCNC: 0.7 MG/DL (ref 0.5–1.4)
DIFFERENTIAL METHOD BLD: ABNORMAL
EOSINOPHIL # BLD AUTO: 0 K/UL (ref 0–0.5)
EOSINOPHIL NFR BLD: 0 % (ref 0–8)
ERYTHROCYTE [DISTWIDTH] IN BLOOD BY AUTOMATED COUNT: 11.4 % (ref 11.5–14.5)
EST. GFR  (NO RACE VARIABLE): >60 ML/MIN/1.73 M^2
GLUCOSE SERPL-MCNC: 150 MG/DL (ref 70–110)
HCT VFR BLD AUTO: 35.2 % (ref 37–48.5)
HGB BLD-MCNC: 12 G/DL (ref 12–16)
IMM GRANULOCYTES # BLD AUTO: 1.1 K/UL (ref 0–0.04)
IMM GRANULOCYTES NFR BLD AUTO: 4.7 % (ref 0–0.5)
LYMPHOCYTES # BLD AUTO: 0.5 K/UL (ref 1–4.8)
LYMPHOCYTES NFR BLD: 1.9 % (ref 18–48)
MAGNESIUM SERPL-MCNC: 1.7 MG/DL (ref 1.6–2.6)
MCH RBC QN AUTO: 32.4 PG (ref 27–31)
MCHC RBC AUTO-ENTMCNC: 34.1 G/DL (ref 32–36)
MCV RBC AUTO: 95 FL (ref 82–98)
MONOCYTES # BLD AUTO: 0.4 K/UL (ref 0.3–1)
MONOCYTES NFR BLD: 1.5 % (ref 4–15)
NEUTROPHILS # BLD AUTO: 21.6 K/UL (ref 1.8–7.7)
NEUTROPHILS NFR BLD: 91.8 % (ref 38–73)
NRBC BLD-RTO: 0 /100 WBC
PHOSPHATE SERPL-MCNC: 3.5 MG/DL (ref 2.7–4.5)
PLATELET # BLD AUTO: 202 K/UL (ref 150–450)
PMV BLD AUTO: 9.3 FL (ref 9.2–12.9)
POTASSIUM SERPL-SCNC: 3.8 MMOL/L (ref 3.5–5.1)
RBC # BLD AUTO: 3.7 M/UL (ref 4–5.4)
SODIUM SERPL-SCNC: 141 MMOL/L (ref 136–145)
WBC # BLD AUTO: 23.52 K/UL (ref 3.9–12.7)

## 2024-08-03 PROCEDURE — 94640 AIRWAY INHALATION TREATMENT: CPT

## 2024-08-03 PROCEDURE — 99900035 HC TECH TIME PER 15 MIN (STAT)

## 2024-08-03 PROCEDURE — 25000003 PHARM REV CODE 250: Performed by: STUDENT IN AN ORGANIZED HEALTH CARE EDUCATION/TRAINING PROGRAM

## 2024-08-03 PROCEDURE — 83735 ASSAY OF MAGNESIUM: CPT | Performed by: NURSE PRACTITIONER

## 2024-08-03 PROCEDURE — 25000003 PHARM REV CODE 250: Performed by: NURSE PRACTITIONER

## 2024-08-03 PROCEDURE — 63600175 PHARM REV CODE 636 W HCPCS: Performed by: NURSE PRACTITIONER

## 2024-08-03 PROCEDURE — 84100 ASSAY OF PHOSPHORUS: CPT | Performed by: NURSE PRACTITIONER

## 2024-08-03 PROCEDURE — 80048 BASIC METABOLIC PNL TOTAL CA: CPT | Performed by: NURSE PRACTITIONER

## 2024-08-03 PROCEDURE — 94761 N-INVAS EAR/PLS OXIMETRY MLT: CPT

## 2024-08-03 PROCEDURE — 11000001 HC ACUTE MED/SURG PRIVATE ROOM

## 2024-08-03 PROCEDURE — 99233 SBSQ HOSP IP/OBS HIGH 50: CPT | Mod: ,,, | Performed by: INTERNAL MEDICINE

## 2024-08-03 PROCEDURE — 85025 COMPLETE CBC W/AUTO DIFF WBC: CPT | Performed by: NURSE PRACTITIONER

## 2024-08-03 PROCEDURE — 63600175 PHARM REV CODE 636 W HCPCS: Mod: UD | Performed by: STUDENT IN AN ORGANIZED HEALTH CARE EDUCATION/TRAINING PROGRAM

## 2024-08-03 PROCEDURE — 63700000 PHARM REV CODE 250 ALT 637 W/O HCPCS: Performed by: STUDENT IN AN ORGANIZED HEALTH CARE EDUCATION/TRAINING PROGRAM

## 2024-08-03 PROCEDURE — 27000221 HC OXYGEN, UP TO 24 HOURS

## 2024-08-03 PROCEDURE — 25000242 PHARM REV CODE 250 ALT 637 W/ HCPCS: Performed by: NURSE PRACTITIONER

## 2024-08-03 RX ADMIN — BUSPIRONE HYDROCHLORIDE 15 MG: 5 TABLET ORAL at 09:08

## 2024-08-03 RX ADMIN — PANTOPRAZOLE SODIUM 40 MG: 40 TABLET, DELAYED RELEASE ORAL at 09:08

## 2024-08-03 RX ADMIN — IPRATROPIUM BROMIDE AND ALBUTEROL SULFATE 3 ML: .5; 3 SOLUTION RESPIRATORY (INHALATION) at 11:08

## 2024-08-03 RX ADMIN — METHYLPREDNISOLONE SODIUM SUCCINATE 60 MG: 40 INJECTION, POWDER, FOR SOLUTION INTRAMUSCULAR; INTRAVENOUS at 05:08

## 2024-08-03 RX ADMIN — IPRATROPIUM BROMIDE AND ALBUTEROL SULFATE 3 ML: .5; 3 SOLUTION RESPIRATORY (INHALATION) at 03:08

## 2024-08-03 RX ADMIN — IPRATROPIUM BROMIDE AND ALBUTEROL SULFATE 3 ML: .5; 3 SOLUTION RESPIRATORY (INHALATION) at 07:08

## 2024-08-03 RX ADMIN — METHYLPREDNISOLONE SODIUM SUCCINATE 60 MG: 40 INJECTION, POWDER, FOR SOLUTION INTRAMUSCULAR; INTRAVENOUS at 09:08

## 2024-08-03 RX ADMIN — SENNOSIDES AND DOCUSATE SODIUM 1 TABLET: 8.6; 5 TABLET ORAL at 09:08

## 2024-08-03 RX ADMIN — ASPIRIN 81 MG: 81 TABLET, CHEWABLE ORAL at 09:08

## 2024-08-03 RX ADMIN — GABAPENTIN 300 MG: 300 CAPSULE ORAL at 09:08

## 2024-08-03 RX ADMIN — CEFTRIAXONE SODIUM 1 G: 1 INJECTION, POWDER, FOR SOLUTION INTRAMUSCULAR; INTRAVENOUS at 02:08

## 2024-08-03 RX ADMIN — ATENOLOL 25 MG: 25 TABLET ORAL at 09:08

## 2024-08-03 RX ADMIN — DULOXETINE HYDROCHLORIDE 20 MG: 20 CAPSULE, DELAYED RELEASE ORAL at 09:08

## 2024-08-03 RX ADMIN — GUAIFENESIN 600 MG: 600 TABLET, EXTENDED RELEASE ORAL at 09:08

## 2024-08-03 RX ADMIN — GABAPENTIN 300 MG: 300 CAPSULE ORAL at 02:08

## 2024-08-03 RX ADMIN — BUSPIRONE HYDROCHLORIDE 15 MG: 5 TABLET ORAL at 02:08

## 2024-08-03 RX ADMIN — IPRATROPIUM BROMIDE AND ALBUTEROL SULFATE 3 ML: .5; 3 SOLUTION RESPIRATORY (INHALATION) at 12:08

## 2024-08-03 RX ADMIN — Medication 2000 UNITS: at 09:08

## 2024-08-03 RX ADMIN — AZITHROMYCIN DIHYDRATE 500 MG: 250 TABLET ORAL at 09:08

## 2024-08-03 RX ADMIN — ACETAMINOPHEN 650 MG: 325 TABLET ORAL at 03:08

## 2024-08-03 RX ADMIN — ATORVASTATIN CALCIUM 20 MG: 10 TABLET, FILM COATED ORAL at 09:08

## 2024-08-03 RX ADMIN — METHYLPREDNISOLONE SODIUM SUCCINATE 60 MG: 40 INJECTION, POWDER, FOR SOLUTION INTRAMUSCULAR; INTRAVENOUS at 03:08

## 2024-08-04 LAB
ANION GAP SERPL CALC-SCNC: 12 MMOL/L (ref 8–16)
BASOPHILS # BLD AUTO: 0.03 K/UL (ref 0–0.2)
BASOPHILS NFR BLD: 0.1 % (ref 0–1.9)
BUN SERPL-MCNC: 12 MG/DL (ref 6–20)
CALCIUM SERPL-MCNC: 8.8 MG/DL (ref 8.7–10.5)
CHLORIDE SERPL-SCNC: 101 MMOL/L (ref 95–110)
CO2 SERPL-SCNC: 29 MMOL/L (ref 23–29)
CREAT SERPL-MCNC: 0.7 MG/DL (ref 0.5–1.4)
DIFFERENTIAL METHOD BLD: ABNORMAL
EOSINOPHIL # BLD AUTO: 0 K/UL (ref 0–0.5)
EOSINOPHIL NFR BLD: 0 % (ref 0–8)
ERYTHROCYTE [DISTWIDTH] IN BLOOD BY AUTOMATED COUNT: 11.7 % (ref 11.5–14.5)
EST. GFR  (NO RACE VARIABLE): >60 ML/MIN/1.73 M^2
GLUCOSE SERPL-MCNC: 150 MG/DL (ref 70–110)
HCT VFR BLD AUTO: 35.9 % (ref 37–48.5)
HGB BLD-MCNC: 12.1 G/DL (ref 12–16)
IMM GRANULOCYTES # BLD AUTO: 1.16 K/UL (ref 0–0.04)
IMM GRANULOCYTES NFR BLD AUTO: 4.9 % (ref 0–0.5)
LYMPHOCYTES # BLD AUTO: 0.3 K/UL (ref 1–4.8)
LYMPHOCYTES NFR BLD: 1.1 % (ref 18–48)
MAGNESIUM SERPL-MCNC: 1.8 MG/DL (ref 1.6–2.6)
MCH RBC QN AUTO: 32.4 PG (ref 27–31)
MCHC RBC AUTO-ENTMCNC: 33.7 G/DL (ref 32–36)
MCV RBC AUTO: 96 FL (ref 82–98)
MONOCYTES # BLD AUTO: 0.3 K/UL (ref 0.3–1)
MONOCYTES NFR BLD: 1.2 % (ref 4–15)
NEUTROPHILS # BLD AUTO: 21.9 K/UL (ref 1.8–7.7)
NEUTROPHILS NFR BLD: 92.7 % (ref 38–73)
NRBC BLD-RTO: 0 /100 WBC
PHOSPHATE SERPL-MCNC: 2.8 MG/DL (ref 2.7–4.5)
PLATELET # BLD AUTO: 216 K/UL (ref 150–450)
PMV BLD AUTO: 9 FL (ref 9.2–12.9)
POTASSIUM SERPL-SCNC: 3.9 MMOL/L (ref 3.5–5.1)
RBC # BLD AUTO: 3.73 M/UL (ref 4–5.4)
SODIUM SERPL-SCNC: 142 MMOL/L (ref 136–145)
WBC # BLD AUTO: 23.59 K/UL (ref 3.9–12.7)

## 2024-08-04 PROCEDURE — 27000221 HC OXYGEN, UP TO 24 HOURS

## 2024-08-04 PROCEDURE — 36415 COLL VENOUS BLD VENIPUNCTURE: CPT | Performed by: NURSE PRACTITIONER

## 2024-08-04 PROCEDURE — 63700000 PHARM REV CODE 250 ALT 637 W/O HCPCS: Performed by: STUDENT IN AN ORGANIZED HEALTH CARE EDUCATION/TRAINING PROGRAM

## 2024-08-04 PROCEDURE — 25000003 PHARM REV CODE 250: Performed by: STUDENT IN AN ORGANIZED HEALTH CARE EDUCATION/TRAINING PROGRAM

## 2024-08-04 PROCEDURE — 25000003 PHARM REV CODE 250: Performed by: NURSE PRACTITIONER

## 2024-08-04 PROCEDURE — 84100 ASSAY OF PHOSPHORUS: CPT | Performed by: NURSE PRACTITIONER

## 2024-08-04 PROCEDURE — 83735 ASSAY OF MAGNESIUM: CPT | Performed by: NURSE PRACTITIONER

## 2024-08-04 PROCEDURE — 63600175 PHARM REV CODE 636 W HCPCS: Performed by: NURSE PRACTITIONER

## 2024-08-04 PROCEDURE — 25000242 PHARM REV CODE 250 ALT 637 W/ HCPCS: Performed by: NURSE PRACTITIONER

## 2024-08-04 PROCEDURE — 85025 COMPLETE CBC W/AUTO DIFF WBC: CPT | Performed by: NURSE PRACTITIONER

## 2024-08-04 PROCEDURE — 63600175 PHARM REV CODE 636 W HCPCS: Mod: UD | Performed by: STUDENT IN AN ORGANIZED HEALTH CARE EDUCATION/TRAINING PROGRAM

## 2024-08-04 PROCEDURE — 94761 N-INVAS EAR/PLS OXIMETRY MLT: CPT

## 2024-08-04 PROCEDURE — 80048 BASIC METABOLIC PNL TOTAL CA: CPT | Performed by: NURSE PRACTITIONER

## 2024-08-04 PROCEDURE — 11000001 HC ACUTE MED/SURG PRIVATE ROOM

## 2024-08-04 PROCEDURE — 94640 AIRWAY INHALATION TREATMENT: CPT

## 2024-08-04 PROCEDURE — 99233 SBSQ HOSP IP/OBS HIGH 50: CPT | Mod: ,,, | Performed by: INTERNAL MEDICINE

## 2024-08-04 RX ADMIN — BUSPIRONE HYDROCHLORIDE 15 MG: 5 TABLET ORAL at 09:08

## 2024-08-04 RX ADMIN — IPRATROPIUM BROMIDE AND ALBUTEROL SULFATE 3 ML: .5; 3 SOLUTION RESPIRATORY (INHALATION) at 11:08

## 2024-08-04 RX ADMIN — ACETAMINOPHEN 650 MG: 325 TABLET ORAL at 09:08

## 2024-08-04 RX ADMIN — CEFTRIAXONE SODIUM 1 G: 1 INJECTION, POWDER, FOR SOLUTION INTRAMUSCULAR; INTRAVENOUS at 01:08

## 2024-08-04 RX ADMIN — METHYLPREDNISOLONE SODIUM SUCCINATE 60 MG: 40 INJECTION, POWDER, FOR SOLUTION INTRAMUSCULAR; INTRAVENOUS at 09:08

## 2024-08-04 RX ADMIN — Medication 2000 UNITS: at 09:08

## 2024-08-04 RX ADMIN — METHYLPREDNISOLONE SODIUM SUCCINATE 60 MG: 40 INJECTION, POWDER, FOR SOLUTION INTRAMUSCULAR; INTRAVENOUS at 05:08

## 2024-08-04 RX ADMIN — AZITHROMYCIN DIHYDRATE 500 MG: 250 TABLET ORAL at 09:08

## 2024-08-04 RX ADMIN — IPRATROPIUM BROMIDE AND ALBUTEROL SULFATE 3 ML: .5; 3 SOLUTION RESPIRATORY (INHALATION) at 07:08

## 2024-08-04 RX ADMIN — METHYLPREDNISOLONE SODIUM SUCCINATE 60 MG: 40 INJECTION, POWDER, FOR SOLUTION INTRAMUSCULAR; INTRAVENOUS at 01:08

## 2024-08-04 RX ADMIN — PANTOPRAZOLE SODIUM 40 MG: 40 TABLET, DELAYED RELEASE ORAL at 09:08

## 2024-08-04 RX ADMIN — GUAIFENESIN 600 MG: 600 TABLET, EXTENDED RELEASE ORAL at 09:08

## 2024-08-04 RX ADMIN — GABAPENTIN 300 MG: 300 CAPSULE ORAL at 09:08

## 2024-08-04 RX ADMIN — DULOXETINE HYDROCHLORIDE 20 MG: 20 CAPSULE, DELAYED RELEASE ORAL at 09:08

## 2024-08-04 RX ADMIN — ASPIRIN 81 MG: 81 TABLET, CHEWABLE ORAL at 09:08

## 2024-08-04 RX ADMIN — SENNOSIDES AND DOCUSATE SODIUM 1 TABLET: 8.6; 5 TABLET ORAL at 09:08

## 2024-08-04 RX ADMIN — ATENOLOL 25 MG: 25 TABLET ORAL at 09:08

## 2024-08-04 RX ADMIN — BUSPIRONE HYDROCHLORIDE 15 MG: 5 TABLET ORAL at 02:08

## 2024-08-04 RX ADMIN — ATORVASTATIN CALCIUM 20 MG: 10 TABLET, FILM COATED ORAL at 09:08

## 2024-08-04 RX ADMIN — IPRATROPIUM BROMIDE AND ALBUTEROL SULFATE 3 ML: .5; 3 SOLUTION RESPIRATORY (INHALATION) at 03:08

## 2024-08-04 RX ADMIN — GABAPENTIN 300 MG: 300 CAPSULE ORAL at 02:08

## 2024-08-05 VITALS
RESPIRATION RATE: 19 BRPM | WEIGHT: 107.13 LBS | HEART RATE: 87 BPM | HEIGHT: 65 IN | SYSTOLIC BLOOD PRESSURE: 129 MMHG | BODY MASS INDEX: 17.85 KG/M2 | TEMPERATURE: 98 F | OXYGEN SATURATION: 97 % | DIASTOLIC BLOOD PRESSURE: 61 MMHG

## 2024-08-05 LAB
ANION GAP SERPL CALC-SCNC: 11 MMOL/L (ref 8–16)
BACTERIA SPEC AEROBE CULT: ABNORMAL
BASOPHILS # BLD AUTO: 0.02 K/UL (ref 0–0.2)
BASOPHILS NFR BLD: 0.1 % (ref 0–1.9)
BUN SERPL-MCNC: 21 MG/DL (ref 6–20)
CALCIUM SERPL-MCNC: 8.7 MG/DL (ref 8.7–10.5)
CHLORIDE SERPL-SCNC: 103 MMOL/L (ref 95–110)
CO2 SERPL-SCNC: 27 MMOL/L (ref 23–29)
CREAT SERPL-MCNC: 0.6 MG/DL (ref 0.5–1.4)
DIFFERENTIAL METHOD BLD: ABNORMAL
EOSINOPHIL # BLD AUTO: 0 K/UL (ref 0–0.5)
EOSINOPHIL NFR BLD: 0 % (ref 0–8)
ERYTHROCYTE [DISTWIDTH] IN BLOOD BY AUTOMATED COUNT: 11.5 % (ref 11.5–14.5)
EST. GFR  (NO RACE VARIABLE): >60 ML/MIN/1.73 M^2
GLUCOSE SERPL-MCNC: 159 MG/DL (ref 70–110)
GRAM STN SPEC: ABNORMAL
HCT VFR BLD AUTO: 34.7 % (ref 37–48.5)
HGB BLD-MCNC: 11.6 G/DL (ref 12–16)
IMM GRANULOCYTES # BLD AUTO: 0.48 K/UL (ref 0–0.04)
IMM GRANULOCYTES NFR BLD AUTO: 2.9 % (ref 0–0.5)
LYMPHOCYTES # BLD AUTO: 0.4 K/UL (ref 1–4.8)
LYMPHOCYTES NFR BLD: 2.4 % (ref 18–48)
MAGNESIUM SERPL-MCNC: 1.9 MG/DL (ref 1.6–2.6)
MCH RBC QN AUTO: 32.6 PG (ref 27–31)
MCHC RBC AUTO-ENTMCNC: 33.4 G/DL (ref 32–36)
MCV RBC AUTO: 98 FL (ref 82–98)
MONOCYTES # BLD AUTO: 0.4 K/UL (ref 0.3–1)
MONOCYTES NFR BLD: 2.3 % (ref 4–15)
NEUTROPHILS # BLD AUTO: 15.2 K/UL (ref 1.8–7.7)
NEUTROPHILS NFR BLD: 92.3 % (ref 38–73)
NRBC BLD-RTO: 0 /100 WBC
PHOSPHATE SERPL-MCNC: 2.8 MG/DL (ref 2.7–4.5)
PLATELET # BLD AUTO: 204 K/UL (ref 150–450)
PMV BLD AUTO: 9.3 FL (ref 9.2–12.9)
POTASSIUM SERPL-SCNC: 3.8 MMOL/L (ref 3.5–5.1)
RBC # BLD AUTO: 3.56 M/UL (ref 4–5.4)
SODIUM SERPL-SCNC: 141 MMOL/L (ref 136–145)
WBC # BLD AUTO: 16.45 K/UL (ref 3.9–12.7)

## 2024-08-05 PROCEDURE — 84100 ASSAY OF PHOSPHORUS: CPT | Performed by: NURSE PRACTITIONER

## 2024-08-05 PROCEDURE — 94761 N-INVAS EAR/PLS OXIMETRY MLT: CPT

## 2024-08-05 PROCEDURE — 25000003 PHARM REV CODE 250: Performed by: NURSE PRACTITIONER

## 2024-08-05 PROCEDURE — 27000221 HC OXYGEN, UP TO 24 HOURS

## 2024-08-05 PROCEDURE — 80048 BASIC METABOLIC PNL TOTAL CA: CPT | Performed by: NURSE PRACTITIONER

## 2024-08-05 PROCEDURE — 25000242 PHARM REV CODE 250 ALT 637 W/ HCPCS: Performed by: NURSE PRACTITIONER

## 2024-08-05 PROCEDURE — 85025 COMPLETE CBC W/AUTO DIFF WBC: CPT | Performed by: NURSE PRACTITIONER

## 2024-08-05 PROCEDURE — 63600175 PHARM REV CODE 636 W HCPCS: Performed by: STUDENT IN AN ORGANIZED HEALTH CARE EDUCATION/TRAINING PROGRAM

## 2024-08-05 PROCEDURE — 36415 COLL VENOUS BLD VENIPUNCTURE: CPT | Performed by: NURSE PRACTITIONER

## 2024-08-05 PROCEDURE — 83735 ASSAY OF MAGNESIUM: CPT | Performed by: NURSE PRACTITIONER

## 2024-08-05 PROCEDURE — 94640 AIRWAY INHALATION TREATMENT: CPT

## 2024-08-05 RX ORDER — PREDNISONE 20 MG/1
40 TABLET ORAL DAILY
Status: DISCONTINUED | OUTPATIENT
Start: 2024-08-05 | End: 2024-08-05 | Stop reason: HOSPADM

## 2024-08-05 RX ORDER — PREDNISONE 10 MG/1
TABLET ORAL
Qty: 32 TABLET | Refills: 0 | Status: SHIPPED | OUTPATIENT
Start: 2024-08-06 | End: 2024-08-16

## 2024-08-05 RX ORDER — LEVOFLOXACIN 750 MG/1
750 TABLET ORAL DAILY
Qty: 6 TABLET | Refills: 0 | Status: SHIPPED | OUTPATIENT
Start: 2024-08-06 | End: 2024-08-12

## 2024-08-05 RX ORDER — LEVOFLOXACIN 5 MG/ML
750 INJECTION, SOLUTION INTRAVENOUS
Status: DISCONTINUED | OUTPATIENT
Start: 2024-08-05 | End: 2024-08-05 | Stop reason: HOSPADM

## 2024-08-05 RX ADMIN — SENNOSIDES AND DOCUSATE SODIUM 1 TABLET: 8.6; 5 TABLET ORAL at 08:08

## 2024-08-05 RX ADMIN — IPRATROPIUM BROMIDE AND ALBUTEROL SULFATE 3 ML: .5; 3 SOLUTION RESPIRATORY (INHALATION) at 07:08

## 2024-08-05 RX ADMIN — DULOXETINE HYDROCHLORIDE 20 MG: 20 CAPSULE, DELAYED RELEASE ORAL at 08:08

## 2024-08-05 RX ADMIN — PANTOPRAZOLE SODIUM 40 MG: 40 TABLET, DELAYED RELEASE ORAL at 08:08

## 2024-08-05 RX ADMIN — ATORVASTATIN CALCIUM 20 MG: 10 TABLET, FILM COATED ORAL at 08:08

## 2024-08-05 RX ADMIN — PREDNISONE 40 MG: 20 TABLET ORAL at 08:08

## 2024-08-05 RX ADMIN — GABAPENTIN 300 MG: 300 CAPSULE ORAL at 08:08

## 2024-08-05 RX ADMIN — IPRATROPIUM BROMIDE AND ALBUTEROL SULFATE 3 ML: .5; 3 SOLUTION RESPIRATORY (INHALATION) at 03:08

## 2024-08-05 RX ADMIN — GUAIFENESIN 600 MG: 600 TABLET, EXTENDED RELEASE ORAL at 08:08

## 2024-08-05 RX ADMIN — ATENOLOL 25 MG: 25 TABLET ORAL at 08:08

## 2024-08-05 RX ADMIN — ASPIRIN 81 MG: 81 TABLET, CHEWABLE ORAL at 08:08

## 2024-08-05 RX ADMIN — BUSPIRONE HYDROCHLORIDE 15 MG: 5 TABLET ORAL at 08:08

## 2024-08-05 RX ADMIN — ACETAMINOPHEN 650 MG: 325 TABLET ORAL at 11:08

## 2024-08-05 RX ADMIN — LEVOFLOXACIN 750 MG: 5 INJECTION, SOLUTION INTRAVENOUS at 09:08

## 2024-08-05 RX ADMIN — Medication 2000 UNITS: at 08:08

## 2024-08-07 ENCOUNTER — OFFICE VISIT (OUTPATIENT)
Dept: PULMONOLOGY | Facility: CLINIC | Age: 59
End: 2024-08-07
Payer: MEDICAID

## 2024-08-07 VITALS
BODY MASS INDEX: 17.52 KG/M2 | WEIGHT: 105.19 LBS | RESPIRATION RATE: 18 BRPM | DIASTOLIC BLOOD PRESSURE: 83 MMHG | HEIGHT: 65 IN | OXYGEN SATURATION: 97 % | SYSTOLIC BLOOD PRESSURE: 143 MMHG | HEART RATE: 91 BPM

## 2024-08-07 DIAGNOSIS — J96.11 CHRONIC RESPIRATORY FAILURE WITH HYPOXIA AND HYPERCAPNIA: ICD-10-CM

## 2024-08-07 DIAGNOSIS — Z92.241 STEROID-DEPENDENT COPD: Primary | ICD-10-CM

## 2024-08-07 DIAGNOSIS — R91.8 MULTIPLE LUNG NODULES ON CT: ICD-10-CM

## 2024-08-07 DIAGNOSIS — J44.9 STEROID-DEPENDENT COPD: Primary | ICD-10-CM

## 2024-08-07 DIAGNOSIS — R09.89 CHRONIC SINUS COMPLAINTS: ICD-10-CM

## 2024-08-07 DIAGNOSIS — J98.11 ATELECTASIS OF BOTH LUNGS: ICD-10-CM

## 2024-08-07 DIAGNOSIS — J44.9 STAGE 4 VERY SEVERE COPD BY GOLD CLASSIFICATION: ICD-10-CM

## 2024-08-07 DIAGNOSIS — G47.33 OSA (OBSTRUCTIVE SLEEP APNEA): ICD-10-CM

## 2024-08-07 DIAGNOSIS — J96.12 CHRONIC RESPIRATORY FAILURE WITH HYPOXIA AND HYPERCAPNIA: ICD-10-CM

## 2024-08-07 DIAGNOSIS — Z87.891 PERSONAL HISTORY OF NICOTINE DEPENDENCE: ICD-10-CM

## 2024-08-07 DIAGNOSIS — A49.8 PSEUDOMONAS INFECTION: ICD-10-CM

## 2024-08-07 DIAGNOSIS — F41.9 ANXIETY: ICD-10-CM

## 2024-08-07 PROCEDURE — 99999 PR PBB SHADOW E&M-EST. PATIENT-LVL V: CPT | Mod: PBBFAC,,, | Performed by: NURSE PRACTITIONER

## 2024-08-07 PROCEDURE — 99215 OFFICE O/P EST HI 40 MIN: CPT | Mod: PBBFAC,PO | Performed by: NURSE PRACTITIONER

## 2024-08-07 RX ORDER — DOXYCYCLINE 100 MG/1
100 CAPSULE ORAL EVERY 12 HOURS
COMMUNITY
Start: 2024-07-25

## 2024-08-07 RX ORDER — ALBUTEROL SULFATE 90 UG/1
INHALANT RESPIRATORY (INHALATION)
Qty: 36 G | Refills: 6 | Status: SHIPPED | OUTPATIENT
Start: 2024-08-07

## 2024-08-07 RX ORDER — PREDNISONE 5 MG/1
5 TABLET ORAL DAILY
Qty: 30 TABLET | Refills: 2 | Status: SHIPPED | OUTPATIENT
Start: 2024-08-07

## 2024-08-07 RX ORDER — FLUTICASONE FUROATE, UMECLIDINIUM BROMIDE AND VILANTEROL TRIFENATATE 200; 62.5; 25 UG/1; UG/1; UG/1
1 POWDER RESPIRATORY (INHALATION) DAILY
Qty: 60 EACH | Refills: 11 | Status: SHIPPED | OUTPATIENT
Start: 2024-08-07

## 2024-08-08 ENCOUNTER — OFFICE VISIT (OUTPATIENT)
Dept: FAMILY MEDICINE | Facility: CLINIC | Age: 59
End: 2024-08-08
Payer: MEDICAID

## 2024-08-08 VITALS
HEIGHT: 65 IN | DIASTOLIC BLOOD PRESSURE: 76 MMHG | SYSTOLIC BLOOD PRESSURE: 118 MMHG | WEIGHT: 104 LBS | RESPIRATION RATE: 20 BRPM | OXYGEN SATURATION: 96 % | HEART RATE: 76 BPM | BODY MASS INDEX: 17.33 KG/M2

## 2024-08-08 DIAGNOSIS — F41.9 ANXIETY: Primary | ICD-10-CM

## 2024-08-08 LAB
AMP AMPHETAMINE 1000 NM/ML POC: NEGATIVE
BAR BARBITURATES 300 NG/ML POC: NEGATIVE
BUP BUPRENORPHINE 10 NG/ML POC: NEGATIVE
BZO BENZODIAZEPINES 300 NG/ML POC: NEGATIVE
COC COCAINE 300 NG/ML POC: NEGATIVE
CREATININE (CR) POC: 50
CTP QC/QA: YES
MET METHAMPHETAMINE 1000 NG/ML POC: NEGATIVE
MOP/OPI300 MORPHINE 300 NG/ML POC: NEGATIVE
MTD METHADONE 300 NG/ML POC: NEGATIVE
OXIDANT (OX) POC: NEGATIVE
OXY OXYCODONE 100 NG/ML POC: NEGATIVE
SPECIFIC GRAVITY (SG) POC: 1.01
TEMPERATURE (°F) POC: 94
THC MARIJUANA 50 NG/ML POC: NEGATIVE

## 2024-08-08 PROCEDURE — 99214 OFFICE O/P EST MOD 30 MIN: CPT | Mod: S$PBB,,, | Performed by: FAMILY MEDICINE

## 2024-08-08 PROCEDURE — G2211 COMPLEX E/M VISIT ADD ON: HCPCS | Mod: S$PBB,,, | Performed by: FAMILY MEDICINE

## 2024-08-08 PROCEDURE — 80305 DRUG TEST PRSMV DIR OPT OBS: CPT | Mod: PBBFAC | Performed by: FAMILY MEDICINE

## 2024-08-08 PROCEDURE — 1111F DSCHRG MED/CURRENT MED MERGE: CPT | Mod: CPTII,,, | Performed by: FAMILY MEDICINE

## 2024-08-08 PROCEDURE — 99213 OFFICE O/P EST LOW 20 MIN: CPT | Mod: PBBFAC | Performed by: FAMILY MEDICINE

## 2024-08-08 PROCEDURE — 3078F DIAST BP <80 MM HG: CPT | Mod: CPTII,,, | Performed by: FAMILY MEDICINE

## 2024-08-08 PROCEDURE — 3008F BODY MASS INDEX DOCD: CPT | Mod: CPTII,,, | Performed by: FAMILY MEDICINE

## 2024-08-08 PROCEDURE — 99999 PR PBB SHADOW E&M-EST. PATIENT-LVL III: CPT | Mod: PBBFAC,,, | Performed by: FAMILY MEDICINE

## 2024-08-08 PROCEDURE — 99999PBSHW POCT URINE DRUG SCREEN (WITH BUP): Mod: PBBFAC,,,

## 2024-08-08 PROCEDURE — 3074F SYST BP LT 130 MM HG: CPT | Mod: CPTII,,, | Performed by: FAMILY MEDICINE

## 2024-08-08 RX ORDER — GUAIFENESIN 600 MG/1
600 TABLET, EXTENDED RELEASE ORAL 2 TIMES DAILY
Qty: 120 TABLET | Refills: 1 | Status: SHIPPED | OUTPATIENT
Start: 2024-08-08

## 2024-08-08 RX ORDER — CLONAZEPAM 0.5 MG/1
0.5 TABLET ORAL 2 TIMES DAILY PRN
Qty: 60 TABLET | Refills: 2 | Status: SHIPPED | OUTPATIENT
Start: 2024-08-08 | End: 2025-08-08

## 2024-08-09 ENCOUNTER — TELEPHONE (OUTPATIENT)
Dept: PULMONOLOGY | Facility: CLINIC | Age: 59
End: 2024-08-09
Payer: MEDICAID

## 2024-08-09 NOTE — TELEPHONE ENCOUNTER
Spoke to patient's daughter she stated that someone is coming to the house tomorrow to drop off supplies at the house.  Daughter didn't know who called but was told it could be done peripherally.  Daughter is an RN.

## 2024-08-12 ENCOUNTER — HOSPITAL ENCOUNTER (OUTPATIENT)
Dept: RADIOLOGY | Facility: HOSPITAL | Age: 59
Discharge: HOME | End: 2024-08-12
Attending: NURSE PRACTITIONER
Payer: MEDICAID

## 2024-08-12 ENCOUNTER — PATIENT MESSAGE (OUTPATIENT)
Dept: PULMONOLOGY | Facility: CLINIC | Age: 59
End: 2024-08-12
Payer: MEDICAID

## 2024-08-12 DIAGNOSIS — A49.8 PSEUDOMONAS INFECTION: ICD-10-CM

## 2024-08-12 DIAGNOSIS — J44.9 STAGE 4 VERY SEVERE COPD BY GOLD CLASSIFICATION: ICD-10-CM

## 2024-08-12 PROCEDURE — 71046 X-RAY EXAM CHEST 2 VIEWS: CPT | Mod: TC

## 2024-08-12 PROCEDURE — 71046 X-RAY EXAM CHEST 2 VIEWS: CPT | Mod: 26,,, | Performed by: RADIOLOGY

## 2024-08-12 PROCEDURE — 71250 CT THORAX DX C-: CPT | Mod: 26,,, | Performed by: RADIOLOGY

## 2024-08-12 PROCEDURE — 71250 CT THORAX DX C-: CPT | Mod: TC

## 2024-08-13 ENCOUNTER — TELEPHONE (OUTPATIENT)
Dept: FAMILY MEDICINE | Facility: CLINIC | Age: 59
End: 2024-08-13
Payer: MEDICAID

## 2024-08-13 ENCOUNTER — CLINICAL SUPPORT (OUTPATIENT)
Dept: NURSING | Facility: HOSPITAL | Age: 59
End: 2024-08-13
Attending: FAMILY MEDICINE
Payer: MEDICAID

## 2024-08-13 ENCOUNTER — TELEPHONE (OUTPATIENT)
Dept: PULMONOLOGY | Facility: CLINIC | Age: 59
End: 2024-08-13
Payer: MEDICAID

## 2024-08-13 DIAGNOSIS — A49.8 PSEUDOMONAS INFECTION: ICD-10-CM

## 2024-08-13 PROCEDURE — C1751 CATH, INF, PER/CENT/MIDLINE: HCPCS

## 2024-08-13 PROCEDURE — 76937 US GUIDE VASCULAR ACCESS: CPT

## 2024-08-13 PROCEDURE — 36410 VNPNXR 3YR/> PHY/QHP DX/THER: CPT

## 2024-08-13 NOTE — TELEPHONE ENCOUNTER
Route to LPN yoan   ----- Message from Bailee Foote sent at 8/13/2024  2:56 PM CDT -----  Contact: cynthia  Type: Needs Medical Advice  Who Called:  Cynthia with Home Health  Best Call Back Number: [phone]    Additional Information: Cynthia is calling in regards to needing to speak to Yoan in the office about the pt's referral. Has some questions as they have not received the orders yet and is trying to get everything set up. Please call back and advise. Thanks!

## 2024-08-13 NOTE — TELEPHONE ENCOUNTER
----- Message from Alexsandra Andrews sent at 8/13/2024 11:34 AM CDT -----  Type: Needs Medical Advice  Who Called:  Mayda with Home Health  Best Call Back Number: [phone]  Additional Information: Mayda is calling in regards to needing to speak to Ledy in the office about the pt's referral. Has some questions as they have not received the orders yet and is trying to get everything set up. Please call back and advise. Thanks!   Consent 2/Introductory Paragraph: Mohs surgery was explained to the patient and consent was obtained. The risks, benefits and alternatives to therapy were discussed in detail. Specifically, the risks of infection, scarring, bleeding, prolonged wound healing, incomplete removal, allergy to anesthesia, nerve injury and recurrence were addressed. Prior to the procedure, the treatment site was clearly identified and confirmed by the patient. All components of Universal Protocol/PAUSE Rule completed.

## 2024-08-14 ENCOUNTER — TELEPHONE (OUTPATIENT)
Dept: PULMONOLOGY | Facility: CLINIC | Age: 59
End: 2024-08-14
Payer: MEDICAID

## 2024-08-14 NOTE — TELEPHONE ENCOUNTER
Spoke to Mayda.  I went over the treatment plan with her about patient.  She verbalized understanding.

## 2024-08-14 NOTE — TELEPHONE ENCOUNTER
----- Message from Hailey Zamudio MA sent at 8/13/2024  4:14 PM CDT -----  Contact: cynthia    ----- Message -----  From: Bailee Foote  Sent: 8/13/2024   2:56 PM CDT  To: Madhu Corona Staff    Type: Needs Medical Advice  Who Called:  Cynthia with Home Health  Best Call Back Number: [phone]    Additional Information: Cynthia is calling in regards to needing to speak to Ledy in the office about the pt's referral. Has some questions as they have not received the orders yet and is trying to get everything set up. Please call back and advise. Thanks!

## 2024-08-20 ENCOUNTER — LAB VISIT (OUTPATIENT)
Dept: LAB | Facility: HOSPITAL | Age: 59
End: 2024-08-20
Attending: FAMILY MEDICINE
Payer: MEDICAID

## 2024-08-20 DIAGNOSIS — J96.11 CHRONIC RESPIRATORY FAILURE WITH HYPOXIA: Primary | ICD-10-CM

## 2024-08-20 LAB
ALBUMIN SERPL BCP-MCNC: 3.3 G/DL (ref 3.5–5.2)
ALP SERPL-CCNC: 51 U/L (ref 55–135)
ALT SERPL W/O P-5'-P-CCNC: 26 U/L (ref 10–44)
ANION GAP SERPL CALC-SCNC: 7 MMOL/L (ref 8–16)
AST SERPL-CCNC: 25 U/L (ref 10–40)
BILIRUB SERPL-MCNC: 0.7 MG/DL (ref 0.1–1)
BUN SERPL-MCNC: 10 MG/DL (ref 6–20)
CALCIUM SERPL-MCNC: 8.6 MG/DL (ref 8.7–10.5)
CHLORIDE SERPL-SCNC: 105 MMOL/L (ref 95–110)
CO2 SERPL-SCNC: 26 MMOL/L (ref 23–29)
CREAT SERPL-MCNC: 0.7 MG/DL (ref 0.5–1.4)
EST. GFR  (NO RACE VARIABLE): >60 ML/MIN/1.73 M^2
GLUCOSE SERPL-MCNC: 128 MG/DL (ref 70–110)
POTASSIUM SERPL-SCNC: 4 MMOL/L (ref 3.5–5.1)
PROT SERPL-MCNC: 5.6 G/DL (ref 6–8.4)
SODIUM SERPL-SCNC: 138 MMOL/L (ref 136–145)

## 2024-08-20 PROCEDURE — 36415 COLL VENOUS BLD VENIPUNCTURE: CPT | Performed by: FAMILY MEDICINE

## 2024-08-20 PROCEDURE — 80053 COMPREHEN METABOLIC PANEL: CPT | Performed by: FAMILY MEDICINE

## 2024-09-09 ENCOUNTER — EXTERNAL HOME HEALTH (OUTPATIENT)
Dept: HOME HEALTH SERVICES | Facility: HOSPITAL | Age: 59
End: 2024-09-09
Payer: MEDICAID

## 2024-09-20 ENCOUNTER — PATIENT MESSAGE (OUTPATIENT)
Dept: PULMONOLOGY | Facility: CLINIC | Age: 59
End: 2024-09-20
Payer: MEDICAID

## 2024-09-20 ENCOUNTER — HOSPITAL ENCOUNTER (OUTPATIENT)
Dept: RADIOLOGY | Facility: HOSPITAL | Age: 59
Discharge: HOME | End: 2024-09-20
Attending: NURSE PRACTITIONER
Payer: MEDICAID

## 2024-09-20 DIAGNOSIS — J15.1 PNEUMONIA OF RIGHT UPPER LOBE DUE TO PSEUDOMONAS SPECIES: Primary | ICD-10-CM

## 2024-09-20 DIAGNOSIS — J44.9 STAGE 4 VERY SEVERE COPD BY GOLD CLASSIFICATION: ICD-10-CM

## 2024-09-20 DIAGNOSIS — A49.8 PSEUDOMONAS INFECTION: ICD-10-CM

## 2024-09-20 PROCEDURE — 71046 X-RAY EXAM CHEST 2 VIEWS: CPT | Mod: 26,,, | Performed by: RADIOLOGY

## 2024-09-20 PROCEDURE — 71046 X-RAY EXAM CHEST 2 VIEWS: CPT | Mod: TC,PN

## 2024-09-24 DIAGNOSIS — J44.9 STAGE 4 VERY SEVERE COPD BY GOLD CLASSIFICATION: ICD-10-CM

## 2024-09-26 RX ORDER — PREDNISONE 5 MG/1
5 TABLET ORAL DAILY
Qty: 30 TABLET | Refills: 2 | Status: SHIPPED | OUTPATIENT
Start: 2024-09-26

## 2024-09-26 RX ORDER — ALBUTEROL SULFATE 90 UG/1
INHALANT RESPIRATORY (INHALATION)
Qty: 36 G | Refills: 6 | Status: SHIPPED | OUTPATIENT
Start: 2024-09-26

## 2024-10-11 ENCOUNTER — TELEPHONE (OUTPATIENT)
Dept: FAMILY MEDICINE | Facility: CLINIC | Age: 59
End: 2024-10-11
Payer: MEDICAID

## 2024-10-11 NOTE — TELEPHONE ENCOUNTER
Spoke with the patient to reschedule appt on 10/15 due to MD being out of office. She voiced acceptance of new appt.

## 2024-10-18 ENCOUNTER — OFFICE VISIT (OUTPATIENT)
Dept: PULMONOLOGY | Facility: CLINIC | Age: 59
End: 2024-10-18
Payer: MEDICAID

## 2024-10-18 ENCOUNTER — OFFICE VISIT (OUTPATIENT)
Dept: FAMILY MEDICINE | Facility: CLINIC | Age: 59
End: 2024-10-18
Payer: MEDICAID

## 2024-10-18 VITALS
SYSTOLIC BLOOD PRESSURE: 142 MMHG | HEIGHT: 65 IN | HEART RATE: 83 BPM | BODY MASS INDEX: 19.48 KG/M2 | OXYGEN SATURATION: 95 % | DIASTOLIC BLOOD PRESSURE: 78 MMHG | WEIGHT: 116.94 LBS

## 2024-10-18 VITALS
HEART RATE: 67 BPM | HEIGHT: 65 IN | BODY MASS INDEX: 19.33 KG/M2 | DIASTOLIC BLOOD PRESSURE: 62 MMHG | OXYGEN SATURATION: 99 % | RESPIRATION RATE: 14 BRPM | WEIGHT: 116 LBS | SYSTOLIC BLOOD PRESSURE: 136 MMHG

## 2024-10-18 DIAGNOSIS — Z92.241 STEROID-DEPENDENT COPD: ICD-10-CM

## 2024-10-18 DIAGNOSIS — J15.1 PNEUMONIA OF RIGHT UPPER LOBE DUE TO PSEUDOMONAS SPECIES: ICD-10-CM

## 2024-10-18 DIAGNOSIS — R45.89 ANXIETY ABOUT HEALTH: ICD-10-CM

## 2024-10-18 DIAGNOSIS — J96.11 CHRONIC RESPIRATORY FAILURE WITH HYPOXIA AND HYPERCAPNIA: Primary | ICD-10-CM

## 2024-10-18 DIAGNOSIS — G47.33 OSA (OBSTRUCTIVE SLEEP APNEA): ICD-10-CM

## 2024-10-18 DIAGNOSIS — J44.9 STEROID-DEPENDENT COPD: ICD-10-CM

## 2024-10-18 DIAGNOSIS — J44.9 STAGE 4 VERY SEVERE COPD BY GOLD CLASSIFICATION: ICD-10-CM

## 2024-10-18 DIAGNOSIS — J43.8 OTHER EMPHYSEMA: ICD-10-CM

## 2024-10-18 DIAGNOSIS — Z79.899 CHRONIC PRESCRIPTION BENZODIAZEPINE USE: Primary | ICD-10-CM

## 2024-10-18 DIAGNOSIS — Z87.891 PERSONAL HISTORY OF NICOTINE DEPENDENCE: ICD-10-CM

## 2024-10-18 DIAGNOSIS — R09.89 CHRONIC SINUS COMPLAINTS: ICD-10-CM

## 2024-10-18 DIAGNOSIS — R91.8 MULTIPLE LUNG NODULES ON CT: ICD-10-CM

## 2024-10-18 DIAGNOSIS — Z23 ENCOUNTER FOR ADMINISTRATION OF VACCINE: ICD-10-CM

## 2024-10-18 DIAGNOSIS — J96.12 CHRONIC RESPIRATORY FAILURE WITH HYPOXIA AND HYPERCAPNIA: Primary | ICD-10-CM

## 2024-10-18 DIAGNOSIS — F41.9 ANXIETY: ICD-10-CM

## 2024-10-18 DIAGNOSIS — J98.11 ATELECTASIS OF BOTH LUNGS: ICD-10-CM

## 2024-10-18 LAB
AMP AMPHETAMINE 1000 NM/ML POC: NEGATIVE
BAR BARBITURATES 300 NG/ML POC: NEGATIVE
BUP BUPRENORPHINE 10 NG/ML POC: NEGATIVE
BZO BENZODIAZEPINES 300 NG/ML POC: NEGATIVE
COC COCAINE 300 NG/ML POC: NEGATIVE
CREATININE (CR) POC: 50
CTP QC/QA: YES
MET METHAMPHETAMINE 1000 NG/ML POC: NEGATIVE
MOP/OPI300 MORPHINE 300 NG/ML POC: NEGATIVE
MTD METHADONE 300 NG/ML POC: NEGATIVE
OXIDANT (OX) POC: NEGATIVE
OXY OXYCODONE 100 NG/ML POC: NEGATIVE
SPECIFIC GRAVITY (SG) POC: 1.01
TEMPERATURE (°F) POC: 90
THC MARIJUANA 50 NG/ML POC: NEGATIVE

## 2024-10-18 PROCEDURE — 99999 PR PBB SHADOW E&M-EST. PATIENT-LVL IV: CPT | Mod: PBBFAC,,, | Performed by: NURSE PRACTITIONER

## 2024-10-18 PROCEDURE — 99214 OFFICE O/P EST MOD 30 MIN: CPT | Mod: PBBFAC | Performed by: FAMILY MEDICINE

## 2024-10-18 PROCEDURE — 99999 PR PBB SHADOW E&M-EST. PATIENT-LVL IV: CPT | Mod: PBBFAC,,, | Performed by: FAMILY MEDICINE

## 2024-10-18 PROCEDURE — 99214 OFFICE O/P EST MOD 30 MIN: CPT | Mod: PBBFAC,27,PO | Performed by: NURSE PRACTITIONER

## 2024-10-18 NOTE — ASSESSMENT & PLAN NOTE
ranjit's COPD is with exacerbation noted by continued dyspnea currently.  Patient is currently on COPD Pathway. Continue scheduled inhalers Steroids, Antibiotics, and Supplemental oxygen and monitor respiratory status closely.      Longstanding history of advanced COPD with chronic hypoxic respiratory failure presents with a failed outpatient regimen.  Patient will be admitted and initiated on IV antibiotics as well as steroids and nebulized therapy.  Secondary to the lungs being relatively clear other etiologies will be explored.  With echocardiogram and recommend CTA of the chest.

## 2024-10-18 NOTE — PATIENT INSTRUCTIONS
Continue current COPD regimen including Trelegy inhaler 1 puff once per day. This inhaler contains an inhaled steroid component. Rinse mouth after each use due to risk for thrush development. If mouth or tongue develops white sores please contact the clinic and I will order a prescription mouth wash.     Continue to use albuterol and nebulizer treatments as needed for shortness of breath, wheezing, cough.    Continue the use of supplemental oxygen throughout the day as needed and at nighttime. Also continue NIV use nightly.    Continue 5 mg prednisone per day.  Will attempt gentle taper in the near future.    We will repeat CT chest next month -  due in November 20, 2024.    I have ordered sputum cultures - you will leave the office today with sputum specimen cups. Bring to any [facility] within 4 hours of obtaining specimen. Rinse your mouth prior to collecting sample. Please collect sample in the morning by deep coughing prior to eating or drinking. I recommend submitting sample in about two weeks.      Continue current medication regiment. Keep follow up appointment as scheduled. Please call the office if you have any questions or concerns.

## 2024-10-18 NOTE — PROGRESS NOTES
Subjective:       Patient ID: Zayra White is a 59 y.o. female.    Chief Complaint: Follow-up      Past Medical History:   Diagnosis Date    Anxiety     Arthritis     Asthma     Back pain     COPD (chronic obstructive pulmonary disease)     Pulmonary embolism     10 years ago       Past Surgical History:   Procedure Laterality Date    BIOPSY      right breast    BREAST CYST EXCISION      CATARACT EXTRACTION       SECTION      CHOLECYSTECTOMY      COLONOSCOPY N/A 2022    Procedure: COLONOSCOPY;  Surgeon: Chavez Gonzales MD;  Location: [facility];  Service: General;  Laterality: N/A;    ESOPHAGOGASTRODUODENOSCOPY N/A 2022    Procedure: ESOPHAGOGASTRODUODENOSCOPY (EGD);  Surgeon: Chavez Gonzales MD;  Location: [facility];  Service: General;  Laterality: N/A;    INCISION AND DRAINAGE OF ABSCESS Left 2020    Procedure: INCISION AND DRAINAGE, ABSCESS;  Surgeon: Irvin Villarreal MD;  Location: [facility] OR;  Service: General;  Laterality: Left;        Social History     Socioeconomic History    Marital status: Legally    Tobacco Use    Smoking status: Former    Smokeless tobacco: Never   Substance and Sexual Activity    Alcohol use: Not Currently     Comment: occ    Drug use: Yes     Types: Benzodiazepines     Comment: as prescribed    Sexual activity: Not Currently     Birth control/protection: Post-menopausal   Social History Narrative    ** Merged History Encounter **          Social Drivers of Health     Financial Resource Strain: Low Risk  (2024)    Overall Financial Resource Strain (CARDIA)     Difficulty of Paying Living Expenses: Not very hard   Food Insecurity: No Food Insecurity (2024)    Hunger Vital Sign     Worried About Running Out of Food in the Last Year: Never true     Ran Out of Food in the Last Year: Never true   Transportation Needs: No Transportation Needs (2024)    TRANSPORTATION NEEDS     Transportation : No   Physical Activity: Inactive  (8/5/2024)    Exercise Vital Sign     Days of Exercise per Week: 0 days     Minutes of Exercise per Session: 0 min   Stress: Stress Concern Present (8/5/2024)    [employer] [place] [employer] - Occupational Stress Questionnaire     Feeling of Stress : Very much   Housing Stability: Low Risk  (8/5/2024)    Housing Stability Vital Sign     Unable to Pay for Housing in the Last Year: No     Homeless in the Last Year: No       Family History   Problem Relation Name Age of Onset    Breast cancer Sister      Ovarian cancer Neg Hx         Review of patient's allergies indicates:   Allergen Reactions    Ativan [lorazepam] Itching          Current Outpatient Medications:     albuterol (VENTOLIN HFA) 90 mcg/actuation inhaler, inhale 2 puff by inhalation route  every 4 - 6 hours as needed, Disp: 36 g, Rfl: 6    albuterol-ipratropium (DUO-NEB) 2.5 mg-0.5 mg/3 mL nebulizer solution, Take 3 mLs by nebulization every 6 (six) hours as needed for Wheezing or Shortness of Breath. Rescue, Disp: 75 mL, Rfl: 3    alendronate (FOSAMAX) 70 MG tablet, Take 1 tablet (70 mg total) by mouth every 7 days., Disp: 13 tablet, Rfl: 3    aspirin 81 MG Chew, 81 mg., Disp: , Rfl:     atenoloL (TENORMIN) 25 MG tablet, TAKE 1/2 TABLET BY MOUTH EVERY DAY **THANK YOU**, Disp: 45 tablet, Rfl: 3    atorvastatin (LIPITOR) 20 MG tablet, Take 1 tablet (20 mg total) by mouth once daily., Disp: 90 tablet, Rfl: 3    busPIRone (BUSPAR) 15 MG tablet, Take 1 tablet (15 mg total) by mouth 3 (three) times daily., Disp: 90 tablet, Rfl: 11    cholecalciferol, vitamin D3, (VITAMIN D3) 25 mcg (1,000 unit) capsule, Take 2 capsules (2,000 Units total) by mouth once daily., Disp: 60 capsule, Rfl: 0    clonazePAM (KLONOPIN) 0.5 MG tablet, Take 1 tablet (0.5 mg total) by mouth 2 (two) times daily as needed for Anxiety., Disp: 60 tablet, Rfl: 2    DULoxetine (CYMBALTA) 20 MG capsule, Take 2 capsules po daily, Disp: 180 capsule, Rfl: 3     fluticasone-umeclidin-vilanter (TRELEGY ELLIPTA) 200-62.5-25 mcg inhaler, Inhale 1 puff into the lungs once daily., Disp: 60 each, Rfl: 11    latanoprost 0.005 % ophthalmic solution, Place 1 drop into both eyes every evening., Disp: , Rfl:     ondansetron (ZOFRAN-ODT) 4 MG TbDL, Take 1 tablet (4 mg total) by mouth every 12 (twelve) hours as needed (Nausea)., Disp: 30 tablet, Rfl: 2    pantoprazole (PROTONIX) 40 MG tablet, Take 1 tablet (40 mg total) by mouth once daily. Take in the morning before breakfast.  Wait 30 minutes before eating or drinking anything, Disp: 90 tablet, Rfl: 3    predniSONE (DELTASONE) 5 MG tablet, Take 1 tablet (5 mg total) by mouth once daily., Disp: 30 tablet, Rfl: 2  No current facility-administered medications for this visit.    Ms eHnry is a 59-year-old female with anxiety, COPD on continuous O2, history of a PE 10 years ago, back pain and asthma.  She also has GERD and chronic nausea.  She is here today for routine medical exam and a medical compliance visit.  She is on Klonopin for her extreme anxiety secondary to severe COPD/asthma and needs a routine UDS and will need medication refills .  She was actually a month early, as it has only been 2 months since her last compliance visit, but we will do it along with her encounter for immunizations.  She does want a flu shot and pneumonia shot    Follow-up  Pertinent negatives include no abdominal pain, chest pain, chills, congestion, coughing, diaphoresis, fever, nausea, rash or sore throat.     Review of Systems   Constitutional:  Negative for activity change, appetite change, chills, diaphoresis and fever.   HENT:  Negative for congestion, ear pain, postnasal drip, rhinorrhea and sore throat.    Eyes:  Negative for pain, discharge, redness and itching.   Respiratory:  Positive for shortness of breath. Negative for cough.         On continuous O2 for severe COPD/asthma overlap syndrome   Cardiovascular:  Negative for chest pain,  palpitations and leg swelling.   Gastrointestinal:  Negative for abdominal distention, abdominal pain, constipation, diarrhea and nausea.   Genitourinary:  Negative for difficulty urinating, dysuria, frequency and urgency.   Skin:  Negative for color change, rash and wound.   Psychiatric/Behavioral:  Positive for sleep disturbance. The patient is nervous/anxious.    All other systems reviewed and are negative.      Objective:      Physical Exam  Constitutional:       Appearance: Normal appearance.      Comments: Weight gain of 11# Has been using instant breakfast and ice cream   HENT:      Right Ear: Tympanic membrane normal.      Left Ear: Tympanic membrane normal.      Mouth/Throat:      Mouth: Mucous membranes are moist.   Cardiovascular:      Rate and Rhythm: Normal rate and regular rhythm.      Pulses: Normal pulses.      Heart sounds: Normal heart sounds.   Pulmonary:      Comments: Breath sounds distant secondary to COPD, no rhonchi/rales or wheezes  Skin:     General: Skin is warm and dry.      Comments: Scratches on upper extremity due to itching and scratching herself in her sleep    Neurological:      Mental Status: She is alert.   Psychiatric:         Mood and Affect: Mood normal.         Behavior: Behavior normal.         Assessment:       1. Chronic prescription benzodiazepine use    2. Encounter for administration of vaccine    3. Other emphysema    4. Anxiety about health        Plan:         Chronic prescription benzodiazepine use  -     POCT Urine Drug Screen (With BUP)    Encounter for administration of vaccine  -     influenza (Afluria) 45 mcg/0.5 mL IM vaccine (> or = 36 mo) 0.5 mL  -     (VFC) PCV20 (Prevnar 20) IM vaccine (>/= 6 wks)    Other emphysema  Comments:  On continuous O2, albuterol, DuoNeb and Trelegy.  Stable    Anxiety about health  Comments:  On Klonopin 0.5 mg b.i.d. and BuSpar 15 mg t.i.d.        Risks, benefits, and side effects were discussed with the patient. All questions  were answered to the fullest satisfaction of the patient, and pt verbalized understanding and agreement to treatment plan. Pt was to call with any new or worsening symptoms, or present to the ER.        Polly Lemos MD

## 2024-10-18 NOTE — PROGRESS NOTES
10/18/2024    Zayra White  In office visit    Chief Complaint   Patient presents with    COPD       HPI:  10/18/2024: Hx: COPD, Chronic Resp Failure on NIV nightly, Former smoker, Lung nodules   In office today with sister.  Using Trelegy once per day and Albuterol as needed with reported benefit. States Albuterol use depends on the weather. Using Duonebs as needed as well.  Using supplemental oxygen only as needed throughout the day at 2L via NC. Using NIV nightly.   Feels as if she's been doing well, able to tolerate activity more recently.   Denies recent UC or ER visit for resp complaints.   Completed Prednisone after last appt - currently on 5 mg per day and tolerating well.   Did complete IV CEFEPIME regimen after last appt - anticipate repeat CT in NOV.   Recently started back on Klonopin per PCP for anxiety.      08/07/2024: Hx: COPD, Chronic Resp Failure on NIV nightly, Former smoker, Lung nodules   In office today with younger sister.  Using Trelegy once per day and Albuterol as needed with reported benefit. Using Duonebs as needed as well.  Using supplemental oxygen only as needed throughout the day at 2L via NC. Using NIV nightly. Has missed two nights with NIV due to staying at her daughters house but has since resumed use with no reported issue.   Since prior office visit in [place] has had five ER visits for documented COPD exacerbations.   Latest ER visit was on 8/2 to [facility] - patient was admitted for COPD exacerbation - treated with steroids while inpatient and subsequently discharged home with Rx for Prednisone taper and Levaquin. No discharge summary available as of yet for review. States she is currently on the 40 mg x 2 days portion of the taper, also reports compliance with Levaquin. Thinks COPD exacerbation was secondary to riding in vehicle with no AC after cataract surgery.  Prior to hospitalizations was doing well on 5 mg Prednisone per day.  Over the last few months patient  self discontinued Klonopin in attempts to decrease the quantity of her medication regimen.  States anxiety has since increased severely since discontinuation of medicine. Is being followed by her PCP in several other pharmaceutical drugs have been tried with no reported benefit.  Patient is requesting to resume Klonopin at this time. Patient thinks that several hospitalizations over the last few months were triggered somewhat by severe anxiety.    Overall since discharged home patient states she is feeling somewhat improved from a respiratory perspective however feels very drained, fatigued.  Upon review of chart patient has had positive for Pseudomonas on sputum culture submitted 8/2 in which she was started on Levaquin - repeat sputum culture from later that same day showed multi-drug resistant Pseudomonas that was resistant to Levaquin.  Patient Instructions   Continue to use current inhalers including Trelegy once per day and Albuterol as needed.  Continue to use the supplemental oxygen.  Continue to use the NIV machine nightly.  Complete current prednisone taper and then continue 5 mg prednisone per day.  Will attempt to taper you off in the future once stable.  Concern that the Levaquin you were discharged from the hospital with is not going to effectively treat Pseudomonas noted on most recent sputum culture which showed resistance to fluoroquinolones.  Will arrange for outpatient administration of Cefepime 2 g every 8 hours x 7 days via PICC line.  We will arrange home health.  CMP to be drawn once CEFEPIME regimen finished - to be drawn per home health.  Recommend chest x-ray every month x3.  We will repeat CT chest in 3 months, due in November 20, 2024.  I have ordered sputum cultures - you will leave the office today with sputum specimen cups. Bring to any [facility] within 4 hours of obtaining specimen. Rinse your mouth prior to collecting sample. Please collect sample in the morning by deep coughing  prior to eating or drinking. I recommend submitting sample in about two weeks.    I do agree that your severe anxiety could be contributing to the significant respiratory distress that you have been encountering.  Please defer to PCP in regards to treatment.  Continue current medication regiment. Keep follow up appointment as scheduled. Please call the office if you have any questions or concerns.           03/25/2024: Hx: COPD, Chronic Resp Failure on NIV nightly, Former smoker, Lung nodules   Using Trelegy once per day and Albuterol as needed, approx use 3 times per day as of late. Using Duonebs as needed as well - started back recently with benefit.  Using supplemental oxygen only as needed throughout the day at 1-2L via NC. Using NIV nightly.  Taking Prednisone 5 mg per day. States at times she feels like she could taper up for a day or so but has not tapered up since prior office visit.  Was experiencing unintentional weight loss - started on PERIACTIN at the beginning of March - has gained approx 4# since starting.  Underwent repeat CT Chest recently revealing atelectasis to DHAVAL nodule that was previously noted. No new or enlarging nodules noted.  HTN noted in clinic today - /93 - states she is compliant with antihypertensives - thinks may be secondary to anxiety.   Patient Instructions   Continue to use current inhalers including Trelegy once per day and Albuterol as needed.  Continue to use the supplemental oxygen.  Continue to use the NIV machine nightly.  CT Chest reviewed - does not show any acute process. Prior noted area of concern to DHAVAL now less mass like appearing, likely atelectasis. Will plan for CT Chest in 12 months at the latest.  Continue 5 mg prednisone per day. Can trial up to 20 mg per day only as needed then go back to 5 mg.  Need to get DEXA bone scan.  Continue current medication regiment. Keep follow up appointment as scheduled. Please call the office if you have any questions or  concerns.           12/18/2023: Hx: COPD, Chronic Resp Failure on NIV nightly, Former smoker, Lung nodules   Last week had episodes of shortness of breath with recent weather change. States she almost felt as if she could have used a visit to the ER.   Using Trelegy once per day and Albuterol as needed, approx use 6 times per day as of late. Using Duonebs as needed as well.   Taking Prednisone 5 mg per day. States at times she feels like she could taper up for a day or so.  Denies current mucous production - did not submit sputum samples. States if she does get mucous up, it's clear in color.  Using supplemental oxygen only as needed throughout the day at 1-2L via NC. Using NIV nightly.  Stopped vaping since prior office appointment.   Patient Instructions   Continue to use current inhalers including Trelegy once per day and Albuterol as needed.  Continue to use the supplemental oxygen.  Continue to use the NIV machine nightly.  CT Chest from November 2023 reviewed - Will discuss with MD further. MAC vs other infectious etiology? You currently do not bring up mucous - may need bronchoscopy? In the least, will plan for repeat CT in 3 months (Due in March 2024).  Continue 5 mg prednisone per day. Can trial up to 20 mg per day only as needed then go back to 5 mg.  Need to get DEXA bone scan.  Nodifylung blood testing unrevealing.  Consider Dupixent in the future.  Continue current medication regiment. Keep follow up appointment as scheduled. Please call the office if you have any questions or concerns.   *Discussed with J[facility] on 12/19/2023 - CT Chest reviewed - new nodule 9 mm to DHAVAL - not present on CT from August 2023 - recommend repeat CT in three months for follow up. Discussed with patient via telephone on 12/19/2023 at 1603. She VU.       12/05/2023: Documentation Only:   Talked with patient on 12/05/2023 at approx 0904 regarding recent CT Chest - Thi Jason NP discussed findings with Dr. Martinez - findings  concerning for MAC. Patient states she does not bring up sputum on a regular basis - states she is not currently bringing up sputum but may start later today. States she is going to her PCP today, will  sputum cups from their office. I ordered Resp culture, AFB order already in [facility]. Patient is to message me in the next two weeks if she is unable to submit sputum samples and if not, will discuss bronchoscopy further.  Patient verbalizes understanding and acceptance of plan of care. Repeat CT in 3 months anticipated (Due in March 2024).    Using supplemental oxygen only as needed throughout the day at 1-2L via NC. Using NIV nightly.        10/17/2023:  Using supplemental oxygen only as needed throughout the day - mostly when humidity levels are elevated - using at 2-3L via NC. Bleeding in supplemental oxygen into NIV nightly.  Using NIV nightly with much reported benefit.   Seen in [facility] on 7/27 for shortness of breath, diagnosed with COPD exacerbation - completed regimen of Levaquin and Prednisone 60 mg x 4 days.   Using Trelegy once per day and Albuterol only as needed, states depends on the day, when having a bad day may use 6x per day. Using nebulized treatments 1-2x per day.  Using Prednisone 5 mg per day.   Underwent CT Chest in Aug 2023 - states at that time may have had a cold/chest congestion - new nodules are noted throughout - recommend repeat CT in 3 months.  States did not undergo PFT - is having trouble scheduling due to insurance reasons.  Patient Instructions   Reach out to [facility] in regards to malfunctioning POC machine.  Continue to use current inhalers including Trelegy once per day and Albuterol as needed.  Continue to use the supplemental oxygen.  Continue to use the NIV machine nightly.  CT Chest due in November 2023. The one you had done in August shows a few new nodules - this could be scarring vs infectious as you endorse you were sick at that time. Can check Quantiferon Gold  blood test.  Still would like to get PFT - will see if my staff can help facilitate scheduling.  Continue 5 mg prednisone per day.  Recommend stop using vape.  Nodifylung blood testing unrevealing.  Continue current medication regiment. Keep follow up appointment as scheduled. Please call the office if you have any questions or concerns.           07/07/2023:  Has NIV machine - using nightly without issue. Recently got Life 2000 portable vent for daytime use, states she couldn't tolerate - Life 2000 was recalled? States she is waiting for the DME company to come  the machine.  Using supplemental oxygen PRN throughout the day - at 2L via NC. Bleeding in supplemental oxygen into NIV nightly.  Using Trelegy once per day and Albuterol only as needed, states depends on the day, when having a bad day may use 6x per day. States has worse days when humidity, heat are high.  Using Prednisone 5 mg daily with benefit.   Is scheduled to have CT in August 2023.  Stopped smoking - now vaping with nicotine.   Patient Instructions   Overall doing well!  Continue to use current inhalers including Trelegy once per day and Albuterol as needed.  Continue to use the supplemental oxygen.  Continue to use the NIV machine nightly.  CT Chest due in August 2023.  Can check PFT later in the year and see if lung strength improved. Will plan on PFT the same day as your next appt with me.   Continue 5 mg prednisone per day.  Recommend stop using vape.  Continue current medication regiment. Keep follow up appointment as scheduled. Please call the office if you have any questions or concerns.         03/13/2023:  Doing well overall from a resp perspective. No recent hospitalizations since prior office visit. Using Trelegy once per day and Albuterol only as needed, states depends on the day, when having a bad day may use 6x per day. Using supplemental oxygen PRN throughout the day - at 2L via NC and at night time in correlation with NIV  machine. States tolerating NIV overall however does experience some difficulty with mask. [facility] is looking forward to being more active throughout the day, traveling more. Inquiring whether portable NIV would be beneficial for daytime use.  Using Prednisone 5 mg daily with benefit. Not currently smoking however using vape with nicotine.   Patient Instructions   Overall doing well!  Continue to use current inhalers including Trelegy once per day and Albuterol as needed.  Continue to use the supplemental oxygen.  Continue to use the NIV machine nightly.  May benefit from portable NIV machine to be used throughout the day. Order placed, will see if insurance approves.  CT Chest due in August 2023.  Can check PFT later in the year and see if lung strength improved.  Continue 5 mg prednisone per day.  Continue current medication regiment. Keep follow up appointment as scheduled. Please call the office if you have any questions or concerns.         12/13/2022:  Using Trelegy once per day with benefit, Albuterol nebulized treatments every 6 hours as needed.  Using NIV machine for four hours per day - states ever since starting use has been feeling better. Still using supplemental oxygen, has decreased to only PRN use - not wearing O2 in clinic today and in no acute distress.  Has been on Prednisone 10 mg per day with great benefit, agreeable to decrease to 5 mg.   Not currently smoking - cravings under control, [facility] is concerned with Kuldeep upcoming that cravings will increase.  Interested in lung transplant program - [facility] was told to call back once quit smoking for 2 consecutive months.  Patient Instructions   Continue current regiment - Trelegy once per day and Albuterol as needed for shortness of breath, wheezing, cough.  Prednisone - start alternating 10 mg and 5 mg every other day. Do this for two weeks and see who you tolerate. If you have no issues, can go down to 5 mg per day.   Continue NIV use, Continue  "supplemental oxygen.  Doing well from smoking cessation standpoint.  Alpha 1 test today in office.  CT Chest due Aug 2023  Continue current medication regiment. Keep follow up appointment as scheduled. Please call the office if you have any questions or concerns.         10/13/2022:  Per prior message on 9/29/2022:  "Patient messaged stating she is experiencing shortness of breath, worsening since initiation of NIV use.  I called the patient and discussed the case with her on 09/29/2022  at 0940.  Patient states she received her NIV machine approximately 2 weeks ago, has been using daily for at least 4 hours per day.  States she can tolerate the pressure well while using - with initially experiencing headaches with use, respiratory therapist changed pressures.  Patient states shortness of breath is worsening from baseline, cannot walk from living room to kitchen without getting short-winded, patient states she lives in a camper currently.  Patient using supplemental oxygen throughout the day and at night as needed.  On Trelegy once per day.  Using nebulized treatments every 4-6 hours.  Patient currently taking prednisone regimen that was prescribed at prior visit, 6 days total -states she has 2 days left.  Reports benefit with prednisone use.  Patient denies mucus production.  Patient will proceed with getting chest x-ray at this time.  May need repeat/prolonged prednisone taper.  Patient states she spoke with lung transplant service, however a consultation appointment was not scheduled due to current smoking status.  Patient states she is currently smoking 1-2 cigarettes per day."  Patient required ER visit on 10/8/2022 for COPD exacerabtion, was discharged with Rx for Azithromycin and 9 day regiment of Prednisone. Completed Zpack and currently has 3 days left of Prednisone.   States overall is having a good day, however yesterday was bad. States she was exposed to several sick contacts and developed bronchitis. "   Currently using supplemental oxygen at night time and as needed throughout the day, dose ranges from 1-3lpm.   Using NIV machine nightly, states bleeds o2 in at 2L. Concerned that NIV is causing illness.  States hasn't smoked cigarettes in 2 days now.  Using Trelegy inhaler once per day, albuterol as needed approximately 2x per day. Taking neb treatments every 4-6 hours. States with Albuterol use she experiences hand shakiness, heart palpitations, jitters, and nervousness.   Patient Instructions   Continue Prednisone taper from ER until you finish. Then recommend going to daily Prednisone, 5-10 mg per day based on how you feel.   Continue Trelegy once per day and Albuterol as needed. Have ordered Xopenex for you to use in nebulizer machine since adverse reactions reported to Albuterol.  Continue supplemental oxygen use. Continue NIV use.  Doing well from smoking standpoint, continue smoking cessation.  Continue current medication regiment. Keep follow up appointment as scheduled. Please call the office if you have any questions or concerns.         8/16/2022:  Patient did not keep follow up - I saw patient last in Feb 2022.   Patient required hospitalization for SIRS, COPD at [facility] on 8/7 - was subsequently discharged home on 8/10 (no discharge summary available in Epic). Treated with IV Abx and Bipap therapy while inpatient. States she was discharged home with Rx for steroid and abx, in which she completed both regiments. Patient has had THREE hospitalizations for COPD exacerbations in 2022 alone.  Still using Trelegy, one puff once per day with reported benefit. Using Albuterol inhaler as needed. Using nebulizer machine with Duoneb as needed.   Still smoking cigarettes, approximately 5 cigarettes per day.  Has supplemental oxygen at home and POC - wears as needed for shortness of breath, at night time - uses 1-2L via NC.  ABG obtained in the hospital on 8/7/2022 revealing pH 7.309, CO2 63.6, O2 122,  and HCO3 32.0  Patient Instructions   Area of concern on CT from Feb - need to repeat CT Chest now.  I ordered a Lung Function Test to evaluate lung strength. Please complete prior to next appointment.   Continue Trelegy once per day.  Albuterol as needed. Prescription sent for Duoneb to be taken as needed.  NIV machine ordered given chronic resp failure secondary to COPD with recent hospitalization revealing high CO2 level.  Continue the use of supplemental oxygen.  Stop smoking.   Continue current medication regiment. Keep follow up appointment as scheduled. Please call the office if you have any questions or concerns.             2/18/2022: In office today with sister. Hx: COPD, Chronic Resp failure.  Recently Hospitalized at [facility] on 2/7 for COPD exacerbation, was treated with IV steroids and neb treatments, subsequently discharged home on 2/10 with Rx for Prednisone and Levaquin, which she reports completion and compliance.  Had Prior hospitalization in November 2021 at [facility] for COPD exacerbation - complicated hospitalization with intramuscular hematoma of the left rectus muscle of the anterior abdominal wall - general surgery consulted, resolved spontaneously. Discharged home at that time with supplemental oxygen. Patient also endorses prior ER visit in Feb 2021 for COPD exacerbation and ER visit at [facility] for same.  Currently taking Anora once per day - Albuterol rescue inhaler approx 2-5 times per day. Using Nebulizer (Albuterol and Ipatropium) scheduled per day.  Using oxygen at night time, and as needed throughout the day - 1L via NC.  Shortness of breath: Varies with time - states worse over the last week due to sinus congestion, weather change, pollen. Exertional, improves with rest and tripod position. Affecting ADLS.   Cough: Worsening, productive with clear mucous production. Worse in the morning, Denies wheezing, chest tightness. Taking Mucinex without benefit. Ran out  of Tessalon pearls - they seemed to help.   Patient Instructions   You have two areas of suspicion noted on CT Chest.   One area in the R lower lung - initially noted on CT from 2022.  Area in the L lung has been present since at least 2020 - appears to have grown in size.  Will repeat CT in 3 months.  Would benefit from smoking cessation. Declines referral to smoking cessation program at this time. Has tried Chantix in past.  This inhaler Trelegy is your new daily inhaler. It contains an inhaled steroid component. Rinse mouth after each use due to risk for thrush development. If mouth or tongue develops white sores please contact the clinic and I will order a prescription mouth wash.   Continue to use albuterol rescue inhaler as needed.  Zyrtec - take one pill nightly for 30 days - see if it helps with sinus complaints.  Tessalon pearls as needed for cough.   I ordered a Lung Function Test to evaluate lung strength. Please complete prior to next appointment.         Social Hx: Lives alone - no animals in the home. Previously worked as labored. No Asbestosis exposure, Smoking Hx: Current smoker, started age 14 - 0.5 ppd use.   Family Hx: Mother Lung Cancer, No COPD, Son, Brother, Sister Asthma  Medical Hx: Previous pneumonia (did not require intubation) ; No previous shoulder/chest surgery        The chief compliant  problem varies with instability at times.  PFSH:  Past Medical History:   Diagnosis Date    Anxiety     Arthritis     Asthma     Back pain     COPD (chronic obstructive pulmonary disease)     Pulmonary embolism     10 years ago         Past Surgical History:   Procedure Laterality Date    BIOPSY      right breast    BREAST CYST EXCISION      CATARACT EXTRACTION       SECTION      CHOLECYSTECTOMY      COLONOSCOPY N/A 2022    Procedure: COLONOSCOPY;  Surgeon: Chavez Gonzales MD;  Location: [facility];  Service: General;  Laterality: N/A;    ESOPHAGOGASTRODUODENOSCOPY N/A  "06/06/2022    Procedure: ESOPHAGOGASTRODUODENOSCOPY (EGD);  Surgeon: Chavez Gonzales MD;  Location: [facility] ENDO;  Service: General;  Laterality: N/A;    INCISION AND DRAINAGE OF ABSCESS Left 03/16/2020    Procedure: INCISION AND DRAINAGE, ABSCESS;  Surgeon: Irvin Villarreal MD;  Location: [facility] OR;  Service: General;  Laterality: Left;     Social History     Tobacco Use    Smoking status: Former    Smokeless tobacco: Never   Substance Use Topics    Alcohol use: Not Currently     Comment: occ    Drug use: Yes     Types: Benzodiazepines     Comment: as prescribed     Family History   Problem Relation Name Age of Onset    Breast cancer Sister      Ovarian cancer Neg Hx       Review of patient's allergies indicates:   Allergen Reactions    Ativan [lorazepam] Itching     I have reviewed past medical, family, and social history. I have reviewed previous nurse notes.    Performance Status:The patient's activity level is functions out of house.      Review of Systems:  a review of eleven systems covering constitutional, Eye, HEENT, Psych, Respiratory, Cardiac, GI, , Musculoskeletal, Endocrine, Dermatologic was negative except for pertinent findings as listed ABOVE and below: pertinent positive as above, rest is good       Exam:Comprehensive exam done. BP (!) 142/78 (BP Location: Left arm, Patient Position: Sitting)   Pulse 83   Ht 5' 5" (1.651 m)   Wt 53 kg (116 lb 15.3 oz)   LMP 09/22/2017   SpO2 95% Comment: on room air at rest  BMI 19.46 kg/m²   Exam included Vitals as listed  Constitutional: She is oriented to person, place, and time. She appears well-developed. No distress.   Nose: Nose normal.   Mouth/Throat: Uvula is midline, oropharynx is clear and moist and mucous membranes are normal. No dental caries. No oropharyngeal exudate, posterior oropharyngeal edema, posterior oropharyngeal erythema or tonsillar abscesses.  Mallapatti (M) score 1  Eyes: Pupils are equal, round, and reactive to light.   Neck: No " JVD present. No thyromegaly present.   Cardiovascular: Normal rate, regular rhythm and normal heart sounds. Exam reveals no gallop and no friction rub.   No murmur heard.  Pulmonary/Chest: Effort normal and breath sounds normal. No accessory muscle usage or stridor. No apnea and no tachypnea. No respiratory distress, on room air. Diminished breath sounds throughout, on supplemental oxygen, in no acute distress.  Abdominal: Soft. She exhibits no mass. There is no tenderness. No hepatosplenomegaly, hernias and normoactive bowel sounds  Musculoskeletal: Normal range of motion. exhibits no edema.   Neurological:  alert and oriented to person, place, and time. not disoriented.   Skin: Skin is warm and dry. Capillary refill takes less 2 sec. No cyanosis or erythema. No pallor. Nails show no clubbing.   Psychiatric: normal mood and affect. behavior is normal. Judgment and thought content normal.       Radiographs (ct chest and cxr) reviewed: view by direct vision     CTA Chest Non-Coronary (PE Studies) 8/2/2024: No PE, Bronchiectasis, Emphysema, Scattered micro nodules, increasing nodular area to R apex, several areas of spiculation to DAHVAL, one in which seems to be increasing surrounded by smaller nodules.   No evidence of a pulmonary embolus.    CT Chest without Contrast 03/21/2024 - No acute process. DHAVAL Atelectasis. Prior noted lung nodules without change.     CT Chest without Contrast: November 2023 - multiple lung nodules, emphysema     CT Chest Lung Screening Low Dose 8/30/2023 - new areas of concern, lung nodules    X-Ray Chest 1 View 10/8/2022 Impression:   Marked interval improvement in previously noted patchy segmental airspace consolidation at the left lung base.  There are a few tiny residual nodular densities at the left lung base.  Continued follow-up is recommended to ensure resolution..  Consider a follow-up chest x-ray in 4 weeks.     CT Chest Without Contrast 8/23/2022 FINDINGS:  Centrilobular and  paraseptal emphysema.  Bandlike pleuroparenchymal thickening in the right upper lobe again noted.  Target nodule with spiculation in the lingula measures 1.4 cm on series 4, image 317.  3 mm nodule subpleural location right upper lobe series 4, image 301.  3 mm nodule left upper lobe series 4, image 174.  No filling defects central airways.  No pleural effusion or pneumothorax.   Heart size normal.  Coronary artery disease.  No mediastinal adenopathy.  Cholecystectomy clips.  Partially imaged advanced degenerative disc disease at several lower cervical levels.  Several mild remote compression fractures in the upper thoracic spine.   Impression:   1. Unchanged extent of pulmonary nodules, including spicular lesion in the lingula.  2. Coronary artery disease and pulmonary emphysema.        X-Ray Chest AP Portable  8/7/2022   FINDINGS:  Normal cardiomediastinal contour. No focal consolidation, pleural effusion or pneumothorax. The lungs are hyperinflated with flattening of the diaphragms.   Impression:   No acute cardiopulmonary abnormality.   Pulmonary emphysema.       CTA Chest Non-Coronary  2/8/2022   Impression:   No CTA evidence of pulmonary embolus.  Significant centrilobular emphysema.  Stable 1.4 cm spiculated mass of the left lingula.  New 1.9 cm band of spiculated density in the posterior right lung gutter likely represents atelectasis.  Follow-up CT in 3 months is recommended however.      X-Ray Chest AP Portable  2/8/2022   Impression:   COPD.         Labs reviewed   Patient's labs were reviewed including CBC and CMP    Lab Results   Component Value Date    WBC 16.45 (H) 08/05/2024    RBC 3.56 (L) 08/05/2024    HGB 11.6 (L) 08/05/2024    HCT 34.7 (L) 08/05/2024    MCV 98 08/05/2024    MCH 32.6 (H) 08/05/2024    MCHC 33.4 08/05/2024    RDW 11.5 08/05/2024     08/05/2024    MPV 9.3 08/05/2024    GRAN 15.2 (H) 08/05/2024    GRAN 92.3 (H) 08/05/2024    LYMPH 0.4 (L) 08/05/2024    LYMPH 2.4 (L) 08/05/2024     MONO 0.4 08/05/2024    MONO 2.3 (L) 08/05/2024    EOS 0.0 08/05/2024    BASO 0.02 08/05/2024    EOSINOPHIL 0.0 08/05/2024    BASOPHIL 0.1 08/05/2024   CMP  Sodium   Date Value Ref Range Status   08/20/2024 138 136 - 145 mmol/L Final     Potassium   Date Value Ref Range Status   08/20/2024 4.0 3.5 - 5.1 mmol/L Final     Chloride   Date Value Ref Range Status   08/20/2024 105 95 - 110 mmol/L Final     CO2   Date Value Ref Range Status   08/20/2024 26 23 - 29 mmol/L Final     Glucose   Date Value Ref Range Status   08/20/2024 128 (H) 70 - 110 mg/dL Final     BUN   Date Value Ref Range Status   08/20/2024 10 6 - 20 mg/dL Final     Creatinine   Date Value Ref Range Status   08/20/2024 0.7 0.5 - 1.4 mg/dL Final     Calcium   Date Value Ref Range Status   08/20/2024 8.6 (L) 8.7 - 10.5 mg/dL Final     Total Protein   Date Value Ref Range Status   08/20/2024 5.6 (L) 6.0 - 8.4 g/dL Final     Albumin   Date Value Ref Range Status   08/20/2024 3.3 (L) 3.5 - 5.2 g/dL Final     Total Bilirubin   Date Value Ref Range Status   08/20/2024 0.7 0.1 - 1.0 mg/dL Final     Comment:     For infants and newborns, interpretation of results should be based  on gestational age, weight and in agreement with clinical  observations.    Premature Infant recommended reference ranges:  Up to 24 hours.............<8.0 mg/dL  Up to 48 hours............<12.0 mg/dL  3-5 days..................<15.0 mg/dL  6-29 days.................<15.0 mg/dL       Alkaline Phosphatase   Date Value Ref Range Status   08/20/2024 51 (L) 55 - 135 U/L Final     AST   Date Value Ref Range Status   08/20/2024 25 10 - 40 U/L Final     ALT   Date Value Ref Range Status   08/20/2024 26 10 - 44 U/L Final     Anion Gap   Date Value Ref Range Status   08/20/2024 7 (L) 8 - 16 mmol/L Final     eGFR   Date Value Ref Range Status   08/20/2024 >60.0 >60 mL/min/1.73 m^2 Final         PFT  reviewed  Pulmonary Functions Testing Results:    Patient has chronic respiratory failure  secondary to COPD.  Patient will need a specific targeted tidal volume which cannot be provided via a CPAP or BiPAP.  This patient will require a set tidal volume to ensure CO2 levels were lowered adequately; as well as, to assist in establishing proper diaphragmatic functions.  Therefore, NIV is being ordered due to the severe state of this patient's disease.  Without this therapy, the patient runs a higher risk of expiration and readmittance.  Patient is benefiting from nightly NIV use however would also benefit from portable NIV for daytime use to help reduce shortness of breath and increase patient's overall daytime physical activities. Supplemental oxygen during the day is not enough support to complete patient's daily activities.     Plan:  Clinical impression is ambiguous and will need repeated evaluation wrt.    Zayra was seen today for copd.    Diagnoses and all orders for this visit:    Chronic respiratory failure with hypoxia and hypercapnia  -     Culture, Respiratory with Gram Stain; Standing    Stage 4 very severe COPD by GOLD classification    Pneumonia of right upper lobe due to Pseudomonas species  -     CT Chest Without Contrast; Future    Steroid-dependent COPD    Multiple lung nodules on CT    Chronic sinus complaints    Personal history of nicotine dependence    Atelectasis of both lungs    DASH (obstructive sleep apnea)    Anxiety            Follow up in about 3 months (around 1/18/2025), or if symptoms worsen or fail to improve.    Discussed with patient above for education the following:      Patient Instructions   Continue current COPD regimen including Trelegy inhaler 1 puff once per day. This inhaler contains an inhaled steroid component. Rinse mouth after each use due to risk for thrush development. If mouth or tongue develops white sores please contact the clinic and I will order a prescription mouth wash.     Continue to use albuterol and nebulizer treatments as needed for shortness  of breath, wheezing, cough.    Continue the use of supplemental oxygen throughout the day as needed and at nighttime. Also continue NIV use nightly.    Continue 5 mg prednisone per day.  Will attempt gentle taper in the near future.    We will repeat CT chest next month -  due in November 20, 2024.    I have ordered sputum cultures - you will leave the office today with sputum specimen cups. Bring to any [facility] within 4 hours of obtaining specimen. Rinse your mouth prior to collecting sample. Please collect sample in the morning by deep coughing prior to eating or drinking. I recommend submitting sample in about two weeks.      Continue current medication regiment. Keep follow up appointment as scheduled. Please call the office if you have any questions or concerns.

## 2024-11-05 ENCOUNTER — PATIENT MESSAGE (OUTPATIENT)
Dept: FAMILY MEDICINE | Facility: CLINIC | Age: 59
End: 2024-11-05
Payer: MEDICAID

## 2024-11-07 ENCOUNTER — PATIENT MESSAGE (OUTPATIENT)
Dept: PULMONOLOGY | Facility: CLINIC | Age: 59
End: 2024-11-07
Payer: MEDICAID

## 2024-11-07 ENCOUNTER — HOSPITAL ENCOUNTER (OUTPATIENT)
Dept: RADIOLOGY | Facility: HOSPITAL | Age: 59
Discharge: HOME | End: 2024-11-07
Attending: NURSE PRACTITIONER
Payer: MEDICAID

## 2024-11-07 DIAGNOSIS — J44.9 STAGE 4 VERY SEVERE COPD BY GOLD CLASSIFICATION: ICD-10-CM

## 2024-11-07 DIAGNOSIS — J15.1 PNEUMONIA OF RIGHT UPPER LOBE DUE TO PSEUDOMONAS SPECIES: ICD-10-CM

## 2024-11-07 PROCEDURE — 71250 CT THORAX DX C-: CPT | Mod: TC

## 2024-11-07 PROCEDURE — 71250 CT THORAX DX C-: CPT | Mod: 26,,, | Performed by: RADIOLOGY

## 2024-11-07 RX ORDER — FLUTICASONE FUROATE, UMECLIDINIUM BROMIDE AND VILANTEROL TRIFENATATE 200; 62.5; 25 UG/1; UG/1; UG/1
1 POWDER RESPIRATORY (INHALATION) DAILY
Qty: 60 EACH | Refills: 11 | Status: SHIPPED | OUTPATIENT
Start: 2024-11-07

## 2024-11-09 ENCOUNTER — HOSPITAL ENCOUNTER (EMERGENCY)
Facility: HOSPITAL | Age: 59
Discharge: HOME | End: 2024-11-09
Payer: MEDICAID

## 2024-11-09 VITALS
BODY MASS INDEX: 18.99 KG/M2 | RESPIRATION RATE: 19 BRPM | SYSTOLIC BLOOD PRESSURE: 168 MMHG | OXYGEN SATURATION: 98 % | WEIGHT: 114 LBS | TEMPERATURE: 98 F | HEIGHT: 65 IN | HEART RATE: 104 BPM | DIASTOLIC BLOOD PRESSURE: 76 MMHG

## 2024-11-09 DIAGNOSIS — R06.02 SHORTNESS OF BREATH: ICD-10-CM

## 2024-11-09 DIAGNOSIS — B33.8 RSV (RESPIRATORY SYNCYTIAL VIRUS INFECTION): ICD-10-CM

## 2024-11-09 DIAGNOSIS — R07.9 CHEST PAIN: ICD-10-CM

## 2024-11-09 DIAGNOSIS — J44.1 COPD WITH ACUTE EXACERBATION: Primary | ICD-10-CM

## 2024-11-09 LAB
ALBUMIN SERPL BCP-MCNC: 3.9 G/DL (ref 3.5–5.2)
ALP SERPL-CCNC: 63 U/L (ref 40–150)
ALT SERPL W/O P-5'-P-CCNC: 17 U/L (ref 10–44)
ANION GAP SERPL CALC-SCNC: 12 MMOL/L (ref 8–16)
AST SERPL-CCNC: 28 U/L (ref 10–40)
BASOPHILS # BLD AUTO: 0.04 K/UL (ref 0–0.2)
BASOPHILS NFR BLD: 0.5 % (ref 0–1.9)
BILIRUB SERPL-MCNC: 0.5 MG/DL (ref 0.1–1)
BILIRUB UR QL STRIP: NEGATIVE
BNP SERPL-MCNC: 19 PG/ML (ref 0–99)
BUN SERPL-MCNC: 6 MG/DL (ref 8–23)
CALCIUM SERPL-MCNC: 9.6 MG/DL (ref 8.7–10.5)
CHLORIDE SERPL-SCNC: 100 MMOL/L (ref 95–110)
CLARITY UR: CLEAR
CO2 SERPL-SCNC: 27 MMOL/L (ref 23–29)
COLOR UR: YELLOW
CREAT SERPL-MCNC: 0.7 MG/DL (ref 0.5–1.4)
DIFFERENTIAL METHOD BLD: ABNORMAL
EOSINOPHIL # BLD AUTO: 0 K/UL (ref 0–0.5)
EOSINOPHIL NFR BLD: 0 % (ref 0–8)
ERYTHROCYTE [DISTWIDTH] IN BLOOD BY AUTOMATED COUNT: 11.5 % (ref 11.5–14.5)
EST. GFR  (NO RACE VARIABLE): >60 ML/MIN/1.73 M^2
GLUCOSE SERPL-MCNC: 115 MG/DL (ref 70–110)
GLUCOSE UR QL STRIP: NEGATIVE
HCT VFR BLD AUTO: 42.9 % (ref 37–48.5)
HGB BLD-MCNC: 14.5 G/DL (ref 12–16)
HGB UR QL STRIP: NEGATIVE
IMM GRANULOCYTES # BLD AUTO: 0.02 K/UL (ref 0–0.04)
IMM GRANULOCYTES NFR BLD AUTO: 0.3 % (ref 0–0.5)
INFLUENZA A, MOLECULAR: NEGATIVE
INFLUENZA B, MOLECULAR: NEGATIVE
KETONES UR QL STRIP: NEGATIVE
LACTATE SERPL-SCNC: 1.5 MMOL/L (ref 0.5–2.2)
LEUKOCYTE ESTERASE UR QL STRIP: NEGATIVE
LYMPHOCYTES # BLD AUTO: 0.6 K/UL (ref 1–4.8)
LYMPHOCYTES NFR BLD: 7.8 % (ref 18–48)
MCH RBC QN AUTO: 32.3 PG (ref 27–31)
MCHC RBC AUTO-ENTMCNC: 33.8 G/DL (ref 32–36)
MCV RBC AUTO: 96 FL (ref 82–98)
MONOCYTES # BLD AUTO: 0.5 K/UL (ref 0.3–1)
MONOCYTES NFR BLD: 6.5 % (ref 4–15)
NEUTROPHILS # BLD AUTO: 6.6 K/UL (ref 1.8–7.7)
NEUTROPHILS NFR BLD: 84.9 % (ref 38–73)
NITRITE UR QL STRIP: NEGATIVE
NRBC BLD-RTO: 0 /100 WBC
PH UR STRIP: 6 [PH] (ref 5–8)
PLATELET # BLD AUTO: 264 K/UL (ref 150–450)
PMV BLD AUTO: 8.7 FL (ref 9.2–12.9)
POTASSIUM SERPL-SCNC: 4.2 MMOL/L (ref 3.5–5.1)
PROT SERPL-MCNC: 7.2 G/DL (ref 6–8.4)
PROT UR QL STRIP: NEGATIVE
RBC # BLD AUTO: 4.49 M/UL (ref 4–5.4)
RSV AG SPEC QL IA: POSITIVE
SARS-COV-2 RDRP RESP QL NAA+PROBE: NEGATIVE
SODIUM SERPL-SCNC: 139 MMOL/L (ref 136–145)
SP GR UR STRIP: 1.01 (ref 1–1.03)
SPECIMEN SOURCE: ABNORMAL
SPECIMEN SOURCE: NORMAL
TROPONIN I SERPL DL<=0.01 NG/ML-MCNC: <0.006 NG/ML (ref 0–0.03)
URN SPEC COLLECT METH UR: NORMAL
UROBILINOGEN UR STRIP-ACNC: NEGATIVE EU/DL
WBC # BLD AUTO: 7.82 K/UL (ref 3.9–12.7)

## 2024-11-09 PROCEDURE — 87154 CUL TYP ID BLD PTHGN 6+ TRGT: CPT | Performed by: NURSE PRACTITIONER

## 2024-11-09 PROCEDURE — 93005 ELECTROCARDIOGRAM TRACING: CPT

## 2024-11-09 PROCEDURE — 94640 AIRWAY INHALATION TREATMENT: CPT

## 2024-11-09 PROCEDURE — 25000242 PHARM REV CODE 250 ALT 637 W/ HCPCS: Performed by: NURSE PRACTITIONER

## 2024-11-09 PROCEDURE — 83605 ASSAY OF LACTIC ACID: CPT | Performed by: NURSE PRACTITIONER

## 2024-11-09 PROCEDURE — 87076 CULTURE ANAEROBE IDENT EACH: CPT | Performed by: NURSE PRACTITIONER

## 2024-11-09 PROCEDURE — 36415 COLL VENOUS BLD VENIPUNCTURE: CPT | Performed by: NURSE PRACTITIONER

## 2024-11-09 PROCEDURE — 87040 BLOOD CULTURE FOR BACTERIA: CPT | Mod: 59 | Performed by: NURSE PRACTITIONER

## 2024-11-09 PROCEDURE — 85025 COMPLETE CBC W/AUTO DIFF WBC: CPT | Performed by: NURSE PRACTITIONER

## 2024-11-09 PROCEDURE — 99285 EMERGENCY DEPT VISIT HI MDM: CPT | Mod: 25

## 2024-11-09 PROCEDURE — 71045 X-RAY EXAM CHEST 1 VIEW: CPT | Mod: 26,,, | Performed by: RADIOLOGY

## 2024-11-09 PROCEDURE — 87635 SARS-COV-2 COVID-19 AMP PRB: CPT | Performed by: NURSE PRACTITIONER

## 2024-11-09 PROCEDURE — 96361 HYDRATE IV INFUSION ADD-ON: CPT

## 2024-11-09 PROCEDURE — 87502 INFLUENZA DNA AMP PROBE: CPT | Performed by: NURSE PRACTITIONER

## 2024-11-09 PROCEDURE — 83880 ASSAY OF NATRIURETIC PEPTIDE: CPT | Performed by: NURSE PRACTITIONER

## 2024-11-09 PROCEDURE — 87634 RSV DNA/RNA AMP PROBE: CPT | Performed by: NURSE PRACTITIONER

## 2024-11-09 PROCEDURE — 96374 THER/PROPH/DIAG INJ IV PUSH: CPT

## 2024-11-09 PROCEDURE — 93010 ELECTROCARDIOGRAM REPORT: CPT | Mod: ,,, | Performed by: INTERNAL MEDICINE

## 2024-11-09 PROCEDURE — 80053 COMPREHEN METABOLIC PANEL: CPT | Performed by: NURSE PRACTITIONER

## 2024-11-09 PROCEDURE — 84484 ASSAY OF TROPONIN QUANT: CPT | Performed by: NURSE PRACTITIONER

## 2024-11-09 PROCEDURE — 27000221 HC OXYGEN, UP TO 24 HOURS

## 2024-11-09 PROCEDURE — 81003 URINALYSIS AUTO W/O SCOPE: CPT | Performed by: NURSE PRACTITIONER

## 2024-11-09 PROCEDURE — 25000003 PHARM REV CODE 250: Performed by: NURSE PRACTITIONER

## 2024-11-09 PROCEDURE — 63600175 PHARM REV CODE 636 W HCPCS: Performed by: NURSE PRACTITIONER

## 2024-11-09 PROCEDURE — 71045 X-RAY EXAM CHEST 1 VIEW: CPT | Mod: TC

## 2024-11-09 PROCEDURE — 96375 TX/PRO/DX INJ NEW DRUG ADDON: CPT

## 2024-11-09 RX ORDER — PREDNISONE 20 MG/1
40 TABLET ORAL DAILY
Qty: 10 TABLET | Refills: 0 | Status: SHIPPED | OUTPATIENT
Start: 2024-11-09 | End: 2024-11-14

## 2024-11-09 RX ORDER — KETOROLAC TROMETHAMINE 30 MG/ML
15 INJECTION, SOLUTION INTRAMUSCULAR; INTRAVENOUS
Status: COMPLETED | OUTPATIENT
Start: 2024-11-09 | End: 2024-11-09

## 2024-11-09 RX ORDER — IPRATROPIUM BROMIDE AND ALBUTEROL SULFATE 2.5; .5 MG/3ML; MG/3ML
3 SOLUTION RESPIRATORY (INHALATION) ONCE
Status: COMPLETED | OUTPATIENT
Start: 2024-11-09 | End: 2024-11-09

## 2024-11-09 RX ORDER — METHYLPREDNISOLONE SOD SUCC 125 MG
125 VIAL (EA) INJECTION
Status: COMPLETED | OUTPATIENT
Start: 2024-11-09 | End: 2024-11-09

## 2024-11-09 RX ORDER — DOXYCYCLINE 100 MG/1
100 CAPSULE ORAL 2 TIMES DAILY
Qty: 20 CAPSULE | Refills: 0 | Status: SHIPPED | OUTPATIENT
Start: 2024-11-09 | End: 2024-11-19

## 2024-11-09 RX ORDER — DOXYCYCLINE HYCLATE 100 MG
100 TABLET ORAL
Status: COMPLETED | OUTPATIENT
Start: 2024-11-09 | End: 2024-11-09

## 2024-11-09 RX ADMIN — KETOROLAC TROMETHAMINE 15 MG: 30 INJECTION, SOLUTION INTRAMUSCULAR; INTRAVENOUS at 09:11

## 2024-11-09 RX ADMIN — SODIUM CHLORIDE, POTASSIUM CHLORIDE, SODIUM LACTATE AND CALCIUM CHLORIDE 500 ML: 600; 310; 30; 20 INJECTION, SOLUTION INTRAVENOUS at 07:11

## 2024-11-09 RX ADMIN — METHYLPREDNISOLONE SODIUM SUCCINATE 125 MG: 125 INJECTION, POWDER, FOR SOLUTION INTRAMUSCULAR; INTRAVENOUS at 07:11

## 2024-11-09 RX ADMIN — IPRATROPIUM BROMIDE AND ALBUTEROL SULFATE 3 ML: .5; 2.5 SOLUTION RESPIRATORY (INHALATION) at 09:11

## 2024-11-09 RX ADMIN — DOXYCYCLINE HYCLATE 100 MG: 100 TABLET, COATED ORAL at 09:11

## 2024-11-10 NOTE — ED PROVIDER NOTES
Encounter Date: 2024       History     Chief Complaint   Patient presents with    SOB/ COPD      Presents with COPD exacerbation onset 2 hours. Breathing treatment PTA.      Presents for shortness of breath reports feels like she is having a COPD exacerbation also with significant anxiety which she reports she has anxiety/panic disorder.  She took her Klonopin and BuSpar prior to arrival.  Patient reports she has been feeling unwell for about 3 days now with minor symptoms tells me she has had a cough with yellow sputum.  Reports she was exposed to RSV tells me her grandson tested positive and she had RSV booster vaccine 3 days ago.  She also reports a recently had CT imaging outpatient at this facility per her pulmonologist.  She denies any fever.  She does report midsternal chest pain.  She denies history of cardiac issues.  She is chronically oxygen dependent at 2 L.        Review of patient's allergies indicates:   Allergen Reactions    Ativan [lorazepam] Itching     Past Medical History:   Diagnosis Date    Anxiety     Arthritis     Asthma     Back pain     COPD (chronic obstructive pulmonary disease)     Pulmonary embolism     10 years ago     Past Surgical History:   Procedure Laterality Date    BIOPSY      right breast    BREAST CYST EXCISION      CATARACT EXTRACTION       SECTION      CHOLECYSTECTOMY      COLONOSCOPY N/A 2022    Procedure: COLONOSCOPY;  Surgeon: Chavez Gonzales MD;  Location: [facility];  Service: General;  Laterality: N/A;    ESOPHAGOGASTRODUODENOSCOPY N/A 2022    Procedure: ESOPHAGOGASTRODUODENOSCOPY (EGD);  Surgeon: Chavez Gonzales MD;  Location: [facility];  Service: General;  Laterality: N/A;    INCISION AND DRAINAGE OF ABSCESS Left 2020    Procedure: INCISION AND DRAINAGE, ABSCESS;  Surgeon: Irvin Villarreal MD;  Location: [facility];  Service: General;  Laterality: Left;     Family History   Problem Relation Name Age of Onset    Breast cancer  Sister      Ovarian cancer Neg Hx       Social History     Tobacco Use    Smoking status: Former    Smokeless tobacco: Never   Substance Use Topics    Alcohol use: Not Currently     Comment: occ    Drug use: Yes     Types: Benzodiazepines     Comment: as prescribed     Review of Systems   Constitutional:  Positive for fatigue. Negative for fever.   HENT:  Positive for congestion. Negative for sinus pain.    Respiratory:  Positive for cough, chest tightness, shortness of breath and wheezing.    Cardiovascular:  Positive for chest pain.   Gastrointestinal: Negative.    Genitourinary: Negative.    Neurological:  Negative for dizziness and weakness.   Psychiatric/Behavioral:  The patient is nervous/anxious.        Physical Exam     Initial Vitals   BP Pulse Resp Temp SpO2   11/09/24 1855 11/09/24 1855 11/09/24 1855 11/09/24 1857 11/09/24 1855   (!) 187/108 (!) 130 (!) 26 97.9 °F (36.6 °C) 95 %      MAP       --                Physical Exam    Constitutional: She appears well-developed and well-nourished.   Anxious appearing   HENT:   Head: Normocephalic and atraumatic.   Eyes: EOM are normal.   Neck:   Normal range of motion.  Cardiovascular:  Regular rhythm.           Tachycardic   Pulmonary/Chest:   Tight/diminished throughout with tachypnea noted   Abdominal: Abdomen is soft. Bowel sounds are normal. She exhibits no distension.   Musculoskeletal:         General: Edema present. Normal range of motion.      Cervical back: Normal range of motion.     Neurological: She is alert and oriented to person, place, and time.   Psychiatric:   Anxious         ED Course   Procedures  Labs Reviewed   CBC W/ AUTO DIFFERENTIAL - Abnormal       Result Value    WBC 7.82      RBC 4.49      Hemoglobin 14.5      Hematocrit 42.9      MCV 96      MCH 32.3 (*)     MCHC 33.8      RDW 11.5      Platelets 264      MPV 8.7 (*)     Immature Granulocytes 0.3      Gran # (ANC) 6.6      Immature Grans (Abs) 0.02      Lymph # 0.6 (*)     Mono # 0.5       Eos # 0.0      Baso # 0.04      nRBC 0      Gran % 84.9 (*)     Lymph % 7.8 (*)     Mono % 6.5      Eosinophil % 0.0      Basophil % 0.5      Differential Method Automated     COMPREHENSIVE METABOLIC PANEL - Abnormal    Sodium 139      Potassium 4.2      Chloride 100      CO2 27      Glucose 115 (*)     BUN 6 (*)     Creatinine 0.7      Calcium 9.6      Total Protein 7.2      Albumin 3.9      Total Bilirubin 0.5      Alkaline Phosphatase 63      AST 28      ALT 17      eGFR >60.0      Anion Gap 12     RSV ANTIGEN DETECTION - Abnormal    RSV Source NP      RSV Ag by Molecular Method Positive (*)     Narrative:     Specimen Source->Nasopharyngeal Swab   INFLUENZA A & B BY MOLECULAR    Influenza A, Molecular Negative      Influenza B, Molecular Negative      Flu A & B Source Nasal Swab     CULTURE, BLOOD   CULTURE, BLOOD   LACTIC ACID, PLASMA    Lactate (Lactic Acid) 1.5     TROPONIN I    Troponin I <0.006     URINALYSIS, REFLEX TO URINE CULTURE    Specimen UA Urine, Unspecified      Color, UA Yellow      Appearance, UA Clear      pH, UA 6.0      Specific Gravity, UA 1.010      Protein, UA Negative      Glucose, UA Negative      Ketones, UA Negative      Bilirubin (UA) Negative      Occult Blood UA Negative      Nitrite, UA Negative      Urobilinogen, UA Negative      Leukocytes, UA Negative      Narrative:     Preferred Collection Type->Urine, Clean Catch  Specimen Source->Urine   SARS-COV-2 RNA AMPLIFICATION, QUAL    SARS-CoV-2 RNA, Amplification, Qual Negative     B-TYPE NATRIURETIC PEPTIDE    BNP 19            Imaging Results              X-Ray Chest 1 View (In process)                      Medications   methylPREDNISolone sodium succinate injection 125 mg (125 mg Intravenous Given 11/9/24 1913)   lactated ringers bolus 500 mL (0 mLs Intravenous Stopped 11/9/24 2028)   albuterol-ipratropium 2.5 mg-0.5 mg/3 mL nebulizer solution 3 mL (3 mLs Nebulization Given 11/9/24 2126)   doxycycline tablet 100 mg (100 mg  Oral Given 11/9/24 2113)   ketorolac injection 15 mg (15 mg Intravenous Given 11/9/24 2114)     Medical Decision Making  Amount and/or Complexity of Data Reviewed  Labs: ordered.  Radiology: ordered.    Risk  Prescription drug management.    Patient has been re-evaluated multiple intervals while present in the ED. given low volume IV fluid given for potential sepsis given tachycardia and tachypnea and initiated sepsis workup.  On re-evaluation tachycardia and tachypnea resolved.  Patient remained hemodynamically stable while in ED. clinically improved with treatment provided.  Patient has been up ambulating without issue.  She reports significant and clinical improvement, medically stable for discharge home.  We will treat for COPD exacerbation with doxycycline and prednisone.  RSV positive this is a viral illness, discussed symptomatic supportive care.  Chest x-ray reviewed along with recent CT imaging chest and lung that were done in outpatient setting per patient's pulmonologist.  Patient advised follow-up PCP.  Follow up Pulmonary Medicine as scheduled.  Return to ED for acute symptoms or concerns.            Sepsis Perfusion Exam: I attest that a sepsis perfusion exam was performed within 6 hours of sepsis, severe sepsis, or septic shock presentation, following fluid resuscitation.                         Clinical Impression:  Final diagnoses:  [R06.02] Shortness of breath  [R07.9] Chest pain  [J44.1] COPD with acute exacerbation (Primary)  [B33.8] RSV (respiratory syncytial virus infection)          ED Disposition Condition    Discharge Stable          ED Prescriptions       Medication Sig Dispense Start Date End Date Auth. Provider    predniSONE (DELTASONE) 20 MG tablet Take 2 tablets (40 mg total) by mouth once daily. for 5 days 10 tablet 11/9/2024 11/14/2024 Shawnee Cuba, NP    doxycycline (VIBRAMYCIN) 100 MG Cap Take 1 capsule (100 mg total) by mouth 2 (two) times daily. for 10 days 20 capsule  11/9/2024 11/19/2024 Shawnee Cuba, NP          Follow-up Information       Follow up With Specialties Details Why Contact Info    Polly Lemos MD Family Medicine   [address] [place] [address] [place] [address] [phone]      Pulmonologist as scheduled                 Shawnee Cuba, MATEUSZ  11/09/24 2127       Shawnee Cuba NP  11/09/24 2127

## 2024-11-11 LAB
OHS QRS DURATION: 70 MS
OHS QTC CALCULATION: 437 MS

## 2024-11-12 LAB
ACINETOBACTER CALCOACETICUS/BAUMANNII COMPLEX: NOT DETECTED
BACTEROIDES FRAGILIS: NOT DETECTED
CANDIDA ALBICANS: NOT DETECTED
CANDIDA AURIS: NOT DETECTED
CANDIDA GLABRATA: NOT DETECTED
CANDIDA KRUSEI: NOT DETECTED
CANDIDA PARAPSILOSIS: NOT DETECTED
CANDIDA TROPICALIS: NOT DETECTED
CRYPTOCOCCUS NEOFORMANS/GATTII: NOT DETECTED
CTX-M GENE (ESBL PRODUCER): NORMAL
ENTEROBACTER CLOACAE COMPLEX: NOT DETECTED
ENTEROBACTERALES: NOT DETECTED
ENTEROCOCCUS FAECALIS: NOT DETECTED
ENTEROCOCCUS FAECIUM: NOT DETECTED
ESCHERICHIA COLI: NOT DETECTED
HAEMOPHILUS INFLUENZAE: NOT DETECTED
IMP GENE (CARBAPENEM RESISTANT): NORMAL
KLEBSIELLA AEROGENES: NOT DETECTED
KLEBSIELLA OXYTOCA: NOT DETECTED
KLEBSIELLA PNEUMONIAE GROUP: NOT DETECTED
KPC RESISTANCE GENE (CARBAPENEM): NORMAL
LISTERIA MONOCYTOGENES: NOT DETECTED
MCR-1: NORMAL
MEC A/C AND MREJ (MRSA): NORMAL
MEC A/C: NORMAL
NDM GENE (CARBAPENEM RESISTANT): NORMAL
NEISSERIA MENINGITIDIS: NOT DETECTED
OXA-48-LIKE (CARBAPENEM RESISTANT): NORMAL
PROTEUS SPECIES: NOT DETECTED
PSEUDOMONAS AERUGINOSA: NOT DETECTED
SALMONELLA SP: NOT DETECTED
SERRATIA MARCESCENS: NOT DETECTED
STAPHYLOCOCCUS AUREUS: NOT DETECTED
STAPHYLOCOCCUS EPIDERMIDIS: NOT DETECTED
STAPHYLOCOCCUS LUGDUNESIS: NOT DETECTED
STAPHYLOCOCCUS SPECIES: NOT DETECTED
STENOTROPHOMONAS MALTOPHILIA: NOT DETECTED
STREPTOCOCCUS AGALACTIAE: NOT DETECTED
STREPTOCOCCUS PNEUMONIAE: NOT DETECTED
STREPTOCOCCUS PYOGENES: NOT DETECTED
STREPTOCOCCUS SPECIES: NOT DETECTED
VAN A/B (VRE GENE): NORMAL
VIM GENE (CARBAPENEM RESISTANT): NORMAL

## 2024-11-14 LAB
BACTERIA BLD CULT: ABNORMAL

## 2024-11-15 LAB — BACTERIA BLD CULT: NORMAL

## 2024-11-16 ENCOUNTER — PATIENT MESSAGE (OUTPATIENT)
Dept: FAMILY MEDICINE | Facility: CLINIC | Age: 59
End: 2024-11-16
Payer: MEDICAID

## 2024-11-21 ENCOUNTER — OFFICE VISIT (OUTPATIENT)
Dept: FAMILY MEDICINE | Facility: CLINIC | Age: 59
End: 2024-11-21
Payer: MEDICAID

## 2024-11-21 VITALS
HEIGHT: 65 IN | OXYGEN SATURATION: 98 % | HEART RATE: 103 BPM | RESPIRATION RATE: 14 BRPM | BODY MASS INDEX: 18.86 KG/M2 | SYSTOLIC BLOOD PRESSURE: 130 MMHG | DIASTOLIC BLOOD PRESSURE: 72 MMHG | WEIGHT: 113.19 LBS

## 2024-11-21 DIAGNOSIS — R11.0 NAUSEA: ICD-10-CM

## 2024-11-21 DIAGNOSIS — F41.9 ANXIETY: ICD-10-CM

## 2024-11-21 DIAGNOSIS — Z79.899 CHRONIC PRESCRIPTION BENZODIAZEPINE USE: Primary | ICD-10-CM

## 2024-11-21 DIAGNOSIS — J43.8 OTHER EMPHYSEMA: ICD-10-CM

## 2024-11-21 DIAGNOSIS — E78.5 HYPERLIPIDEMIA, UNSPECIFIED HYPERLIPIDEMIA TYPE: ICD-10-CM

## 2024-11-21 DIAGNOSIS — J44.9 STAGE 4 VERY SEVERE COPD BY GOLD CLASSIFICATION: ICD-10-CM

## 2024-11-21 LAB
AMP AMPHETAMINE 1000 NM/ML POC: NEGATIVE
BAR BARBITURATES 300 NG/ML POC: NEGATIVE
BUP BUPRENORPHINE 10 NG/ML POC: NEGATIVE
BZO BENZODIAZEPINES 300 NG/ML POC: NEGATIVE
COC COCAINE 300 NG/ML POC: NEGATIVE
CREATININE (CR) POC: 100
CTP QC/QA: YES
MET METHAMPHETAMINE 1000 NG/ML POC: NEGATIVE
MOP/OPI300 MORPHINE 300 NG/ML POC: NEGATIVE
MTD METHADONE 300 NG/ML POC: NEGATIVE
OXIDANT (OX) POC: NEGATIVE
OXY OXYCODONE 100 NG/ML POC: NEGATIVE
SPECIFIC GRAVITY (SG) POC: 1.02
TEMPERATURE (°F) POC: 90
THC MARIJUANA 50 NG/ML POC: NEGATIVE

## 2024-11-21 PROCEDURE — 99999 PR PBB SHADOW E&M-EST. PATIENT-LVL III: CPT | Mod: PBBFAC,,, | Performed by: FAMILY MEDICINE

## 2024-11-21 PROCEDURE — 99999PBSHW POCT URINE DRUG SCREEN (WITH BUP): Mod: PBBFAC,,,

## 2024-11-21 PROCEDURE — 80305 DRUG TEST PRSMV DIR OPT OBS: CPT | Mod: PBBFAC | Performed by: FAMILY MEDICINE

## 2024-11-21 PROCEDURE — 99213 OFFICE O/P EST LOW 20 MIN: CPT | Mod: PBBFAC | Performed by: FAMILY MEDICINE

## 2024-11-21 RX ORDER — ONDANSETRON 4 MG/1
4 TABLET, ORALLY DISINTEGRATING ORAL EVERY 12 HOURS PRN
Qty: 30 TABLET | Refills: 2 | Status: SHIPPED | OUTPATIENT
Start: 2024-11-21

## 2024-11-21 RX ORDER — CLONAZEPAM 0.5 MG/1
TABLET ORAL
Qty: 60 TABLET | Refills: 2 | Status: SHIPPED | OUTPATIENT
Start: 2024-11-21

## 2024-11-21 RX ORDER — ATORVASTATIN CALCIUM 20 MG/1
20 TABLET, FILM COATED ORAL DAILY
Qty: 90 TABLET | Refills: 3 | Status: SHIPPED | OUTPATIENT
Start: 2024-11-21

## 2024-11-21 RX ORDER — BUSPIRONE HYDROCHLORIDE 15 MG/1
15 TABLET ORAL 3 TIMES DAILY
Qty: 270 TABLET | Refills: 3 | Status: SHIPPED | OUTPATIENT
Start: 2024-11-21 | End: 2025-11-21

## 2024-11-21 RX ORDER — CLONAZEPAM 0.5 MG/1
TABLET ORAL
Qty: 45 TABLET | Refills: 2 | Status: SHIPPED | OUTPATIENT
Start: 2024-11-21 | End: 2024-11-21 | Stop reason: SDUPTHER

## 2024-11-21 RX ORDER — PREDNISONE 5 MG/1
5 TABLET ORAL
COMMUNITY
Start: 2024-11-16

## 2024-11-21 NOTE — PROGRESS NOTES
Patient ID: Zayra White is a 59 y.o. female.    Chief Complaint: Medication Refill    History of Present Illness    CHIEF COMPLAINT:  Zayra presents today for follow-up of respiratory symptoms.    RECENT RSV INFECTION:  She reports a recent emergency room visit due to RSV, contracted from her six-month-old grandson. She has completed a course of treatment, with the last dose scheduled for the evening. She received the RSV vaccine the day after her infection was diagnosed, unaware of having RSV at the time of vaccination.    CURRENT RESPIRATORY SYMPTOMS:  She presents with a raspy voice, productive cough with whitish sputum expectorated from the upper chest, and whitish nasal discharge.    MEDICATIONS:  She is currently taking Mucinex 600mg (informed she could take up to 1200mg twice daily), prednisone, Protonix for reflux, Trelegy inhaler (recently refilled), atenolol, and Fosamax (sufficient until March). She uses both albuterol duo nebs and an albuterol inhaler (prescribed as two 18-gram inhalers with six refills). She requests refills for Zofran (for nausea) and atorvastatin (for cholesterol).    RECENT LAB RESULTS:  Laboratory tests performed on November 9th showed normal CBC, CMP, and BNP results, ruling out heart failure.      ROS:  ROS as indicated in HPI.         Physical Exam    General: No acute distress. Well-developed.  Underweight   Eyes: EOMI. Sclerae anicteric.  HENT: Normocephalic. Atraumatic. Nares patent. Moist oral mucosa. Raspy voice.  Cardiovascular: Regular rate. Regular rhythm. No murmurs. No rubs. No gallops. Normal S1, S2.  Respiratory: Normal respiratory effort.  Decreased air movement consistent with her severe COPD.  O2 dependent 24/7 at 2 L   Musculoskeletal: No  obvious deformity.  Extremities: No lower extremity edema.  Neurological: Alert & oriented x3. No slurred speech. Normal gait.  Psychiatric: Normal mood. Normal affect. Good insight. Good judgment.  Skin: Warm. Dry. No  rash.         Assessment & Plan    Assessed patient's current Mucinex dosage and recommended increasing to 1,200 mg twice daily (extended release formulation)  Evaluated recent ER visit for suspected heart failure; BNP results were normal, ruling out heart failure  Confirmed recent RSV, likely contracted from patient's grandson  Considered RSV vaccine, but determined unnecessary due to recent infection providing natural immunity  Performed physical exam, including respiratory assessment    RSV (RSV) AND BRONCHIOLITIS:  - Explained that natural immunity from recent RSV may provide better protection than vaccine, especially given patient's potentially compromised immune system due to current medications.    GASTRO-ESOPHAGEAL REFLUX DISEASE:  - Refilled Zofran for nausea.    ASTHMA:  - Continued Trelegy inhaler.  - Changed Albuterol from 30-day to 90-day supply.  - Continued albuterol duo nebs.  - Continued albuterol inhaler (two 18-gram puffers with 6 refills).    HYPERCHOLESTEROLEMIA:  - Refilled atorvastatin for cholesterol.    LONG-TERM USE OF MEDICATIONS:  - Continued atenolol.  - Continued Fosamax.    OTHER:  - Increased Mucinex to 1,200 mg twice daily (extended release formulation).  - Performed drug screen.    FOLLOW-UP:  - Follow up in 3 months for medication management        Plan:          Chronic prescription benzodiazepine use  -     clonazePAM (KLONOPIN) 0.5 MG tablet; Take 1/2 tablet (.25 mg) to 1 tablet (0.5 mg) up to twice daily as needed for anxiety  Dispense: 60 tablet; Refill: 2    Anxiety  -     Discontinue: clonazePAM (KLONOPIN) 0.5 MG tablet; Take 1/2 tablet (.25 mg) to 1 tablet (0.5 mg) up to twice daily as needed for anxiety  Dispense: 45 tablet; Refill: 2  -     POCT Urine Drug Screen (With BUP)  -     clonazePAM (KLONOPIN) 0.5 MG tablet; Take 1/2 tablet (.25 mg) to 1 tablet (0.5 mg) up to twice daily as needed for anxiety  Dispense: 60 tablet; Refill: 2  -     busPIRone (BUSPAR) 15 MG tablet;  Take 1 tablet (15 mg total) by mouth 3 (three) times daily.  Dispense: 270 tablet; Refill: 3    Other emphysema  -     clonazePAM (KLONOPIN) 0.5 MG tablet; Take 1/2 tablet (.25 mg) to 1 tablet (0.5 mg) up to twice daily as needed for anxiety  Dispense: 60 tablet; Refill: 2    Nausea  -     ondansetron (ZOFRAN-ODT) 4 MG TbDL; Take 1 tablet (4 mg total) by mouth every 12 (twelve) hours as needed (Nausea).  Dispense: 30 tablet; Refill: 2    Hyperlipidemia, unspecified hyperlipidemia type  -     atorvastatin (LIPITOR) 20 MG tablet; Take 1 tablet (20 mg total) by mouth once daily.  Dispense: 90 tablet; Refill: 3    Stage 4 very severe COPD by GOLD classification  -     clonazePAM (KLONOPIN) 0.5 MG tablet; Take 1/2 tablet (.25 mg) to 1 tablet (0.5 mg) up to twice daily as needed for anxiety  Dispense: 60 tablet; Refill: 2        Follow up in about 3 months (around 2/21/2025), or if symptoms worsen or fail to improve.    This note was generated with the assistance of ambient listening technology. Verbal consent was obtained by the patient and accompanying visitor(s) for the recording of patient appointment to facilitate this note. I attest to having reviewed and edited the generated note for accuracy, though some syntax or spelling errors may persist. Please contact the author of this note for any clarification.

## 2024-11-25 DIAGNOSIS — R91.8 MULTIPLE LUNG NODULES ON CT: Primary | ICD-10-CM

## 2024-11-26 NOTE — ASSESSMENT & PLAN NOTE
Obstetric HPI:  Patient reports some contractions, active fetal movement, No vaginal bleeding , No loss of fluid     This pregnancy has been complicated by   Fibroids  Anxiety/depression    OB History    Para Term  AB Living   1 0 0 0 0 0   SAB IAB Ectopic Multiple Live Births   0 0 0 0 0      # Outcome Date GA Lbr John/2nd Weight Sex Type Anes PTL Lv   1 Current              Past Medical History:   Diagnosis Date    Anxiety disorder, unspecified     Insomnia     Migraines      Past Surgical History:   Procedure Laterality Date    EYE SURGERY      PRK    wrist cyst removal Right        PTA Medications   Medication Sig    PNV,calcium 72-iron-folic acid (PRENATAL VITAMIN PLUS LOW IRON) 27 mg iron- 1 mg Tab Take 1 tablet (1 each total) by mouth once daily.       Review of patient's allergies indicates:  No Known Allergies     Family History       Problem Relation (Age of Onset)    Breast cancer Maternal Grandmother (66)    Cancer Maternal Grandfather    Leukemia Maternal Grandfather          Tobacco Use    Smoking status: Never     Passive exposure: Never    Smokeless tobacco: Never   Substance and Sexual Activity    Alcohol use: Not Currently     Comment: occ    Drug use: Never    Sexual activity: Yes     Partners: Male     Birth control/protection: OCP     Review of Systems   Respiratory:  Negative for shortness of breath.    Cardiovascular:  Negative for chest pain.   Gastrointestinal:  Positive for abdominal pain. Negative for nausea and vomiting.   Genitourinary:  Negative for vaginal bleeding and vaginal discharge.   Neurological:  Positive for headaches.   All other systems reviewed and are negative.     Objective:     Vital Signs (Most Recent):    Vital Signs (24h Range):           There is no height or weight on file to calculate BMI.    FHT: 140 Cat 1 (reassuring)  TOCO:  Q irritability minutes     Physical Exam:   Constitutional: She is oriented to person, place, and time. She appears  Patient with Hypoxic Respiratory failure which is Acute on chronic.  she is not on home oxygen. Supplemental oxygen was provided and noted-  .   Signs/symptoms of respiratory failure include- tachypnea and increased work of breathing. Contributing diagnoses includes - COPD Labs and images were reviewed. Patient Has recent ABG, which has been reviewed. Will treat underlying causes and adjust management of respiratory failure as follows-      Continue solumedrol and levaquin   O2 and nebs PRN       well-developed and well-nourished.    HENT:   Head: Normocephalic.      Cardiovascular:  Normal rate, regular rhythm and normal heart sounds.             Pulmonary/Chest: Effort normal and breath sounds normal.        Abdominal: Soft.     Genitourinary:    Vagina and uterus normal.             Musculoskeletal: Normal range of motion and moves all extremeties.       Neurological: She is alert and oriented to person, place, and time. She has normal reflexes.    Skin: Skin is warm and dry.         Cervix:  Dilation:  1  Effacement:  60  Station: -2  Presentation: Vertex     Significant Labs:  Lab Results   Component Value Date    RUST B POS 08/27/2024    HEPBSAG Non-reactive 04/01/2024       I have personallly reviewed all pertinent lab results from the last 24 hours.

## 2024-12-04 ENCOUNTER — PATIENT MESSAGE (OUTPATIENT)
Dept: PULMONOLOGY | Facility: CLINIC | Age: 59
End: 2024-12-04
Payer: MEDICAID

## 2024-12-04 ENCOUNTER — PATIENT MESSAGE (OUTPATIENT)
Dept: FAMILY MEDICINE | Facility: CLINIC | Age: 59
End: 2024-12-04
Payer: MEDICAID

## 2024-12-04 DIAGNOSIS — J44.9 STAGE 4 VERY SEVERE COPD BY GOLD CLASSIFICATION: ICD-10-CM

## 2024-12-04 RX ORDER — ALBUTEROL SULFATE 90 UG/1
1-2 INHALANT RESPIRATORY (INHALATION) EVERY 4 HOURS PRN
Qty: 36 G | Refills: 6 | Status: SHIPPED | OUTPATIENT
Start: 2024-12-04

## 2024-12-04 RX ORDER — ALBUTEROL SULFATE 90 UG/1
INHALANT RESPIRATORY (INHALATION)
Qty: 36 G | Refills: 6 | Status: CANCELLED | OUTPATIENT
Start: 2024-12-04

## 2024-12-04 NOTE — TELEPHONE ENCOUNTER
No care due was identified.  Health [facility] Embedded Care Due Messages. Reference number: [id number].   12/04/2024 10:06:00 AM CST

## 2024-12-05 ENCOUNTER — HOSPITAL ENCOUNTER (OUTPATIENT)
Dept: RADIOLOGY | Facility: HOSPITAL | Age: 59
Discharge: HOME | End: 2024-12-05
Attending: FAMILY MEDICINE
Payer: MEDICAID

## 2024-12-05 DIAGNOSIS — Z12.31 ENCOUNTER FOR SCREENING MAMMOGRAM FOR BREAST CANCER: ICD-10-CM

## 2024-12-05 PROCEDURE — 77067 SCR MAMMO BI INCL CAD: CPT | Mod: 26,,, | Performed by: RADIOLOGY

## 2024-12-05 PROCEDURE — 77063 BREAST TOMOSYNTHESIS BI: CPT | Mod: 26,,, | Performed by: RADIOLOGY

## 2024-12-05 PROCEDURE — 77067 SCR MAMMO BI INCL CAD: CPT | Mod: TC

## 2024-12-19 ENCOUNTER — HOSPITAL ENCOUNTER (OUTPATIENT)
Dept: RADIOLOGY | Facility: HOSPITAL | Age: 59
Discharge: HOME | End: 2024-12-19
Attending: FAMILY MEDICINE
Payer: MEDICAID

## 2024-12-19 DIAGNOSIS — R92.8 ABNORMAL MAMMOGRAM OF RIGHT BREAST: Primary | ICD-10-CM

## 2024-12-19 DIAGNOSIS — R92.8 ABNORMAL MAMMOGRAM OF RIGHT BREAST: ICD-10-CM

## 2024-12-19 PROCEDURE — 77065 DX MAMMO INCL CAD UNI: CPT | Mod: TC,RT

## 2024-12-19 PROCEDURE — 77065 DX MAMMO INCL CAD UNI: CPT | Mod: 26,RT,, | Performed by: RADIOLOGY

## 2024-12-19 PROCEDURE — 77061 BREAST TOMOSYNTHESIS UNI: CPT | Mod: 26,RT,, | Performed by: RADIOLOGY

## 2024-12-22 ENCOUNTER — HOSPITAL ENCOUNTER (EMERGENCY)
Facility: HOSPITAL | Age: 59
Discharge: HOME | End: 2024-12-22
Attending: EMERGENCY MEDICINE
Payer: MEDICAID

## 2024-12-22 VITALS
TEMPERATURE: 99 F | RESPIRATION RATE: 20 BRPM | WEIGHT: 115 LBS | HEART RATE: 90 BPM | OXYGEN SATURATION: 99 % | HEIGHT: 65 IN | SYSTOLIC BLOOD PRESSURE: 138 MMHG | BODY MASS INDEX: 19.16 KG/M2 | DIASTOLIC BLOOD PRESSURE: 69 MMHG

## 2024-12-22 DIAGNOSIS — R07.89 CHEST WALL PAIN: ICD-10-CM

## 2024-12-22 LAB
ALBUMIN SERPL BCP-MCNC: 3.5 G/DL (ref 3.5–5.2)
ALP SERPL-CCNC: 54 U/L (ref 40–150)
ALT SERPL W/O P-5'-P-CCNC: 17 U/L (ref 10–44)
ANION GAP SERPL CALC-SCNC: 11 MMOL/L (ref 8–16)
AST SERPL-CCNC: 21 U/L (ref 10–40)
BASOPHILS # BLD AUTO: 0.05 K/UL (ref 0–0.2)
BASOPHILS NFR BLD: 0.5 % (ref 0–1.9)
BILIRUB SERPL-MCNC: 0.4 MG/DL (ref 0.1–1)
BNP SERPL-MCNC: 23 PG/ML (ref 0–99)
BUN SERPL-MCNC: 6 MG/DL (ref 8–23)
CALCIUM SERPL-MCNC: 8.8 MG/DL (ref 8.7–10.5)
CHLORIDE SERPL-SCNC: 106 MMOL/L (ref 95–110)
CO2 SERPL-SCNC: 27 MMOL/L (ref 23–29)
CREAT SERPL-MCNC: 0.7 MG/DL (ref 0.5–1.4)
D DIMER PPP IA.FEU-MCNC: 0.24 MG/L FEU
DIFFERENTIAL METHOD BLD: ABNORMAL
EOSINOPHIL # BLD AUTO: 0.1 K/UL (ref 0–0.5)
EOSINOPHIL NFR BLD: 0.9 % (ref 0–8)
ERYTHROCYTE [DISTWIDTH] IN BLOOD BY AUTOMATED COUNT: 12.8 % (ref 11.5–14.5)
EST. GFR  (NO RACE VARIABLE): >60 ML/MIN/1.73 M^2
GLUCOSE SERPL-MCNC: 111 MG/DL (ref 70–110)
HCT VFR BLD AUTO: 37.7 % (ref 37–48.5)
HGB BLD-MCNC: 12.9 G/DL (ref 12–16)
IMM GRANULOCYTES # BLD AUTO: 0.14 K/UL (ref 0–0.04)
IMM GRANULOCYTES NFR BLD AUTO: 1.3 % (ref 0–0.5)
LYMPHOCYTES # BLD AUTO: 2.3 K/UL (ref 1–4.8)
LYMPHOCYTES NFR BLD: 21.6 % (ref 18–48)
MCH RBC QN AUTO: 32.6 PG (ref 27–31)
MCHC RBC AUTO-ENTMCNC: 34.2 G/DL (ref 32–36)
MCV RBC AUTO: 95 FL (ref 82–98)
MONOCYTES # BLD AUTO: 0.8 K/UL (ref 0.3–1)
MONOCYTES NFR BLD: 7.6 % (ref 4–15)
NEUTROPHILS # BLD AUTO: 7.4 K/UL (ref 1.8–7.7)
NEUTROPHILS NFR BLD: 68.1 % (ref 38–73)
NRBC BLD-RTO: 0 /100 WBC
PLATELET # BLD AUTO: 289 K/UL (ref 150–450)
PMV BLD AUTO: 9.3 FL (ref 9.2–12.9)
POTASSIUM SERPL-SCNC: 3.6 MMOL/L (ref 3.5–5.1)
PROT SERPL-MCNC: 6.1 G/DL (ref 6–8.4)
RBC # BLD AUTO: 3.96 M/UL (ref 4–5.4)
SODIUM SERPL-SCNC: 144 MMOL/L (ref 136–145)
TROPONIN I SERPL DL<=0.01 NG/ML-MCNC: 0.01 NG/ML (ref 0–0.03)
TROPONIN I SERPL DL<=0.01 NG/ML-MCNC: 0.01 NG/ML (ref 0–0.03)
WBC # BLD AUTO: 10.82 K/UL (ref 3.9–12.7)

## 2024-12-22 PROCEDURE — 80053 COMPREHEN METABOLIC PANEL: CPT | Performed by: EMERGENCY MEDICINE

## 2024-12-22 PROCEDURE — 93005 ELECTROCARDIOGRAM TRACING: CPT

## 2024-12-22 PROCEDURE — 85025 COMPLETE CBC W/AUTO DIFF WBC: CPT | Performed by: EMERGENCY MEDICINE

## 2024-12-22 PROCEDURE — 71046 X-RAY EXAM CHEST 2 VIEWS: CPT | Mod: TC

## 2024-12-22 PROCEDURE — 27000221 HC OXYGEN, UP TO 24 HOURS

## 2024-12-22 PROCEDURE — 94761 N-INVAS EAR/PLS OXIMETRY MLT: CPT

## 2024-12-22 PROCEDURE — 84484 ASSAY OF TROPONIN QUANT: CPT | Mod: 91 | Performed by: EMERGENCY MEDICINE

## 2024-12-22 PROCEDURE — 71046 X-RAY EXAM CHEST 2 VIEWS: CPT | Mod: 26,,, | Performed by: SPECIALIST

## 2024-12-22 PROCEDURE — 85379 FIBRIN DEGRADATION QUANT: CPT | Performed by: EMERGENCY MEDICINE

## 2024-12-22 PROCEDURE — 83880 ASSAY OF NATRIURETIC PEPTIDE: CPT | Performed by: EMERGENCY MEDICINE

## 2024-12-22 PROCEDURE — 99285 EMERGENCY DEPT VISIT HI MDM: CPT | Mod: 25

## 2024-12-22 PROCEDURE — 93010 ELECTROCARDIOGRAM REPORT: CPT | Mod: ,,, | Performed by: INTERNAL MEDICINE

## 2024-12-22 NOTE — ED PROVIDER NOTES
Encounter Date: 2024       History     Chief Complaint   Patient presents with    Pleurisy     Pleuritic type chest wall pain that radiates to left back x 45 min prior to arrival     59-year-old female here from home complaining of left sided chest pain which began about 1 hour prior to arrival.  Pain is located under her left breast.  She states it is dull and achy.  It hurts worse when she takes a deep breath.  No fever, chills, shortness of breath, nausea, vomiting, or diaphoresis.  Denies history of cardiac disease.  Nothing she does makes her symptoms better or worse.  In route, EMS gave her nitroglycerin and aspirin.  She states initially, at home, hurt pain was 10/10, now it is 2/10.      Review of patient's allergies indicates:   Allergen Reactions    Ativan [lorazepam] Itching     Past Medical History:   Diagnosis Date    Anxiety     Arthritis     Asthma     Back pain     COPD (chronic obstructive pulmonary disease)     Pulmonary embolism     10 years ago     Past Surgical History:   Procedure Laterality Date    BIOPSY      right breast    BREAST CYST EXCISION      CATARACT EXTRACTION       SECTION      CHOLECYSTECTOMY      COLONOSCOPY N/A 2022    Procedure: COLONOSCOPY;  Surgeon: Chavez Gonzales MD;  Location: [facility] ENDO;  Service: General;  Laterality: N/A;    ESOPHAGOGASTRODUODENOSCOPY N/A 2022    Procedure: ESOPHAGOGASTRODUODENOSCOPY (EGD);  Surgeon: Chavez Gonzales MD;  Location: [facility] ENDO;  Service: General;  Laterality: N/A;    INCISION AND DRAINAGE OF ABSCESS Left 2020    Procedure: INCISION AND DRAINAGE, ABSCESS;  Surgeon: Irvin Villarreal MD;  Location: [facility] OR;  Service: General;  Laterality: Left;     Family History   Problem Relation Name Age of Onset    Breast cancer Sister      Ovarian cancer Neg Hx       Social History     Tobacco Use    Smoking status: Former     Types: Cigarettes    Smokeless tobacco: Never   Substance Use Topics    Alcohol use:  Not Currently     Comment: occ    Drug use: Yes     Types: Benzodiazepines     Comment: as prescribed     Review of Systems   Cardiovascular:  Positive for chest pain.   All other systems reviewed and are negative.      Physical Exam     Initial Vitals [12/22/24 0201]   BP Pulse Resp Temp SpO2   (!) 149/74 100 (!) 21 98.6 °F (37 °C) 98 %      MAP       --         Physical Exam    Nursing note and vitals reviewed.  Constitutional: She appears well-developed and well-nourished. She is not diaphoretic. No distress.   HENT:   Head: Normocephalic and atraumatic.   Nose: Nose normal. Mouth/Throat: Oropharynx is clear and moist. No oropharyngeal exudate.   Eyes: Conjunctivae and EOM are normal. Pupils are equal, round, and reactive to light. No scleral icterus.   Neck: Neck supple. No JVD present.   Normal range of motion.  Cardiovascular:  Normal rate, regular rhythm, normal heart sounds and intact distal pulses.           No murmur heard.  Pulmonary/Chest: No stridor. No respiratory distress. She has no wheezes. She has no rhonchi. She has no rales. She exhibits no tenderness.   Abdominal: Abdomen is soft. Bowel sounds are normal. She exhibits no distension. There is no abdominal tenderness. There is no rebound and no guarding.   Musculoskeletal:         General: No tenderness or edema. Normal range of motion.      Cervical back: Normal range of motion and neck supple.     Neurological: She is alert and oriented to person, place, and time. She has normal strength. No cranial nerve deficit or sensory deficit. GCS score is 15. GCS eye subscore is 4. GCS verbal subscore is 5. GCS motor subscore is 6.   Skin: Skin is warm and dry. Capillary refill takes less than 2 seconds. No rash noted. No erythema.   Psychiatric: She has a normal mood and affect. Her behavior is normal.         ED Course   Procedures  Labs Reviewed   CBC W/ AUTO DIFFERENTIAL - Abnormal       Result Value    WBC 10.82      RBC 3.96 (*)     Hemoglobin 12.9       Hematocrit 37.7      MCV 95      MCH 32.6 (*)     MCHC 34.2      RDW 12.8      Platelets 289      MPV 9.3      Immature Granulocytes 1.3 (*)     Gran # (ANC) 7.4      Immature Grans (Abs) 0.14 (*)     Lymph # 2.3      Mono # 0.8      Eos # 0.1      Baso # 0.05      nRBC 0      Gran % 68.1      Lymph % 21.6      Mono % 7.6      Eosinophil % 0.9      Basophil % 0.5      Differential Method Automated     COMPREHENSIVE METABOLIC PANEL - Abnormal    Sodium 144      Potassium 3.6      Chloride 106      CO2 27      Glucose 111 (*)     BUN 6 (*)     Creatinine 0.7      Calcium 8.8      Total Protein 6.1      Albumin 3.5      Total Bilirubin 0.4      Alkaline Phosphatase 54      AST 21      ALT 17      eGFR >60.0      Anion Gap 11     TROPONIN I    Troponin I 0.011     B-TYPE NATRIURETIC PEPTIDE    BNP 23     D DIMER, QUANTITATIVE    D-Dimer 0.24     TROPONIN I    Troponin I 0.006       EKG Readings: (Independently Interpreted)   EKG, personally reviewed by me, normal sinus rhythm, sinus arrhythmia, left atrial enlargement, possible anterior infarct of undetermined age.  No acute ST elevation or depression, no T-wave changes, rate 93 beats minute, NE interval 130, .     ECG Results              EKG 12-lead (Final result)        Collection Time Result Time QRS Duration OHS QTC Calculation    12/22/24 02:03:35 12/23/24 17:13:21 76 430                     Final result by Interface, Lab In [facility] (12/23/24 17:13:25)                   Narrative:    Test Reason : R07.89,    Vent. Rate :  93 BPM     Atrial Rate :  93 BPM     P-R Int : 130 ms          QRS Dur :  76 ms      QT Int : 346 ms       P-R-T Axes :  87  84  80 degrees    QTcB Int : 430 ms    Normal sinus rhythm with sinus arrhythmia  Possible Left atrial enlargement  Cannot rule out Anterior infarct (cited on or before 09-Nov-2024)  Abnormal ECG  When compared with ECG of 09-Nov-2024 19:29,  No significant change was found  Confirmed by Bryant Morrell ([id number]) on  12/23/2024 5:13:18 PM    Referred By: RADHAERRAL SELF           Confirmed By: Bryant Morrell                                  Imaging Results              X-Ray Chest PA And Lateral (Final result)  Result time 12/22/24 03:24:05      Final result by Jeff Kline MD (12/22/24 03:24:05)                   Impression:      Interval development of nodular densities bilaterally, these may relate to focal areas of nodular infiltrate however follow-up to document resolution is recommended.    Previously identified irregular opacity at the peripheral right hemithorax appears stable.  Otherwise stable intrathoracic appearance.      Electronically signed by: Jeff Kline  Date:    12/22/2024  Time:    03:24               Narrative:    EXAMINATION:  XR CHEST PA AND LATERAL    CLINICAL HISTORY:  Chest Pain;    TECHNIQUE:  PA and lateral views of the chest were performed.    COMPARISON:  Chest radiograph November 9, 2024    FINDINGS:  Two views of the chest are submitted.  The cardiomediastinal silhouette appears stable.    Mild irregular opacity of the peripheral mid right hemithorax corresponds to appearance on the prior examination, and appears stable.  Medial to this at the mid right lung there is mild irregular nodular opacity that may relate to interval developing pulmonary nodular or focal infiltrate.  Otherwise the right hemithorax appears stable.    Somewhat elongated nodular density overlying the left upper chest peripherally appears to have developed in the interim since the prior study, may relate to a focal area of pulmonary infiltrate however follow-up to document resolution is recommended.    There is no evidence for significant pleural effusion and there is no evidence for pneumothorax.    The osseous structures appear intact.                                    X-Rays:   Independently Interpreted Readings:   Other Readings:  Chest x-ray personally reviewed by me shows clear lungs bilaterally.  No pneumonia or  pneumothorax.  Normal cardiac silhouette, normal skeletal structures    Medications - No data to display  Medical Decision Making  Differential includes myocardial infarction or ischemia, pneumonia, pneumothorax, pleurisy, costochondritis, etc.    Patient's symptoms appear to be minor.  She is in no distress.  Labs normal, normal troponin x2.  EKG unremarkable.  I believe she is likely suffering from pleurisy or costochondritis.  Recommended over-the-counter Tylenol and Motrin and follow-up with PCP.  Patient will continue her previously prescribed medications as well.  Return here for any worsening signs or symptoms.    Amount and/or Complexity of Data Reviewed  Labs: ordered.  Radiology: ordered.                                      Clinical Impression:  Final diagnoses:  [R07.89] Chest wall pain          ED Disposition Condition    Discharge Stable          ED Prescriptions    None       Follow-up Information       Follow up With Specialties Details Why Contact Info    Polly Lemos MD Family Medicine Call in 1 day  [address] [facility] [address] [phone]      [facility] Emergency Medicine  As needed, If symptoms worsen [address] [facility] [address] [phone]             Zaid Escobar MD  12/28/24 0619

## 2024-12-22 NOTE — DISCHARGE INSTRUCTIONS
As we discussed, your labs do not show any evidence of heart attack at this time.  You are likely suffering from some inflammation of the chest wall.  This should respond well to over-the-counter Tylenol and Motrin.  Take the Motrin with food.  Continue all of your previously prescribed medications and treatments and follow-up with your primary care provider tomorrow.  Return here if worse.

## 2024-12-23 LAB
OHS QRS DURATION: 76 MS
OHS QTC CALCULATION: 430 MS

## 2025-01-09 ENCOUNTER — EXTERNAL HOME HEALTH (OUTPATIENT)
Dept: HOME HEALTH SERVICES | Facility: HOSPITAL | Age: 60
End: 2025-01-09
Payer: MEDICAID

## 2025-01-09 DIAGNOSIS — R45.89 ANXIETY ABOUT HEALTH: ICD-10-CM

## 2025-01-09 RX ORDER — DULOXETIN HYDROCHLORIDE 20 MG/1
CAPSULE, DELAYED RELEASE ORAL
Qty: 180 CAPSULE | Refills: 3 | Status: SHIPPED | OUTPATIENT
Start: 2025-01-09

## 2025-01-09 NOTE — TELEPHONE ENCOUNTER
No care due was identified.  Health [facility] Embedded Care Due Messages. Reference number: [id number].   1/09/2025 3:29:43 PM CST

## 2025-01-10 NOTE — TELEPHONE ENCOUNTER
Refill Decision Note   Zayra White  is requesting a refill authorization.  Brief Assessment and Rationale for Refill:  Approve     Medication Therapy Plan:  12/22/24 EDv for Chest wall pain. No change to pended medication. FOVS 02/19/25      Comments:     Note composed:10:13 PM 01/09/2025

## 2025-01-13 ENCOUNTER — PATIENT MESSAGE (OUTPATIENT)
Dept: PULMONOLOGY | Facility: CLINIC | Age: 60
End: 2025-01-13
Payer: MEDICAID

## 2025-01-14 DIAGNOSIS — J44.9 STAGE 4 VERY SEVERE COPD BY GOLD CLASSIFICATION: ICD-10-CM

## 2025-01-16 RX ORDER — PREDNISONE 5 MG/1
5 TABLET ORAL DAILY
Qty: 60 TABLET | Refills: 0 | Status: SHIPPED | OUTPATIENT
Start: 2025-01-16

## 2025-01-18 ENCOUNTER — HOSPITAL ENCOUNTER (EMERGENCY)
Facility: HOSPITAL | Age: 60
Discharge: HOME | End: 2025-01-18
Attending: EMERGENCY MEDICINE
Payer: MEDICAID

## 2025-01-18 VITALS
DIASTOLIC BLOOD PRESSURE: 51 MMHG | OXYGEN SATURATION: 98 % | WEIGHT: 115 LBS | HEIGHT: 65 IN | SYSTOLIC BLOOD PRESSURE: 109 MMHG | HEART RATE: 112 BPM | BODY MASS INDEX: 19.16 KG/M2 | TEMPERATURE: 99 F | RESPIRATION RATE: 22 BRPM

## 2025-01-18 DIAGNOSIS — J20.8 ACUTE BACTERIAL BRONCHITIS: ICD-10-CM

## 2025-01-18 DIAGNOSIS — R06.02 SOB (SHORTNESS OF BREATH): ICD-10-CM

## 2025-01-18 DIAGNOSIS — B96.89 ACUTE BACTERIAL BRONCHITIS: ICD-10-CM

## 2025-01-18 DIAGNOSIS — J44.1 COPD WITH ACUTE EXACERBATION: Primary | ICD-10-CM

## 2025-01-18 LAB
ALBUMIN SERPL BCP-MCNC: 3.6 G/DL (ref 3.5–5.2)
ALLENS TEST: ABNORMAL
ALP SERPL-CCNC: 69 U/L (ref 40–150)
ALT SERPL W/O P-5'-P-CCNC: 15 U/L (ref 10–44)
ANION GAP SERPL CALC-SCNC: 13 MMOL/L (ref 8–16)
AST SERPL-CCNC: 21 U/L (ref 10–40)
BASOPHILS # BLD AUTO: 0.05 K/UL (ref 0–0.2)
BASOPHILS NFR BLD: 0.4 % (ref 0–1.9)
BILIRUB SERPL-MCNC: 0.5 MG/DL (ref 0.1–1)
BNP SERPL-MCNC: 30 PG/ML (ref 0–99)
BUN SERPL-MCNC: 9 MG/DL (ref 8–23)
CALCIUM SERPL-MCNC: 9.7 MG/DL (ref 8.7–10.5)
CHLORIDE SERPL-SCNC: 102 MMOL/L (ref 95–110)
CO2 SERPL-SCNC: 28 MMOL/L (ref 23–29)
CREAT SERPL-MCNC: 0.8 MG/DL (ref 0.5–1.4)
DELSYS: ABNORMAL
DIFFERENTIAL METHOD BLD: ABNORMAL
EOSINOPHIL # BLD AUTO: 0.1 K/UL (ref 0–0.5)
EOSINOPHIL NFR BLD: 0.5 % (ref 0–8)
ERYTHROCYTE [DISTWIDTH] IN BLOOD BY AUTOMATED COUNT: 12.4 % (ref 11.5–14.5)
ERYTHROCYTE [SEDIMENTATION RATE] IN BLOOD BY WESTERGREN METHOD: 3 MM/H
EST. GFR  (NO RACE VARIABLE): >60 ML/MIN/1.73 M^2
FIO2: 32
GLUCOSE SERPL-MCNC: 114 MG/DL (ref 70–110)
HCO3 UR-SCNC: 28.4 MMOL/L (ref 24–28)
HCT VFR BLD AUTO: 40.5 % (ref 37–48.5)
HGB BLD-MCNC: 13.8 G/DL (ref 12–16)
IMM GRANULOCYTES # BLD AUTO: 0.06 K/UL (ref 0–0.04)
IMM GRANULOCYTES NFR BLD AUTO: 0.5 % (ref 0–0.5)
INFLUENZA A, MOLECULAR: NEGATIVE
INFLUENZA B, MOLECULAR: NEGATIVE
LYMPHOCYTES # BLD AUTO: 2.5 K/UL (ref 1–4.8)
LYMPHOCYTES NFR BLD: 20.3 % (ref 18–48)
MCH RBC QN AUTO: 32.5 PG (ref 27–31)
MCHC RBC AUTO-ENTMCNC: 34.1 G/DL (ref 32–36)
MCV RBC AUTO: 95 FL (ref 82–98)
MODE: ABNORMAL
MONOCYTES # BLD AUTO: 0.9 K/UL (ref 0.3–1)
MONOCYTES NFR BLD: 7 % (ref 4–15)
NEUTROPHILS # BLD AUTO: 8.9 K/UL (ref 1.8–7.7)
NEUTROPHILS NFR BLD: 71.3 % (ref 38–73)
NRBC BLD-RTO: 0 /100 WBC
PCO2 BLDA: 43.9 MMHG (ref 35–45)
PH SMN: 7.42 [PH] (ref 7.35–7.45)
PLATELET # BLD AUTO: 426 K/UL (ref 150–450)
PMV BLD AUTO: 8.8 FL (ref 9.2–12.9)
PO2 BLDA: 88 MMHG (ref 80–100)
POC BE: 4 MMOL/L
POC SATURATED O2: 97 % (ref 95–100)
POC TCO2: 30 MMOL/L (ref 23–27)
POTASSIUM SERPL-SCNC: 3.5 MMOL/L (ref 3.5–5.1)
PROT SERPL-MCNC: 7.4 G/DL (ref 6–8.4)
RBC # BLD AUTO: 4.25 M/UL (ref 4–5.4)
SAMPLE: ABNORMAL
SARS-COV-2 RDRP RESP QL NAA+PROBE: NEGATIVE
SITE: ABNORMAL
SODIUM SERPL-SCNC: 143 MMOL/L (ref 136–145)
SPECIMEN SOURCE: NORMAL
TROPONIN I SERPL DL<=0.01 NG/ML-MCNC: <0.006 NG/ML (ref 0–0.03)
WBC # BLD AUTO: 12.49 K/UL (ref 3.9–12.7)

## 2025-01-18 PROCEDURE — 84484 ASSAY OF TROPONIN QUANT: CPT | Performed by: EMERGENCY MEDICINE

## 2025-01-18 PROCEDURE — 25000003 PHARM REV CODE 250: Mod: UD | Performed by: EMERGENCY MEDICINE

## 2025-01-18 PROCEDURE — 83880 ASSAY OF NATRIURETIC PEPTIDE: CPT | Performed by: EMERGENCY MEDICINE

## 2025-01-18 PROCEDURE — 36600 WITHDRAWAL OF ARTERIAL BLOOD: CPT

## 2025-01-18 PROCEDURE — 82803 BLOOD GASES ANY COMBINATION: CPT

## 2025-01-18 PROCEDURE — 25000242 PHARM REV CODE 250 ALT 637 W/ HCPCS: Performed by: EMERGENCY MEDICINE

## 2025-01-18 PROCEDURE — 87635 SARS-COV-2 COVID-19 AMP PRB: CPT | Performed by: EMERGENCY MEDICINE

## 2025-01-18 PROCEDURE — 85025 COMPLETE CBC W/AUTO DIFF WBC: CPT | Performed by: EMERGENCY MEDICINE

## 2025-01-18 PROCEDURE — 94640 AIRWAY INHALATION TREATMENT: CPT

## 2025-01-18 PROCEDURE — 71045 X-RAY EXAM CHEST 1 VIEW: CPT | Mod: 26,,, | Performed by: RADIOLOGY

## 2025-01-18 PROCEDURE — 99900031 HC PATIENT EDUCATION (STAT)

## 2025-01-18 PROCEDURE — 87502 INFLUENZA DNA AMP PROBE: CPT | Performed by: EMERGENCY MEDICINE

## 2025-01-18 PROCEDURE — 96361 HYDRATE IV INFUSION ADD-ON: CPT

## 2025-01-18 PROCEDURE — 63600175 PHARM REV CODE 636 W HCPCS: Performed by: EMERGENCY MEDICINE

## 2025-01-18 PROCEDURE — 96375 TX/PRO/DX INJ NEW DRUG ADDON: CPT

## 2025-01-18 PROCEDURE — 96365 THER/PROPH/DIAG IV INF INIT: CPT

## 2025-01-18 PROCEDURE — 71045 X-RAY EXAM CHEST 1 VIEW: CPT | Mod: TC

## 2025-01-18 PROCEDURE — 27000221 HC OXYGEN, UP TO 24 HOURS

## 2025-01-18 PROCEDURE — 80053 COMPREHEN METABOLIC PANEL: CPT | Performed by: EMERGENCY MEDICINE

## 2025-01-18 PROCEDURE — 99285 EMERGENCY DEPT VISIT HI MDM: CPT | Mod: 25

## 2025-01-18 RX ORDER — IPRATROPIUM BROMIDE AND ALBUTEROL SULFATE 2.5; .5 MG/3ML; MG/3ML
3 SOLUTION RESPIRATORY (INHALATION)
Status: COMPLETED | OUTPATIENT
Start: 2025-01-18 | End: 2025-01-18

## 2025-01-18 RX ORDER — ACETAMINOPHEN 500 MG
1000 TABLET ORAL
Status: COMPLETED | OUTPATIENT
Start: 2025-01-18 | End: 2025-01-18

## 2025-01-18 RX ORDER — PREDNISONE 50 MG/1
50 TABLET ORAL DAILY
Qty: 5 TABLET | Refills: 0 | Status: SHIPPED | OUTPATIENT
Start: 2025-01-18 | End: 2025-01-18

## 2025-01-18 RX ORDER — AZITHROMYCIN 250 MG/1
250 TABLET, FILM COATED ORAL DAILY
Qty: 4 TABLET | Refills: 0 | Status: SHIPPED | OUTPATIENT
Start: 2025-01-18 | End: 2025-01-22

## 2025-01-18 RX ORDER — PREDNISONE 50 MG/1
50 TABLET ORAL DAILY
Qty: 5 TABLET | Refills: 0 | Status: SHIPPED | OUTPATIENT
Start: 2025-01-18 | End: 2025-01-23

## 2025-01-18 RX ORDER — AZITHROMYCIN 250 MG/1
250 TABLET, FILM COATED ORAL DAILY
Qty: 4 TABLET | Refills: 0 | Status: SHIPPED | OUTPATIENT
Start: 2025-01-18 | End: 2025-01-18

## 2025-01-18 RX ORDER — ALPRAZOLAM 0.25 MG/1
0.5 TABLET ORAL
Status: COMPLETED | OUTPATIENT
Start: 2025-01-18 | End: 2025-01-18

## 2025-01-18 RX ADMIN — ACETAMINOPHEN 1000 MG: 500 TABLET ORAL at 09:01

## 2025-01-18 RX ADMIN — ALPRAZOLAM 0.5 MG: 0.25 TABLET ORAL at 09:01

## 2025-01-18 RX ADMIN — IPRATROPIUM BROMIDE AND ALBUTEROL SULFATE 3 ML: .5; 3 SOLUTION RESPIRATORY (INHALATION) at 10:01

## 2025-01-18 RX ADMIN — AZITHROMYCIN MONOHYDRATE 500 MG: 500 INJECTION, POWDER, LYOPHILIZED, FOR SOLUTION INTRAVENOUS at 11:01

## 2025-01-18 RX ADMIN — METHYLPREDNISOLONE SODIUM SUCCINATE 80 MG: 40 INJECTION, POWDER, FOR SOLUTION INTRAMUSCULAR; INTRAVENOUS at 09:01

## 2025-01-18 RX ADMIN — SODIUM CHLORIDE, POTASSIUM CHLORIDE, SODIUM LACTATE AND CALCIUM CHLORIDE 1000 ML: 600; 310; 30; 20 INJECTION, SOLUTION INTRAVENOUS at 09:01

## 2025-01-18 NOTE — ED PROVIDER NOTES
History     Chief Complaint   Patient presents with    Shortness of Breath     EMS states patient woke up with SOB this morning.  EMS states patient used neb x 2 and inhaler with no relief, initial room air was 90%.  EMS states patient was given Albuterol neb.  Patient reports history of COPD and yellow productive cough.       HPI:  Zayra White is a 59 y.o. female with PMH as below who presents to the [facility] emergency department for evaluation of gradual onset, worsening, severe SOB at home with associated cough and wheezing similar to prior COPD exacerbations. Symptoms not alleviated by two albuterol treatments and 10 mg prednisone just prior to arrival at home, then 1 Duo-Neb with EMS en route. She notes anxiety, took Klonopin prior to arrival.       PCP: Polly Lemos MD    Review of patient's allergies indicates:   Allergen Reactions    Ativan [lorazepam] Itching      Past Medical History:   Diagnosis Date    Anxiety     Arthritis     Asthma     Back pain     COPD (chronic obstructive pulmonary disease)     Pulmonary embolism     10 years ago     Past Surgical History:   Procedure Laterality Date    BIOPSY      right breast    BREAST CYST EXCISION      CATARACT EXTRACTION       SECTION      CHOLECYSTECTOMY      COLONOSCOPY N/A 2022    Procedure: COLONOSCOPY;  Surgeon: Chavez Gonzales MD;  Location: [facility] ENDO;  Service: General;  Laterality: N/A;    ESOPHAGOGASTRODUODENOSCOPY N/A 2022    Procedure: ESOPHAGOGASTRODUODENOSCOPY (EGD);  Surgeon: Chavez Gonzales MD;  Location: [facility] ENDO;  Service: General;  Laterality: N/A;    INCISION AND DRAINAGE OF ABSCESS Left 2020    Procedure: INCISION AND DRAINAGE, ABSCESS;  Surgeon: Irvin Villarreal MD;  Location: [facility] OR;  Service: General;  Laterality: Left;       Family History   Problem Relation Name Age of Onset    Breast cancer Sister      Ovarian cancer Neg Hx       Social History     Tobacco Use    Smoking  status: Former     Types: Cigarettes    Smokeless tobacco: Never   Substance and Sexual Activity    Alcohol use: Not Currently     Comment: occ    Drug use: Yes     Types: Benzodiazepines     Comment: as prescribed    Sexual activity: Not Currently     Birth control/protection: Post-menopausal      Review of Systems     Review of Systems   Constitutional: Negative.    HENT: Negative.     Eyes: Negative.    Respiratory:  Positive for cough, shortness of breath and wheezing.    Cardiovascular: Negative.  Negative for chest pain.   Gastrointestinal: Negative.    Endocrine: Negative.    Genitourinary: Negative.    Musculoskeletal: Negative.    Skin: Negative.    Allergic/Immunologic: Negative.    Neurological: Negative.    Hematological: Negative.    Psychiatric/Behavioral: Negative.     All other systems reviewed and are negative.       Physical Exam     Initial Vitals [01/18/25 0904]   BP Pulse Resp Temp SpO2   134/64 (!) 144 (!) 28 99.4 °F (37.4 °C) (!) 93 %      MAP       --          Nursing notes and vital signs reviewed.  Constitutional: Patient is in moderate distress.   Head: Normocephalic. Atraumatic.   Eyes:  Conjunctivae are not pale. No scleral icterus.   ENT: Mucous membranes moist.   Neck: Supple.   Cardiovascular: Tachycardic. Regular rhythm.   Pulmonary: No respiratory distress.   Abdominal: Non-distended.   Musculoskeletal: Moves all extremities. No obvious deformities.   Skin: Warm and dry.   Neurological:  Alert, awake, and appropriate. Normal speech. No acute lateralizing neurologic deficits appreciated.   Psychiatric: Normal affect.       ED Course   Critical Care    Date/Time: 1/18/2025 9:32 AM    Performed by: Charly Alfaro MD  Authorized by: Charly Alfaro MD  Direct patient critical care time: 15 minutes  Additional history critical care time: 5 minutes  Ordering / reviewing critical care time: 5 minutes  Documentation critical care time: 5 minutes  Consulting other physicians  "critical care time: 5 minutes  Total critical care time (exclusive of procedural time) : 35 minutes  Critical care time was exclusive of separately billable procedures and treating other patients and teaching time.  Critical care was necessary to treat or prevent imminent or life-threatening deterioration of the following conditions: respiratory failure (COPD exacerbation).  Critical care was time spent personally by me on the following activities: blood draw for specimens, development of treatment plan with patient or surrogate, interpretation of cardiac output measurements, evaluation of patient's response to treatment, examination of patient, obtaining history from patient or surrogate, ordering and performing treatments and interventions, ordering and review of laboratory studies, ordering and review of radiographic studies, pulse oximetry, re-evaluation of patient's condition, review of old charts and discussions with consultants.        Vitals:    01/18/25 0904 01/18/25 0932 01/18/25 0938 01/18/25 1032   BP: 134/64 (!) 119/58  (!) 147/67   Pulse: (!) 144 (!) 143 (!) 130 (!) 118   Resp: (!) 28 (!) 38  17   Temp: 99.4 °F (37.4 °C)      TempSrc: Oral      SpO2: (!) 93% 95%  97%   Weight: 52.2 kg (115 lb)      Height: 5' 5" (1.651 m)       01/18/25 1049 01/18/25 1050 01/18/25 1054 01/18/25 1058   BP:       Pulse: (!) 132 (!) 131 (!) 113 (!) 112   Resp: (!) 30 (!) 30 (!) 26 (!) 9   Temp:       TempSrc:       SpO2: (!) 93% 95% 99%    Weight:       Height:        01/18/25 1059   BP:    Pulse: (!) 112   Resp: (!) 25   Temp:    TempSrc:    SpO2: 100%   Weight:    Height:      Lab Results Interpreted as Abnormal:  Labs Reviewed   CBC W/ AUTO DIFFERENTIAL - Abnormal       Result Value    WBC 12.49      RBC 4.25      Hemoglobin 13.8      Hematocrit 40.5      MCV 95      MCH 32.5 (*)     MCHC 34.1      RDW 12.4      Platelets 426      MPV 8.8 (*)     Immature Granulocytes 0.5      Gran # (ANC) 8.9 (*)     Immature Grans " (Abs) 0.06 (*)     Lymph # 2.5      Mono # 0.9      Eos # 0.1      Baso # 0.05      nRBC 0      Gran % 71.3      Lymph % 20.3      Mono % 7.0      Eosinophil % 0.5      Basophil % 0.4      Differential Method Automated     COMPREHENSIVE METABOLIC PANEL - Abnormal    Sodium 143      Potassium 3.5      Chloride 102      CO2 28      Glucose 114 (*)     BUN 9      Creatinine 0.8      Calcium 9.7      Total Protein 7.4      Albumin 3.6      Total Bilirubin 0.5      Alkaline Phosphatase 69      AST 21      ALT 15      eGFR >60.0      Anion Gap 13     ISTAT PROCEDURE - Abnormal    POC PH 7.418      POC PCO2 43.9      POC PO2 88      POC HCO3 28.4 (*)     POC BE 4 (*)     POC SATURATED O2 97      POC TCO2 30 (*)     Rate 3      Sample ARTERIAL      Site LB      Allens Test N/A      DelSys Nasal Can      Mode SPONT      FiO2 32     INFLUENZA A & B BY MOLECULAR    Influenza A, Molecular Negative      Influenza B, Molecular Negative      Flu A & B Source Nasal Swab     TROPONIN I    Troponin I <0.006     B-TYPE NATRIURETIC PEPTIDE    BNP 30     SARS-COV-2 RNA AMPLIFICATION, QUAL    SARS-CoV-2 RNA, Amplification, Qual Negative        All Lab Results:  Results for orders placed or performed during the hospital encounter of 01/18/25   CBC auto differential    Collection Time: 01/18/25  9:22 AM   Result Value Ref Range    WBC 12.49 3.90 - 12.70 K/uL    RBC 4.25 4.00 - 5.40 M/uL    Hemoglobin 13.8 12.0 - 16.0 g/dL    Hematocrit 40.5 37.0 - 48.5 %    MCV 95 82 - 98 fL    MCH 32.5 (H) 27.0 - 31.0 pg    MCHC 34.1 32.0 - 36.0 g/dL    RDW 12.4 11.5 - 14.5 %    Platelets 426 150 - 450 K/uL    MPV 8.8 (L) 9.2 - 12.9 fL    Immature Granulocytes 0.5 0.0 - 0.5 %    Gran # (ANC) 8.9 (H) 1.8 - 7.7 K/uL    Immature Grans (Abs) 0.06 (H) 0.00 - 0.04 K/uL    Lymph # 2.5 1.0 - 4.8 K/uL    Mono # 0.9 0.3 - 1.0 K/uL    Eos # 0.1 0.0 - 0.5 K/uL    Baso # 0.05 0.00 - 0.20 K/uL    nRBC 0 0 /100 WBC    Gran % 71.3 38.0 - 73.0 %    Lymph % 20.3 18.0 -  48.0 %    Mono % 7.0 4.0 - 15.0 %    Eosinophil % 0.5 0.0 - 8.0 %    Basophil % 0.4 0.0 - 1.9 %    Differential Method Automated    Comprehensive metabolic panel    Collection Time: 01/18/25  9:22 AM   Result Value Ref Range    Sodium 143 136 - 145 mmol/L    Potassium 3.5 3.5 - 5.1 mmol/L    Chloride 102 95 - 110 mmol/L    CO2 28 23 - 29 mmol/L    Glucose 114 (H) 70 - 110 mg/dL    BUN 9 8 - 23 mg/dL    Creatinine 0.8 0.5 - 1.4 mg/dL    Calcium 9.7 8.7 - 10.5 mg/dL    Total Protein 7.4 6.0 - 8.4 g/dL    Albumin 3.6 3.5 - 5.2 g/dL    Total Bilirubin 0.5 0.1 - 1.0 mg/dL    Alkaline Phosphatase 69 40 - 150 U/L    AST 21 10 - 40 U/L    ALT 15 10 - 44 U/L    eGFR >60.0 >60 mL/min/1.73 m^2    Anion Gap 13 8 - 16 mmol/L   Troponin I    Collection Time: 01/18/25  9:22 AM   Result Value Ref Range    Troponin I <0.006 0.000 - 0.026 ng/mL   Brain natriuretic peptide    Collection Time: 01/18/25  9:22 AM   Result Value Ref Range    BNP 30 0 - 99 pg/mL   Influenza A & B by Molecular    Collection Time: 01/18/25  9:23 AM    Specimen: Nasopharyngeal Swab   Result Value Ref Range    Influenza A, Molecular Negative Negative    Influenza B, Molecular Negative Negative    Flu A & B Source Nasal Swab    COVID-19 Rapid Screening    Collection Time: 01/18/25  9:23 AM   Result Value Ref Range    SARS-CoV-2 RNA, Amplification, Qual Negative Negative   ISTAT PROCEDURE    Collection Time: 01/18/25 10:55 AM   Result Value Ref Range    POC PH 7.418 7.35 - 7.45    POC PCO2 43.9 35 - 45 mmHg    POC PO2 88 80 - 100 mmHg    POC HCO3 28.4 (H) 24 - 28 mmol/L    POC BE 4 (H) -2 to 2 mmol/L    POC SATURATED O2 97 95 - 100 %    POC TCO2 30 (H) 23 - 27 mmol/L    Rate 3     Sample ARTERIAL     Site LB     Allens Test N/A     DelSys Nasal Can     Mode SPONT     FiO2 32      Imaging Results              X-Ray Chest AP Portable (Final result)  Result time 01/18/25 09:46:04      Final result by Patricia Roque MD (01/18/25 09:46:04)                    Impression:      There are coarse pulmonary markings suggesting chronic pulmonary changes without evidence acute pulmonary disease.      Electronically signed by: Patricia Roque MD  Date:    01/18/2025  Time:    09:46               Narrative:    EXAMINATION:  XR CHEST AP PORTABLE    CLINICAL HISTORY:  Shortness of breath    TECHNIQUE:  Single frontal view of the chest was performed.    COMPARISON:  12/22/2024    FINDINGS:  There are coarse pulmonary markings seen throughout the pulmonary parenchyma.  There is no evidence pneumothorax or pleural effusion.  The cardiac silhouette is within normal limits.                                       Emergency Physician Independent Interpretation of Imaging: agree with radiologist     The emergency physician reviewed the vital signs / test results outlined above.     ED Discussion     ED Course as of 01/18/25 1134   Sat Jan 18, 2025   0916 The EKG taken at 9:07 AM was reviewed and independently interpreted by the ED physician:  Physician interpreting: Dr. Charly Alfaro  Rhythm: sinus tachycardia   Rate: 140 bpm  No ST-T changes concerning for acute ischemia  Extreme right axis deviation  Normal intervals   [ND]   1051 92% ambulatory spO2 on her 3L home O2 setting.  [ND]   1116 Patient 100% spO2 at rest on her home setting, tachypnea markedly improved, wheezing resolved. Offered admission vs discharge, patient would like to go home. Return precautions given.  [ND]      ED Course User Index  [ND] Charly Alfaro MD     Patient's evaluation in the ED does not suggest any emergent or life-threatening medical conditions requiring immediate intervention beyond what was provided in the ED, and I believe patient is safe for discharge. Regardless, an unremarkable evaluation in the ED does not preclude the development or presence of a serious or life-threatening condition. As such, patient was given return instructions for any change or worsening of symptoms.       ED  Medication(s) Administered:  Medications   azithromycin (ZITHROMAX) 500 mg in 0.9% NaCl 250 mL IVPB (admixture device) (500 mg Intravenous New Bag 1/18/25 1100)   methylPREDNISolone sodium succinate injection 80 mg (80 mg Intravenous Given 1/18/25 0923)   lactated ringers bolus 1,000 mL (0 mLs Intravenous Stopped 1/18/25 1026)   ALPRAZolam tablet 0.5 mg (0.5 mg Oral Given 1/18/25 0921)   acetaminophen tablet 1,000 mg (1,000 mg Oral Given 1/18/25 0957)   albuterol-ipratropium 2.5 mg-0.5 mg/3 mL nebulizer solution 3 mL (3 mLs Nebulization Given 1/18/25 1059)       Prescription Management: I performed a review of the patient's current Rx medication list as input by nursing staff.    Patient's Medications   New Prescriptions    AZITHROMYCIN (Z-IMANI) 250 MG TABLET    Take 1 tablet (250 mg total) by mouth once daily. for 4 days    PREDNISONE (DELTASONE) 50 MG TAB    Take 1 tablet (50 mg total) by mouth once daily. On day 6, go back to your usual home dose. for 5 days   Previous Medications    ALBUTEROL (VENTOLIN HFA) 90 MCG/ACTUATION INHALER    Inhale 1-2 puffs into the lungs every 4 (four) hours as needed for Wheezing. inhale 2 puff by inhalation route  every 4 - 6 hours as needed    ALBUTEROL-IPRATROPIUM (DUO-NEB) 2.5 MG-0.5 MG/3 ML NEBULIZER SOLUTION    Take 3 mLs by nebulization every 6 (six) hours as needed for Wheezing or Shortness of Breath. Rescue    ALENDRONATE (FOSAMAX) 70 MG TABLET    Take 1 tablet (70 mg total) by mouth every 7 days.    ASPIRIN 81 MG CHEW    81 mg.    ATENOLOL (TENORMIN) 25 MG TABLET    TAKE 1/2 TABLET BY MOUTH EVERY DAY **THANK YOU**    ATORVASTATIN (LIPITOR) 20 MG TABLET    Take 1 tablet (20 mg total) by mouth once daily.    BUSPIRONE (BUSPAR) 15 MG TABLET    Take 1 tablet (15 mg total) by mouth 3 (three) times daily.    CHOLECALCIFEROL, VITAMIN D3, (VITAMIN D3) 25 MCG (1,000 UNIT) CAPSULE    Take 2 capsules (2,000 Units total) by mouth once daily.    CLONAZEPAM (KLONOPIN) 0.5 MG TABLET     Take 1/2 tablet (.25 mg) to 1 tablet (0.5 mg) up to twice daily as needed for anxiety    DULOXETINE (CYMBALTA) 20 MG CAPSULE    Take 2 capsules po daily    FLUTICASONE-UMECLIDIN-VILANTER (TRELEGY ELLIPTA) 200-62.5-25 MCG INHALER    Inhale 1 puff into the lungs once daily.    LATANOPROST 0.005 % OPHTHALMIC SOLUTION    Place 1 drop into both eyes every evening.    ONDANSETRON (ZOFRAN-ODT) 4 MG TBDL    Take 1 tablet (4 mg total) by mouth every 12 (twelve) hours as needed (Nausea).    PANTOPRAZOLE (PROTONIX) 40 MG TABLET    Take 1 tablet (40 mg total) by mouth once daily. Take in the morning before breakfast.  Wait 30 minutes before eating or drinking anything    PREDNISONE (DELTASONE) 5 MG TABLET    Take 1 tablet (5 mg total) by mouth once daily.   Modified Medications    No medications on file   Discontinued Medications    No medications on file         Follow-up Information       Polly Lemos MD. Schedule an appointment as soon as possible for a visit in 5 days.    Specialty: Family Medicine  Contact information:  [address] [facility] [address] [phone]               [facility] Emergency Dept.    Specialty: Emergency Medicine  Why: As needed, If symptoms worsen  Contact information:  [address] [facility] [address] [phone]                          Clinical Impression       ICD-10-CM ICD-9-CM   1. COPD with acute exacerbation  J44.1 491.21   2. SOB (shortness of breath)  R06.02 786.05   3. Acute bacterial bronchitis  J20.8 466.0    B96.89 041.9      ED Disposition Condition    Discharge Stable                Charly Alfaro MD  01/18/25 0841

## 2025-01-18 NOTE — ED NOTES
Patient received from EMS with complaint of SOB that started this morning. Pt states she took 2x albuterol treatments and used rescue inhaler. SpO2 90% on room air. Pt reports no relief following treatments. Productive cough of green/yellow sputum. RR 26 and labored.      Pain:  Rated 5/10.Pt c/o HA from coughing.      Psychosocial:  Patient is calm and cooperative.  Patients insight and judgement are appropriate to situation.  Appears clean, well maintained, with clothing appropriate to environment.  No evidence of delusions, hallucinations, or psychosis.     Neuro:  Eyes open spontaneously.  Awake, alert, oriented x 4.  Speech clear and appropriate.  Tolerating saliva secretions well.  Able to follow commands, demonstrating ability to actively and appropriately communicate within context of current conversation.  Symmetrical facial muscles.  Moving all extremities well with no noted weakness.  Adequate muscle tone present.    Movement is purposeful.       Airway:  Bilateral chest rise and fall.  RR 26 and labored with c/o SOB. Rhonchi breath sounds.      Circulatory:  Skin warm, dry, and pink.  Apical and radial pulses strong and regular.  Capillary refill/skin blanching less than 3 seconds to distal of 4 extremities.     Abdomen:  Abdomen soft and non-distended.  Positive normo-active bowel sounds x 4 quadrants.       Urinary:  Patient denies pain, frequency, or urgency.  Voids independently.  Reports urine appears nathanael/yellow in color.     Extremities:  No redness, heat, swelling, deformity, or pain.     Skin:  Intact with no bruising/discolorations noted.

## 2025-01-18 NOTE — DISCHARGE INSTRUCTIONS
Use breathing treatments every 4 hours as needed. If needing treatments more than that for shortness of breath, please return to the hospital.

## 2025-01-19 ENCOUNTER — PATIENT MESSAGE (OUTPATIENT)
Dept: PULMONOLOGY | Facility: CLINIC | Age: 60
End: 2025-01-19
Payer: MEDICAID

## 2025-01-24 LAB
OHS QRS DURATION: 76 MS
OHS QTC CALCULATION: 442 MS

## 2025-01-29 ENCOUNTER — TELEPHONE (OUTPATIENT)
Dept: PULMONOLOGY | Facility: CLINIC | Age: 60
End: 2025-01-29
Payer: MEDICAID

## 2025-02-04 ENCOUNTER — E-VISIT (OUTPATIENT)
Dept: FAMILY MEDICINE | Facility: CLINIC | Age: 60
End: 2025-02-04
Payer: MEDICAID

## 2025-02-04 DIAGNOSIS — R30.0 DYSURIA: Primary | ICD-10-CM

## 2025-02-04 PROCEDURE — 99422 OL DIG E/M SVC 11-20 MIN: CPT | Mod: ,,, | Performed by: FAMILY MEDICINE

## 2025-02-05 ENCOUNTER — OFFICE VISIT (OUTPATIENT)
Dept: URGENT CARE | Facility: CLINIC | Age: 60
End: 2025-02-05
Payer: MEDICAID

## 2025-02-05 VITALS
SYSTOLIC BLOOD PRESSURE: 137 MMHG | OXYGEN SATURATION: 95 % | BODY MASS INDEX: 18.99 KG/M2 | HEIGHT: 65 IN | HEART RATE: 86 BPM | TEMPERATURE: 98 F | RESPIRATION RATE: 20 BRPM | WEIGHT: 114 LBS | DIASTOLIC BLOOD PRESSURE: 83 MMHG

## 2025-02-05 DIAGNOSIS — R31.9 URINARY TRACT INFECTION WITH HEMATURIA, SITE UNSPECIFIED: Primary | ICD-10-CM

## 2025-02-05 DIAGNOSIS — R30.0 DYSURIA: ICD-10-CM

## 2025-02-05 DIAGNOSIS — N39.0 URINARY TRACT INFECTION WITH HEMATURIA, SITE UNSPECIFIED: Primary | ICD-10-CM

## 2025-02-05 LAB
BILIRUBIN, UA POC OHS: NEGATIVE
BLOOD, UA POC OHS: ABNORMAL
CLARITY, UA POC OHS: CLEAR
COLOR, UA POC OHS: YELLOW
GLUCOSE, UA POC OHS: NEGATIVE
KETONES, UA POC OHS: NEGATIVE
LEUKOCYTES, UA POC OHS: ABNORMAL
NITRITE, UA POC OHS: NEGATIVE
PH, UA POC OHS: 5.5
PROTEIN, UA POC OHS: NEGATIVE
SPECIFIC GRAVITY, UA POC OHS: 1.01
UROBILINOGEN, UA POC OHS: 0.2

## 2025-02-05 PROCEDURE — 99213 OFFICE O/P EST LOW 20 MIN: CPT | Mod: S$GLB,,, | Performed by: NURSE PRACTITIONER

## 2025-02-05 PROCEDURE — 81003 URINALYSIS AUTO W/O SCOPE: CPT | Mod: QW,S$GLB,, | Performed by: NURSE PRACTITIONER

## 2025-02-05 RX ORDER — NITROFURANTOIN 25; 75 MG/1; MG/1
100 CAPSULE ORAL 2 TIMES DAILY
Qty: 14 CAPSULE | Refills: 0 | Status: SHIPPED | OUTPATIENT
Start: 2025-02-05 | End: 2025-02-12

## 2025-02-05 NOTE — PROGRESS NOTES
"Subjective:       Patient ID: Zayra White is a 59 y.o. female.    Vitals:  height is 5' 5" (1.651 m) and weight is 51.7 kg (114 lb). Her oral temperature is 98.2 °F (36.8 °C). Her blood pressure is 137/83 and her pulse is 86. Her respiration is 20 and oxygen saturation is 95%.     Chief Complaint: Dysuria    This is a 59 y.o. female who presents today with a chief complaint of dysuria and frequency that has been persistent over the past 3 days She has increased her water intake with no improvement.      Patient presents with:  Dysuria         Dysuria   This is a new problem. The current episode started in the past 7 days. The problem has been rapidly worsening. The quality of the pain is described as burning. There has been no fever. Associated symptoms include frequency and urgency. She has tried nothing for the symptoms.       Constitution: Negative.   Genitourinary:  Positive for dysuria, frequency and urgency.           Objective:      Physical Exam   Constitutional: She is oriented to person, place, and time. She appears well-developed.   HENT:   Head: Normocephalic and atraumatic.   Ears:   Right Ear: External ear normal.   Left Ear: External ear normal.   Nose: Nose normal. No nasal deformity. No epistaxis.   Mouth/Throat: Oropharynx is clear and moist and mucous membranes are normal.   Eyes: Conjunctivae and lids are normal.   Neck: Trachea normal and phonation normal. Neck supple.   Cardiovascular: Normal rate, regular rhythm, normal heart sounds and normal pulses.   Pulmonary/Chest: Effort normal and breath sounds normal. No respiratory distress.   Abdominal: Normal appearance and bowel sounds are normal. She exhibits no distension and no mass. Soft. There is no abdominal tenderness.   Genitourinary:         Comments: Patient reports dysuria and frequency.  Denies any lower back pain or CVA tenderness.  Denies any fever chills or systemic symptoms.     Musculoskeletal: Normal range of motion.         " General: Normal range of motion.   Neurological: She is alert and oriented to person, place, and time. She has normal strength.   Skin: Skin is warm, dry, intact, not diaphoretic and not pale.   Psychiatric: Her speech is normal and behavior is normal. Judgment and thought content normal.   Nursing note and vitals reviewed.        Past medical history and current medications reviewed.     Results for orders placed or performed in visit on 02/05/25   POCT Urinalysis(Instrument)    Collection Time: 02/05/25  3:36 PM   Result Value Ref Range    Color, POC UA Yellow Yellow, Straw, Colorless    Clarity, POC UA Clear Clear    Glucose, POC UA Negative Negative    Bilirubin, POC UA Negative Negative    Ketones, POC UA Negative Negative    Spec Grav POC UA 1.015 1.005 - 1.030    Blood, POC UA Trace-intact (A) Negative    pH, POC UA 5.5 5.0 - 8.0    Protein, POC UA Negative Negative    Urobilinogen, POC UA 0.2 <=1.0    Nitrite, POC UA Negative Negative    WBC, POC UA Large (A) Negative      Assessment:           1. Urinary tract infection with hematuria, site unspecified    2. Dysuria              Plan:         Urinary tract infection with hematuria, site unspecified  -     nitrofurantoin, macrocrystal-monohydrate, (MACROBID) 100 MG capsule; Take 1 capsule (100 mg total) by mouth 2 (two) times daily. for 7 days  Dispense: 14 capsule; Refill: 0    Dysuria  -     POCT Urinalysis(Instrument)             Patient Instructions   Recommend increased intake of fluids.    If you were prescribed antibiotics take them as directed to completion.    You may take azo OTC as directed for painful urination unless you were prescribe something for these symptoms by the provider.  Follow-up with PCP or return to clinic if no improvement in symptoms over the next 3 days.           Son Cano, XIMENA

## 2025-02-06 NOTE — PROGRESS NOTES
Subjective:       Patient ID: Zayra White is a 59 y.o. female.    Chief Complaint: General Illness (Entered automatically based on patient selection in [employer].)      Past Medical History:   Diagnosis Date    Anxiety     Arthritis     Asthma     Back pain     COPD (chronic obstructive pulmonary disease)     Pulmonary embolism     10 years ago       Past Surgical History:   Procedure Laterality Date    BIOPSY      right breast    BREAST CYST EXCISION      CATARACT EXTRACTION       SECTION      CHOLECYSTECTOMY      COLONOSCOPY N/A 2022    Procedure: COLONOSCOPY;  Surgeon: Chavez Gonzales MD;  Location: [facility] ENDO;  Service: General;  Laterality: N/A;    ESOPHAGOGASTRODUODENOSCOPY N/A 2022    Procedure: ESOPHAGOGASTRODUODENOSCOPY (EGD);  Surgeon: Chavez Gonzales MD;  Location: [facility] ENDO;  Service: General;  Laterality: N/A;    INCISION AND DRAINAGE OF ABSCESS Left 2020    Procedure: INCISION AND DRAINAGE, ABSCESS;  Surgeon: Irvin Villarreal MD;  Location: [facility] OR;  Service: General;  Laterality: Left;        Social History     Socioeconomic History    Marital status: Legally    Tobacco Use    Smoking status: Former     Types: Cigarettes    Smokeless tobacco: Never   Substance and Sexual Activity    Alcohol use: Not Currently     Comment: occ    Drug use: Yes     Types: Benzodiazepines     Comment: as prescribed    Sexual activity: Not Currently     Birth control/protection: Post-menopausal   Social History Narrative    ** Merged History Encounter **          Social Drivers of Health     Financial Resource Strain: Low Risk  (2024)    Overall Financial Resource Strain (CARDIA)     Difficulty of Paying Living Expenses: Not very hard   Food Insecurity: No Food Insecurity (2024)    Hunger Vital Sign     Worried About Running Out of Food in the Last Year: Never true     Ran Out of Food in the Last Year: Never true   Transportation Needs: No Transportation Needs  (7/22/2024)    TRANSPORTATION NEEDS     Transportation : No   Physical Activity: Inactive (8/5/2024)    Exercise Vital Sign     Days of Exercise per Week: 0 days     Minutes of Exercise per Session: 0 min   Stress: Stress Concern Present (8/5/2024)    [employer] [place] [employer] - Occupational Stress Questionnaire     Feeling of Stress : Very much   Housing Stability: Low Risk  (8/5/2024)    Housing Stability Vital Sign     Unable to Pay for Housing in the Last Year: No     Homeless in the Last Year: No       Family History   Problem Relation Name Age of Onset    Breast cancer Sister      Ovarian cancer Neg Hx         Review of patient's allergies indicates:   Allergen Reactions    Ativan [lorazepam] Itching          Current Outpatient Medications:     albuterol (VENTOLIN HFA) 90 mcg/actuation inhaler, Inhale 1-2 puffs into the lungs every 4 (four) hours as needed for Wheezing. inhale 2 puff by inhalation route  every 4 - 6 hours as needed, Disp: 36 g, Rfl: 6    albuterol-ipratropium (DUO-NEB) 2.5 mg-0.5 mg/3 mL nebulizer solution, Take 3 mLs by nebulization every 6 (six) hours as needed for Wheezing or Shortness of Breath. Rescue, Disp: 75 mL, Rfl: 3    alendronate (FOSAMAX) 70 MG tablet, Take 1 tablet (70 mg total) by mouth every 7 days., Disp: 13 tablet, Rfl: 3    aspirin 81 MG Chew, 81 mg., Disp: , Rfl:     atenoloL (TENORMIN) 25 MG tablet, TAKE 1/2 TABLET BY MOUTH EVERY DAY **THANK YOU**, Disp: 45 tablet, Rfl: 3    atorvastatin (LIPITOR) 20 MG tablet, Take 1 tablet (20 mg total) by mouth once daily., Disp: 90 tablet, Rfl: 3    busPIRone (BUSPAR) 15 MG tablet, Take 1 tablet (15 mg total) by mouth 3 (three) times daily., Disp: 270 tablet, Rfl: 3    cholecalciferol, vitamin D3, (VITAMIN D3) 25 mcg (1,000 unit) capsule, Take 2 capsules (2,000 Units total) by mouth once daily., Disp: 60 capsule, Rfl: 0    clonazePAM (KLONOPIN) 0.5 MG tablet, Take 1/2 tablet (.25 mg) to 1 tablet (0.5 mg) up to twice  daily as needed for anxiety, Disp: 60 tablet, Rfl: 2    DULoxetine (CYMBALTA) 20 MG capsule, Take 2 capsules po daily, Disp: 180 capsule, Rfl: 3    fluticasone-umeclidin-vilanter (TRELEGY ELLIPTA) 200-62.5-25 mcg inhaler, Inhale 1 puff into the lungs once daily., Disp: 60 each, Rfl: 11    latanoprost 0.005 % ophthalmic solution, Place 1 drop into both eyes every evening., Disp: , Rfl:     nitrofurantoin, macrocrystal-monohydrate, (MACROBID) 100 MG capsule, Take 1 capsule (100 mg total) by mouth 2 (two) times daily. for 7 days, Disp: 14 capsule, Rfl: 0    ondansetron (ZOFRAN-ODT) 4 MG TbDL, Take 1 tablet (4 mg total) by mouth every 12 (twelve) hours as needed (Nausea)., Disp: 30 tablet, Rfl: 2    pantoprazole (PROTONIX) 40 MG tablet, Take 1 tablet (40 mg total) by mouth once daily. Take in the morning before breakfast.  Wait 30 minutes before eating or drinking anything, Disp: 90 tablet, Rfl: 3    predniSONE (DELTASONE) 5 MG tablet, Take 1 tablet (5 mg total) by mouth once daily., Disp: 60 tablet, Rfl: 0    Ms. White is a 59-year-old female who presents today via E visit for urinary symptoms that include painful urination, pain while passing urine and incomplete bladder emptying.  She states this started on 02/03 2025 and she has tried drinking plenty of water but that has not helped she has a moderate urge to urinate frequently but is unable to void completely when urinating she has not seen any blood in your urine and she states she does not know what the urine looks like there is no abnormal vaginal discharge and there is no flank pain.  She has not had a fever she states that she has moderate discomfort over the past 24 hours but this is not interfered with her ability to go to work and do her daily activities.  She is not a diabetic.    She did have a similar complaint and was placed on Macrobid by another provider approximately 3 weeks ago.  I think a urine culture is in order since she did not clear her bladder  issue with the last antibiotic course.  Once the urinalysis is obtained antibiotics can be sent to the pharmacy    General Illness  Pertinent negatives include no congestion, ear pain, rhinorrhea, sore throat, fever, chest pain, coughing, shortness of breath, abdominal pain or rash.     Review of Systems   Constitutional:  Negative for activity change, appetite change, chills, diaphoresis and fever.   HENT:  Negative for congestion, ear pain, postnasal drip, rhinorrhea and sore throat.    Respiratory:  Negative for cough and shortness of breath.    Cardiovascular:  Negative for chest pain, palpitations and leg swelling.   Gastrointestinal:  Negative for abdominal distention and abdominal pain.   Genitourinary:  Positive for dysuria, frequency and urgency.   Skin:  Negative for color change, rash and wound.       Objective:      Physical Exam  Genitourinary:     Comments: Patient states urgency, pain in incomplete emptying of bladder on urination.        Assessment:       1. Dysuria        Plan:         Dysuria  -     Urinalysis, Reflex to Urine Culture; Future; Expected date: 02/05/2025          -once urinalysis is obtained, antibiotics can be sent to the pharmacy while awaiting urinalysis results    15 minutes was utilized in reviewing this patient's E visit questionnaire, her allergies and her medication list as well as reviewing her last office notes and in documenting this E visit note    Risks, benefits, and side effects were discussed with the patient. All questions were answered to the fullest satisfaction of the patient, and pt verbalized understanding and agreement to treatment plan. Pt was to call with any new or worsening symptoms, or present to the ER.        Polly Lemos MD

## 2025-02-17 ENCOUNTER — OFFICE VISIT (OUTPATIENT)
Dept: FAMILY MEDICINE | Facility: CLINIC | Age: 60
End: 2025-02-17
Payer: MEDICAID

## 2025-02-17 VITALS
DIASTOLIC BLOOD PRESSURE: 74 MMHG | HEIGHT: 65 IN | HEART RATE: 81 BPM | RESPIRATION RATE: 14 BRPM | WEIGHT: 115.81 LBS | OXYGEN SATURATION: 97 % | SYSTOLIC BLOOD PRESSURE: 124 MMHG | BODY MASS INDEX: 19.29 KG/M2

## 2025-02-17 DIAGNOSIS — J43.8 OTHER EMPHYSEMA: ICD-10-CM

## 2025-02-17 DIAGNOSIS — F41.9 ANXIETY: ICD-10-CM

## 2025-02-17 DIAGNOSIS — J44.9 STAGE 4 VERY SEVERE COPD BY GOLD CLASSIFICATION: ICD-10-CM

## 2025-02-17 DIAGNOSIS — R45.89 ANXIETY ABOUT HEALTH: ICD-10-CM

## 2025-02-17 DIAGNOSIS — I10 HYPERTENSION, UNSPECIFIED TYPE: ICD-10-CM

## 2025-02-17 DIAGNOSIS — Z79.899 CHRONIC PRESCRIPTION BENZODIAZEPINE USE: Primary | ICD-10-CM

## 2025-02-17 DIAGNOSIS — M85.80 OSTEOPENIA, UNSPECIFIED LOCATION: ICD-10-CM

## 2025-02-17 DIAGNOSIS — R11.0 NAUSEA: ICD-10-CM

## 2025-02-17 PROCEDURE — 80305 DRUG TEST PRSMV DIR OPT OBS: CPT | Mod: PBBFAC | Performed by: FAMILY MEDICINE

## 2025-02-17 PROCEDURE — 99213 OFFICE O/P EST LOW 20 MIN: CPT | Mod: PBBFAC | Performed by: FAMILY MEDICINE

## 2025-02-17 RX ORDER — ATENOLOL 25 MG/1
TABLET ORAL
Qty: 45 TABLET | Refills: 3 | Status: SHIPPED | OUTPATIENT
Start: 2025-02-17

## 2025-02-17 RX ORDER — ONDANSETRON 4 MG/1
4 TABLET, ORALLY DISINTEGRATING ORAL EVERY 12 HOURS PRN
Qty: 30 TABLET | Refills: 2 | Status: SHIPPED | OUTPATIENT
Start: 2025-02-17

## 2025-02-17 RX ORDER — ALENDRONATE SODIUM 70 MG/1
70 TABLET ORAL
Qty: 13 TABLET | Refills: 3 | Status: SHIPPED | OUTPATIENT
Start: 2025-02-17 | End: 2026-02-17

## 2025-02-17 RX ORDER — CLONAZEPAM 0.5 MG/1
TABLET ORAL
Qty: 60 TABLET | Refills: 2 | Status: SHIPPED | OUTPATIENT
Start: 2025-02-17

## 2025-02-17 NOTE — PROGRESS NOTES
Patient ID: Zayra White is a 59 y.o. female.    Chief Complaint: Medication Refill    History of Present Illness    CHIEF COMPLAINT:  Zayra presents today for annual visit.    COPD:  She started taking Mullein herb 4-5 days ago for COPD symptoms, reporting increased mucus expectoration. She notes improved walking ability, now able to walk two blocks with her granddaughter, which was previously not possible. Before starting Mullein, she experienced severe coughing episodes that were intense enough to cause concern about potential back injury.    MEDICATIONS:  Current medications include Albuterol, Fosamax, Atenolol, Atorvastatin, Duloxetine, Trelogy, Protonix, and Prednisone 5mg daily.    SOCIAL HISTORY:  She is a never smoker.      ROS:  ROS as indicated in HPI.         Physical Exam  Constitutional:       Comments: Thin 59-year-old female, on O2 at 2 L (severe COPD but  doing well today)   Neurological:      Mental Status: She is alert.          Assessment & Plan    IMPRESSION:  - Reviewed patient's recent lab work and physical exam findings  - Evaluated effectiveness of current medication regimen for COPD and other conditions  - Noted distant breath sounds consistent with COPD diagnosis  - Assessed patient's reported improvement in breathing and mobility with use of herbal supplement (Mullein)    COPD:  - Auscultated the patient's lungs and noted distant breath sounds due to COPD.  - Zayra reports improved breathing and reduced coughing after taking Mullein.  - Discussed potential benefits of herbal supplements like Mullein for COPD symptom management.  - Emphasized the importance of continued adherence to prescribed medications alongside any herbal supplements.  - Continued Trelegy, which expires in November.  - Continued albuterol as needed for symptom relief.    CARDIOVASCULAR HEALTH:  - Auscultated the patient's heart and noted normal heart sounds.  - Continued Atenolol, which expires in March.  -  Planned to refill Atenolol prescription.    MEDICATIONS/SUPPLEMENTS:  - Continued atorvastatin, which is good through November.  - Continued duloxetine, which was recently refilled.  - Continued Protonix, which expires in June.  - Continued prednisone 5 mg daily.  - Refilled Fosamax, expires March 28.    WEIGHT MANAGEMENT:  - Zayra to continue current walking routine, as it appears to be beneficial for overall health and mobility.    FOLLOW UP:  - Follow up in 3 months.  - Will reassess need for colorectal screening at next visit (due June 2025).         Plan:          Chronic prescription benzodiazepine use  -     POCT Urine Drug Screen (With BUP)  -     clonazePAM (KLONOPIN) 0.5 MG tablet; Take 1/2 tablet (.25 mg) to 1 tablet (0.5 mg) up to twice daily as needed for anxiety  Dispense: 60 tablet; Refill: 2    Anxiety about health  -     POCT Urine Drug Screen (With BUP)    Nausea  -     ondansetron (ZOFRAN-ODT) 4 MG TbDL; Take 1 tablet (4 mg total) by mouth every 12 (twelve) hours as needed (Nausea).  Dispense: 30 tablet; Refill: 2    Anxiety  -     clonazePAM (KLONOPIN) 0.5 MG tablet; Take 1/2 tablet (.25 mg) to 1 tablet (0.5 mg) up to twice daily as needed for anxiety  Dispense: 60 tablet; Refill: 2    Other emphysema  -     clonazePAM (KLONOPIN) 0.5 MG tablet; Take 1/2 tablet (.25 mg) to 1 tablet (0.5 mg) up to twice daily as needed for anxiety  Dispense: 60 tablet; Refill: 2    Stage 4 very severe COPD by GOLD classification  -     clonazePAM (KLONOPIN) 0.5 MG tablet; Take 1/2 tablet (.25 mg) to 1 tablet (0.5 mg) up to twice daily as needed for anxiety  Dispense: 60 tablet; Refill: 2    Osteopenia, unspecified location  -     alendronate (FOSAMAX) 70 MG tablet; Take 1 tablet (70 mg total) by mouth every 7 days.  Dispense: 13 tablet; Refill: 3    Hypertension, unspecified type  -     atenoloL (TENORMIN) 25 MG tablet; TAKE 1/2 TABLET BY MOUTH EVERY DAY **THANK YOU**  Dispense: 45 tablet; Refill:  3        Follow up in about 3 months (around 5/17/2025), or if symptoms worsen or fail to improve.    This note was generated with the assistance of ambient listening technology. Verbal consent was obtained by the patient and accompanying visitor(s) for the recording of patient appointment to facilitate this note. I attest to having reviewed and edited the generated note for accuracy, though some syntax or spelling errors may persist. Please contact the author of this note for any clarification.

## 2025-02-27 ENCOUNTER — HOSPITAL ENCOUNTER (OUTPATIENT)
Dept: RADIOLOGY | Facility: HOSPITAL | Age: 60
Discharge: HOME | End: 2025-02-27
Attending: NURSE PRACTITIONER
Payer: MEDICAID

## 2025-02-27 ENCOUNTER — RESULTS FOLLOW-UP (OUTPATIENT)
Dept: PULMONOLOGY | Facility: CLINIC | Age: 60
End: 2025-02-27

## 2025-02-27 DIAGNOSIS — R91.8 MULTIPLE LUNG NODULES ON CT: ICD-10-CM

## 2025-02-27 PROCEDURE — 71250 CT THORAX DX C-: CPT | Mod: TC

## 2025-02-27 PROCEDURE — 71250 CT THORAX DX C-: CPT | Mod: 26,,, | Performed by: RADIOLOGY

## 2025-03-31 ENCOUNTER — OFFICE VISIT (OUTPATIENT)
Dept: PULMONOLOGY | Facility: CLINIC | Age: 60
End: 2025-03-31
Payer: MEDICAID

## 2025-03-31 VITALS
OXYGEN SATURATION: 91 % | SYSTOLIC BLOOD PRESSURE: 158 MMHG | DIASTOLIC BLOOD PRESSURE: 84 MMHG | HEART RATE: 93 BPM | BODY MASS INDEX: 19.89 KG/M2 | WEIGHT: 119.38 LBS | HEIGHT: 65 IN

## 2025-03-31 DIAGNOSIS — Z92.241 STEROID-DEPENDENT COPD: Primary | ICD-10-CM

## 2025-03-31 DIAGNOSIS — R91.8 MULTIPLE LUNG NODULES ON CT: ICD-10-CM

## 2025-03-31 DIAGNOSIS — J44.9 STEROID-DEPENDENT COPD: Primary | ICD-10-CM

## 2025-03-31 DIAGNOSIS — J44.9 STAGE 4 VERY SEVERE COPD BY GOLD CLASSIFICATION: ICD-10-CM

## 2025-03-31 DIAGNOSIS — F41.9 ANXIETY: ICD-10-CM

## 2025-03-31 DIAGNOSIS — J98.11 ATELECTASIS OF BOTH LUNGS: ICD-10-CM

## 2025-03-31 DIAGNOSIS — Z87.891 PERSONAL HISTORY OF NICOTINE DEPENDENCE: ICD-10-CM

## 2025-03-31 DIAGNOSIS — J96.12 CHRONIC RESPIRATORY FAILURE WITH HYPOXIA AND HYPERCAPNIA: ICD-10-CM

## 2025-03-31 DIAGNOSIS — R09.89 CHRONIC SINUS COMPLAINTS: ICD-10-CM

## 2025-03-31 DIAGNOSIS — J96.11 CHRONIC RESPIRATORY FAILURE WITH HYPOXIA AND HYPERCAPNIA: ICD-10-CM

## 2025-03-31 PROCEDURE — 99213 OFFICE O/P EST LOW 20 MIN: CPT | Mod: PBBFAC,PO | Performed by: NURSE PRACTITIONER

## 2025-03-31 PROCEDURE — 3008F BODY MASS INDEX DOCD: CPT | Mod: CPTII,,, | Performed by: NURSE PRACTITIONER

## 2025-03-31 PROCEDURE — 3079F DIAST BP 80-89 MM HG: CPT | Mod: CPTII,,, | Performed by: NURSE PRACTITIONER

## 2025-03-31 PROCEDURE — 99214 OFFICE O/P EST MOD 30 MIN: CPT | Mod: S$PBB,,, | Performed by: NURSE PRACTITIONER

## 2025-03-31 PROCEDURE — 1159F MED LIST DOCD IN RCRD: CPT | Mod: CPTII,,, | Performed by: NURSE PRACTITIONER

## 2025-03-31 PROCEDURE — 99999 PR PBB SHADOW E&M-EST. PATIENT-LVL III: CPT | Mod: PBBFAC,,, | Performed by: NURSE PRACTITIONER

## 2025-03-31 PROCEDURE — 3077F SYST BP >= 140 MM HG: CPT | Mod: CPTII,,, | Performed by: NURSE PRACTITIONER

## 2025-03-31 NOTE — PROGRESS NOTES
3/31/2025    Zayra White  In office visit    Chief Complaint   Patient presents with    Hypoxia       HPI:  03/31/2025: Hx: COPD, Chronic Resp Failure on NIV nightly, Former smoker, Lung nodules   In office today with sister.  Using Trelegy once per day and Albuterol as needed with reported benefit. States Albuterol use depends on the weather - states depends on humidity level as well. Using Duonebs as needed as well.  Using supplemental oxygen only as needed throughout the day at 2L via NC. Using NIV nightly.   Feels as if she's been doing well, able to tolerate activity more recently.   Denies recent UC or ER visit for resp complaints.  Currently on 5 mg Prednisone per day - states rarely will take 10 mg per day if needed for breathing issues.  In NOV underwent CT Chest which shows new 11 mm L apex nodule however this film was obtained 2 days after being diagnosed with RSV. Repeat film obtained on FEB reveals stability of nodules, resolution of prior noted 11 mm nodule.        10/18/2024: Hx: COPD, Chronic Resp Failure on NIV nightly, Former smoker, Lung nodules   In office today with sister.  Using Trelegy once per day and Albuterol as needed with reported benefit. States Albuterol use depends on the weather. Using Duonebs as needed as well.  Using supplemental oxygen only as needed throughout the day at 2L via NC. Using NIV nightly.   Feels as if she's been doing well, able to tolerate activity more recently.   Denies recent UC or ER visit for resp complaints.   Completed Prednisone after last appt - currently on 5 mg per day and tolerating well.   Did complete IV CEFEPIME regimen after last appt - anticipate repeat CT in NOV.   Recently started back on Klonopin per PCP for anxiety.  Patient Instructions   Continue current COPD regimen including Trelegy inhaler 1 puff once per day. This inhaler contains an inhaled steroid component. Rinse mouth after each use due to risk for thrush development. If mouth or  tongue develops white sores please contact the clinic and I will order a prescription mouth wash.   Continue to use albuterol and nebulizer treatments as needed for shortness of breath, wheezing, cough.  Continue the use of supplemental oxygen throughout the day as needed and at nighttime. Also continue NIV use nightly.  Continue 5 mg prednisone per day.  Will attempt gentle taper in the near future.  We will repeat CT chest next month -  due in November 20, 2024.  I have ordered sputum cultures - you will leave the office today with sputum specimen cups. Bring to any [facility] within 4 hours of obtaining specimen. Rinse your mouth prior to collecting sample. Please collect sample in the morning by deep coughing prior to eating or drinking. I recommend submitting sample in about two weeks.  Continue current medication regiment. Keep follow up appointment as scheduled. Please call the office if you have any questions or concerns.         08/07/2024: Hx: COPD, Chronic Resp Failure on NIV nightly, Former smoker, Lung nodules   In office today with younger sister.  Using Trelegy once per day and Albuterol as needed with reported benefit. Using Duonebs as needed as well.  Using supplemental oxygen only as needed throughout the day at 2L via NC. Using NIV nightly. Has missed two nights with NIV due to staying at her daughters house but has since resumed use with no reported issue.   Since prior office visit in [place] has had five ER visits for documented COPD exacerbations.   Latest ER visit was on 8/2 to [facility] - patient was admitted for COPD exacerbation - treated with steroids while inpatient and subsequently discharged home with Rx for Prednisone taper and Levaquin. No discharge summary available as of yet for review. States she is currently on the 40 mg x 2 days portion of the taper, also reports compliance with Levaquin. Thinks COPD exacerbation was secondary to riding in vehicle with no AC after cataract  surgery.  Prior to hospitalizations was doing well on 5 mg Prednisone per day.  Over the last few months patient self discontinued Klonopin in attempts to decrease the quantity of her medication regimen.  States anxiety has since increased severely since discontinuation of medicine. Is being followed by her PCP in several other pharmaceutical drugs have been tried with no reported benefit.  Patient is requesting to resume Klonopin at this time. Patient thinks that several hospitalizations over the last few months were triggered somewhat by severe anxiety.    Overall since discharged home patient states she is feeling somewhat improved from a respiratory perspective however feels very drained, fatigued.  Upon review of chart patient has had positive for Pseudomonas on sputum culture submitted 8/2 in which she was started on Levaquin - repeat sputum culture from later that same day showed multi-drug resistant Pseudomonas that was resistant to Levaquin.  Patient Instructions   Continue to use current inhalers including Trelegy once per day and Albuterol as needed.  Continue to use the supplemental oxygen.  Continue to use the NIV machine nightly.  Complete current prednisone taper and then continue 5 mg prednisone per day.  Will attempt to taper you off in the future once stable.  Concern that the Levaquin you were discharged from the hospital with is not going to effectively treat Pseudomonas noted on most recent sputum culture which showed resistance to fluoroquinolones.  Will arrange for outpatient administration of Cefepime 2 g every 8 hours x 7 days via PICC line.  We will arrange home health.  CMP to be drawn once CEFEPIME regimen finished - to be drawn per home health.  Recommend chest x-ray every month x3.  We will repeat CT chest in 3 months, due in November 20, 2024.  I have ordered sputum cultures - you will leave the office today with sputum specimen cups. Bring to any [facility] within 4 hours of  obtaining specimen. Rinse your mouth prior to collecting sample. Please collect sample in the morning by deep coughing prior to eating or drinking. I recommend submitting sample in about two weeks.    I do agree that your severe anxiety could be contributing to the significant respiratory distress that you have been encountering.  Please defer to PCP in regards to treatment.  Continue current medication regiment. Keep follow up appointment as scheduled. Please call the office if you have any questions or concerns.           03/25/2024: Hx: COPD, Chronic Resp Failure on NIV nightly, Former smoker, Lung nodules   Using Trelegy once per day and Albuterol as needed, approx use 3 times per day as of late. Using Duonebs as needed as well - started back recently with benefit.  Using supplemental oxygen only as needed throughout the day at 1-2L via NC. Using NIV nightly.  Taking Prednisone 5 mg per day. States at times she feels like she could taper up for a day or so but has not tapered up since prior office visit.  Was experiencing unintentional weight loss - started on PERIACTIN at the beginning of March - has gained approx 4# since starting.  Underwent repeat CT Chest recently revealing atelectasis to DHAVAL nodule that was previously noted. No new or enlarging nodules noted.  HTN noted in clinic today - /93 - states she is compliant with antihypertensives - thinks may be secondary to anxiety.   Patient Instructions   Continue to use current inhalers including Trelegy once per day and Albuterol as needed.  Continue to use the supplemental oxygen.  Continue to use the NIV machine nightly.  CT Chest reviewed - does not show any acute process. Prior noted area of concern to DHAVAL now less mass like appearing, likely atelectasis. Will plan for CT Chest in 12 months at the latest.  Continue 5 mg prednisone per day. Can trial up to 20 mg per day only as needed then go back to 5 mg.  Need to get DEXA bone scan.  Continue  current medication regiment. Keep follow up appointment as scheduled. Please call the office if you have any questions or concerns.           12/18/2023: Hx: COPD, Chronic Resp Failure on NIV nightly, Former smoker, Lung nodules   Last week had episodes of shortness of breath with recent weather change. States she almost felt as if she could have used a visit to the ER.   Using Trelegy once per day and Albuterol as needed, approx use 6 times per day as of late. Using Duonebs as needed as well.   Taking Prednisone 5 mg per day. States at times she feels like she could taper up for a day or so.  Denies current mucous production - did not submit sputum samples. States if she does get mucous up, it's clear in color.  Using supplemental oxygen only as needed throughout the day at 1-2L via NC. Using NIV nightly.  Stopped vaping since prior office appointment.   Patient Instructions   Continue to use current inhalers including Trelegy once per day and Albuterol as needed.  Continue to use the supplemental oxygen.  Continue to use the NIV machine nightly.  CT Chest from November 2023 reviewed - Will discuss with MD further. MAC vs other infectious etiology? You currently do not bring up mucous - may need bronchoscopy? In the least, will plan for repeat CT in 3 months (Due in March 2024).  Continue 5 mg prednisone per day. Can trial up to 20 mg per day only as needed then go back to 5 mg.  Need to get DEXA bone scan.  Nodifylung blood testing unrevealing.  Consider Dupixent in the future.  Continue current medication regiment. Keep follow up appointment as scheduled. Please call the office if you have any questions or concerns.   *Discussed with J[facility] on 12/19/2023 - CT Chest reviewed - new nodule 9 mm to DHAVAL - not present on CT from August 2023 - recommend repeat CT in three months for follow up. Discussed with patient via telephone on 12/19/2023 at 1603. Ramya VU.       12/05/2023: Documentation Only:   Talked with patient on  12/05/2023 at approx 0930 regarding recent CT Chest - Thi Jason NP discussed findings with Dr. 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 Patient states she does not bring up sputum on a regular basis - states she is not currently bringing up sputum but may start later today. States she is going to her PCP today, will  sputum cups from their office. I ordered Resp culture, [facility] order already in [facility]. Patient is to message me in the next two weeks if she is unable to submit sputum samples and if not, will discuss bronchoscopy further.  Patient verbalizes understanding and acceptance of plan of care. Repeat CT in 3 months anticipated (Due in March 2024).    Using supplemental oxygen only as needed throughout the day at 1-2L via NC. Using NIV nightly.        10/17/2023:  Using supplemental oxygen only as needed throughout the day - mostly when humidity levels are elevated - using at 2-3L via NC. Bleeding in supplemental oxygen into NIV nightly.  Using NIV nightly with much reported benefit.   Seen in [facility] on 7/27 for shortness of breath, diagnosed with COPD exacerbation - completed regimen of Levaquin and Prednisone 60 mg x 4 days.   Using Trelegy once per day and Albuterol only as needed, states depends on the day, when having a bad day may use 6x per day. Using nebulized treatments 1-2x per day.  Using Prednisone 5 mg per day.   Underwent CT Chest in Aug 2023 - states at that time may have had a cold/chest congestion - new nodules are noted throughout - recommend repeat CT in 3 months.  States did not undergo PFT - is having trouble scheduling due to insurance reasons.  Patient Instructions   Reach out to ROSSY in regards to malfunctioning POC machine.  Continue to use current inhalers including Trelegy once per day and Albuterol as needed.  Continue to use the supplemental oxygen.  Continue to use the NIV machine nightly.  CT Chest due in November 2023. The one you had done in August shows a few new  nodules - this could be scarring vs infectious as you endorse you were sick at that time. Can check Quantiferon Gold blood test.  Still would like to get PFT - will see if my staff can help facilitate scheduling.  Continue 5 mg prednisone per day.  Recommend stop using vape.  Nodifylung blood testing unrevealing.  Continue current medication regiment. Keep follow up appointment as scheduled. Please call the office if you have any questions or concerns.           07/07/2023:  Has NIV machine - using nightly without issue. Recently got Life 2000 portable vent for daytime use, states she couldn't tolerate - Life 2000 was recalled? States she is waiting for the DME company to come  the machine.  Using supplemental oxygen PRN throughout the day - at 2L via NC. Bleeding in supplemental oxygen into NIV nightly.  Using Trelegy once per day and Albuterol only as needed, states depends on the day, when having a bad day may use 6x per day. States has worse days when humidity, heat are high.  Using Prednisone 5 mg daily with benefit.   Is scheduled to have CT in August 2023.  Stopped smoking - now vaping with nicotine.   Patient Instructions   Overall doing well!  Continue to use current inhalers including Trelegy once per day and Albuterol as needed.  Continue to use the supplemental oxygen.  Continue to use the NIV machine nightly.  CT Chest due in August 2023.  Can check PFT later in the year and see if lung strength improved. Will plan on PFT the same day as your next appt with me.   Continue 5 mg prednisone per day.  Recommend stop using vape.  Continue current medication regiment. Keep follow up appointment as scheduled. Please call the office if you have any questions or concerns.         03/13/2023:  Doing well overall from a resp perspective. No recent hospitalizations since prior office visit. Using Trelegy once per day and Albuterol only as needed, states depends on the day, when having a bad day may use 6x  per day. Using supplemental oxygen PRN throughout the day - at 2L via NC and at night time in correlation with NIV machine. States tolerating NIV overall however does experience some difficulty with mask. [facility] is looking forward to being more active throughout the day, traveling more. Inquiring whether portable NIV would be beneficial for daytime use.  Using Prednisone 5 mg daily with benefit. Not currently smoking however using vape with nicotine.   Patient Instructions   Overall doing well!  Continue to use current inhalers including Trelegy once per day and Albuterol as needed.  Continue to use the supplemental oxygen.  Continue to use the NIV machine nightly.  May benefit from portable NIV machine to be used throughout the day. Order placed, will see if insurance approves.  CT Chest due in August 2023.  Can check PFT later in the year and see if lung strength improved.  Continue 5 mg prednisone per day.  Continue current medication regiment. Keep follow up appointment as scheduled. Please call the office if you have any questions or concerns.         12/13/2022:  Using Trelegy once per day with benefit, Albuterol nebulized treatments every 6 hours as needed.  Using NIV machine for four hours per day - states ever since starting use has been feeling better. Still using supplemental oxygen, has decreased to only PRN use - not wearing O2 in clinic today and in no acute distress.  Has been on Prednisone 10 mg per day with great benefit, agreeable to decrease to 5 mg.   Not currently smoking - cravings under control, [facility] is concerned with [place] upcoming that cravings will increase.  Interested in lung transplant program - [facility] was told to call back once quit smoking for 2 consecutive months.  Patient Instructions   Continue current regiment - Trelegy once per day and Albuterol as needed for shortness of breath, wheezing, cough.  Prednisone - start alternating 10 mg and 5 mg every other day. Do this for two  "weeks and see who you tolerate. If you have no issues, can go down to 5 mg per day.   Continue NIV use, Continue supplemental oxygen.  Doing well from smoking cessation standpoint.  Alpha 1 test today in office.  CT Chest due Aug 2023  Continue current medication regiment. Keep follow up appointment as scheduled. Please call the office if you have any questions or concerns.         10/13/2022:  Per prior message on 9/29/2022:  "Patient messaged stating she is experiencing shortness of breath, worsening since initiation of NIV use.  I called the patient and discussed the case with her on 09/29/2022  at 0940.  Patient states she received her NIV machine approximately 2 weeks ago, has been using daily for at least 4 hours per day.  States she can tolerate the pressure well while using - with initially experiencing headaches with use, respiratory therapist changed pressures.  Patient states shortness of breath is worsening from baseline, cannot walk from living room to kitchen without getting short-winded, patient states she lives in a camper currently.  Patient using supplemental oxygen throughout the day and at night as needed.  On Trelegy once per day.  Using nebulized treatments every 4-6 hours.  Patient currently taking prednisone regimen that was prescribed at prior visit, 6 days total -states she has 2 days left.  Reports benefit with prednisone use.  Patient denies mucus production.  Patient will proceed with getting chest x-ray at this time.  May need repeat/prolonged prednisone taper.  Patient states she spoke with lung transplant service, however a consultation appointment was not scheduled due to current smoking status.  Patient states she is currently smoking 1-2 cigarettes per day."  Patient required ER visit on 10/8/2022 for COPD exacerabtion, was discharged with Rx for Azithromycin and 9 day regiment of Prednisone. Completed Zpack and currently has 3 days left of Prednisone.   States overall is having a " good day, however yesterday was bad. States she was exposed to several sick contacts and developed bronchitis.   Currently using supplemental oxygen at night time and as needed throughout the day, dose ranges from 1-3lpm.   Using NIV machine nightly, states bleeds o2 in at 2L. Concerned that NIV is causing illness.  States hasn't smoked cigarettes in 2 days now.  Using Trelegy inhaler once per day, albuterol as needed approximately 2x per day. Taking neb treatments every 4-6 hours. States with Albuterol use she experiences hand shakiness, heart palpitations, jitters, and nervousness.   Patient Instructions   Continue Prednisone taper from ER until you finish. Then recommend going to daily Prednisone, 5-10 mg per day based on how you feel.   Continue Trelegy once per day and Albuterol as needed. Have ordered Xopenex for you to use in nebulizer machine since adverse reactions reported to Albuterol.  Continue supplemental oxygen use. Continue NIV use.  Doing well from smoking standpoint, continue smoking cessation.  Continue current medication regiment. Keep follow up appointment as scheduled. Please call the office if you have any questions or concerns.         8/16/2022:  Patient did not keep follow up - I saw patient last in Feb 2022.   Patient required hospitalization for SIRS, COPD at [facility] on 8/7 - was subsequently discharged home on 8/10 (no discharge summary available in Epic). Treated with IV Abx and Bipap therapy while inpatient. States she was discharged home with Rx for steroid and abx, in which she completed both regiments. Patient has had THREE hospitalizations for COPD exacerbations in 2022 alone.  Still using Trelegy, one puff once per day with reported benefit. Using Albuterol inhaler as needed. Using nebulizer machine with Duoneb as needed.   Still smoking cigarettes, approximately 5 cigarettes per day.  Has supplemental oxygen at home and POC - wears as needed for shortness of breath, at  night time - uses 1-2L via NC.  ABG obtained in the hospital on 8/7/2022 revealing pH 7.309, CO2 63.6, O2 122, and HCO3 32.0  Patient Instructions   Area of concern on CT from Feb - need to repeat CT Chest now.  I ordered a Lung Function Test to evaluate lung strength. Please complete prior to next appointment.   Continue Trelegy once per day.  Albuterol as needed. Prescription sent for Duoneb to be taken as needed.  NIV machine ordered given chronic resp failure secondary to COPD with recent hospitalization revealing high CO2 level.  Continue the use of supplemental oxygen.  Stop smoking.   Continue current medication regiment. Keep follow up appointment as scheduled. Please call the office if you have any questions or concerns.             2/18/2022: In office today with sister. Hx: COPD, Chronic Resp failure.  Recently Hospitalized at [facility] on 2/7 for COPD exacerbation, was treated with IV steroids and neb treatments, subsequently discharged home on 2/10 with Rx for Prednisone and Levaquin, which she reports completion and compliance.  Had Prior hospitalization in November 2021 at [facility] for COPD exacerbation - complicated hospitalization with intramuscular hematoma of the left rectus muscle of the anterior abdominal wall - general surgery consulted, resolved spontaneously. Discharged home at that time with supplemental oxygen. Patient also endorses prior ER visit in Feb 2021 for COPD exacerbation and ER visit at [facility] for same.  Currently taking Anora once per day - Albuterol rescue inhaler approx 2-5 times per day. Using Nebulizer (Albuterol and Ipatropium) scheduled per day.  Using oxygen at night time, and as needed throughout the day - 1L via NC.  Shortness of breath: Varies with time - states worse over the last week due to sinus congestion, weather change, pollen. Exertional, improves with rest and tripod position. Affecting ADLS.   Cough: Worsening, productive with clear  mucous production. Worse in the morning, Denies wheezing, chest tightness. Taking Mucinex without benefit. Ran out of Tessalon pearls - they seemed to help.   Patient Instructions   You have two areas of suspicion noted on CT Chest.   One area in the R lower lung - initially noted on CT from 2022.  Area in the L lung has been present since at least 2020 - appears to have grown in size.  Will repeat CT in 3 months.  Would benefit from smoking cessation. Declines referral to smoking cessation program at this time. Has tried Chantix in past.  This inhaler Trelegy is your new daily inhaler. It contains an inhaled steroid component. Rinse mouth after each use due to risk for thrush development. If mouth or tongue develops white sores please contact the clinic and I will order a prescription mouth wash.   Continue to use albuterol rescue inhaler as needed.  Zyrtec - take one pill nightly for 30 days - see if it helps with sinus complaints.  Tessalon pearls as needed for cough.   I ordered a Lung Function Test to evaluate lung strength. Please complete prior to next appointment.         Social Hx: Lives alone - no animals in the home. Previously worked as labored. No Asbestosis exposure, Smoking Hx: Current smoker, started age 14 - 0.5 ppd use.   Family Hx: Mother Lung Cancer, No COPD, Son, Brother, Sister Asthma  Medical Hx: Previous pneumonia (did not require intubation) ; No previous shoulder/chest surgery        The chief compliant  problem varies with instability at times.  PFSH:  Past Medical History:   Diagnosis Date    Anxiety     Arthritis     Asthma     Back pain     COPD (chronic obstructive pulmonary disease)     Pulmonary embolism     10 years ago         Past Surgical History:   Procedure Laterality Date    BIOPSY      right breast    BREAST CYST EXCISION      CATARACT EXTRACTION       SECTION      CHOLECYSTECTOMY      COLONOSCOPY N/A 2022    Procedure: COLONOSCOPY;  Surgeon: Chavez ROSALES  "MD Christian;  Location: [facility] ENDO;  Service: General;  Laterality: N/A;    ESOPHAGOGASTRODUODENOSCOPY N/A 06/06/2022    Procedure: ESOPHAGOGASTRODUODENOSCOPY (EGD);  Surgeon: Chavez Gonzales MD;  Location: [facility] ENDO;  Service: General;  Laterality: N/A;    INCISION AND DRAINAGE OF ABSCESS Left 03/16/2020    Procedure: INCISION AND DRAINAGE, ABSCESS;  Surgeon: Irvin Villarreal MD;  Location: [facility] OR;  Service: General;  Laterality: Left;     Social History     Tobacco Use    Smoking status: Former     Types: Cigarettes    Smokeless tobacco: Never   Substance Use Topics    Alcohol use: Not Currently     Comment: occ    Drug use: Yes     Types: Benzodiazepines     Comment: as prescribed     Family History   Problem Relation Name Age of Onset    Breast cancer Sister      Ovarian cancer Neg Hx       Review of patient's allergies indicates:   Allergen Reactions    Ativan [lorazepam] Itching     I have reviewed past medical, family, and social history. I have reviewed previous nurse notes.    Performance Status:The patient's activity level is functions out of house.      Review of Systems:  a review of eleven systems covering constitutional, Eye, HEENT, Psych, Respiratory, Cardiac, GI, , Musculoskeletal, Endocrine, Dermatologic was negative except for pertinent findings as listed ABOVE and below: pertinent positive as above, rest is good       Exam:Comprehensive exam done. BP (!) 158/84 (BP Location: Right arm, Patient Position: Sitting)   Pulse 93   Ht 5' 5" (1.651 m)   Wt 54.2 kg (119 lb 6.1 oz)   LMP 09/22/2017   SpO2 (!) 91% Comment: on room air at rest  BMI 19.87 kg/m²   Exam included Vitals as listed  Constitutional: She is oriented to person, place, and time. She appears well-developed. No distress.   Nose: Nose normal.   Mouth/Throat: Uvula is midline, oropharynx is clear and moist and mucous membranes are normal. No dental caries. No oropharyngeal exudate, posterior oropharyngeal edema, posterior " oropharyngeal erythema or tonsillar abscesses.  Mallapatti (M) score 1  Eyes: Pupils are equal, round, and reactive to light.   Neck: No JVD present. No thyromegaly present.   Cardiovascular: Normal rate, regular rhythm and normal heart sounds. Exam reveals no gallop and no friction rub.   No murmur heard.  Pulmonary/Chest: Effort normal and breath sounds normal. No accessory muscle usage or stridor. No apnea and no tachypnea. No respiratory distress, on room air. Diminished breath sounds throughout, on supplemental oxygen, in no acute distress.  Abdominal: Soft. She exhibits no mass. There is no tenderness. No hepatosplenomegaly, hernias and normoactive bowel sounds  Musculoskeletal: Normal range of motion. exhibits no edema.   Neurological:  alert and oriented to person, place, and time. not disoriented.   Skin: Skin is warm and dry. Capillary refill takes less 2 sec. No cyanosis or erythema. No pallor. Nails show no clubbing.   Psychiatric: normal mood and affect. behavior is normal. Judgment and thought content normal.       Radiographs (ct chest and cxr) reviewed: view by direct vision       CT Chest Without Contrast 2/27/2025 - Emphysema, stability of prior noted nodules - resolution of 11 mm L apex nodule noted on last film.     CT Chest without Contrast NOV 2024 - new 11 mm nodule to the L apex and nodular appearance to the LML, that has questionable growth in comparison     CTA Chest Non-Coronary (PE Studies) 8/2/2024: No PE, Bronchiectasis, Emphysema, Scattered micro nodules, increasing nodular area to R apex, several areas of spiculation to DHAVAL, one in which seems to be increasing surrounded by smaller nodules.   No evidence of a pulmonary embolus.    CT Chest without Contrast 03/21/2024 - No acute process. DHAVAL Atelectasis. Prior noted lung nodules without change.     CT Chest without Contrast: November 2023 - multiple lung nodules, emphysema     CT Chest Lung Screening Low Dose 8/30/2023 - new areas of  concern, lung nodules    X-Ray Chest 1 View 10/8/2022 Impression:   Marked interval improvement in previously noted patchy segmental airspace consolidation at the left lung base.  There are a few tiny residual nodular densities at the left lung base.  Continued follow-up is recommended to ensure resolution..  Consider a follow-up chest x-ray in 4 weeks.     CT Chest Without Contrast 8/23/2022 FINDINGS:  Centrilobular and paraseptal emphysema.  Bandlike pleuroparenchymal thickening in the right upper lobe again noted.  Target nodule with spiculation in the lingula measures 1.4 cm on series 4, image 317.  3 mm nodule subpleural location right upper lobe series 4, image 301.  3 mm nodule left upper lobe series 4, image 174.  No filling defects central airways.  No pleural effusion or pneumothorax.   Heart size normal.  Coronary artery disease.  No mediastinal adenopathy.  Cholecystectomy clips.  Partially imaged advanced degenerative disc disease at several lower cervical levels.  Several mild remote compression fractures in the upper thoracic spine.   Impression:   1. Unchanged extent of pulmonary nodules, including spicular lesion in the lingula.  2. Coronary artery disease and pulmonary emphysema.        X-Ray Chest AP Portable  8/7/2022   FINDINGS:  Normal cardiomediastinal contour. No focal consolidation, pleural effusion or pneumothorax. The lungs are hyperinflated with flattening of the diaphragms.   Impression:   No acute cardiopulmonary abnormality.   Pulmonary emphysema.       CTA Chest Non-Coronary  2/8/2022   Impression:   No CTA evidence of pulmonary embolus.  Significant centrilobular emphysema.  Stable 1.4 cm spiculated mass of the left lingula.  New 1.9 cm band of spiculated density in the posterior right lung gutter likely represents atelectasis.  Follow-up CT in 3 months is recommended however.      X-Ray Chest AP Portable  2/8/2022   Impression:   COPD.         Labs reviewed   Patient's labs were  reviewed including CBC and CMP    Lab Results   Component Value Date    WBC 12.49 01/18/2025    RBC 4.25 01/18/2025    HGB 13.8 01/18/2025    HCT 40.5 01/18/2025    MCV 95 01/18/2025    MCH 32.5 (H) 01/18/2025    MCHC 34.1 01/18/2025    RDW 12.4 01/18/2025     01/18/2025    MPV 8.8 (L) 01/18/2025    GRAN 8.9 (H) 01/18/2025    GRAN 71.3 01/18/2025    LYMPH 2.5 01/18/2025    LYMPH 20.3 01/18/2025    MONO 0.9 01/18/2025    MONO 7.0 01/18/2025    EOS 0.1 01/18/2025    BASO 0.05 01/18/2025    EOSINOPHIL 0.5 01/18/2025    BASOPHIL 0.4 01/18/2025   CMP  Sodium   Date Value Ref Range Status   01/18/2025 143 136 - 145 mmol/L Final     Potassium   Date Value Ref Range Status   01/18/2025 3.5 3.5 - 5.1 mmol/L Final     Chloride   Date Value Ref Range Status   01/18/2025 102 95 - 110 mmol/L Final     CO2   Date Value Ref Range Status   01/18/2025 28 23 - 29 mmol/L Final     Glucose   Date Value Ref Range Status   01/18/2025 114 (H) 70 - 110 mg/dL Final     BUN   Date Value Ref Range Status   01/18/2025 9 8 - 23 mg/dL Final     Creatinine   Date Value Ref Range Status   01/18/2025 0.8 0.5 - 1.4 mg/dL Final     Calcium   Date Value Ref Range Status   01/18/2025 9.7 8.7 - 10.5 mg/dL Final     Total Protein   Date Value Ref Range Status   01/18/2025 7.4 6.0 - 8.4 g/dL Final     Albumin   Date Value Ref Range Status   01/18/2025 3.6 3.5 - 5.2 g/dL Final     Total Bilirubin   Date Value Ref Range Status   01/18/2025 0.5 0.1 - 1.0 mg/dL Final     Comment:     For infants and newborns, interpretation of results should be based  on gestational age, weight and in agreement with clinical  observations.    Premature Infant recommended reference ranges:  Up to 24 hours.............<8.0 mg/dL  Up to 48 hours............<12.0 mg/dL  3-5 days..................<15.0 mg/dL  6-29 days.................<15.0 mg/dL       Alkaline Phosphatase   Date Value Ref Range Status   01/18/2025 69 40 - 150 U/L Final     AST   Date Value Ref Range Status    01/18/2025 21 10 - 40 U/L Final     ALT   Date Value Ref Range Status   01/18/2025 15 10 - 44 U/L Final     Anion Gap   Date Value Ref Range Status   01/18/2025 13 8 - 16 mmol/L Final     eGFR   Date Value Ref Range Status   01/18/2025 >60.0 >60 mL/min/1.73 m^2 Final         PFT  reviewed  Pulmonary Functions Testing Results:    Patient has chronic respiratory failure secondary to COPD.  Patient will need a specific targeted tidal volume which cannot be provided via a CPAP or BiPAP.  This patient will require a set tidal volume to ensure CO2 levels were lowered adequately; as well as, to assist in establishing proper diaphragmatic functions.  Therefore, NIV is being ordered due to the severe state of this patient's disease.  Without this therapy, the patient runs a higher risk of expiration and readmittance.  Patient is benefiting from nightly NIV use however would also benefit from portable NIV for daytime use to help reduce shortness of breath and increase patient's overall daytime physical activities. Supplemental oxygen during the day is not enough support to complete patient's daily activities.     Plan:  Clinical impression is ambiguous and will need repeated evaluation wrt.    Zayra was seen today for hypoxia.    Diagnoses and all orders for this visit:    Steroid-dependent COPD  -     dupilumab 300 mg/2 mL PnIj; Inject 2 mLs (300 mg total) into the skin every 14 (fourteen) days.    Stage 4 very severe COPD by GOLD classification    Multiple lung nodules on CT    Chronic sinus complaints    Personal history of nicotine dependence    Atelectasis of both lungs    Chronic respiratory failure with hypoxia and hypercapnia    Anxiety        Follow up in about 3 months (around 6/30/2025), or if symptoms worsen or fail to improve.    Discussed with patient above for education the following:      Patient Instructions   Continue current COPD regimen including Trelegy inhaler 1 puff once per day. This inhaler  contains an inhaled steroid component. Rinse mouth after each use due to risk for thrush development. If mouth or tongue develops white sores please contact the clinic and I will order a prescription mouth wash.     Continue to use albuterol and nebulizer treatments as needed for shortness of breath, wheezing, cough.    Continue the use of supplemental oxygen throughout the day as needed and at nighttime.     Also continue NIV use nightly.    Continue 5 mg prednisone per day.  Will attempt gentle taper in the near future.    CT Chest can be done in one year (DUE FEB 2026).    For persistent steroid dependent COPD will prescribe DUPIXENT. This is one injection every two weeks.     I have ordered sputum cultures - you will leave the office today with sputum specimen cups. Bring to any [facility] within 4 hours of obtaining specimen. Rinse your mouth prior to collecting sample. Please collect sample in the morning by deep coughing prior to eating or drinking. I recommend submitting sample in about two weeks.    Continue current medication regiment. Keep follow up appointment as scheduled. Please call the office if you have any questions or concerns.

## 2025-03-31 NOTE — PATIENT INSTRUCTIONS
Continue current COPD regimen including Trelegy inhaler 1 puff once per day. This inhaler contains an inhaled steroid component. Rinse mouth after each use due to risk for thrush development. If mouth or tongue develops white sores please contact the clinic and I will order a prescription mouth wash.     Continue to use albuterol and nebulizer treatments as needed for shortness of breath, wheezing, cough.    Continue the use of supplemental oxygen throughout the day as needed and at nighttime.     Also continue NIV use nightly.    Continue 5 mg prednisone per day.  Will attempt gentle taper in the near future.    CT Chest can be done in one year (DUE FEB 2026).    For persistent steroid dependent COPD will prescribe DUPIXENT. This is one injection every two weeks.     I have ordered sputum cultures - you will leave the office today with sputum specimen cups. Bring to any [facility] within 4 hours of obtaining specimen. Rinse your mouth prior to collecting sample. Please collect sample in the morning by deep coughing prior to eating or drinking. I recommend submitting sample in about two weeks.    Continue current medication regiment. Keep follow up appointment as scheduled. Please call the office if you have any questions or concerns.

## 2025-04-01 PROBLEM — Z92.241 STEROID-DEPENDENT COPD: Status: ACTIVE | Noted: 2025-04-01

## 2025-04-01 PROBLEM — J44.9 STEROID-DEPENDENT COPD: Status: ACTIVE | Noted: 2025-04-01

## 2025-04-03 ENCOUNTER — TELEPHONE (OUTPATIENT)
Dept: PULMONOLOGY | Facility: CLINIC | Age: 60
End: 2025-04-03
Payer: MEDICAID

## 2025-04-15 ENCOUNTER — PATIENT MESSAGE (OUTPATIENT)
Dept: PULMONOLOGY | Facility: CLINIC | Age: 60
End: 2025-04-15
Payer: MEDICAID

## 2025-04-21 ENCOUNTER — TELEPHONE (OUTPATIENT)
Dept: PULMONOLOGY | Facility: CLINIC | Age: 60
End: 2025-04-21
Payer: MEDICAID

## 2025-05-07 ENCOUNTER — TELEPHONE (OUTPATIENT)
Dept: FAMILY MEDICINE | Facility: CLINIC | Age: 60
End: 2025-05-07
Payer: MEDICAID

## 2025-05-07 NOTE — TELEPHONE ENCOUNTER
I CALLED AND SPOKE WITH PATIENT ABOUT RESCHEDULING HER APPOINTMENT   FROM 05/22/25 TO 05/09/25 @8:20

## 2025-05-09 ENCOUNTER — LAB VISIT (OUTPATIENT)
Dept: LAB | Facility: HOSPITAL | Age: 60
End: 2025-05-09
Attending: NURSE PRACTITIONER
Payer: MEDICAID

## 2025-05-09 ENCOUNTER — TELEPHONE (OUTPATIENT)
Dept: PULMONOLOGY | Facility: CLINIC | Age: 60
End: 2025-05-09
Payer: MEDICAID

## 2025-05-09 ENCOUNTER — OFFICE VISIT (OUTPATIENT)
Dept: FAMILY MEDICINE | Facility: CLINIC | Age: 60
End: 2025-05-09
Payer: MEDICAID

## 2025-05-09 VITALS
SYSTOLIC BLOOD PRESSURE: 116 MMHG | HEART RATE: 103 BPM | DIASTOLIC BLOOD PRESSURE: 80 MMHG | BODY MASS INDEX: 19.89 KG/M2 | HEIGHT: 65 IN | RESPIRATION RATE: 15 BRPM | OXYGEN SATURATION: 99 % | WEIGHT: 119.38 LBS

## 2025-05-09 DIAGNOSIS — R11.0 NAUSEA: ICD-10-CM

## 2025-05-09 DIAGNOSIS — J43.8 OTHER EMPHYSEMA: ICD-10-CM

## 2025-05-09 DIAGNOSIS — I10 HYPERTENSION, UNSPECIFIED TYPE: Primary | ICD-10-CM

## 2025-05-09 DIAGNOSIS — Z79.899 CHRONIC PRESCRIPTION BENZODIAZEPINE USE: ICD-10-CM

## 2025-05-09 DIAGNOSIS — A49.8 PSEUDOMONAS INFECTION: ICD-10-CM

## 2025-05-09 DIAGNOSIS — Z00.00 ANNUAL VISIT FOR GENERAL ADULT MEDICAL EXAMINATION WITHOUT ABNORMAL FINDINGS: ICD-10-CM

## 2025-05-09 DIAGNOSIS — J44.9 STAGE 4 VERY SEVERE COPD BY GOLD CLASSIFICATION: ICD-10-CM

## 2025-05-09 DIAGNOSIS — F41.9 ANXIETY: ICD-10-CM

## 2025-05-09 DIAGNOSIS — E78.5 HYPERLIPIDEMIA, UNSPECIFIED HYPERLIPIDEMIA TYPE: ICD-10-CM

## 2025-05-09 LAB
AMP AMPHETAMINE 1000 NM/ML POC: NEGATIVE
BAR BARBITURATES 300 NG/ML POC: NEGATIVE
BUP BUPRENORPHINE 10 NG/ML POC: NEGATIVE
BZO BENZODIAZEPINES 300 NG/ML POC: NEGATIVE
COC COCAINE 300 NG/ML POC: NEGATIVE
CREATININE (CR) POC: 50
CTP QC/QA: YES
MET METHAMPHETAMINE 1000 NG/ML POC: NEGATIVE
MOP/OPI300 MORPHINE 300 NG/ML POC: NEGATIVE
MTD METHADONE 300 NG/ML POC: NEGATIVE
OXIDANT (OX) POC: NEGATIVE
OXY OXYCODONE 100 NG/ML POC: NEGATIVE
SPECIFIC GRAVITY (SG) POC: 1.02
TEMPERATURE (°F) POC: 90
THC MARIJUANA 50 NG/ML POC: NEGATIVE

## 2025-05-09 PROCEDURE — 99999 PR PBB SHADOW E&M-EST. PATIENT-LVL IV: CPT | Mod: PBBFAC,,, | Performed by: FAMILY MEDICINE

## 2025-05-09 PROCEDURE — 99214 OFFICE O/P EST MOD 30 MIN: CPT | Mod: PBBFAC | Performed by: FAMILY MEDICINE

## 2025-05-09 PROCEDURE — 87070 CULTURE OTHR SPECIMN AEROBIC: CPT

## 2025-05-09 RX ORDER — CLONAZEPAM 0.5 MG/1
TABLET ORAL
Qty: 60 TABLET | Refills: 2 | Status: SHIPPED | OUTPATIENT
Start: 2025-06-02

## 2025-05-09 RX ORDER — ONDANSETRON 4 MG/1
4 TABLET, ORALLY DISINTEGRATING ORAL EVERY 12 HOURS PRN
Qty: 30 TABLET | Refills: 2 | Status: SHIPPED | OUTPATIENT
Start: 2025-05-09

## 2025-05-09 NOTE — PROGRESS NOTES
Patient ID: Zayra White is a 59 y.o. female.    Chief Complaint: Follow-up (3 month)    History of Present Illness    CHIEF COMPLAINT:  Zayra presents today for medication refills    MEDICATIONS:  She is taking Klonopin daily and awaiting second dose of Dupixent.    MEDICAL HISTORY:  She has history of benign biopsy.    LABS:  Urine culture was submitted this morning. Last cholesterol check was in March 2021.      ROS:  ROS as indicated in HPI.         Physical Exam  Constitutional:       Appearance: Normal appearance.      Comments: Ms. White is a frail appearing 59-year-old on chronic O2 due to COPD   HENT:      Head: Normocephalic and atraumatic.      Nose: Nose normal.   Eyes:      Extraocular Movements: Extraocular movements intact.      Pupils: Pupils are equal, round, and reactive to light.   Cardiovascular:      Rate and Rhythm: Regular rhythm. Tachycardia present.      Pulses: Normal pulses.   Pulmonary:      Effort: Pulmonary effort is normal. No respiratory distress.      Breath sounds: No wheezing or rhonchi.      Comments: Distant breath sounds consistent with her severe COPD  Musculoskeletal:         General: Normal range of motion.   Skin:     General: Skin is warm and dry.   Neurological:      General: No focal deficit present.      Mental Status: She is alert and oriented to person, place, and time.   Psychiatric:         Mood and Affect: Mood normal.         Behavior: Behavior normal.         Thought Content: Thought content normal.          Assessment & Plan    F13.20 Sedative, hypnotic or anxiolytic dependence, uncomplicated  Z87.898 Personal history of other specified conditions    IMPRESSION:  - Reviewed medication refill needs and current prescription status.  - Evaluated need for cholesterol screening.  - Assessed preventive care needs, including mammogram and DEXA scan status.    SEDATIVE, HYPNOTIC OR ANXIOLYTIC USE:  - Monitored patient's Klonopin usage (takes half to whole tablet  daily).  - Last prescription was written February 17th and filled May 1st.  - Ordered urine drug screen to monitor compliance.  - Provided refill dated for June 1st to prevent unnecessary return visit.    PERSONAL HISTORY OF ABNORMAL MAMMOGRAM:  - Reviewed patient's history of abnormal mammogram that previously required biopsy.  - Next mammogram is not due until December.    OTHER MEDICAL MANAGEMENT:  - Will message Chloe regarding status of Dupixent prescription.  - Ordered fasting cholesterol lab test.         Plan:          Hypertension, unspecified type  -     Microalbumin/Creatinine Ratio, Urine; Future; Expected date: 05/09/2025  -     CBC Auto Differential; Future; Expected date: 05/09/2025  -     Comprehensive Metabolic Panel; Future; Expected date: 05/09/2025  -     TSH; Future; Expected date: 05/09/2025    Nausea  -     ondansetron (ZOFRAN-ODT) 4 MG TbDL; Take 1 tablet (4 mg total) by mouth every 12 (twelve) hours as needed (Nausea).  Dispense: 30 tablet; Refill: 2    Chronic prescription benzodiazepine use  -     clonazePAM (KLONOPIN) 0.5 MG tablet; Take 1/2 tablet (.25 mg) to 1 tablet (0.5 mg) up to twice daily as needed for anxiety  Dispense: 60 tablet; Refill: 2  -     POCT Urine Drug Screen (With BUP)    Anxiety  -     clonazePAM (KLONOPIN) 0.5 MG tablet; Take 1/2 tablet (.25 mg) to 1 tablet (0.5 mg) up to twice daily as needed for anxiety  Dispense: 60 tablet; Refill: 2    Other emphysema  -     clonazePAM (KLONOPIN) 0.5 MG tablet; Take 1/2 tablet (.25 mg) to 1 tablet (0.5 mg) up to twice daily as needed for anxiety  Dispense: 60 tablet; Refill: 2    Stage 4 very severe COPD by GOLD classification  -     clonazePAM (KLONOPIN) 0.5 MG tablet; Take 1/2 tablet (.25 mg) to 1 tablet (0.5 mg) up to twice daily as needed for anxiety  Dispense: 60 tablet; Refill: 2  -     Vitamin D; Future; Expected date: 05/09/2025    Hyperlipidemia, unspecified hyperlipidemia type  -     Lipid Panel; Future; Expected date:  05/09/2025    Annual visit for general adult medical examination without abnormal findings  -     Microalbumin/Creatinine Ratio, Urine; Future; Expected date: 05/09/2025  -     Vitamin D; Future; Expected date: 05/09/2025  -     Vitamin B12; Future; Expected date: 05/09/2025  -     Lipid Panel; Future; Expected date: 05/09/2025  -     CBC Auto Differential; Future; Expected date: 05/09/2025  -     Comprehensive Metabolic Panel; Future; Expected date: 05/09/2025  -     Hemoglobin A1C; Future; Expected date: 05/09/2025  -     TSH; Future; Expected date: 05/09/2025        Follow up in about 3 months (around 8/9/2025), or if symptoms worsen or fail to improve.    This note was generated with the assistance of ambient listening technology. Verbal consent was obtained by the patient and accompanying visitor(s) for the recording of patient appointment to facilitate this note. I attest to having reviewed and edited the generated note for accuracy, though some syntax or spelling errors may persist. Please contact the author of this note for any clarification.

## 2025-05-09 NOTE — TELEPHONE ENCOUNTER
Secure chat received from provider about pt not receiving Dupixent. Reached out to [facility] for status update spoke to Ledy JOE. she explained pt has [place] Medicaid and they're requiring a manual PA. Ledy faxed paperwork to office, provider signed and faxed back to [facility].

## 2025-05-09 NOTE — TELEPHONE ENCOUNTER
Received secure chat from provider explaining pt has never received Dupixent. Reached out to [facility] via TEAMS spoke to Ledy JOE for status update. Ledy reports PA is being worked on but [place] medicaid is requesting a manual PA. Ledy will try to complete PA today and expedite order. Provider and pt notified. Advised pt if she has not heard from [facility] by Wednesday to reach out to clinic, pt v/u.

## 2025-05-12 ENCOUNTER — RESULTS FOLLOW-UP (OUTPATIENT)
Dept: PULMONOLOGY | Facility: CLINIC | Age: 60
End: 2025-05-12
Payer: MEDICAID

## 2025-05-12 DIAGNOSIS — A49.2 H. INFLUENZAE INFECTION: Primary | ICD-10-CM

## 2025-05-12 LAB
BACTERIA SPEC CULT: ABNORMAL
BETA LACTAMASE (OHS): NEGATIVE
GRAM STN SPEC: ABNORMAL

## 2025-05-12 RX ORDER — AMOXICILLIN AND CLAVULANATE POTASSIUM 500; 125 MG/1; MG/1
1 TABLET, FILM COATED ORAL 2 TIMES DAILY
Qty: 20 TABLET | Refills: 0 | Status: ON HOLD | OUTPATIENT
Start: 2025-05-12 | End: 2025-05-22

## 2025-05-13 ENCOUNTER — RESULTS FOLLOW-UP (OUTPATIENT)
Dept: FAMILY MEDICINE | Facility: CLINIC | Age: 60
End: 2025-05-13
Payer: MEDICAID

## 2025-05-13 ENCOUNTER — LAB VISIT (OUTPATIENT)
Dept: LAB | Facility: HOSPITAL | Age: 60
End: 2025-05-13
Attending: FAMILY MEDICINE
Payer: MEDICAID

## 2025-05-13 ENCOUNTER — TELEPHONE (OUTPATIENT)
Dept: PULMONOLOGY | Facility: CLINIC | Age: 60
End: 2025-05-13
Payer: MEDICAID

## 2025-05-13 DIAGNOSIS — Z00.00 ANNUAL VISIT FOR GENERAL ADULT MEDICAL EXAMINATION WITHOUT ABNORMAL FINDINGS: ICD-10-CM

## 2025-05-13 DIAGNOSIS — I10 HYPERTENSION, UNSPECIFIED TYPE: ICD-10-CM

## 2025-05-13 LAB
ALBUMIN/CREAT UR: 6.7 UG/MG
CREAT UR-MCNC: 209 MG/DL (ref 15–325)
MICROALBUMIN UR-MCNC: 14 UG/ML (ref ?–5000)

## 2025-05-13 PROCEDURE — 82570 ASSAY OF URINE CREATININE: CPT

## 2025-05-13 NOTE — TELEPHONE ENCOUNTER
----- Message from Elsi sent at 5/13/2025 11:00 AM CDT -----  Contact: patient  Type:  Sooner Apoointment RequestCaller is requesting a sooner appointment.  Caller declined first available appointment listed below.  Caller will not accept being placed on the waitlist and is requesting a message be sent to doctor.Name of Caller:patientWhen is the first available appointment? N/aSymptoms:COPD 3 mo f/uWould the patient rather a call back or a response via [facility]? [facility] Call Back Number:[phone] Additional Information: patient states she sees Chloe melendez 3 months and is due in June 2025. Please  call with an appt.

## 2025-05-17 ENCOUNTER — HOSPITAL ENCOUNTER (OUTPATIENT)
Facility: HOSPITAL | Age: 60
Discharge: HOME | End: 2025-05-18
Attending: EMERGENCY MEDICINE | Admitting: STUDENT IN AN ORGANIZED HEALTH CARE EDUCATION/TRAINING PROGRAM
Payer: MEDICAID

## 2025-05-17 DIAGNOSIS — Z13.6 SCREENING FOR CARDIOVASCULAR CONDITION: ICD-10-CM

## 2025-05-17 DIAGNOSIS — R07.9 CHEST PAIN: ICD-10-CM

## 2025-05-17 DIAGNOSIS — R11.2 INTRACTABLE NAUSEA AND VOMITING: Primary | ICD-10-CM

## 2025-05-17 DIAGNOSIS — A41.3: ICD-10-CM

## 2025-05-17 DIAGNOSIS — I95.9 HYPOTENSION, UNSPECIFIED HYPOTENSION TYPE: ICD-10-CM

## 2025-05-17 PROBLEM — R00.0 SINUS TACHYCARDIA: Status: ACTIVE | Noted: 2025-05-17

## 2025-05-17 LAB
ABSOLUTE EOSINOPHIL (OHS): 0.04 K/UL
ABSOLUTE MONOCYTE (OHS): 0.24 K/UL (ref 0.3–1)
ABSOLUTE NEUTROPHIL COUNT (OHS): 5.33 K/UL (ref 1.8–7.7)
ALBUMIN SERPL BCP-MCNC: 3.8 G/DL (ref 3.5–5.2)
ALP SERPL-CCNC: 59 UNIT/L (ref 40–150)
ALT SERPL W/O P-5'-P-CCNC: 14 UNIT/L (ref 10–44)
ANION GAP (OHS): 11 MMOL/L (ref 8–16)
AST SERPL-CCNC: 44 UNIT/L (ref 11–45)
BASOPHILS # BLD AUTO: 0.03 K/UL
BASOPHILS NFR BLD AUTO: 0.5 %
BILIRUB SERPL-MCNC: 0.7 MG/DL (ref 0.1–1)
BILIRUB UR QL STRIP.AUTO: NEGATIVE
BUN SERPL-MCNC: 8 MG/DL (ref 8–23)
CALCIUM SERPL-MCNC: 8.3 MG/DL (ref 8.7–10.5)
CHLORIDE SERPL-SCNC: 103 MMOL/L (ref 95–110)
CLARITY UR: CLEAR
CO2 SERPL-SCNC: 25 MMOL/L (ref 23–29)
COLOR UR AUTO: YELLOW
CREAT SERPL-MCNC: 0.8 MG/DL (ref 0.5–1.4)
ERYTHROCYTE [DISTWIDTH] IN BLOOD BY AUTOMATED COUNT: 12.4 % (ref 11.5–14.5)
GFR SERPLBLD CREATININE-BSD FMLA CKD-EPI: >60 ML/MIN/1.73/M2
GLUCOSE SERPL-MCNC: 114 MG/DL (ref 70–110)
GLUCOSE UR QL STRIP: NEGATIVE
HCT VFR BLD AUTO: 42.4 % (ref 37–48.5)
HCV AB SERPL QL IA: NORMAL
HGB BLD-MCNC: 14.5 GM/DL (ref 12–16)
HGB UR QL STRIP: NEGATIVE
HIV 1+2 AB+HIV1 P24 AG SERPL QL IA: NORMAL
IMM GRANULOCYTES # BLD AUTO: 0.02 K/UL (ref 0–0.04)
IMM GRANULOCYTES NFR BLD AUTO: 0.3 % (ref 0–0.5)
KETONES UR QL STRIP: NEGATIVE
LACTATE SERPL-SCNC: 1.7 MMOL/L (ref 0.5–2.2)
LEUKOCYTE ESTERASE UR QL STRIP: NEGATIVE
LIPASE SERPL-CCNC: 51 U/L (ref 4–60)
LYMPHOCYTES # BLD AUTO: 0.92 K/UL (ref 1–4.8)
MCH RBC QN AUTO: 32.7 PG (ref 27–31)
MCHC RBC AUTO-ENTMCNC: 34.2 G/DL (ref 32–36)
MCV RBC AUTO: 96 FL (ref 82–98)
NITRITE UR QL STRIP: NEGATIVE
NUCLEATED RBC (/100WBC) (OHS): 0 /100 WBC
PH UR STRIP: 6 [PH]
PLATELET # BLD AUTO: 274 K/UL (ref 150–450)
PMV BLD AUTO: 8.9 FL (ref 9.2–12.9)
POTASSIUM SERPL-SCNC: 3.8 MMOL/L (ref 3.5–5.1)
PROT SERPL-MCNC: 7.1 GM/DL (ref 6–8.4)
PROT UR QL STRIP: NEGATIVE
RBC # BLD AUTO: 4.44 M/UL (ref 4–5.4)
RELATIVE EOSINOPHIL (OHS): 0.6 %
RELATIVE LYMPHOCYTE (OHS): 14 % (ref 18–48)
RELATIVE MONOCYTE (OHS): 3.6 % (ref 4–15)
RELATIVE NEUTROPHIL (OHS): 81 % (ref 38–73)
SODIUM SERPL-SCNC: 139 MMOL/L (ref 136–145)
SP GR UR STRIP: <=1.005
TROPONIN I SERPL DL<=0.01 NG/ML-MCNC: <0.006 NG/ML
UROBILINOGEN UR STRIP-ACNC: NEGATIVE EU/DL
WBC # BLD AUTO: 6.58 K/UL (ref 3.9–12.7)

## 2025-05-17 PROCEDURE — 99900031 HC PATIENT EDUCATION (STAT)

## 2025-05-17 PROCEDURE — 85025 COMPLETE CBC W/AUTO DIFF WBC: CPT | Performed by: EMERGENCY MEDICINE

## 2025-05-17 PROCEDURE — 25000003 PHARM REV CODE 250: Performed by: STUDENT IN AN ORGANIZED HEALTH CARE EDUCATION/TRAINING PROGRAM

## 2025-05-17 PROCEDURE — 25000003 PHARM REV CODE 250: Performed by: EMERGENCY MEDICINE

## 2025-05-17 PROCEDURE — 83690 ASSAY OF LIPASE: CPT | Performed by: EMERGENCY MEDICINE

## 2025-05-17 PROCEDURE — 94640 AIRWAY INHALATION TREATMENT: CPT

## 2025-05-17 PROCEDURE — 71045 X-RAY EXAM CHEST 1 VIEW: CPT | Mod: TC

## 2025-05-17 PROCEDURE — G0378 HOSPITAL OBSERVATION PER HR: HCPCS

## 2025-05-17 PROCEDURE — 87389 HIV-1 AG W/HIV-1&-2 AB AG IA: CPT | Performed by: EMERGENCY MEDICINE

## 2025-05-17 PROCEDURE — 99900035 HC TECH TIME PER 15 MIN (STAT)

## 2025-05-17 PROCEDURE — 87040 BLOOD CULTURE FOR BACTERIA: CPT | Mod: 91 | Performed by: EMERGENCY MEDICINE

## 2025-05-17 PROCEDURE — 82040 ASSAY OF SERUM ALBUMIN: CPT | Performed by: EMERGENCY MEDICINE

## 2025-05-17 PROCEDURE — 25000242 PHARM REV CODE 250 ALT 637 W/ HCPCS: Performed by: EMERGENCY MEDICINE

## 2025-05-17 PROCEDURE — 99222 1ST HOSP IP/OBS MODERATE 55: CPT | Mod: ,,, | Performed by: STUDENT IN AN ORGANIZED HEALTH CARE EDUCATION/TRAINING PROGRAM

## 2025-05-17 PROCEDURE — 84484 ASSAY OF TROPONIN QUANT: CPT | Performed by: EMERGENCY MEDICINE

## 2025-05-17 PROCEDURE — 96374 THER/PROPH/DIAG INJ IV PUSH: CPT

## 2025-05-17 PROCEDURE — 96361 HYDRATE IV INFUSION ADD-ON: CPT

## 2025-05-17 PROCEDURE — 71045 X-RAY EXAM CHEST 1 VIEW: CPT | Mod: 26,,, | Performed by: RADIOLOGY

## 2025-05-17 PROCEDURE — 74177 CT ABD & PELVIS W/CONTRAST: CPT | Mod: TC

## 2025-05-17 PROCEDURE — 94761 N-INVAS EAR/PLS OXIMETRY MLT: CPT

## 2025-05-17 PROCEDURE — 83605 ASSAY OF LACTIC ACID: CPT | Performed by: EMERGENCY MEDICINE

## 2025-05-17 PROCEDURE — 93005 ELECTROCARDIOGRAM TRACING: CPT | Performed by: INTERNAL MEDICINE

## 2025-05-17 PROCEDURE — 27000221 HC OXYGEN, UP TO 24 HOURS

## 2025-05-17 PROCEDURE — 94799 UNLISTED PULMONARY SVC/PX: CPT

## 2025-05-17 PROCEDURE — 96376 TX/PRO/DX INJ SAME DRUG ADON: CPT

## 2025-05-17 PROCEDURE — 99406 BEHAV CHNG SMOKING 3-10 MIN: CPT

## 2025-05-17 PROCEDURE — 99285 EMERGENCY DEPT VISIT HI MDM: CPT | Mod: 25

## 2025-05-17 PROCEDURE — 63600175 PHARM REV CODE 636 W HCPCS: Performed by: EMERGENCY MEDICINE

## 2025-05-17 PROCEDURE — 63600175 PHARM REV CODE 636 W HCPCS: Mod: UD | Performed by: STUDENT IN AN ORGANIZED HEALTH CARE EDUCATION/TRAINING PROGRAM

## 2025-05-17 PROCEDURE — 93010 ELECTROCARDIOGRAM REPORT: CPT | Mod: ,,, | Performed by: INTERNAL MEDICINE

## 2025-05-17 PROCEDURE — 96375 TX/PRO/DX INJ NEW DRUG ADDON: CPT

## 2025-05-17 PROCEDURE — 86803 HEPATITIS C AB TEST: CPT | Performed by: EMERGENCY MEDICINE

## 2025-05-17 PROCEDURE — 81003 URINALYSIS AUTO W/O SCOPE: CPT | Performed by: EMERGENCY MEDICINE

## 2025-05-17 PROCEDURE — 25500020 PHARM REV CODE 255: Performed by: EMERGENCY MEDICINE

## 2025-05-17 RX ORDER — TALC
6 POWDER (GRAM) TOPICAL NIGHTLY PRN
Status: DISCONTINUED | OUTPATIENT
Start: 2025-05-17 | End: 2025-05-18 | Stop reason: HOSPADM

## 2025-05-17 RX ORDER — PREDNISONE 1 MG/1
5 TABLET ORAL DAILY
Status: DISCONTINUED | OUTPATIENT
Start: 2025-05-17 | End: 2025-05-18 | Stop reason: HOSPADM

## 2025-05-17 RX ORDER — SODIUM,POTASSIUM PHOSPHATES 280-250MG
2 POWDER IN PACKET (EA) ORAL
Status: DISCONTINUED | OUTPATIENT
Start: 2025-05-17 | End: 2025-05-18 | Stop reason: HOSPADM

## 2025-05-17 RX ORDER — ATORVASTATIN CALCIUM 10 MG/1
20 TABLET, FILM COATED ORAL DAILY
Status: DISCONTINUED | OUTPATIENT
Start: 2025-05-18 | End: 2025-05-18 | Stop reason: HOSPADM

## 2025-05-17 RX ORDER — IBUPROFEN 200 MG
16 TABLET ORAL
Status: DISCONTINUED | OUTPATIENT
Start: 2025-05-17 | End: 2025-05-18 | Stop reason: HOSPADM

## 2025-05-17 RX ORDER — CEFEPIME HYDROCHLORIDE 2 G/1
2 INJECTION, POWDER, FOR SOLUTION INTRAVENOUS
Status: DISCONTINUED | OUTPATIENT
Start: 2025-05-17 | End: 2025-05-17

## 2025-05-17 RX ORDER — PROCHLORPERAZINE EDISYLATE 5 MG/ML
5 INJECTION INTRAMUSCULAR; INTRAVENOUS EVERY 6 HOURS PRN
Status: DISCONTINUED | OUTPATIENT
Start: 2025-05-17 | End: 2025-05-18 | Stop reason: HOSPADM

## 2025-05-17 RX ORDER — IBUPROFEN 200 MG
24 TABLET ORAL
Status: DISCONTINUED | OUTPATIENT
Start: 2025-05-17 | End: 2025-05-18 | Stop reason: HOSPADM

## 2025-05-17 RX ORDER — OXYCODONE HYDROCHLORIDE 5 MG/1
5 TABLET ORAL EVERY 6 HOURS PRN
Refills: 0 | Status: DISCONTINUED | OUTPATIENT
Start: 2025-05-17 | End: 2025-05-18 | Stop reason: HOSPADM

## 2025-05-17 RX ORDER — DULOXETIN HYDROCHLORIDE 20 MG/1
40 CAPSULE, DELAYED RELEASE ORAL DAILY
Status: DISCONTINUED | OUTPATIENT
Start: 2025-05-18 | End: 2025-05-18 | Stop reason: HOSPADM

## 2025-05-17 RX ORDER — ACETAMINOPHEN 325 MG/1
650 TABLET ORAL EVERY 4 HOURS PRN
Status: DISCONTINUED | OUTPATIENT
Start: 2025-05-17 | End: 2025-05-18 | Stop reason: HOSPADM

## 2025-05-17 RX ORDER — ONDANSETRON HYDROCHLORIDE 2 MG/ML
4 INJECTION, SOLUTION INTRAVENOUS EVERY 8 HOURS PRN
Status: DISCONTINUED | OUTPATIENT
Start: 2025-05-17 | End: 2025-05-18 | Stop reason: HOSPADM

## 2025-05-17 RX ORDER — IPRATROPIUM BROMIDE AND ALBUTEROL SULFATE 2.5; .5 MG/3ML; MG/3ML
3 SOLUTION RESPIRATORY (INHALATION)
Status: COMPLETED | OUTPATIENT
Start: 2025-05-17 | End: 2025-05-17

## 2025-05-17 RX ORDER — ONDANSETRON HYDROCHLORIDE 2 MG/ML
8 INJECTION, SOLUTION INTRAVENOUS
Status: COMPLETED | OUTPATIENT
Start: 2025-05-17 | End: 2025-05-17

## 2025-05-17 RX ORDER — SODIUM CHLORIDE, SODIUM LACTATE, POTASSIUM CHLORIDE, CALCIUM CHLORIDE 600; 310; 30; 20 MG/100ML; MG/100ML; MG/100ML; MG/100ML
INJECTION, SOLUTION INTRAVENOUS CONTINUOUS
Status: DISCONTINUED | OUTPATIENT
Start: 2025-05-17 | End: 2025-05-18

## 2025-05-17 RX ORDER — ALUMINUM HYDROXIDE, MAGNESIUM HYDROXIDE, AND SIMETHICONE 1200; 120; 1200 MG/30ML; MG/30ML; MG/30ML
30 SUSPENSION ORAL 4 TIMES DAILY PRN
Status: DISCONTINUED | OUTPATIENT
Start: 2025-05-17 | End: 2025-05-18 | Stop reason: HOSPADM

## 2025-05-17 RX ORDER — NAPROXEN SODIUM 220 MG/1
81 TABLET, FILM COATED ORAL DAILY
Status: DISCONTINUED | OUTPATIENT
Start: 2025-05-18 | End: 2025-05-18 | Stop reason: HOSPADM

## 2025-05-17 RX ORDER — SODIUM CHLORIDE 0.9 % (FLUSH) 0.9 %
10 SYRINGE (ML) INJECTION EVERY 12 HOURS PRN
Status: DISCONTINUED | OUTPATIENT
Start: 2025-05-17 | End: 2025-05-18 | Stop reason: HOSPADM

## 2025-05-17 RX ORDER — IPRATROPIUM BROMIDE AND ALBUTEROL SULFATE 2.5; .5 MG/3ML; MG/3ML
3 SOLUTION RESPIRATORY (INHALATION) EVERY 6 HOURS PRN
Status: DISCONTINUED | OUTPATIENT
Start: 2025-05-17 | End: 2025-05-18 | Stop reason: HOSPADM

## 2025-05-17 RX ORDER — LANOLIN ALCOHOL/MO/W.PET/CERES
800 CREAM (GRAM) TOPICAL
Status: DISCONTINUED | OUTPATIENT
Start: 2025-05-17 | End: 2025-05-18 | Stop reason: HOSPADM

## 2025-05-17 RX ORDER — CEFEPIME HYDROCHLORIDE 2 G/1
2 INJECTION, POWDER, FOR SOLUTION INTRAVENOUS
Status: COMPLETED | OUTPATIENT
Start: 2025-05-17 | End: 2025-05-17

## 2025-05-17 RX ORDER — BUSPIRONE HYDROCHLORIDE 5 MG/1
15 TABLET ORAL 3 TIMES DAILY
Status: DISCONTINUED | OUTPATIENT
Start: 2025-05-17 | End: 2025-05-18 | Stop reason: HOSPADM

## 2025-05-17 RX ORDER — LATANOPROST 50 UG/ML
1 SOLUTION/ DROPS OPHTHALMIC NIGHTLY
Status: DISCONTINUED | OUTPATIENT
Start: 2025-05-17 | End: 2025-05-18 | Stop reason: HOSPADM

## 2025-05-17 RX ORDER — CEFEPIME HYDROCHLORIDE 2 G/1
2 INJECTION, POWDER, FOR SOLUTION INTRAVENOUS
Status: DISCONTINUED | OUTPATIENT
Start: 2025-05-17 | End: 2025-05-18 | Stop reason: HOSPADM

## 2025-05-17 RX ORDER — ACETAMINOPHEN 500 MG
1000 TABLET ORAL
Status: COMPLETED | OUTPATIENT
Start: 2025-05-17 | End: 2025-05-17

## 2025-05-17 RX ORDER — GLUCAGON 1 MG
1 KIT INJECTION
Status: DISCONTINUED | OUTPATIENT
Start: 2025-05-17 | End: 2025-05-18 | Stop reason: HOSPADM

## 2025-05-17 RX ORDER — PANTOPRAZOLE SODIUM 40 MG/1
40 TABLET, DELAYED RELEASE ORAL DAILY
Status: DISCONTINUED | OUTPATIENT
Start: 2025-05-17 | End: 2025-05-18 | Stop reason: HOSPADM

## 2025-05-17 RX ORDER — ENOXAPARIN SODIUM 100 MG/ML
40 INJECTION SUBCUTANEOUS EVERY 24 HOURS
Status: DISCONTINUED | OUTPATIENT
Start: 2025-05-18 | End: 2025-05-18 | Stop reason: HOSPADM

## 2025-05-17 RX ORDER — CLONAZEPAM 0.5 MG/1
0.5 TABLET ORAL 2 TIMES DAILY PRN
Status: DISCONTINUED | OUTPATIENT
Start: 2025-05-17 | End: 2025-05-18 | Stop reason: HOSPADM

## 2025-05-17 RX ORDER — NALOXONE HCL 0.4 MG/ML
0.02 VIAL (ML) INJECTION
Status: DISCONTINUED | OUTPATIENT
Start: 2025-05-17 | End: 2025-05-18 | Stop reason: HOSPADM

## 2025-05-17 RX ADMIN — PANTOPRAZOLE SODIUM 40 MG: 40 TABLET, DELAYED RELEASE ORAL at 01:05

## 2025-05-17 RX ADMIN — PREDNISONE 5 MG: 1 TABLET ORAL at 01:05

## 2025-05-17 RX ADMIN — Medication 6 MG: at 08:05

## 2025-05-17 RX ADMIN — BUSPIRONE HYDROCHLORIDE 15 MG: 5 TABLET ORAL at 08:05

## 2025-05-17 RX ADMIN — ACETAMINOPHEN 650 MG: 325 TABLET ORAL at 05:05

## 2025-05-17 RX ADMIN — IOHEXOL 75 ML: 350 INJECTION, SOLUTION INTRAVENOUS at 08:05

## 2025-05-17 RX ADMIN — SODIUM CHLORIDE, POTASSIUM CHLORIDE, SODIUM LACTATE AND CALCIUM CHLORIDE 1000 ML: 600; 310; 30; 20 INJECTION, SOLUTION INTRAVENOUS at 10:05

## 2025-05-17 RX ADMIN — ACETAMINOPHEN 1000 MG: 500 TABLET ORAL at 10:05

## 2025-05-17 RX ADMIN — CEFEPIME 2 G: 2 INJECTION, POWDER, FOR SOLUTION INTRAVENOUS at 09:05

## 2025-05-17 RX ADMIN — ONDANSETRON 8 MG: 2 INJECTION INTRAMUSCULAR; INTRAVENOUS at 07:05

## 2025-05-17 RX ADMIN — IPRATROPIUM BROMIDE AND ALBUTEROL SULFATE 3 ML: .5; 3 SOLUTION RESPIRATORY (INHALATION) at 07:05

## 2025-05-17 RX ADMIN — CEFEPIME 2 G: 2 INJECTION, POWDER, FOR SOLUTION INTRAVENOUS at 08:05

## 2025-05-17 RX ADMIN — SODIUM CHLORIDE, POTASSIUM CHLORIDE, SODIUM LACTATE AND CALCIUM CHLORIDE 1000 ML: 600; 310; 30; 20 INJECTION, SOLUTION INTRAVENOUS at 07:05

## 2025-05-17 RX ADMIN — ONDANSETRON 4 MG: 2 INJECTION INTRAMUSCULAR; INTRAVENOUS at 05:05

## 2025-05-17 RX ADMIN — BUSPIRONE HYDROCHLORIDE 15 MG: 5 TABLET ORAL at 01:05

## 2025-05-17 RX ADMIN — SODIUM CHLORIDE, POTASSIUM CHLORIDE, SODIUM LACTATE AND CALCIUM CHLORIDE: 600; 310; 30; 20 INJECTION, SOLUTION INTRAVENOUS at 01:05

## 2025-05-17 NOTE — ASSESSMENT & PLAN NOTE
Symptoms consistent with viral gastroenteritis  IVF  FLD  PRN medications for pain and nausea  CT obtained with no acute pathology  Lipase wnl

## 2025-05-17 NOTE — PLAN OF CARE
[facility]  Initial Discharge Assessment       Primary Care Provider: Polly Lemos MD    Admission Diagnosis: Screening for cardiovascular condition [Z13.6]  Chest pain [R07.9]  Intractable nausea and vomiting [R11.2]  Hypotension, unspecified hypotension type [I95.9]  Sepsis due to Haemophilus influenzae without acute organ dysfunction [A41.3]    Admission Date: 5/17/2025  Expected Discharge Date: 5/18/25    DC assessment completed with patient at bedside. Verified information on facesheet as correct. Pt lives [address] in [place]  alone. Reports she has help if needed from her sister Noreen that lives next door, and her daughter that she has been staying with some.  PCP is Dr. Lemos.- reports last apt was 1 month ago.Pharmacy is [facility] [facility].Denies hh/hd/outpt services.   DME: she uses home oxygen at 2L/NC she has a home and portable concentrator through [employer]. She also has a nebulizer, Bipap, shower chair grab bars and a BP cuff. Reports being independent with activities. Her family drives her to apts. Reports her sister will provide transportation home upon DC. Reports taking home medications as prescribed and can currently afford them. Verified insurance on file. Denies recent inpt stay in last 30 days.  DC plan is.Home with family.      Transition of Care Barriers: None    Payor: [place] / Plan: [place] [facility] [employer] [place] [place] / Product Type: Managed Medicaid /     Extended Emergency Contact Information  Primary Emergency Contact: Noreen Wong  Address: [address]           [place] [address] [place]  Mobile Phone: [phone]  Relation: Sister  Preferred language: English   needed? No  Secondary Emergency Contact: Shell Wilkerson   [place]  Mobile Phone: [phone]  Relation: Daughter    Discharge Plan A: Home with family  Discharge Plan B: Home      [place] [facility] [place] - [address]  [address]  [place]  [place] [address]  Phone: [phone] Fax: [phone]    [id number] [place] [facility] [address]  [address]  [place] [address]  Phone: [phone] Fax: [phone]      Initial Assessment (most recent)       Adult Discharge Assessment - 05/17/25 1349          Discharge Assessment    Assessment Type Discharge Planning Assessment     Confirmed/corrected address, phone number and insurance Yes     Confirmed Demographics Correct on Facesheet     Source of Information patient     When was your last doctors appointment? --   1 month ago    Does patient/caregiver understand observation status Yes     Communicated KIM with patient/caregiver Yes     Reason For Admission N/V     People in Home alone     Do you expect to return to your current living situation? Yes     Do you have help at home or someone to help you manage your care at home? Yes     Who are your caregiver(s) and their phone number(s)? Daughter Shell [phone] & sister Noreen [phone]     Prior to hospitilization cognitive status: Alert/Oriented;No Deficits     Current cognitive status: Alert/Oriented;No Deficits     Walking or Climbing Stairs Difficulty no     Dressing/Bathing Difficulty yes     Dressing/Bathing bathing difficulty, requires equipment     Dressing/Bathing Management uses shower chair and grab bars     Home Accessibility not wheelchair accessible     Home Layout Able to live on 1st floor     Equipment Currently Used at Home oxygen;nebulizer;BIPAP;shower chair;grab bar;blood pressure machine     Readmission within 30 days? No     Patient currently being followed by outpatient case management? No     Do you currently have service(s) that help you manage your care at home? No     Do you take prescription medications? Yes     Do you have prescription coverage? Yes     Do you have any problems affording any of your prescribed medications? No     Is the patient taking medications as  prescribed? yes     Who is going to help you get home at discharge? her sister Noreen     How do you get to doctors appointments? family or friend will provide     Are you on dialysis? No     Do you take coumadin? No     Discharge Plan A Home with family     Discharge Plan B Home     DME Needed Upon Discharge  none     Discharge Plan discussed with: Patient     Transition of Care Barriers None

## 2025-05-17 NOTE — SUBJECTIVE & OBJECTIVE
Past Medical History:   Diagnosis Date    Anxiety     Arthritis     Asthma     Back pain     COPD (chronic obstructive pulmonary disease)     Pulmonary embolism     10 years ago       Past Surgical History:   Procedure Laterality Date    BIOPSY      right breast    BREAST CYST EXCISION      CATARACT EXTRACTION       SECTION  1990    CHOLECYSTECTOMY      COLONOSCOPY N/A 2022    Procedure: COLONOSCOPY;  Surgeon: Chavez Gonzales MD;  Location: [facility] ENDO;  Service: General;  Laterality: N/A;    ESOPHAGOGASTRODUODENOSCOPY N/A 2022    Procedure: ESOPHAGOGASTRODUODENOSCOPY (EGD);  Surgeon: Chavez Gonzales MD;  Location: [facility] ENDO;  Service: General;  Laterality: N/A;    EYE SURGERY      INCISION AND DRAINAGE OF ABSCESS Left 2020    Procedure: INCISION AND DRAINAGE, ABSCESS;  Surgeon: Irvin Villarreal MD;  Location: [facility] OR;  Service: General;  Laterality: Left;    TUBAL LIGATION         Review of patient's allergies indicates:   Allergen Reactions    Ativan [lorazepam] Itching       No current facility-administered medications on file prior to encounter.     Current Outpatient Medications on File Prior to Encounter   Medication Sig    albuterol (VENTOLIN HFA) 90 mcg/actuation inhaler Inhale 1-2 puffs into the lungs every 4 (four) hours as needed for Wheezing. inhale 2 puff by inhalation route  every 4 - 6 hours as needed    albuterol-ipratropium (DUO-NEB) 2.5 mg-0.5 mg/3 mL nebulizer solution Take 3 mLs by nebulization every 6 (six) hours as needed for Wheezing or Shortness of Breath. Rescue    alendronate (FOSAMAX) 70 MG tablet Take 1 tablet (70 mg total) by mouth every 7 days.    amoxicillin-clavulanate 500-125mg (AUGMENTIN) 500-125 mg Tab Take 1 tablet (500 mg total) by mouth 2 (two) times daily. for 10 days    aspirin 81 MG Chew 81 mg.    atenoloL (TENORMIN) 25 MG tablet TAKE 1/2 TABLET BY MOUTH EVERY DAY **THANK YOU**    atorvastatin (LIPITOR) 20 MG tablet Take  1 tablet (20 mg total) by mouth once daily.    busPIRone (BUSPAR) 15 MG tablet Take 1 tablet (15 mg total) by mouth 3 (three) times daily.    cholecalciferol, vitamin D3, (VITAMIN D3) 25 mcg (1,000 unit) capsule Take 2 capsules (2,000 Units total) by mouth once daily.    [START ON 6/2/2025] clonazePAM (KLONOPIN) 0.5 MG tablet Take 1/2 tablet (.25 mg) to 1 tablet (0.5 mg) up to twice daily as needed for anxiety    DULoxetine (CYMBALTA) 20 MG capsule Take 2 capsules po daily    dupilumab 300 mg/2 mL PnIj Inject 2 mLs (300 mg total) into the skin every 14 (fourteen) days. (Patient not taking: Reported on 5/9/2025)    fluticasone-umeclidin-vilanter (TRELEGY ELLIPTA) 200-62.5-25 mcg inhaler Inhale 1 puff into the lungs once daily.    latanoprost 0.005 % ophthalmic solution Place 1 drop into both eyes every evening.    ondansetron (ZOFRAN-ODT) 4 MG TbDL Take 1 tablet (4 mg total) by mouth every 12 (twelve) hours as needed (Nausea).    pantoprazole (PROTONIX) 40 MG tablet Take 1 tablet (40 mg total) by mouth once daily. Take in the morning before breakfast.  Wait 30 minutes before eating or drinking anything    predniSONE (DELTASONE) 5 MG tablet Take 1 tablet (5 mg total) by mouth once daily.     Family History       Problem Relation (Age of Onset)    Arthritis Mother    Asthma Son    Breast cancer Sister    COPD Sister    Cancer Mother    Diabetes Brother    Heart disease Sister, Mother    Hypertension Sister, Mother, Father    Learning disabilities Sister    Stroke Father          Tobacco Use    Smoking status: Former     Types: Cigarettes    Smokeless tobacco: Never   Substance and Sexual Activity    Alcohol use: Not Currently     Comment: occ    Drug use: Yes     Types: Benzodiazepines     Comment: as prescribed    Sexual activity: Not Currently     Birth control/protection: Post-menopausal     Review of Systems   All other systems reviewed and are negative.    Objective:     Vital Signs (Most Recent):  Temp: 98.8 °F  (37.1 °C) (05/17/25 0641)  Pulse: 93 (05/17/25 1132)  Resp: 20 (05/17/25 1058)  BP: 114/60 (05/17/25 1132)  SpO2: 100 % (05/17/25 1132) Vital Signs (24h Range):  Temp:  [98.8 °F (37.1 °C)] 98.8 °F (37.1 °C)  Pulse:  [] 93  Resp:  [20-40] 20  SpO2:  [92 %-100 %] 100 %  BP: ()/(54-71) 114/60     Weight: 51.7 kg (114 lb)  Body mass index is 18.97 kg/m².     Physical Exam        Vitals reviewed  General: NAD, Well developed, Well Nourished  Head: NC/AT  Eyes: EOMI, NANY  Cardiovascular: Pulses intact distally, Regular Rate and rhythm  Pulmonary: Normal Respiratory Rate, No respiratory distress  Gi: Soft, Non-tender  Extremities: Warm, No edema present  Skin: Warm, dry  Neuro: Alert, Oriented x3, No focal Deficit  Psych: Appropriate mood and affect       Significant Labs: All pertinent labs within the past 24 hours have been reviewed.    Significant Imaging: I have reviewed all pertinent imaging results/findings within the past 24 hours.

## 2025-05-17 NOTE — ASSESSMENT & PLAN NOTE
Patient's COPD is controlled currently.  Patient is currently off COPD Pathway. Continue scheduled inhalers PRN duonebs, Steroids, Antibiotics, and Supplemental oxygen and monitor respiratory status closely.     Continue cefepime for Hflu on respiratory culture  Continue oxygen at home dose  Continue chronic steroids

## 2025-05-17 NOTE — ED TRIAGE NOTES
Pt c/o N/V, abd cramping that started upon waking this morning. Hx COPD, wears 2L O2 NC at home, reports she increased O2 to 3L this morning when she started vomiting. Reports slight increase in SOB. Subjective fever/chills. Currently taking Augmentin, started 5/12, for sputum culture + for Haemophilus influenzae.

## 2025-05-17 NOTE — ED PROVIDER NOTES
History     Chief Complaint   Patient presents with    Nausea    Vomiting     HPI:  Zayra White is a 59 y.o. female with PMH as below who presents to the [facility] emergency department for evaluation of 1 day of gradual onset, moderate nausea with vomiting and generalized abdominal pain. This started a few days into taking Augmentin for recent +sputum culture of H influenzae; patient has productive sputum and wheezing that have slightly improved, but then flared up after she got nervous this AM with her nausea. She has no other complaints.       PCP: Polly Lemos MD    Review of patient's allergies indicates:   Allergen Reactions    Ativan [lorazepam] Itching      Past Medical History:   Diagnosis Date    Anxiety     Arthritis     Asthma     Back pain     COPD (chronic obstructive pulmonary disease)     Pulmonary embolism     10 years ago     Past Surgical History:   Procedure Laterality Date    BIOPSY      right breast    BREAST CYST EXCISION      CATARACT EXTRACTION       SECTION  1990    CHOLECYSTECTOMY      COLONOSCOPY N/A 2022    Procedure: COLONOSCOPY;  Surgeon: Chavez Gonzales MD;  Location: [facility] ENDO;  Service: General;  Laterality: N/A;    ESOPHAGOGASTRODUODENOSCOPY N/A 2022    Procedure: ESOPHAGOGASTRODUODENOSCOPY (EGD);  Surgeon: Chavez Gonzales MD;  Location: [facility] ENDO;  Service: General;  Laterality: N/A;    EYE SURGERY      INCISION AND DRAINAGE OF ABSCESS Left 2020    Procedure: INCISION AND DRAINAGE, ABSCESS;  Surgeon: Irvin Villarreal MD;  Location: [facility] OR;  Service: General;  Laterality: Left;    TUBAL LIGATION         Family History   Problem Relation Name Age of Onset    Breast cancer Sister JoanRhode     COPD Sister JoanRhode     Heart disease Sister JoanRhode     Hypertension Sister JoanRhode     Learning disabilities Sister JoanRhode     Arthritis Mother Carmina Wong     Cancer Mother Carmina Wong         Lung Cancer     Heart disease Mother Carmina Wong     Hypertension Mother Carmina Wong     Hypertension Father Jay Wong     Stroke Father Jay Wong     Asthma Son Jacoby Wilkerson II     Diabetes Brother Yovany Wong     Ovarian cancer Neg Hx       Social History     Tobacco Use    Smoking status: Former     Types: Cigarettes    Smokeless tobacco: Never   Substance and Sexual Activity    Alcohol use: Not Currently     Comment: occ    Drug use: Yes     Types: Benzodiazepines     Comment: as prescribed    Sexual activity: Not Currently     Birth control/protection: Post-menopausal      Review of Systems     Review of Systems   Constitutional: Negative.    HENT: Negative.     Eyes: Negative.    Respiratory:  Positive for cough, shortness of breath and wheezing.    Cardiovascular: Negative.    Gastrointestinal:  Positive for abdominal pain, nausea and vomiting.   Endocrine: Negative.    Genitourinary: Negative.    Musculoskeletal: Negative.    Skin: Negative.    Allergic/Immunologic: Negative.    Neurological: Negative.    Hematological: Negative.    Psychiatric/Behavioral: Negative.     All other systems reviewed and are negative.       Physical Exam     Initial Vitals [05/17/25 0641]   BP Pulse Resp Temp SpO2   122/69 (!) 136 (!) 26 98.8 °F (37.1 °C) 97 %      MAP       --          Nursing notes and vital signs reviewed.  Constitutional: Patient is in mild to moderate distress.   Head: Normocephalic. Atraumatic.   Eyes:  Conjunctivae are not pale. No scleral icterus.   ENT: Mucous membranes moist.   Neck: Supple.   Cardiovascular: tachycardic rate. Regular rhythm.   Pulmonary: No respiratory distress. RUL wheezes. Mildly decreased air exchange. No rales.   Abdominal: Soft. Non-distended. BLQ tenderness.   Musculoskeletal: Moves all extremities. No obvious deformities.   Skin: Warm and dry.   Neurological:  Alert, awake, and appropriate. Normal speech. No acute lateralizing neurologic deficits appreciated.   Psychiatric: Normal  affect.       ED Course   Procedures  Vitals:    05/17/25 0724 05/17/25 0728 05/17/25 0729 05/17/25 0732   BP:    100/62   Pulse:  (!) 119 (!) 123 (!) 126   Resp: (!) 24  (!) 22    Temp:       TempSrc:       SpO2:  98% 97% 98%   Weight:       Height:        05/17/25 0835 05/17/25 0917 05/17/25 0948 05/17/25 0949   BP: 122/63 106/60 114/62    Pulse: (!) 112 (!) 119  109   Resp:   (!) 24    Temp:       TempSrc:       SpO2: 99% 98% 98%    Weight:       Height:        05/17/25 1017 05/17/25 1048 05/17/25 1054 05/17/25 1058   BP: 129/71 (!) 114/58     Pulse:  101 99    Resp:   (!) 36 20   Temp:       TempSrc:       SpO2: (!) 92% 100% 97%    Weight:       Height:        05/17/25 1132 05/17/25 1158 05/17/25 1203   BP: 114/60 120/85 121/73   Pulse: 93 103 106   Resp:      Temp:      TempSrc:      SpO2: 100% 100% 97%   Weight:      Height:        Lab Results Interpreted as Abnormal:  Labs Reviewed   COMPREHENSIVE METABOLIC PANEL - Abnormal       Result Value    Sodium 139      Potassium 3.8      Chloride 103      CO2 25      Glucose 114 (*)     BUN 8      Creatinine 0.8      Calcium 8.3 (*)     Protein Total 7.1      Albumin 3.8      Bilirubin Total 0.7      ALP 59      AST 44      ALT 14      Anion Gap 11      eGFR >60     URINALYSIS, REFLEX TO URINE CULTURE - Abnormal    Color, UA Yellow      Appearance, UA Clear      pH, UA 6.0      Spec Grav UA <=1.005 (*)     Protein, UA Negative      Glucose, UA Negative      Ketones, UA Negative      Bilirubin, UA Negative      Blood, UA Negative      Nitrites, UA Negative      Urobilinogen, UA Negative      Leukocyte Esterase, UA Negative     CBC WITH DIFFERENTIAL - Abnormal    WBC 6.58      RBC 4.44      HGB 14.5      HCT 42.4      MCV 96      MCH 32.7 (*)     MCHC 34.2      RDW 12.4      Platelet Count 274      MPV 8.9 (*)     Nucleated RBC 0      Neut % 81.0 (*)     Lymph % 14.0 (*)     Mono % 3.6 (*)     Eos % 0.6      Basophil % 0.5      Imm Grans % 0.3      Neut # 5.33       Lymph # 0.92 (*)     Mono # 0.24 (*)     Eos # 0.04      Baso # 0.03      Imm Grans # 0.02     LACTIC ACID, PLASMA - Normal    Lactic Acid Level 1.7      Narrative:     Falsely low lactic acid results can be found in samples containing >=13.0 mg/dL total bilirubin and/or >=3.5 mg/dL direct bilirubin.    TROPONIN I - Normal    Troponin-I <0.006     LIPASE - Normal    Lipase Level 51     CULTURE, BLOOD   CULTURE, BLOOD   CBC W/ AUTO DIFFERENTIAL    Narrative:     The following orders were created for panel order CBC auto differential.  Procedure                               Abnormality         Status                     ---------                               -----------         ------                     CBC with Differential[[id number]]       Abnormal            Final result                 Please view results for these tests on the individual orders.   HEPATITIS C ANTIBODY   HIV 1 / 2 ANTIBODY      All Lab Results:  Results for orders placed or performed during the hospital encounter of 05/17/25   Comprehensive metabolic panel    Collection Time: 05/17/25  7:21 AM   Result Value Ref Range    Sodium 139 136 - 145 mmol/L    Potassium 3.8 3.5 - 5.1 mmol/L    Chloride 103 95 - 110 mmol/L    CO2 25 23 - 29 mmol/L    Glucose 114 (H) 70 - 110 mg/dL    BUN 8 8 - 23 mg/dL    Creatinine 0.8 0.5 - 1.4 mg/dL    Calcium 8.3 (L) 8.7 - 10.5 mg/dL    Protein Total 7.1 6.0 - 8.4 gm/dL    Albumin 3.8 3.5 - 5.2 g/dL    Bilirubin Total 0.7 0.1 - 1.0 mg/dL    ALP 59 40 - 150 unit/L    AST 44 11 - 45 unit/L    ALT 14 10 - 44 unit/L    Anion Gap 11 8 - 16 mmol/L    eGFR >60 >60 mL/min/1.73/m2   Lactic acid, plasma #1    Collection Time: 05/17/25  7:21 AM   Result Value Ref Range    Lactic Acid Level 1.7 0.5 - 2.2 mmol/L   Troponin I    Collection Time: 05/17/25  7:21 AM   Result Value Ref Range    Troponin-I <0.006 <=0.026 ng/mL   Lipase    Collection Time: 05/17/25  7:21 AM   Result Value Ref Range    Lipase Level 51 4 - 60 U/L   CBC  with Differential    Collection Time: 05/17/25  7:21 AM   Result Value Ref Range    WBC 6.58 3.90 - 12.70 K/uL    RBC 4.44 4.00 - 5.40 M/uL    HGB 14.5 12.0 - 16.0 gm/dL    HCT 42.4 37.0 - 48.5 %    MCV 96 82 - 98 fL    MCH 32.7 (H) 27.0 - 31.0 pg    MCHC 34.2 32.0 - 36.0 g/dL    RDW 12.4 11.5 - 14.5 %    Platelet Count 274 150 - 450 K/uL    MPV 8.9 (L) 9.2 - 12.9 fL    Nucleated RBC 0 <=0 /100 WBC    Neut % 81.0 (H) 38 - 73 %    Lymph % 14.0 (L) 18 - 48 %    Mono % 3.6 (L) 4 - 15 %    Eos % 0.6 <=8 %    Basophil % 0.5 <=1.9 %    Imm Grans % 0.3 0.0 - 0.5 %    Neut # 5.33 1.8 - 7.7 K/uL    Lymph # 0.92 (L) 1 - 4.8 K/uL    Mono # 0.24 (L) 0.3 - 1 K/uL    Eos # 0.04 <=0.5 K/uL    Baso # 0.03 <=0.2 K/uL    Imm Grans # 0.02 0.00 - 0.04 K/uL   Urinalysis, Reflex to Urine Culture Urine, Clean Catch    Collection Time: 05/17/25 10:13 AM    Specimen: Urine, Clean Catch   Result Value Ref Range    Color, UA Yellow Straw, [place], Yellow, Light-Orange    Appearance, UA Clear Clear    pH, UA 6.0 5.0 - 8.0    Spec Grav UA <=1.005 (A) 1.005 - 1.030    Protein, UA Negative Negative    Glucose, UA Negative Negative    Ketones, UA Negative Negative    Bilirubin, UA Negative Negative    Blood, UA Negative Negative    Nitrites, UA Negative Negative    Urobilinogen, UA Negative <2.0 EU/dL    Leukocyte Esterase, UA Negative Negative     Imaging Results              CT Abdomen Pelvis With IV Contrast NO Oral Contrast (Final result)  Result time 05/17/25 08:43:21      Final result by Gee Penn Jr., MD (05/17/25 08:43:21)                   Impression:      Prior cholecystectomy.  Diverticulosis coli.  Heavy atherosclerotic calcification of the aorta with stenosis distally at the bifurcation.      Electronically signed by: Gee Penn MD  Date:    05/17/2025  Time:    08:43               Narrative:    EXAMINATION:  CT ABDOMEN PELVIS WITH IV CONTRAST    CLINICAL HISTORY:  Abdominal pain, acute, nonlocalized;    TECHNIQUE:  Low  dose axial images, sagittal and coronal reformations were obtained from the lung bases to the pubic symphysis following the IV administration of 75 mL of Omnipaque 350    COMPARISON:  CT abdomen pelvis of November 20, 2022.    FINDINGS:  The liver is of normal size contour and CT density without focal mass.  The gallbladder is absent with surgical clips noted.  The pancreas is of normal contour and CT density without edema or mass.  The spleen is of normal size and CT density.    The adrenal glands are not enlarged.  The kidneys are of normal size contour and contrast enhancement without mass or hydronephrosis.  The abdominal aorta is heavily calcified and stenotic at the bifurcation with a calcification extending into the iliac vessels.  No aneurysm is seen.    The stomach is of normal configuration.  Small bowel dilatation or air-fluid levels are not seen.  The colon appears of normal configuration without distention or mass.  There are diverticuli noted in the descending and sigmoid colon.  A normal appendix is noted.  No free fluid or free air is seen.    Bladder is not full.  The uterus is not enlarged.  No free fluid is seen.                                       X-Ray Chest AP Portable (Final result)  Result time 05/17/25 08:33:42      Final result by Gee Penn Jr., MD (05/17/25 08:33:42)                   Impression:      No acute abnormality.      Electronically signed by: Gee Penn MD  Date:    05/17/2025  Time:    08:33               Narrative:    EXAMINATION:  XR CHEST AP PORTABLE    CLINICAL HISTORY:  Sepsis;    TECHNIQUE:  Single frontal view of the chest was performed.    COMPARISON:  Chest x-ray of January 18, 2025    FINDINGS:  The lungs are clear, with normal appearance of pulmonary vasculature and no pleural effusion or pneumothorax.    The cardiac silhouette is normal in size. The hilar and mediastinal contours are unremarkable.    Bones are intact.                                        The emergency physician reviewed the vital signs / test results outlined above.     ED Discussion     ED Course as of 05/17/25 1227   Sat May 17, 2025   0701 The EKG taken at 6:59 was reviewed and independently interpreted by the ED physician:  Physician interpreting: Charly Alfaro  Rhythm: sinus tachycardia   Rate: 128 bpm  Nonspecific ST-T changes. No STEMI.   Normal intervals   Right axis deviation   [ND]   1136 Sepsis Perfusion Exam: I attest that a sepsis perfusion exam was performed within 6 hours of sepsis, severe sepsis, or septic shock presentation, following fluid resuscitation.   [ND]   1225 I discussed the patient's case with Dr. Jeff Boateng, hospitalist, who accepts the patient for admission.     Admitting physician: Dr. Boateng  Admitting service:  Hospital Medicine  [ND]   1225 Patient had hypotension, significant tachycardia; N/V; may be dehydration, gastroenteritis; will obs to r/o potential sepsis with active H flu infection.  [ND]      ED Course User Index  [ND] Charly Alfaro MD           ED Medication(s) Administered:  Medications   aspirin chewable tablet 81 mg (has no administration in time range)   atorvastatin tablet 20 mg (has no administration in time range)   atenolol split tablet 12.5 mg (has no administration in time range)   busPIRone tablet 15 mg (has no administration in time range)   DULoxetine DR capsule 40 mg (has no administration in time range)   clonazePAM tablet 0.5 mg (has no administration in time range)   latanoprost 0.005 % ophthalmic solution 1 drop (has no administration in time range)   pantoprazole EC tablet 40 mg (has no administration in time range)   sodium chloride 0.9% flush 10 mL (has no administration in time range)   naloxone 0.4 mg/mL injection 0.02 mg (has no administration in time range)   glucose chewable tablet 16 g (has no administration in time range)   glucose chewable tablet 24 g (has no administration in time range)   dextrose  50% injection 12.5 g (has no administration in time range)   dextrose 50% injection 25 g (has no administration in time range)   glucagon (human recombinant) injection 1 mg (has no administration in time range)   enoxaparin injection 40 mg (has no administration in time range)   potassium bicarbonate disintegrating tablet 50 mEq (has no administration in time range)   potassium bicarbonate disintegrating tablet 35 mEq (has no administration in time range)   potassium bicarbonate disintegrating tablet 60 mEq (has no administration in time range)   magnesium oxide tablet 800 mg (has no administration in time range)   magnesium oxide tablet 800 mg (has no administration in time range)   potassium, sodium phosphates 280-160-250 mg packet 2 packet (has no administration in time range)   potassium, sodium phosphates 280-160-250 mg packet 2 packet (has no administration in time range)   potassium, sodium phosphates 280-160-250 mg packet 2 packet (has no administration in time range)   acetaminophen tablet 650 mg (has no administration in time range)   oxyCODONE immediate release tablet 5 mg (has no administration in time range)   ondansetron injection 4 mg (has no administration in time range)   prochlorperazine injection Soln 5 mg (has no administration in time range)   albuterol-ipratropium 2.5 mg-0.5 mg/3 mL nebulizer solution 3 mL (has no administration in time range)   melatonin tablet 6 mg (has no administration in time range)   aluminum-magnesium hydroxide-simethicone 200-200-20 mg/5 mL suspension 30 mL (has no administration in time range)   lactated ringers infusion (has no administration in time range)   ceFEPIme injection 2 g (has no administration in time range)   predniSONE tablet 5 mg (has no administration in time range)   ondansetron injection 8 mg (8 mg Intravenous Given 5/17/25 5753)   lactated ringers bolus 1,000 mL (0 mLs Intravenous Stopped 5/17/25 1150)   albuterol-ipratropium 2.5 mg-0.5 mg/3 mL  nebulizer solution 3 mL (3 mLs Nebulization Given 5/17/25 6002)   iohexoL (OMNIPAQUE 350) injection 75 mL (75 mLs Intravenous Given 5/17/25 0806)   lactated ringers bolus 1,000 mL (1,000 mLs Intravenous New Bag 5/17/25 1032)   ceFEPIme injection 2 g (2 g Intravenous Given 5/17/25 0939)   acetaminophen tablet 1,000 mg (1,000 mg Oral Given 5/17/25 1022)       Prescription Management: I performed a review of the patient's current Rx medication list as input by nursing staff.    Patient's Medications   New Prescriptions    No medications on file   Previous Medications    ALBUTEROL (VENTOLIN HFA) 90 MCG/ACTUATION INHALER    Inhale 1-2 puffs into the lungs every 4 (four) hours as needed for Wheezing. inhale 2 puff by inhalation route  every 4 - 6 hours as needed    ALBUTEROL-IPRATROPIUM (DUO-NEB) 2.5 MG-0.5 MG/3 ML NEBULIZER SOLUTION    Take 3 mLs by nebulization every 6 (six) hours as needed for Wheezing or Shortness of Breath. Rescue    ALENDRONATE (FOSAMAX) 70 MG TABLET    Take 1 tablet (70 mg total) by mouth every 7 days.    AMOXICILLIN-CLAVULANATE 500-125MG (AUGMENTIN) 500-125 MG TAB    Take 1 tablet (500 mg total) by mouth 2 (two) times daily. for 10 days    ASPIRIN 81 MG CHEW    81 mg.    ATENOLOL (TENORMIN) 25 MG TABLET    TAKE 1/2 TABLET BY MOUTH EVERY DAY **THANK YOU**    ATORVASTATIN (LIPITOR) 20 MG TABLET    Take 1 tablet (20 mg total) by mouth once daily.    BUSPIRONE (BUSPAR) 15 MG TABLET    Take 1 tablet (15 mg total) by mouth 3 (three) times daily.    CHOLECALCIFEROL, VITAMIN D3, (VITAMIN D3) 25 MCG (1,000 UNIT) CAPSULE    Take 2 capsules (2,000 Units total) by mouth once daily.    CLONAZEPAM (KLONOPIN) 0.5 MG TABLET    Take 1/2 tablet (.25 mg) to 1 tablet (0.5 mg) up to twice daily as needed for anxiety    DULOXETINE (CYMBALTA) 20 MG CAPSULE    Take 2 capsules po daily    DUPILUMAB 300 MG/2 ML PNIJ    Inject 2 mLs (300 mg total) into the skin every 14 (fourteen) days.    FLUTICASONE-UMECLIDIN-VILANTER  (TRELEGY ELLIPTA) 200-62.5-25 MCG INHALER    Inhale 1 puff into the lungs once daily.    LATANOPROST 0.005 % OPHTHALMIC SOLUTION    Place 1 drop into both eyes every evening.    ONDANSETRON (ZOFRAN-ODT) 4 MG TBDL    Take 1 tablet (4 mg total) by mouth every 12 (twelve) hours as needed (Nausea).    PANTOPRAZOLE (PROTONIX) 40 MG TABLET    Take 1 tablet (40 mg total) by mouth once daily. Take in the morning before breakfast.  Wait 30 minutes before eating or drinking anything    PREDNISONE (DELTASONE) 5 MG TABLET    Take 1 tablet (5 mg total) by mouth once daily.   Modified Medications    No medications on file   Discontinued Medications    No medications on file           Clinical Impression       ICD-10-CM ICD-9-CM   1. Hypotension, unspecified hypotension type  I95.9 458.9   2. Screening for cardiovascular condition  Z13.6 V81.2   3. Sepsis due to Haemophilus influenzae without acute organ dysfunction  A41.3 038.41     995.91   4. Intractable nausea and vomiting  R11.2 536.2   5. Chest pain  R07.9 786.50      ED Disposition Condition    Observation              Charly Alfaro MD  05/17/25 3226

## 2025-05-17 NOTE — RESPIRATORY THERAPY
05/17/25 0729   Patient Assessment/Suction   Level of Consciousness (AVPU) alert   Respiratory Effort Normal;Unlabored   Expansion/Accessory Muscles/Retractions no retractions;no use of accessory muscles   All Lung Fields Breath Sounds Anterior:;Posterior:;Lateral:;equal bilaterally   Rhythm/Pattern, Respiratory depth regular;pattern regular;unlabored   Cough Frequency frequent   Cough Type good;loose;nonproductive   Skin Integrity   $ Wound Care Tech Time 15 min   Area Observed Left;Right;Behind ear;Cheek;Upper lip;Nares   Skin Appearance without discoloration   PRE-TX-O2   Device (Oxygen Therapy) nasal cannula   $ Is the patient on Low Flow Oxygen? Yes   Flow (L/min) (Oxygen Therapy) 2   Oxygen Concentration (%) 28   SpO2 97 %   Pulse Oximetry Type Continuous   $ Pulse Oximetry - Multiple Charge Pulse Oximetry - Multiple   Pulse (!) 123   Resp (!) 22   Aerosol Therapy   $ Aerosol Therapy Charges Aerosol Treatment   Daily Review of Necessity (SVN) completed   Respiratory Treatment Status (SVN) given   Treatment Route (SVN) mouthpiece utilized;air   Patient Position HOB elevated   Post Treatment Assessment (SVN) breath sounds improved   Signs of Intolerance (SVN) none   Breath Sounds Post-Respiratory Treatment   Throughout All Fields Post-Treatment All Fields   Throughout All Fields Post-Treatment aeration increased   Post-treatment Heart Rate (beats/min) 123   Post-treatment Resp Rate (breaths/min) 19   Equipment Change   $ RT Equipment Treatment nebulizer   Education   $ Education 15 min;Bronchodilator;Oxygen

## 2025-05-17 NOTE — PLAN OF CARE
Remains on 02 @ 2l/nc with sats >90%. Ambulates to bathroom with sba x 1. AAOx4, makes needs known without difficulty. No further c/o N/V.   Problem: Infection  Goal: Absence of Infection Signs and Symptoms  Outcome: Progressing

## 2025-05-17 NOTE — RESPIRATORY THERAPY
05/17/25 1302   Patient Assessment/Suction   Level of Consciousness (AVPU) alert   Respiratory Effort Normal;Unlabored   Expansion/Accessory Muscles/Retractions no retractions;no use of accessory muscles   All Lung Fields Breath Sounds Anterior:;Posterior:;Lateral:;equal bilaterally;diminished   Rhythm/Pattern, Respiratory depth regular;pattern regular;unlabored   Cough Frequency no cough   Skin Integrity   $ Wound Care Tech Time 15 min   Area Observed Left;Right;Behind ear;Cheek;Upper lip;Nares   Skin Appearance without discoloration   PRE-TX-O2   Device (Oxygen Therapy) nasal cannula   $ Is the patient on Low Flow Oxygen? Yes   Flow (L/min) (Oxygen Therapy) 2   Oxygen Concentration (%) 28   SpO2 98 %   Pulse Oximetry Type Intermittent   $ Pulse Oximetry - Multiple Charge Pulse Oximetry - Multiple   Pulse 106   Resp (!) 22   Aerosol Therapy   $ Aerosol Therapy Charges PRN treatment not required   Daily Review of Necessity (SVN) completed   Respiratory Treatment Status (SVN) PRN treatment not required   Tobacco Cessation Intervention   Do you use any type of tobacco product? No   $ Smoking Cessation Charges Smoking Cessation - Intermediate (Non-CTTS)   Respiratory Evaluation   $ Care Plan Tech Time 15 min   $ Respiratory Evaluation Complete   Evaluation For New Orders   Admitting Diagnosis Intractable nausea and vomiting   Cardiac Diagnosis Pulmonary embolism       10 years ago   Pulmonary Diagnosis COPD   Home Oxygen   Has Home Oxygen? Yes   Liter Flow 2   Duration continuous   Route nasal cannula   Mode continuous   Device home concentrator with portable unit   Home Aerosol, MDI, DPI, and Other Treatments/Therapies   Home Respiratory Therapy Per Patient/Review of Chart Yes   Aerosol Home Meds/Freq albuterol-ipratropium (DUO-NEB) 2.5 mg-0.5 mg/3 mL nebulizer solution Take 3 mLs by nebulization every 6 (six) hours as needed for Wheezing or Shortness of Breath. Rescue   DPI Home Meds/Freq  fluticasone-umeclidin-vilanter (TRELEGY ELLIPTA) 200-62.5-25 mcg inhaler Inhale 1 puff into the lungs once daily.  (spoke to dr. velázquez, he will address for AM dose)   Oxygen Care Plan   Oxygen Care Plan Per Protocol   SPO2 Goal (%) 92% non-cardiac   Rationale SpO2 is <92% (Non-cardiac Pt.);Maintain Home oxygen   Bronchodilator Care Plan   Bronchodilator Care Plan Aerosol   Aerosol Meds w/ frequency Albuterol - Ipratropium (DUO-NEB) 0.5mg-3mg(2.5ml) 3ml Nebulizer Solution2.5mg PRN   Rationale Wheezing;Shortness of breath (increased WOB);Maintain home respiratory medicine   Atelectasis Care Plan   Rationale No Rational Found   Airway Clearance Care Plan   Rationale No rationale found

## 2025-05-17 NOTE — H&P
[facility] Medicine  History & Physical    Patient Name: Zayra White  MRN: [id number]  Patient Class: OP- Observation  Admission Date: 2025  Attending Physician: Jeff Boateng MD   Primary Care Provider: Polly Lemos MD         Patient information was obtained from patient, past medical records, and ER records.     Subjective:     Principal Problem:Intractable nausea and vomiting    Chief Complaint:   Chief Complaint   Patient presents with    Nausea    Vomiting        HPI: Patient is a 60 yo with a past medical history significant for anxiety, COPD on home oxygen, back pain, and remote pulmonary embolism  presenting with intractable NV. Symptoms started this morning. Patient was in usual state of health yesterday. No fever, chills, CP, Diarrhea. Patient states the NV made her SOB. No blood in vomit. Patient states other family members have had stomach virus over past 1-2 days. Patient is on augmentin for H flu lower respiratory infection.    Past Medical History:   Diagnosis Date    Anxiety     Arthritis     Asthma     Back pain     COPD (chronic obstructive pulmonary disease)     Pulmonary embolism     10 years ago       Past Surgical History:   Procedure Laterality Date    BIOPSY      right breast    BREAST CYST EXCISION      CATARACT EXTRACTION       SECTION  1990    CHOLECYSTECTOMY      COLONOSCOPY N/A 2022    Procedure: COLONOSCOPY;  Surgeon: Chavez Gonzales MD;  Location: [facility] ENDO;  Service: General;  Laterality: N/A;    ESOPHAGOGASTRODUODENOSCOPY N/A 2022    Procedure: ESOPHAGOGASTRODUODENOSCOPY (EGD);  Surgeon: Chavez Gonzales MD;  Location: [facility] ENDO;  Service: General;  Laterality: N/A;    EYE SURGERY      INCISION AND DRAINAGE OF ABSCESS Left 2020    Procedure: INCISION AND DRAINAGE, ABSCESS;  Surgeon: Irvin Villarreal MD;  Location: [facility] OR;  Service: General;  Laterality: Left;    TUBAL LIGATION          Review of patient's allergies indicates:   Allergen Reactions    Ativan [lorazepam] Itching       No current facility-administered medications on file prior to encounter.     Current Outpatient Medications on File Prior to Encounter   Medication Sig    albuterol (VENTOLIN HFA) 90 mcg/actuation inhaler Inhale 1-2 puffs into the lungs every 4 (four) hours as needed for Wheezing. inhale 2 puff by inhalation route  every 4 - 6 hours as needed    albuterol-ipratropium (DUO-NEB) 2.5 mg-0.5 mg/3 mL nebulizer solution Take 3 mLs by nebulization every 6 (six) hours as needed for Wheezing or Shortness of Breath. Rescue    alendronate (FOSAMAX) 70 MG tablet Take 1 tablet (70 mg total) by mouth every 7 days.    amoxicillin-clavulanate 500-125mg (AUGMENTIN) 500-125 mg Tab Take 1 tablet (500 mg total) by mouth 2 (two) times daily. for 10 days    aspirin 81 MG Chew 81 mg.    atenoloL (TENORMIN) 25 MG tablet TAKE 1/2 TABLET BY MOUTH EVERY DAY **THANK YOU**    atorvastatin (LIPITOR) 20 MG tablet Take 1 tablet (20 mg total) by mouth once daily.    busPIRone (BUSPAR) 15 MG tablet Take 1 tablet (15 mg total) by mouth 3 (three) times daily.    cholecalciferol, vitamin D3, (VITAMIN D3) 25 mcg (1,000 unit) capsule Take 2 capsules (2,000 Units total) by mouth once daily.    [START ON 6/2/2025] clonazePAM (KLONOPIN) 0.5 MG tablet Take 1/2 tablet (.25 mg) to 1 tablet (0.5 mg) up to twice daily as needed for anxiety    DULoxetine (CYMBALTA) 20 MG capsule Take 2 capsules po daily    dupilumab 300 mg/2 mL PnIj Inject 2 mLs (300 mg total) into the skin every 14 (fourteen) days. (Patient not taking: Reported on 5/9/2025)    fluticasone-umeclidin-vilanter (TRELEGY ELLIPTA) 200-62.5-25 mcg inhaler Inhale 1 puff into the lungs once daily.    latanoprost 0.005 % ophthalmic solution Place 1 drop into both eyes every evening.    ondansetron (ZOFRAN-ODT) 4 MG TbDL Take 1 tablet (4 mg total) by mouth every 12 (twelve) hours as needed (Nausea).     pantoprazole (PROTONIX) 40 MG tablet Take 1 tablet (40 mg total) by mouth once daily. Take in the morning before breakfast.  Wait 30 minutes before eating or drinking anything    predniSONE (DELTASONE) 5 MG tablet Take 1 tablet (5 mg total) by mouth once daily.     Family History       Problem Relation (Age of Onset)    Arthritis Mother    Asthma Son    Breast cancer Sister    COPD Sister    Cancer Mother    Diabetes Brother    Heart disease Sister, Mother    Hypertension Sister, Mother, Father    Learning disabilities Sister    Stroke Father          Tobacco Use    Smoking status: Former     Types: Cigarettes    Smokeless tobacco: Never   Substance and Sexual Activity    Alcohol use: Not Currently     Comment: occ    Drug use: Yes     Types: Benzodiazepines     Comment: as prescribed    Sexual activity: Not Currently     Birth control/protection: Post-menopausal     Review of Systems   All other systems reviewed and are negative.    Objective:     Vital Signs (Most Recent):  Temp: 98.8 °F (37.1 °C) (05/17/25 0641)  Pulse: 93 (05/17/25 1132)  Resp: 20 (05/17/25 1058)  BP: 114/60 (05/17/25 1132)  SpO2: 100 % (05/17/25 1132) Vital Signs (24h Range):  Temp:  [98.8 °F (37.1 °C)] 98.8 °F (37.1 °C)  Pulse:  [] 93  Resp:  [20-40] 20  SpO2:  [92 %-100 %] 100 %  BP: ()/(54-71) 114/60     Weight: 51.7 kg (114 lb)  Body mass index is 18.97 kg/m².     Physical Exam        Vitals reviewed  General: NAD, Well developed, Well Nourished  Head: NC/AT  Eyes: EOMI, NANY  Cardiovascular: Pulses intact distally, Regular Rate and rhythm  Pulmonary: Normal Respiratory Rate, No respiratory distress  Gi: Soft, Non-tender  Extremities: Warm, No edema present  Skin: Warm, dry  Neuro: Alert, Oriented x3, No focal Deficit  Psych: Appropriate mood and affect       Significant Labs: All pertinent labs within the past 24 hours have been reviewed.    Significant Imaging: I have reviewed all pertinent imaging results/findings within the  past 24 hours.  Assessment/Plan:     Assessment & Plan  Intractable nausea and vomiting  Symptoms consistent with viral gastroenteritis  IVF  FLD  PRN medications for pain and nausea  CT obtained with no acute pathology  Lipase wnl    Chronic obstructive pulmonary disease  Patient's COPD is controlled currently.  Patient is currently off COPD Pathway. Continue scheduled inhalers PRN duonebs, Steroids, Antibiotics, and Supplemental oxygen and monitor respiratory status closely.     Continue cefepime for Hflu on respiratory culture  Continue oxygen at home dose  Continue chronic steroids  Anxiety about health  Prn xanax    Sinus tachycardia  Continue home atenolol    VTE Risk Mitigation (From admission, onward)           Ordered     enoxaparin injection 40 mg  Daily         05/17/25 1156     IP VTE HIGH RISK PATIENT  Once         05/17/25 1156     Place sequential compression device  Until discontinued         05/17/25 1156                       On 05/17/2025, patient should be placed in hospital observation services under my care.             Jeff Boateng MD  Department of Hospital Medicine  [facility] - Emergency Dept

## 2025-05-17 NOTE — NURSING
Admitted to unit from ER. Initial assessment and vital signs completed. Ambulated to bathroom with sba x 1. Oriented to room, plan of care, safety instructions. Bed alarm set.

## 2025-05-17 NOTE — HPI
Patient is a 60 yo with a past medical history significant for anxiety, COPD on home oxygen, back pain, and remote pulmonary embolism  presenting with intractable NV. Symptoms started this morning. Patient was in usual state of health yesterday. No fever, chills, CP, Diarrhea. Patient states the NV made her SOB. No blood in vomit. Patient states other family members have had stomach virus over past 1-2 days. Patient is on augmentin for H flu lower respiratory infection.  
No

## 2025-05-18 VITALS
OXYGEN SATURATION: 94 % | DIASTOLIC BLOOD PRESSURE: 67 MMHG | BODY MASS INDEX: 18.85 KG/M2 | WEIGHT: 113.13 LBS | RESPIRATION RATE: 19 BRPM | TEMPERATURE: 98 F | HEIGHT: 65 IN | SYSTOLIC BLOOD PRESSURE: 149 MMHG | HEART RATE: 74 BPM

## 2025-05-18 LAB
ABSOLUTE EOSINOPHIL (OHS): 0.04 K/UL
ABSOLUTE MONOCYTE (OHS): 0.26 K/UL (ref 0.3–1)
ABSOLUTE NEUTROPHIL COUNT (OHS): 1.8 K/UL (ref 1.8–7.7)
ALBUMIN SERPL BCP-MCNC: 3.4 G/DL (ref 3.5–5.2)
ALP SERPL-CCNC: 51 UNIT/L (ref 40–150)
ALT SERPL W/O P-5'-P-CCNC: 11 UNIT/L (ref 10–44)
ANION GAP (OHS): 7 MMOL/L (ref 8–16)
AST SERPL-CCNC: 28 UNIT/L (ref 11–45)
BASOPHILS # BLD AUTO: 0.02 K/UL
BASOPHILS NFR BLD AUTO: 0.6 %
BILIRUB SERPL-MCNC: 0.6 MG/DL (ref 0.1–1)
BUN SERPL-MCNC: 5 MG/DL (ref 8–23)
CALCIUM SERPL-MCNC: 8.1 MG/DL (ref 8.7–10.5)
CHLORIDE SERPL-SCNC: 109 MMOL/L (ref 95–110)
CO2 SERPL-SCNC: 26 MMOL/L (ref 23–29)
CREAT SERPL-MCNC: 0.7 MG/DL (ref 0.5–1.4)
ERYTHROCYTE [DISTWIDTH] IN BLOOD BY AUTOMATED COUNT: 12.2 % (ref 11.5–14.5)
GFR SERPLBLD CREATININE-BSD FMLA CKD-EPI: >60 ML/MIN/1.73/M2
GLUCOSE SERPL-MCNC: 88 MG/DL (ref 70–110)
HCT VFR BLD AUTO: 37.5 % (ref 37–48.5)
HGB BLD-MCNC: 13.1 GM/DL (ref 12–16)
IMM GRANULOCYTES # BLD AUTO: 0.01 K/UL (ref 0–0.04)
IMM GRANULOCYTES NFR BLD AUTO: 0.3 % (ref 0–0.5)
LYMPHOCYTES # BLD AUTO: 1.02 K/UL (ref 1–4.8)
MAGNESIUM SERPL-MCNC: 1.9 MG/DL (ref 1.6–2.6)
MCH RBC QN AUTO: 32.9 PG (ref 27–31)
MCHC RBC AUTO-ENTMCNC: 34.9 G/DL (ref 32–36)
MCV RBC AUTO: 94 FL (ref 82–98)
NUCLEATED RBC (/100WBC) (OHS): 0 /100 WBC
PHOSPHATE SERPL-MCNC: 3 MG/DL (ref 2.7–4.5)
PLATELET # BLD AUTO: 204 K/UL (ref 150–450)
PMV BLD AUTO: 9 FL (ref 9.2–12.9)
POTASSIUM SERPL-SCNC: 4.1 MMOL/L (ref 3.5–5.1)
PROT SERPL-MCNC: 6.3 GM/DL (ref 6–8.4)
RBC # BLD AUTO: 3.98 M/UL (ref 4–5.4)
RELATIVE EOSINOPHIL (OHS): 1.3 %
RELATIVE LYMPHOCYTE (OHS): 32.4 % (ref 18–48)
RELATIVE MONOCYTE (OHS): 8.3 % (ref 4–15)
RELATIVE NEUTROPHIL (OHS): 57.1 % (ref 38–73)
SODIUM SERPL-SCNC: 142 MMOL/L (ref 136–145)
WBC # BLD AUTO: 3.15 K/UL (ref 3.9–12.7)

## 2025-05-18 PROCEDURE — 96361 HYDRATE IV INFUSION ADD-ON: CPT

## 2025-05-18 PROCEDURE — 63600175 PHARM REV CODE 636 W HCPCS: Performed by: STUDENT IN AN ORGANIZED HEALTH CARE EDUCATION/TRAINING PROGRAM

## 2025-05-18 PROCEDURE — 25000003 PHARM REV CODE 250: Performed by: STUDENT IN AN ORGANIZED HEALTH CARE EDUCATION/TRAINING PROGRAM

## 2025-05-18 PROCEDURE — 82040 ASSAY OF SERUM ALBUMIN: CPT | Performed by: STUDENT IN AN ORGANIZED HEALTH CARE EDUCATION/TRAINING PROGRAM

## 2025-05-18 PROCEDURE — G0378 HOSPITAL OBSERVATION PER HR: HCPCS

## 2025-05-18 PROCEDURE — 27000221 HC OXYGEN, UP TO 24 HOURS

## 2025-05-18 PROCEDURE — 84100 ASSAY OF PHOSPHORUS: CPT | Performed by: STUDENT IN AN ORGANIZED HEALTH CARE EDUCATION/TRAINING PROGRAM

## 2025-05-18 PROCEDURE — 99900031 HC PATIENT EDUCATION (STAT)

## 2025-05-18 PROCEDURE — 99900035 HC TECH TIME PER 15 MIN (STAT)

## 2025-05-18 PROCEDURE — 99238 HOSP IP/OBS DSCHRG MGMT 30/<: CPT | Mod: ,,, | Performed by: STUDENT IN AN ORGANIZED HEALTH CARE EDUCATION/TRAINING PROGRAM

## 2025-05-18 PROCEDURE — 94761 N-INVAS EAR/PLS OXIMETRY MLT: CPT

## 2025-05-18 PROCEDURE — 85025 COMPLETE CBC W/AUTO DIFF WBC: CPT | Performed by: STUDENT IN AN ORGANIZED HEALTH CARE EDUCATION/TRAINING PROGRAM

## 2025-05-18 PROCEDURE — 83735 ASSAY OF MAGNESIUM: CPT | Performed by: STUDENT IN AN ORGANIZED HEALTH CARE EDUCATION/TRAINING PROGRAM

## 2025-05-18 PROCEDURE — 96376 TX/PRO/DX INJ SAME DRUG ADON: CPT

## 2025-05-18 RX ADMIN — SODIUM CHLORIDE, POTASSIUM CHLORIDE, SODIUM LACTATE AND CALCIUM CHLORIDE: 600; 310; 30; 20 INJECTION, SOLUTION INTRAVENOUS at 04:05

## 2025-05-18 RX ADMIN — DULOXETINE HYDROCHLORIDE 40 MG: 20 CAPSULE, DELAYED RELEASE ORAL at 10:05

## 2025-05-18 RX ADMIN — ATORVASTATIN CALCIUM 20 MG: 10 TABLET, FILM COATED ORAL at 10:05

## 2025-05-18 RX ADMIN — ASPIRIN 81 MG: 81 TABLET, CHEWABLE ORAL at 10:05

## 2025-05-18 RX ADMIN — PANTOPRAZOLE SODIUM 40 MG: 40 TABLET, DELAYED RELEASE ORAL at 10:05

## 2025-05-18 RX ADMIN — BUSPIRONE HYDROCHLORIDE 15 MG: 5 TABLET ORAL at 10:05

## 2025-05-18 RX ADMIN — PREDNISONE 5 MG: 1 TABLET ORAL at 10:05

## 2025-05-18 RX ADMIN — CEFEPIME 2 G: 2 INJECTION, POWDER, FOR SOLUTION INTRAVENOUS at 04:05

## 2025-05-18 NOTE — NURSING
Patient resting quietly. Bed in lowest position, side rails up x 2, and call light within reach. IV fluids maintained. No complaints of pain at this time.

## 2025-05-18 NOTE — RESPIRATORY THERAPY
05/18/25 0715   Patient Assessment/Suction   Level of Consciousness (AVPU) alert   Respiratory Effort Normal;Unlabored   Expansion/Accessory Muscles/Retractions no retractions;no use of accessory muscles   All Lung Fields Breath Sounds Anterior:;Posterior:;Lateral:;equal bilaterally;clear   Rhythm/Pattern, Respiratory depth regular;pattern regular;unlabored   Cough Frequency no cough   Skin Integrity   $ Wound Care Tech Time 15 min   Area Observed Left;Right;Behind ear;Cheek;Upper lip;Nares   Skin Appearance without discoloration   PRE-TX-O2   Device (Oxygen Therapy) nasal cannula   $ Is the patient on Low Flow Oxygen? Yes   Flow (L/min) (Oxygen Therapy) 2   Oxygen Concentration (%) 28   SpO2 100 %   Pulse Oximetry Type Continuous   $ Pulse Oximetry - Multiple Charge Pulse Oximetry - Multiple   Pulse 77   Resp 19   Aerosol Therapy   $ Aerosol Therapy Charges PRN treatment not required   Daily Review of Necessity (SVN) completed   Respiratory Treatment Status (SVN) PRN treatment not required   Education   $ Education Bronchodilator;Oxygen;15 min

## 2025-05-18 NOTE — NURSING
Report received from JUAN MIGUEL Mchugh,  Chart reviewed, pt resting at this time, call light in reach, bed locked and in lowest position, whiteboard updated, side rails upX2, the care team will continue to provide care and update pt on any poc changes, will continue assessing pt for any potential complications.

## 2025-05-18 NOTE — DISCHARGE SUMMARY
[facility] Medicine  Discharge Summary      Patient Name: Zayra White  MRN: [id number]  BISI: [id number]  Patient Class: OP- Observation  Admission Date: 5/17/2025  Hospital Length of Stay: 0 days  Discharge Date and Time: 05/18/2025 10:05 AM  Attending Physician: Jeff Boateng MD   Discharging Provider: Jeff Boateng MD  Primary Care Provider: Polly Lemos MD    Primary Care Team: Networked reference to record PCT     HPI:   Patient is a 58 yo with a past medical history significant for anxiety, COPD on home oxygen, back pain, and remote pulmonary embolism  presenting with intractable NV. Symptoms started this morning. Patient was in usual state of health yesterday. No fever, chills, CP, Diarrhea. Patient states the NV made her SOB. No blood in vomit. Patient states other family members have had stomach virus over past 1-2 days. Patient is on augmentin for H flu lower respiratory infection.    * No surgery found *      Hospital Course:   Patient admitted for acute viral gastroenteritis. Monitored overnight on IVF. Nausea improved. Tolerating diet. Patient says she has zofran at home. Advised to continue augmentin she is on for current infection. Advised to eat with med. Patient has received maximum benefit from inpatient stay and is medically clear for discharge. Provided discharge instructions and return precautions.       Goals of Care Treatment Preferences:  Code Status: Full Code      SDOH Screening:  The patient was screened for utility difficulties, food insecurity, transport difficulties, housing insecurity, and interpersonal safety and there were no concerns identified this admission.     Consults:     Assessment & Plan  Intractable nausea and vomiting  Symptoms consistent with viral gastroenteritis  IVF  FLD  PRN medications for pain and nausea  CT obtained with no acute pathology  Lipase wnl    Chronic obstructive pulmonary disease  Patient's COPD is controlled  currently.  Patient is currently off COPD Pathway. Continue scheduled inhalers PRN duonebs, Steroids, Antibiotics, and Supplemental oxygen and monitor respiratory status closely.     Continue cefepime for Hflu on respiratory culture  Continue oxygen at home dose  Continue chronic steroids  Anxiety about health  Prn xanax    Sinus tachycardia  Continue home atenolol    Final Active Diagnoses:    Diagnosis Date Noted POA    PRINCIPAL PROBLEM:  Intractable nausea and vomiting [R11.2] 05/17/2025 Yes    Sinus tachycardia [R00.0] 05/17/2025 Yes    Anxiety about health [R45.89] 11/27/2021 Yes    Chronic obstructive pulmonary disease [J44.9] 03/15/2020 Yes      Problems Resolved During this Admission:       Discharged Condition: good    Disposition: Home or Self Care    Follow Up:   Follow-up Information       Polly Lemos MD. Go on 5/26/2025.    Specialty: Family Medicine  Why: Hospital follow- up Monday May 26th at 9:25am  Contact information:  [address] [facility] [address]  [phone]                           Patient Instructions:      Diet Cardiac     Notify your health care provider if you experience any of the following:  persistent nausea and vomiting or diarrhea     Notify your health care provider if you experience any of the following:  temperature >100.4     Notify your health care provider if you experience any of the following:  difficulty breathing or increased cough     Activity as tolerated       Significant Diagnostic Studies: N/A    Pending Diagnostic Studies:       None           Medications:  Reconciled Home Medications:      Medication List        CONTINUE taking these medications      albuterol 90 mcg/actuation inhaler  Commonly known as: VENTOLIN HFA  Inhale 1-2 puffs into the lungs every 4 (four) hours as needed for Wheezing. inhale 2 puff by inhalation route  every 4 - 6 hours as needed     albuterol-ipratropium 2.5 mg-0.5 mg/3 mL nebulizer solution  Commonly known as:  DUO-NEB  Take 3 mLs by nebulization every 6 (six) hours as needed for Wheezing or Shortness of Breath. Rescue     alendronate 70 MG tablet  Commonly known as: FOSAMAX  Take 1 tablet (70 mg total) by mouth every 7 days.     amoxicillin-clavulanate 500-125mg 500-125 mg Tab  Commonly known as: AUGMENTIN  Take 1 tablet (500 mg total) by mouth 2 (two) times daily. for 10 days     aspirin 81 MG Chew  81 mg.     atenoloL 25 MG tablet  Commonly known as: TENORMIN  TAKE 1/2 TABLET BY MOUTH EVERY DAY **THANK YOU**     atorvastatin 20 MG tablet  Commonly known as: LIPITOR  Take 1 tablet (20 mg total) by mouth once daily.     busPIRone 15 MG tablet  Commonly known as: BUSPAR  Take 1 tablet (15 mg total) by mouth 3 (three) times daily.     cholecalciferol (vitamin D3) 25 mcg (1,000 unit) capsule  Commonly known as: VITAMIN D3  Take 2 capsules (2,000 Units total) by mouth once daily.     clonazePAM 0.5 MG tablet  Commonly known as: KlonoPIN  Take 1/2 tablet (.25 mg) to 1 tablet (0.5 mg) up to twice daily as needed for anxiety  Start taking on: June 2, 2025     DULoxetine 20 MG capsule  Commonly known as: CYMBALTA  Take 2 capsules po daily     latanoprost 0.005 % ophthalmic solution  Place 1 drop into both eyes every evening.     ondansetron 4 MG Tbdl  Commonly known as: ZOFRAN-ODT  Take 1 tablet (4 mg total) by mouth every 12 (twelve) hours as needed (Nausea).     pantoprazole 40 MG tablet  Commonly known as: PROTONIX  Take 1 tablet (40 mg total) by mouth once daily. Take in the morning before breakfast.  Wait 30 minutes before eating or drinking anything     predniSONE 5 MG tablet  Commonly known as: DELTASONE  Take 1 tablet (5 mg total) by mouth once daily.     TRELEGY ELLIPTA 200-62.5-25 mcg inhaler  Generic drug: fluticasone-umeclidin-vilanter  Inhale 1 puff into the lungs once daily.            ASK your doctor about these medications      dupilumab 300 mg/2 mL Pnij  Inject 2 mLs (300 mg total) into the skin every 14  (fourteen) days.              Indwelling Lines/Drains at time of discharge:   Lines/Drains/Airways       None                   Time spent on the discharge of patient: 15 minutes         Jeff Boateng MD  Department of Hospital Medicine  [facility]

## 2025-05-18 NOTE — PROGRESS NOTES
Pharmacist Renal Dose Adjustment Note    Zayra White is a 59 y.o. female being treated with the medication cefepime    Patient Data:    Vital Signs (Most Recent):  Temp: 97.9 °F (36.6 °C) (05/17/25 1912)  Pulse: 75 (05/17/25 1912)  Resp: 19 (05/17/25 1912)  BP: (!) 129/58 (05/17/25 1912)  SpO2: 99 % (05/17/25 1912) Vital Signs (72h Range):  Temp:  [97.9 °F (36.6 °C)-98.8 °F (37.1 °C)]   Pulse:  []   Resp:  [18-40]   BP: ()/(54-85)   SpO2:  [92 %-100 %]      Recent Labs   Lab 05/13/25  0814 05/17/25  0721   CREATININE 0.8 0.8     Serum creatinine: 0.8 mg/dL 05/17/25 0721  Estimated creatinine clearance: 61.7 mL/min    Medication:cefepime dose: 2g frequency q12h will be changed to medication:cefepime dose:2g frequency:q8h    Pharmacist's Name: Cari Eng  Pharmacist's Extension:

## 2025-05-18 NOTE — HOSPITAL COURSE
Patient admitted for acute viral gastroenteritis. Monitored overnight on IVF. Nausea improved. Tolerating diet. Patient says she has zofran at home. Advised to continue augmentin she is on for current infection. Advised to eat with med. Patient has received maximum benefit from inpatient stay and is medically clear for discharge. Provided discharge instructions and return precautions.

## 2025-05-18 NOTE — NURSING
Patient did well overnight. Up to the bathroom with no problems except getting SOB when in the bathroom. States she wants to go home today, it is her grandsons first birthday. Bed in lowest position, side rails up x 2, and call light within reach.  Chart check complete and all new orders acknowledged.

## 2025-05-19 LAB
OHS QRS DURATION: 80 MS
OHS QTC CALCULATION: 455 MS

## 2025-05-22 LAB
BACTERIA BLD CULT: NORMAL
BACTERIA BLD CULT: NORMAL

## 2025-05-26 ENCOUNTER — OFFICE VISIT (OUTPATIENT)
Dept: FAMILY MEDICINE | Facility: CLINIC | Age: 60
End: 2025-05-26
Payer: MEDICAID

## 2025-05-26 VITALS
DIASTOLIC BLOOD PRESSURE: 64 MMHG | HEIGHT: 65 IN | WEIGHT: 118.19 LBS | BODY MASS INDEX: 19.69 KG/M2 | HEART RATE: 87 BPM | SYSTOLIC BLOOD PRESSURE: 124 MMHG | RESPIRATION RATE: 15 BRPM | OXYGEN SATURATION: 98 %

## 2025-05-26 DIAGNOSIS — R45.89 ANXIETY ABOUT HEALTH: ICD-10-CM

## 2025-05-26 DIAGNOSIS — Z09 HOSPITAL DISCHARGE FOLLOW-UP: Primary | ICD-10-CM

## 2025-05-26 DIAGNOSIS — R11.2 NAUSEA AND VOMITING, UNSPECIFIED VOMITING TYPE: ICD-10-CM

## 2025-05-26 DIAGNOSIS — J96.11 CHRONIC RESPIRATORY FAILURE WITH HYPOXIA: ICD-10-CM

## 2025-05-26 PROCEDURE — 3078F DIAST BP <80 MM HG: CPT | Mod: CPTII,,, | Performed by: FAMILY MEDICINE

## 2025-05-26 PROCEDURE — 3066F NEPHROPATHY DOC TX: CPT | Mod: CPTII,,, | Performed by: FAMILY MEDICINE

## 2025-05-26 PROCEDURE — G2211 COMPLEX E/M VISIT ADD ON: HCPCS | Mod: S$PBB,,, | Performed by: FAMILY MEDICINE

## 2025-05-26 PROCEDURE — 99214 OFFICE O/P EST MOD 30 MIN: CPT | Mod: PBBFAC | Performed by: FAMILY MEDICINE

## 2025-05-26 PROCEDURE — 1159F MED LIST DOCD IN RCRD: CPT | Mod: CPTII,,, | Performed by: FAMILY MEDICINE

## 2025-05-26 PROCEDURE — 3074F SYST BP LT 130 MM HG: CPT | Mod: CPTII,,, | Performed by: FAMILY MEDICINE

## 2025-05-26 PROCEDURE — 99999 PR PBB SHADOW E&M-EST. PATIENT-LVL IV: CPT | Mod: PBBFAC,,, | Performed by: FAMILY MEDICINE

## 2025-05-26 PROCEDURE — 3061F NEG MICROALBUMINURIA REV: CPT | Mod: CPTII,,, | Performed by: FAMILY MEDICINE

## 2025-05-26 PROCEDURE — 3008F BODY MASS INDEX DOCD: CPT | Mod: CPTII,,, | Performed by: FAMILY MEDICINE

## 2025-05-26 PROCEDURE — 99214 OFFICE O/P EST MOD 30 MIN: CPT | Mod: S$PBB,,, | Performed by: FAMILY MEDICINE

## 2025-05-26 PROCEDURE — 3044F HG A1C LEVEL LT 7.0%: CPT | Mod: CPTII,,, | Performed by: FAMILY MEDICINE

## 2025-05-26 NOTE — PROGRESS NOTES
Patient ID: Zayra White is a 59 y.o. female.    Chief Complaint: Follow-up (hospital)    History of Present Illness    CHIEF COMPLAINT:  Zayra presents today for follow up after recent hospitalization    HISTORY OF PRESENT ILLNESS:  She was hospitalized on May 17th with nausea and vomiting. She attributes these symptoms to her panic disorder, though she does not recall specific anxiety triggers at the time. Sputum culture was positive for Haemophilus influenza, for which Augmentin was prescribed.    WEIGHT MANAGEMENT:  Her weight has increased from 113 lbs on May 18th to 118 lbs currently, achieved through dietary modifications including ice cream and instant breakfast consumption.    MEDICAL HISTORY:  She has a history of panic disorder.      ROS:  ROS as indicated in HPI.         Physical Exam  Constitutional:       Appearance: Normal appearance.   HENT:      Head: Normocephalic and atraumatic.      Nose: Nose normal.   Eyes:      Extraocular Movements: Extraocular movements intact.      Pupils: Pupils are equal, round, and reactive to light.   Cardiovascular:      Rate and Rhythm: Normal rate and regular rhythm.      Pulses: Normal pulses.      Heart sounds: Normal heart sounds.   Pulmonary:      Comments: Chronic respiratory failure with decreased air flow.  On 02 at 3L.  Musculoskeletal:         General: Normal range of motion.   Skin:     General: Skin is warm and dry.   Neurological:      General: No focal deficit present.      Mental Status: She is alert and oriented to person, place, and time.   Psychiatric:         Mood and Affect: Mood normal.         Behavior: Behavior normal.         Thought Content: Thought content normal.          Assessment & Plan    A41.9 Sepsis, unspecified organism  F41.0 Panic disorder [episodic paroxysmal anxiety]  B96.3 Hemophilus influenzae [H. influenzae] as the cause of diseases classified elsewhere  E86.0 Dehydration  R11.2 Nausea with vomiting, unspecified  I95.9  Hypotension, unspecified  R00.0 Tachycardia, unspecified  R06.82 Tachypnea, not elsewhere classified  R06.89 Other abnormalities of breathing    SEPSIS:  - Assessed patient's recent hospitalization for potential sepsis due to elevated heart rate, low BP, and dehydration.  - Vital signs during hospitalization showed heart rate of 136, blood pressure of 94/54, and respirations of 40, indicating a severe condition.    PANIC DISORDER:  - Considered patient's history of panic disorder in relation to recent symptoms, noting anxiety is exacerbated when vomiting.  - Will continue Klonopin for anxiety management with confirmed refills available.    HAEMOPHILUS INFLUENZAE INFECTION:  - Reviewed sputum culture results positive for Haemophilus influenzae.  - Prescribed Augmentin specifically to treat this infection based on culture results.    DEHYDRATION:  - Assessed dehydration during recent hospitalization for nausea and vomiting, confirmed by clinical indicators including hypotension and tachycardia.    NAUSEA AND VOMITING:  - Assessed recent hospitalization for these symptoms.  - Advised patient that Augmentin (prescribed for H. influenzae) may potentially cause nausea as a side effect.    HYPOTENSION:  - Blood pressure was 94/54 during hospitalization, will continue to monitor.    TACHYCARDIA:  - Heart rate was 136 during hospitalization, will continue to monitor.    TACHYPNEA:  - Respiration rate was 40 during hospitalization, will continue to monitor.    ABNORMAL BREATHING:  - Zayra does not move air adequately to the lung bases.    WEIGHT MANAGEMENT:  - Evaluated weight gain, noting improvement from previous visit (118 lbs from 113 lbs).  - Zayra to continue ice cream and instant breakfast to maintain weight gain.    FOLLOW-UP:  - Follow up in 3 months for regular checkup.         Review of patient's allergies indicates:   Allergen Reactions    Ativan [lorazepam] Itching      Vitals:    05/26/25 0919   BP:  "124/64   BP Location: Right arm   Patient Position: Sitting   Pulse: 87   Resp: 15   SpO2: 98%  Comment: 2 LPM NC   Weight: 53.6 kg (118 lb 3.2 oz)   Height: 5' 5" (1.651 m)           Results for orders placed or performed during the hospital encounter of 05/17/25   EKG 12-lead    Collection Time: 05/17/25  6:59 AM   Result Value Ref Range    QRS Duration 80 ms    [facility] QTC Calculation 455 ms   Blood culture x two cultures. Draw prior to antibiotics.    Collection Time: 05/17/25  7:21 AM    Specimen: Peripheral, Antecubital, Right; Blood   Result Value Ref Range    Blood Culture No Growth After 5 Days    Hepatitis C Antibody    Collection Time: 05/17/25  7:21 AM   Result Value Ref Range    Hep C Ab Interp Non-Reactive Non-Reactive   HIV 1/2 Ag/Ab (4th Gen)    Collection Time: 05/17/25  7:21 AM   Result Value Ref Range    HIV 1/2 Ag/Ab Non-Reactive Non-Reactive   Comprehensive metabolic panel    Collection Time: 05/17/25  7:21 AM   Result Value Ref Range    Sodium 139 136 - 145 mmol/L    Potassium 3.8 3.5 - 5.1 mmol/L    Chloride 103 95 - 110 mmol/L    CO2 25 23 - 29 mmol/L    Glucose 114 (H) 70 - 110 mg/dL    BUN 8 8 - 23 mg/dL    Creatinine 0.8 0.5 - 1.4 mg/dL    Calcium 8.3 (L) 8.7 - 10.5 mg/dL    Protein Total 7.1 6.0 - 8.4 gm/dL    Albumin 3.8 3.5 - 5.2 g/dL    Bilirubin Total 0.7 0.1 - 1.0 mg/dL    ALP 59 40 - 150 unit/L    AST 44 11 - 45 unit/L    ALT 14 10 - 44 unit/L    Anion Gap 11 8 - 16 mmol/L    eGFR >60 >60 mL/min/1.73/m2   Lactic acid, plasma #1    Collection Time: 05/17/25  7:21 AM   Result Value Ref Range    Lactic Acid Level 1.7 0.5 - 2.2 mmol/L   Troponin I    Collection Time: 05/17/25  7:21 AM   Result Value Ref Range    Troponin-I <0.006 <=0.026 ng/mL   Lipase    Collection Time: 05/17/25  7:21 AM   Result Value Ref Range    Lipase Level 51 4 - 60 U/L   CBC with Differential    Collection Time: 05/17/25  7:21 AM   Result Value Ref Range    WBC 6.58 3.90 - 12.70 K/uL    RBC 4.44 4.00 - 5.40 M/uL    " HGB 14.5 12.0 - 16.0 gm/dL    HCT 42.4 37.0 - 48.5 %    MCV 96 82 - 98 fL    MCH 32.7 (H) 27.0 - 31.0 pg    MCHC 34.2 32.0 - 36.0 g/dL    RDW 12.4 11.5 - 14.5 %    Platelet Count 274 150 - 450 K/uL    MPV 8.9 (L) 9.2 - 12.9 fL    Nucleated RBC 0 <=0 /100 WBC    Neut % 81.0 (H) 38 - 73 %    Lymph % 14.0 (L) 18 - 48 %    Mono % 3.6 (L) 4 - 15 %    Eos % 0.6 <=8 %    Basophil % 0.5 <=1.9 %    Imm Grans % 0.3 0.0 - 0.5 %    Neut # 5.33 1.8 - 7.7 K/uL    Lymph # 0.92 (L) 1 - 4.8 K/uL    Mono # 0.24 (L) 0.3 - 1 K/uL    Eos # 0.04 <=0.5 K/uL    Baso # 0.03 <=0.2 K/uL    Imm Grans # 0.02 0.00 - 0.04 K/uL   Blood culture x two cultures. Draw prior to antibiotics.    Collection Time: 05/17/25  8:30 AM    Specimen: Peripheral, Antecubital, Left; Blood   Result Value Ref Range    Blood Culture No Growth After 5 Days    Urinalysis, Reflex to Urine Culture Urine, Clean Catch    Collection Time: 05/17/25 10:13 AM    Specimen: Urine, Clean Catch   Result Value Ref Range    Color, UA Yellow Straw, [place], Yellow, Light-Orange    Appearance, UA Clear Clear    pH, UA 6.0 5.0 - 8.0    Spec Grav UA <=1.005 (A) 1.005 - 1.030    Protein, UA Negative Negative    Glucose, UA Negative Negative    Ketones, UA Negative Negative    Bilirubin, UA Negative Negative    Blood, UA Negative Negative    Nitrites, UA Negative Negative    Urobilinogen, UA Negative <2.0 EU/dL    Leukocyte Esterase, UA Negative Negative   Comprehensive Metabolic Panel    Collection Time: 05/18/25  4:22 AM   Result Value Ref Range    Sodium 142 136 - 145 mmol/L    Potassium 4.1 3.5 - 5.1 mmol/L    Chloride 109 95 - 110 mmol/L    CO2 26 23 - 29 mmol/L    Glucose 88 70 - 110 mg/dL    BUN 5 (L) 8 - 23 mg/dL    Creatinine 0.7 0.5 - 1.4 mg/dL    Calcium 8.1 (L) 8.7 - 10.5 mg/dL    Protein Total 6.3 6.0 - 8.4 gm/dL    Albumin 3.4 (L) 3.5 - 5.2 g/dL    Bilirubin Total 0.6 0.1 - 1.0 mg/dL    ALP 51 40 - 150 unit/L    AST 28 11 - 45 unit/L    ALT 11 10 - 44 unit/L    Anion Gap 7 (L)  8 - 16 mmol/L    eGFR >60 >60 mL/min/1.73/m2   Magnesium    Collection Time: 05/18/25  4:22 AM   Result Value Ref Range    Magnesium  1.9 1.6 - 2.6 mg/dL   Phosphorus    Collection Time: 05/18/25  4:22 AM   Result Value Ref Range    Phosphorus Level 3.0 2.7 - 4.5 mg/dL   CBC with Differential    Collection Time: 05/18/25  4:22 AM   Result Value Ref Range    WBC 3.15 (L) 3.90 - 12.70 K/uL    RBC 3.98 (L) 4.00 - 5.40 M/uL    HGB 13.1 12.0 - 16.0 gm/dL    HCT 37.5 37.0 - 48.5 %    MCV 94 82 - 98 fL    MCH 32.9 (H) 27.0 - 31.0 pg    MCHC 34.9 32.0 - 36.0 g/dL    RDW 12.2 11.5 - 14.5 %    Platelet Count 204 150 - 450 K/uL    MPV 9.0 (L) 9.2 - 12.9 fL    Nucleated RBC 0 <=0 /100 WBC    Neut % 57.1 38 - 73 %    Lymph % 32.4 18 - 48 %    Mono % 8.3 4 - 15 %    Eos % 1.3 <=8 %    Basophil % 0.6 <=1.9 %    Imm Grans % 0.3 0.0 - 0.5 %    Neut # 1.80 1.8 - 7.7 K/uL    Lymph # 1.02 1 - 4.8 K/uL    Mono # 0.26 (L) 0.3 - 1 K/uL    Eos # 0.04 <=0.5 K/uL    Baso # 0.02 <=0.2 K/uL    Imm Grans # 0.01 0.00 - 0.04 K/uL      Plan:          Hospital discharge follow-up  Comments:  05/17- 05/18/2025 for nausea vomiting.  Has recovered well    Nausea and vomiting, unspecified vomiting type  Comments:  Resolved    Anxiety about health  Comments:  Managed with low-dose Klonopin    Chronic respiratory failure with hypoxia  Comments:  Patient with chronic hypoxic respiratory failure ,on home oxygen at 2 LPM. Supplemental oxygen was provided and noted- Oxygen Concentration (%):  [28] 28        Follow up if symptoms worsen or fail to improve, keep previously scheduled appointments.    This note was generated with the assistance of ambient listening technology. Verbal consent was obtained by the patient and accompanying visitor(s) for the recording of patient appointment to facilitate this note. I attest to having reviewed and edited the generated note for accuracy, though some syntax or spelling errors may persist. Please contact the author of  this note for any clarification.

## 2025-05-27 DIAGNOSIS — J44.9 STEROID-DEPENDENT COPD: ICD-10-CM

## 2025-05-27 DIAGNOSIS — Z92.241 STEROID-DEPENDENT COPD: ICD-10-CM

## 2025-05-30 ENCOUNTER — TELEPHONE (OUTPATIENT)
Dept: PULMONOLOGY | Facility: CLINIC | Age: 60
End: 2025-05-30
Payer: MEDICAID

## 2025-06-03 DIAGNOSIS — J44.9 STAGE 4 VERY SEVERE COPD BY GOLD CLASSIFICATION: ICD-10-CM

## 2025-06-05 RX ORDER — PREDNISONE 5 MG/1
5 TABLET ORAL DAILY
Qty: 60 TABLET | Refills: 0 | Status: SHIPPED | OUTPATIENT
Start: 2025-06-05

## 2025-06-19 ENCOUNTER — HOSPITAL ENCOUNTER (OUTPATIENT)
Dept: RADIOLOGY | Facility: HOSPITAL | Age: 60
Discharge: HOME | End: 2025-06-19
Attending: FAMILY MEDICINE
Payer: MEDICAID

## 2025-06-19 ENCOUNTER — RESULTS FOLLOW-UP (OUTPATIENT)
Dept: FAMILY MEDICINE | Facility: CLINIC | Age: 60
End: 2025-06-19
Payer: MEDICAID

## 2025-06-19 DIAGNOSIS — Z12.31 ENCOUNTER FOR SCREENING MAMMOGRAM FOR MALIGNANT NEOPLASM OF BREAST: Primary | ICD-10-CM

## 2025-06-19 DIAGNOSIS — R92.8 ABNORMAL MAMMOGRAM OF RIGHT BREAST: ICD-10-CM

## 2025-06-19 PROCEDURE — 77065 DX MAMMO INCL CAD UNI: CPT | Mod: TC,RT

## 2025-06-19 PROCEDURE — 76642 ULTRASOUND BREAST LIMITED: CPT | Mod: 26,RT,, | Performed by: RADIOLOGY

## 2025-06-19 PROCEDURE — 77065 DX MAMMO INCL CAD UNI: CPT | Mod: 26,RT,, | Performed by: RADIOLOGY

## 2025-06-19 PROCEDURE — 77061 BREAST TOMOSYNTHESIS UNI: CPT | Mod: 26,RT,, | Performed by: RADIOLOGY

## 2025-06-19 PROCEDURE — 76642 ULTRASOUND BREAST LIMITED: CPT | Mod: TC,RT

## 2025-06-27 ENCOUNTER — OFFICE VISIT (OUTPATIENT)
Dept: PULMONOLOGY | Facility: CLINIC | Age: 60
End: 2025-06-27
Payer: MEDICAID

## 2025-06-27 VITALS
BODY MASS INDEX: 19.78 KG/M2 | OXYGEN SATURATION: 91 % | DIASTOLIC BLOOD PRESSURE: 84 MMHG | SYSTOLIC BLOOD PRESSURE: 172 MMHG | HEART RATE: 95 BPM | WEIGHT: 118.69 LBS | HEIGHT: 65 IN

## 2025-06-27 DIAGNOSIS — G47.33 OSA (OBSTRUCTIVE SLEEP APNEA): ICD-10-CM

## 2025-06-27 DIAGNOSIS — R91.1 LUNG NODULE: ICD-10-CM

## 2025-06-27 DIAGNOSIS — R09.89 CHRONIC SINUS COMPLAINTS: ICD-10-CM

## 2025-06-27 DIAGNOSIS — J98.11 ATELECTASIS OF BOTH LUNGS: ICD-10-CM

## 2025-06-27 DIAGNOSIS — J44.9 STAGE 4 VERY SEVERE COPD BY GOLD CLASSIFICATION: Primary | ICD-10-CM

## 2025-06-27 DIAGNOSIS — J96.11 CHRONIC RESPIRATORY FAILURE WITH HYPOXIA AND HYPERCAPNIA: ICD-10-CM

## 2025-06-27 DIAGNOSIS — J96.12 CHRONIC RESPIRATORY FAILURE WITH HYPOXIA AND HYPERCAPNIA: ICD-10-CM

## 2025-06-27 DIAGNOSIS — Z87.891 PERSONAL HISTORY OF NICOTINE DEPENDENCE: ICD-10-CM

## 2025-06-27 PROCEDURE — 99999 PR PBB SHADOW E&M-EST. PATIENT-LVL IV: CPT | Mod: PBBFAC,,, | Performed by: NURSE PRACTITIONER

## 2025-06-27 PROCEDURE — 99214 OFFICE O/P EST MOD 30 MIN: CPT | Mod: PBBFAC,PO | Performed by: NURSE PRACTITIONER

## 2025-06-27 RX ORDER — PREDNISONE 2.5 MG/1
2.5 TABLET ORAL DAILY
Qty: 60 TABLET | Refills: 0 | Status: SHIPPED | OUTPATIENT
Start: 2025-06-27

## 2025-06-27 NOTE — PROGRESS NOTES
6/27/2025    Zayra White  In office visit    Chief Complaint   Patient presents with    COPD       HPI:  06/27/2025: Hx: COPD, Chronic Resp Failure on NIV nightly, Former smoker, Lung nodules   In office today with sister.  Using Trelegy once per day and Albuterol as needed with reported benefit. Has been going some days without using Albuterol, some days needs more than 3 times. Depends on humidity level. Using Duonebs as needed as well.  In MAY submitted sputum sample which was positive for H Influenzae - completed Augmentin at that time. States respiratory symptoms are at bay, denies current mucous production.   Using supplemental oxygen only as needed throughout the day at 2L via NC. States sometimes with walking will increase to 3L. Using NIV nightly.   Started DUPIXENT - tolerating well - injection dose every 2 weeks - next dose is due next week.  Remains on Prednisone 5 mg per day - states she feels as if she is ready to start weaning down.   Has not had to increase dose since prior visit.   Did have one night hospitalization in MAY for GI illness.         03/31/2025: Hx: COPD, Chronic Resp Failure on NIV nightly, Former smoker, Lung nodules   In office today with sister.  Using Trelegy once per day and Albuterol as needed with reported benefit. States Albuterol use depends on the weather - states depends on humidity level as well. Using Duonebs as needed as well.  Using supplemental oxygen only as needed throughout the day at 2L via NC. Using NIV nightly.   Feels as if she's been doing well, able to tolerate activity more recently.   Denies recent UC or ER visit for resp complaints.  Currently on 5 mg Prednisone per day - states rarely will take 10 mg per day if needed for breathing issues.  In NOV underwent CT Chest which shows new 11 mm L apex nodule however this film was obtained 2 days after being diagnosed with RSV. Repeat film obtained on FEB reveals stability of nodules, resolution of prior noted  11 mm nodule.  Patient Instructions   Continue current COPD regimen including Trelegy inhaler 1 puff once per day. This inhaler contains an inhaled steroid component. Rinse mouth after each use due to risk for thrush development. If mouth or tongue develops white sores please contact the clinic and I will order a prescription mouth wash.   Continue to use albuterol and nebulizer treatments as needed for shortness of breath, wheezing, cough.  Continue the use of supplemental oxygen throughout the day as needed and at nighttime.   Also continue NIV use nightly.  Continue 5 mg prednisone per day.  Will attempt gentle taper in the near future.  CT Chest can be done in one year (DUE FEB 2026).  For persistent steroid dependent COPD will prescribe DUPIXENT. This is one injection every two weeks.   I have ordered sputum cultures - you will leave the office today with sputum specimen cups. Bring to any [facility] within 4 hours of obtaining specimen. Rinse your mouth prior to collecting sample. Please collect sample in the morning by deep coughing prior to eating or drinking. I recommend submitting sample in about two weeks.  Continue current medication regiment. Keep follow up appointment as scheduled. Please call the office if you have any questions or concerns.           10/18/2024: Hx: COPD, Chronic Resp Failure on NIV nightly, Former smoker, Lung nodules   In office today with sister.  Using Trelegy once per day and Albuterol as needed with reported benefit. States Albuterol use depends on the weather. Using Duonebs as needed as well.  Using supplemental oxygen only as needed throughout the day at 2L via NC. Using NIV nightly.   Feels as if she's been doing well, able to tolerate activity more recently.   Denies recent UC or ER visit for resp complaints.   Completed Prednisone after last appt - currently on 5 mg per day and tolerating well.   Did complete IV CEFEPIME regimen after last appt - anticipate repeat CT in  NOV.   Recently started back on Klonopin per PCP for anxiety.  Patient Instructions   Continue current COPD regimen including Trelegy inhaler 1 puff once per day. This inhaler contains an inhaled steroid component. Rinse mouth after each use due to risk for thrush development. If mouth or tongue develops white sores please contact the clinic and I will order a prescription mouth wash.   Continue to use albuterol and nebulizer treatments as needed for shortness of breath, wheezing, cough.  Continue the use of supplemental oxygen throughout the day as needed and at nighttime. Also continue NIV use nightly.  Continue 5 mg prednisone per day.  Will attempt gentle taper in the near future.  We will repeat CT chest next month -  due in November 20, 2024.  I have ordered sputum cultures - you will leave the office today with sputum specimen cups. Bring to any [facility] within 4 hours of obtaining specimen. Rinse your mouth prior to collecting sample. Please collect sample in the morning by deep coughing prior to eating or drinking. I recommend submitting sample in about two weeks.  Continue current medication regiment. Keep follow up appointment as scheduled. Please call the office if you have any questions or concerns.         08/07/2024: Hx: COPD, Chronic Resp Failure on NIV nightly, Former smoker, Lung nodules   In office today with younger sister.  Using Trelegy once per day and Albuterol as needed with reported benefit. Using Duonebs as needed as well.  Using supplemental oxygen only as needed throughout the day at 2L via NC. Using NIV nightly. Has missed two nights with NIV due to staying at her daughters house but has since resumed use with no reported issue.   Since prior office visit in MARCH has had five ER visits for documented COPD exacerbations.   Latest ER visit was on 8/2 to [facility] - patient was admitted for COPD exacerbation - treated with steroids while inpatient and subsequently discharged home  with Rx for Prednisone taper and Levaquin. No discharge summary available as of yet for review. States she is currently on the 40 mg x 2 days portion of the taper, also reports compliance with Levaquin. Thinks COPD exacerbation was secondary to riding in vehicle with no AC after cataract surgery.  Prior to hospitalizations was doing well on 5 mg Prednisone per day.  Over the last few months patient self discontinued Klonopin in attempts to decrease the quantity of her medication regimen.  States anxiety has since increased severely since discontinuation of medicine. Is being followed by her PCP in several other pharmaceutical drugs have been tried with no reported benefit.  Patient is requesting to resume Klonopin at this time. Patient thinks that several hospitalizations over the last few months were triggered somewhat by severe anxiety.    Overall since discharged home patient states she is feeling somewhat improved from a respiratory perspective however feels very drained, fatigued.  Upon review of chart patient has had positive for Pseudomonas on sputum culture submitted 8/2 in which she was started on Levaquin - repeat sputum culture from later that same day showed multi-drug resistant Pseudomonas that was resistant to Levaquin.  Patient Instructions   Continue to use current inhalers including Trelegy once per day and Albuterol as needed.  Continue to use the supplemental oxygen.  Continue to use the NIV machine nightly.  Complete current prednisone taper and then continue 5 mg prednisone per day.  Will attempt to taper you off in the future once stable.  Concern that the Levaquin you were discharged from the hospital with is not going to effectively treat Pseudomonas noted on most recent sputum culture which showed resistance to fluoroquinolones.  Will arrange for outpatient administration of Cefepime 2 g every 8 hours x 7 days via PICC line.  We will arrange home health.  CMP to be drawn once CEFEPIME  regimen finished - to be drawn per home health.  Recommend chest x-ray every month x3.  We will repeat CT chest in 3 months, due in November 20, 2024.  I have ordered sputum cultures - you will leave the office today with sputum specimen cups. Bring to any [facility] within 4 hours of obtaining specimen. Rinse your mouth prior to collecting sample. Please collect sample in the morning by deep coughing prior to eating or drinking. I recommend submitting sample in about two weeks.    I do agree that your severe anxiety could be contributing to the significant respiratory distress that you have been encountering.  Please defer to PCP in regards to treatment.  Continue current medication regiment. Keep follow up appointment as scheduled. Please call the office if you have any questions or concerns.           03/25/2024: Hx: COPD, Chronic Resp Failure on NIV nightly, Former smoker, Lung nodules   Using Trelegy once per day and Albuterol as needed, approx use 3 times per day as of late. Using Duonebs as needed as well - started back recently with benefit.  Using supplemental oxygen only as needed throughout the day at 1-2L via NC. Using NIV nightly.  Taking Prednisone 5 mg per day. States at times she feels like she could taper up for a day or so but has not tapered up since prior office visit.  Was experiencing unintentional weight loss - started on PERIACTIN at the beginning of March - has gained approx 4# since starting.  Underwent repeat CT Chest recently revealing atelectasis to DHAVAL nodule that was previously noted. No new or enlarging nodules noted.  HTN noted in clinic today - /93 - states she is compliant with antihypertensives - thinks may be secondary to anxiety.   Patient Instructions   Continue to use current inhalers including Trelegy once per day and Albuterol as needed.  Continue to use the supplemental oxygen.  Continue to use the NIV machine nightly.  CT Chest reviewed - does not show any acute  process. Prior noted area of concern to DHAVAL now less mass like appearing, likely atelectasis. Will plan for CT Chest in 12 months at the latest.  Continue 5 mg prednisone per day. Can trial up to 20 mg per day only as needed then go back to 5 mg.  Need to get DEXA bone scan.  Continue current medication regiment. Keep follow up appointment as scheduled. Please call the office if you have any questions or concerns.           12/18/2023: Hx: COPD, Chronic Resp Failure on NIV nightly, Former smoker, Lung nodules   Last week had episodes of shortness of breath with recent weather change. States she almost felt as if she could have used a visit to the ER.   Using Trelegy once per day and Albuterol as needed, approx use 6 times per day as of late. Using Duonebs as needed as well.   Taking Prednisone 5 mg per day. States at times she feels like she could taper up for a day or so.  Denies current mucous production - did not submit sputum samples. States if she does get mucous up, it's clear in color.  Using supplemental oxygen only as needed throughout the day at 1-2L via NC. Using NIV nightly.  Stopped vaping since prior office appointment.   Patient Instructions   Continue to use current inhalers including Trelegy once per day and Albuterol as needed.  Continue to use the supplemental oxygen.  Continue to use the NIV machine nightly.  CT Chest from November 2023 reviewed - Will discuss with MD further. MAC vs other infectious etiology? You currently do not bring up mucous - may need bronchoscopy? In the least, will plan for repeat CT in 3 months (Due in March 2024).  Continue 5 mg prednisone per day. Can trial up to 20 mg per day only as needed then go back to 5 mg.  Need to get DEXA bone scan.  Nodifylung blood testing unrevealing.  Consider Dupixent in the future.  Continue current medication regiment. Keep follow up appointment as scheduled. Please call the office if you have any questions or concerns.   *Discussed  with [facility] on 12/19/2023 - CT Chest reviewed - new nodule 9 mm to DHAVAL - not present on CT from August 2023 - recommend repeat CT in three months for follow up. Discussed with patient via telephone on 12/19/2023 at 1603. She VU.       12/05/2023: Documentation Only:   Talked with patient on 12/05/2023 at approx 0930 regarding recent CT Chest - Thi Jason NP discussed findings with Dr. 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 Patient states she does not bring up sputum on a regular basis - states she is not currently bringing up sputum but may start later today. States she is going to her PCP today, will  sputum cups from their office. I ordered Resp culture, AFB order already in cocone. Patient is to message me in the next two weeks if she is unable to submit sputum samples and if not, will discuss bronchoscopy further.  Patient verbalizes understanding and acceptance of plan of care. Repeat CT in 3 months anticipated (Due in March 2024).    Using supplemental oxygen only as needed throughout the day at 1-2L via NC. Using NIV nightly.        10/17/2023:  Using supplemental oxygen only as needed throughout the day - mostly when humidity levels are elevated - using at 2-3L via NC. Bleeding in supplemental oxygen into NIV nightly.  Using NIV nightly with much reported benefit.   Seen in [facility] on 7/27 for shortness of breath, diagnosed with COPD exacerbation - completed regimen of Levaquin and Prednisone 60 mg x 4 days.   Using Trelegy once per day and Albuterol only as needed, states depends on the day, when having a bad day may use 6x per day. Using nebulized treatments 1-2x per day.  Using Prednisone 5 mg per day.   Underwent CT Chest in Aug 2023 - states at that time may have had a cold/chest congestion - new nodules are noted throughout - recommend repeat CT in 3 months.  States did not undergo PFT - is having trouble scheduling due to insurance reasons.  Patient Instructions   Reach out to [facility] in regards  to malfunctioning POC machine.  Continue to use current inhalers including Trelegy once per day and Albuterol as needed.  Continue to use the supplemental oxygen.  Continue to use the NIV machine nightly.  CT Chest due in November 2023. The one you had done in August shows a few new nodules - this could be scarring vs infectious as you endorse you were sick at that time. Can check Quantiferon Gold blood test.  Still would like to get PFT - will see if my staff can help facilitate scheduling.  Continue 5 mg prednisone per day.  Recommend stop using vape.  Nodifylung blood testing unrevealing.  Continue current medication regiment. Keep follow up appointment as scheduled. Please call the office if you have any questions or concerns.           07/07/2023:  Has NIV machine - using nightly without issue. Recently got Life 2000 portable vent for daytime use, states she couldn't tolerate - Life 2000 was recalled? States she is waiting for the DME company to come  the machine.  Using supplemental oxygen PRN throughout the day - at 2L via NC. Bleeding in supplemental oxygen into NIV nightly.  Using Trelegy once per day and Albuterol only as needed, states depends on the day, when having a bad day may use 6x per day. States has worse days when humidity, heat are high.  Using Prednisone 5 mg daily with benefit.   Is scheduled to have CT in August 2023.  Stopped smoking - now vaping with nicotine.   Patient Instructions   Overall doing well!  Continue to use current inhalers including Trelegy once per day and Albuterol as needed.  Continue to use the supplemental oxygen.  Continue to use the NIV machine nightly.  CT Chest due in August 2023.  Can check PFT later in the year and see if lung strength improved. Will plan on PFT the same day as your next appt with me.   Continue 5 mg prednisone per day.  Recommend stop using vape.  Continue current medication regiment. Keep follow up appointment as scheduled. Please call the  office if you have any questions or concerns.         03/13/2023:  Doing well overall from a resp perspective. No recent hospitalizations since prior office visit. Using Trelegy once per day and Albuterol only as needed, states depends on the day, when having a bad day may use 6x per day. Using supplemental oxygen PRN throughout the day - at 2L via NC and at night time in correlation with NIV machine. States tolerating NIV overall however does experience some difficulty with mask. [facility] is looking forward to being more active throughout the day, traveling more. Inquiring whether portable NIV would be beneficial for daytime use.  Using Prednisone 5 mg daily with benefit. Not currently smoking however using vape with nicotine.   Patient Instructions   Overall doing well!  Continue to use current inhalers including Trelegy once per day and Albuterol as needed.  Continue to use the supplemental oxygen.  Continue to use the NIV machine nightly.  May benefit from portable NIV machine to be used throughout the day. Order placed, will see if insurance approves.  CT Chest due in August 2023.  Can check PFT later in the year and see if lung strength improved.  Continue 5 mg prednisone per day.  Continue current medication regiment. Keep follow up appointment as scheduled. Please call the office if you have any questions or concerns.         12/13/2022:  Using Trelegy once per day with benefit, Albuterol nebulized treatments every 6 hours as needed.  Using NIV machine for four hours per day - [facility] ever since starting use has been feeling better. Still using supplemental oxygen, has decreased to only PRN use - not wearing O2 in clinic today and in no acute distress.  Has been on Prednisone 10 mg per day with great benefit, agreeable to decrease to 5 mg.   Not currently smoking - cravings under control, [facility] is concerned with Kuldeep upcoming that cravings will increase.  Interested in lung transplant program - [facility] was  "told to call back once quit smoking for 2 consecutive months.  Patient Instructions   Continue current regiment - Trelegy once per day and Albuterol as needed for shortness of breath, wheezing, cough.  Prednisone - start alternating 10 mg and 5 mg every other day. Do this for two weeks and see who you tolerate. If you have no issues, can go down to 5 mg per day.   Continue NIV use, Continue supplemental oxygen.  Doing well from smoking cessation standpoint.  Alpha 1 test today in office.  CT Chest due Aug 2023  Continue current medication regiment. Keep follow up appointment as scheduled. Please call the office if you have any questions or concerns.         10/13/2022:  Per prior message on 9/29/2022:  "Patient messaged stating she is experiencing shortness of breath, worsening since initiation of NIV use.  I called the patient and discussed the case with her on 09/29/2022  at 0940.  Patient states she received her NIV machine approximately 2 weeks ago, has been using daily for at least 4 hours per day.  States she can tolerate the pressure well while using - with initially experiencing headaches with use, respiratory therapist changed pressures.  Patient states shortness of breath is worsening from baseline, cannot walk from living room to kitchen without getting short-winded, patient states she lives in a camper currently.  Patient using supplemental oxygen throughout the day and at night as needed.  On Trelegy once per day.  Using nebulized treatments every 4-6 hours.  Patient currently taking prednisone regimen that was prescribed at prior visit, 6 days total -states she has 2 days left.  Reports benefit with prednisone use.  Patient denies mucus production.  Patient will proceed with getting chest x-ray at this time.  May need repeat/prolonged prednisone taper.  Patient states she spoke with lung transplant service, however a consultation appointment was not scheduled due to current smoking status.  Patient " "states she is currently smoking 1-2 cigarettes per day."  Patient required ER visit on 10/8/2022 for COPD exacerabtion, was discharged with Rx for Azithromycin and 9 day regiment of Prednisone. Completed Zpack and currently has 3 days left of Prednisone.   States overall is having a good day, however yesterday was bad. States she was exposed to several sick contacts and developed bronchitis.   Currently using supplemental oxygen at night time and as needed throughout the day, dose ranges from 1-3lpm.   Using NIV machine nightly, states bleeds o2 in at 2L. Concerned that NIV is causing illness.  States hasn't smoked cigarettes in 2 days now.  Using Trelegy inhaler once per day, albuterol as needed approximately 2x per day. Taking neb treatments every 4-6 hours. States with Albuterol use she experiences hand shakiness, heart palpitations, jitters, and nervousness.   Patient Instructions   Continue Prednisone taper from ER until you finish. Then recommend going to daily Prednisone, 5-10 mg per day based on how you feel.   Continue Trelegy once per day and Albuterol as needed. Have ordered Xopenex for you to use in nebulizer machine since adverse reactions reported to Albuterol.  Continue supplemental oxygen use. Continue NIV use.  Doing well from smoking standpoint, continue smoking cessation.  Continue current medication regiment. Keep follow up appointment as scheduled. Please call the office if you have any questions or concerns.         8/16/2022:  Patient did not keep follow up - I saw patient last in Feb 2022.   Patient required hospitalization for SIRS, COPD at [facility] on 8/7 - was subsequently discharged home on 8/10 (no discharge summary available in Epic). Treated with IV Abx and Bipap therapy while inpatient. States she was discharged home with Rx for steroid and abx, in which she completed both regiments. Patient has had THREE hospitalizations for COPD exacerbations in 2022 alone.  Still using " Trelegy, one puff once per day with reported benefit. Using Albuterol inhaler as needed. Using nebulizer machine with Duoneb as needed.   Still smoking cigarettes, approximately 5 cigarettes per day.  Has supplemental oxygen at home and POC - wears as needed for shortness of breath, at night time - uses 1-2L via NC.  ABG obtained in the hospital on 8/7/2022 revealing pH 7.309, CO2 63.6, O2 122, and HCO3 32.0  Patient Instructions   Area of concern on CT from Feb - need to repeat CT Chest now.  I ordered a Lung Function Test to evaluate lung strength. Please complete prior to next appointment.   Continue Trelegy once per day.  Albuterol as needed. Prescription sent for Duoneb to be taken as needed.  NIV machine ordered given chronic resp failure secondary to COPD with recent hospitalization revealing high CO2 level.  Continue the use of supplemental oxygen.  Stop smoking.   Continue current medication regiment. Keep follow up appointment as scheduled. Please call the office if you have any questions or concerns.             2/18/2022: In office today with sister. Hx: COPD, Chronic Resp failure.  Recently Hospitalized at [facility] on 2/7 for COPD exacerbation, was treated with IV steroids and neb treatments, subsequently discharged home on 2/10 with Rx for Prednisone and Levaquin, which she reports completion and compliance.  Had Prior hospitalization in November 2021 at [facility] for COPD exacerbation - complicated hospitalization with intramuscular hematoma of the left rectus muscle of the anterior abdominal wall - general surgery consulted, resolved spontaneously. Discharged home at that time with supplemental oxygen. Patient also endorses prior ER visit in Feb 2021 for COPD exacerbation and ER visit at [facility] for same.  Currently taking Anora once per day - Albuterol rescue inhaler approx 2-5 times per day. Using Nebulizer (Albuterol and Ipatropium) scheduled per day.  Using oxygen at night  time, and as needed throughout the day - 1L via NC.  Shortness of breath: Varies with time - states worse over the last week due to sinus congestion, weather change, pollen. Exertional, improves with rest and tripod position. Affecting ADLS.   Cough: Worsening, productive with clear mucous production. Worse in the morning, Denies wheezing, chest tightness. Taking Mucinex without benefit. Ran out of Tessalon pearls - they seemed to help.   Patient Instructions   You have two areas of suspicion noted on CT Chest.   One area in the R lower lung - initially noted on CT from 2/8/2022.  Area in the L lung has been present since at least 12/2020 - appears to have grown in size.  Will repeat CT in 3 months.  Would benefit from smoking cessation. Declines referral to smoking cessation program at this time. Has tried Chantix in past.  This inhaler Trelegy is your new daily inhaler. It contains an inhaled steroid component. Rinse mouth after each use due to risk for thrush development. If mouth or tongue develops white sores please contact the clinic and I will order a prescription mouth wash.   Continue to use albuterol rescue inhaler as needed.  Zyrtec - take one pill nightly for 30 days - see if it helps with sinus complaints.  Tessalon pearls as needed for cough.   I ordered a Lung Function Test to evaluate lung strength. Please complete prior to next appointment.         Social Hx: Lives alone - no animals in the home. Previously worked as labored. No Asbestosis exposure, Smoking Hx: Current smoker, started age 14 - 0.5 ppd use.   Family Hx: Mother Lung Cancer, No COPD, Son, Brother, Sister Asthma  Medical Hx: Previous pneumonia (did not require intubation) ; No previous shoulder/chest surgery        The chief compliant  problem varies with instability at times.  PFSH:  Past Medical History:   Diagnosis Date    Anxiety     Arthritis     Asthma     Back pain     COPD (chronic obstructive pulmonary disease)     Pulmonary  embolism     10 years ago         Past Surgical History:   Procedure Laterality Date    BIOPSY      right breast    BREAST CYST EXCISION      CATARACT EXTRACTION       SECTION  1990    CHOLECYSTECTOMY  2010    COLONOSCOPY N/A 2022    Procedure: COLONOSCOPY;  Surgeon: Chavez Gonzales MD;  Location: [facility] ENDO;  Service: General;  Laterality: N/A;    ESOPHAGOGASTRODUODENOSCOPY N/A 2022    Procedure: ESOPHAGOGASTRODUODENOSCOPY (EGD);  Surgeon: Chavez Gonzales MD;  Location: [facility] ENDO;  Service: General;  Laterality: N/A;    EYE SURGERY      INCISION AND DRAINAGE OF ABSCESS Left 2020    Procedure: INCISION AND DRAINAGE, ABSCESS;  Surgeon: Irvin Villarreal MD;  Location: [facility] OR;  Service: General;  Laterality: Left;    TUBAL LIGATION       Social History     Tobacco Use    Smoking status: Former     Types: Cigarettes    Smokeless tobacco: Never   Substance Use Topics    Alcohol use: Not Currently     Comment: occ    Drug use: Yes     Types: Benzodiazepines     Comment: as prescribed     Family History   Problem Relation Name Age of Onset    Breast cancer Sister JoanRhode     COPD Sister JoanRhode     Heart disease Sister JoanRhode     Hypertension Sister JoanRhode     Learning disabilities Sister JoanRhode     Arthritis Mother Carmina Judith     Cancer Mother Carmina Wong         Lung Cancer    Heart disease Mother Carmina Benjamine     Hypertension Mother Carmina Wong     Hypertension Father Jay Wong     Stroke Father Jay Wong     Asthma Son Jacoby Rock II     Diabetes Brother Yovany Wong     Ovarian cancer Neg Hx       Review of patient's allergies indicates:   Allergen Reactions    Ativan [lorazepam] Itching     I have reviewed past medical, family, and social history. I have reviewed previous nurse notes.    Performance Status:The patient's activity level is functions out of house.      Review of Systems:  a review of eleven systems covering constitutional, Eye,  "HEENT, Psych, Respiratory, Cardiac, GI, , Musculoskeletal, Endocrine, Dermatologic was negative except for pertinent findings as listed ABOVE and below: pertinent positive as above, rest is good       Exam:Comprehensive exam done. BP (!) 172/84 (BP Location: Right arm, Patient Position: Sitting)   Pulse 95   Ht 5' 5" (1.651 m)   Wt 53.8 kg (118 lb 11.5 oz)   LMP 09/22/2017   SpO2 (!) 91% Comment: on room air at rest  BMI 19.76 kg/m²   Exam included Vitals as listed  Constitutional: She is oriented to person, place, and time. She appears well-developed. No distress.   Nose: Nose normal.   Mouth/Throat: Uvula is midline, oropharynx is clear and moist and mucous membranes are normal. No dental caries. No oropharyngeal exudate, posterior oropharyngeal edema, posterior oropharyngeal erythema or tonsillar abscesses.  Mallapatti (M) score 1  Eyes: Pupils are equal, round, and reactive to light.   Neck: No JVD present. No thyromegaly present.   Cardiovascular: Normal rate, regular rhythm and normal heart sounds. Exam reveals no gallop and no friction rub.   No murmur heard.  Pulmonary/Chest: Effort normal and breath sounds normal. No accessory muscle usage or stridor. No apnea and no tachypnea. No respiratory distress, on room air. Diminished breath sounds throughout, on supplemental oxygen, in no acute distress.  Abdominal: Soft. She exhibits no mass. There is no tenderness. No hepatosplenomegaly, hernias and normoactive bowel sounds  Musculoskeletal: Normal range of motion. exhibits no edema.   Neurological:  alert and oriented to person, place, and time. not disoriented.   Skin: Skin is warm and dry. Capillary refill takes less 2 sec. No cyanosis or erythema. No pallor. Nails show no clubbing.   Psychiatric: normal mood and affect. behavior is normal. Judgment and thought content normal.       Radiographs (ct chest and cxr) reviewed: view by direct vision       CT Chest Without Contrast 2/27/2025 - Emphysema, " stability of prior noted nodules - resolution of 11 mm L apex nodule noted on last film.     CT Chest without Contrast NOV 2024 - new 11 mm nodule to the L apex and nodular appearance to the LML, that has questionable growth in comparison     CTA Chest Non-Coronary (PE Studies) 8/2/2024: No PE, Bronchiectasis, Emphysema, Scattered micro nodules, increasing nodular area to R apex, several areas of spiculation to DHAVAL, one in which seems to be increasing surrounded by smaller nodules.   No evidence of a pulmonary embolus.    CT Chest without Contrast 03/21/2024 - No acute process. DHAVAL Atelectasis. Prior noted lung nodules without change.     CT Chest without Contrast: November 2023 - multiple lung nodules, emphysema     CT Chest Lung Screening Low Dose 8/30/2023 - new areas of concern, lung nodules    X-Ray Chest 1 View 10/8/2022 Impression:   Marked interval improvement in previously noted patchy segmental airspace consolidation at the left lung base.  There are a few tiny residual nodular densities at the left lung base.  Continued follow-up is recommended to ensure resolution..  Consider a follow-up chest x-ray in 4 weeks.     CT Chest Without Contrast 8/23/2022 FINDINGS:  Centrilobular and paraseptal emphysema.  Bandlike pleuroparenchymal thickening in the right upper lobe again noted.  Target nodule with spiculation in the lingula measures 1.4 cm on series 4, image 317.  3 mm nodule subpleural location right upper lobe series 4, image 301.  3 mm nodule left upper lobe series 4, image 174.  No filling defects central airways.  No pleural effusion or pneumothorax.   Heart size normal.  Coronary artery disease.  No mediastinal adenopathy.  Cholecystectomy clips.  Partially imaged advanced degenerative disc disease at several lower cervical levels.  Several mild remote compression fractures in the upper thoracic spine.   Impression:   1. Unchanged extent of pulmonary nodules, including spicular lesion in the  lingula.  2. Coronary artery disease and pulmonary emphysema.        X-Ray Chest AP Portable  8/7/2022   FINDINGS:  Normal cardiomediastinal contour. No focal consolidation, pleural effusion or pneumothorax. The lungs are hyperinflated with flattening of the diaphragms.   Impression:   No acute cardiopulmonary abnormality.   Pulmonary emphysema.       CTA Chest Non-Coronary  2/8/2022   Impression:   No CTA evidence of pulmonary embolus.  Significant centrilobular emphysema.  Stable 1.4 cm spiculated mass of the left lingula.  New 1.9 cm band of spiculated density in the posterior right lung gutter likely represents atelectasis.  Follow-up CT in 3 months is recommended however.      X-Ray Chest AP Portable  2/8/2022   Impression:   COPD.         Labs reviewed   Patient's labs were reviewed including CBC and CMP    Lab Results   Component Value Date    WBC 3.15 (L) 05/18/2025    RBC 3.98 (L) 05/18/2025    HGB 13.1 05/18/2025    HCT 37.5 05/18/2025    MCV 94 05/18/2025    MCH 32.9 (H) 05/18/2025    MCHC 34.9 05/18/2025    RDW 12.2 05/18/2025     05/18/2025    MPV 9.0 (L) 05/18/2025    GRAN 8.9 (H) 01/18/2025    GRAN 71.3 01/18/2025    LYMPH 32.4 05/18/2025    LYMPH 1.02 05/18/2025    MONO 8.3 05/18/2025    MONO 0.26 (L) 05/18/2025    EOS 1.3 05/18/2025    EOS 0.04 05/18/2025    BASO 0.05 01/18/2025    EOSINOPHIL 0.5 01/18/2025    BASOPHIL 0.6 05/18/2025    BASOPHIL 0.02 05/18/2025   CMP  Sodium   Date Value Ref Range Status   05/18/2025 142 136 - 145 mmol/L Final   01/18/2025 143 136 - 145 mmol/L Final     Potassium   Date Value Ref Range Status   05/18/2025 4.1 3.5 - 5.1 mmol/L Final   01/18/2025 3.5 3.5 - 5.1 mmol/L Final     Chloride   Date Value Ref Range Status   05/18/2025 109 95 - 110 mmol/L Final   01/18/2025 102 95 - 110 mmol/L Final     CO2   Date Value Ref Range Status   05/18/2025 26 23 - 29 mmol/L Final   01/18/2025 28 23 - 29 mmol/L Final     Glucose   Date Value Ref Range Status   05/18/2025 88 70  - 110 mg/dL Final   01/18/2025 114 (H) 70 - 110 mg/dL Final     BUN   Date Value Ref Range Status   05/18/2025 5 (L) 8 - 23 mg/dL Final     Creatinine   Date Value Ref Range Status   05/18/2025 0.7 0.5 - 1.4 mg/dL Final     Calcium   Date Value Ref Range Status   05/18/2025 8.1 (L) 8.7 - 10.5 mg/dL Final   01/18/2025 9.7 8.7 - 10.5 mg/dL Final     Protein Total   Date Value Ref Range Status   05/18/2025 6.3 6.0 - 8.4 gm/dL Final     Total Protein   Date Value Ref Range Status   01/18/2025 7.4 6.0 - 8.4 g/dL Final     Albumin   Date Value Ref Range Status   05/18/2025 3.4 (L) 3.5 - 5.2 g/dL Final   01/18/2025 3.6 3.5 - 5.2 g/dL Final     Total Bilirubin   Date Value Ref Range Status   01/18/2025 0.5 0.1 - 1.0 mg/dL Final     Comment:     For infants and newborns, interpretation of results should be based  on gestational age, weight and in agreement with clinical  observations.    Premature Infant recommended reference ranges:  Up to 24 hours.............<8.0 mg/dL  Up to 48 hours............<12.0 mg/dL  3-5 days..................<15.0 mg/dL  6-29 days.................<15.0 mg/dL       Bilirubin Total   Date Value Ref Range Status   05/18/2025 0.6 0.1 - 1.0 mg/dL Final     Comment:     For infants and newborns, interpretation of results should be based   on gestational age, weight and in agreement with clinical   observations.    Premature Infant recommended reference ranges:   0-24 hours:  <8.0 mg/dL   24-48 hours: <12.0 mg/dL   3-5 days:    <15.0 mg/dL   6-29 days:   <15.0 mg/dL     Alkaline Phosphatase   Date Value Ref Range Status   01/18/2025 69 40 - 150 U/L Final     ALP   Date Value Ref Range Status   05/18/2025 51 40 - 150 unit/L Final     AST   Date Value Ref Range Status   05/18/2025 28 11 - 45 unit/L Final   01/18/2025 21 10 - 40 U/L Final     ALT   Date Value Ref Range Status   05/18/2025 11 10 - 44 unit/L Final   01/18/2025 15 10 - 44 U/L Final     Anion Gap   Date Value Ref Range Status   05/18/2025 7  (L) 8 - 16 mmol/L Final     eGFR   Date Value Ref Range Status   05/18/2025 >60 >60 mL/min/1.73/m2 Final     Comment:     Estimated GFR calculated using the CKD-EPI creatinine (2021) equation.   01/18/2025 >60.0 >60 mL/min/1.73 m^2 Final         PFT  reviewed  Pulmonary Functions Testing Results:    Patient has chronic respiratory failure secondary to COPD.  Patient will need a specific targeted tidal volume which cannot be provided via a CPAP or BiPAP.  This patient will require a set tidal volume to ensure CO2 levels were lowered adequately; as well as, to assist in establishing proper diaphragmatic functions.  Therefore, NIV is being ordered due to the severe state of this patient's disease.  Without this therapy, the patient runs a higher risk of expiration and readmittance.  Patient is benefiting from nightly NIV use however would also benefit from portable NIV for daytime use to help reduce shortness of breath and increase patient's overall daytime physical activities. Supplemental oxygen during the day is not enough support to complete patient's daily activities.     Plan:  Clinical impression is ambiguous and will need repeated evaluation wrtCollins    Zayra was seen today for copd.    Diagnoses and all orders for this visit:    Stage 4 very severe COPD by GOLD classification  -     predniSONE (DELTASONE) 2.5 MG tablet; Take 1 tablet (2.5 mg total) by mouth once daily.    Personal history of nicotine dependence    Chronic sinus complaints    Atelectasis of both lungs    Chronic respiratory failure with hypoxia and hypercapnia    DASH (obstructive sleep apnea)    Lung nodule          Follow up in about 3 months (around 9/27/2025), or if symptoms worsen or fail to improve.    Discussed with patient above for education the following:      Patient Instructions   Continue current COPD regimen including Trelegy inhaler 1 puff once per day. This inhaler contains an inhaled steroid component. Rinse mouth after each use  due to risk for thrush development. If mouth or tongue develops white sores please contact the clinic and I will order a prescription mouth wash.     Continue to use albuterol and nebulizer treatments as needed for shortness of breath, wheezing, cough.    Continue the use of supplemental oxygen throughout the day as needed and at nighttime.     Also continue NIV use nightly.    Attempt to taper Prednisone - will prescribe 2.5 mg to be taken daily.     CT Chest can be done in one year (DUE FEB 2026).    Continue DUPIXENT injection every two weeks.     I have ordered sputum cultures - you will leave the office today with sputum specimen cups. Bring to any [facility] within 4 hours of obtaining specimen. Rinse your mouth prior to collecting sample. Please collect sample in the morning by deep coughing prior to eating or drinking. I recommend submitting sample in about two weeks.    Continue current medication regiment. Keep follow up appointment as scheduled. Please call the office if you have any questions or concerns.

## 2025-06-27 NOTE — PATIENT INSTRUCTIONS
Continue current COPD regimen including Trelegy inhaler 1 puff once per day. This inhaler contains an inhaled steroid component. Rinse mouth after each use due to risk for thrush development. If mouth or tongue develops white sores please contact the clinic and I will order a prescription mouth wash.     Continue to use albuterol and nebulizer treatments as needed for shortness of breath, wheezing, cough.    Continue the use of supplemental oxygen throughout the day as needed and at nighttime.     Also continue NIV use nightly.    Attempt to taper Prednisone - will prescribe 2.5 mg to be taken daily.     CT Chest can be done in one year (DUE FEB 2026).    Continue DUPIXENT injection every two weeks.     I have ordered sputum cultures - you will leave the office today with sputum specimen cups. Bring to any [facility] within 4 hours of obtaining specimen. Rinse your mouth prior to collecting sample. Please collect sample in the morning by deep coughing prior to eating or drinking. I recommend submitting sample in about two weeks.    Continue current medication regiment. Keep follow up appointment as scheduled. Please call the office if you have any questions or concerns.

## 2025-07-12 DIAGNOSIS — K29.70 GASTRITIS, PRESENCE OF BLEEDING UNSPECIFIED, UNSPECIFIED CHRONICITY, UNSPECIFIED GASTRITIS TYPE: ICD-10-CM

## 2025-07-12 NOTE — TELEPHONE ENCOUNTER
No care due was identified.  [facility] Embedded Care Due Messages. Reference number: [id number].   7/12/2025 9:31:58 AM CDT

## 2025-07-14 RX ORDER — PANTOPRAZOLE SODIUM 40 MG/1
40 TABLET, DELAYED RELEASE ORAL DAILY
Qty: 90 TABLET | Refills: 3 | Status: SHIPPED | OUTPATIENT
Start: 2025-07-14

## 2025-07-17 ENCOUNTER — HOSPITAL ENCOUNTER (EMERGENCY)
Facility: HOSPITAL | Age: 60
Discharge: HOME | End: 2025-07-17
Attending: STUDENT IN AN ORGANIZED HEALTH CARE EDUCATION/TRAINING PROGRAM
Payer: MEDICAID

## 2025-07-17 VITALS
WEIGHT: 115 LBS | TEMPERATURE: 99 F | SYSTOLIC BLOOD PRESSURE: 135 MMHG | HEART RATE: 102 BPM | DIASTOLIC BLOOD PRESSURE: 72 MMHG | RESPIRATION RATE: 22 BRPM | BODY MASS INDEX: 19.16 KG/M2 | HEIGHT: 65 IN | OXYGEN SATURATION: 99 %

## 2025-07-17 DIAGNOSIS — R06.02 SHORTNESS OF BREATH: ICD-10-CM

## 2025-07-17 DIAGNOSIS — J44.1 COPD EXACERBATION: Primary | ICD-10-CM

## 2025-07-17 LAB
ABSOLUTE EOSINOPHIL (OHS): 0.35 K/UL
ABSOLUTE MONOCYTE (OHS): 0.87 K/UL (ref 0.3–1)
ABSOLUTE NEUTROPHIL COUNT (OHS): 9.84 K/UL (ref 1.8–7.7)
ALBUMIN SERPL BCP-MCNC: 4.3 G/DL (ref 3.5–5.2)
ALP SERPL-CCNC: 68 UNIT/L (ref 40–150)
ALT SERPL W/O P-5'-P-CCNC: 14 UNIT/L (ref 10–44)
ANION GAP (OHS): 12 MMOL/L (ref 8–16)
AST SERPL-CCNC: 28 UNIT/L (ref 11–45)
BASOPHILS # BLD AUTO: 0.1 K/UL
BASOPHILS NFR BLD AUTO: 0.7 %
BILIRUB SERPL-MCNC: 0.8 MG/DL (ref 0.1–1)
BNP SERPL-MCNC: 20 PG/ML (ref 0–99)
BUN SERPL-MCNC: 7 MG/DL (ref 8–23)
CALCIUM SERPL-MCNC: 10.1 MG/DL (ref 8.7–10.5)
CHLORIDE SERPL-SCNC: 103 MMOL/L (ref 95–110)
CO2 SERPL-SCNC: 27 MMOL/L (ref 23–29)
CREAT SERPL-MCNC: 0.9 MG/DL (ref 0.5–1.4)
D DIMER PPP IA.FEU-MCNC: 0.36 MG/L FEU
ERYTHROCYTE [DISTWIDTH] IN BLOOD BY AUTOMATED COUNT: 12 % (ref 11.5–14.5)
GFR SERPLBLD CREATININE-BSD FMLA CKD-EPI: >60 ML/MIN/1.73/M2
GLUCOSE SERPL-MCNC: 110 MG/DL (ref 70–110)
HCT VFR BLD AUTO: 44.2 % (ref 37–48.5)
HGB BLD-MCNC: 14.9 GM/DL (ref 12–16)
HOLD SPECIMEN: NORMAL
IMM GRANULOCYTES # BLD AUTO: 0.05 K/UL (ref 0–0.04)
IMM GRANULOCYTES NFR BLD AUTO: 0.4 % (ref 0–0.5)
INFLUENZA A MOLECULAR (OHS): NEGATIVE
INFLUENZA B MOLECULAR (OHS): NEGATIVE
LYMPHOCYTES # BLD AUTO: 2.47 K/UL (ref 1–4.8)
MAGNESIUM SERPL-MCNC: 1.9 MG/DL (ref 1.6–2.6)
MCH RBC QN AUTO: 32.5 PG (ref 27–31)
MCHC RBC AUTO-ENTMCNC: 33.7 G/DL (ref 32–36)
MCV RBC AUTO: 96 FL (ref 82–98)
NUCLEATED RBC (/100WBC) (OHS): 0 /100 WBC
OHS QRS DURATION: 80 MS
OHS QTC CALCULATION: 428 MS
PLATELET # BLD AUTO: 339 K/UL (ref 150–450)
PMV BLD AUTO: 8.7 FL (ref 9.2–12.9)
POTASSIUM SERPL-SCNC: 3.8 MMOL/L (ref 3.5–5.1)
PROT SERPL-MCNC: 8 GM/DL (ref 6–8.4)
RBC # BLD AUTO: 4.59 M/UL (ref 4–5.4)
RELATIVE EOSINOPHIL (OHS): 2.6 %
RELATIVE LYMPHOCYTE (OHS): 18.1 % (ref 18–48)
RELATIVE MONOCYTE (OHS): 6.4 % (ref 4–15)
RELATIVE NEUTROPHIL (OHS): 71.8 % (ref 38–73)
SARS-COV-2 RDRP RESP QL NAA+PROBE: NEGATIVE
SODIUM SERPL-SCNC: 142 MMOL/L (ref 136–145)
TROPONIN I SERPL DL<=0.01 NG/ML-MCNC: <0.006 NG/ML
TROPONIN I SERPL DL<=0.01 NG/ML-MCNC: <0.006 NG/ML
WBC # BLD AUTO: 13.68 K/UL (ref 3.9–12.7)

## 2025-07-17 PROCEDURE — 96374 THER/PROPH/DIAG INJ IV PUSH: CPT

## 2025-07-17 PROCEDURE — 87502 INFLUENZA DNA AMP PROBE: CPT | Performed by: STUDENT IN AN ORGANIZED HEALTH CARE EDUCATION/TRAINING PROGRAM

## 2025-07-17 PROCEDURE — 84484 ASSAY OF TROPONIN QUANT: CPT | Performed by: EMERGENCY MEDICINE

## 2025-07-17 PROCEDURE — 93005 ELECTROCARDIOGRAM TRACING: CPT | Performed by: INTERNAL MEDICINE

## 2025-07-17 PROCEDURE — 63600175 PHARM REV CODE 636 W HCPCS: Performed by: EMERGENCY MEDICINE

## 2025-07-17 PROCEDURE — 96375 TX/PRO/DX INJ NEW DRUG ADDON: CPT

## 2025-07-17 PROCEDURE — 85025 COMPLETE CBC W/AUTO DIFF WBC: CPT | Performed by: EMERGENCY MEDICINE

## 2025-07-17 PROCEDURE — 25000242 PHARM REV CODE 250 ALT 637 W/ HCPCS: Performed by: EMERGENCY MEDICINE

## 2025-07-17 PROCEDURE — 99285 EMERGENCY DEPT VISIT HI MDM: CPT | Mod: 25

## 2025-07-17 PROCEDURE — 94640 AIRWAY INHALATION TREATMENT: CPT

## 2025-07-17 PROCEDURE — 84484 ASSAY OF TROPONIN QUANT: CPT | Performed by: STUDENT IN AN ORGANIZED HEALTH CARE EDUCATION/TRAINING PROGRAM

## 2025-07-17 PROCEDURE — U0002 COVID-19 LAB TEST NON-CDC: HCPCS | Performed by: EMERGENCY MEDICINE

## 2025-07-17 PROCEDURE — 80053 COMPREHEN METABOLIC PANEL: CPT | Performed by: EMERGENCY MEDICINE

## 2025-07-17 PROCEDURE — 94761 N-INVAS EAR/PLS OXIMETRY MLT: CPT

## 2025-07-17 PROCEDURE — 83735 ASSAY OF MAGNESIUM: CPT | Performed by: STUDENT IN AN ORGANIZED HEALTH CARE EDUCATION/TRAINING PROGRAM

## 2025-07-17 PROCEDURE — 85379 FIBRIN DEGRADATION QUANT: CPT | Performed by: STUDENT IN AN ORGANIZED HEALTH CARE EDUCATION/TRAINING PROGRAM

## 2025-07-17 PROCEDURE — 93010 ELECTROCARDIOGRAM REPORT: CPT | Mod: ,,, | Performed by: INTERNAL MEDICINE

## 2025-07-17 PROCEDURE — 71045 X-RAY EXAM CHEST 1 VIEW: CPT | Mod: TC

## 2025-07-17 PROCEDURE — 25000003 PHARM REV CODE 250: Performed by: STUDENT IN AN ORGANIZED HEALTH CARE EDUCATION/TRAINING PROGRAM

## 2025-07-17 PROCEDURE — 71045 X-RAY EXAM CHEST 1 VIEW: CPT | Mod: 26,,, | Performed by: RADIOLOGY

## 2025-07-17 PROCEDURE — 27000221 HC OXYGEN, UP TO 24 HOURS

## 2025-07-17 PROCEDURE — 83880 ASSAY OF NATRIURETIC PEPTIDE: CPT | Performed by: EMERGENCY MEDICINE

## 2025-07-17 RX ORDER — IPRATROPIUM BROMIDE AND ALBUTEROL SULFATE 2.5; .5 MG/3ML; MG/3ML
6 SOLUTION RESPIRATORY (INHALATION)
Status: COMPLETED | OUTPATIENT
Start: 2025-07-17 | End: 2025-07-17

## 2025-07-17 RX ORDER — ACETAMINOPHEN 500 MG
1000 TABLET ORAL
Status: COMPLETED | OUTPATIENT
Start: 2025-07-17 | End: 2025-07-17

## 2025-07-17 RX ORDER — ONDANSETRON HYDROCHLORIDE 2 MG/ML
4 INJECTION, SOLUTION INTRAVENOUS
Status: COMPLETED | OUTPATIENT
Start: 2025-07-17 | End: 2025-07-17

## 2025-07-17 RX ORDER — METHYLPREDNISOLONE SOD SUCC 125 MG
125 VIAL (EA) INJECTION
Status: COMPLETED | OUTPATIENT
Start: 2025-07-17 | End: 2025-07-17

## 2025-07-17 RX ORDER — DOXYCYCLINE 100 MG/1
100 CAPSULE ORAL 2 TIMES DAILY
Qty: 20 CAPSULE | Refills: 0 | Status: SHIPPED | OUTPATIENT
Start: 2025-07-17 | End: 2025-07-17

## 2025-07-17 RX ORDER — DOXYCYCLINE 100 MG/1
100 CAPSULE ORAL 2 TIMES DAILY
Qty: 10 CAPSULE | Refills: 0 | Status: SHIPPED | OUTPATIENT
Start: 2025-07-17 | End: 2025-07-22

## 2025-07-17 RX ADMIN — IPRATROPIUM BROMIDE AND ALBUTEROL SULFATE 6 ML: .5; 3 SOLUTION RESPIRATORY (INHALATION) at 06:07

## 2025-07-17 RX ADMIN — ONDANSETRON 4 MG: 2 INJECTION INTRAMUSCULAR; INTRAVENOUS at 05:07

## 2025-07-17 RX ADMIN — ACETAMINOPHEN 1000 MG: 500 TABLET ORAL at 07:07

## 2025-07-17 RX ADMIN — METHYLPREDNISOLONE SODIUM SUCCINATE 125 MG: 125 INJECTION, POWDER, FOR SOLUTION INTRAMUSCULAR; INTRAVENOUS at 05:07

## 2025-07-17 RX ADMIN — SODIUM CHLORIDE 500 ML: 9 INJECTION, SOLUTION INTRAVENOUS at 08:07

## 2025-07-17 NOTE — DISCHARGE INSTRUCTIONS
Take antibiotics as prescribed, starting today    Follow up with the primary care physician    May return to the ED at any time if any worsening symptoms

## 2025-07-17 NOTE — ED PROVIDER NOTES
Encounter Date: 2025       History     Chief Complaint   Patient presents with    Shortness of Breath     Pt reports she awoke about an hour ago feeling SOB. Pt reports she took 1-2 puffs from her albuterol inhaler with no decrease in symptoms. Pt reports she wears 2 l o2 nc at home normally and when she is SOB she turns it to 3L.     Nausea     Pt reports nausea with no emesis this morning.      59-year-old female with a significant history below includes COPD on home O2.  She presents to [facility] ED with complaints of progressively worsening shortness of breath for the past few weeks for which worsened last night. She endorses associated productive cough, subjective fever and chills, nausea. She denies chest pain, palpitations, orthopnea, dependent edema.    The history is provided by the patient. No  was used.     Review of patient's allergies indicates:   Allergen Reactions    Ativan [lorazepam] Itching     Past Medical History:   Diagnosis Date    Anxiety     Arthritis     Asthma     Back pain     COPD (chronic obstructive pulmonary disease)     Pulmonary embolism     10 years ago     Past Surgical History:   Procedure Laterality Date    BIOPSY      right breast    BREAST CYST EXCISION      CATARACT EXTRACTION       SECTION  1990    CHOLECYSTECTOMY      COLONOSCOPY N/A 2022    Procedure: COLONOSCOPY;  Surgeon: Chavez Gonzales MD;  Location: [facility] ENDO;  Service: General;  Laterality: N/A;    ESOPHAGOGASTRODUODENOSCOPY N/A 2022    Procedure: ESOPHAGOGASTRODUODENOSCOPY (EGD);  Surgeon: Chavez Gonzales MD;  Location: [facility] ENDO;  Service: General;  Laterality: N/A;    EYE SURGERY      INCISION AND DRAINAGE OF ABSCESS Left 2020    Procedure: INCISION AND DRAINAGE, ABSCESS;  Surgeon: Irvin Villarreal MD;  Location: [facility] OR;  Service: General;  Laterality: Left;    TUBAL LIGATION       Family History   Problem Relation Name Age of  Onset    Breast cancer Sister JoanRhode     COPD Sister JoanRhode     Heart disease Sister JoanRhode     Hypertension Sister JoanRhode     Learning disabilities Sister JoanRhode     Arthritis Mother Carmina Wong     Cancer Mother Carmina Wong         Lung Cancer    Heart disease Mother Carmina Wong     Hypertension Mother Carmina Wong     Hypertension Father Jay Wong     Stroke Father Jay Wong     Asthma Son Jacoby [facility]     Diabetes Brother Yovany Wong     Ovarian cancer Neg Hx       Social History[1]  Review of Systems   Constitutional: Negative.    HENT: Negative.     Eyes: Negative.    Respiratory:  Positive for cough, shortness of breath and wheezing.    Cardiovascular: Negative.    Gastrointestinal: Negative.    Genitourinary: Negative.    Musculoskeletal: Negative.    Skin: Negative.    Neurological: Negative.    Psychiatric/Behavioral: Negative.     All other systems reviewed and are negative.      Physical Exam     Initial Vitals [07/17/25 0540]   BP Pulse Resp Temp SpO2   134/88 (!) 145 20 99.2 °F (37.3 °C) 95 %      MAP       --         Physical Exam    Nursing note and vitals reviewed.  Constitutional: She appears well-developed.   HENT:   Head: Normocephalic.   Eyes: Pupils are equal, round, and reactive to light.   Neck: No JVD present.   Normal range of motion.  Cardiovascular:  Normal rate.           Pulmonary/Chest: She has wheezes.   Diminished bases   Abdominal: Abdomen is soft. Bowel sounds are normal.   Musculoskeletal:         General: Normal range of motion.      Cervical back: Normal range of motion.     Neurological: She is alert and oriented to person, place, and time. GCS score is 15. GCS eye subscore is 4. GCS verbal subscore is 5. GCS motor subscore is 6.   Skin: Skin is warm. Capillary refill takes less than 2 seconds.   Psychiatric: She has a normal mood and affect.         ED Course   Procedures  Labs Reviewed   COMPREHENSIVE METABOLIC PANEL - Abnormal       Result Value     Sodium 142      Potassium 3.8      Chloride 103      CO2 27      Glucose 110      BUN 7 (*)     Creatinine 0.9      Calcium 10.1      Protein Total 8.0      Albumin 4.3      Bilirubin Total 0.8      ALP 68      AST 28      ALT 14      Anion Gap 12      eGFR >60     CBC WITH DIFFERENTIAL - Abnormal    WBC 13.68 (*)     RBC 4.59      HGB 14.9      HCT 44.2      MCV 96      MCH 32.5 (*)     MCHC 33.7      RDW 12.0      Platelet Count 339      MPV 8.7 (*)     Nucleated RBC 0      Neut % 71.8      Lymph % 18.1      Mono % 6.4      Eos % 2.6      Basophil % 0.7      Imm Grans % 0.4      Neut # 9.84 (*)     Lymph # 2.47      Mono # 0.87      Eos # 0.35      Baso # 0.10      Imm Grans # 0.05 (*)    INFLUENZA A & B BY MOLECULAR - Normal    INFLUENZA A MOLECULAR Negative      INFLUENZA B MOLECULAR  Negative     SARS-COV-2 RNA AMPLIFICATION, QUAL - Normal    SARS COV-2 Molecular Negative     B-TYPE NATRIURETIC PEPTIDE - Normal    BNP 20     TROPONIN I - Normal    Troponin-I <0.006     MAGNESIUM - Normal    Magnesium  1.9     TROPONIN I - Normal    Troponin-I <0.006     D DIMER, QUANTITATIVE - Normal    D-Dimer 0.36     CBC W/ AUTO DIFFERENTIAL    Narrative:     The following orders were created for panel order CBC Auto Differential.  Procedure                               Abnormality         Status                     ---------                               -----------         ------                     CBC with Differential[[id number]]       Abnormal            Final result                 Please view results for these tests on the individual orders.   EXTRA TUBES    Narrative:     The following orders were created for panel order EXTRA TUBES.  Procedure                               Abnormality         Status                     ---------                               -----------         ------                     Light Green Top Hold[[id number]]                            Final result                 Please view results  for these tests on the individual orders.   LIGHT GREEN TOP HOLD    Extra Tube Hold for add-ons.            Imaging Results              X-Ray Chest 1 View (Final result)  Result time 07/17/25 07:13:04      Final result by Mathieu Steiner MD (07/17/25 07:13:04)                   Impression:      COPD.  No acute process.      Electronically signed by: Mathieu Steiner MD  Date:    07/17/2025  Time:    07:13               Narrative:    EXAMINATION:  XR CHEST 1 VIEW    CLINICAL HISTORY:  shortness of breath;    TECHNIQUE:  Single frontal view of the chest was performed.    COMPARISON:  05/17/2025    FINDINGS:  The heart size is normal.  The lungs are hyperexpanded in keeping with COPD.  No consolidation, edema, pleural effusion, or pneumothorax.                                       Medications   ondansetron injection 4 mg (4 mg Intravenous Given 7/17/25 0556)   methylPREDNISolone sodium succinate injection 125 mg (125 mg Intravenous Given 7/17/25 0556)   albuterol-ipratropium 2.5 mg-0.5 mg/3 mL nebulizer solution 6 mL (6 mLs Nebulization Given 7/17/25 0611)   acetaminophen tablet 1,000 mg (1,000 mg Oral Given 7/17/25 0744)   sodium chloride 0.9% bolus 500 mL 500 mL (500 mLs Intravenous New Bag 7/17/25 0813)     Medical Decision Making  Ddx COPD exacerbation, pneumonia, others  Troponin x2 normal, BNP, D-dimer, CBC, CMP UA without significant gross abnormalities.  Chest x-ray normal  Received breathing treatments, steroids for COPD exacerbation  Rx doxycycline for productive cough  She is re-evaluated recent has resolved she feels better  Patient's symptoms seem to be stable. I discussed the patient's diagnosis, treatment plan, and plan for discharge with the patient. Patient was instructed to follow up with PCP and was given strict return precautions to the ED. The patient voiced understanding and agreed with the plan      Risk  OTC drugs.  Prescription drug management.                                           Clinical Impression:  Final diagnoses:  [R06.02] Shortness of breath  [J44.1] COPD exacerbation (Primary)          ED Disposition Condition    Discharge Stable          ED Prescriptions       Medication Sig Dispense Start Date End Date Auth. Provider    doxycycline (VIBRAMYCIN) 100 MG Cap  (Status: Discontinued) Take 1 capsule (100 mg total) by mouth 2 (two) times daily. for 10 days 20 capsule 7/17/2025 7/17/2025 Devaughn Keane MD    doxycycline (VIBRAMYCIN) 100 MG Cap Take 1 capsule (100 mg total) by mouth 2 (two) times daily. for 5 days 10 capsule 7/17/2025 7/22/2025 Devaughn Keane MD          Follow-up Information       Follow up With Specialties Details Why Contact Info    Polly Lemos MD Family Medicine  As needed [address] [facility] [place] [address] [phone]                     [1]   Social History  Tobacco Use    Smoking status: Former     Types: Cigarettes    Smokeless tobacco: Never   Substance Use Topics    Alcohol use: Not Currently     Comment: occ    Drug use: Yes     Types: Benzodiazepines     Comment: as prescribed        Devaughn Keane MD  07/17/25 0929

## 2025-07-17 NOTE — RESPIRATORY THERAPY
07/17/25 0612   Patient Assessment/Suction   Level of Consciousness (AVPU) alert   Respiratory Effort Short of breath   Expansion/Accessory Muscles/Retractions no retractions;no use of accessory muscles   Rhythm/Pattern, Respiratory shortness of breath   Cough Frequency infrequent   Cough Type nonproductive   Skin Integrity   Area Observed Left;Right;Behind ear;Nares;Upper lip   Skin Appearance without discoloration   PRE-TX-O2   Device (Oxygen Therapy) nasal cannula   $ Is the patient on Low Flow Oxygen? Yes   Flow (L/min) (Oxygen Therapy) 2   Oxygen Concentration (%) 28   SpO2 97 %   Pulse (!) 116   Resp (!) 21   Aerosol Therapy   $ Aerosol Therapy Charges Aerosol Treatment   Daily Review of Necessity (SVN) completed   Respiratory Treatment Status (SVN) given   Treatment Route (SVN) mask;air   Patient Position semi-Hernández's   Post Treatment Assessment (SVN) increased aeration   Signs of Intolerance (SVN) none   Ready to Wean/Extubation Screen   FIO2<=50 (chart decimal) 0.28

## 2025-07-17 NOTE — ED NOTES
"1st contact with pt, care assumed, report received from nurse Farzaneh, sitting high howell in stretcher, awake, alert, a&ox3, resp even non labored, reports reason for ER visit " exacerbation of copd" " I couldn't breath, nausea" reports symptom awakened her from sleep approx 45-1hr pta, pt has been treated for complaints while in the ER, currently reports nausea is " not as bad" reports SOB " a little better" pt reports " I just feel bad" c/o L sided head HA, rates pain 9/10, no facial grimacing, calm, skin dry, warm, thin, fragile, ST on cardiac monitor, sr up 2, call light within reach, tv control offered, pt refused tv at this time, warm blankets provided, lights dimmed per pt request,       Pt is made aware, md will be made aware of her c/o HA, pt verbalized understanding  "

## 2025-07-24 ENCOUNTER — TELEPHONE (OUTPATIENT)
Dept: PULMONOLOGY | Facility: CLINIC | Age: 60
End: 2025-07-24
Payer: MEDICAID

## 2025-07-31 ENCOUNTER — TELEPHONE (OUTPATIENT)
Dept: PULMONOLOGY | Facility: CLINIC | Age: 60
End: 2025-07-31
Payer: MEDICAID

## 2025-08-07 ENCOUNTER — TELEPHONE (OUTPATIENT)
Dept: PULMONOLOGY | Facility: CLINIC | Age: 60
End: 2025-08-07
Payer: MEDICAID

## 2025-08-22 ENCOUNTER — RESULTS FOLLOW-UP (OUTPATIENT)
Dept: FAMILY MEDICINE | Facility: CLINIC | Age: 60
End: 2025-08-22

## 2025-08-22 ENCOUNTER — HOSPITAL ENCOUNTER (OUTPATIENT)
Dept: RADIOLOGY | Facility: HOSPITAL | Age: 60
Discharge: HOME | End: 2025-08-22
Attending: FAMILY MEDICINE
Payer: MEDICAID

## 2025-08-22 ENCOUNTER — OFFICE VISIT (OUTPATIENT)
Dept: FAMILY MEDICINE | Facility: CLINIC | Age: 60
End: 2025-08-22
Payer: MEDICAID

## 2025-08-22 VITALS
HEART RATE: 78 BPM | DIASTOLIC BLOOD PRESSURE: 62 MMHG | HEIGHT: 65 IN | BODY MASS INDEX: 18.8 KG/M2 | WEIGHT: 112.81 LBS | OXYGEN SATURATION: 97 % | RESPIRATION RATE: 16 BRPM | SYSTOLIC BLOOD PRESSURE: 134 MMHG

## 2025-08-22 DIAGNOSIS — F41.9 ANXIETY: ICD-10-CM

## 2025-08-22 DIAGNOSIS — Z79.899 CHRONIC PRESCRIPTION BENZODIAZEPINE USE: ICD-10-CM

## 2025-08-22 DIAGNOSIS — M54.50 LUMBAR PAIN: Primary | ICD-10-CM

## 2025-08-22 DIAGNOSIS — M54.50 LUMBAR PAIN: ICD-10-CM

## 2025-08-22 DIAGNOSIS — J44.9 STAGE 4 VERY SEVERE COPD BY GOLD CLASSIFICATION: ICD-10-CM

## 2025-08-22 DIAGNOSIS — J43.8 OTHER EMPHYSEMA: ICD-10-CM

## 2025-08-22 PROCEDURE — 99999 PR PBB SHADOW E&M-EST. PATIENT-LVL IV: CPT | Mod: PBBFAC,,, | Performed by: FAMILY MEDICINE

## 2025-08-22 PROCEDURE — 72100 X-RAY EXAM L-S SPINE 2/3 VWS: CPT | Mod: TC

## 2025-08-22 PROCEDURE — 99214 OFFICE O/P EST MOD 30 MIN: CPT | Mod: PBBFAC,25 | Performed by: FAMILY MEDICINE

## 2025-08-22 RX ORDER — CYCLOBENZAPRINE HCL 10 MG
10 TABLET ORAL 3 TIMES DAILY PRN
Qty: 30 TABLET | Refills: 2 | Status: SHIPPED | OUTPATIENT
Start: 2025-08-22

## 2025-08-22 RX ORDER — CLONAZEPAM 0.5 MG/1
TABLET ORAL
Qty: 60 TABLET | Refills: 2 | Status: SHIPPED | OUTPATIENT
Start: 2025-08-22

## 2025-09-02 ENCOUNTER — HOSPITAL ENCOUNTER (EMERGENCY)
Facility: HOSPITAL | Age: 60
Discharge: HOME | End: 2025-09-02
Attending: EMERGENCY MEDICINE
Payer: MEDICAID

## 2025-09-02 VITALS
RESPIRATION RATE: 18 BRPM | WEIGHT: 110 LBS | HEIGHT: 65 IN | DIASTOLIC BLOOD PRESSURE: 60 MMHG | TEMPERATURE: 99 F | BODY MASS INDEX: 18.33 KG/M2 | HEART RATE: 95 BPM | SYSTOLIC BLOOD PRESSURE: 129 MMHG | OXYGEN SATURATION: 100 %

## 2025-09-02 DIAGNOSIS — M54.50 ACUTE BILATERAL LOW BACK PAIN WITHOUT SCIATICA: Primary | ICD-10-CM

## 2025-09-02 DIAGNOSIS — S39.012A LUMBAR STRAIN, INITIAL ENCOUNTER: ICD-10-CM

## 2025-09-02 PROCEDURE — 25000003 PHARM REV CODE 250: Performed by: EMERGENCY MEDICINE

## 2025-09-02 PROCEDURE — 96372 THER/PROPH/DIAG INJ SC/IM: CPT | Performed by: EMERGENCY MEDICINE

## 2025-09-02 PROCEDURE — 63600175 PHARM REV CODE 636 W HCPCS: Performed by: EMERGENCY MEDICINE

## 2025-09-02 PROCEDURE — 99284 EMERGENCY DEPT VISIT MOD MDM: CPT | Mod: 25

## 2025-09-02 RX ORDER — HYDROCODONE BITARTRATE AND ACETAMINOPHEN 10; 325 MG/1; MG/1
1 TABLET ORAL
Refills: 0 | Status: COMPLETED | OUTPATIENT
Start: 2025-09-02 | End: 2025-09-02

## 2025-09-02 RX ORDER — METHOCARBAMOL 500 MG/1
1000 TABLET, FILM COATED ORAL 3 TIMES DAILY
Qty: 30 TABLET | Refills: 0 | Status: ON HOLD | OUTPATIENT
Start: 2025-09-02 | End: 2025-09-07

## 2025-09-02 RX ORDER — PREDNISONE 20 MG/1
20 TABLET ORAL DAILY
Qty: 15 TABLET | Refills: 1 | Status: ON HOLD | OUTPATIENT
Start: 2025-09-02

## 2025-09-02 RX ORDER — ORPHENADRINE CITRATE 30 MG/ML
60 INJECTION INTRAMUSCULAR; INTRAVENOUS
Status: COMPLETED | OUTPATIENT
Start: 2025-09-02 | End: 2025-09-02

## 2025-09-02 RX ORDER — HYDROCODONE BITARTRATE AND ACETAMINOPHEN 10; 325 MG/1; MG/1
1 TABLET ORAL EVERY 6 HOURS PRN
Qty: 12 TABLET | Refills: 0 | Status: ON HOLD | OUTPATIENT
Start: 2025-09-02

## 2025-09-02 RX ORDER — KETOROLAC TROMETHAMINE 30 MG/ML
60 INJECTION, SOLUTION INTRAMUSCULAR; INTRAVENOUS
Status: COMPLETED | OUTPATIENT
Start: 2025-09-02 | End: 2025-09-02

## 2025-09-02 RX ADMIN — KETOROLAC TROMETHAMINE 60 MG: 30 INJECTION, SOLUTION INTRAMUSCULAR; INTRAVENOUS at 08:09

## 2025-09-02 RX ADMIN — ORPHENADRINE CITRATE 60 MG: 60 INJECTION INTRAMUSCULAR; INTRAVENOUS at 08:09

## 2025-09-02 RX ADMIN — HYDROCODONE BITARTRATE AND ACETAMINOPHEN 1 TABLET: 10; 325 TABLET ORAL at 08:09

## 2025-09-05 ENCOUNTER — OFFICE VISIT (OUTPATIENT)
Dept: URGENT CARE | Facility: CLINIC | Age: 60
End: 2025-09-05
Payer: MEDICAID

## 2025-09-05 VITALS
SYSTOLIC BLOOD PRESSURE: 156 MMHG | HEART RATE: 109 BPM | DIASTOLIC BLOOD PRESSURE: 87 MMHG | TEMPERATURE: 99 F | WEIGHT: 110 LBS | BODY MASS INDEX: 18.33 KG/M2 | OXYGEN SATURATION: 95 % | RESPIRATION RATE: 20 BRPM | HEIGHT: 65 IN

## 2025-09-05 DIAGNOSIS — U07.1 COVID-19: Primary | ICD-10-CM

## 2025-09-05 DIAGNOSIS — J44.9 CHRONIC OBSTRUCTIVE PULMONARY DISEASE, UNSPECIFIED COPD TYPE: ICD-10-CM

## 2025-09-05 DIAGNOSIS — R05.9 COUGH, UNSPECIFIED TYPE: ICD-10-CM

## 2025-09-05 PROBLEM — J18.9 COMMUNITY ACQUIRED PNEUMONIA OF RIGHT LOWER LOBE OF LUNG: Status: ACTIVE | Noted: 2025-09-05

## 2025-09-05 PROBLEM — J44.1 COPD EXACERBATION: Status: RESOLVED | Noted: 2023-12-29 | Resolved: 2025-09-05

## 2025-09-05 PROBLEM — Z86.711 PERSONAL HISTORY OF PULMONARY EMBOLISM: Status: ACTIVE | Noted: 2025-09-05

## 2025-09-05 PROBLEM — J12.82 PNEUMONIA DUE TO COVID-19 VIRUS: Status: ACTIVE | Noted: 2025-09-05

## 2025-09-05 PROBLEM — J18.9 COMMUNITY ACQUIRED PNEUMONIA OF RIGHT LOWER LOBE OF LUNG: Status: RESOLVED | Noted: 2025-09-05 | Resolved: 2025-09-05

## 2025-09-05 PROBLEM — F41.1 GAD (GENERALIZED ANXIETY DISORDER): Status: ACTIVE | Noted: 2025-09-05

## 2025-09-05 LAB
CTP QC/QA: YES
SARS-COV+SARS-COV-2 AG RESP QL IA.RAPID: POSITIVE

## 2025-09-05 RX ORDER — NIRMATRELVIR AND RITONAVIR 300-100 MG
KIT ORAL
Qty: 30 TABLET | Refills: 0 | Status: ON HOLD | OUTPATIENT
Start: 2025-09-05

## (undated) DEVICE — KIT CANIST SUCTION 1200CC

## (undated) DEVICE — BITE BLOCK ADULT LATEX FREE

## (undated) DEVICE — SEE MEDLINE ITEM 146292

## (undated) DEVICE — TRAP MUCUS SPECIMEN 80CC

## (undated) DEVICE — GLOVE SURG ULTRA TOUCH 7

## (undated) DEVICE — FORCEP BIOSY RJ 4 HOT 2.2X240

## (undated) DEVICE — SEE MEDLINE ITEM 156964

## (undated) DEVICE — SOL 9P NACL IRR PIC IL

## (undated) DEVICE — ELECTRODE REM PLYHSV RETURN 9

## (undated) DEVICE — SOL WATER STRL IRR 1000ML

## (undated) DEVICE — SEE MEDLINE ITEM 157116

## (undated) DEVICE — CANISTER SUCTION 3000CC

## (undated) DEVICE — KIT DEFENDO VLV  AIR WATER SUC

## (undated) DEVICE — GAUZE SPONGE BULKEE 6X6.75IN

## (undated) DEVICE — KIT ENDO CARRY-ON PROC 100310

## (undated) DEVICE — SNARE CAPTIVATOR II 25MM ROUND

## (undated) DEVICE — GLOVE SURG ULTRA TOUCH 7.5

## (undated) DEVICE — KIT COLLECTION E SWAB REGULAR

## (undated) DEVICE — SYR SLIP TIP 5CC

## (undated) DEVICE — GLOVE SURGEONS ULTRA TOUCH 6.5